# Patient Record
Sex: MALE | Race: WHITE | NOT HISPANIC OR LATINO | Employment: OTHER | URBAN - METROPOLITAN AREA
[De-identification: names, ages, dates, MRNs, and addresses within clinical notes are randomized per-mention and may not be internally consistent; named-entity substitution may affect disease eponyms.]

---

## 2018-01-11 NOTE — PROCEDURES
Results/Data  Procedure: Electroencephalography (EEG)   Indications for the procedure include Memory Disturbance  Were discussed with the patient  Written consent was obtained prior to the procedure and is detailed in the patient's record  Prior to the start of the procedure, a time out was taken and the identity of the patient was confirmed via name and date of birth with the patient  The correct site(s) and the procedure to be performed were confirmed and the site(s) were marked appropriately  The positioning of the patient and the availabilty of the correct equipment were verified  Certain medications (such as anticonvulsants and tranquilizers), stimulants, and alcohol were avoided for at least 24-48 hours prior to the procedure  Procedure Note:   Performed by: González File  Start Time: 11:15  End Time: 11:45  Electrode(s) Placement: Fp1, Fp2, F7, F3, Fz, F4, F8, T3, C3, CZ, C4, T4, T5, T6, P3, PZ, P4, O1, O2, A1 and A2  They were placed in a bipolar montage, referential montage, average reference montage, laplacian montage  The EEG was performed while the patient was exposed to of photic stimulation and awake and drowsy, but not hyperventilated and not sedated  Findings: EEG    This is a routine 18 channel EEG recording performed on a 51-year-old man  with a history of memory difficulty   Background activities during wakefulness consist of mid amplitude 8-9 cycle per second activities emanating from the posterior head region  These activities are reactive to eye opening  Intermingled with background activities are a moderate amount of lower amplitude beta activities emanating from the frontocentral head regions  Episodes of drowsiness and sleep are not seen  After about 13 minutes the study was discontinued at patient request    Photic stimulation was performed over a wide range of flash frequencies and produced a symmetrical driving response   Hyperventilation was not obtained  Concomitant EKG revealed a sinus rhythm  IMPRESSION: This is an incomplete study as only 13 minutes of EEG were performed due to poor patient tolerance  No abnormalities were noted in the portions obtained  A complete study could be obtained if the patient is more cooperative, or sedated    JOSEFINA Correia  Impression:    Post-Procedure:   Complications: The patient was non-compliant with the procedure   The patient requested I stop the EEG after only 14 minutes into the study        Signatures   Electronically signed by : Mary Malik MD; May  5 2016 12:53PM EST                       (Author)

## 2018-01-17 NOTE — MISCELLANEOUS
Dear  Mr Leticia Dawkins: We missed you for your originally scheduled appointment for an  EEG test ordered by Dr Dinah Hackett    Please call at your earliest convenience to reschedule this appointment      Sincerely,     Twila Severin  105           Electronically signed [place]   Mar 29 2016 11:40AM EST Author

## 2019-04-18 ENCOUNTER — TRANSCRIBE ORDERS (OUTPATIENT)
Dept: ADMINISTRATIVE | Facility: HOSPITAL | Age: 69
End: 2019-04-18

## 2019-04-18 DIAGNOSIS — J44.9 CHRONIC OBSTRUCTIVE PULMONARY DISEASE, UNSPECIFIED COPD TYPE (HCC): Primary | ICD-10-CM

## 2019-04-18 DIAGNOSIS — R42 DIZZINESS AND GIDDINESS: ICD-10-CM

## 2019-05-06 ENCOUNTER — HOSPITAL ENCOUNTER (OUTPATIENT)
Dept: PULMONOLOGY | Facility: HOSPITAL | Age: 69
Discharge: HOME | End: 2019-05-06
Attending: INTERNAL MEDICINE
Payer: MEDICARE

## 2019-05-06 ENCOUNTER — OFFICE VISIT (OUTPATIENT)
Dept: LAB | Facility: HOSPITAL | Age: 69
End: 2019-05-06
Attending: INTERNAL MEDICINE
Payer: MEDICARE

## 2019-05-06 DIAGNOSIS — J44.9 CHRONIC OBSTRUCTIVE PULMONARY DISEASE, UNSPECIFIED COPD TYPE (HCC): ICD-10-CM

## 2019-05-06 DIAGNOSIS — R42 DIZZINESS AND GIDDINESS: ICD-10-CM

## 2019-05-06 LAB
ATRIAL RATE: 97 BPM
P AXIS: 42 DEGREES
PR INTERVAL: 176 MS
QRS AXIS: 32 DEGREES
QRSD INTERVAL: 88 MS
QT INTERVAL: 346 MS
QTC INTERVAL: 439 MS
T WAVE AXIS: 59 DEGREES
VENTRICULAR RATE: 97 BPM

## 2019-05-06 PROCEDURE — 94729 DIFFUSING CAPACITY: CPT | Performed by: INTERNAL MEDICINE

## 2019-05-06 PROCEDURE — 94726 PLETHYSMOGRAPHY LUNG VOLUMES: CPT | Performed by: INTERNAL MEDICINE

## 2019-05-06 PROCEDURE — 94060 EVALUATION OF WHEEZING: CPT | Performed by: INTERNAL MEDICINE

## 2019-05-06 PROCEDURE — 93005 ELECTROCARDIOGRAM TRACING: CPT

## 2019-05-06 PROCEDURE — 93010 ELECTROCARDIOGRAM REPORT: CPT | Performed by: INTERNAL MEDICINE

## 2019-05-06 PROCEDURE — 94727 GAS DIL/WSHOT DETER LNG VOL: CPT

## 2019-05-06 PROCEDURE — 94729 DIFFUSING CAPACITY: CPT

## 2019-05-06 PROCEDURE — 94760 N-INVAS EAR/PLS OXIMETRY 1: CPT

## 2019-05-06 PROCEDURE — 94010 BREATHING CAPACITY TEST: CPT

## 2019-05-06 RX ORDER — DOCUSATE SODIUM 100 MG/1
1 CAPSULE, LIQUID FILLED ORAL 2 TIMES DAILY
COMMUNITY
End: 2019-06-20 | Stop reason: SDUPTHER

## 2019-05-06 RX ORDER — TRAZODONE HYDROCHLORIDE 100 MG/1
100 TABLET ORAL
COMMUNITY
Start: 2019-04-08

## 2019-05-06 RX ORDER — SIMVASTATIN 40 MG
1 TABLET ORAL
COMMUNITY
Start: 2009-06-18 | End: 2019-10-22 | Stop reason: ALTCHOICE

## 2019-05-06 RX ORDER — BUDESONIDE AND FORMOTEROL FUMARATE DIHYDRATE 80; 4.5 UG/1; UG/1
1 AEROSOL RESPIRATORY (INHALATION) EVERY 12 HOURS
COMMUNITY
End: 2020-01-29 | Stop reason: ALTCHOICE

## 2019-05-06 RX ORDER — ATORVASTATIN CALCIUM 40 MG/1
40 TABLET, FILM COATED ORAL DAILY
Refills: 5 | COMMUNITY
Start: 2019-05-02 | End: 2019-10-22 | Stop reason: SDUPTHER

## 2019-05-06 RX ORDER — CLOZAPINE 100 MG/1
TABLET ORAL
COMMUNITY
Start: 2019-04-08 | End: 2019-10-22 | Stop reason: ALTCHOICE

## 2019-05-06 RX ORDER — CLOZAPINE 200 MG/1
200 TABLET ORAL
COMMUNITY
Start: 2019-04-08

## 2019-05-06 RX ORDER — CHOLECALCIFEROL (VITAMIN D3) 25 MCG
1000 CAPSULE ORAL DAILY
Refills: 5 | COMMUNITY
Start: 2019-04-28 | End: 2019-10-22 | Stop reason: SDUPTHER

## 2019-05-06 RX ORDER — BENZTROPINE MESYLATE 1 MG/1
1 TABLET ORAL 2 TIMES DAILY
COMMUNITY
Start: 2009-06-18 | End: 2019-10-22 | Stop reason: ALTCHOICE

## 2019-05-06 RX ORDER — FAMOTIDINE 20 MG/1
1 TABLET, FILM COATED ORAL 2 TIMES DAILY
COMMUNITY
End: 2019-10-22 | Stop reason: SDUPTHER

## 2019-05-06 RX ORDER — AMLODIPINE BESYLATE 5 MG/1
1 TABLET ORAL DAILY
COMMUNITY
Start: 2010-02-03 | End: 2019-10-22 | Stop reason: ALTCHOICE

## 2019-05-06 RX ORDER — LOSARTAN POTASSIUM 25 MG/1
25 TABLET ORAL DAILY
Refills: 5 | COMMUNITY
Start: 2019-05-02 | End: 2019-10-22 | Stop reason: SDUPTHER

## 2019-05-07 ENCOUNTER — OFFICE VISIT (OUTPATIENT)
Dept: GASTROENTEROLOGY | Facility: CLINIC | Age: 69
End: 2019-05-07
Payer: MEDICARE

## 2019-05-07 VITALS
WEIGHT: 191.13 LBS | SYSTOLIC BLOOD PRESSURE: 124 MMHG | DIASTOLIC BLOOD PRESSURE: 70 MMHG | HEART RATE: 89 BPM | BODY MASS INDEX: 27.42 KG/M2

## 2019-05-07 DIAGNOSIS — K59.00 CONSTIPATION, UNSPECIFIED CONSTIPATION TYPE: ICD-10-CM

## 2019-05-07 DIAGNOSIS — K21.9 GASTROESOPHAGEAL REFLUX DISEASE, ESOPHAGITIS PRESENCE NOT SPECIFIED: Primary | ICD-10-CM

## 2019-05-07 PROCEDURE — 99214 OFFICE O/P EST MOD 30 MIN: CPT | Performed by: PHYSICIAN ASSISTANT

## 2019-05-07 RX ORDER — OMEPRAZOLE 40 MG/1
40 CAPSULE, DELAYED RELEASE ORAL DAILY
Qty: 30 CAPSULE | Refills: 1 | Status: SHIPPED | OUTPATIENT
Start: 2019-05-07 | End: 2019-10-22 | Stop reason: SDUPTHER

## 2019-05-07 RX ORDER — LACTULOSE 10 G/10G
10 SOLUTION ORAL DAILY
COMMUNITY
End: 2019-07-09 | Stop reason: ALTCHOICE

## 2019-05-07 RX ORDER — POLYETHYLENE GLYCOL 3350 17 G/17G
17 POWDER, FOR SOLUTION ORAL DAILY
Qty: 30 EACH | Refills: 5 | Status: SHIPPED | OUTPATIENT
Start: 2019-05-07 | End: 2019-10-22 | Stop reason: ALTCHOICE

## 2019-05-07 RX ORDER — ASPIRIN 325 MG
325 TABLET ORAL DAILY
COMMUNITY
End: 2019-10-22 | Stop reason: ALTCHOICE

## 2019-06-07 ENCOUNTER — APPOINTMENT (OUTPATIENT)
Dept: LAB | Facility: CLINIC | Age: 69
End: 2019-06-07
Payer: MEDICARE

## 2019-06-07 ENCOUNTER — TRANSCRIBE ORDERS (OUTPATIENT)
Dept: ADMINISTRATIVE | Facility: HOSPITAL | Age: 69
End: 2019-06-07

## 2019-06-07 DIAGNOSIS — Z79.899 LONG TERM CURRENT USE OF CLOZAPINE: Primary | ICD-10-CM

## 2019-06-07 DIAGNOSIS — Z79.899 LONG TERM CURRENT USE OF CLOZAPINE: ICD-10-CM

## 2019-06-07 LAB
BASOPHILS # BLD AUTO: 0.05 THOUSANDS/ΜL (ref 0–0.1)
BASOPHILS NFR BLD AUTO: 1 % (ref 0–1)
EOSINOPHIL # BLD AUTO: 0.31 THOUSAND/ΜL (ref 0–0.61)
EOSINOPHIL NFR BLD AUTO: 3 % (ref 0–6)
ERYTHROCYTE [DISTWIDTH] IN BLOOD BY AUTOMATED COUNT: 14.2 % (ref 11.6–15.1)
HCT VFR BLD AUTO: 43.1 % (ref 36.5–49.3)
HGB BLD-MCNC: 14.6 G/DL (ref 12–17)
IMM GRANULOCYTES # BLD AUTO: 0.02 THOUSAND/UL (ref 0–0.2)
IMM GRANULOCYTES NFR BLD AUTO: 0 % (ref 0–2)
LYMPHOCYTES # BLD AUTO: 1.32 THOUSANDS/ΜL (ref 0.6–4.47)
LYMPHOCYTES NFR BLD AUTO: 15 % (ref 14–44)
MCH RBC QN AUTO: 33.3 PG (ref 26.8–34.3)
MCHC RBC AUTO-ENTMCNC: 33.9 G/DL (ref 31.4–37.4)
MCV RBC AUTO: 98 FL (ref 82–98)
MONOCYTES # BLD AUTO: 0.86 THOUSAND/ΜL (ref 0.17–1.22)
MONOCYTES NFR BLD AUTO: 9 % (ref 4–12)
NEUTROPHILS # BLD AUTO: 6.55 THOUSANDS/ΜL (ref 1.85–7.62)
NEUTS SEG NFR BLD AUTO: 72 % (ref 43–75)
NRBC BLD AUTO-RTO: 0 /100 WBCS
PLATELET # BLD AUTO: 180 THOUSANDS/UL (ref 149–390)
PMV BLD AUTO: 10.1 FL (ref 8.9–12.7)
RBC # BLD AUTO: 4.39 MILLION/UL (ref 3.88–5.62)
WBC # BLD AUTO: 9.11 THOUSAND/UL (ref 4.31–10.16)

## 2019-06-07 PROCEDURE — 36415 COLL VENOUS BLD VENIPUNCTURE: CPT

## 2019-06-07 PROCEDURE — 85025 COMPLETE CBC W/AUTO DIFF WBC: CPT

## 2019-06-20 ENCOUNTER — TELEPHONE (OUTPATIENT)
Dept: GASTROENTEROLOGY | Facility: CLINIC | Age: 69
End: 2019-06-20

## 2019-06-20 DIAGNOSIS — K59.09 CHRONIC CONSTIPATION: Primary | ICD-10-CM

## 2019-06-20 RX ORDER — DOCUSATE SODIUM 100 MG/1
100 CAPSULE, LIQUID FILLED ORAL 2 TIMES DAILY
Qty: 60 CAPSULE | Refills: 5 | Status: SHIPPED | OUTPATIENT
Start: 2019-06-20 | End: 2019-07-09 | Stop reason: SDUPTHER

## 2019-07-09 ENCOUNTER — OFFICE VISIT (OUTPATIENT)
Dept: GASTROENTEROLOGY | Facility: CLINIC | Age: 69
End: 2019-07-09
Payer: MEDICARE

## 2019-07-09 VITALS
SYSTOLIC BLOOD PRESSURE: 124 MMHG | DIASTOLIC BLOOD PRESSURE: 78 MMHG | HEART RATE: 91 BPM | WEIGHT: 189.38 LBS | BODY MASS INDEX: 27.17 KG/M2

## 2019-07-09 DIAGNOSIS — K21.9 GASTROESOPHAGEAL REFLUX DISEASE, ESOPHAGITIS PRESENCE NOT SPECIFIED: ICD-10-CM

## 2019-07-09 DIAGNOSIS — K59.09 CHRONIC CONSTIPATION: Primary | ICD-10-CM

## 2019-07-09 PROCEDURE — 99213 OFFICE O/P EST LOW 20 MIN: CPT | Performed by: PHYSICIAN ASSISTANT

## 2019-07-09 RX ORDER — DOCUSATE SODIUM 100 MG/1
100 CAPSULE, LIQUID FILLED ORAL 2 TIMES DAILY
Qty: 60 CAPSULE | Refills: 5 | Status: SHIPPED | OUTPATIENT
Start: 2019-07-09 | End: 2020-11-11 | Stop reason: SDUPTHER

## 2019-07-09 RX ORDER — TOPIRAMATE 25 MG/1
25 TABLET ORAL 2 TIMES DAILY
Refills: 1 | COMMUNITY
Start: 2019-07-01

## 2019-07-09 RX ORDER — TEMAZEPAM 15 MG/1
15 CAPSULE ORAL
COMMUNITY

## 2019-07-09 NOTE — PROGRESS NOTES
[facility] - Outpatient Follow-up Note  Divya Neri 71 y o  male MRN: [id number]  Encounter: [id number]          ASSESSMENT AND PLAN:      1  Chronic Constipation  - Continue metamucil  - Miralax was initially recommended but this was too expensive, recommended colace but neither of these are on med rec  - Provided script for colace 100 mg BID to start  - Pt and staff will ask his sister when the last colonoscopy was and what the results were to determine if he is due for colonoscopy especially due to his recent change in bowel habits  - His weight is stable, no melena or hematochezia    2  GERD  - On omeprazole, pt reports improvement and no further reflux symptoms  ______________________________________________________________________    SUBJECTIVE:  Mr Baljinder Watkins is a 70 yo M with a PMH of COPD, HTN, HLD, presenting for follow-up of constipation and acid reflux  He was last seen in May and he was started on omeprazole and then continued on Metamucil and the suggestion was to add MiraLax  The MiraLax was too expensive and so I recommended using Colace 100 mg b i d  as a trial   This is not listed on his med rec  He is not sure see if he is taking this  He reports he does not have a bowel movement every day but he does not know how often eat he is having them  He denies melena or hematochezia  He reports occasional left lower quadrant discomfort but no pain  He does not have any nausea or vomiting  He denies any acid reflux symptoms or dysphagia  He is not sure when his last colonoscopy was but knows it was at [facility]  His history Gaynelle Lesches is coming to visit today at his group home so the staff members going to ask and call our office with this information  REVIEW OF SYSTEMS IS OTHERWISE NEGATIVE        Historical Information   Past Medical History:   Diagnosis Date    Asthma     COPD (chronic obstructive pulmonary disease) (HCC)     GERD (gastroesophageal reflux disease)     Hypercholesteremia     Hypertension     [facility]     [facility]      No past surgical history on file  Social History   Social History     Substance and Sexual Activity   Alcohol Use Not Currently     Social History     Substance and Sexual Activity   Drug Use Never     Social History     Tobacco Use   Smoking Status Former Smoker   Smokeless Tobacco Never Used     Family History   Family history unknown: Yes       Meds/Allergies       Current Outpatient Medications:     amLODIPine (NORVASC) 5 mg tablet    aspirin 325 mg tablet    atorvastatin (LIPITOR) 40 mg tablet    benztropine (COGENTIN) 1 mg tablet    budesonide-formoterol (SYMBICORT) 80-4 5 MCG/ACT inhaler    cloZAPine (CLOZARIL) 100 mg tablet    clozapine (CLOZARIL) 200 MG tablet    docusate sodium (COLACE) 100 mg capsule    famotidine (PEPCID) 20 mg tablet    Fluticasone Furoate 50 MCG/ACT AEPB    fluticasone-salmeterol (ADVAIR DISKUS, WIXELA INHUB) 250-50 mcg/dose inhaler    lactulose (CEPHULAC) 10 g packet    losartan (COZAAR) 25 mg tablet    magnesium oxide (MAG-OX) 400 mg    omeprazole (PriLOSEC) 40 MG capsule    polyethylene glycol (MIRALAX) 17 g packet    psyllium (METAMUCIL) 58 6 % packet    simvastatin (ZOCOR) 40 mg tablet    traZODone (DESYREL) 100 mg tablet    VITAMIN D HIGH POTENCY 1000 units capsule    No Known Allergies        Objective     There were no vitals taken for this visit  There is no height or weight on file to calculate BMI  PHYSICAL EXAM:      General Appearance:   Alert, cooperative, no distress   HEENT:   Normocephalic, atraumatic, anicteric      Neck:  Supple, symmetrical, trachea midline   Lungs:   Clear to auscultation bilaterally; no rales, rhonchi or wheezing; respirations unlabored    Heart[de-identified]   Regular rate and rhythm; no murmur, rub, or gallop     Abdomen:   Soft, non-tender, non-distended; normal bowel sounds; no masses, no organomegaly    Genitalia:   Deferred    Rectal:   Deferred    Extremities:  No cyanosis, clubbing or edema    Pulses:  2+ and symmetric    Skin:  No jaundice, rashes, or lesions    Lymph nodes:  No palpable cervical lymphadenopathy        Lab Results:   No visits with results within 1 Day(s) from this visit  Latest known visit with results is:   Appointment on 06/07/2019   Component Date Value    WBC 06/07/2019 9 11     RBC 06/07/2019 4 39     Hemoglobin 06/07/2019 14 6     Hematocrit 06/07/2019 43 1     MCV 06/07/2019 98     MCH 06/07/2019 33 3     MCHC 06/07/2019 33 9     RDW 06/07/2019 14 2     MPV 06/07/2019 10 1     Platelets 44/63/2120 180     nRBC 06/07/2019 0     Neutrophils Relative 06/07/2019 72     Immat GRANS % 06/07/2019 0     Lymphocytes Relative 06/07/2019 15     Monocytes Relative 06/07/2019 9     Eosinophils Relative 06/07/2019 3     Basophils Relative 06/07/2019 1     Neutrophils Absolute 06/07/2019 6 55     Immature Grans Absolute 06/07/2019 0 02     Lymphocytes Absolute 06/07/2019 1 32     Monocytes Absolute 06/07/2019 0 86     Eosinophils Absolute 06/07/2019 0 31     Basophils Absolute 06/07/2019 0 05          Radiology Results:   No results found

## 2019-07-17 ENCOUNTER — TRANSCRIBE ORDERS (OUTPATIENT)
Dept: ADMINISTRATIVE | Facility: HOSPITAL | Age: 69
End: 2019-07-17

## 2019-07-17 DIAGNOSIS — F20.9 SUBCHRONIC SCHIZOPHRENIA (HCC): Primary | ICD-10-CM

## 2019-07-19 ENCOUNTER — APPOINTMENT (OUTPATIENT)
Dept: LAB | Facility: CLINIC | Age: 69
End: 2019-07-19
Payer: MEDICARE

## 2019-07-19 LAB
BASOPHILS # BLD AUTO: 0.03 THOUSANDS/ΜL (ref 0–0.1)
BASOPHILS NFR BLD AUTO: 0 % (ref 0–1)
EOSINOPHIL # BLD AUTO: 0.24 THOUSAND/ΜL (ref 0–0.61)
EOSINOPHIL NFR BLD AUTO: 3 % (ref 0–6)
ERYTHROCYTE [DISTWIDTH] IN BLOOD BY AUTOMATED COUNT: 13.7 % (ref 11.6–15.1)
HCT VFR BLD AUTO: 43.6 % (ref 36.5–49.3)
HGB BLD-MCNC: 14.5 G/DL (ref 12–17)
IMM GRANULOCYTES # BLD AUTO: 0.02 THOUSAND/UL (ref 0–0.2)
IMM GRANULOCYTES NFR BLD AUTO: 0 % (ref 0–2)
LYMPHOCYTES # BLD AUTO: 1.21 THOUSANDS/ΜL (ref 0.6–4.47)
LYMPHOCYTES NFR BLD AUTO: 15 % (ref 14–44)
MCH RBC QN AUTO: 32.8 PG (ref 26.8–34.3)
MCHC RBC AUTO-ENTMCNC: 33.3 G/DL (ref 31.4–37.4)
MCV RBC AUTO: 99 FL (ref 82–98)
MONOCYTES # BLD AUTO: 0.8 THOUSAND/ΜL (ref 0.17–1.22)
MONOCYTES NFR BLD AUTO: 10 % (ref 4–12)
NEUTROPHILS # BLD AUTO: 5.61 THOUSANDS/ΜL (ref 1.85–7.62)
NEUTS SEG NFR BLD AUTO: 72 % (ref 43–75)
NRBC BLD AUTO-RTO: 0 /100 WBCS
PLATELET # BLD AUTO: 188 THOUSANDS/UL (ref 149–390)
PMV BLD AUTO: 10.2 FL (ref 8.9–12.7)
RBC # BLD AUTO: 4.42 MILLION/UL (ref 3.88–5.62)
WBC # BLD AUTO: 7.91 THOUSAND/UL (ref 4.31–10.16)

## 2019-07-19 PROCEDURE — 85025 COMPLETE CBC W/AUTO DIFF WBC: CPT | Performed by: PHYSICIAN ASSISTANT

## 2019-07-19 PROCEDURE — 36415 COLL VENOUS BLD VENIPUNCTURE: CPT | Performed by: PHYSICIAN ASSISTANT

## 2019-08-29 ENCOUNTER — TRANSCRIBE ORDERS (OUTPATIENT)
Dept: ADMINISTRATIVE | Facility: HOSPITAL | Age: 69
End: 2019-08-29

## 2019-08-29 ENCOUNTER — APPOINTMENT (OUTPATIENT)
Dept: LAB | Facility: CLINIC | Age: 69
End: 2019-08-29
Payer: MEDICARE

## 2019-08-29 DIAGNOSIS — E83.42 HYPOMAGNESEMIA: ICD-10-CM

## 2019-08-29 DIAGNOSIS — I51.9 MYXEDEMA HEART DISEASE: ICD-10-CM

## 2019-08-29 DIAGNOSIS — E03.9 MYXEDEMA HEART DISEASE: ICD-10-CM

## 2019-08-29 DIAGNOSIS — E55.9 AVITAMINOSIS D: ICD-10-CM

## 2019-08-29 DIAGNOSIS — D64.9 ANEMIA, UNSPECIFIED TYPE: ICD-10-CM

## 2019-08-29 DIAGNOSIS — Z11.59 SCREENING EXAMINATION FOR POLIOMYELITIS: ICD-10-CM

## 2019-08-29 DIAGNOSIS — E78.2 MIXED HYPERLIPIDEMIA: ICD-10-CM

## 2019-08-29 DIAGNOSIS — E11.9 TYPE 2 DIABETES MELLITUS WITHOUT COMPLICATION, UNSPECIFIED WHETHER LONG TERM INSULIN USE (HCC): Primary | ICD-10-CM

## 2019-08-29 LAB
25(OH)D3 SERPL-MCNC: 29.3 NG/ML (ref 30–100)
MAGNESIUM SERPL-MCNC: 2.3 MG/DL (ref 1.6–2.6)

## 2019-08-29 PROCEDURE — 36415 COLL VENOUS BLD VENIPUNCTURE: CPT

## 2019-08-29 PROCEDURE — 86803 HEPATITIS C AB TEST: CPT

## 2019-08-29 PROCEDURE — 82306 VITAMIN D 25 HYDROXY: CPT

## 2019-08-29 PROCEDURE — 83735 ASSAY OF MAGNESIUM: CPT

## 2019-08-30 ENCOUNTER — APPOINTMENT (OUTPATIENT)
Dept: LAB | Facility: CLINIC | Age: 69
End: 2019-08-30
Payer: MEDICARE

## 2019-08-30 ENCOUNTER — TRANSCRIBE ORDERS (OUTPATIENT)
Dept: LAB | Facility: CLINIC | Age: 69
End: 2019-08-30

## 2019-08-30 DIAGNOSIS — F20.9 SUBCHRONIC SCHIZOPHRENIA (HCC): ICD-10-CM

## 2019-08-30 DIAGNOSIS — F20.9 SUBCHRONIC SCHIZOPHRENIA (HCC): Primary | ICD-10-CM

## 2019-08-30 LAB
BASOPHILS # BLD AUTO: 0.06 THOUSANDS/ΜL (ref 0–0.1)
BASOPHILS NFR BLD AUTO: 1 % (ref 0–1)
EOSINOPHIL # BLD AUTO: 0.3 THOUSAND/ΜL (ref 0–0.61)
EOSINOPHIL NFR BLD AUTO: 4 % (ref 0–6)
ERYTHROCYTE [DISTWIDTH] IN BLOOD BY AUTOMATED COUNT: 13.7 % (ref 11.6–15.1)
HCT VFR BLD AUTO: 41.4 % (ref 36.5–49.3)
HCV AB SER QL: NORMAL
HGB BLD-MCNC: 13.7 G/DL (ref 12–17)
IMM GRANULOCYTES # BLD AUTO: 0.02 THOUSAND/UL (ref 0–0.2)
IMM GRANULOCYTES NFR BLD AUTO: 0 % (ref 0–2)
LYMPHOCYTES # BLD AUTO: 1.19 THOUSANDS/ΜL (ref 0.6–4.47)
LYMPHOCYTES NFR BLD AUTO: 17 % (ref 14–44)
MCH RBC QN AUTO: 32.6 PG (ref 26.8–34.3)
MCHC RBC AUTO-ENTMCNC: 33.1 G/DL (ref 31.4–37.4)
MCV RBC AUTO: 99 FL (ref 82–98)
MONOCYTES # BLD AUTO: 0.75 THOUSAND/ΜL (ref 0.17–1.22)
MONOCYTES NFR BLD AUTO: 11 % (ref 4–12)
NEUTROPHILS # BLD AUTO: 4.59 THOUSANDS/ΜL (ref 1.85–7.62)
NEUTS SEG NFR BLD AUTO: 67 % (ref 43–75)
NRBC BLD AUTO-RTO: 0 /100 WBCS
PLATELET # BLD AUTO: 202 THOUSANDS/UL (ref 149–390)
PMV BLD AUTO: 9.6 FL (ref 8.9–12.7)
RBC # BLD AUTO: 4.2 MILLION/UL (ref 3.88–5.62)
WBC # BLD AUTO: 6.91 THOUSAND/UL (ref 4.31–10.16)

## 2019-08-30 PROCEDURE — 36415 COLL VENOUS BLD VENIPUNCTURE: CPT

## 2019-08-30 PROCEDURE — 85025 COMPLETE CBC W/AUTO DIFF WBC: CPT

## 2019-09-06 ENCOUNTER — APPOINTMENT (OUTPATIENT)
Dept: LAB | Facility: CLINIC | Age: 69
End: 2019-09-06
Payer: MEDICARE

## 2019-09-06 DIAGNOSIS — E78.2 MIXED HYPERLIPIDEMIA: ICD-10-CM

## 2019-09-06 DIAGNOSIS — E03.9 MYXEDEMA HEART DISEASE: ICD-10-CM

## 2019-09-06 DIAGNOSIS — D64.9 ANEMIA, UNSPECIFIED TYPE: ICD-10-CM

## 2019-09-06 DIAGNOSIS — E83.42 HYPOMAGNESEMIA: ICD-10-CM

## 2019-09-06 DIAGNOSIS — E11.9 TYPE 2 DIABETES MELLITUS WITHOUT COMPLICATION, UNSPECIFIED WHETHER LONG TERM INSULIN USE (HCC): ICD-10-CM

## 2019-09-06 DIAGNOSIS — I51.9 MYXEDEMA HEART DISEASE: ICD-10-CM

## 2019-09-06 DIAGNOSIS — E55.9 AVITAMINOSIS D: ICD-10-CM

## 2019-09-06 DIAGNOSIS — Z11.59 SCREENING EXAMINATION FOR POLIOMYELITIS: ICD-10-CM

## 2019-09-06 LAB
ALBUMIN SERPL BCP-MCNC: 3.7 G/DL (ref 3.5–5)
ALP SERPL-CCNC: 116 U/L (ref 46–116)
ALT SERPL W P-5'-P-CCNC: 31 U/L (ref 12–78)
ANION GAP SERPL CALCULATED.3IONS-SCNC: 4 MMOL/L (ref 4–13)
AST SERPL W P-5'-P-CCNC: 19 U/L (ref 5–45)
BASOPHILS # BLD AUTO: 0.06 THOUSANDS/ΜL (ref 0–0.1)
BASOPHILS NFR BLD AUTO: 1 % (ref 0–1)
BILIRUB SERPL-MCNC: 0.55 MG/DL (ref 0.2–1)
BUN SERPL-MCNC: 11 MG/DL (ref 5–25)
CALCIUM SERPL-MCNC: 8.7 MG/DL (ref 8.3–10.1)
CHLORIDE SERPL-SCNC: 112 MMOL/L (ref 100–108)
CO2 SERPL-SCNC: 27 MMOL/L (ref 21–32)
CREAT SERPL-MCNC: 0.67 MG/DL (ref 0.6–1.3)
EOSINOPHIL # BLD AUTO: 0.25 THOUSAND/ΜL (ref 0–0.61)
EOSINOPHIL NFR BLD AUTO: 5 % (ref 0–6)
ERYTHROCYTE [DISTWIDTH] IN BLOOD BY AUTOMATED COUNT: 13.6 % (ref 11.6–15.1)
EST. AVERAGE GLUCOSE BLD GHB EST-MCNC: 88 MG/DL
GFR SERPL CREATININE-BSD FRML MDRD: 98 ML/MIN/1.73SQ M
GLUCOSE P FAST SERPL-MCNC: 96 MG/DL (ref 65–99)
HBA1C MFR BLD: 4.7 % (ref 4.2–6.3)
HCT VFR BLD AUTO: 43.2 % (ref 36.5–49.3)
HGB BLD-MCNC: 14.5 G/DL (ref 12–17)
IMM GRANULOCYTES # BLD AUTO: 0.01 THOUSAND/UL (ref 0–0.2)
IMM GRANULOCYTES NFR BLD AUTO: 0 % (ref 0–2)
LYMPHOCYTES # BLD AUTO: 1.03 THOUSANDS/ΜL (ref 0.6–4.47)
LYMPHOCYTES NFR BLD AUTO: 20 % (ref 14–44)
MCH RBC QN AUTO: 32.8 PG (ref 26.8–34.3)
MCHC RBC AUTO-ENTMCNC: 33.6 G/DL (ref 31.4–37.4)
MCV RBC AUTO: 98 FL (ref 82–98)
MONOCYTES # BLD AUTO: 0.56 THOUSAND/ΜL (ref 0.17–1.22)
MONOCYTES NFR BLD AUTO: 11 % (ref 4–12)
NEUTROPHILS # BLD AUTO: 3.23 THOUSANDS/ΜL (ref 1.85–7.62)
NEUTS SEG NFR BLD AUTO: 63 % (ref 43–75)
NRBC BLD AUTO-RTO: 0 /100 WBCS
PLATELET # BLD AUTO: 187 THOUSANDS/UL (ref 149–390)
PMV BLD AUTO: 10.2 FL (ref 8.9–12.7)
POTASSIUM SERPL-SCNC: 3.9 MMOL/L (ref 3.5–5.3)
PROT SERPL-MCNC: 6.7 G/DL (ref 6.4–8.2)
RBC # BLD AUTO: 4.42 MILLION/UL (ref 3.88–5.62)
SODIUM SERPL-SCNC: 143 MMOL/L (ref 136–145)
TSH SERPL DL<=0.05 MIU/L-ACNC: 1.47 UIU/ML (ref 0.36–3.74)
WBC # BLD AUTO: 5.14 THOUSAND/UL (ref 4.31–10.16)

## 2019-09-06 PROCEDURE — 85025 COMPLETE CBC W/AUTO DIFF WBC: CPT

## 2019-09-06 PROCEDURE — 83036 HEMOGLOBIN GLYCOSYLATED A1C: CPT

## 2019-09-06 PROCEDURE — 80053 COMPREHEN METABOLIC PANEL: CPT

## 2019-09-06 PROCEDURE — 36415 COLL VENOUS BLD VENIPUNCTURE: CPT

## 2019-09-06 PROCEDURE — 84443 ASSAY THYROID STIM HORMONE: CPT

## 2019-09-12 ENCOUNTER — TRANSCRIBE ORDERS (OUTPATIENT)
Dept: ADMINISTRATIVE | Facility: HOSPITAL | Age: 69
End: 2019-09-12

## 2019-09-12 ENCOUNTER — APPOINTMENT (OUTPATIENT)
Dept: LAB | Facility: CLINIC | Age: 69
End: 2019-09-12
Payer: MEDICARE

## 2019-09-12 DIAGNOSIS — F20.9 SUBCHRONIC SCHIZOPHRENIA (HCC): Primary | ICD-10-CM

## 2019-09-12 DIAGNOSIS — F20.9 SUBCHRONIC SCHIZOPHRENIA (HCC): ICD-10-CM

## 2019-09-12 LAB
BASOPHILS # BLD AUTO: 0.05 THOUSANDS/ΜL (ref 0–0.1)
BASOPHILS NFR BLD AUTO: 1 % (ref 0–1)
EOSINOPHIL # BLD AUTO: 0.24 THOUSAND/ΜL (ref 0–0.61)
EOSINOPHIL NFR BLD AUTO: 4 % (ref 0–6)
ERYTHROCYTE [DISTWIDTH] IN BLOOD BY AUTOMATED COUNT: 13.9 % (ref 11.6–15.1)
HCT VFR BLD AUTO: 43.5 % (ref 36.5–49.3)
HGB BLD-MCNC: 14.4 G/DL (ref 12–17)
IMM GRANULOCYTES # BLD AUTO: 0.01 THOUSAND/UL (ref 0–0.2)
IMM GRANULOCYTES NFR BLD AUTO: 0 % (ref 0–2)
LYMPHOCYTES # BLD AUTO: 1.14 THOUSANDS/ΜL (ref 0.6–4.47)
LYMPHOCYTES NFR BLD AUTO: 21 % (ref 14–44)
MCH RBC QN AUTO: 32.8 PG (ref 26.8–34.3)
MCHC RBC AUTO-ENTMCNC: 33.1 G/DL (ref 31.4–37.4)
MCV RBC AUTO: 99 FL (ref 82–98)
MONOCYTES # BLD AUTO: 0.54 THOUSAND/ΜL (ref 0.17–1.22)
MONOCYTES NFR BLD AUTO: 10 % (ref 4–12)
NEUTROPHILS # BLD AUTO: 3.48 THOUSANDS/ΜL (ref 1.85–7.62)
NEUTS SEG NFR BLD AUTO: 64 % (ref 43–75)
NRBC BLD AUTO-RTO: 0 /100 WBCS
PLATELET # BLD AUTO: 187 THOUSANDS/UL (ref 149–390)
PMV BLD AUTO: 10.3 FL (ref 8.9–12.7)
RBC # BLD AUTO: 4.39 MILLION/UL (ref 3.88–5.62)
WBC # BLD AUTO: 5.46 THOUSAND/UL (ref 4.31–10.16)

## 2019-09-12 PROCEDURE — 36415 COLL VENOUS BLD VENIPUNCTURE: CPT

## 2019-09-12 PROCEDURE — 85025 COMPLETE CBC W/AUTO DIFF WBC: CPT

## 2019-09-16 RX ORDER — MOXIFLOXACIN 5 MG/ML
SOLUTION/ DROPS OPHTHALMIC
Refills: 0 | COMMUNITY
Start: 2019-08-29 | End: 2020-01-29 | Stop reason: ALTCHOICE

## 2019-09-16 RX ORDER — TRIPROLIDINE/PSEUDOEPHEDRINE 2.5MG-60MG
TABLET ORAL
Refills: 0 | COMMUNITY
Start: 2019-08-29 | End: 2019-10-22 | Stop reason: ALTCHOICE

## 2019-09-19 ENCOUNTER — PREP FOR PROCEDURE (OUTPATIENT)
Dept: GASTROENTEROLOGY | Facility: CLINIC | Age: 69
End: 2019-09-19

## 2019-09-19 ENCOUNTER — OFFICE VISIT (OUTPATIENT)
Dept: GASTROENTEROLOGY | Facility: CLINIC | Age: 69
End: 2019-09-19
Payer: MEDICARE

## 2019-09-19 VITALS
BODY MASS INDEX: 28.16 KG/M2 | WEIGHT: 185.8 LBS | HEIGHT: 68 IN | HEART RATE: 85 BPM | DIASTOLIC BLOOD PRESSURE: 70 MMHG | SYSTOLIC BLOOD PRESSURE: 118 MMHG

## 2019-09-19 DIAGNOSIS — Z12.11 SCREENING FOR MALIGNANT NEOPLASM OF COLON: Primary | ICD-10-CM

## 2019-09-19 PROCEDURE — 99213 OFFICE O/P EST LOW 20 MIN: CPT | Performed by: PHYSICIAN ASSISTANT

## 2019-09-19 NOTE — PROGRESS NOTES
[facility] - Outpatient Consultation  Raymon Both 71 y o  male MRN: [id number]  Encounter: [id number]          ASSESSMENT AND PLAN:      1  Screening for malignant neoplasm of colon  Will plan colonoscopy  ______________________________________________________________________    HPI:   70-year-old male referred from his primary care doctor for routine colonoscopy  Patient reports occasional episodes of loose stool 1 to 2 times a week  Patient denies any melena or rectal bleeding  Patient reports he had a colonoscopy many many years ago  Patient is unsure with the results were at that time  Patient has no family history of colon cancer  Patient denies abdominal pain nausea and vomiting  REVIEW OF SYSTEMS:    CONSTITUTIONAL: Denies any fever, chills, rigors, and weight loss  HEENT: No earache or tinnitus  Denies hearing loss or visual disturbances  CARDIOVASCULAR: No chest pain or palpitations  RESPIRATORY: Denies any cough, hemoptysis, shortness of breath or dyspnea on exertion  GASTROINTESTINAL: As noted in the History of Present Illness  GENITOURINARY: No problems with urination  Denies any hematuria or dysuria  NEUROLOGIC: No dizziness or vertigo, denies headaches  MUSCULOSKELETAL: Denies any muscle or joint pain  SKIN: Denies skin rashes or itching  ENDOCRINE: Denies excessive thirst  Denies intolerance to heat or cold  PSYCHOSOCIAL: Denies depression or anxiety  Denies any recent memory loss  Historical Information   Past Medical History:   Diagnosis Date    Asthma     [facility]     GERD (gastroesophageal reflux disease)     Hypercholesteremia     Hypertension     Paranoid schizophrenia [facility]     Peripheral vascular disease ([facility] )      History reviewed  No pertinent surgical history    Social History   Social History     Substance and Sexual Activity   Alcohol Use Not Currently     Social History Substance and Sexual Activity   Drug Use Never     Social History     Tobacco Use   Smoking Status Former Smoker   Smokeless Tobacco Never Used     Family History   Problem Relation Age of Onset    No Known Problems Mother     No Known Problems Father        Meds/Allergies       Current Outpatient Medications:     amLODIPine (NORVASC) 5 mg tablet    aspirin 325 mg tablet    atorvastatin (LIPITOR) 40 mg tablet    benztropine (COGENTIN) 1 mg tablet    budesonide-formoterol (SYMBICORT) 80-4 5 MCG/ACT inhaler    cloZAPine (CLOZARIL) 100 mg tablet    clozapine (CLOZARIL) 200 MG tablet    docusate sodium (COLACE) 100 mg capsule    DUREZOL 0 05 % EMUL    famotidine (PEPCID) 20 mg tablet    Fluticasone Furoate 50 MCG/ACT AEPB    fluticasone-salmeterol (ADVAIR DISKUS, WIXELA INHUB) 250-50 mcg/dose inhaler    losartan (COZAAR) 25 mg tablet    magnesium oxide (MAG-OX) 400 mg    moxifloxacin (VIGAMOX) 0 5 % ophthalmic solution    omeprazole (PriLOSEC) 40 MG capsule    polyethylene glycol (MIRALAX) 17 g packet    psyllium (METAMUCIL) 58 6 % packet    simvastatin (ZOCOR) 40 mg tablet    temazepam (RESTORIL) 15 mg capsule    topiramate (TOPAMAX) 25 mg tablet    traZODone (DESYREL) 100 mg tablet    VITAMIN D HIGH POTENCY 1000 units capsule    No Known Allergies        Objective     Blood pressure 118/70, pulse 85, height 5' 8" (1 727 m), weight 84 3 kg (185 lb 12 8 oz)  Body mass index is 28 25 kg/m²  PHYSICAL EXAM:      General Appearance:   Alert, cooperative, no distress   HEENT:   Normocephalic, atraumatic, anicteric      Neck:  Supple, symmetrical, trachea midline   Lungs:   Clear to auscultation bilaterally; no rales, rhonchi or wheezing; respirations unlabored    Heart[de-identified]   Regular rate and rhythm; no murmur, rub, or gallop     Abdomen:   Soft, non-tender, non-distended; normal bowel sounds; no masses, no organomegaly    Genitalia:   Deferred    Rectal:   Deferred    Extremities:  No cyanosis, clubbing or edema    Pulses:  2+ and symmetric    Skin:  No jaundice, rashes, or lesions    Lymph nodes:  No palpable cervical lymphadenopathy        Lab Results:   No visits with results within 1 Day(s) from this visit  Latest known visit with results is:   Appointment on 09/12/2019   Component Date Value    WBC 09/12/2019 5 46     RBC 09/12/2019 4 39     Hemoglobin 09/12/2019 14 4     Hematocrit 09/12/2019 43 5     MCV 09/12/2019 99*    MCH 09/12/2019 32 8     MCHC 09/12/2019 33 1     RDW 09/12/2019 13 9     MPV 09/12/2019 10 3     Platelets 41/33/4671 187     nRBC 09/12/2019 0     Neutrophils Relative 09/12/2019 64     Immat GRANS % 09/12/2019 0     Lymphocytes Relative 09/12/2019 21     Monocytes Relative 09/12/2019 10     Eosinophils Relative 09/12/2019 4     Basophils Relative 09/12/2019 1     Neutrophils Absolute 09/12/2019 3 48     Immature Grans Absolute 09/12/2019 0 01     Lymphocytes Absolute 09/12/2019 1 14     Monocytes Absolute 09/12/2019 0 54     Eosinophils Absolute 09/12/2019 0 24     Basophils Absolute 09/12/2019 0 05          Radiology Results:   No results found

## 2019-09-19 NOTE — PATIENT INSTRUCTIONS
Colonoscopy   AMBULATORY CARE:   What you need to know about a colonoscopy:  A colonoscopy is a procedure to examine the inside of your colon (intestine) with a scope  A scope is a flexible tube with a small light and camera on the end  Polyps or tissue growths may be removed during your colonoscopy  What you need to do the week before your colonoscopy: You will need to stop taking medicines that contain aspirin or iron for 7 days before your colonoscopy  If you take anticoagulants, such as warfarin, ask when you should stop taking it  Make plans for someone to drive you home after your procedure  How to prepare for your colonoscopy: Your healthcare provider will have you prepare your bowels before your procedure  Your bowels will need to be empty before your procedure to allow him to clearly see your colon  You will need to do the following the day before your procedure:  · Have only clear liquids  for the entire day before your colonoscopy  Clear liquid diet includes clear fruit juices and broths, clear flavored gelatin, and hard candy  It also includes coffee, tea, carbonated beverages, and clear sports drinks  · Follow your bowel prep as directed  There are many different preparations that can be given before a colonoscopy  Some are given over 2 hours and others over 6 hours  Some are given earlier in the afternoon the day before the colonoscopy  Others are given the day before and then the morning of the colonoscopy  With any bowel prep, stay close to the bathroom  This liquid will cause your bowels to move frequently  · An enema  may be needed  Your healthcare provider may tell you to use an enema to help clean out your bowels  · Do not eat or drink anything after midnight  This will help prevent problems that can happen if you vomit while under anesthesia  What will happen during your colonoscopy:   · You will be given medicine to help you relax   You will lie on your left side and raise one or both knees toward your chest  Your healthcare provider will examine your anus and use a finger to check your rectum  You may need another enema if your bowel is not empty  The scope will be lubricated and gently placed into your anus  It will then be passed through your rectum and into your colon  Water or air will be put into your colon to help clean or expand it  This is done so your healthcare provider can see your colon clearly  · Tissue samples may be taken from the walls of your bowel and sent to a lab for tests  If you have a polyp, your healthcare provider will pass a wire loop through the scope and use it to hold the polyp  The polyp is then burned or cut off the wall of your colon  Removed polyps are sent to a lab for tests  Pictures of your colon may be taken during the procedure  The scope will be removed when the procedure is done  What will happen after your colonoscopy:   · Rest after your procedure  You may feel bloated, have some gas and abdominal discomfort  You may need to lie on your right side with a heating pad on your abdomen  You may need to take short walks to help move the gas out  Eat small meals, if you feel bloated  Do not drive or make important decisions until the day after your procedure  · You may have polyps removed  Do not take aspirin or go on long car trips for 7 days after your procedure  Ask your healthcare provider about any other limits after your procedure  Risks of a colonoscopy: You may have pain or bleeding after the scope or polyps are removed  You may also have a slow heartbeat, decreased blood pressure, or increased sweating  Your colon may tear due to the increased pressure from the scope and other instruments  This may cause bowel contents to leak out of your colon and into your abdomen  If this happens, you will need to stay in the hospital and have surgery on your colon     Seek care immediately if:   · You have a large amount of bright red blood in your bowel movements  · Your abdomen is hard and firm and you have severe pain  · You have sudden trouble breathing  Contact your healthcare provider if:   · You develop a rash or hives  · You have a fever within 24 hours of your procedure  · You have not had a bowel movement for 3 days after your procedure  · You have questions or concerns about your condition or care  Activity:   · Do not lift, strain, or run  for 3 days after your procedure  · Rest after your procedure  You have been given medicine to relax you  Do not  drive or make important decisions until the day after your procedure  Return to your normal activity as directed  · Relieve gas and discomfort from bloating  by lying on your right side with a heating pad on your abdomen  You may need to take short walks to help the gas move out  Eat small meals until bloating is relieved  If you had polyps removed: For 7 days after your procedure:  · Do not  take aspirin  · Do not  go on long car rides  Help prevent constipation:   · Eat a variety of healthy foods  Healthy foods include fruit, vegetables, whole-grain breads, low-fat dairy products, beans, lean meat, and fish  Ask if you need to be on a special diet  Your healthcare provider may recommend that you eat high-fiber foods such as cooked beans  Fiber helps you have regular bowel movements  · Drink liquids as directed  Adults should drink between 9 and 13 eight-ounce cups of liquid every day  Ask what amount is best for you  For most people, good liquids to drink are water, juice, and milk  · Exercise as directed  Talk to your healthcare provider about the best exercise plan for you  Exercise can help prevent constipation, decrease your blood pressure and improve your health  Follow up with your healthcare provider as directed:  Write down your questions so you remember to ask them during your visits     © 2017 [employer] Information is for End User's use only and may not be sold, redistributed or otherwise used for commercial purposes  All illustrations and images included in CareNotes® are the copyrighted property of [employer]  or Ricki Sandra  The above information is an  only  It is not intended as medical advice for individual conditions or treatments  Talk to your doctor, nurse or pharmacist before following any medical regimen to see if it is safe and effective for you

## 2019-09-19 NOTE — LETTER
September 19, 2019     Lydia Smith MD  [address]  [facility]  [address] [place] [id number]    Patient: Elvin Shah   YOB: 1950   Date of Visit: 9/19/2019       Dear Dr Hung French:    Thank you for referring Camilla Ely to me for evaluation  Below are my notes for this consultation  If you have questions, please do not hesitate to call me  I look forward to following your patient along with you  Sincerely,        Adriana Helton PA-C        CC: No Recipients  Christian Solis  9/19/2019 10:39 AM  Sign at close encounter  [facility] - Outpatient Consultation  Elvin Race 71 y o  male MRN: [id number]  Encounter: [id number]          ASSESSMENT AND PLAN:      1  Screening for malignant neoplasm of colon  Will plan colonoscopy  ______________________________________________________________________    HPI:   60-year-old male referred from his primary care doctor for routine colonoscopy  Patient reports occasional episodes of loose stool 1 to 2 times a week  Patient denies any melena or rectal bleeding  Patient reports he had a colonoscopy many many years ago  Patient is unsure with the results were at that time  Patient has no family history of colon cancer  Patient denies abdominal pain nausea and vomiting  REVIEW OF SYSTEMS:    CONSTITUTIONAL: Denies any fever, chills, rigors, and weight loss  HEENT: No earache or tinnitus  Denies hearing loss or visual disturbances  CARDIOVASCULAR: No chest pain or palpitations  RESPIRATORY: Denies any cough, hemoptysis, shortness of breath or dyspnea on exertion  GASTROINTESTINAL: As noted in the History of Present Illness  GENITOURINARY: No problems with urination  Denies any hematuria or dysuria  NEUROLOGIC: No dizziness or vertigo, denies headaches  MUSCULOSKELETAL: Denies any muscle or joint pain  SKIN: Denies skin rashes or itching     ENDOCRINE: Denies excessive thirst  Denies intolerance to heat or cold   PSYCHOSOCIAL: Denies depression or anxiety  Denies any recent memory loss  Historical Information   Past Medical History:   Diagnosis Date    Asthma     COPD (chronic obstructive pulmonary disease) ([place] [address] )     GERD (gastroesophageal reflux disease)     Hypercholesteremia     Hypertension     Paranoid schizophrenia ([place] [address] )     Peripheral vascular disease ([place] [address] )      History reviewed  No pertinent surgical history    Social History   Social History     Substance and Sexual Activity   Alcohol Use Not Currently     Social History     Substance and Sexual Activity   Drug Use Never     Social History     Tobacco Use   Smoking Status Former Smoker   Smokeless Tobacco Never Used     Family History   Problem Relation Age of Onset    No Known Problems Mother     No Known Problems Father        Meds/Allergies       Current Outpatient Medications:     amLODIPine (NORVASC) 5 mg tablet    aspirin 325 mg tablet    atorvastatin (LIPITOR) 40 mg tablet    benztropine (COGENTIN) 1 mg tablet    budesonide-formoterol (SYMBICORT) 80-4 5 MCG/ACT inhaler    cloZAPine (CLOZARIL) 100 mg tablet    clozapine (CLOZARIL) 200 MG tablet    docusate sodium (COLACE) 100 mg capsule    DUREZOL 0 05 % EMUL    famotidine (PEPCID) 20 mg tablet    Fluticasone Furoate 50 MCG/ACT AEPB    fluticasone-salmeterol (ADVAIR DISKUS, WIXELA INHUB) 250-50 mcg/dose inhaler    losartan (COZAAR) 25 mg tablet    magnesium oxide (MAG-OX) 400 mg    moxifloxacin (VIGAMOX) 0 5 % ophthalmic solution    omeprazole (PriLOSEC) 40 MG capsule    polyethylene glycol (MIRALAX) 17 g packet    psyllium (METAMUCIL) 58 6 % packet    simvastatin (ZOCOR) 40 mg tablet    temazepam (RESTORIL) 15 mg capsule    topiramate (TOPAMAX) 25 mg tablet    traZODone (DESYREL) 100 mg tablet    VITAMIN D HIGH POTENCY 1000 units capsule    No Known Allergies        Objective     Blood pressure 118/70, pulse 85, height 5' 8" (1 727 m), weight 84 3 kg (185 lb 12 8 oz)  Body mass index is 28 25 kg/m²  PHYSICAL EXAM:      General Appearance:   Alert, cooperative, no distress   HEENT:   Normocephalic, atraumatic, anicteric      Neck:  Supple, symmetrical, trachea midline   Lungs:   Clear to auscultation bilaterally; no rales, rhonchi or wheezing; respirations unlabored    Heart[de-identified]   Regular rate and rhythm; no murmur, rub, or gallop  Abdomen:   Soft, non-tender, non-distended; normal bowel sounds; no masses, no organomegaly    Genitalia:   Deferred    Rectal:   Deferred    Extremities:  No cyanosis, clubbing or edema    Pulses:  2+ and symmetric    Skin:  No jaundice, rashes, or lesions    Lymph nodes:  No palpable cervical lymphadenopathy        Lab Results:   No visits with results within 1 Day(s) from this visit  Latest known visit with results is:   Appointment on 09/12/2019   Component Date Value    WBC 09/12/2019 5 46     RBC 09/12/2019 4 39     Hemoglobin 09/12/2019 14 4     Hematocrit 09/12/2019 43 5     MCV 09/12/2019 99*    MCH 09/12/2019 32 8     MCHC 09/12/2019 33 1     RDW 09/12/2019 13 9     MPV 09/12/2019 10 3     Platelets 43/07/5245 187     nRBC 09/12/2019 0     Neutrophils Relative 09/12/2019 64     Immat GRANS % 09/12/2019 0     Lymphocytes Relative 09/12/2019 21     Monocytes Relative 09/12/2019 10     Eosinophils Relative 09/12/2019 4     Basophils Relative 09/12/2019 1     Neutrophils Absolute 09/12/2019 3 48     Immature Grans Absolute 09/12/2019 0 01     Lymphocytes Absolute 09/12/2019 1 14     Monocytes Absolute 09/12/2019 0 54     Eosinophils Absolute 09/12/2019 0 24     Basophils Absolute 09/12/2019 0 05          Radiology Results:   No results found

## 2019-10-01 ENCOUNTER — APPOINTMENT (OUTPATIENT)
Dept: LAB | Facility: CLINIC | Age: 69
End: 2019-10-01
Payer: MEDICARE

## 2019-10-01 DIAGNOSIS — F20.9 SUBCHRONIC SCHIZOPHRENIA (HCC): ICD-10-CM

## 2019-10-01 LAB
BASOPHILS # BLD AUTO: 0.03 THOUSANDS/ΜL (ref 0–0.1)
BASOPHILS NFR BLD AUTO: 1 % (ref 0–1)
EOSINOPHIL # BLD AUTO: 0.17 THOUSAND/ΜL (ref 0–0.61)
EOSINOPHIL NFR BLD AUTO: 4 % (ref 0–6)
ERYTHROCYTE [DISTWIDTH] IN BLOOD BY AUTOMATED COUNT: 14 % (ref 11.6–15.1)
HCT VFR BLD AUTO: 41 % (ref 36.5–49.3)
HGB BLD-MCNC: 13.9 G/DL (ref 12–17)
IMM GRANULOCYTES # BLD AUTO: 0.01 THOUSAND/UL (ref 0–0.2)
IMM GRANULOCYTES NFR BLD AUTO: 0 % (ref 0–2)
LYMPHOCYTES # BLD AUTO: 1.09 THOUSANDS/ΜL (ref 0.6–4.47)
LYMPHOCYTES NFR BLD AUTO: 23 % (ref 14–44)
MCH RBC QN AUTO: 33.1 PG (ref 26.8–34.3)
MCHC RBC AUTO-ENTMCNC: 33.9 G/DL (ref 31.4–37.4)
MCV RBC AUTO: 98 FL (ref 82–98)
MONOCYTES # BLD AUTO: 0.52 THOUSAND/ΜL (ref 0.17–1.22)
MONOCYTES NFR BLD AUTO: 11 % (ref 4–12)
NEUTROPHILS # BLD AUTO: 2.9 THOUSANDS/ΜL (ref 1.85–7.62)
NEUTS SEG NFR BLD AUTO: 61 % (ref 43–75)
NRBC BLD AUTO-RTO: 0 /100 WBCS
PLATELET # BLD AUTO: 174 THOUSANDS/UL (ref 149–390)
PMV BLD AUTO: 10.4 FL (ref 8.9–12.7)
RBC # BLD AUTO: 4.2 MILLION/UL (ref 3.88–5.62)
WBC # BLD AUTO: 4.72 THOUSAND/UL (ref 4.31–10.16)

## 2019-10-01 PROCEDURE — 85025 COMPLETE CBC W/AUTO DIFF WBC: CPT

## 2019-10-01 PROCEDURE — 36415 COLL VENOUS BLD VENIPUNCTURE: CPT

## 2019-10-04 ENCOUNTER — HOSPITAL ENCOUNTER (OUTPATIENT)
Dept: GASTROENTEROLOGY | Facility: HOSPITAL | Age: 69
Setting detail: OUTPATIENT SURGERY
Discharge: HOME | End: 2019-10-04
Attending: INTERNAL MEDICINE | Admitting: INTERNAL MEDICINE
Payer: MEDICARE

## 2019-10-04 ENCOUNTER — ANESTHESIA EVENT (OUTPATIENT)
Dept: GASTROENTEROLOGY | Facility: HOSPITAL | Age: 69
End: 2019-10-04

## 2019-10-04 ENCOUNTER — ANESTHESIA (OUTPATIENT)
Dept: GASTROENTEROLOGY | Facility: HOSPITAL | Age: 69
End: 2019-10-04

## 2019-10-04 VITALS
HEIGHT: 71 IN | HEART RATE: 73 BPM | RESPIRATION RATE: 18 BRPM | SYSTOLIC BLOOD PRESSURE: 160 MMHG | OXYGEN SATURATION: 97 % | WEIGHT: 179.23 LBS | TEMPERATURE: 98 F | DIASTOLIC BLOOD PRESSURE: 88 MMHG | BODY MASS INDEX: 25.09 KG/M2

## 2019-10-04 DIAGNOSIS — Z12.11 SCREENING FOR MALIGNANT NEOPLASM OF COLON: ICD-10-CM

## 2019-10-04 PROCEDURE — G0121 COLON CA SCRN NOT HI RSK IND: HCPCS | Performed by: INTERNAL MEDICINE

## 2019-10-04 RX ORDER — LIDOCAINE HYDROCHLORIDE 10 MG/ML
INJECTION, SOLUTION EPIDURAL; INFILTRATION; INTRACAUDAL; PERINEURAL AS NEEDED
Status: DISCONTINUED | OUTPATIENT
Start: 2019-10-04 | End: 2019-10-04 | Stop reason: SURG

## 2019-10-04 RX ORDER — PROPOFOL 10 MG/ML
INJECTION, EMULSION INTRAVENOUS AS NEEDED
Status: DISCONTINUED | OUTPATIENT
Start: 2019-10-04 | End: 2019-10-04 | Stop reason: SURG

## 2019-10-04 RX ORDER — SODIUM CHLORIDE, SODIUM LACTATE, POTASSIUM CHLORIDE, CALCIUM CHLORIDE 600; 310; 30; 20 MG/100ML; MG/100ML; MG/100ML; MG/100ML
INJECTION, SOLUTION INTRAVENOUS CONTINUOUS PRN
Status: DISCONTINUED | OUTPATIENT
Start: 2019-10-04 | End: 2019-10-04 | Stop reason: SURG

## 2019-10-04 RX ADMIN — PROPOFOL 30 MG: 10 INJECTION, EMULSION INTRAVENOUS at 10:23

## 2019-10-04 RX ADMIN — LIDOCAINE HYDROCHLORIDE 50 MG: 10 INJECTION, SOLUTION EPIDURAL; INFILTRATION; INTRACAUDAL; PERINEURAL at 10:20

## 2019-10-04 RX ADMIN — PROPOFOL 30 MG: 10 INJECTION, EMULSION INTRAVENOUS at 10:26

## 2019-10-04 RX ADMIN — PROPOFOL 150 MG: 10 INJECTION, EMULSION INTRAVENOUS at 10:20

## 2019-10-04 RX ADMIN — PROPOFOL 30 MG: 10 INJECTION, EMULSION INTRAVENOUS at 10:29

## 2019-10-04 RX ADMIN — PROPOFOL 30 MG: 10 INJECTION, EMULSION INTRAVENOUS at 10:33

## 2019-10-04 RX ADMIN — SODIUM CHLORIDE, SODIUM LACTATE, POTASSIUM CHLORIDE, AND CALCIUM CHLORIDE: .6; .31; .03; .02 INJECTION, SOLUTION INTRAVENOUS at 09:30

## 2019-10-04 NOTE — H&P
History and Physical -  [facility]  Olivia Moore 71 y o  male MRN: [id number]      HPI: Olivia Moore is a 71y o  year old male who presents for screening colonoscopy      REVIEW OF SYSTEMS: Per the HPI, and otherwise unremarkable  Historical Information   Past Medical History:   Diagnosis Date    Asthma     [facility]ch[facility]) ([facility] )     GERD (gastroesophageal reflux disease)     Hypercholesteremia     Hypertension     Paranoid schizophrenia ([facility] )     Peripheral vascular disease ([place] [address] )      No past surgical history on file  Social History   Social History     Substance and Sexual Activity   Alcohol Use Not Currently     Social History     Substance and Sexual Activity   Drug Use Never     Social History     Tobacco Use   Smoking Status Former Smoker   Smokeless Tobacco Never Used     Family History   Problem Relation Age of Onset    No Known Problems Mother     No Known Problems Father        Meds/Allergies       (Not in a hospital admission)    No Known Allergies    Objective     There were no vitals taken for this visit  PHYSICAL EXAM    Gen: NAD  CV: RRR  CHEST: Clear  ABD: soft, NT/ND  EXT: no edema      ASSESSMENT/PLAN:  This is a 71y o  year old male here for screening colonoscopy, and he is stable and optimized for his procedure

## 2019-10-04 NOTE — ANESTHESIA PREPROCEDURE EVALUATION
Review of Systems/Medical History  Patient summary reviewed  Chart reviewed  No history of anesthetic complications     Cardiovascular  EKG reviewed, Exercise tolerance (METS): >4,  Hyperlipidemia, Hypertension on > 1 medication, PVD,    Pulmonary  COPD moderate- medication dependent ,        GI/Hepatic    GERD well controlled,             Endo/Other     GYN       Hematology   Musculoskeletal       Neurology   Psychology   No depression , Schizophrenia             Physical Exam    Airway    Mallampati score: II  TM Distance: >3 FB  Neck ROM: full     Dental   upper dentures,     Cardiovascular      Pulmonary      Other Findings        Anesthesia Plan  ASA Score- 3     Anesthesia Type- IV sedation with anesthesia with ASA Monitors  Additional Monitors:   Airway Plan:         Plan Factors-    Induction- intravenous  Postoperative Plan-     Informed Consent- Anesthetic plan and risks discussed with patient  I personally reviewed this patient with the CRNA  Discussed and agreed on the Anesthesia Plan with the CRNA  Pilar Su

## 2019-10-04 NOTE — DISCHARGE INSTRUCTIONS
Colonoscopy   WHAT YOU NEED TO KNOW:   A colonoscopy is a procedure to examine the inside of your colon (intestine) with a scope  Polyps or tissue growths may have been removed during your colonoscopy  It is normal to feel bloated and to have some abdominal discomfort  You should be passing gas  If you have hemorrhoids or you had polyps removed, you may have a small amount of bleeding  DISCHARGE INSTRUCTIONS:   Seek care immediately if:   · You have a large amount of bright red blood in your bowel movements  · Your abdomen is hard and firm and you have severe pain  · You have sudden trouble breathing  Contact your healthcare provider if:   · You develop a rash or hives  · You have a fever within 24 hours of your procedure  · You have not had a bowel movement for 3 days after your procedure  · You have questions or concerns about your condition or care  Activity:   · Do not lift, strain, or run  for 3 days after your procedure  · Rest after your procedure  You have been given medicine to relax you  Do not  drive or make important decisions until the day after your procedure  Return to your normal activity as directed  · Relieve gas and discomfort from bloating  by lying on your right side with a heating pad on your abdomen  You may need to take short walks to help the gas move out  Eat small meals until bloating is relieved  If you had polyps removed: For 7 days after your procedure:  · Do not  take aspirin  · Do not  go on long car rides  Help prevent constipation:   · Eat a variety of healthy foods  Healthy foods include fruit, vegetables, whole-grain breads, low-fat dairy products, beans, lean meat, and fish  Ask if you need to be on a special diet  Your healthcare provider may recommend that you eat high-fiber foods such as cooked beans  Fiber helps you have regular bowel movements  · Drink liquids as directed    Adults should drink between 9 and 13 eight-ounce cups of liquid every day  Ask what amount is best for you  For most people, good liquids to drink are water, juice, and milk  · Exercise as directed  Talk to your healthcare provider about the best exercise plan for you  Exercise can help prevent constipation, decrease your blood pressure and improve your health  Follow up with your healthcare provider as directed:  Write down your questions so you remember to ask them during your visits  © 2017 [employer] Information is for End User's use only and may not be sold, redistributed or otherwise used for commercial purposes  All illustrations and images included in CareNotes® are the copyrighted property of [employer]  or [employer]i [employer]  The above information is an  only  It is not intended as medical advice for individual conditions or treatments  Talk to your doctor, nurse or pharmacist before following any medical regimen to see if it is safe and effective for you

## 2019-10-04 NOTE — ANESTHESIA POSTPROCEDURE EVALUATION
Post-Op Assessment Note    CV Status:  Stable  Pain Score: 0    Pain management: adequate     Mental Status:  Alert and awake   Hydration Status:  Euvolemic and stable   PONV Controlled:  Controlled   Airway Patency:  Patent   Post Op Vitals Reviewed: Yes      Staff: Anesthesiologist           /88 (10/04/19 1104)    Temp      Pulse 73 (10/04/19 1104)   Resp 18 (10/04/19 1104)    SpO2 97 % (10/04/19 1104)
16

## 2019-10-09 ENCOUNTER — TRANSCRIBE ORDERS (OUTPATIENT)
Dept: ADMINISTRATIVE | Facility: HOSPITAL | Age: 69
End: 2019-10-09

## 2019-10-09 ENCOUNTER — APPOINTMENT (OUTPATIENT)
Dept: LAB | Facility: CLINIC | Age: 69
End: 2019-10-09
Payer: MEDICARE

## 2019-10-09 DIAGNOSIS — F20.9 SUBCHRONIC SCHIZOPHRENIA (HCC): ICD-10-CM

## 2019-10-09 DIAGNOSIS — F20.9 SUBCHRONIC SCHIZOPHRENIA (HCC): Primary | ICD-10-CM

## 2019-10-09 LAB
BASOPHILS # BLD AUTO: 0.05 THOUSANDS/ΜL (ref 0–0.1)
BASOPHILS NFR BLD AUTO: 1 % (ref 0–1)
EOSINOPHIL # BLD AUTO: 0.15 THOUSAND/ΜL (ref 0–0.61)
EOSINOPHIL NFR BLD AUTO: 3 % (ref 0–6)
ERYTHROCYTE [DISTWIDTH] IN BLOOD BY AUTOMATED COUNT: 14 % (ref 11.6–15.1)
HCT VFR BLD AUTO: 41.4 % (ref 36.5–49.3)
HGB BLD-MCNC: 13.5 G/DL (ref 12–17)
IMM GRANULOCYTES # BLD AUTO: 0.01 THOUSAND/UL (ref 0–0.2)
IMM GRANULOCYTES NFR BLD AUTO: 0 % (ref 0–2)
LYMPHOCYTES # BLD AUTO: 1.42 THOUSANDS/ΜL (ref 0.6–4.47)
LYMPHOCYTES NFR BLD AUTO: 28 % (ref 14–44)
MCH RBC QN AUTO: 31.9 PG (ref 26.8–34.3)
MCHC RBC AUTO-ENTMCNC: 32.6 G/DL (ref 31.4–37.4)
MCV RBC AUTO: 98 FL (ref 82–98)
MONOCYTES # BLD AUTO: 0.72 THOUSAND/ΜL (ref 0.17–1.22)
MONOCYTES NFR BLD AUTO: 14 % (ref 4–12)
NEUTROPHILS # BLD AUTO: 2.67 THOUSANDS/ΜL (ref 1.85–7.62)
NEUTS SEG NFR BLD AUTO: 54 % (ref 43–75)
NRBC BLD AUTO-RTO: 0 /100 WBCS
PLATELET # BLD AUTO: 177 THOUSANDS/UL (ref 149–390)
PMV BLD AUTO: 9.7 FL (ref 8.9–12.7)
RBC # BLD AUTO: 4.23 MILLION/UL (ref 3.88–5.62)
WBC # BLD AUTO: 5.02 THOUSAND/UL (ref 4.31–10.16)

## 2019-10-09 PROCEDURE — 36415 COLL VENOUS BLD VENIPUNCTURE: CPT

## 2019-10-09 PROCEDURE — 85025 COMPLETE CBC W/AUTO DIFF WBC: CPT

## 2019-10-22 ENCOUNTER — OFFICE VISIT (OUTPATIENT)
Dept: INTERNAL MEDICINE CLINIC | Facility: CLINIC | Age: 69
End: 2019-10-22
Payer: MEDICARE

## 2019-10-22 VITALS
TEMPERATURE: 98 F | HEART RATE: 82 BPM | DIASTOLIC BLOOD PRESSURE: 80 MMHG | RESPIRATION RATE: 12 BRPM | HEIGHT: 68 IN | BODY MASS INDEX: 27.58 KG/M2 | SYSTOLIC BLOOD PRESSURE: 124 MMHG | WEIGHT: 182 LBS

## 2019-10-22 DIAGNOSIS — J45.20 MILD INTERMITTENT ASTHMA, UNSPECIFIED WHETHER COMPLICATED: ICD-10-CM

## 2019-10-22 DIAGNOSIS — K21.9 GASTROESOPHAGEAL REFLUX DISEASE WITHOUT ESOPHAGITIS: ICD-10-CM

## 2019-10-22 DIAGNOSIS — E55.9 VITAMIN D DEFICIENCY: ICD-10-CM

## 2019-10-22 DIAGNOSIS — Z23 NEED FOR INFLUENZA VACCINATION: ICD-10-CM

## 2019-10-22 DIAGNOSIS — I10 ESSENTIAL HYPERTENSION: Primary | ICD-10-CM

## 2019-10-22 DIAGNOSIS — G21.11 NEUROLEPTIC-INDUCED PARKINSONISM (HCC): ICD-10-CM

## 2019-10-22 DIAGNOSIS — E78.00 HYPERCHOLESTEREMIA: ICD-10-CM

## 2019-10-22 DIAGNOSIS — R52 PAIN: ICD-10-CM

## 2019-10-22 DIAGNOSIS — F20.0 PARANOID SCHIZOPHRENIA (HCC): ICD-10-CM

## 2019-10-22 DIAGNOSIS — K21.9 GASTROESOPHAGEAL REFLUX DISEASE, ESOPHAGITIS PRESENCE NOT SPECIFIED: ICD-10-CM

## 2019-10-22 DIAGNOSIS — H61.21 RIGHT EAR IMPACTED CERUMEN: ICD-10-CM

## 2019-10-22 DIAGNOSIS — J44.9 CHRONIC OBSTRUCTIVE PULMONARY DISEASE, UNSPECIFIED COPD TYPE (HCC): ICD-10-CM

## 2019-10-22 PROBLEM — J45.909 ASTHMA: Status: ACTIVE | Noted: 2019-10-22

## 2019-10-22 PROBLEM — T43.505A NEUROLEPTIC INDUCED PARKINSONISM: Status: ACTIVE | Noted: 2019-10-22

## 2019-10-22 PROCEDURE — 69209 REMOVE IMPACTED EAR WAX UNI: CPT | Performed by: NURSE PRACTITIONER

## 2019-10-22 PROCEDURE — 99214 OFFICE O/P EST MOD 30 MIN: CPT | Performed by: NURSE PRACTITIONER

## 2019-10-22 PROCEDURE — G0008 ADMIN INFLUENZA VIRUS VAC: HCPCS | Performed by: NURSE PRACTITIONER

## 2019-10-22 PROCEDURE — 90662 IIV NO PRSV INCREASED AG IM: CPT | Performed by: NURSE PRACTITIONER

## 2019-10-22 RX ORDER — ATORVASTATIN CALCIUM 40 MG/1
40 TABLET, FILM COATED ORAL DAILY
Qty: 90 TABLET | Refills: 3 | Status: SHIPPED | OUTPATIENT
Start: 2019-10-22 | End: 2020-10-01 | Stop reason: SDUPTHER

## 2019-10-22 RX ORDER — CHOLECALCIFEROL (VITAMIN D3) 25 MCG
1000 CAPSULE ORAL DAILY
Qty: 90 CAPSULE | Refills: 3 | Status: SHIPPED | OUTPATIENT
Start: 2019-10-22 | End: 2020-10-01 | Stop reason: SDUPTHER

## 2019-10-22 RX ORDER — LOSARTAN POTASSIUM 25 MG/1
25 TABLET ORAL DAILY
Qty: 90 TABLET | Refills: 3 | Status: SHIPPED | OUTPATIENT
Start: 2019-10-22 | End: 2020-10-01 | Stop reason: SDUPTHER

## 2019-10-22 RX ORDER — ACETAMINOPHEN 500 MG
500 TABLET ORAL EVERY 6 HOURS PRN
COMMUNITY
End: 2019-10-22 | Stop reason: SDUPTHER

## 2019-10-22 RX ORDER — ACETAMINOPHEN 500 MG
500 TABLET ORAL EVERY 6 HOURS PRN
Qty: 30 TABLET | Refills: 3 | Status: SHIPPED | OUTPATIENT
Start: 2019-10-22 | End: 2021-09-27 | Stop reason: SDUPTHER

## 2019-10-22 RX ORDER — OMEPRAZOLE 40 MG/1
40 CAPSULE, DELAYED RELEASE ORAL DAILY
Qty: 30 CAPSULE | Refills: 1 | Status: SHIPPED | OUTPATIENT
Start: 2019-10-22 | End: 2019-12-24 | Stop reason: SDUPTHER

## 2019-10-22 NOTE — ASSESSMENT & PLAN NOTE
Sinusitis (No Antibiotics)    The sinuses are air-filled spaces within the bones of the face. They connect to the inside of the nose. Sinusitis is an inflammation of the tissue lining the sinus cavity. Sinus inflammation can occur during a cold. It can also be due to allergies to pollens and other particles in the air. It can cause symptoms such as sinus congestion, headache, sore throat, facial swelling and fullness. It may also cause a low-grade fever. No infection is present, and no antibiotic treatment is needed.  Home care    Drink plenty of water, hot tea, and other liquids. This may help thin mucus. It also may promote sinus drainage.    Heat may help soothe painful areas of the face. Use a towel soaked in hot water. Or,  the shower and direct the hot spray onto your face. Using a vaporizer along with a menthol rub at night may also help.     An expectorant containing guaifenesin may help thin the mucus and promote drainage from the sinuses.    Over-the-counter decongestants may be used unless a similar medicine was prescribed. Nasal sprays work the fastest. Use one that contains phenylephrine or oxymetazoline. First blow the nose gently. Then use the spray. Do not use these medicines more often than directed on the label or symptoms may get worse. You may also use tablets containing pseudoephedrine. Avoid products that combine ingredients, because side effects may be increased. Read labels. You can also ask the pharmacist for help. (NOTE: Persons with high blood pressure should not use decongestants. They can raise blood pressure.)    Over-the-counter antihistamines may help if allergies contributed to your sinusitis.      Use acetaminophen or ibuprofen to control pain, unless another pain medicine was prescribed. (If you have chronic liver or kidney disease or ever had a stomach ulcer, talk with your doctor before using these medicines. Aspirin should never be used in anyone under 18 years of age  Recently saw a gastroenterologist   Colonoscopy performed and the patient will be due in 10 years  Continue  Prilosec  who is ill with a fever. It may cause severe liver damage.)    Use nasal rinses or irrigation as instructed by your health care provider.    Don't smoke. This can worsen symptoms.  Follow-up care  Follow up with your healthcare provider or our staff if you are not improving within the next week.  When to seek medical advice  Call your healthcare provider if any of these occur:    Green or yellow discharge from the nose or into the throat    Facial pain or headache becoming more severe    Stiff neck    Unusual drowsiness or confusion    Swelling of the forehead or eyelids    Vision problems, including blurred or double vision    Fever of 100.4 F (38 C) or higher, or as directed by your healthcare provider    Seizure    Breathing problems    Symptoms not resolving within 14 days  Date Last Reviewed: 4/13/2015 2000-2017 [employer]. [address] [place] [address], [place] [address]. All rights reserved. This information is not intended as a substitute for professional medical care. Always follow your healthcare professional's instructions.

## 2019-10-22 NOTE — ASSESSMENT & PLAN NOTE
Patient was a former smoker  He had been followed by a pulmonologist outside of the [facility]  Referral placed for the patient to be established in network

## 2019-10-22 NOTE — PROGRESS NOTES
INTERNAL MEDICINE INITIAL OFFICE VISIT  [facility] - [facility] OF [address] [place] [address]    NAME: Wing Díaz  AGE: 71 y o  SEX: male  : 1950     DATE: 10/22/2019     Assessment and Plan:     Problem List Items Addressed This Visit        Respiratory    Chronic obstructive pulmonary disease ([facility] )    Relevant Orders    Ambulatory referral to Pulmonology    Asthma    Relevant Orders    Ambulatory referral to Pulmonology       Cardiovascular and Mediastinum    Hypertension - Primary    Relevant Orders    TSH, 3rd generation with Free T4 reflex    Comprehensive metabolic panel    CBC    Urinalysis with reflex to microscopic       Nervous and Auditory    Neuro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 ([facility] )    Relevant Orders    Ambulatory referral to Neurology       Other    Hypercholesteremia    Relevant Orders    Lipid Panel with Direct LDL reflex    Paranoid schizophrenia ([facility] )      Other Visit Diagnoses     Vitamin D deficiency        Relevant Orders    Vitamin D 25 hydroxy    Right ear impacted cerumen        Relevant Orders    Ear cerumen removal    Need for influenza vaccination        Relevant Orders    influenza vaccine, [id number], high-dose, PF 0 5 mL (FLUZONE HIGH-DOSE) (Completed)          Return in about 4 months (around 2020) for Medicare wellness visit  Chief Complaint:     Chief Complaint   Patient presents with   Phuong Haro Establish Care     new patient  History of Present Illness: Warren Lamar presents to the office today to establish care  He is a 31-year-old male who appears his stated age  He is a resident of a local group home  He has a past medical history of schizophrenia, GERD, COPD, neuroleptics induced Parkinson's, elevated cholesterol and hypertension  He presents today in the office with his   He has no complaints which are new at today's visit  The patient is up-to-date with his pneumonia vaccines and will get the flu shot at today's visit    The  is unsure if he is up-to-date on his tetanus and hepatitis B vaccines and she will check the records and bring them with him on his next visit  The patient denies any recent falls  He had a cataract removed on his right eye last week and will be having his left eye done later on this week  He had a colonoscopy done within the last month and it was within normal limits and he will be due for repeat in 10 years  The patient had lab work performed in August of this year which was reviewed  His vitamin-D level was 29 and he was instructed to continue taking his vitamin-D  The patient does have a history of COPD and had previously followed with a pulmonologist   A referral was placed for this patient to reestablish care with a pulmonologist within the [address] [place] [address],[address]  He had also been following with a neurologist and a referral was placed for him to follow up with Neurology as well  On exam the patient had impacted cerumen in his right ear with a complaint of diminished hearing in that ear  An ear lavage was performed in the office with successful removal of that wax  The patient states his hearing was improved after the procedure  Orders were placed for the patient to get blood work done prior to his next visit in 4 months  He will be seen in February for a Medicare wellness exam     The following portions of the patient's history were reviewed and updated as appropriate: allergies, current medications, past family history, past medical history, past social history, past surgical history and problem list      Review of Systems:     Review of Systems   Constitutional: Negative  HENT: Negative  Eyes: Negative  Respiratory: Negative  Cardiovascular: Negative  Gastrointestinal: Negative  Endocrine: Negative  Genitourinary: Negative  Musculoskeletal: Negative  Neurological: Negative  Psychiatric/Behavioral: Negative           Past Medical History:     Past Medical History:   Diagnosis Date    Asthma     COPD (chronic obstructive pulmonary disease) (HCC)     GERD (gastroesophageal reflux disease)     Hypercholesteremia     Hypertension     Hypertension     Paranoid schizophrenia ([facility] )     Peripheral vascular disease ([facility] )     Peripheral vascular disease ([facility] )         Past Surgical History:     Past Surgical History:   Procedure Laterality Date    CATARACT EXTRACTION      COLONOSCOPY          Social History:     Social History     Socioeconomic History    Marital status: Single     Spouse name: None    Number of children: None    Years of education: None    Highest education level: None   Occupational History    None   Social Needs    Financial resource strain: None    Food insecurity:     Worry: None     Inability: None    Transportation needs:     Medical: None     Non-medical: None   Tobacco Use    Smoking status: Former Smoker    Smokeless tobacco: Never Used   Substance and Sexual Activity    Alcohol use: Not Currently    Drug use: Never    Sexual activity: Not Currently   Lifestyle    Physical activity:     Days per week: 0 days     Minutes per session: 0 min    Stress: Very much   Relationships    Social connections:     Talks on phone: None     Gets together: None     Attends Evangelical service: None     Active member of club or organization: None     Attends meetings of clubs or organizations: None     Relationship status: None    Intimate partner violence:     Fear of current or ex partner: None     Emotionally abused: None     Physically abused: None     Forced sexual activity: None   Other Topics Concern    None   Social History Narrative    None         Family History:     Family History   Problem Relation Age of Onset    No Known Problems Mother     No Known Problems Father         Current Medications:     Current Outpatient Medications:     acetaminophen (TYLENOL) 500 mg tablet, Take 500 mg by mouth every 6 (six) hours as needed for mild pain, Disp: , Rfl:     atorvastatin (LIPITOR) 40 mg tablet, Take 40 mg by mouth daily, Disp: , Rfl: 5    budesonide-formoterol (SYMBICORT) 80-4 5 MCG/ACT inhaler, Inhale 1 puff every 12 (twelve) hours, Disp: , Rfl:     clozapine (CLOZARIL) 200 MG tablet, , Disp: , Rfl:     docusate sodium (COLACE) 100 mg capsule, Take 1 capsule (100 mg total) by mouth 2 (two) times a day, Disp: 60 capsule, Rfl: 5    famotidine (PEPCID) 20 mg tablet, Take 1 tablet by mouth 2 (two) times a day, Disp: , Rfl:     Fluticasone Furoate 50 MCG/ACT AEPB, Inhale, Disp: , Rfl:     fluticasone-salmeterol (ADVAIR DISKUS, WIXELA INHUB) 250-50 mcg/dose inhaler, Inhale 1 puff 2 (two) times a day Rinse mouth after use , Disp: , Rfl:     losartan (COZAAR) 25 mg tablet, Take 25 mg by mouth daily, Disp: , Rfl: 5    omeprazole (PriLOSEC) 40 MG capsule, Take 1 capsule (40 mg total) by mouth daily, Disp: 30 capsule, Rfl: 1    temazepam (RESTORIL) 15 mg capsule, Take 15 mg by mouth daily at bedtime as needed for sleep, Disp: , Rfl:     topiramate (TOPAMAX) 25 mg tablet, Take 25 mg by mouth 2 (two) times a day, Disp: , Rfl: 1    traZODone (DESYREL) 100 mg tablet, , Disp: , Rfl:     VITAMIN D HIGH POTENCY 1000 units capsule, Take 1,000 Units by mouth daily, Disp: , Rfl: 5    moxifloxacin (VIGAMOX) 0 5 % ophthalmic solution, INSTILL 1 DROP IN RIGHT EYE FOUR TIMES DAILY - START DAY BEFORE SURGERY - BRING MEDICATION TO ALL APPOINTMENTS, Disp: , Rfl: 0     Allergies:   No Known Allergies     Physical Exam:     /80 (BP Location: Left arm, Patient Position: Sitting, Cuff Size: Standard)   Pulse 82   Temp 98 °F (36 7 °C) (Oral)   Resp 12   Ht 5' 8" (1 727 m)   Wt 82 6 kg (182 lb)   BMI 27 67 kg/m²     Physical Exam   Constitutional: He is oriented to person, place, and time  He appears well-developed and well-nourished  No distress  HENT:   Head: Normocephalic and atraumatic     Right Ear: External ear normal  A foreign body (impacted cerumen) is present  Decreased hearing is noted  Left Ear: External ear normal    Nose: Nose normal    Mouth/Throat: Oropharynx is clear and moist  No oropharyngeal exudate  Eyes: Pupils are equal, round, and reactive to light  Conjunctivae and EOM are normal  Right eye exhibits no discharge  Left eye exhibits no discharge  Neck: Normal range of motion  Neck supple  No JVD present  No tracheal deviation present  No thyromegaly present  Cardiovascular: Normal rate, regular rhythm, normal heart sounds and intact distal pulses  No murmur heard  Pulmonary/Chest: Effort normal and breath sounds normal  No respiratory distress  He has no wheezes  Abdominal: Soft  Bowel sounds are normal  There is no tenderness  Musculoskeletal: Normal range of motion  He exhibits no edema  Lymphadenopathy:     He has no cervical adenopathy  Neurological: He is alert and oriented to person, place, and time  Skin: Skin is warm and dry  Capillary refill takes less than 2 seconds  Psychiatric: He has a normal mood and affect  His behavior is normal    Vitals reviewed  BMI Counseling: Body mass index is 27 67 kg/m²  The BMI is above normal  Nutrition recommendations include consuming healthier snacks and moderation in carbohydrate intake  Exercise recommendations include exercising 3-5 times per week     Data:     Ear cerumen removal  Date/Time: 10/22/2019 2:25 PM  Performed by: LIUDMILA Leon  Authorized by: LIUDMILA Leon     Patient location:  Clinic  Other Assisting Provider: No    Consent:     Consent obtained:  Verbal    Consent given by:  Patient    Risks discussed:  Bleeding, dizziness, infection, incomplete removal, TM perforation and pain    Alternatives discussed:  No treatment  Universal protocol:     Procedure explained and questions answered to patient or proxy's satisfaction: yes    Procedure details:     Local anesthetic:  None    Location:  R ear    Procedure type: irrigation only Post-procedure details:     Complication:  None    Hearing quality:  Improved    Patient tolerance of procedure: Tolerated well, no immediate complications        Patient Instructions   Influenza Vaccine   WHAT YOU NEED TO KNOW:   The influenza vaccine is an injection given to help prevent influenza (flu)  The flu is caused by a virus  The virus spreads from person to person through coughing and sneezing  Several types of viruses cause the flu  The viruses change over time, so new vaccines are made each year  The vaccine begins to protect you about 2 weeks after you get it  The flu shot usually injected into your upper arm  It may be given in your thigh  You may get a vaccine with a weak or dead virus  DISCHARGE INSTRUCTIONS:   Call 911 for any of the following:   · Your mouth and throat are swollen  · You are wheezing or have trouble breathing  · You have chest pain or your heart is beating faster than normal for you  · You feel like you are going to faint  Return to the emergency department if:   · Your face is red or swollen  · You have hives that spread over your body  · You feel weak or dizzy  Contact your healthcare provider if:   · You have increased pain, redness, or swelling around the area where the shot was given  · You have questions or concerns about the influenza vaccine  Apply a warm compress  to the injection area if you got a flu shot  Apply the compress as directed to decrease pain and swelling  Follow up with your healthcare provider as directed:  Write down your questions so you remember to ask them during your visits  © 2017 [address] [employer] Information is for End User's use only and may not be sold, redistributed or otherwise used for commercial purposes  All illustrations and images included in CareNotes® are the copyrighted property of A [employer] A M [employer]  or Ricki Sandra  The above information is an  only   It is not intended as medical advice for individual conditions or treatments  Talk to your doctor, nurse or pharmacist before following any medical regimen to see if it is safe and effective for you            LIUDMILA Foster  [facility]

## 2019-11-08 ENCOUNTER — TRANSCRIBE ORDERS (OUTPATIENT)
Dept: MAMMOGRAPHY | Facility: CLINIC | Age: 69
End: 2019-11-08

## 2019-11-08 ENCOUNTER — APPOINTMENT (OUTPATIENT)
Dept: LAB | Facility: CLINIC | Age: 69
End: 2019-11-08
Payer: MEDICARE

## 2019-11-08 DIAGNOSIS — F20.9 SUBCHRONIC SCHIZOPHRENIA (HCC): ICD-10-CM

## 2019-11-08 DIAGNOSIS — Z79.899 ENCOUNTER FOR LONG-TERM (CURRENT) USE OF OTHER MEDICATIONS: ICD-10-CM

## 2019-11-08 DIAGNOSIS — Z79.899 ENCOUNTER FOR LONG-TERM (CURRENT) USE OF OTHER MEDICATIONS: Primary | ICD-10-CM

## 2019-11-08 LAB
BASOPHILS # BLD AUTO: 0.04 THOUSANDS/ΜL (ref 0–0.1)
BASOPHILS NFR BLD AUTO: 1 % (ref 0–1)
EOSINOPHIL # BLD AUTO: 0.22 THOUSAND/ΜL (ref 0–0.61)
EOSINOPHIL NFR BLD AUTO: 5 % (ref 0–6)
ERYTHROCYTE [DISTWIDTH] IN BLOOD BY AUTOMATED COUNT: 14.3 % (ref 11.6–15.1)
HCT VFR BLD AUTO: 43.5 % (ref 36.5–49.3)
HGB BLD-MCNC: 14.5 G/DL (ref 12–17)
IMM GRANULOCYTES # BLD AUTO: 0.01 THOUSAND/UL (ref 0–0.2)
IMM GRANULOCYTES NFR BLD AUTO: 0 % (ref 0–2)
LYMPHOCYTES # BLD AUTO: 1.22 THOUSANDS/ΜL (ref 0.6–4.47)
LYMPHOCYTES NFR BLD AUTO: 28 % (ref 14–44)
MCH RBC QN AUTO: 33 PG (ref 26.8–34.3)
MCHC RBC AUTO-ENTMCNC: 33.3 G/DL (ref 31.4–37.4)
MCV RBC AUTO: 99 FL (ref 82–98)
MONOCYTES # BLD AUTO: 0.46 THOUSAND/ΜL (ref 0.17–1.22)
MONOCYTES NFR BLD AUTO: 10 % (ref 4–12)
NEUTROPHILS # BLD AUTO: 2.48 THOUSANDS/ΜL (ref 1.85–7.62)
NEUTS SEG NFR BLD AUTO: 56 % (ref 43–75)
NRBC BLD AUTO-RTO: 0 /100 WBCS
PLATELET # BLD AUTO: 169 THOUSANDS/UL (ref 149–390)
PMV BLD AUTO: 10.8 FL (ref 8.9–12.7)
RBC # BLD AUTO: 4.39 MILLION/UL (ref 3.88–5.62)
WBC # BLD AUTO: 4.43 THOUSAND/UL (ref 4.31–10.16)

## 2019-11-08 PROCEDURE — 36415 COLL VENOUS BLD VENIPUNCTURE: CPT

## 2019-11-08 PROCEDURE — 82164 ANGIOTENSIN I ENZYME TEST: CPT

## 2019-11-08 PROCEDURE — 85025 COMPLETE CBC W/AUTO DIFF WBC: CPT

## 2019-11-11 LAB — ACE SERPL-CCNC: 33 U/L (ref 14–82)

## 2019-12-16 ENCOUNTER — APPOINTMENT (OUTPATIENT)
Dept: LAB | Facility: CLINIC | Age: 69
End: 2019-12-16
Payer: MEDICARE

## 2019-12-19 ENCOUNTER — OFFICE VISIT (OUTPATIENT)
Dept: INTERNAL MEDICINE CLINIC | Facility: CLINIC | Age: 69
End: 2019-12-19
Payer: MEDICARE

## 2019-12-19 VITALS
HEIGHT: 68 IN | TEMPERATURE: 97.4 F | BODY MASS INDEX: 26.1 KG/M2 | DIASTOLIC BLOOD PRESSURE: 80 MMHG | SYSTOLIC BLOOD PRESSURE: 128 MMHG | HEART RATE: 72 BPM | OXYGEN SATURATION: 92 % | WEIGHT: 172.2 LBS

## 2019-12-19 DIAGNOSIS — L03.012 CELLULITIS OF LEFT THUMB: Primary | ICD-10-CM

## 2019-12-19 PROCEDURE — 99213 OFFICE O/P EST LOW 20 MIN: CPT | Performed by: NURSE PRACTITIONER

## 2019-12-19 RX ORDER — MOMETASONE FUROATE 1 MG/G
CREAM TOPICAL DAILY
Qty: 45 G | Refills: 0 | Status: SHIPPED | OUTPATIENT
Start: 2019-12-19 | End: 2020-01-29 | Stop reason: ALTCHOICE

## 2019-12-19 RX ORDER — MOMETASONE FUROATE 1 MG/G
CREAM TOPICAL DAILY
Qty: 45 G | Refills: 0 | Status: SHIPPED | OUTPATIENT
Start: 2019-12-19 | End: 2019-12-19

## 2019-12-19 RX ORDER — CEPHALEXIN 500 MG/1
500 CAPSULE ORAL EVERY 6 HOURS SCHEDULED
Qty: 28 CAPSULE | Refills: 0 | Status: SHIPPED | OUTPATIENT
Start: 2019-12-19 | End: 2019-12-26

## 2019-12-19 RX ORDER — CEPHALEXIN 500 MG/1
500 CAPSULE ORAL EVERY 6 HOURS SCHEDULED
Qty: 28 CAPSULE | Refills: 0 | Status: SHIPPED | OUTPATIENT
Start: 2019-12-19 | End: 2019-12-19

## 2019-12-19 NOTE — PROGRESS NOTES
INTERNAL MEDICINE FOLLOW-UP OFFICE VISIT  [facility] - [facility]ICAL [facility] OF [facility] VANDANA JOHNSON    NAME: Av Campoverde  AGE: 71 y o  SEX: male    DATE OF ENCOUNTER: 12/19/2019   Assessment and Plan:     Problem List Items Addressed This Visit     None      Visit Diagnoses     Cellulitis of left thumb    -  Primary      Keflex and Elocon ointment sent to pharmacy  Relevant Medications    cephalexin (KEFLEX) 500 mg capsule    mometasone (ELOCON) 0 1 % cream          No follow-ups on file  Counseling:     · Medication Side Effects - Adverse side effects of medications were reviewed with the patient/guardian today: Yes  · Counseling was given regarding: Intructions for management  · Barriers to treatment include: Literacy barrier      Chief Complaint:     Chief Complaint   Patient presents with    Wound Infection     left thumb        History of Present Illness:     THERESA       Jeff Bryant comes to the office today for a sick visit  He is a 80-year-old male who is a resident of a group home  He has a past medical history of paranoid schizophrenia and parkinsonism  The patient has swelling of his left thumb  There is an area  On the patient's distal area of the thumb  On the skin below the nail which appears  To be a healing abrasion or cut  The patient does not remember injuring his finger  There is also a portion of skin removed from that area  The patient's left thumb is more swollen when compared to the right  A photo of this area was taken and is at the bottom of this note and is under media in epic  I will treat the patient for a cellulitis  Keflex 500 mg 4 times daily for 7 days was sent to his pharmacy  Elicon cream was also sent to his pharmacy to be placed on that area twice a day    He is accompanied today by the  in the group home [facility]   I did make him aware that if this area becomes more significantly swollen, the redness spreads down his thumb to his hand, there is increased drainage or a fever he should be evaluated in the emergency department  She will contact me with any additional questions or concerns  The following portions of the patient's history were reviewed and updated as appropriate: allergies, current medications, past family history, past medical history, past social history, past surgical history and problem list      Review of Systems:     Review of Systems   Constitutional: Negative  Negative for fatigue  HENT: Negative  Negative for congestion, postnasal drip, rhinorrhea and trouble swallowing  Eyes: Negative  Negative for visual disturbance  Respiratory: Negative  Negative for choking and shortness of breath  Cardiovascular: Negative  Negative for chest pain  Gastrointestinal: Negative  Endocrine: Negative  Genitourinary: Negative  Musculoskeletal: Negative  Negative for arthralgias, back pain, myalgias and neck pain  Skin: Positive for wound (left thumb)  Neurological: Negative for dizziness and headaches  Psychiatric/Behavioral: Negative  Problem List:     Patient Active Problem List   Diagnosis    Chronic obstructive pulmonary disease (HCC)    GERD (gastroesophageal reflux disease)    Neuro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 (HCC)    Asthma    Hypertension    Hypercholesteremia    Paranoid schizophrenia ([facility] )        Objective:     /80 (BP Location: Right arm, Patient Position: Sitting, Cuff Size: Standard)   Pulse 72   Temp (!) 97 4 °F (36 3 °C) (Oral)   Ht 5' 8" (1 727 m)   Wt 78 1 kg (172 lb 3 2 oz)   SpO2 92%   BMI 26 18 kg/m²     Physical Exam   Constitutional: He appears well-developed and well-nourished  HENT:   Head: Normocephalic and atraumatic  Eyes: Pupils are equal, round, and reactive to light  EOM are normal    Skin:            Media Information      Document Information     Clinical Image - Mobile Device   Left thumb   12/19/2019 3:30 PM   Attached To:    Office Visit on 12/19/19 with Laura Tequila, [facility], [address] [place] [address]        Current Medications:     Current Outpatient Medications   Medication Sig Dispense Refill    acetaminophen (TYLENOL) 500 mg tablet Take 1 tablet (500 mg total) by mouth every 6 (six) hours as needed for mild pain 30 tablet 3    atorvastatin (LIPITOR) 40 mg tablet Take 1 tablet (40 mg total) by mouth daily 90 tablet 3    clozapine (CLOZARIL) 200 MG tablet       docusate sodium (COLACE) 100 mg capsule Take 1 capsule (100 mg total) by mouth 2 (two) times a day 60 capsule 5    Fluticasone Furoate 50 MCG/ACT AEPB Inhale 1 spray as needed (as needed for congestion) 1 each 3    fluticasone-salmeterol (ADVAIR, WIXELA) 250-50 mcg/dose inhaler Inhale 1 puff 2 (two) times a day Rinse mouth after use  1 Inhaler 3    losartan (COZAAR) 25 mg tablet Take 1 tablet (25 mg total) by mouth daily 90 tablet 3    omeprazole (PriLOSEC) 40 MG capsule Take 1 capsule (40 mg total) by mouth daily 30 capsule 1    temazepam (RESTORIL) 15 mg capsule Take 15 mg by mouth daily at bedtime as needed for sleep      topiramate (TOPAMAX) 25 mg tablet Take 25 mg by mouth 2 (two) times a day  1    traZODone (DESYREL) 100 mg tablet       VITAMIN D HIGH POTENCY 1000 units capsule Take 1 capsule (1,000 Units total) by mouth daily 90 capsule 3    budesonide-formoterol (SYMBICORT) 80-4 5 MCG/ACT inhaler Inhale 1 puff every 12 (twelve) hours      moxifloxacin (VIGAMOX) 0 5 % ophthalmic solution INSTILL 1 DROP IN RIGHT EYE FOUR TIMES DAILY - START DAY BEFORE SURGERY - BRING MEDICATION TO ALL APPOINTMENTS  0     No current facility-administered medications for this visit  There are no Patient Instructions on file for this visit      LIUDMILA Robins  [facility]

## 2019-12-19 NOTE — PATIENT INSTRUCTIONS
Start antibiotic today  If you develops a fever, increased swelling or redness or drainage from his left thumb, take to emergency room for evaluation  Cellulitis   WHAT YOU NEED TO KNOW:   Cellulitis is a skin infection caused by bacteria  Cellulitis may go away on its own or you may need treatment  Your healthcare provider may draw a [place] around the outside edges of your cellulitis  If your cellulitis spreads, your healthcare provider will see it outside of the [place]  DISCHARGE INSTRUCTIONS:   Call 911 if:   · You have sudden trouble breathing or chest pain  Return to the emergency department if:   · Your wound gets larger and more painful  · You feel a crackling under your skin when you touch it  · You have purple dots or bumps on your skin, or you see bleeding under your skin  · You have new swelling and pain in your legs  · The red, warm, swollen area gets larger  · You see red streaks coming from the infected area  Contact your healthcare provider if:   · You have a fever  · Your fever or pain does not go away or gets worse  · The area does not get smaller after 2 days of antibiotics  · Your skin is flaking or peeling off  · You have questions or concerns about your condition or care  Medicines:   · Antibiotics  help treat the bacterial infection  · NSAIDs , such as ibuprofen, help decrease swelling, pain, and fever  NSAIDs can cause stomach bleeding or kidney problems in certain people  If you take blood thinner medicine, always ask if NSAIDs are safe for you  Always read the medicine label and follow directions  Do not give these medicines to children under 10months of age without direction from your child's healthcare provider  · Acetaminophen  decreases pain and fever  It is available without a doctor's order  Ask how much to take and how often to take it  Follow directions   Read the labels of all other medicines you are using to see if they also contain acetaminophen, or ask your doctor or pharmacist  Acetaminophen can cause liver damage if not taken correctly  Do not use more than 4 grams (4,000 milligrams) total of acetaminophen in one day  · Take your medicine as directed  Contact your healthcare provider if you think your medicine is not helping or if you have side effects  Tell him or her if you are allergic to any medicine  Keep a list of the medicines, vitamins, and herbs you take  Include the amounts, and when and why you take them  Bring the list or the pill bottles to follow-up visits  Carry your medicine list with you in case of an emergency  Self-care:   · Elevate the area above the level of your heart  as often as you can  This will help decrease swelling and pain  Prop the area on pillows or blankets to keep it elevated comfortably  · Clean the area daily until the wound scabs over  Gently wash the area with soap and water  Pat dry  Use dressings as directed  · Place cool or warm, wet cloths on the area as directed  Use clean cloths and clean water  Leave it on the area until the cloth is room temperature  Pat the area dry with a clean, dry cloth  The cloths may help decrease pain  Prevent cellulitis:   · Do not scratch bug bites or areas of injury  You increase your risk for cellulitis by scratching these areas  · Do not share personal items, such as towels, clothing, and razors  · Clean exercise equipment  with germ-killing  before and after you use it  · Wash your hands often  Use soap and water  Wash your hands after you use the bathroom, change a child's diapers, or sneeze  Wash your hands before you prepare or eat food  Use lotion to prevent dry, cracked skin  · Wear pressure stockings as directed  You may be told to wear the stockings if you have peripheral edema  The stockings improve blood flow and decrease swelling  · Treat athlete's foot    This can help prevent the spread of a bacterial skin infection  Follow up with your healthcare provider within 3 days, or as directed: Your healthcare provider will check if your cellulitis is getting better  You may need different medicine  Write down your questions so you remember to ask them during your visits  © 2017 [address] [employer] Information is for End User's use only and may not be sold, redistributed or otherwise used for commercial purposes  All illustrations and images included in CareNotes® are the copyrighted property of [employer]  or Ricki Sandra  The above information is an  only  It is not intended as medical advice for individual conditions or treatments  Talk to your doctor, nurse or pharmacist before following any medical regimen to see if it is safe and effective for you

## 2019-12-24 DIAGNOSIS — K21.9 GASTROESOPHAGEAL REFLUX DISEASE, ESOPHAGITIS PRESENCE NOT SPECIFIED: ICD-10-CM

## 2019-12-26 RX ORDER — OMEPRAZOLE 40 MG/1
40 CAPSULE, DELAYED RELEASE ORAL DAILY
Qty: 30 CAPSULE | Refills: 1 | Status: SHIPPED | OUTPATIENT
Start: 2019-12-26 | End: 2020-01-03 | Stop reason: SDUPTHER

## 2020-01-03 ENCOUNTER — TELEPHONE (OUTPATIENT)
Dept: INTERNAL MEDICINE CLINIC | Facility: CLINIC | Age: 70
End: 2020-01-03

## 2020-01-03 DIAGNOSIS — K59.00 CONSTIPATION, UNSPECIFIED CONSTIPATION TYPE: Primary | ICD-10-CM

## 2020-01-03 RX ORDER — OMEPRAZOLE 40 MG/1
40 CAPSULE, DELAYED RELEASE ORAL DAILY
Qty: 90 CAPSULE | Refills: 1 | Status: SHIPPED | OUTPATIENT
Start: 2020-01-03 | End: 2020-06-11 | Stop reason: SDUPTHER

## 2020-01-03 NOTE — TELEPHONE ENCOUNTER
Please call Mirian and let her know metamucil twice daily [facility]  He should also continue with stool softener daily as ordered previously

## 2020-01-03 NOTE — TELEPHONE ENCOUNTER
Mirian from  [facility] [phone]      Patient said he's constipated has not moved his bowels for 5 days        Would like something sent to [facility]

## 2020-01-13 ENCOUNTER — APPOINTMENT (OUTPATIENT)
Dept: LAB | Facility: CLINIC | Age: 70
End: 2020-01-13
Payer: MEDICARE

## 2020-01-13 ENCOUNTER — TRANSCRIBE ORDERS (OUTPATIENT)
Dept: LAB | Facility: CLINIC | Age: 70
End: 2020-01-13

## 2020-01-13 DIAGNOSIS — F20.9 SUBCHRONIC SCHIZOPHRENIA (HCC): ICD-10-CM

## 2020-01-13 DIAGNOSIS — Z79.899 ENCOUNTER FOR LONG-TERM (CURRENT) USE OF OTHER MEDICATIONS: Primary | ICD-10-CM

## 2020-01-21 ENCOUNTER — APPOINTMENT (OUTPATIENT)
Dept: LAB | Facility: CLINIC | Age: 70
End: 2020-01-21
Payer: MEDICARE

## 2020-01-21 DIAGNOSIS — E55.9 VITAMIN D DEFICIENCY: ICD-10-CM

## 2020-01-21 DIAGNOSIS — E78.00 HYPERCHOLESTEREMIA: ICD-10-CM

## 2020-01-21 DIAGNOSIS — I10 ESSENTIAL HYPERTENSION: ICD-10-CM

## 2020-01-21 LAB
25(OH)D3 SERPL-MCNC: 36.5 NG/ML (ref 30–100)
ALBUMIN SERPL BCP-MCNC: 3.3 G/DL (ref 3.5–5)
ALP SERPL-CCNC: 124 U/L (ref 46–116)
ALT SERPL W P-5'-P-CCNC: 29 U/L (ref 12–78)
ANION GAP SERPL CALCULATED.3IONS-SCNC: 2 MMOL/L (ref 4–13)
AST SERPL W P-5'-P-CCNC: 18 U/L (ref 5–45)
BILIRUB SERPL-MCNC: 0.47 MG/DL (ref 0.2–1)
BILIRUB UR QL STRIP: NEGATIVE
BUN SERPL-MCNC: 14 MG/DL (ref 5–25)
CALCIUM SERPL-MCNC: 8.6 MG/DL (ref 8.3–10.1)
CHLORIDE SERPL-SCNC: 109 MMOL/L (ref 100–108)
CHOLEST SERPL-MCNC: 86 MG/DL (ref 50–200)
CLARITY UR: CLEAR
CO2 SERPL-SCNC: 28 MMOL/L (ref 21–32)
COLOR UR: YELLOW
CREAT SERPL-MCNC: 0.67 MG/DL (ref 0.6–1.3)
ERYTHROCYTE [DISTWIDTH] IN BLOOD BY AUTOMATED COUNT: 13.7 % (ref 11.6–15.1)
GFR SERPL CREATININE-BSD FRML MDRD: 98 ML/MIN/1.73SQ M
GLUCOSE P FAST SERPL-MCNC: 88 MG/DL (ref 65–99)
GLUCOSE UR STRIP-MCNC: NEGATIVE MG/DL
HCT VFR BLD AUTO: 44.2 % (ref 36.5–49.3)
HDLC SERPL-MCNC: 36 MG/DL
HGB BLD-MCNC: 14.7 G/DL (ref 12–17)
HGB UR QL STRIP.AUTO: NEGATIVE
KETONES UR STRIP-MCNC: NEGATIVE MG/DL
LDLC SERPL CALC-MCNC: 38 MG/DL (ref 0–100)
LEUKOCYTE ESTERASE UR QL STRIP: NEGATIVE
MCH RBC QN AUTO: 32.8 PG (ref 26.8–34.3)
MCHC RBC AUTO-ENTMCNC: 33.3 G/DL (ref 31.4–37.4)
MCV RBC AUTO: 99 FL (ref 82–98)
NITRITE UR QL STRIP: NEGATIVE
PH UR STRIP.AUTO: 7.5 [PH]
PLATELET # BLD AUTO: 179 THOUSANDS/UL (ref 149–390)
PMV BLD AUTO: 9.9 FL (ref 8.9–12.7)
POTASSIUM SERPL-SCNC: 3.7 MMOL/L (ref 3.5–5.3)
PROT SERPL-MCNC: 7 G/DL (ref 6.4–8.2)
PROT UR STRIP-MCNC: NEGATIVE MG/DL
RBC # BLD AUTO: 4.48 MILLION/UL (ref 3.88–5.62)
SODIUM SERPL-SCNC: 139 MMOL/L (ref 136–145)
SP GR UR STRIP.AUTO: 1.01 (ref 1–1.03)
TRIGL SERPL-MCNC: 58 MG/DL
TSH SERPL DL<=0.05 MIU/L-ACNC: 1.96 UIU/ML (ref 0.36–3.74)
UROBILINOGEN UR QL STRIP.AUTO: 1 E.U./DL
WBC # BLD AUTO: 5.98 THOUSAND/UL (ref 4.31–10.16)

## 2020-01-21 PROCEDURE — 85027 COMPLETE CBC AUTOMATED: CPT

## 2020-01-21 PROCEDURE — 81003 URINALYSIS AUTO W/O SCOPE: CPT

## 2020-01-21 PROCEDURE — 84443 ASSAY THYROID STIM HORMONE: CPT

## 2020-01-21 PROCEDURE — 82306 VITAMIN D 25 HYDROXY: CPT

## 2020-01-21 PROCEDURE — 80053 COMPREHEN METABOLIC PANEL: CPT

## 2020-01-21 PROCEDURE — 80061 LIPID PANEL: CPT

## 2020-01-21 PROCEDURE — 36415 COLL VENOUS BLD VENIPUNCTURE: CPT

## 2020-01-29 ENCOUNTER — CONSULT (OUTPATIENT)
Dept: NEUROLOGY | Facility: CLINIC | Age: 70
End: 2020-01-29
Payer: MEDICARE

## 2020-01-29 VITALS
HEART RATE: 84 BPM | DIASTOLIC BLOOD PRESSURE: 78 MMHG | WEIGHT: 167 LBS | SYSTOLIC BLOOD PRESSURE: 120 MMHG | BODY MASS INDEX: 25.31 KG/M2 | HEIGHT: 68 IN

## 2020-01-29 DIAGNOSIS — E78.00 HYPERCHOLESTEREMIA: ICD-10-CM

## 2020-01-29 DIAGNOSIS — I10 ESSENTIAL HYPERTENSION: ICD-10-CM

## 2020-01-29 DIAGNOSIS — F20.0 PARANOID SCHIZOPHRENIA (HCC): ICD-10-CM

## 2020-01-29 DIAGNOSIS — G21.11 NEUROLEPTIC-INDUCED PARKINSONISM (HCC): ICD-10-CM

## 2020-01-29 PROCEDURE — 99214 OFFICE O/P EST MOD 30 MIN: CPT | Performed by: PSYCHIATRY & NEUROLOGY

## 2020-01-29 RX ORDER — DEUTETRABENAZINE 12 MG/1
1 TABLET, COATED ORAL 2 TIMES DAILY
COMMUNITY
Start: 2020-01-03

## 2020-01-29 RX ORDER — CLOZAPINE 100 MG/1
100 TABLET ORAL 2 TIMES DAILY
COMMUNITY

## 2020-01-29 NOTE — PROGRESS NOTES
Trini Lin is a 71 y o  male  Chief Complaint   Patient presents with    Tremors       Assessment:  1  Neuro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 ([facility] [address] )    2  Essential hypertension    3  Hypercholesteremia    4  Paranoid schizophrenia ([facility] [address] )          Discussion:  Patient most likely has medication induced parkinsonism including jaw tremor and resting tremor of the right hand and cogwheeling rigidity, he also possibly has some tardive dyskinesia, I have advised the patient to have an MRI scan of the brain, we discussed the side effects of Sinemet including dizziness GI side effects and tardive dyskinesia, if patient experiences any side effects then we will take him off of the medication but will give him a trial to see if his tremor is getting under control, also he and his  is going to discuss with his psychiatrist regarding changing some of his antipsychotic medications to see if it will help decreasing the tremor, he was advised to avoid getting up at least for 20 minutes after taking the medication, if he does not get any relief with the above medication then we would recommend patient to be seen  by our movement disorder specialist, he was advised to take fall safety and aspiration precautions, to go to the hospital if has any worsening symptoms and call me, follow up with his psychiatrist in other physicians and see me back in 6-8 weeks      Subjective:    HPI   Patient is here for evaluation of his jaw tremor and right hand resting tremor for the last several months, he is accompanied with his caretaker and according to her patient was having this tremor which has been getting worse since last few weeks, according to the patient the caregiver since patient has been on antipsychotics he has had the tremor, he denies any freezing episodes, no difficulty in swallowing, he does have resting tremor of the right hand worse than the left hand and jaw tremor, denies any hallucinations, his mood is good, he does have paranoid schizophrenia,  no numbness or tingling, no focal weakness, no bowel and bladder incontinence, he lives in a group home, no other complaints      Vitals:    01/29/20 1322   BP: 120/78   BP Location: Left arm   Patient Position: Sitting   Cuff Size: Adult   Pulse: 84   Weight: 75 8 kg (167 lb)   Height: 5' 8" (1 727 m)       Current Medications    Current Outpatient Medications:     acetaminophen (TYLENOL) 500 mg tablet, Take 1 tablet (500 mg total) by mouth every 6 (six) hours as needed for mild pain, Disp: 30 tablet, Rfl: 3    atorvastatin (LIPITOR) 40 mg tablet, Take 1 tablet (40 mg total) by mouth daily, Disp: 90 tablet, Rfl: 3    AUSTEDO 12 MG TABS, Take 1 tablet by mouth 2 (two) times a day, Disp: , Rfl:     cloZAPine (CLOZARIL) 100 mg tablet, Take 100 mg by mouth 2 (two) times a day, Disp: , Rfl:     clozapine (CLOZARIL) 200 MG tablet, Take 200 mg by mouth daily at bedtime , Disp: , Rfl:     docusate sodium (COLACE) 100 mg capsule, Take 1 capsule (100 mg total) by mouth 2 (two) times a day, Disp: 60 capsule, Rfl: 5    Fluticasone Furoate 50 MCG/ACT AEPB, Inhale 1 spray as needed (as needed for congestion), Disp: 1 each, Rfl: 3    fluticasone-salmeterol (ADVAIR, WIXELA) 250-50 mcg/dose inhaler, Inhale 1 puff 2 (two) times a day Rinse mouth after use , Disp: 1 Inhaler, Rfl: 3    losartan (COZAAR) 25 mg tablet, Take 1 tablet (25 mg total) by mouth daily, Disp: 90 tablet, Rfl: 3    omeprazole (PriLOSEC) 40 MG capsule, Take 1 capsule (40 mg total) by mouth daily, Disp: 90 capsule, Rfl: 1    psyllium (METAMUCIL SMOOTH TEXTURE) 28 % packet, Take 1 packet by mouth 2 (two) times a day, Disp: 60 packet, Rfl: 0    temazepam (RESTORIL) 15 mg capsule, Take 15 mg by mouth daily at bedtime as needed for sleep, Disp: , Rfl:     topiramate (TOPAMAX) 25 mg tablet, Take 25 mg by mouth 2 (two) times a day, Disp: , Rfl: 1    traZODone (DESYREL) 100 mg tablet, Take 100 mg by mouth daily at bedtime as needed , Disp: , Rfl:     VITAMIN D HIGH POTENCY 1000 units capsule, Take 1 capsule (1,000 Units total) by mouth daily, Disp: 90 capsule, Rfl: 3      Allergies  Patient has no known allergies  Past Medical History  Past Medical History:   Diagnosis Date    Asthma     COPD (chronic obstructive pulmonary disease) (HCC)     GERD (gastroesophageal reflux disease)     Hypercholesteremia     Hypertension     Hypertension     [facility] [address] [facility]     Peripheral vascular disease [facility] [address] [facility]     Peripheral vascular disease ([facility] [address] )          Past Surgical History:  Past Surgical History:   Procedure Laterality Date    CATARACT EXTRACTION      COLONOSCOPY           Family History:  Family History   Problem Relation Age of Onset    No Known Problems Mother     No Known Problems Father     Parkinsonism Son        Social History:   reports that he has quit smoking  He has never used smokeless tobacco  He reports that he drank alcohol  He reports that he does not use drugs  I have reviewed the past medical history, surgical history, social and family history, current medications, allergies vitals, review of systems, and updated this information as appropriate today  Objective:    Physical Exam    Neurological Exam    GENERAL:  Cooperative in no acute distress  Well-developed and well-nourished    HEAD and NECK   Head is atraumatic normocephalic with no lesions or masses  Neck is supple with full range of motion    CARDIOVASCULAR  Carotid Arteries-no carotid bruits  NEUROLOGIC:  Mental Status-the patient is awake alert and oriented without aphasia or apraxia  Cranial Nerves: Visual fields are full to confrontation  Discs are flat  Limited exam as unable to dilate the pupil, visual acuity is 20/20 with hand-held chart Extraocular movements are full without nystagmus  Pupils are 2-1/2 mm and reactive  Face is symmetrical to light touch  Movements of facial expression move symmetrically   Hearing is normal to finger rub bilaterally  Soft palate lifts symmetrically  Shoulder shrug is symmetrical  Tongue is midline without atrophy  Motor: No drift is noted on arm extension  Strength is full in the upper and lower extremities with normal bulk and slight cogwheeling rigidity of both hands, resting tremor of the right hand and jaw tremor  Sensory: Intact to temperature and vibratory sensation in the upper and lower extremities bilaterally  Cortical function is intact  Coordination: Finger to nose testing is performed accurately  Romberg is negative  Gait reveals a slightly stooped posture with decreased arm swing Tandem walk is abnormal  Reflexes:   2+ and symmetrical    Toes are downgoing          ROS:  Review of Systems   Constitutional: Negative  Negative for appetite change, chills, fatigue and fever  HENT: Positive for voice change (loss of volume)  Negative for hearing loss, tinnitus and trouble swallowing  Eyes: Negative  Negative for photophobia, pain and visual disturbance  Respiratory: Negative  Negative for shortness of breath and wheezing  Cardiovascular: Negative  Negative for chest pain and palpitations  Gastrointestinal: Negative  Negative for nausea and vomiting  Endocrine: Negative  Negative for cold intolerance and heat intolerance  Genitourinary: Negative  Negative for dysuria, frequency and urgency  Musculoskeletal: Negative  Negative for arthralgias, back pain, gait problem, myalgias, neck pain and neck stiffness  Skin: Negative  Negative for rash  Allergic/Immunologic: Negative  Neurological: Positive for tremors (mouth, hands)  Negative for dizziness, seizures, syncope, facial asymmetry, speech difficulty, weakness, light-headedness, numbness and headaches  Hematological: Negative  Does not bruise/bleed easily  Psychiatric/Behavioral: Positive for sleep disturbance  Negative for confusion, decreased concentration and hallucinations

## 2020-02-03 DIAGNOSIS — K59.00 CONSTIPATION, UNSPECIFIED CONSTIPATION TYPE: ICD-10-CM

## 2020-02-04 DIAGNOSIS — K59.00 CONSTIPATION, UNSPECIFIED CONSTIPATION TYPE: ICD-10-CM

## 2020-02-05 ENCOUNTER — HOSPITAL ENCOUNTER (OUTPATIENT)
Dept: MRI IMAGING | Facility: CLINIC | Age: 70
Discharge: HOME | End: 2020-02-05
Payer: MEDICARE

## 2020-02-05 DIAGNOSIS — G21.11 NEUROLEPTIC-INDUCED PARKINSONISM (HCC): ICD-10-CM

## 2020-02-05 PROCEDURE — 70551 MRI BRAIN STEM W/O DYE: CPT

## 2020-02-06 ENCOUNTER — OFFICE VISIT (OUTPATIENT)
Dept: INTERNAL MEDICINE CLINIC | Facility: CLINIC | Age: 70
End: 2020-02-06
Payer: MEDICARE

## 2020-02-06 VITALS
DIASTOLIC BLOOD PRESSURE: 72 MMHG | SYSTOLIC BLOOD PRESSURE: 112 MMHG | HEART RATE: 88 BPM | BODY MASS INDEX: 26.58 KG/M2 | RESPIRATION RATE: 14 BRPM | HEIGHT: 68 IN | TEMPERATURE: 97.8 F | WEIGHT: 175.4 LBS

## 2020-02-06 DIAGNOSIS — F20.0 PARANOID SCHIZOPHRENIA (HCC): ICD-10-CM

## 2020-02-06 DIAGNOSIS — I10 ESSENTIAL HYPERTENSION: ICD-10-CM

## 2020-02-06 DIAGNOSIS — G21.11 NEUROLEPTIC-INDUCED PARKINSONISM (HCC): ICD-10-CM

## 2020-02-06 DIAGNOSIS — K21.9 GASTROESOPHAGEAL REFLUX DISEASE WITHOUT ESOPHAGITIS: Primary | ICD-10-CM

## 2020-02-06 DIAGNOSIS — J44.9 CHRONIC OBSTRUCTIVE PULMONARY DISEASE, UNSPECIFIED COPD TYPE (HCC): ICD-10-CM

## 2020-02-06 DIAGNOSIS — E78.00 HYPERCHOLESTEREMIA: ICD-10-CM

## 2020-02-06 DIAGNOSIS — J45.20 MILD INTERMITTENT ASTHMA, UNSPECIFIED WHETHER COMPLICATED: ICD-10-CM

## 2020-02-06 PROCEDURE — 3008F BODY MASS INDEX DOCD: CPT | Performed by: NURSE PRACTITIONER

## 2020-02-06 PROCEDURE — 3074F SYST BP LT 130 MM HG: CPT | Performed by: NURSE PRACTITIONER

## 2020-02-06 PROCEDURE — 1160F RVW MEDS BY RX/DR IN RCRD: CPT | Performed by: NURSE PRACTITIONER

## 2020-02-06 PROCEDURE — 1036F TOBACCO NON-USER: CPT | Performed by: NURSE PRACTITIONER

## 2020-02-06 PROCEDURE — 4040F PNEUMOC VAC/ADMIN/RCVD: CPT | Performed by: NURSE PRACTITIONER

## 2020-02-06 PROCEDURE — 3078F DIAST BP <80 MM HG: CPT | Performed by: NURSE PRACTITIONER

## 2020-02-06 PROCEDURE — 99213 OFFICE O/P EST LOW 20 MIN: CPT | Performed by: NURSE PRACTITIONER

## 2020-02-06 NOTE — ASSESSMENT & PLAN NOTE
Recently saw a gastroenterologist   Colonoscopy performed and the patient will be due in 10 years  Continue  Prilosec

## 2020-02-06 NOTE — ASSESSMENT & PLAN NOTE
Patient was a former smoker  He had been followed by a pulmonologist outside of the [facility]      The patient has an upcoming appointment with Joe Thompson

## 2020-02-06 NOTE — ASSESSMENT & PLAN NOTE
Blood pressure in the office today is 112/72  The patient will continue his current antihypertensive medications

## 2020-02-06 NOTE — PROGRESS NOTES
INTERNAL MEDICINE FOLLOW-UP OFFICE VISIT  [facility] [address] [place] [address]    NAME: Luly Akbar  AGE: 71 y o  SEX: male    DATE OF ENCOUNTER: 2/6/2020   Assessment and Plan:     Problem List Items Addressed This Visit        Digestive    GERD (gastroesophageal reflux disease) - Primary       Recently saw a gastroenterologist   Colonoscopy performed and the patient will be due in 10 years  Continue  Prilosec  Respiratory    Chronic obstructive pulmonary disease ([address] )       Patient was a former smoker  He had been followed by a pulmonologist outside of the [facility]  The patient has an upcoming appointment with Dr Wilks         Asthma       Continue current inhaler use  Cardiovascular and Mediastinum    Hypertension       Blood pressure in the office today is 112/72  The patient will continue his current antihypertensive medications  Nervous and Auditory    Neuro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 (HCC)       Other    Hypercholesteremia       Recent lipid panel normal   Continue with Lipitor  Recent lipid panel was within normal limits  Paranoid schizophrenia [facility])       Patient follows with psych  He will contact them when he is in need of medication renewals  Return in about 3 months (around 5/6/2020) for Office Visit, [facility]  Counseling:     · Medication Side Effects - Adverse side effects of medications were reviewed with the patient/guardian today: Yes  · Counseling was given regarding: Intructions for management  · Barriers to treatment include: No identified barriers      Chief Complaint:     Chief Complaint   Patient presents with    Follow-up     needs form filled out  History of Present Illness:     THERESA Hidalgo presents to the office today for follow-up  He is accompanied by his  [facility]   He continues to follow with psychiatry for his schizophrenia     The patient had blood work performed in January and the patient's lipid panel is much improved  He will continue on his  Lipitor  The patient is a previous smoker and does have a diagnosis of COPD on his chart  When I initially had seen this patient this was in his medical history  The patient had seen pulmonologist in the past but he does have an appointment with Dr Tayla Clayton  in the near future for re-evaluation  The patient will also continue to follow with neurology for his neuroleptics induced parkinsonism  The patient does have a new concern this morning of some left ear pain  The patient does not have any other associated symptoms of congestion, rhinorrhea, sore throat, postnasal drip or fever  The patient's tympanic membrane is intact  He does have some erythema to his canal and upon questioning the patient does use Q-tips in his ears  Information was provided to the patient regarding safe usage of Q-tips in his ear canal       An MA-51 form was completed for the patient today  I will see him back in 3 months for re-evaluation    The following portions of the patient's history were reviewed and updated as appropriate: allergies, current medications, past family history, past medical history, past social history, past surgical history and problem list      Review of Systems:     Review of Systems     Problem List:     Patient Active Problem List   Diagnosis    Chronic obstructive pulmonary disease ([address] )    GERD (gastroesophageal reflux disease)    Neuro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 ([address] )    Asthma    Hypertension    Hypercholesteremia    Paranoid schizophrenia ([facility] [address] )        Objective:     /72 (BP Location: Left arm, Patient Position: Sitting, Cuff Size: Standard)   Pulse 88   Temp 97 8 °F (36 6 °C) (Oral)   Resp 14   Ht 5' 8" (1 727 m)   Wt 79 6 kg (175 lb 6 4 oz)   BMI 26 67 kg/m²     Physical Exam    Pertinent Laboratory/Diagnostic Studies:    Laboratory Results: I have personally reviewed the pertinent laboratory results/reports   Radiology/Other Diagnostic Testing Results: I have personally reviewed pertinent reports  Current Medications:     Current Outpatient Medications   Medication Sig Dispense Refill    acetaminophen (TYLENOL) 500 mg tablet Take 1 tablet (500 mg total) by mouth every 6 (six) hours as needed for mild pain 30 tablet 3    atorvastatin (LIPITOR) 40 mg tablet Take 1 tablet (40 mg total) by mouth daily 90 tablet 3    AUSTEDO 12 MG TABS Take 1 tablet by mouth 2 (two) times a day      carbidopa-levodopa (SINEMET)  mg per tablet Take 1 tablet by mouth 3 (three) times a day 90 tablet 4    cloZAPine (CLOZARIL) 100 mg tablet Take 100 mg by mouth 2 (two) times a day      clozapine (CLOZARIL) 200 MG tablet Take 200 mg by mouth daily at bedtime       docusate sodium (COLACE) 100 mg capsule Take 1 capsule (100 mg total) by mouth 2 (two) times a day 60 capsule 5    Fluticasone Furoate 50 MCG/ACT AEPB Inhale 1 spray as needed (as needed for congestion) 1 each 3    fluticasone-salmeterol (ADVAIR, WIXELA) 250-50 mcg/dose inhaler Inhale 1 puff 2 (two) times a day Rinse mouth after use  1 Inhaler 3    losartan (COZAAR) 25 mg tablet Take 1 tablet (25 mg total) by mouth daily 90 tablet 3    omeprazole (PriLOSEC) 40 MG capsule Take 1 capsule (40 mg total) by mouth daily 90 capsule 1    psyllium (METAMUCIL SMOOTH TEXTURE) 28 % packet Take 1 packet by mouth 2 (two) times a day 60 packet 2    temazepam (RESTORIL) 15 mg capsule Take 15 mg by mouth daily at bedtime as needed for sleep      topiramate (TOPAMAX) 25 mg tablet Take 25 mg by mouth 2 (two) times a day  1    traZODone (DESYREL) 100 mg tablet Take 100 mg by mouth daily at bedtime as needed       VITAMIN D HIGH POTENCY 1000 units capsule Take 1 capsule (1,000 Units total) by mouth daily 90 capsule 3     No current facility-administered medications for this visit  There are no Patient Instructions on file for this visit      Adelina Knight LIUDMILA Higuera  [facility] [address] [place] [address]

## 2020-02-10 ENCOUNTER — APPOINTMENT (OUTPATIENT)
Dept: LAB | Facility: CLINIC | Age: 70
End: 2020-02-10
Payer: MEDICARE

## 2020-02-10 DIAGNOSIS — F20.9 SUBCHRONIC SCHIZOPHRENIA (HCC): ICD-10-CM

## 2020-02-10 DIAGNOSIS — Z79.899 ENCOUNTER FOR LONG-TERM (CURRENT) USE OF OTHER MEDICATIONS: ICD-10-CM

## 2020-02-10 LAB
BASOPHILS # BLD AUTO: 0.03 THOUSANDS/ΜL (ref 0–0.1)
BASOPHILS NFR BLD AUTO: 1 % (ref 0–1)
EOSINOPHIL # BLD AUTO: 0.19 THOUSAND/ΜL (ref 0–0.61)
EOSINOPHIL NFR BLD AUTO: 4 % (ref 0–6)
ERYTHROCYTE [DISTWIDTH] IN BLOOD BY AUTOMATED COUNT: 13.4 % (ref 11.6–15.1)
HCT VFR BLD AUTO: 42.5 % (ref 36.5–49.3)
HGB BLD-MCNC: 14 G/DL (ref 12–17)
IMM GRANULOCYTES # BLD AUTO: 0.01 THOUSAND/UL (ref 0–0.2)
IMM GRANULOCYTES NFR BLD AUTO: 0 % (ref 0–2)
LYMPHOCYTES # BLD AUTO: 1 THOUSANDS/ΜL (ref 0.6–4.47)
LYMPHOCYTES NFR BLD AUTO: 19 % (ref 14–44)
MCH RBC QN AUTO: 32.3 PG (ref 26.8–34.3)
MCHC RBC AUTO-ENTMCNC: 32.9 G/DL (ref 31.4–37.4)
MCV RBC AUTO: 98 FL (ref 82–98)
MONOCYTES # BLD AUTO: 0.63 THOUSAND/ΜL (ref 0.17–1.22)
MONOCYTES NFR BLD AUTO: 12 % (ref 4–12)
NEUTROPHILS # BLD AUTO: 3.31 THOUSANDS/ΜL (ref 1.85–7.62)
NEUTS SEG NFR BLD AUTO: 64 % (ref 43–75)
NRBC BLD AUTO-RTO: 0 /100 WBCS
PLATELET # BLD AUTO: 182 THOUSANDS/UL (ref 149–390)
PMV BLD AUTO: 9.9 FL (ref 8.9–12.7)
RBC # BLD AUTO: 4.33 MILLION/UL (ref 3.88–5.62)
WBC # BLD AUTO: 5.17 THOUSAND/UL (ref 4.31–10.16)

## 2020-02-10 PROCEDURE — 82164 ANGIOTENSIN I ENZYME TEST: CPT

## 2020-02-10 PROCEDURE — 36415 COLL VENOUS BLD VENIPUNCTURE: CPT

## 2020-02-10 PROCEDURE — 85025 COMPLETE CBC W/AUTO DIFF WBC: CPT

## 2020-02-11 LAB — ACE SERPL-CCNC: 29 U/L (ref 14–82)

## 2020-02-13 ENCOUNTER — CONSULT (OUTPATIENT)
Dept: PULMONOLOGY | Facility: CLINIC | Age: 70
End: 2020-02-13
Payer: MEDICARE

## 2020-02-13 VITALS
HEART RATE: 91 BPM | BODY MASS INDEX: 25.91 KG/M2 | SYSTOLIC BLOOD PRESSURE: 110 MMHG | OXYGEN SATURATION: 93 % | HEIGHT: 68 IN | WEIGHT: 171 LBS | DIASTOLIC BLOOD PRESSURE: 84 MMHG

## 2020-02-13 DIAGNOSIS — R05.9 COUGH: ICD-10-CM

## 2020-02-13 DIAGNOSIS — R05.3 CHRONIC COUGH: Primary | ICD-10-CM

## 2020-02-13 DIAGNOSIS — J31.0 CHRONIC RHINITIS: ICD-10-CM

## 2020-02-13 PROCEDURE — 99214 OFFICE O/P EST MOD 30 MIN: CPT | Performed by: INTERNAL MEDICINE

## 2020-02-13 PROCEDURE — 4040F PNEUMOC VAC/ADMIN/RCVD: CPT | Performed by: INTERNAL MEDICINE

## 2020-02-13 PROCEDURE — 3074F SYST BP LT 130 MM HG: CPT | Performed by: INTERNAL MEDICINE

## 2020-02-13 PROCEDURE — 3079F DIAST BP 80-89 MM HG: CPT | Performed by: INTERNAL MEDICINE

## 2020-02-13 PROCEDURE — 1036F TOBACCO NON-USER: CPT | Performed by: INTERNAL MEDICINE

## 2020-02-13 PROCEDURE — 1160F RVW MEDS BY RX/DR IN RCRD: CPT | Performed by: INTERNAL MEDICINE

## 2020-02-13 NOTE — PROGRESS NOTES
Assessment/Plan:     Diagnoses and all orders for this visit:    Chronic cough    Cough  -     XR chest pa & lateral; Future  -     [facility] Allergy Panel, Adult; Future  -     Hypersensitivity pnuemonitis profile; Future    Chronic rhinitis   -     [facility] Allergy Panel, Adult; Future        Chronic cough likely secondary to chronic rhinitis and postnasal drip  Will get respiratory allergy panel with IgE and hypersensitivity pneumonitis profile  Chest x-ray PA and lateral view  He will continue with the saline nasal spray 1 spray into each nostril twice daily  Continue with fluticasone nasal spray 1 spray into each nostril twice daily  If any worsening of the postnasal drip ENT evaluation  PFTs recently done in May of 2019 normal spirometry normal lung volumes and normal DLCO  Discussed with the patient no evidence of any obstructive lung disease of COPD from the PFTs  Follow-up after the above testing  Return in about 6 weeks (around 3/26/2020)  All questions are answered to the patient's satisfaction and understanding  He verbalizes understanding  He is encouraged to call with any further questions or concerns  Portions of the record may have been created with voice recognition software  Occasional wrong word or "sound a like" substitutions may have occurred due to the inherent limitations of voice recognition software  Read the chart carefully and recognize, using context, where substitutions have occurred  a    Electronically Signed by Kathie Kenyon MD    ______________________________________________________________________    Chief Complaint:   Chief Complaint   Patient presents with    COPD        Patient ID: Phil Yuan is a 71 y o  y o  male has a past medical history of Asthma, COPD (chronic obstructive pulmonary disease) ([address] ), GERD (gastroesophageal reflux disease), Hypercholesteremia, Hypertension, and Paranoid schizophrenia ([facility] )      2/13/2020  Patient presents today for initial visit  Patient is a very pleasant 66-year-old gentleman former smoker with less than 50 pack-year smoking history who quit, here with history of cough chronic for more than 3 years, with white mucoid sputum no hemoptysis  Occupational/Exposure history:  Pets/Enviroment:  None  Travel history:  Review of Systems   Constitutional: Positive for fatigue  Negative for activity change, appetite change, chills, diaphoresis, fever and unexpected weight change  HENT: Positive for postnasal drip  Negative for congestion, dental problem, drooling, nosebleeds, rhinorrhea, sinus pressure, sore throat and voice change  Eyes: Negative for discharge, itching and visual disturbance  Respiratory: Positive for cough (clear sputum, no hemoptysis)  Negative for shortness of breath and wheezing  Cardiovascular: Negative for chest pain, palpitations and leg swelling  Endocrine: Negative for cold intolerance and heat intolerance  Allergic/Immunologic: Negative for food allergies and immunocompromised state  Neurological: Negative for dizziness, facial asymmetry, speech difficulty and weakness  Hematological: Negative for adenopathy  Psychiatric/Behavioral: Negative for agitation, confusion and sleep disturbance  The patient is not nervous/anxious  Social history: He reports that he has quit smoking  He has never used smokeless tobacco  He reports that he drank alcohol  He reports that he does not use drugs      Past surgical history:   Past Surgical History:   Procedure Laterality Date    CATARACT EXTRACTION      COLONOSCOPY       Family history:   Family History   Problem Relation Age of Onset    No Known Problems Mother     No Known Problems Father     Parkinsonism Son        Immunization History   Administered Date(s) Administered    H1N1, All Formulations 10/23/2009    Influenza TIV (IM) 10/20/2009    Influenza, high dose seasonal 0 5 mL 10/22/2019    Pneumococcal Polysaccharide PPV23 10/16/2009, 10/16/2009     Current Outpatient Medications   Medication Sig Dispense Refill    acetaminophen (TYLENOL) 500 mg tablet Take 1 tablet (500 mg total) by mouth every 6 (six) hours as needed for mild pain 30 tablet 3    atorvastatin (LIPITOR) 40 mg tablet Take 1 tablet (40 mg total) by mouth daily 90 tablet 3    AUSTEDO 12 MG TABS Take 1 tablet by mouth 2 (two) times a day      carbidopa-levodopa (SINEMET)  mg per tablet Take 1 tablet by mouth 3 (three) times a day 90 tablet 4    cloZAPine (CLOZARIL) 100 mg tablet Take 100 mg by mouth 2 (two) times a day      clozapine (CLOZARIL) 200 MG tablet Take 200 mg by mouth daily at bedtime       docusate sodium (COLACE) 100 mg capsule Take 1 capsule (100 mg total) by mouth 2 (two) times a day 60 capsule 5    Fluticasone Furoate 50 MCG/ACT AEPB Inhale 1 spray as needed (as needed for congestion) 1 each 3    losartan (COZAAR) 25 mg tablet Take 1 tablet (25 mg total) by mouth daily 90 tablet 3    omeprazole (PriLOSEC) 40 MG capsule Take 1 capsule (40 mg total) by mouth daily 90 capsule 1    temazepam (RESTORIL) 15 mg capsule Take 15 mg by mouth daily at bedtime as needed for sleep      topiramate (TOPAMAX) 25 mg tablet Take 25 mg by mouth 2 (two) times a day  1    traZODone (DESYREL) 100 mg tablet Take 100 mg by mouth daily at bedtime as needed       VITAMIN D HIGH POTENCY 1000 units capsule Take 1 capsule (1,000 Units total) by mouth daily 90 capsule 3    psyllium (METAMUCIL SMOOTH TEXTURE) 28 % packet Take 1 packet by mouth 2 (two) times a day 60 packet 2     No current facility-administered medications for this visit  Allergies: Patient has no known allergies  Objective:  Vitals:    02/13/20 1040   BP: 110/84   Pulse: 91   SpO2: 93%   Weight: 77 6 kg (171 lb)   Height: 5' 8" (1 727 m)   Oxygen Therapy  SpO2: 93 %      Wt Readings from Last 3 Encounters:   02/13/20 77 6 kg (171 lb)   02/06/20 79 6 kg (175 lb 6 4 oz)   02/05/20 75 8 kg (167 lb)     Body mass index is 26 kg/m²  Physical Exam   Constitutional: He is oriented to person, place, and time  He appears well-developed and well-nourished  HENT:   Head: Normocephalic and atraumatic  Eyes: Pupils are equal, round, and reactive to light  Conjunctivae are normal    Neck: Normal range of motion  Neck supple  No JVD present  No thyromegaly present  Cardiovascular: Normal rate, regular rhythm and normal heart sounds  Exam reveals no gallop and no friction rub  No murmur heard  Pulmonary/Chest: Effort normal and breath sounds normal  No respiratory distress  He has no wheezes  He has no rales  He exhibits no tenderness  Abdominal: Soft  Bowel sounds are normal    Musculoskeletal: Normal range of motion  He exhibits no edema, tenderness or deformity  Lymphadenopathy:     He has no cervical adenopathy  Neurological: He is alert and oriented to person, place, and time  Skin: Skin is warm and dry  Psychiatric: He has a normal mood and affect  Nursing note and vitals reviewed  Diagnostics:  I have personally reviewed pertinent reports

## 2020-02-14 ENCOUNTER — APPOINTMENT (OUTPATIENT)
Dept: LAB | Facility: CLINIC | Age: 70
End: 2020-02-14
Payer: MEDICARE

## 2020-02-14 ENCOUNTER — APPOINTMENT (OUTPATIENT)
Dept: RADIOLOGY | Facility: CLINIC | Age: 70
End: 2020-02-14
Payer: MEDICARE

## 2020-02-14 DIAGNOSIS — R05.9 COUGH: ICD-10-CM

## 2020-02-14 DIAGNOSIS — J31.0 CHRONIC RHINITIS: ICD-10-CM

## 2020-02-14 PROCEDURE — 71046 X-RAY EXAM CHEST 2 VIEWS: CPT

## 2020-02-14 PROCEDURE — 86602 ANTINOMYCES ANTIBODY: CPT

## 2020-02-14 PROCEDURE — 86671 FUNGUS NES ANTIBODY: CPT

## 2020-02-14 PROCEDURE — 86003 ALLG SPEC IGE CRUDE XTRC EA: CPT

## 2020-02-14 PROCEDURE — 86606 ASPERGILLUS ANTIBODY: CPT

## 2020-02-14 PROCEDURE — 82785 ASSAY OF IGE: CPT

## 2020-02-14 PROCEDURE — 86331 IMMUNODIFFUSION OUCHTERLONY: CPT

## 2020-02-17 LAB
A ALTERNATA IGE QN: <0.1 KUA/I
A FUMIGATUS IGE QN: <0.1 KUA/I
ALLERGEN COMMENT: NORMAL
BERMUDA GRASS IGE QN: <0.1 KUA/I
BOXELDER IGE QN: <0.1 KUA/I
C HERBARUM IGE QN: <0.1 KUA/I
CAT DANDER IGE QN: <0.1 KUA/I
CMN PIGWEED IGE QN: <0.1 KUA/I
COMMON RAGWEED IGE QN: <0.1 KUA/I
COTTONWOOD IGE QN: <0.1 KUA/I
D FARINAE IGE QN: <0.1 KUA/I
D PTERONYSS IGE QN: <0.1 KUA/I
DOG DANDER IGE QN: <0.1 KUA/I
LONDON PLANE IGE QN: <0.1 KUA/I
MOUSE URINE PROT IGE QN: <0.1 KUA/I
MT JUNIPER IGE QN: <0.1 KUA/I
MUGWORT IGE QN: <0.1 KUA/I
P NOTATUM IGE QN: <0.1 KUA/I
ROACH IGE QN: <0.1 KUA/I
SHEEP SORREL IGE QN: <0.1 KUA/I
SILVER BIRCH IGE QN: <0.1 KUA/I
TIMOTHY IGE QN: <0.1 KUA/I
TOTAL IGE SMQN RAST: 14.3 KU/L (ref 0–113)
WALNUT IGE QN: <0.1 KUA/I
WHITE ASH IGE QN: <0.1 KUA/I
WHITE ELM IGE QN: <0.1 KUA/I
WHITE MULBERRY IGE QN: <0.1 KUA/I
WHITE OAK IGE QN: <0.1 KUA/I

## 2020-02-18 ENCOUNTER — TELEPHONE (OUTPATIENT)
Dept: NEUROLOGY | Facility: CLINIC | Age: 70
End: 2020-02-18

## 2020-02-18 NOTE — TELEPHONE ENCOUNTER
Discussed with patient's nursing home supervisor johanne advised her that to speak to the psychiatrist regarding the Sinemet and [place], if it needs to be discontinued or modified and she will get back to us

## 2020-02-19 LAB
A FUMIGATUS1 AB SER QL ID: NEGATIVE
A PULLULANS AB SER QL: NEGATIVE
LACEYELLA SACCHARI AB SER QL: NEGATIVE
PIGEON SERUM AB QL ID: NEGATIVE
S RECTIVIRGULA AB SER QL ID: NEGATIVE
T VULGARIS AB SER QL ID: NEGATIVE

## 2020-03-02 DIAGNOSIS — K59.00 CONSTIPATION, UNSPECIFIED CONSTIPATION TYPE: ICD-10-CM

## 2020-03-13 ENCOUNTER — APPOINTMENT (OUTPATIENT)
Dept: LAB | Facility: CLINIC | Age: 70
End: 2020-03-13
Payer: MEDICARE

## 2020-04-07 ENCOUNTER — TELEMEDICINE (OUTPATIENT)
Dept: PULMONOLOGY | Facility: CLINIC | Age: 70
End: 2020-04-07
Payer: MEDICARE

## 2020-04-07 DIAGNOSIS — R09.82 POSTNASAL DRIP: ICD-10-CM

## 2020-04-07 DIAGNOSIS — J41.0 SIMPLE CHRONIC BRONCHITIS (HCC): ICD-10-CM

## 2020-04-07 DIAGNOSIS — F17.211 NICOTINE DEPENDENCE, CIGARETTES, IN REMISSION: Primary | ICD-10-CM

## 2020-04-07 PROCEDURE — 99441 PR PHYS/QHP TELEPHONE EVALUATION 5-10 MIN: CPT | Performed by: PHYSICIAN ASSISTANT

## 2020-04-13 ENCOUNTER — TRANSCRIBE ORDERS (OUTPATIENT)
Dept: LAB | Facility: CLINIC | Age: 70
End: 2020-04-13

## 2020-04-13 ENCOUNTER — APPOINTMENT (OUTPATIENT)
Dept: LAB | Facility: CLINIC | Age: 70
End: 2020-04-13
Payer: MEDICARE

## 2020-04-13 DIAGNOSIS — Z79.899 ENCOUNTER FOR LONG-TERM (CURRENT) USE OF OTHER MEDICATIONS: ICD-10-CM

## 2020-04-13 DIAGNOSIS — F25.0 SCHIZOAFFECTIVE DISORDER, BIPOLAR TYPE (HCC): Primary | ICD-10-CM

## 2020-04-13 DIAGNOSIS — F25.0 SCHIZOAFFECTIVE DISORDER, BIPOLAR TYPE (HCC): ICD-10-CM

## 2020-04-13 DIAGNOSIS — F20.9 SUBCHRONIC SCHIZOPHRENIA (HCC): ICD-10-CM

## 2020-04-13 LAB
BASOPHILS # BLD AUTO: 0.03 THOUSANDS/ΜL (ref 0–0.1)
BASOPHILS NFR BLD AUTO: 1 % (ref 0–1)
EOSINOPHIL # BLD AUTO: 0.15 THOUSAND/ΜL (ref 0–0.61)
EOSINOPHIL NFR BLD AUTO: 3 % (ref 0–6)
ERYTHROCYTE [DISTWIDTH] IN BLOOD BY AUTOMATED COUNT: 13.7 % (ref 11.6–15.1)
HCT VFR BLD AUTO: 42.7 % (ref 36.5–49.3)
HGB BLD-MCNC: 14.3 G/DL (ref 12–17)
IMM GRANULOCYTES # BLD AUTO: 0.01 THOUSAND/UL (ref 0–0.2)
IMM GRANULOCYTES NFR BLD AUTO: 0 % (ref 0–2)
LYMPHOCYTES # BLD AUTO: 1.27 THOUSANDS/ΜL (ref 0.6–4.47)
LYMPHOCYTES NFR BLD AUTO: 25 % (ref 14–44)
MCH RBC QN AUTO: 32.8 PG (ref 26.8–34.3)
MCHC RBC AUTO-ENTMCNC: 33.5 G/DL (ref 31.4–37.4)
MCV RBC AUTO: 98 FL (ref 82–98)
MONOCYTES # BLD AUTO: 0.6 THOUSAND/ΜL (ref 0.17–1.22)
MONOCYTES NFR BLD AUTO: 12 % (ref 4–12)
NEUTROPHILS # BLD AUTO: 3.06 THOUSANDS/ΜL (ref 1.85–7.62)
NEUTS SEG NFR BLD AUTO: 59 % (ref 43–75)
NRBC BLD AUTO-RTO: 0 /100 WBCS
PLATELET # BLD AUTO: 165 THOUSANDS/UL (ref 149–390)
PMV BLD AUTO: 10.1 FL (ref 8.9–12.7)
RBC # BLD AUTO: 4.36 MILLION/UL (ref 3.88–5.62)
WBC # BLD AUTO: 5.12 THOUSAND/UL (ref 4.31–10.16)

## 2020-04-13 PROCEDURE — 36415 COLL VENOUS BLD VENIPUNCTURE: CPT

## 2020-04-13 PROCEDURE — 85025 COMPLETE CBC W/AUTO DIFF WBC: CPT

## 2020-04-13 PROCEDURE — 82164 ANGIOTENSIN I ENZYME TEST: CPT

## 2020-04-14 LAB — ACE SERPL-CCNC: 26 U/L (ref 14–82)

## 2020-05-06 DIAGNOSIS — G21.11 NEUROLEPTIC-INDUCED PARKINSONISM (HCC): ICD-10-CM

## 2020-05-20 ENCOUNTER — TELEPHONE (OUTPATIENT)
Dept: INTERNAL MEDICINE CLINIC | Facility: CLINIC | Age: 70
End: 2020-05-20

## 2020-05-26 ENCOUNTER — TRANSCRIBE ORDERS (OUTPATIENT)
Dept: ADMINISTRATIVE | Facility: HOSPITAL | Age: 70
End: 2020-05-26

## 2020-05-26 ENCOUNTER — APPOINTMENT (OUTPATIENT)
Dept: LAB | Facility: CLINIC | Age: 70
End: 2020-05-26
Payer: MEDICARE

## 2020-05-26 DIAGNOSIS — Z79.899 ENCOUNTER FOR LONG-TERM (CURRENT) USE OF OTHER MEDICATIONS: ICD-10-CM

## 2020-05-26 DIAGNOSIS — F20.0 PARANOID SCHIZOPHRENIA, SUBCHRONIC CONDITION (HCC): ICD-10-CM

## 2020-05-26 DIAGNOSIS — F20.0 PARANOID SCHIZOPHRENIA, SUBCHRONIC CONDITION (HCC): Primary | ICD-10-CM

## 2020-05-26 LAB
BASOPHILS # BLD AUTO: 0.02 THOUSANDS/ΜL (ref 0–0.1)
BASOPHILS NFR BLD AUTO: 1 % (ref 0–1)
EOSINOPHIL # BLD AUTO: 0.17 THOUSAND/ΜL (ref 0–0.61)
EOSINOPHIL NFR BLD AUTO: 4 % (ref 0–6)
ERYTHROCYTE [DISTWIDTH] IN BLOOD BY AUTOMATED COUNT: 13.6 % (ref 11.6–15.1)
HCT VFR BLD AUTO: 42.1 % (ref 36.5–49.3)
HGB BLD-MCNC: 14.5 G/DL (ref 12–17)
IMM GRANULOCYTES # BLD AUTO: 0.01 THOUSAND/UL (ref 0–0.2)
IMM GRANULOCYTES NFR BLD AUTO: 0 % (ref 0–2)
LYMPHOCYTES # BLD AUTO: 1.27 THOUSANDS/ΜL (ref 0.6–4.47)
LYMPHOCYTES NFR BLD AUTO: 29 % (ref 14–44)
MCH RBC QN AUTO: 32.8 PG (ref 26.8–34.3)
MCHC RBC AUTO-ENTMCNC: 34.4 G/DL (ref 31.4–37.4)
MCV RBC AUTO: 95 FL (ref 82–98)
MONOCYTES # BLD AUTO: 0.44 THOUSAND/ΜL (ref 0.17–1.22)
MONOCYTES NFR BLD AUTO: 10 % (ref 4–12)
NEUTROPHILS # BLD AUTO: 2.49 THOUSANDS/ΜL (ref 1.85–7.62)
NEUTS SEG NFR BLD AUTO: 56 % (ref 43–75)
NRBC BLD AUTO-RTO: 0 /100 WBCS
PLATELET # BLD AUTO: 170 THOUSANDS/UL (ref 149–390)
PMV BLD AUTO: 10.2 FL (ref 8.9–12.7)
RBC # BLD AUTO: 4.42 MILLION/UL (ref 3.88–5.62)
WBC # BLD AUTO: 4.4 THOUSAND/UL (ref 4.31–10.16)

## 2020-05-26 PROCEDURE — 36415 COLL VENOUS BLD VENIPUNCTURE: CPT

## 2020-05-26 PROCEDURE — 85025 COMPLETE CBC W/AUTO DIFF WBC: CPT

## 2020-06-11 ENCOUNTER — TELEPHONE (OUTPATIENT)
Dept: INTERNAL MEDICINE CLINIC | Facility: CLINIC | Age: 70
End: 2020-06-11

## 2020-06-11 DIAGNOSIS — K21.9 GASTROESOPHAGEAL REFLUX DISEASE, ESOPHAGITIS PRESENCE NOT SPECIFIED: ICD-10-CM

## 2020-06-11 RX ORDER — OMEPRAZOLE 40 MG/1
40 CAPSULE, DELAYED RELEASE ORAL DAILY
Qty: 90 CAPSULE | Refills: 1 | Status: SHIPPED | OUTPATIENT
Start: 2020-06-11 | End: 2020-12-02 | Stop reason: SDUPTHER

## 2020-06-12 ENCOUNTER — TELEPHONE (OUTPATIENT)
Dept: NEUROLOGY | Facility: CLINIC | Age: 70
End: 2020-06-12

## 2020-06-12 ENCOUNTER — OFFICE VISIT (OUTPATIENT)
Dept: INTERNAL MEDICINE CLINIC | Facility: CLINIC | Age: 70
End: 2020-06-12
Payer: MEDICARE

## 2020-06-12 VITALS
SYSTOLIC BLOOD PRESSURE: 122 MMHG | BODY MASS INDEX: 24.98 KG/M2 | RESPIRATION RATE: 14 BRPM | DIASTOLIC BLOOD PRESSURE: 84 MMHG | HEART RATE: 84 BPM | TEMPERATURE: 96.9 F | HEIGHT: 68 IN | WEIGHT: 164.8 LBS

## 2020-06-12 DIAGNOSIS — E78.00 HYPERCHOLESTEREMIA: Primary | ICD-10-CM

## 2020-06-12 PROCEDURE — 1036F TOBACCO NON-USER: CPT | Performed by: NURSE PRACTITIONER

## 2020-06-12 PROCEDURE — 1170F FXNL STATUS ASSESSED: CPT | Performed by: NURSE PRACTITIONER

## 2020-06-12 PROCEDURE — 3074F SYST BP LT 130 MM HG: CPT | Performed by: NURSE PRACTITIONER

## 2020-06-12 PROCEDURE — 3008F BODY MASS INDEX DOCD: CPT | Performed by: NURSE PRACTITIONER

## 2020-06-12 PROCEDURE — 3079F DIAST BP 80-89 MM HG: CPT | Performed by: NURSE PRACTITIONER

## 2020-06-12 PROCEDURE — 1125F AMNT PAIN NOTED PAIN PRSNT: CPT | Performed by: NURSE PRACTITIONER

## 2020-06-12 PROCEDURE — G0439 PPPS, SUBSEQ VISIT: HCPCS | Performed by: NURSE PRACTITIONER

## 2020-06-12 PROCEDURE — 4040F PNEUMOC VAC/ADMIN/RCVD: CPT | Performed by: NURSE PRACTITIONER

## 2020-06-12 PROCEDURE — 1160F RVW MEDS BY RX/DR IN RCRD: CPT | Performed by: NURSE PRACTITIONER

## 2020-06-12 PROCEDURE — 1123F ACP DISCUSS/DSCN MKR DOCD: CPT | Performed by: NURSE PRACTITIONER

## 2020-06-16 DIAGNOSIS — K59.00 CONSTIPATION, UNSPECIFIED CONSTIPATION TYPE: ICD-10-CM

## 2020-06-17 ENCOUNTER — TRANSCRIBE ORDERS (OUTPATIENT)
Dept: LAB | Facility: CLINIC | Age: 70
End: 2020-06-17

## 2020-06-17 ENCOUNTER — APPOINTMENT (OUTPATIENT)
Dept: LAB | Facility: CLINIC | Age: 70
End: 2020-06-17
Payer: MEDICARE

## 2020-06-17 DIAGNOSIS — Z79.899 ENCOUNTER FOR LONG-TERM (CURRENT) USE OF OTHER MEDICATIONS: ICD-10-CM

## 2020-06-17 DIAGNOSIS — Z79.899 ENCOUNTER FOR LONG-TERM (CURRENT) USE OF OTHER MEDICATIONS: Primary | ICD-10-CM

## 2020-06-17 LAB
BASOPHILS # BLD AUTO: 0.03 THOUSANDS/ΜL (ref 0–0.1)
BASOPHILS NFR BLD AUTO: 1 % (ref 0–1)
EOSINOPHIL # BLD AUTO: 0.17 THOUSAND/ΜL (ref 0–0.61)
EOSINOPHIL NFR BLD AUTO: 4 % (ref 0–6)
ERYTHROCYTE [DISTWIDTH] IN BLOOD BY AUTOMATED COUNT: 13.7 % (ref 11.6–15.1)
HCT VFR BLD AUTO: 43.1 % (ref 36.5–49.3)
HGB BLD-MCNC: 14.5 G/DL (ref 12–17)
IMM GRANULOCYTES # BLD AUTO: 0 THOUSAND/UL (ref 0–0.2)
IMM GRANULOCYTES NFR BLD AUTO: 0 % (ref 0–2)
LYMPHOCYTES # BLD AUTO: 1.17 THOUSANDS/ΜL (ref 0.6–4.47)
LYMPHOCYTES NFR BLD AUTO: 24 % (ref 14–44)
MCH RBC QN AUTO: 32.9 PG (ref 26.8–34.3)
MCHC RBC AUTO-ENTMCNC: 33.6 G/DL (ref 31.4–37.4)
MCV RBC AUTO: 98 FL (ref 82–98)
MONOCYTES # BLD AUTO: 0.59 THOUSAND/ΜL (ref 0.17–1.22)
MONOCYTES NFR BLD AUTO: 12 % (ref 4–12)
NEUTROPHILS # BLD AUTO: 2.9 THOUSANDS/ΜL (ref 1.85–7.62)
NEUTS SEG NFR BLD AUTO: 59 % (ref 43–75)
NRBC BLD AUTO-RTO: 0 /100 WBCS
PLATELET # BLD AUTO: 173 THOUSANDS/UL (ref 149–390)
PMV BLD AUTO: 10.1 FL (ref 8.9–12.7)
RBC # BLD AUTO: 4.41 MILLION/UL (ref 3.88–5.62)
WBC # BLD AUTO: 4.86 THOUSAND/UL (ref 4.31–10.16)

## 2020-06-17 PROCEDURE — 85025 COMPLETE CBC W/AUTO DIFF WBC: CPT

## 2020-06-17 PROCEDURE — 36415 COLL VENOUS BLD VENIPUNCTURE: CPT

## 2020-07-13 ENCOUNTER — APPOINTMENT (OUTPATIENT)
Dept: LAB | Facility: CLINIC | Age: 70
End: 2020-07-13
Payer: MEDICARE

## 2020-07-13 DIAGNOSIS — Z79.899 ENCOUNTER FOR LONG-TERM (CURRENT) USE OF OTHER MEDICATIONS: Primary | ICD-10-CM

## 2020-07-13 PROCEDURE — 82164 ANGIOTENSIN I ENZYME TEST: CPT

## 2020-07-14 LAB — ACE SERPL-CCNC: 31 U/L (ref 14–82)

## 2020-07-17 ENCOUNTER — HOSPITAL ENCOUNTER (OUTPATIENT)
Dept: CT IMAGING | Facility: HOSPITAL | Age: 70
Discharge: HOME | End: 2020-07-17
Payer: MEDICARE

## 2020-07-17 DIAGNOSIS — Z12.2 SCREENING FOR MALIGNANT NEOPLASM OF RESPIRATORY ORGAN: ICD-10-CM

## 2020-07-17 DIAGNOSIS — F17.211 NICOTINE DEPENDENCE, CIGARETTES, IN REMISSION: ICD-10-CM

## 2020-07-17 DIAGNOSIS — Z87.891 PERSONAL HISTORY OF TOBACCO USE, PRESENTING HAZARDS TO HEALTH: ICD-10-CM

## 2020-07-17 PROCEDURE — G0297 LDCT FOR LUNG CA SCREEN: HCPCS

## 2020-07-31 ENCOUNTER — OFFICE VISIT (OUTPATIENT)
Dept: INTERNAL MEDICINE CLINIC | Facility: CLINIC | Age: 70
End: 2020-07-31
Payer: MEDICARE

## 2020-07-31 ENCOUNTER — TELEMEDICINE (OUTPATIENT)
Dept: NEUROLOGY | Facility: CLINIC | Age: 70
End: 2020-07-31
Payer: MEDICARE

## 2020-07-31 VITALS
DIASTOLIC BLOOD PRESSURE: 60 MMHG | SYSTOLIC BLOOD PRESSURE: 90 MMHG | BODY MASS INDEX: 25.91 KG/M2 | WEIGHT: 171 LBS | OXYGEN SATURATION: 97 % | HEART RATE: 84 BPM | HEIGHT: 68 IN | TEMPERATURE: 97.7 F

## 2020-07-31 DIAGNOSIS — L02.91 ABSCESS: Primary | ICD-10-CM

## 2020-07-31 DIAGNOSIS — I10 ESSENTIAL HYPERTENSION: ICD-10-CM

## 2020-07-31 DIAGNOSIS — F20.0 PARANOID SCHIZOPHRENIA (HCC): ICD-10-CM

## 2020-07-31 DIAGNOSIS — E78.00 HYPERCHOLESTEREMIA: ICD-10-CM

## 2020-07-31 DIAGNOSIS — I71.2 ASCENDING AORTIC ANEURYSM (HCC): ICD-10-CM

## 2020-07-31 DIAGNOSIS — G21.11 NEUROLEPTIC-INDUCED PARKINSONISM (HCC): ICD-10-CM

## 2020-07-31 PROBLEM — I71.21 ASCENDING AORTIC ANEURYSM: Status: ACTIVE | Noted: 2020-07-31

## 2020-07-31 PROCEDURE — 3074F SYST BP LT 130 MM HG: CPT | Performed by: NURSE PRACTITIONER

## 2020-07-31 PROCEDURE — 3074F SYST BP LT 130 MM HG: CPT | Performed by: PSYCHIATRY & NEUROLOGY

## 2020-07-31 PROCEDURE — 1036F TOBACCO NON-USER: CPT | Performed by: PSYCHIATRY & NEUROLOGY

## 2020-07-31 PROCEDURE — 99214 OFFICE O/P EST MOD 30 MIN: CPT | Performed by: NURSE PRACTITIONER

## 2020-07-31 PROCEDURE — 1160F RVW MEDS BY RX/DR IN RCRD: CPT | Performed by: NURSE PRACTITIONER

## 2020-07-31 PROCEDURE — 4040F PNEUMOC VAC/ADMIN/RCVD: CPT | Performed by: NURSE PRACTITIONER

## 2020-07-31 PROCEDURE — 1036F TOBACCO NON-USER: CPT | Performed by: NURSE PRACTITIONER

## 2020-07-31 PROCEDURE — 3008F BODY MASS INDEX DOCD: CPT | Performed by: NURSE PRACTITIONER

## 2020-07-31 PROCEDURE — 3079F DIAST BP 80-89 MM HG: CPT | Performed by: PSYCHIATRY & NEUROLOGY

## 2020-07-31 PROCEDURE — 1160F RVW MEDS BY RX/DR IN RCRD: CPT | Performed by: PSYCHIATRY & NEUROLOGY

## 2020-07-31 PROCEDURE — 4040F PNEUMOC VAC/ADMIN/RCVD: CPT | Performed by: PSYCHIATRY & NEUROLOGY

## 2020-07-31 PROCEDURE — 3078F DIAST BP <80 MM HG: CPT | Performed by: NURSE PRACTITIONER

## 2020-07-31 PROCEDURE — 99442 PR PHYS/QHP TELEPHONE EVALUATION 11-20 MIN: CPT | Performed by: PSYCHIATRY & NEUROLOGY

## 2020-07-31 RX ORDER — CEPHALEXIN 500 MG/1
500 CAPSULE ORAL EVERY 12 HOURS SCHEDULED
Qty: 14 CAPSULE | Refills: 0 | Status: SHIPPED | OUTPATIENT
Start: 2020-07-31 | End: 2020-08-07

## 2020-07-31 NOTE — PROGRESS NOTES
INTERNAL MEDICINE FOLLOW-UP OFFICE VISIT  [facility] - [facility] VANDANA JOHNSON    NAME: Kendra Mathis  AGE: 79 y o  SEX: male    DATE OF ENCOUNTER: 7/31/2020   Assessment and Plan:     Problem List Items Addressed This Visit        Cardiovascular and Mediastinum    Ascending aortic aneurysm ([address] )    Relevant Orders    US abdominal aorta screening aaa      Other Visit Diagnoses     Abscess    -  Primary     santyl ointment, Keflex  Relevant Medications    collagenase (SANTYL) ointment    cephalexin (KEFLEX) 500 mg capsule          No follow-ups on file  Counseling:     · Medication Side Effects - Adverse side effects of medications were reviewed with the patient/guardian today: Yes  · Counseling was given regarding: Intructions for management  · Barriers to treatment include: No identified barriers      Chief Complaint:     No chief complaint on file  History of Present Illness:     THERESA Alexander to the office today for sick visit  The patient is concerned about a "sore" on his right buttocks  He states this is been here for approximately 1 month  Upon exam there is a 1 cm circular healing abscess  There is a smaller area about 2 cm below that of another smaller abscess  Keflex was sent to the patient's pharmacy  Santyl cream was prescribed to that area twice daily  In addition the patient had a CT scan for the lung cancer screening program which showed a 4 cm aortic aneurysm  An ultrasound of the aorta was ordered  The following portions of the patient's history were reviewed and updated as appropriate: allergies, current medications, past family history, past medical history, past social history, past surgical history and problem list      Review of Systems:     Review of Systems   Constitutional: Negative  Negative for fatigue  HENT: Negative  Negative for congestion, postnasal drip, rhinorrhea and trouble swallowing  Eyes: Negative  Negative for visual disturbance  Respiratory: Negative  Negative for choking and shortness of breath  Cardiovascular: Negative  Negative for chest pain  Gastrointestinal: Negative  Endocrine: Negative  Genitourinary: Negative  Musculoskeletal: Negative  Negative for arthralgias, back pain, myalgias and neck pain  Skin: Positive for wound  Neurological: Negative for dizziness and headaches  Psychiatric/Behavioral: Negative  Problem List:     Patient Active Problem List   Diagnosis    Chronic obstructive pulmonary disease (HCC)    GERD (gastroesophageal reflux disease)    Neuro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 (HCC)    Asthma    Hypertension    Hypercholesteremia    Paranoid schizophrenia ([facility] )        Objective:     BP 90/60 (BP Location: Left arm, Patient Position: Sitting, Cuff Size: Standard)   Pulse 84   Temp 97 7 °F (36 5 °C) (Temporal)   Ht 5' 8" (1 727 m)   Wt 77 6 kg (171 lb)   SpO2 97%   BMI 26 00 kg/m²     Physical Exam   Constitutional: He is oriented to person, place, and time  He appears well-developed and well-nourished  No distress  HENT:   Head: Normocephalic and atraumatic  Eyes: Pupils are equal, round, and reactive to light  EOM are normal    Neck: Normal range of motion  Cardiovascular: Normal rate, regular rhythm and normal heart sounds  No murmur heard  Pulmonary/Chest: Effort normal and breath sounds normal  No respiratory distress  He has no wheezes  Musculoskeletal: Normal range of motion  Neurological: He is alert and oriented to person, place, and time  Skin: Skin is warm and dry  1 cm healing abscess right buttocks   Psychiatric: He has a normal mood and affect   His behavior is normal  Judgment and thought content normal        Pertinent Laboratory/Diagnostic Studies:    Laboratory Results: I have personally reviewed the pertinent laboratory results/reports   Radiology/Other Diagnostic Testing Results: I have personally reviewed pertinent reports  Current Medications:     Current Outpatient Medications   Medication Sig Dispense Refill    acetaminophen (TYLENOL) 500 mg tablet Take 1 tablet (500 mg total) by mouth every 6 (six) hours as needed for mild pain 30 tablet 3    atorvastatin (LIPITOR) 40 mg tablet Take 1 tablet (40 mg total) by mouth daily 90 tablet 3    AUSTEDO 12 MG TABS Take 1 tablet by mouth 2 (two) times a day      carbidopa-levodopa (SINEMET)  mg per tablet Take 1 tablet by mouth 3 (three) times a day 90 tablet 4    cloZAPine (CLOZARIL) 100 mg tablet Take 100 mg by mouth 2 (two) times a day      clozapine (CLOZARIL) 200 MG tablet Take 200 mg by mouth daily at bedtime       docusate sodium (COLACE) 100 mg capsule Take 1 capsule (100 mg total) by mouth 2 (two) times a day 60 capsule 5    Fluticasone Furoate 50 MCG/ACT AEPB Inhale 1 spray as needed (as needed for congestion) 1 each 3    losartan (COZAAR) 25 mg tablet Take 1 tablet (25 mg total) by mouth daily 90 tablet 3    omeprazole (PriLOSEC) 40 MG capsule Take 1 capsule (40 mg total) by mouth daily 90 capsule 1    psyllium (METAMUCIL SMOOTH TEXTURE) 28 % packet Take 1 packet by mouth 2 (two) times a day 60 packet 2    temazepam (RESTORIL) 15 mg capsule Take 15 mg by mouth daily at bedtime as needed for sleep      topiramate (TOPAMAX) 25 mg tablet Take 25 mg by mouth 2 (two) times a day  1    traZODone (DESYREL) 100 mg tablet Take 100 mg by mouth daily at bedtime as needed       VITAMIN D HIGH POTENCY 1000 units capsule Take 1 capsule (1,000 Units total) by mouth daily 90 capsule 3     No current facility-administered medications for this visit  There are no Patient Instructions on file for this visit      LIUDMILA Mcclain  [facility]

## 2020-07-31 NOTE — PROGRESS NOTES
Virtual Brief Visit    Assessment/Plan:  1  Neuro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 ([facility] )    2  Paranoid schizophrenia ([facility] )    3  Hypercholesteremia    4  Essential hypertension      MRI scan of the brain results were reviewed with the patient and the caretaker, it did not show evidence of any acute pathology, patient seems to be tolerating Sinemet well, I have advised the patient to continue with Sinemet 25/100 mg 3 times a day as it seems to be helping with his tremor, also advised him to discuss with her psychiatrist to see if any of his psychiatric medications can be decreased as some of the tremor could be due to that, if he continues to have issues then we could have him see 1 of our movement disorder specialist, he was advised to be physically active, to keep his blood pressure cholesterol and sugar under control, to go to the hospital if has any worsening symptoms and call me otherwise to see me back in 3 months and follow up with his other physicians  Problem List Items Addressed This Visit     None                Reason for visit is   Chief Complaint   Patient presents with    Follow-up     Dizziness & Tremors    Virtual Brief Visit        Encounter provider Eneida Perez MD    Provider located at [facility] [address] [place] [id number]    Recent Visits  No visits were found meeting these conditions  Showing recent visits within past 7 days and meeting all other requirements     Today's Visits  Date Type Provider Dept   07/31/20 Telemedicine Eneida Perez MD [address] [place] [address] today's visits and meeting all other requirements     Future Appointments  No visits were found meeting these conditions  Showing future appointments within next 150 days and meeting all other requirements        After connecting through telephone, the patient was identified by name and date of birth   Letty Chappell was informed that this is a telemedicine visit and that the visit is being conducted through telephone  My office door was closed  No one else was in the room  He acknowledged consent and understanding of privacy and security of the platform  The patient has agreed to participate and understands he can discontinue the visit at any time  Patient is aware this is a billable service  Subjective    Juvenal Bravo is a 79 y o  male is here in follow-up for his jaw tremor and right hand resting tremor for the last several months, according to his caregiver and the patient he does not have any hand tremor and only has jaw tremor, he has been on antipsychotics for a long time, he denies any side effects to Sinemet and feels that it might be helping with his tremor, denies any early morning stiffness, no freezing episodes, no dizziness, no bowel and bladder incontinence, he lives in a group home, no other complaints        Past Medical History:   Diagnosis Date    Asthma     COPD (chronic obstructive pulmonary disease) ([facility])     GERD (gastroesophageal reflux disease)     Hypercholesteremia     Hypertension     Paranoid schizophrenia ([facility] )        Past Surgical History:   Procedure Laterality Date    CATARACT EXTRACTION      COLONOSCOPY         Current Outpatient Medications   Medication Sig Dispense Refill    acetaminophen (TYLENOL) 500 mg tablet Take 1 tablet (500 mg total) by mouth every 6 (six) hours as needed for mild pain 30 tablet 3    atorvastatin (LIPITOR) 40 mg tablet Take 1 tablet (40 mg total) by mouth daily 90 tablet 3    AUSTEDO 12 MG TABS Take 1 tablet by mouth 2 (two) times a day      carbidopa-levodopa (SINEMET)  mg per tablet Take 1 tablet by mouth 3 (three) times a day 90 tablet 4    cloZAPine (CLOZARIL) 100 mg tablet Take 100 mg by mouth 2 (two) times a day      clozapine (CLOZARIL) 200 MG tablet Take 200 mg by mouth daily at bedtime       docusate sodium (COLACE) 100 mg capsule Take 1 capsule (100 mg total) by mouth 2 (two) times a day 60 capsule 5    Fluticasone Furoate 50 MCG/ACT AEPB Inhale 1 spray as needed (as needed for congestion) 1 each 3    losartan (COZAAR) 25 mg tablet Take 1 tablet (25 mg total) by mouth daily 90 tablet 3    omeprazole (PriLOSEC) 40 MG capsule Take 1 capsule (40 mg total) by mouth daily 90 capsule 1    psyllium (METAMUCIL SMOOTH TEXTURE) 28 % packet Take 1 packet by mouth 2 (two) times a day 60 packet 2    temazepam (RESTORIL) 15 mg capsule Take 15 mg by mouth daily at bedtime as needed for sleep      topiramate (TOPAMAX) 25 mg tablet Take 25 mg by mouth 2 (two) times a day  1    traZODone (DESYREL) 100 mg tablet Take 100 mg by mouth daily at bedtime as needed       VITAMIN D HIGH POTENCY 1000 units capsule Take 1 capsule (1,000 Units total) by mouth daily 90 capsule 3     No current facility-administered medications for this visit  No Known Allergies    Review of Systems   Constitutional: Negative  Negative for appetite change and fever  HENT: Negative  Negative for hearing loss, tinnitus, trouble swallowing and voice change  Eyes: Negative  Negative for photophobia and pain  Respiratory: Negative  Negative for shortness of breath  Cardiovascular: Negative  Negative for palpitations  Gastrointestinal: Negative  Negative for nausea and vomiting  Endocrine: Negative  Negative for cold intolerance  Genitourinary: Negative  Negative for dysuria, frequency and urgency  Musculoskeletal: Negative  Negative for myalgias and neck pain  Skin: Negative  Negative for rash  Allergic/Immunologic: Negative  Neurological: Positive for tremors  Negative for seizures, syncope, facial asymmetry, speech difficulty, weakness, light-headedness, numbness and headaches  Hematological: Negative  Does not bruise/bleed easily  Psychiatric/Behavioral: Negative  Negative for confusion, hallucinations and sleep disturbance     All other systems reviewed and are negative  I have reviewed the past medical history, surgical history, social and family history, current medications, allergies vitals, review of systems, and updated this information as appropriate today  There were no vitals filed for this visit  It was my intent to perform this visit via video technology but the patient was not able to do a video connection so the visit was completed via audio telephone only  I spent 15 minutes directly with the patient during this visit    [facility] [place] acknowledges that he has consented to an online visit or consultation  He understands that the online visit is based solely on information provided by him, and that, in the absence of a face-to-face physical evaluation by the physician, the diagnosis he receives is both limited and provisional in terms of accuracy and completeness  This is not intended to replace a full medical face-to-face evaluation by the physician  Denice Velazco understands and accepts these terms

## 2020-08-04 DIAGNOSIS — F17.211 NICOTINE DEPENDENCE, CIGARETTES, IN REMISSION: Primary | ICD-10-CM

## 2020-08-11 ENCOUNTER — TRANSCRIBE ORDERS (OUTPATIENT)
Dept: ADMINISTRATIVE | Facility: HOSPITAL | Age: 70
End: 2020-08-11

## 2020-08-13 ENCOUNTER — APPOINTMENT (OUTPATIENT)
Dept: LAB | Facility: CLINIC | Age: 70
End: 2020-08-13
Payer: MEDICARE

## 2020-08-19 ENCOUNTER — TELEPHONE (OUTPATIENT)
Dept: INTERNAL MEDICINE CLINIC | Facility: CLINIC | Age: 70
End: 2020-08-19

## 2020-08-19 ENCOUNTER — HOSPITAL ENCOUNTER (OUTPATIENT)
Dept: ULTRASOUND IMAGING | Facility: CLINIC | Age: 70
Discharge: HOME | End: 2020-08-19

## 2020-08-19 ENCOUNTER — HOSPITAL ENCOUNTER (OUTPATIENT)
Dept: NON INVASIVE DIAGNOSTICS | Facility: CLINIC | Age: 70
Discharge: HOME | End: 2020-08-19
Payer: MEDICARE

## 2020-08-19 DIAGNOSIS — I71.2 ASCENDING AORTIC ANEURYSM (HCC): Primary | ICD-10-CM

## 2020-08-19 DIAGNOSIS — I71.2 ASCENDING AORTIC ANEURYSM (HCC): ICD-10-CM

## 2020-08-19 PROCEDURE — 93306 TTE W/DOPPLER COMPLETE: CPT | Performed by: INTERNAL MEDICINE

## 2020-08-19 PROCEDURE — 93306 TTE W/DOPPLER COMPLETE: CPT

## 2020-08-19 NOTE — TELEPHONE ENCOUNTER
Gisselle Navarro from [facility] contacted our office to speak to Reece Delgado about this pt- pt was there    I contacted Reece Delgado and transferred the call

## 2020-08-21 ENCOUNTER — TELEPHONE (OUTPATIENT)
Dept: INTERNAL MEDICINE CLINIC | Facility: CLINIC | Age: 70
End: 2020-08-21

## 2020-08-21 DIAGNOSIS — I51.89 GRADE I DIASTOLIC DYSFUNCTION: Primary | ICD-10-CM

## 2020-08-21 DIAGNOSIS — I77.810 AORTIC ROOT DILATATION (HCC): ICD-10-CM

## 2020-08-21 NOTE — TELEPHONE ENCOUNTER
----- Message from Jeremiah Hodges sent at 8/21/2020 11:09 AM EDT -----  Please call the patient's  and make them aware that the patient's echocardiogram shows mild grade 1 diastolic dysfunction as well as a mild dilatation of the aortic root  A referral was placed for the patient to be evaluated by cardiology for further recommendations

## 2020-09-04 ENCOUNTER — TRANSCRIBE ORDERS (OUTPATIENT)
Dept: LAB | Facility: CLINIC | Age: 70
End: 2020-09-04

## 2020-09-04 ENCOUNTER — APPOINTMENT (OUTPATIENT)
Dept: LAB | Facility: CLINIC | Age: 70
End: 2020-09-04
Payer: MEDICARE

## 2020-09-04 DIAGNOSIS — F20.0 PARANOID SCHIZOPHRENIA, SUBCHRONIC CONDITION (HCC): ICD-10-CM

## 2020-09-04 DIAGNOSIS — Z79.899 ENCOUNTER FOR LONG-TERM (CURRENT) USE OF OTHER MEDICATIONS: ICD-10-CM

## 2020-09-04 DIAGNOSIS — F20.0 PARANOID SCHIZOPHRENIA, SUBCHRONIC CONDITION (HCC): Primary | ICD-10-CM

## 2020-09-04 LAB
BASOPHILS # BLD AUTO: 0.03 THOUSANDS/ΜL (ref 0–0.1)
BASOPHILS NFR BLD AUTO: 1 % (ref 0–1)
EOSINOPHIL # BLD AUTO: 0.13 THOUSAND/ΜL (ref 0–0.61)
EOSINOPHIL NFR BLD AUTO: 3 % (ref 0–6)
ERYTHROCYTE [DISTWIDTH] IN BLOOD BY AUTOMATED COUNT: 13.5 % (ref 11.6–15.1)
HCT VFR BLD AUTO: 41.8 % (ref 36.5–49.3)
HGB BLD-MCNC: 14.1 G/DL (ref 12–17)
IMM GRANULOCYTES # BLD AUTO: 0.01 THOUSAND/UL (ref 0–0.2)
IMM GRANULOCYTES NFR BLD AUTO: 0 % (ref 0–2)
LYMPHOCYTES # BLD AUTO: 1.12 THOUSANDS/ΜL (ref 0.6–4.47)
LYMPHOCYTES NFR BLD AUTO: 22 % (ref 14–44)
MCH RBC QN AUTO: 33 PG (ref 26.8–34.3)
MCHC RBC AUTO-ENTMCNC: 33.7 G/DL (ref 31.4–37.4)
MCV RBC AUTO: 98 FL (ref 82–98)
MONOCYTES # BLD AUTO: 0.43 THOUSAND/ΜL (ref 0.17–1.22)
MONOCYTES NFR BLD AUTO: 9 % (ref 4–12)
NEUTROPHILS # BLD AUTO: 3.33 THOUSANDS/ΜL (ref 1.85–7.62)
NEUTS SEG NFR BLD AUTO: 65 % (ref 43–75)
NRBC BLD AUTO-RTO: 0 /100 WBCS
PLATELET # BLD AUTO: 165 THOUSANDS/UL (ref 149–390)
PMV BLD AUTO: 10 FL (ref 8.9–12.7)
RBC # BLD AUTO: 4.27 MILLION/UL (ref 3.88–5.62)
WBC # BLD AUTO: 5.05 THOUSAND/UL (ref 4.31–10.16)

## 2020-09-04 PROCEDURE — 36415 COLL VENOUS BLD VENIPUNCTURE: CPT

## 2020-09-04 PROCEDURE — 85025 COMPLETE CBC W/AUTO DIFF WBC: CPT

## 2020-09-22 ENCOUNTER — OFFICE VISIT (OUTPATIENT)
Dept: INTERNAL MEDICINE CLINIC | Facility: CLINIC | Age: 70
End: 2020-09-22
Payer: MEDICARE

## 2020-09-22 VITALS
HEIGHT: 68 IN | WEIGHT: 170.8 LBS | BODY MASS INDEX: 25.88 KG/M2 | SYSTOLIC BLOOD PRESSURE: 116 MMHG | DIASTOLIC BLOOD PRESSURE: 62 MMHG | TEMPERATURE: 97.6 F | RESPIRATION RATE: 16 BRPM | HEART RATE: 82 BPM

## 2020-09-22 DIAGNOSIS — L02.416 CELLULITIS AND ABSCESS OF LEFT LEG: ICD-10-CM

## 2020-09-22 DIAGNOSIS — L02.91 ABSCESS: ICD-10-CM

## 2020-09-22 DIAGNOSIS — R21 RASH: ICD-10-CM

## 2020-09-22 DIAGNOSIS — L03.116 CELLULITIS AND ABSCESS OF LEFT LEG: ICD-10-CM

## 2020-09-22 DIAGNOSIS — Z23 NEED FOR INFLUENZA VACCINATION: ICD-10-CM

## 2020-09-22 DIAGNOSIS — Z23 NEED FOR TDAP VACCINATION: ICD-10-CM

## 2020-09-22 DIAGNOSIS — Z23 ENCOUNTER FOR IMMUNIZATION: Primary | ICD-10-CM

## 2020-09-22 PROCEDURE — G0008 ADMIN INFLUENZA VIRUS VAC: HCPCS | Performed by: NURSE PRACTITIONER

## 2020-09-22 PROCEDURE — 99214 OFFICE O/P EST MOD 30 MIN: CPT | Performed by: NURSE PRACTITIONER

## 2020-09-22 PROCEDURE — 90715 TDAP VACCINE 7 YRS/> IM: CPT | Performed by: NURSE PRACTITIONER

## 2020-09-22 PROCEDURE — 90471 IMMUNIZATION ADMIN: CPT | Performed by: NURSE PRACTITIONER

## 2020-09-22 PROCEDURE — 90662 IIV NO PRSV INCREASED AG IM: CPT | Performed by: NURSE PRACTITIONER

## 2020-09-22 RX ORDER — SULFAMETHOXAZOLE AND TRIMETHOPRIM 800; 160 MG/1; MG/1
1 TABLET ORAL EVERY 12 HOURS SCHEDULED
Qty: 10 TABLET | Refills: 0 | Status: SHIPPED | OUTPATIENT
Start: 2020-09-22 | End: 2020-09-27

## 2020-09-22 RX ORDER — METHYLPREDNISOLONE 4 MG/1
TABLET ORAL
Qty: 1 EACH | Refills: 0 | Status: SHIPPED | OUTPATIENT
Start: 2020-09-22 | End: 2020-10-14

## 2020-09-22 NOTE — PROGRESS NOTES
INTERNAL MEDICINE FOLLOW-UP OFFICE VISIT  [facility] [address] [place] [address]    NAME: Dottie Mendoza  AGE: 79 y o  SEX: male    DATE OF ENCOUNTER: 9/22/2020   Assessment and Plan:     Problem List Items Addressed This Visit     None      Visit Diagnoses     Encounter for immunization    -  Primary    Cellulitis and abscess of left leg          Bactrim ds  Photo in epic  Relevant Medications    sulfamethoxazole-trimethoprim (BACTRIM DS) 800-160 mg per tablet    Rash          Medrol Dosepak  Lubriderm cream daily  Relevant Medications    methylPREDNISolone 4 MG tablet therapy pack    collagenase (SANTYL) ointment    Emollient (Lubriderm Daily Moisture) LOTN    Abscess         santyl ointment, Keflex  Relevant Medications    collagenase (SANTYL) ointment    Need for Tdap vaccination        Relevant Orders    TDAP VACCINE GREATER THAN OR EQUAL TO 6YO IM (Completed)    Need for influenza vaccination        Relevant Orders    influenza vaccine, high-dose, PF 0 7 mL (FLUZONE HIGH-DOSE) (Completed)          No follow-ups on file  Counseling:     · Medication Side Effects - Adverse side effects of medications were reviewed with the patient/guardian today: Yes  · Counseling was given regarding: Risks and benefits of tx options, Intructions for management and Risk factor reductions  · Barriers to treatment include: No identified barriers      Chief Complaint:     Chief Complaint   Patient presents with    Rash     right thigh        History of Present Illness:     THERESA Carey presents to the office today for new onset of a rash as well as a swollen red area on his left hip  The rash has been there for several days  There are no new soaps or lotions  There are no aggravating or alleviating factors  The patient does not have any known allergies  Patient on exam does have a rash consisting of scabbed areas of his right hip, buttocks, lower back and left  Leg    In addition the patient does have a 4 5 x 2 cm area on his left hip  Patient denies any type of known bug bite  It looks upon exam at this area had been opened at some point in time and was draining  There is a scabbed area in the center  This area is red and tender to touch though is slightly hard and not fluctuant  I did recommend a dose of antibiotics at this time  In addition a Medrol Dosepak was sent to the patient's pharmacy for his rash and itching  Lubriderm lotion was also sent to the pharmacy for use daily p r n  for dry skin  He was accompanied to the appointment today by Amelia Morrison his   I did recommend that they keep an eye on his area on his left hip  If the patient develops a fever, the redness spreads or that wound opens they should be evaluated at the emergency room  The following portions of the patient's history were reviewed and updated as appropriate: allergies, current medications, past family history, past medical history, past social history, past surgical history and problem list      Review of Systems:     Review of Systems   Constitutional: Negative  Negative for fatigue  HENT: Negative  Negative for congestion, postnasal drip, rhinorrhea and trouble swallowing  Eyes: Negative  Negative for visual disturbance  Respiratory: Negative  Negative for choking and shortness of breath  Cardiovascular: Negative  Negative for chest pain  Gastrointestinal: Negative  Endocrine: Negative  Genitourinary: Negative  Musculoskeletal: Negative  Negative for arthralgias, back pain, myalgias and neck pain  Skin: Positive for rash and wound  Neurological: Negative for dizziness and headaches  Psychiatric/Behavioral: Negative           Problem List:     Patient Active Problem List   Diagnosis    Chronic obstructive pulmonary disease (HCC)    GERD (gastroesophageal reflux disease)    Neuro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 (HCC)    Asthma    Hypertension    Hypercholesteremia  Paranoid schizophrenia ([facility] )    Ascending aortic aneurysm (HCC)        Objective:     /62 (BP Location: Left arm, Patient Position: Sitting, Cuff Size: Standard)   Pulse 82   Temp 97 6 °F (36 4 °C) (Temporal)   Resp 16   Ht 5' 8" (1 727 m)   Wt 77 5 kg (170 lb 12 8 oz)   BMI 25 97 kg/m²     Physical Exam  Vitals signs reviewed  Constitutional:       General: He is not in acute distress  Appearance: Normal appearance  He is well-developed  HENT:      Head: Normocephalic and atraumatic  Eyes:      Pupils: Pupils are equal, round, and reactive to light  Neck:      Musculoskeletal: Normal range of motion  Cardiovascular:      Rate and Rhythm: Normal rate and regular rhythm  Heart sounds: Normal heart sounds  No murmur  Pulmonary:      Effort: Pulmonary effort is normal  No respiratory distress  Breath sounds: Normal breath sounds  No wheezing  Musculoskeletal: Normal range of motion  Skin:     General: Skin is warm and dry  Findings: Erythema, rash and wound present  Neurological:      General: No focal deficit present  Mental Status: He is alert and oriented to person, place, and time  Mental status is at baseline  Psychiatric:         Behavior: Behavior normal          Thought Content: Thought content normal          Judgment: Judgment normal        Media Information      Document Information     Clinical Image - Mobile Device    Left hip    09/22/2020 11:03 AM    Attached To: Office Visit on 9/22/20 with Lorraine Dill [address]      Document Information     Clinical Image - Mobile Device    Left hip    09/22/2020 11:03 AM    Attached To:     Office Visit on 9/22/20 with Lorraine Dill [address]           Current Medications:     Current Outpatient Medications   Medication Sig Dispense Refill    atorvastatin (LIPITOR) 40 mg tablet Take 1 tablet (40 mg total) by mouth daily 90 tablet 3    AUSTEDO 12 MG TABS Take 1 tablet by mouth 2 (two) times a day      carbidopa-levodopa (SINEMET)  mg per tablet Take 1 tablet by mouth 3 (three) times a day 90 tablet 4    cloZAPine (CLOZARIL) 100 mg tablet Take 100 mg by mouth 2 (two) times a day      clozapine (CLOZARIL) 200 MG tablet Take 200 mg by mouth daily at bedtime       docusate sodium (COLACE) 100 mg capsule Take 1 capsule (100 mg total) by mouth 2 (two) times a day 60 capsule 5    Fluticasone Furoate 50 MCG/ACT AEPB Inhale 1 spray as needed (as needed for congestion) 1 each 3    losartan (COZAAR) 25 mg tablet Take 1 tablet (25 mg total) by mouth daily 90 tablet 3    omeprazole (PriLOSEC) 40 MG capsule Take 1 capsule (40 mg total) by mouth daily 90 capsule 1    psyllium (METAMUCIL SMOOTH TEXTURE) 28 % packet Take 1 packet by mouth 2 (two) times a day 60 packet 2    temazepam (RESTORIL) 15 mg capsule Take 15 mg by mouth daily at bedtime as needed for sleep      topiramate (TOPAMAX) 25 mg tablet Take 25 mg by mouth 2 (two) times a day  1    traZODone (DESYREL) 100 mg tablet Take 100 mg by mouth daily at bedtime as needed       VITAMIN D HIGH POTENCY 1000 units capsule Take 1 capsule (1,000 Units total) by mouth daily 90 capsule 3    acetaminophen (TYLENOL) 500 mg tablet Take 1 tablet (500 mg total) by mouth every 6 (six) hours as needed for mild pain 30 tablet 3    collagenase (SANTYL) ointment Apply topically daily (Patient not taking: Reported on 9/22/2020) 15 g 0     No current facility-administered medications for this visit  There are no Patient Instructions on file for this visit      LIUDMILA Sunshine  [facility]

## 2020-09-22 NOTE — PATIENT INSTRUCTIONS
Acute Rash   WHAT YOU NEED TO KNOW:   A rash is irritation, redness, or itchiness in the skin or mucus membranes  Mucus membranes are areas such as the lining of your nose or throat  Acute means the rash starts suddenly, worsens quickly, and lasts a short time  An acute rash may be caused by a disease, such as hepatitis or vasculitis  The rash may be a reaction to something you are allergic to, such as certain foods, or latex  Certain medicines, including antibiotics, NSAIDs, prescription pain medicine, and aspirin can also cause a rash  DISCHARGE INSTRUCTIONS:   Return to the emergency department if:   · You have sudden trouble breathing or chest pain  · You are vomiting, have a headache or muscle aches, and your throat hurts  Contact your healthcare provider if:   · You have a fever  · You get open wounds from scratching your skin, or you have a wound that is red, swollen, or painful  · Your rash lasts longer than 3 months  · You have swelling or pain in your joints  · You have questions or concerns about your condition or care  Medicines:  If your rash does not go away on its own, you may need the following medicines:  · Antihistamines  may be given to help decrease itching  · Steroids  may be given to decrease inflammation  · Antibiotics  help fight or prevent a bacterial infection  · Take your medicine as directed  Contact your healthcare provider if you think your medicine is not helping or if you have side effects  Tell him of her if you are allergic to any medicine  Keep a list of the medicines, vitamins, and herbs you take  Include the amounts, and when and why you take them  Bring the list or the pill bottles to follow-up visits  Carry your medicine list with you in case of an emergency  Prevent a rash or care for your skin when you have a rash:  Dry skin can lead to more problems  Do not scratch your skin if it itches  You may cause a skin infection by scratching   The following may prevent dry skin, and help your skin look better:  · Use thick cream lotions or petroleum jelly to help soothe your rash  These products work well on areas with thick skin, such as your feet  Cool compresses may also be used to soothe your skin  Apply a cool compress or a cool, wet towel, and then cover it with a dry towel  · Use lukewarm water when you bathe  Hot water may damage your skin more  Pat your skin dry  Do not rub your skin with a towel  · Use detergents, soaps, shampoos, and bubble baths made for sensitive skin  Wear clothes that are made of cotton instead of nylon or wool  Cotton is softer, so it will not hurt your skin as much  Follow up with your healthcare provider as directed: You may need to see a dermatologist if healthcare providers do not know what is causing your rash  You may also need to see a dermatologist if your rash does not get better even with treatment  You may need to see a dietitian if you have allergies to foods  Write down your questions so you remember to ask them during your visits  © 2017 [address] [facility] Information is for End User's use only and may not be sold, redistributed or otherwise used for commercial purposes  All illustrations and images included in CareNotes® are the copyrighted property of [employer]  or Ricki Sandra  The above information is an  only  It is not intended as medical advice for individual conditions or treatments  Talk to your doctor, nurse or pharmacist before following any medical regimen to see if it is safe and effective for you  Cellulitis   WHAT YOU NEED TO KNOW:   Cellulitis is a skin infection caused by bacteria  Cellulitis may go away on its own or you may need treatment  Your healthcare provider may draw a [place] around the outside edges of your cellulitis  If your cellulitis spreads, your healthcare provider will see it outside of the [place]          DISCHARGE INSTRUCTIONS:   Call 911 if:   · You have sudden trouble breathing or chest pain  Return to the emergency department if:   · Your wound gets larger and more painful  · You feel a crackling under your skin when you touch it  · You have purple dots or bumps on your skin, or you see bleeding under your skin  · You have new swelling and pain in your legs  · The red, warm, swollen area gets larger  · You see red streaks coming from the infected area  Contact your healthcare provider if:   · You have a fever  · Your fever or pain does not go away or gets worse  · The area does not get smaller after 2 days of antibiotics  · Your skin is flaking or peeling off  · You have questions or concerns about your condition or care  Medicines:   · Antibiotics  help treat the bacterial infection  · NSAIDs , such as ibuprofen, help decrease swelling, pain, and fever  NSAIDs can cause stomach bleeding or kidney problems in certain people  If you take blood thinner medicine, always ask if NSAIDs are safe for you  Always read the medicine label and follow directions  Do not give these medicines to children under 10months of age without direction from your child's healthcare provider  · Acetaminophen  decreases pain and fever  It is available without a doctor's order  Ask how much to take and how often to take it  Follow directions  Read the labels of all other medicines you are using to see if they also contain acetaminophen, or ask your doctor or pharmacist  Acetaminophen can cause liver damage if not taken correctly  Do not use more than 4 grams (4,000 milligrams) total of acetaminophen in one day  · Take your medicine as directed  Contact your healthcare provider if you think your medicine is not helping or if you have side effects  Tell him or her if you are allergic to any medicine  Keep a list of the medicines, vitamins, and herbs you take   Include the amounts, and when and why you take them  Bring the list or the pill bottles to follow-up visits  Carry your medicine list with you in case of an emergency  Self-care:   · Elevate the area above the level of your heart  as often as you can  This will help decrease swelling and pain  Prop the area on pillows or blankets to keep it elevated comfortably  · Clean the area daily until the wound scabs over  Gently wash the area with soap and water  Pat dry  Use dressings as directed  · Place cool or warm, wet cloths on the area as directed  Use clean cloths and clean water  Leave it on the area until the cloth is room temperature  Pat the area dry with a clean, dry cloth  The cloths may help decrease pain  Prevent cellulitis:   · Do not scratch bug bites or areas of injury  You increase your risk for cellulitis by scratching these areas  · Do not share personal items, such as towels, clothing, and razors  · Clean exercise equipment  with germ-killing  before and after you use it  · Wash your hands often  Use soap and water  Wash your hands after you use the bathroom, change a child's diapers, or sneeze  Wash your hands before you prepare or eat food  Use lotion to prevent dry, cracked skin  · Wear pressure stockings as directed  You may be told to wear the stockings if you have peripheral edema  The stockings improve blood flow and decrease swelling  · Treat athlete's foot  This can help prevent the spread of a bacterial skin infection  Follow up with your healthcare provider within 3 days, or as directed: Your healthcare provider will check if your cellulitis is getting better  You may need different medicine  Write down your questions so you remember to ask them during your visits  © 2017 [address] [employer] Information is for End User's use only and may not be sold, redistributed or otherwise used for commercial purposes   All illustrations and images included in CareNotes® are the copyrighted property of [email or web address]  or Ricki Sandra  The above information is an  only  It is not intended as medical advice for individual conditions or treatments  Talk to your doctor, nurse or pharmacist before following any medical regimen to see if it is safe and effective for you

## 2020-09-29 ENCOUNTER — TELEPHONE (OUTPATIENT)
Dept: INTERNAL MEDICINE CLINIC | Facility: CLINIC | Age: 70
End: 2020-09-29

## 2020-09-29 DIAGNOSIS — K59.00 CONSTIPATION, UNSPECIFIED CONSTIPATION TYPE: ICD-10-CM

## 2020-09-29 NOTE — TELEPHONE ENCOUNTER
Resident needs a script for   psyllium (METAMUCIL SMOOTH TEXTURE) 28 % packet     Send to [facility]

## 2020-10-01 DIAGNOSIS — I10 ESSENTIAL HYPERTENSION: ICD-10-CM

## 2020-10-01 DIAGNOSIS — E78.00 HYPERCHOLESTEREMIA: ICD-10-CM

## 2020-10-01 DIAGNOSIS — E55.9 VITAMIN D DEFICIENCY: ICD-10-CM

## 2020-10-01 RX ORDER — LOSARTAN POTASSIUM 25 MG/1
25 TABLET ORAL DAILY
Qty: 90 TABLET | Refills: 3 | Status: SHIPPED | OUTPATIENT
Start: 2020-10-01 | End: 2020-10-14

## 2020-10-01 RX ORDER — CHOLECALCIFEROL (VITAMIN D3) 25 MCG
1000 CAPSULE ORAL DAILY
Qty: 90 CAPSULE | Refills: 3 | Status: SHIPPED | OUTPATIENT
Start: 2020-10-01 | End: 2021-09-08 | Stop reason: SDUPTHER

## 2020-10-01 RX ORDER — ATORVASTATIN CALCIUM 40 MG/1
40 TABLET, FILM COATED ORAL DAILY
Qty: 90 TABLET | Refills: 3 | Status: SHIPPED | OUTPATIENT
Start: 2020-10-01 | End: 2021-09-08 | Stop reason: SDUPTHER

## 2020-10-13 ENCOUNTER — TELEPHONE (OUTPATIENT)
Dept: INTERNAL MEDICINE CLINIC | Facility: CLINIC | Age: 70
End: 2020-10-13

## 2020-10-14 ENCOUNTER — CONSULT (OUTPATIENT)
Dept: CARDIOLOGY CLINIC | Facility: CLINIC | Age: 70
End: 2020-10-14
Payer: MEDICARE

## 2020-10-14 VITALS
HEART RATE: 90 BPM | SYSTOLIC BLOOD PRESSURE: 122 MMHG | HEIGHT: 68 IN | WEIGHT: 178 LBS | BODY MASS INDEX: 26.98 KG/M2 | OXYGEN SATURATION: 99 % | DIASTOLIC BLOOD PRESSURE: 64 MMHG | TEMPERATURE: 98.7 F

## 2020-10-14 DIAGNOSIS — I51.89 GRADE I DIASTOLIC DYSFUNCTION: ICD-10-CM

## 2020-10-14 DIAGNOSIS — I77.810 AORTIC ROOT DILATATION (HCC): ICD-10-CM

## 2020-10-14 DIAGNOSIS — E78.2 MIXED HYPERLIPIDEMIA: Primary | ICD-10-CM

## 2020-10-14 PROCEDURE — 99204 OFFICE O/P NEW MOD 45 MIN: CPT | Performed by: INTERNAL MEDICINE

## 2020-10-14 RX ORDER — METOPROLOL SUCCINATE 25 MG/1
25 TABLET, EXTENDED RELEASE ORAL DAILY
Qty: 90 TABLET | Refills: 3 | Status: SHIPPED | OUTPATIENT
Start: 2020-10-14 | End: 2021-09-13 | Stop reason: SDUPTHER

## 2020-10-15 ENCOUNTER — TRANSCRIBE ORDERS (OUTPATIENT)
Dept: LAB | Facility: CLINIC | Age: 70
End: 2020-10-15

## 2020-10-15 ENCOUNTER — LAB (OUTPATIENT)
Dept: LAB | Facility: CLINIC | Age: 70
End: 2020-10-15
Payer: MEDICARE

## 2020-10-15 DIAGNOSIS — Z79.899 ENCOUNTER FOR LONG-TERM (CURRENT) USE OF OTHER MEDICATIONS: ICD-10-CM

## 2020-10-15 DIAGNOSIS — F20.0 PARANOID SCHIZOPHRENIA, SUBCHRONIC CONDITION (HCC): ICD-10-CM

## 2020-10-15 DIAGNOSIS — F20.0 PARANOID SCHIZOPHRENIA, SUBCHRONIC CONDITION (HCC): Primary | ICD-10-CM

## 2020-10-15 DIAGNOSIS — L02.91 ABSCESS: ICD-10-CM

## 2020-10-15 LAB
BASOPHILS # BLD AUTO: 0.03 THOUSANDS/ΜL (ref 0–0.1)
BASOPHILS NFR BLD AUTO: 1 % (ref 0–1)
EOSINOPHIL # BLD AUTO: 0.15 THOUSAND/ΜL (ref 0–0.61)
EOSINOPHIL NFR BLD AUTO: 3 % (ref 0–6)
ERYTHROCYTE [DISTWIDTH] IN BLOOD BY AUTOMATED COUNT: 13.4 % (ref 11.6–15.1)
HCT VFR BLD AUTO: 42 % (ref 36.5–49.3)
HGB BLD-MCNC: 13.9 G/DL (ref 12–17)
IMM GRANULOCYTES # BLD AUTO: 0.01 THOUSAND/UL (ref 0–0.2)
IMM GRANULOCYTES NFR BLD AUTO: 0 % (ref 0–2)
LYMPHOCYTES # BLD AUTO: 1.13 THOUSANDS/ΜL (ref 0.6–4.47)
LYMPHOCYTES NFR BLD AUTO: 24 % (ref 14–44)
MCH RBC QN AUTO: 32.6 PG (ref 26.8–34.3)
MCHC RBC AUTO-ENTMCNC: 33.1 G/DL (ref 31.4–37.4)
MCV RBC AUTO: 98 FL (ref 82–98)
MONOCYTES # BLD AUTO: 0.49 THOUSAND/ΜL (ref 0.17–1.22)
MONOCYTES NFR BLD AUTO: 11 % (ref 4–12)
NEUTROPHILS # BLD AUTO: 2.82 THOUSANDS/ΜL (ref 1.85–7.62)
NEUTS SEG NFR BLD AUTO: 61 % (ref 43–75)
NRBC BLD AUTO-RTO: 0 /100 WBCS
PLATELET # BLD AUTO: 161 THOUSANDS/UL (ref 149–390)
PMV BLD AUTO: 10 FL (ref 8.9–12.7)
RBC # BLD AUTO: 4.27 MILLION/UL (ref 3.88–5.62)
WBC # BLD AUTO: 4.63 THOUSAND/UL (ref 4.31–10.16)

## 2020-10-15 PROCEDURE — 85025 COMPLETE CBC W/AUTO DIFF WBC: CPT

## 2020-10-15 PROCEDURE — 36415 COLL VENOUS BLD VENIPUNCTURE: CPT

## 2020-11-02 ENCOUNTER — OFFICE VISIT (OUTPATIENT)
Dept: NEUROLOGY | Facility: CLINIC | Age: 70
End: 2020-11-02
Payer: MEDICARE

## 2020-11-02 VITALS
HEART RATE: 79 BPM | HEIGHT: 68 IN | BODY MASS INDEX: 25.01 KG/M2 | DIASTOLIC BLOOD PRESSURE: 80 MMHG | TEMPERATURE: 98.7 F | SYSTOLIC BLOOD PRESSURE: 134 MMHG | WEIGHT: 165 LBS

## 2020-11-02 DIAGNOSIS — G21.11 NEUROLEPTIC-INDUCED PARKINSONISM (HCC): Primary | ICD-10-CM

## 2020-11-02 DIAGNOSIS — F20.0 PARANOID SCHIZOPHRENIA (HCC): ICD-10-CM

## 2020-11-02 DIAGNOSIS — E78.00 HYPERCHOLESTEREMIA: ICD-10-CM

## 2020-11-02 DIAGNOSIS — I10 ESSENTIAL HYPERTENSION: ICD-10-CM

## 2020-11-02 PROCEDURE — 99214 OFFICE O/P EST MOD 30 MIN: CPT | Performed by: PSYCHIATRY & NEUROLOGY

## 2020-11-11 ENCOUNTER — LAB (OUTPATIENT)
Dept: LAB | Facility: CLINIC | Age: 70
End: 2020-11-11
Payer: MEDICARE

## 2020-11-11 ENCOUNTER — TRANSCRIBE ORDERS (OUTPATIENT)
Dept: LAB | Facility: CLINIC | Age: 70
End: 2020-11-11

## 2020-11-11 DIAGNOSIS — K59.09 CHRONIC CONSTIPATION: ICD-10-CM

## 2020-11-11 DIAGNOSIS — Z79.899 ENCOUNTER FOR LONG-TERM (CURRENT) USE OF OTHER MEDICATIONS: ICD-10-CM

## 2020-11-11 DIAGNOSIS — F20.0 PARANOID SCHIZOPHRENIA, SUBCHRONIC CONDITION (HCC): Primary | ICD-10-CM

## 2020-11-11 DIAGNOSIS — F20.0 PARANOID SCHIZOPHRENIA, SUBCHRONIC CONDITION (HCC): ICD-10-CM

## 2020-11-11 LAB
BASOPHILS # BLD AUTO: 0.03 THOUSANDS/ΜL (ref 0–0.1)
BASOPHILS NFR BLD AUTO: 1 % (ref 0–1)
EOSINOPHIL # BLD AUTO: 0.15 THOUSAND/ΜL (ref 0–0.61)
EOSINOPHIL NFR BLD AUTO: 3 % (ref 0–6)
ERYTHROCYTE [DISTWIDTH] IN BLOOD BY AUTOMATED COUNT: 13.2 % (ref 11.6–15.1)
HCT VFR BLD AUTO: 46.4 % (ref 36.5–49.3)
HGB BLD-MCNC: 14.9 G/DL (ref 12–17)
IMM GRANULOCYTES # BLD AUTO: 0.02 THOUSAND/UL (ref 0–0.2)
IMM GRANULOCYTES NFR BLD AUTO: 0 % (ref 0–2)
LYMPHOCYTES # BLD AUTO: 1.1 THOUSANDS/ΜL (ref 0.6–4.47)
LYMPHOCYTES NFR BLD AUTO: 22 % (ref 14–44)
MCH RBC QN AUTO: 31.7 PG (ref 26.8–34.3)
MCHC RBC AUTO-ENTMCNC: 32.1 G/DL (ref 31.4–37.4)
MCV RBC AUTO: 99 FL (ref 82–98)
MONOCYTES # BLD AUTO: 0.5 THOUSAND/ΜL (ref 0.17–1.22)
MONOCYTES NFR BLD AUTO: 10 % (ref 4–12)
NEUTROPHILS # BLD AUTO: 3.29 THOUSANDS/ΜL (ref 1.85–7.62)
NEUTS SEG NFR BLD AUTO: 64 % (ref 43–75)
NRBC BLD AUTO-RTO: 0 /100 WBCS
PLATELET # BLD AUTO: 186 THOUSANDS/UL (ref 149–390)
PMV BLD AUTO: 10 FL (ref 8.9–12.7)
RBC # BLD AUTO: 4.7 MILLION/UL (ref 3.88–5.62)
WBC # BLD AUTO: 5.09 THOUSAND/UL (ref 4.31–10.16)

## 2020-11-11 PROCEDURE — 36415 COLL VENOUS BLD VENIPUNCTURE: CPT

## 2020-11-11 PROCEDURE — 85025 COMPLETE CBC W/AUTO DIFF WBC: CPT

## 2020-11-11 RX ORDER — DOCUSATE SODIUM 100 MG/1
100 CAPSULE, LIQUID FILLED ORAL 2 TIMES DAILY
Qty: 60 CAPSULE | Refills: 5 | Status: SHIPPED | OUTPATIENT
Start: 2020-11-11 | End: 2021-04-30 | Stop reason: SDUPTHER

## 2020-11-13 ENCOUNTER — OFFICE VISIT (OUTPATIENT)
Dept: INTERNAL MEDICINE CLINIC | Facility: CLINIC | Age: 70
End: 2020-11-13
Payer: MEDICARE

## 2020-11-13 VITALS
SYSTOLIC BLOOD PRESSURE: 118 MMHG | DIASTOLIC BLOOD PRESSURE: 68 MMHG | WEIGHT: 167 LBS | HEIGHT: 68 IN | HEART RATE: 82 BPM | BODY MASS INDEX: 25.31 KG/M2 | TEMPERATURE: 97.9 F

## 2020-11-13 DIAGNOSIS — L03.116 CELLULITIS OF LEFT LOWER EXTREMITY: Primary | ICD-10-CM

## 2020-11-13 DIAGNOSIS — I71.2 ASCENDING AORTIC ANEURYSM (HCC): ICD-10-CM

## 2020-11-13 PROCEDURE — 99214 OFFICE O/P EST MOD 30 MIN: CPT | Performed by: NURSE PRACTITIONER

## 2020-11-13 RX ORDER — CEPHALEXIN 500 MG/1
500 CAPSULE ORAL EVERY 6 HOURS SCHEDULED
Qty: 28 CAPSULE | Refills: 0 | Status: SHIPPED | OUTPATIENT
Start: 2020-11-13 | End: 2020-11-20

## 2020-12-02 ENCOUNTER — TELEPHONE (OUTPATIENT)
Dept: INTERNAL MEDICINE CLINIC | Facility: CLINIC | Age: 70
End: 2020-12-02

## 2020-12-02 DIAGNOSIS — K21.9 GASTROESOPHAGEAL REFLUX DISEASE: ICD-10-CM

## 2020-12-02 RX ORDER — OMEPRAZOLE 40 MG/1
40 CAPSULE, DELAYED RELEASE ORAL DAILY
Qty: 90 CAPSULE | Refills: 1 | Status: SHIPPED | OUTPATIENT
Start: 2020-12-02 | End: 2020-12-14 | Stop reason: SDUPTHER

## 2020-12-10 ENCOUNTER — LAB (OUTPATIENT)
Dept: LAB | Facility: CLINIC | Age: 70
End: 2020-12-10
Payer: MEDICARE

## 2020-12-14 ENCOUNTER — TELEMEDICINE (OUTPATIENT)
Dept: INTERNAL MEDICINE CLINIC | Facility: CLINIC | Age: 70
End: 2020-12-14
Payer: MEDICARE

## 2020-12-14 VITALS
DIASTOLIC BLOOD PRESSURE: 73 MMHG | TEMPERATURE: 97.8 F | SYSTOLIC BLOOD PRESSURE: 124 MMHG | BODY MASS INDEX: 25.24 KG/M2 | WEIGHT: 166 LBS

## 2020-12-14 DIAGNOSIS — E78.00 HYPERCHOLESTEREMIA: ICD-10-CM

## 2020-12-14 DIAGNOSIS — G21.11 NEUROLEPTIC-INDUCED PARKINSONISM (HCC): ICD-10-CM

## 2020-12-14 DIAGNOSIS — L89.321 PRESSURE INJURY OF LEFT BUTTOCK, STAGE 1: ICD-10-CM

## 2020-12-14 DIAGNOSIS — J44.9 CHRONIC OBSTRUCTIVE PULMONARY DISEASE, UNSPECIFIED COPD TYPE (HCC): ICD-10-CM

## 2020-12-14 DIAGNOSIS — L89.311 PRESSURE INJURY OF RIGHT BUTTOCK, STAGE 1: ICD-10-CM

## 2020-12-14 DIAGNOSIS — F20.0 PARANOID SCHIZOPHRENIA (HCC): ICD-10-CM

## 2020-12-14 DIAGNOSIS — K21.9 GASTROESOPHAGEAL REFLUX DISEASE: ICD-10-CM

## 2020-12-14 DIAGNOSIS — L02.91 ABSCESS: ICD-10-CM

## 2020-12-14 DIAGNOSIS — I10 ESSENTIAL HYPERTENSION: ICD-10-CM

## 2020-12-14 DIAGNOSIS — K59.00 CONSTIPATION, UNSPECIFIED CONSTIPATION TYPE: ICD-10-CM

## 2020-12-14 DIAGNOSIS — Z12.5 PROSTATE CANCER SCREENING: ICD-10-CM

## 2020-12-14 DIAGNOSIS — K21.9 GASTROESOPHAGEAL REFLUX DISEASE WITHOUT ESOPHAGITIS: Primary | ICD-10-CM

## 2020-12-14 PROCEDURE — 99213 OFFICE O/P EST LOW 20 MIN: CPT | Performed by: NURSE PRACTITIONER

## 2020-12-14 RX ORDER — OMEPRAZOLE 40 MG/1
40 CAPSULE, DELAYED RELEASE ORAL DAILY
Qty: 90 CAPSULE | Refills: 1 | Status: SHIPPED | OUTPATIENT
Start: 2020-12-14 | End: 2021-05-10 | Stop reason: SDUPTHER

## 2021-01-12 ENCOUNTER — LAB (OUTPATIENT)
Dept: LAB | Facility: CLINIC | Age: 71
End: 2021-01-12
Payer: MEDICARE

## 2021-01-12 DIAGNOSIS — I10 ESSENTIAL HYPERTENSION: ICD-10-CM

## 2021-01-12 DIAGNOSIS — E78.00 HYPERCHOLESTEREMIA: ICD-10-CM

## 2021-01-12 DIAGNOSIS — Z12.5 PROSTATE CANCER SCREENING: ICD-10-CM

## 2021-01-12 LAB
ALBUMIN SERPL BCP-MCNC: 3.8 G/DL (ref 3.5–5)
ALP SERPL-CCNC: 117 U/L (ref 46–116)
ALT SERPL W P-5'-P-CCNC: 15 U/L (ref 12–78)
ANION GAP SERPL CALCULATED.3IONS-SCNC: 4 MMOL/L (ref 4–13)
AST SERPL W P-5'-P-CCNC: 14 U/L (ref 5–45)
BILIRUB SERPL-MCNC: 0.46 MG/DL (ref 0.2–1)
BUN SERPL-MCNC: 13 MG/DL (ref 5–25)
CALCIUM SERPL-MCNC: 8.9 MG/DL (ref 8.3–10.1)
CHLORIDE SERPL-SCNC: 113 MMOL/L (ref 100–108)
CHOLEST SERPL-MCNC: 86 MG/DL (ref 50–200)
CO2 SERPL-SCNC: 27 MMOL/L (ref 21–32)
CREAT SERPL-MCNC: 0.72 MG/DL (ref 0.6–1.3)
GFR SERPL CREATININE-BSD FRML MDRD: 95 ML/MIN/1.73SQ M
GLUCOSE SERPL-MCNC: 62 MG/DL (ref 65–140)
HDLC SERPL-MCNC: 44 MG/DL
LDLC SERPL CALC-MCNC: 30 MG/DL (ref 0–100)
POTASSIUM SERPL-SCNC: 3.8 MMOL/L (ref 3.5–5.3)
PROT SERPL-MCNC: 7 G/DL (ref 6.4–8.2)
PSA SERPL-MCNC: 0.5 NG/ML (ref 0–4)
SODIUM SERPL-SCNC: 144 MMOL/L (ref 136–145)
TRIGL SERPL-MCNC: 59 MG/DL

## 2021-01-12 PROCEDURE — 80061 LIPID PANEL: CPT

## 2021-01-12 PROCEDURE — G0103 PSA SCREENING: HCPCS

## 2021-01-12 PROCEDURE — 80053 COMPREHEN METABOLIC PANEL: CPT

## 2021-01-13 ENCOUNTER — TELEPHONE (OUTPATIENT)
Dept: INTERNAL MEDICINE CLINIC | Facility: CLINIC | Age: 71
End: 2021-01-13

## 2021-01-13 NOTE — TELEPHONE ENCOUNTER
----- Message from Angelica Brasher, [address] sent at 1/13/2021  8:50 AM EST -----  Please let patient know blood work is normal

## 2021-01-14 ENCOUNTER — HOSPITAL ENCOUNTER (OUTPATIENT)
Dept: ULTRASOUND IMAGING | Facility: CLINIC | Age: 71
Discharge: HOME | End: 2021-01-14

## 2021-01-14 DIAGNOSIS — I71.2 ASCENDING AORTIC ANEURYSM (HCC): ICD-10-CM

## 2021-01-15 ENCOUNTER — TELEPHONE (OUTPATIENT)
Dept: INTERNAL MEDICINE CLINIC | Facility: CLINIC | Age: 71
End: 2021-01-15

## 2021-01-15 NOTE — TELEPHONE ENCOUNTER
That was an old order from last year  He already had an echocardiogram in August of 2020 and is following with cardiology    No additional testing needed

## 2021-01-15 NOTE — TELEPHONE ENCOUNTER
Rj Law ordered an US abdominal aorta    Mirian called central scheduling and they told her that an echo should be ordered instead    Please fax over echo order # [phone]    [facility] # [phone]

## 2021-02-10 ENCOUNTER — LAB (OUTPATIENT)
Dept: LAB | Facility: CLINIC | Age: 71
End: 2021-02-10
Payer: MEDICARE

## 2021-02-17 ENCOUNTER — LAB (OUTPATIENT)
Dept: LAB | Facility: CLINIC | Age: 71
End: 2021-02-17
Payer: MEDICARE

## 2021-02-17 DIAGNOSIS — Z79.899 ENCOUNTER FOR LONG-TERM (CURRENT) USE OF OTHER MEDICATIONS: ICD-10-CM

## 2021-02-17 DIAGNOSIS — F20.0 PARANOID SCHIZOPHRENIA, SUBCHRONIC CONDITION (HCC): ICD-10-CM

## 2021-02-17 LAB
BASOPHILS # BLD AUTO: 0.04 THOUSANDS/ΜL (ref 0–0.1)
BASOPHILS NFR BLD AUTO: 1 % (ref 0–1)
EOSINOPHIL # BLD AUTO: 0.16 THOUSAND/ΜL (ref 0–0.61)
EOSINOPHIL NFR BLD AUTO: 3 % (ref 0–6)
ERYTHROCYTE [DISTWIDTH] IN BLOOD BY AUTOMATED COUNT: 13.6 % (ref 11.6–15.1)
HCT VFR BLD AUTO: 43.4 % (ref 36.5–49.3)
HGB BLD-MCNC: 14.4 G/DL (ref 12–17)
IMM GRANULOCYTES # BLD AUTO: 0.01 THOUSAND/UL (ref 0–0.2)
IMM GRANULOCYTES NFR BLD AUTO: 0 % (ref 0–2)
LYMPHOCYTES # BLD AUTO: 1.31 THOUSANDS/ΜL (ref 0.6–4.47)
LYMPHOCYTES NFR BLD AUTO: 25 % (ref 14–44)
MCH RBC QN AUTO: 32.2 PG (ref 26.8–34.3)
MCHC RBC AUTO-ENTMCNC: 33.2 G/DL (ref 31.4–37.4)
MCV RBC AUTO: 97 FL (ref 82–98)
MONOCYTES # BLD AUTO: 0.55 THOUSAND/ΜL (ref 0.17–1.22)
MONOCYTES NFR BLD AUTO: 10 % (ref 4–12)
NEUTROPHILS # BLD AUTO: 3.2 THOUSANDS/ΜL (ref 1.85–7.62)
NEUTS SEG NFR BLD AUTO: 61 % (ref 43–75)
NRBC BLD AUTO-RTO: 0 /100 WBCS
PLATELET # BLD AUTO: 180 THOUSANDS/UL (ref 149–390)
PMV BLD AUTO: 9.9 FL (ref 8.9–12.7)
RBC # BLD AUTO: 4.47 MILLION/UL (ref 3.88–5.62)
WBC # BLD AUTO: 5.27 THOUSAND/UL (ref 4.31–10.16)

## 2021-02-17 PROCEDURE — 36415 COLL VENOUS BLD VENIPUNCTURE: CPT

## 2021-02-17 PROCEDURE — 85025 COMPLETE CBC W/AUTO DIFF WBC: CPT

## 2021-02-23 DIAGNOSIS — G21.11 NEUROLEPTIC-INDUCED PARKINSONISM (HCC): ICD-10-CM

## 2021-03-08 ENCOUNTER — OFFICE VISIT (OUTPATIENT)
Dept: INTERNAL MEDICINE CLINIC | Facility: CLINIC | Age: 71
End: 2021-03-08
Payer: MEDICARE

## 2021-03-08 VITALS
HEIGHT: 68 IN | OXYGEN SATURATION: 98 % | HEART RATE: 79 BPM | WEIGHT: 162.8 LBS | TEMPERATURE: 97.4 F | DIASTOLIC BLOOD PRESSURE: 76 MMHG | SYSTOLIC BLOOD PRESSURE: 118 MMHG | BODY MASS INDEX: 24.67 KG/M2

## 2021-03-08 DIAGNOSIS — F20.0 PARANOID SCHIZOPHRENIA (HCC): ICD-10-CM

## 2021-03-08 DIAGNOSIS — K21.9 GASTROESOPHAGEAL REFLUX DISEASE WITHOUT ESOPHAGITIS: ICD-10-CM

## 2021-03-08 DIAGNOSIS — Z00.00 ANNUAL PHYSICAL EXAM: Primary | ICD-10-CM

## 2021-03-08 DIAGNOSIS — I10 ESSENTIAL HYPERTENSION: ICD-10-CM

## 2021-03-08 PROCEDURE — 99214 OFFICE O/P EST MOD 30 MIN: CPT | Performed by: NURSE PRACTITIONER

## 2021-03-08 NOTE — PATIENT INSTRUCTIONS

## 2021-03-08 NOTE — PROGRESS NOTES
ADULT ANNUAL PHYSICAL   [facility]    NAME: Davi Madison  AGE: 79 y o  SEX: male  : 1950     DATE: 3/8/2021     Assessment and Plan:   Patient overall doing well only concern is gum pains  Advised to follow up with dental since it has been years since last visit and patient has dentures  Problem List Items Addressed This Visit        Digestive    GERD (gastroesophageal reflux disease)     Stable  Continue meds  Cardiovascular and Mediastinum    Hypertension     Stable  Continue meds  Other    Paranoid schizophrenia ([facility] [address] )     Stable  Continue meds  Other Visit Diagnoses     Annual physical exam    -  Primary          Immunizations and preventive care screenings were discussed with patient today  Appropriate education was printed on patient's after visit summary  Counseling:  Alcohol/drug use: discussed moderation in alcohol intake, the recommendations for healthy alcohol use, and avoidance of illicit drug use  Dental Health: discussed importance of regular tooth brushing, flossing, and dental visits  Injury prevention: discussed safety/seat belts, safety helmets, smoke detectors, carbon dioxide detectors, and smoking near bedding or upholstery  · Exercise: the importance of regular exercise/physical activity was discussed  Recommend exercise 3-5 times per week for at least 30 minutes  No follow-ups on file  Chief Complaint:     Chief Complaint   Patient presents with    Physical Exam      History of Present Illness:     Adult Annual Physical   Patient here for a comprehensive physical exam  The patient reports problems - gum pain on top of mouth, has not seen dentist in many years       Diet and Physical Activity  · Diet/Nutrition: well balanced diet  · Exercise: no formal exercise        Depression Screening  PHQ-9 Depression Screening    PHQ-9:   Frequency of the following problems over the past two weeks:      Little interest or pleasure in doing things: 0 - not at all  Feeling down, depressed, or hopeless: 0 - not at all  PHQ-2 Score: 0       General Health  · Sleep: sleeps well  · Hearing: normal - bilateral   · Vision: no vision problems, goes for regular eye exams and wears glasses  · Dental: no dental visits for >1 year   Health  · Symptoms include: none     Review of Systems:     Review of Systems   Constitutional: Negative for chills, fatigue and fever  HENT: Positive for dental problem (pain of gums)  Negative for congestion, hearing loss and sinus pressure  Eyes: Negative for visual disturbance  Respiratory: Negative for cough and chest tightness  Cardiovascular: Negative for chest pain  Gastrointestinal: Negative for constipation and diarrhea  Genitourinary: Negative for dysuria  Musculoskeletal: Negative for arthralgias and myalgias  Neurological: Negative for weakness and headaches  Psychiatric/Behavioral: Negative for decreased concentration and sleep disturbance        Past Medical History:     Past Medical History:   Diagnosis Date    Asthma     COPD [facility]chronic obstructive pulmonary disease) ([facility] )     GERD (gastroesophageal reflux disease)     Hypercholesteremia     Hypertension     Paranoid schizophrenia ([facility] )       Past Surgical History:     Past Surgical History:   Procedure Laterality Date    CATARACT EXTRACTION      COLONOSCOPY        Family History:     Family History   Problem Relation Age of Onset    No Known Problems Mother     No Known Problems Father     Parkinsonism Son       Social History:     E-Cigarette/Vaping    E-Cigarette Use Never User      E-Cigarette/Vaping Substances    Nicotine No     THC No     CBD No     Flavoring No     Other No     Unknown No      Social History     Socioeconomic History    Marital status: Single     Spouse name: None    Number of children: None    Years of education: None    Highest education level: None   Occupational History    None   Social Needs    Financial resource strain: None    Food insecurity     Worry: None     Inability: None    Transportation needs     Medical: None     Non-medical: None   Tobacco Use    Smoking status: Former Smoker     Packs/day: 1 00     Years: 20 00     Pack years: 20 00     Types: Cigarettes     Quit date:      Years since quittin     Smokeless tobacco: Never Used   Substance and Sexual Activity    Alcohol use: Not Currently    Drug use: Never    Sexual activity: Not Currently   Lifestyle    Physical activity     Days per week: 0 days     Minutes per session: 0 min    Stress: Not at all   Relationships    Social connections     Talks on phone: None     Gets together: None     Attends Restorationism service: None     Active member of club or organization: None     Attends meetings of clubs or organizations: None     Relationship status: None    Intimate partner violence     Fear of current or ex partner: None     Emotionally abused: None     Physically abused: None     Forced sexual activity: None   Other Topics Concern    None   Social History Narrative    None      Current Medications:     Current Outpatient Medications   Medication Sig Dispense Refill    acetaminophen (TYLENOL) 500 mg tablet Take 1 tablet (500 mg total) by mouth every 6 (six) hours as needed for mild pain 30 tablet 3    atorvastatin (LIPITOR) 40 mg tablet Take 1 tablet (40 mg total) by mouth daily 90 tablet 3    AUSTEDO 12 MG TABS Take 1 tablet by mouth 2 (two) times a day      carbidopa-levodopa (SINEMET)  mg per tablet Take 1 tablet by mouth 3 (three) times a day 90 tablet 4    cloZAPine (CLOZARIL) 100 mg tablet Take 100 mg by mouth 2 (two) times a day      clozapine (CLOZARIL) 200 MG tablet Take 200 mg by mouth daily at bedtime       collagenase (SANTYL) ointment Apply topically daily as needed (place on areas of skin breakdown) 15 g 0    docusate sodium (Colace) 100 mg capsule Take 1 capsule (100 mg total) by mouth 2 (two) times a day 60 capsule 5    Emollient (Lubriderm Daily Moisture) LOTN Apply topically daily as needed (rash and itching) 1 Bottle 1    Fluticasone Furoate 50 MCG/ACT AEPB Inhale 1 spray as needed (as needed for congestion) 1 each 3    metoprolol succinate (TOPROL-XL) 25 mg 24 hr tablet Take 1 tablet (25 mg total) by mouth daily 90 tablet 3    omeprazole (PriLOSEC) 40 MG capsule Take 1 capsule (40 mg total) by mouth daily 90 capsule 1    psyllium (METAMUCIL SMOOTH TEXTURE) 28 % packet Take 1 packet by mouth 2 (two) times a day 60 packet 2    temazepam (RESTORIL) 15 mg capsule Take 15 mg by mouth daily at bedtime as needed for sleep      topiramate (TOPAMAX) 25 mg tablet Take 25 mg by mouth 2 (two) times a day  1    traZODone (DESYREL) 100 mg tablet Take 100 mg by mouth daily at bedtime as needed       Vitamin D High Potency 25 MCG (1000 UT) capsule Take 1 capsule (1,000 Units total) by mouth daily 90 capsule 3     No current facility-administered medications for this visit  Allergies:     No Known Allergies   Physical Exam:     /76 (BP Location: Left arm, Patient Position: Sitting, Cuff Size: Standard)   Pulse 79   Temp (!) 97 4 °F (36 3 °C) (Temporal) Comment: no nsaids  Ht 5' 8" (1 727 m)   Wt 73 8 kg (162 lb 12 8 oz)   SpO2 98%   BMI 24 75 kg/m²     Physical Exam  Vitals signs reviewed  Constitutional:       Appearance: Normal appearance  He is well-developed and normal weight  He is not ill-appearing  HENT:      Head: Normocephalic and atraumatic  Right Ear: Hearing, tympanic membrane, ear canal and external ear normal       Left Ear: Hearing, tympanic membrane, ear canal and external ear normal       Nose: Nose normal       Mouth/Throat:      Lips: Pink  Mouth: Mucous membranes are moist       Dentition: Has dentures  Pharynx: Oropharynx is clear  No posterior oropharyngeal erythema        Tonsils: No tonsillar exudate  Eyes:      General: Lids are normal       Extraocular Movements: Extraocular movements intact  Conjunctiva/sclera: Conjunctivae normal    Neck:      Musculoskeletal: Full passive range of motion without pain and normal range of motion  Thyroid: No thyroid mass or thyromegaly  Trachea: Trachea and phonation normal    Cardiovascular:      Rate and Rhythm: Normal rate and regular rhythm  Heart sounds: Normal heart sounds, S1 normal and S2 normal    Pulmonary:      Effort: Pulmonary effort is normal  No accessory muscle usage  Breath sounds: Normal breath sounds  No wheezing  Abdominal:      General: Abdomen is flat  Bowel sounds are normal       Palpations: Abdomen is soft  Tenderness: There is no abdominal tenderness  Lymphadenopathy:      Cervical: No cervical adenopathy  Skin:     General: Skin is warm and dry  Capillary Refill: Capillary refill takes less than 2 seconds  Neurological:      General: No focal deficit present  Mental Status: He is alert and oriented to person, place, and time  Mental status is at baseline  Motor: Motor function is intact  Psychiatric:         Attention and Perception: Attention and perception normal          Mood and Affect: Mood normal  Affect is flat  Speech: Speech normal          Behavior: Behavior is withdrawn  Behavior is cooperative  Thought Content:  Thought content normal          Cognition and Memory: Cognition and memory normal          Judgment: Judgment normal           Amelia Orellana, [address] [place] [address]

## 2021-03-09 ENCOUNTER — HOSPITAL ENCOUNTER (OUTPATIENT)
Dept: ULTRASOUND IMAGING | Facility: HOSPITAL | Age: 71
Discharge: HOME | End: 2021-03-09
Payer: MEDICARE

## 2021-03-09 ENCOUNTER — TELEPHONE (OUTPATIENT)
Dept: INTERNAL MEDICINE CLINIC | Facility: CLINIC | Age: 71
End: 2021-03-09

## 2021-03-09 DIAGNOSIS — I71.2 ASCENDING AORTIC ANEURYSM (HCC): ICD-10-CM

## 2021-03-09 DIAGNOSIS — K59.00 CONSTIPATION, UNSPECIFIED CONSTIPATION TYPE: ICD-10-CM

## 2021-03-09 PROCEDURE — 76706 US ABDL AORTA SCREEN AAA: CPT

## 2021-03-09 NOTE — TELEPHONE ENCOUNTER
----- Message from Dorina Ernst, [facility] sent at 3/9/2021  1:55 PM EST -----  Please call the staff at [facility] to make them aware that his abdominal aortic aneurysm ultrasound was normal   They do not see any evidence for an aneurysm

## 2021-03-25 ENCOUNTER — OFFICE VISIT (OUTPATIENT)
Dept: INTERNAL MEDICINE CLINIC | Facility: CLINIC | Age: 71
End: 2021-03-25
Payer: MEDICARE

## 2021-03-25 VITALS
WEIGHT: 161.2 LBS | OXYGEN SATURATION: 98 % | TEMPERATURE: 99.1 F | BODY MASS INDEX: 24.51 KG/M2 | SYSTOLIC BLOOD PRESSURE: 128 MMHG | DIASTOLIC BLOOD PRESSURE: 76 MMHG | HEART RATE: 89 BPM

## 2021-03-25 DIAGNOSIS — L02.32 BOIL OF BUTTOCK: Primary | ICD-10-CM

## 2021-03-25 PROCEDURE — 99213 OFFICE O/P EST LOW 20 MIN: CPT | Performed by: NURSE PRACTITIONER

## 2021-03-25 RX ORDER — CEPHALEXIN 500 MG/1
500 CAPSULE ORAL EVERY 6 HOURS SCHEDULED
Qty: 40 CAPSULE | Refills: 0 | Status: SHIPPED | OUTPATIENT
Start: 2021-03-25 | End: 2021-04-04

## 2021-03-25 NOTE — PROGRESS NOTES
INTERNAL MEDICINE FOLLOW-UP OFFICE VISIT  [facility] - [facility] VANDANA JOHNSON    NAME: Jaki Urias  AGE: 79 y o  SEX: male    DATE OF ENCOUNTER: 3/25/2021   Assessment and Plan:   Draining boil on left lower buttocks  Patient will start taking keflex qid  Advised to wash area with warm soap and water daily  Can use gauze as dressing while draining, and change when saturated  Follow up in one week to reevaluate  Problem List Items Addressed This Visit     None      Visit Diagnoses     Boil of buttock    -  Primary    Relevant Medications    cephalexin (KEFLEX) 500 mg capsule          Return in about 1 week (around 4/1/2021), or if symptoms worsen or fail to improve, for Next scheduled follow up  Counseling:     · Medication Side Effects - Adverse side effects of medications were reviewed with the patient/guardian today: Yes  · Counseling was given regarding: Prognosis, Risks and benefits of tx options, Intructions for management, Patient and family education, Importance of tx compliance, Risk factor reductions and Impressions  · Barriers to treatment include: No identified barriers      Chief Complaint:     Chief Complaint   Patient presents with    Rectal Pain     boil? History of Present Illness:     Patient has complaints of possible boil on his buttocks  Care takers stated that he normally applies santyl ointment daily  They noticed a golf ball sized lump today and patient complained of drainage from this area  Denies fever, chills, body aches  Unsure how long there was a boil there  There is tenderness to touch and squeezing  Drainage has soaked through multiple pants today         The following portions of the patient's history were reviewed and updated as appropriate: allergies, current medications, past family history, past medical history, past social history, past surgical history and problem list      Review of Systems:     Review of Systems Constitutional: Negative for chills, fatigue and fever  Respiratory: Negative for shortness of breath  Musculoskeletal: Negative for myalgias  Skin: Positive for color change and wound  Neurological: Negative for dizziness and weakness  Psychiatric/Behavioral: Negative for sleep disturbance  The patient is not nervous/anxious  Problem List:     Patient Active Problem List   Diagnosis    GERD (gastroesophageal reflux disease)    [facility]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 ([facility] [address] )    Hypertension    Hypercholesteremia    [facility]anoid schizophrenia ([facility] )    Ascending aortic aneurysm (HCC)    Pressure injury of left buttock, stage 1    Pressure injury of right buttock, stage 1        Objective:     /76 (BP Location: Right arm, Patient Position: Sitting, Cuff Size: Standard)   Pulse 89   Temp 99 1 °F (37 3 °C) (Temporal) Comment: NO NSAIDS  Wt 73 1 kg (161 lb 3 2 oz) Comment: w  shoes denied off  SpO2 98%   BMI 24 51 kg/m²     Physical Exam  Vitals signs reviewed  Constitutional:       General: He is not in acute distress  Appearance: Normal appearance  He is well-developed, well-groomed and normal weight  He is not ill-appearing  HENT:      Head: Normocephalic and atraumatic  Skin:     General: Skin is warm  Findings: Abscess, erythema and wound present  Comments: SEE IMAGE UNDER MEDIA  Boil with erythema, edema, serosanguinous drainage and ttp on left lower buttocks  Mild erythema of right lower buttocks  Neurological:      Mental Status: He is alert and oriented to person, place, and time  Psychiatric:         Attention and Perception: Attention normal          Mood and Affect: Mood normal  Affect is flat  Speech: Speech normal          Behavior: Behavior is withdrawn  Behavior is cooperative  Thought Content:  Thought content normal          Pertinent Laboratory/Diagnostic Studies:    Laboratory Results: I have personally reviewed the pertinent laboratory results/reports   Radiology/Other Diagnostic Testing Results: I have personally reviewed pertinent reports         Current Medications:     Current Outpatient Medications   Medication Sig Dispense Refill    acetaminophen (TYLENOL) 500 mg tablet Take 1 tablet (500 mg total) by mouth every 6 (six) hours as needed for mild pain 30 tablet 3    atorvastatin (LIPITOR) 40 mg tablet Take 1 tablet (40 mg total) by mouth daily 90 tablet 3    AUSTEDO 12 MG TABS Take 1 tablet by mouth 2 (two) times a day      carbidopa-levodopa (SINEMET)  mg per tablet Take 1 tablet by mouth 3 (three) times a day 90 tablet 4    cloZAPine (CLOZARIL) 100 mg tablet Take 100 mg by mouth 2 (two) times a day      clozapine (CLOZARIL) 200 MG tablet Take 200 mg by mouth daily at bedtime       collagenase (SANTYL) ointment Apply topically daily as needed (place on areas of skin breakdown) 15 g 0    docusate sodium (Colace) 100 mg capsule Take 1 capsule (100 mg total) by mouth 2 (two) times a day 60 capsule 5    Emollient (Lubriderm Daily Moisture) LOTN Apply topically daily as needed (rash and itching) 1 Bottle 1    Fluticasone Furoate 50 MCG/ACT AEPB Inhale 1 spray as needed (as needed for congestion) 1 each 3    metoprolol succinate (TOPROL-XL) 25 mg 24 hr tablet Take 1 tablet (25 mg total) by mouth daily 90 tablet 3    omeprazole (PriLOSEC) 40 MG capsule Take 1 capsule (40 mg total) by mouth daily 90 capsule 1    psyllium (METAMUCIL SMOOTH TEXTURE) 28 % packet Take 1 packet by mouth 2 (two) times a day 60 packet 2    temazepam (RESTORIL) 15 mg capsule Take 15 mg by mouth daily at bedtime as needed for sleep      topiramate (TOPAMAX) 25 mg tablet Take 25 mg by mouth 2 (two) times a day  1    traZODone (DESYREL) 100 mg tablet Take 100 mg by mouth daily at bedtime as needed       Vitamin D High Potency 25 MCG (1000 UT) capsule Take 1 capsule (1,000 Units total) by mouth daily 90 capsule 3    cephalexin (KEFLEX) 500 mg capsule Take 1 capsule (500 mg total) by mouth every 6 (six) hours for 10 days 40 capsule 0     No current facility-administered medications for this visit  Patient Instructions   Furunculosis and Carbunculosis   WHAT YOU NEED TO KNOW:   Furunculosis and carbunculosis are skin infections that form lumps and pus, called furuncles and carbuncles  A furuncle (abscess) forms when a hair follicle and the skin surrounding it become infected  A carbuncle is made up of multiple furuncles, and goes much deeper into the skin  Furuncles and carbuncles are usually caused by bacteria  DISCHARGE INSTRUCTIONS:   Return to the emergency department if:   · You have a fast heartbeat or chest pain  · You have sudden trouble breathing  · Your symptoms do not improve or are getting worse  · Your wound has pus coming out or has a foul smell  Contact your healthcare provider if:   · You have a fever  · You have chills or a cough, or feel weak and achy  · You have increased pain, redness, or swelling around the infected area  · You have questions or concerns about your condition or care  Medicines: You may need any of the following:  · Antibiotics  help treat a bacterial infection  · Acetaminophen  decreases pain and fever  It is available without a doctor's order  Ask how much to take and how often to take it  Follow directions  Acetaminophen can cause liver damage if not taken correctly  · NSAIDs , such as ibuprofen, help decrease swelling, pain, and fever  This medicine is available with or without a doctor's order  NSAIDs can cause stomach bleeding or kidney problems in certain people  If you take blood thinner medicine, always ask your healthcare provider if NSAIDs are safe for you  Always read the medicine label and follow directions  · Take your medicine as directed  Contact your healthcare provider if you think your medicine is not helping or if you have side effects   Tell him or her if you are allergic to any medicine  Keep a list of the medicines, vitamins, and herbs you take  Include the amounts, and when and why you take them  Bring the list or the pill bottles to follow-up visits  Carry your medicine list with you in case of an emergency  Self-care:   · Apply a warm compress  A compress can decrease pain and swelling  It may also help drain pus and speed up healing  Apply a moist, warm compress for 30 minutes, 3 to 4 times a day or as directed  · Care for your wound as directed  You may need to apply bandages with medicine on them  You may need to wash the wound carefully with soap and water  Dry the area and put on new, clean bandages as directed  Change your bandages when they get wet or dirty  Prevent the spread of furunculosis and carbunculosis:   · Keep your skin clean  Wash your skin and hair every day  Wash your hands often  Use soap and water  Use germ-killing hand lotion or gel if soap and water are not available  · Apply lotion or moisturizing creams to your skin regularly  Stop using them if they sting or irritate your skin  · Avoid contact with other people's wounds  Keep any wounds clean and covered with clean, dry bandages until they heal  Place used bandages in a sealed plastic bag when you throw them away  · Do not share personal items  Use your own towel, soap, clothes, and other personal items  Do not share these items with others  · [address] [facility] [address] correctly  Keep an infected person's laundry in a plastic bag until it is washed  Wash with detergent and hot water  Dry the items on the hot setting  Follow up with your healthcare provider as directed:  Write down your questions so you remember to ask them during your visits  © Copyright [address] Information is for End User's use only and may not be sold, redistributed or otherwise used for commercial purposes   All illustrations and images included in CareNotes® are the copyrighted property of A D A M , Inc  or [facility] Elaina Salamanca   The above information is an  only  It is not intended as medical advice for individual conditions or treatments  Talk to your doctor, nurse or pharmacist before following any medical regimen to see if it is safe and effective for you          LIUDMILA Yee  [facility]

## 2021-03-25 NOTE — PATIENT INSTRUCTIONS
Furunculosis and Carbunculosis   WHAT YOU NEED TO KNOW:   Furunculosis and carbunculosis are skin infections that form lumps and pus, called furuncles and carbuncles  A furuncle (abscess) forms when a hair follicle and the skin surrounding it become infected  A carbuncle is made up of multiple furuncles, and goes much deeper into the skin  Furuncles and carbuncles are usually caused by bacteria  DISCHARGE INSTRUCTIONS:   Return to the emergency department if:   · You have a fast heartbeat or chest pain  · You have sudden trouble breathing  · Your symptoms do not improve or are getting worse  · Your wound has pus coming out or has a foul smell  Contact your healthcare provider if:   · You have a fever  · You have chills or a cough, or feel weak and achy  · You have increased pain, redness, or swelling around the infected area  · You have questions or concerns about your condition or care  Medicines: You may need any of the following:  · Antibiotics  help treat a bacterial infection  · Acetaminophen  decreases pain and fever  It is available without a doctor's order  Ask how much to take and how often to take it  Follow directions  Acetaminophen can cause liver damage if not taken correctly  · NSAIDs , such as ibuprofen, help decrease swelling, pain, and fever  This medicine is available with or without a doctor's order  NSAIDs can cause stomach bleeding or kidney problems in certain people  If you take blood thinner medicine, always ask your healthcare provider if NSAIDs are safe for you  Always read the medicine label and follow directions  · Take your medicine as directed  Contact your healthcare provider if you think your medicine is not helping or if you have side effects  Tell him or her if you are allergic to any medicine  Keep a list of the medicines, vitamins, and herbs you take  Include the amounts, and when and why you take them   Bring the list or the pill bottles to follow-up visits  Carry your medicine list with you in case of an emergency  Self-care:   · Apply a warm compress  A compress can decrease pain and swelling  It may also help drain pus and speed up healing  Apply a moist, warm compress for 30 minutes, 3 to 4 times a day or as directed  · Care for your wound as directed  You may need to apply bandages with medicine on them  You may need to wash the wound carefully with soap and water  Dry the area and put on new, clean bandages as directed  Change your bandages when they get wet or dirty  Prevent the spread of furunculosis and carbunculosis:   · Keep your skin clean  Wash your skin and hair every day  Wash your hands often  Use soap and water  Use germ-killing hand lotion or gel if soap and water are not available  · Apply lotion or moisturizing creams to your skin regularly  Stop using them if they sting or irritate your skin  · Avoid contact with other people's wounds  Keep any wounds clean and covered with clean, dry bandages until they heal  Place used bandages in a sealed plastic bag when you throw them away  · Do not share personal items  Use your own towel, soap, clothes, and other personal items  Do not share these items with others  · [address] correctly  Keep an infected person's laundry in a plastic bag until it is washed  Wash with detergent and hot water  Dry the items on the hot setting  Follow up with your healthcare provider as directed:  Write down your questions so you remember to ask them during your visits  © Copyright [address] Information is for End User's use only and may not be sold, redistributed or otherwise used for commercial purposes  All illustrations and images included in CareNotes® are the copyrighted property of [employer]  or [address] [place], [place] [address]sejal   The above information is an  only  It is not intended as medical advice for individual conditions or treatments   Talk to your doctor, nurse or pharmacist before following any medical regimen to see if it is safe and effective for you

## 2021-04-29 DIAGNOSIS — K59.09 CHRONIC CONSTIPATION: ICD-10-CM

## 2021-04-29 NOTE — TELEPHONE ENCOUNTER
Not sure what physician-  RX: Colace 100 mg/ refills  Uses: [facility] [phone]  Please phone Urbano Velasco @ [phone] if, any questions

## 2021-04-30 RX ORDER — DOCUSATE SODIUM 100 MG/1
100 CAPSULE, LIQUID FILLED ORAL 2 TIMES DAILY
Qty: 60 CAPSULE | Refills: 5 | Status: SHIPPED | OUTPATIENT
Start: 2021-04-30 | End: 2021-09-27 | Stop reason: SDUPTHER

## 2021-05-10 DIAGNOSIS — K21.9 GASTROESOPHAGEAL REFLUX DISEASE: ICD-10-CM

## 2021-05-10 RX ORDER — OMEPRAZOLE 40 MG/1
40 CAPSULE, DELAYED RELEASE ORAL DAILY
Qty: 30 CAPSULE | Refills: 5 | Status: SHIPPED | OUTPATIENT
Start: 2021-05-10 | End: 2021-09-27 | Stop reason: SDUPTHER

## 2021-05-11 DIAGNOSIS — L02.91 ABSCESS: ICD-10-CM

## 2021-05-12 RX ORDER — COLLAGENASE SANTYL 250 [ARB'U]/G
OINTMENT TOPICAL
Qty: 30 G | Refills: 0 | Status: SHIPPED | OUTPATIENT
Start: 2021-05-12 | End: 2021-05-19 | Stop reason: SDUPTHER

## 2021-05-13 ENCOUNTER — OFFICE VISIT (OUTPATIENT)
Dept: INTERNAL MEDICINE CLINIC | Facility: CLINIC | Age: 71
End: 2021-05-13
Payer: MEDICARE

## 2021-05-13 VITALS
HEART RATE: 76 BPM | DIASTOLIC BLOOD PRESSURE: 78 MMHG | SYSTOLIC BLOOD PRESSURE: 116 MMHG | TEMPERATURE: 97.3 F | OXYGEN SATURATION: 100 %

## 2021-05-13 DIAGNOSIS — L73.9 FOLLICULITIS: Primary | ICD-10-CM

## 2021-05-13 DIAGNOSIS — L08.9 SKIN INFECTION: ICD-10-CM

## 2021-05-13 PROBLEM — H25.819 COMBINED FORM OF SENILE CATARACT: Status: ACTIVE | Noted: 2020-01-29

## 2021-05-13 PROCEDURE — 87081 CULTURE SCREEN ONLY: CPT | Performed by: NURSE PRACTITIONER

## 2021-05-13 PROCEDURE — 99214 OFFICE O/P EST MOD 30 MIN: CPT | Performed by: NURSE PRACTITIONER

## 2021-05-13 RX ORDER — SULFAMETHOXAZOLE AND TRIMETHOPRIM 800; 160 MG/1; MG/1
1 TABLET ORAL EVERY 12 HOURS SCHEDULED
Qty: 20 TABLET | Refills: 0 | Status: SHIPPED | OUTPATIENT
Start: 2021-05-13 | End: 2021-05-23

## 2021-05-13 NOTE — PATIENT INSTRUCTIONS
Shower daily-wash affected with soap and water  Start antibiotic  Bactroban cream to affected area twice a day      Folliculitis   WHAT YOU NEED TO KNOW:   Folliculitis is inflammation of your hair follicles  A hair follicle is a sac under your skin  Your hair grows out of the follicle  Folliculitis is caused by bacteria or fungus, most commonly a germ called Staph  Folliculitis can occur anywhere you have hair  DISCHARGE INSTRUCTIONS:   Medicines:   · Antibiotics: This medicine is given to fight or prevent an infection caused by bacteria  It may be given as an ointment that you apply to your skin or as a pill  Always take your antibiotics exactly as ordered by your healthcare provider  Never save antibiotics or take leftover antibiotics that were given to you for another illness  · Antifungal medicine: This medicine helps kill fungus that may be causing your folliculitis  It may be given as an cream that you apply to your skin or as a pill  · NSAIDs , such as ibuprofen, help decrease swelling, pain, and fever  This medicine is available with or without a doctor's order  NSAIDs can cause stomach bleeding or kidney problems in certain people  If you take blood thinner medicine, always ask if NSAIDs are safe for you  Always read the medicine label and follow directions  Do not give these medicines to children under 10months of age without direction from your child's healthcare provider  · Antihistamines: This medicine may be given to help decrease itching  · Take your medicine as directed  Contact your healthcare provider if you think your medicine is not helping or if you have side effects  Tell him of her if you are allergic to any medicine  Keep a list of the medicines, vitamins, and herbs you take  Include the amounts, and when and why you take them  Bring the list or the pill bottles to follow-up visits  Carry your medicine list with you in case of an emergency      Follow up with your healthcare provider or dermatologist as directed:  Write down your questions so you remember to ask them during your visits  Manage folliculitis:   · Use warm compresses:  Wet a washcloth with warm water and apply it to the infected skin area to help decrease pain and swelling  Warm compresses may also help drain pus and improve healing  · Clean the area:  Use antibacterial soap to wash the affected area  Change your washcloths and towels every day  · Avoid shaving the area: If possible, do not shave areas that have folliculitis  If you must shave, use an electric razor or new blade every time you shave  Prevent folliculitis:   · Do not share personal items:  Do not share towels, soap, or any personal items with other people  · Do not wear tight clothing:  Do not wear tight-fitting clothes that rub against and irritate your skin  · Treat skin injuries right away:  Treat injuries such as cuts and scrapes right away  Wash them with warm, soapy water, and cover the area to prevent infection  Contact your healthcare provider or dermatologist if:  · You have a fever  · You have foul-smelling pus coming from the bumps on your skin  · Your rash is spreading  · You have questions or concerns about your condition or care  Return to the emergency department if:  · You develop large areas of red, warm, tender skin around the folliculitis  · You develop boils  © Copyright [address] Information is for End User's use only and may not be sold, redistributed or otherwise used for commercial purposes  All illustrations and images included in CareNotes® are the copyrighted property of A D A M , Inc  or Joao De Leon  The above information is an  only  It is not intended as medical advice for individual conditions or treatments  Talk to your doctor, nurse or pharmacist before following any medical regimen to see if it is safe and effective for you

## 2021-05-13 NOTE — ASSESSMENT & PLAN NOTE
Patient with a several day history of inflamed areas on his skin  These areas are limited to his left thigh  Patient denies any pain in that area  There is some tenderness at the area of the largest sore  Patient does not have a known history of MRSA  The patient is a resident of a group home and does have underlying mental health issues  I will treat the patient for folliculitis  Bactrim was sent to the patient's pharmacy  Bactroban ointment was sent for the patient to apply to those areas twice a day  I did recommend that he shower daily and not share any clothing or bath house with any other resident  In addition he has been instructed that he should not scratch or touch that area and if he does he should wash his hands afterwards  I did perform a nasal swab for MRSA to check for colonization  The patient is accompanied to the office by Dara Mendez who works at the group [employer]  I did instruct her that should these areas worsen in size or open she should contact the office

## 2021-05-13 NOTE — PROGRESS NOTES
Dariana    NAME: Rene Segura  AGE: 79 y o  SEX: male  : 1950     DATE: 2021     Assessment and Plan:     Problem List Items Addressed This Visit        Musculoskeletal and Integument    Folliculitis - Primary         Patient with a several day history of inflamed areas on his skin  These areas are limited to his left thigh  Patient denies any pain in that area  There is some tenderness at the area of the largest sore  Patient does not have a known history of MRSA  The patient is a resident of a group home and does have underlying mental health issues  I will treat the patient for folliculitis  Bactrim was sent to the patient's pharmacy  Bactroban ointment was sent for the patient to apply to those areas twice a day  I did recommend that he shower daily and not share any clothing or bath house with any other resident  In addition he has been instructed that he should not scratch or touch that area and if he does he should wash his hands afterwards  I did perform a nasal swab for MRSA to check for colonization  The patient is accompanied to the office by Sarai Swanson who works at the group home  I did instruct her that should these areas worsen in size or open she should contact the office  Relevant Medications    mupirocin (BACTROBAN) 2 % ointment    Other Relevant Orders    MRSA culture      Other Visit Diagnoses     Skin infection        Relevant Medications    sulfamethoxazole-trimethoprim (BACTRIM DS) 800-160 mg per tablet    mupirocin (BACTROBAN) 2 % ointment    Other Relevant Orders    MRSA culture              No follow-ups on file  Chief Complaint:     Chief Complaint   Patient presents with    Leg Swelling     SORES ALL OVER LEGS, "left upper leg hurts"        History of Present Illness: Chick Officer to the office today accompanied by Sarai Swanson who works in the patient's group home    The patient did bring to the staff to tension several sores that he has in his left lateral thigh  This area is not painful  There is tenderness at the largest of these areas  There is no drainage  Several of these areas are scabbed over  The patient is unsure of how long these areas have been there  In addition they are unsure if the patient ever had MRSA  Patient will be treated with Bactroban and a p o  antibiotic  A nasal culture for MRSA was obtained  They will contact me should these areas worsen  Review of Systems:     Review of Systems   Constitutional: Negative  Negative for fatigue  HENT: Negative  Negative for congestion, postnasal drip, rhinorrhea and trouble swallowing  Eyes: Negative  Negative for visual disturbance  Respiratory: Negative  Negative for choking and shortness of breath  Cardiovascular: Negative  Negative for chest pain  Gastrointestinal: Negative  Endocrine: Negative  Genitourinary: Negative  Musculoskeletal: Negative  Negative for arthralgias, back pain, myalgias and neck pain  Skin: Positive for color change and wound  Neurological: Negative for dizziness and headaches  Psychiatric/Behavioral: Negative  Problem List:     Patient Active Problem List   Diagnosis    GERD (gastroesophageal reflux disease)    [facility]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 ([facility] [address] [facility]    Hypertension    Hypercholesteremia    Paranoid schizophrenia ([facility] [address] [facility]    Ascending aortic aneurysm ([facility] [address] )    Pressure injury of left buttock, stage 1    Pressure injury of right buttock, stage 1    Combined form of senile cataract    Folliculitis        Objective:     /78 (BP Location: Left arm, Patient Position: Sitting)   Pulse 76 Comment: manual  Temp (!) 97 3 °F (36 3 °C) (Temporal)   SpO2 100%     Physical Exam  Vitals signs reviewed  Constitutional:       Appearance: Normal appearance  HENT:      Head: Normocephalic and atraumatic        Nose: Nose normal       Mouth/Throat:      Mouth: Mucous membranes are moist    Eyes:      Extraocular Movements: Extraocular movements intact  Pupils: Pupils are equal, round, and reactive to light  Cardiovascular:      Rate and Rhythm: Normal rate and regular rhythm  Pulses: Normal pulses  Heart sounds: Normal heart sounds  Pulmonary:      Effort: Pulmonary effort is normal       Breath sounds: Normal breath sounds  Musculoskeletal: Normal range of motion  Skin:     General: Skin is warm  Findings: Erythema, lesion and rash present  Neurological:      General: No focal deficit present  Mental Status: He is alert and oriented to person, place, and time  Psychiatric:         Mood and Affect: Mood normal          Behavior: Behavior normal          Thought Content:  Thought content normal          Judgment: Judgment normal          Severa Camera, [facility]

## 2021-05-14 LAB — MRSA NOSE QL CULT: ABNORMAL

## 2021-05-17 ENCOUNTER — TELEPHONE (OUTPATIENT)
Dept: INTERNAL MEDICINE CLINIC | Facility: CLINIC | Age: 71
End: 2021-05-17

## 2021-05-17 DIAGNOSIS — Z22.322 MRSA (METHICILLIN RESISTANT STAPH AUREUS) CULTURE POSITIVE: Primary | ICD-10-CM

## 2021-05-17 DIAGNOSIS — L73.9 FOLLICULITIS: ICD-10-CM

## 2021-05-17 DIAGNOSIS — L89.311 PRESSURE INJURY OF RIGHT BUTTOCK, STAGE 1: ICD-10-CM

## 2021-05-17 RX ORDER — VANCOMYCIN HYDROCHLORIDE 250 MG/1
250 CAPSULE ORAL 4 TIMES DAILY
Qty: 56 CAPSULE | Refills: 0 | Status: SHIPPED | OUTPATIENT
Start: 2021-05-17 | End: 2021-05-31

## 2021-05-17 NOTE — TELEPHONE ENCOUNTER
I spoke with Mirian the patient's  regarding his recent MRSA culture which is positive  I have stopped his Bactrim  I will start him on vancomycin 250 mg 4 times a day for 14 days  She will contact me should the area of concern not resolve  She was advised of the highly contagious nature of MRSA

## 2021-05-19 DIAGNOSIS — L02.91 ABSCESS: ICD-10-CM

## 2021-05-19 RX ORDER — COLLAGENASE SANTYL 250 [ARB'U]/G
OINTMENT TOPICAL DAILY PRN
Qty: 30 G | Refills: 3 | Status: SHIPPED | OUTPATIENT
Start: 2021-05-19 | End: 2021-09-27 | Stop reason: SDUPTHER

## 2021-05-27 ENCOUNTER — TELEPHONE (OUTPATIENT)
Dept: UROLOGY | Facility: MEDICAL CENTER | Age: 71
End: 2021-05-27

## 2021-05-27 NOTE — TELEPHONE ENCOUNTER
Please Triage - [facility] Patient-     What is the reason for the patients appointment? Mirian () called stating patient has urinary issues with incontinence  Appointment made with Peyton Contreras for 06/24 at 130 pm   Please review to see if appointment time frame is appropriate  Imaging/Lab Results:      Do we accept the patient's insurance or is the patient Self-Pay? Medicare  Provider & Plan:   Member ID#: Has the patient had any previous urologist(s)? Have patient records been requested? Has the patient had any outside testing done?       Does the patient have a personal history of cancer?no       Patient can be reached at :[phone] (M)

## 2021-06-02 DIAGNOSIS — K59.00 CONSTIPATION, UNSPECIFIED CONSTIPATION TYPE: ICD-10-CM

## 2021-07-09 DIAGNOSIS — G21.11 NEUROLEPTIC-INDUCED PARKINSONISM (HCC): ICD-10-CM

## 2021-07-14 ENCOUNTER — APPOINTMENT (OUTPATIENT)
Dept: LAB | Facility: CLINIC | Age: 71
End: 2021-07-14
Payer: MEDICARE

## 2021-07-14 ENCOUNTER — OFFICE VISIT (OUTPATIENT)
Dept: INTERNAL MEDICINE CLINIC | Facility: CLINIC | Age: 71
End: 2021-07-14
Payer: MEDICARE

## 2021-07-14 VITALS
OXYGEN SATURATION: 100 % | RESPIRATION RATE: 22 BRPM | HEART RATE: 85 BPM | TEMPERATURE: 97.9 F | SYSTOLIC BLOOD PRESSURE: 110 MMHG | BODY MASS INDEX: 23.95 KG/M2 | WEIGHT: 158 LBS | HEIGHT: 68 IN | DIASTOLIC BLOOD PRESSURE: 70 MMHG

## 2021-07-14 DIAGNOSIS — E78.00 HYPERCHOLESTEREMIA: ICD-10-CM

## 2021-07-14 DIAGNOSIS — Z12.5 PROSTATE CANCER SCREENING: ICD-10-CM

## 2021-07-14 DIAGNOSIS — F20.0 PARANOID SCHIZOPHRENIA (HCC): ICD-10-CM

## 2021-07-14 DIAGNOSIS — N40.1 BENIGN PROSTATIC HYPERPLASIA WITH POST-VOID DRIBBLING: Primary | ICD-10-CM

## 2021-07-14 DIAGNOSIS — G21.11 NEUROLEPTIC-INDUCED PARKINSONISM (HCC): ICD-10-CM

## 2021-07-14 DIAGNOSIS — N39.43 DRIBBLING FOLLOWING URINATION: ICD-10-CM

## 2021-07-14 DIAGNOSIS — Z00.00 MEDICARE ANNUAL WELLNESS VISIT, SUBSEQUENT: ICD-10-CM

## 2021-07-14 DIAGNOSIS — I10 ESSENTIAL HYPERTENSION: ICD-10-CM

## 2021-07-14 DIAGNOSIS — Z22.322 MRSA CARRIER: ICD-10-CM

## 2021-07-14 DIAGNOSIS — N39.43 BENIGN PROSTATIC HYPERPLASIA WITH POST-VOID DRIBBLING: Primary | ICD-10-CM

## 2021-07-14 PROBLEM — L89.321 PRESSURE INJURY OF LEFT BUTTOCK, STAGE 1: Status: RESOLVED | Noted: 2020-12-14 | Resolved: 2021-07-14

## 2021-07-14 PROBLEM — L89.311 PRESSURE INJURY OF RIGHT BUTTOCK, STAGE 1: Status: RESOLVED | Noted: 2020-12-14 | Resolved: 2021-07-14

## 2021-07-14 PROBLEM — L73.9 FOLLICULITIS: Status: RESOLVED | Noted: 2021-05-13 | Resolved: 2021-07-14

## 2021-07-14 LAB
BILIRUB UR QL STRIP: NEGATIVE
CLARITY UR: CLEAR
COLOR UR: YELLOW
GLUCOSE UR STRIP-MCNC: NEGATIVE MG/DL
HGB UR QL STRIP.AUTO: NEGATIVE
KETONES UR STRIP-MCNC: NEGATIVE MG/DL
LEUKOCYTE ESTERASE UR QL STRIP: NEGATIVE
NITRITE UR QL STRIP: NEGATIVE
PH UR STRIP.AUTO: 6.5 [PH]
PROT UR STRIP-MCNC: NEGATIVE MG/DL
SP GR UR STRIP.AUTO: 1.02 (ref 1–1.03)
UROBILINOGEN UR QL STRIP.AUTO: 0.2 E.U./DL

## 2021-07-14 PROCEDURE — G0439 PPPS, SUBSEQ VISIT: HCPCS | Performed by: NURSE PRACTITIONER

## 2021-07-14 PROCEDURE — 1123F ACP DISCUSS/DSCN MKR DOCD: CPT | Performed by: NURSE PRACTITIONER

## 2021-07-14 PROCEDURE — 99214 OFFICE O/P EST MOD 30 MIN: CPT | Performed by: NURSE PRACTITIONER

## 2021-07-14 PROCEDURE — 87086 URINE CULTURE/COLONY COUNT: CPT

## 2021-07-14 PROCEDURE — 81003 URINALYSIS AUTO W/O SCOPE: CPT | Performed by: NURSE PRACTITIONER

## 2021-07-14 RX ORDER — OLOPATADINE HYDROCHLORIDE 2 MG/ML
SOLUTION/ DROPS OPHTHALMIC
COMMUNITY
Start: 2021-07-07 | End: 2021-09-27 | Stop reason: SDUPTHER

## 2021-07-14 NOTE — PROGRESS NOTES
Dariana    NAME: Donnell De La Cruz  AGE: 70 y o  SEX: male  : 1950     DATE: 2021     Assessment and Plan:     {Assess/Plan SmartLinks:84091}        No follow-ups on file       Chief Complaint:     Chief Complaint   Patient presents with    Follow-up    Medicare Wellness Visit        History of Present Illness:     ***     Review of Systems:     Review of Systems     Problem List:     Patient Active Problem List   Diagnosis    GERD (gastroesophageal reflux disease)    Neuro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 [facility] [address] [facility]    Hypertension    Hypercholesteremia    [facility]    Ascending aortic aneurysm ([facility] )    Pressure injury of left buttock, stage 1    Pressure injury of right buttock, stage 1    Combined form of senile cataract    Folliculitis        Objective:     /70 (BP Location: Left arm, Patient Position: Sitting)   Temp 97 9 °F (36 6 °C) (Tympanic)   Ht 5' 8" (1 727 m)   Wt 71 7 kg (158 lb)   BMI 24 02 kg/m²     Physical Exam    Oscar Dunn, [facility]

## 2021-07-14 NOTE — ASSESSMENT & PLAN NOTE
The patient is lipid panels within normal limits  He will continue his atorvastatin  Lipid panel will be  repeated in 6 months

## 2021-07-14 NOTE — ASSESSMENT & PLAN NOTE
Patient continues with Neurology  He has had no increase in symptoms    Patient continues on his carbidopa/levodopa

## 2021-07-14 NOTE — PROGRESS NOTES
Assessment and Plan:     Problem List Items Addressed This Visit     None      Visit Diagnoses     Benign prostatic hyperplasia with post-void dribbling    -  Primary    Medicare annual wellness visit, subsequent               Preventive health issues were discussed with patient, and age appropriate screening tests were ordered as noted in patient's After Visit Summary  Personalized health advice and appropriate referrals for health education or preventive services given if needed, as noted in patient's After Visit Summary       History of Present Illness:     Patient presents for Medicare Annual Wellness visit    Patient Care Team:  LIUDMILA Burciaga as PCP - General (Internal Medicine)  MD Brittany Quesada PA-C (Gastroenterology)  Sabine Maldonado PA-C as Physician Assistant (Physician Assistant)     Problem List:     Patient Active Problem List   Diagnosis    GERD (gastroesophageal reflux disease)    [facility]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 ([facility]    Hypertension    Hypercholesteremia    Paranoid schizophrenia ([facility]    Ascending aortic aneurysm ([facility] )    Pressure injury of left buttock, stage 1    Pressure injury of right buttock, stage 1    Combined form of senile cataract    Folliculitis      Past Medical and Surgical History:     Past Medical History:   Diagnosis Date    Asthma     COPD (chronic obstructive pulmonary disease) ([facility]     GERD (gastroesophageal reflux disease)     Hypercholesteremia     Hypertension     Paranoid schizophrenia ([facility] )      Past Surgical History:   Procedure Laterality Date    CATARACT EXTRACTION      COLONOSCOPY        Family History:     Family History   Problem Relation Age of Onset    No Known Problems Mother     No Known Problems Father     Parkinsonism Son       Social History:     Social History     Socioeconomic History    Marital status: Single     Spouse name: None    Number of children: None    Years of education: None    Highest education level: None   Occupational History    None   Tobacco Use    Smoking status: Former Smoker     Packs/day: 1 00     Years: 20 00     Pack years: 20 00     Types: Cigarettes     Quit date:      Years since quittin 5    Smokeless tobacco: Never Used   Vaping Use    Vaping Use: Never used   Substance and Sexual Activity    Alcohol use: Not Currently    Drug use: Never    Sexual activity: Not Currently   Other Topics Concern    None   Social History Narrative    None     Social Determinants of Health     Financial Resource Strain:     Difficulty of Paying Living Expenses:    Food Insecurity:     Worried About Running Out of Food in the Last Year:     Ran Out of Food in the Last Year:    Transportation Needs:     Lack of Transportation (Medical):      Lack of Transportation (Non-Medical):    Physical Activity: Inactive    Days of Exercise per Week: 0 days    Minutes of Exercise per Session: 0 min   Stress: No Stress Concern Present    Feeling of Stress : Not at all   Social Connections:     Frequency of Communication with Friends and Family:     Frequency of Social Gatherings with Friends and Family:     Attends Restorationist Services:     Active Member of Clubs or Organizations:     Attends Club or Organization Meetings:     Marital Status:    Intimate Partner Violence:     Fear of Current or Ex-Partner:     Emotionally Abused:     Physically Abused:     Sexually Abused:       Medications and Allergies:     Current Outpatient Medications   Medication Sig Dispense Refill    atorvastatin (LIPITOR) 40 mg tablet Take 1 tablet (40 mg total) by mouth daily 90 tablet 3    AUSTEDO 12 MG TABS Take 1 tablet by mouth 2 (two) times a day      carbidopa-levodopa (SINEMET)  mg per tablet Take 1 tablet by mouth 3 (three) times a day 90 tablet 4    cloZAPine (CLOZARIL) 100 mg tablet Take 100 mg by mouth 2 (two) times a day      clozapine (CLOZARIL) 200 MG tablet Take 200 mg by mouth daily at bedtime       collagenase (Santyl) ointment Apply topically daily as needed (skin breakdown) 30 g 3    docusate sodium (Colace) 100 mg capsule Take 1 capsule (100 mg total) by mouth 2 (two) times a day 60 capsule 5    Emollient (Lubriderm Daily Moisture) LOTN Apply topically daily as needed (rash and itching) 1 Bottle 1    Fluticasone Furoate 50 MCG/ACT AEPB Inhale 1 spray as needed (as needed for congestion) 1 each 3    metoprolol succinate (TOPROL-XL) 25 mg 24 hr tablet Take 1 tablet (25 mg total) by mouth daily 90 tablet 3    olopatadine HCl (PATADAY) 0 2 % opth drops INSTILL 1 DROP IN EACH EYE AT BEDTIME (NC)      omeprazole (PriLOSEC) 40 MG capsule Take 1 capsule (40 mg total) by mouth daily 30 capsule 5    psyllium (METAMUCIL SMOOTH TEXTURE) 28 % packet Take 1 packet by mouth 2 (two) times a day 60 packet 2    temazepam (RESTORIL) 15 mg capsule Take 15 mg by mouth daily at bedtime as needed for sleep      topiramate (TOPAMAX) 25 mg tablet Take 25 mg by mouth 2 (two) times a day  1    traZODone (DESYREL) 100 mg tablet Take 100 mg by mouth daily at bedtime as needed       Vitamin D High Potency 25 MCG (1000 UT) capsule Take 1 capsule (1,000 Units total) by mouth daily 90 capsule 3    acetaminophen (TYLENOL) 500 mg tablet Take 1 tablet (500 mg total) by mouth every 6 (six) hours as needed for mild pain (Patient not taking: Reported on 7/14/2021) 30 tablet 3    mupirocin (BACTROBAN) 2 % ointment Apply topically 3 (three) times a day (Patient not taking: Reported on 7/14/2021) 22 g 0     No current facility-administered medications for this visit       No Known Allergies   Immunizations:     Immunization History   Administered Date(s) Administered    H1N1, All Formulations 10/23/2009    Influenza, high dose seasonal 0 7 mL 10/22/2019, 09/22/2020    Influenza, seasonal, injectable 10/20/2009    Pneumococcal Polysaccharide PPV23 10/16/2009, 10/16/2009    SARS-CoV-2 / COVID-19 mRNA IM ([employer]) 02/19/2021, 03/19/2021    SARS-CoV-2 / COVID-19 mRNA IM ([employer]) 01/29/2021, 02/19/2021    Tdap 09/22/2020      Health Maintenance:         Topic Date Due    Colorectal Cancer Screening  10/04/2029    Hepatitis C Screening  Completed         Topic Date Due    Influenza Vaccine (1) 09/01/2021      Medicare Health Risk Assessment:     /70 (BP Location: Left arm, Patient Position: Sitting)   Temp 97 9 °F (36 6 °C) (Tympanic)   Ht 5' 8" (1 727 m)   Wt 71 7 kg (158 lb)   BMI 24 02 kg/m²          Health Risk Assessment:   Patient rates overall health as good  Patient feels that their physical health rating is same  Patient is satisfied with their life  Eyesight was rated as same  Hearing was rated as same  Patient feels that their emotional and mental health rating is same  Patients states they are never, rarely angry  Patient states they are sometimes unusually tired/fatigued  Pain experienced in the last 7 days has been none  Patient states that he has experienced no weight loss or gain in last 6 months  Fall Risk Screening: In the past year, patient has experienced: no history of falling in past year      Home Safety:  Patient does not have trouble with stairs inside or outside of their home  Patient has working smoke alarms and has working carbon monoxide detector  Home safety hazards include: none  Nutrition:   Current diet is Regular and Limited junk food  Medications:   Patient is currently taking over-the-counter supplements  OTC medications include: see medication list  Patient is not able to manage medications  Patient in group home medications managed for him    Activities of Daily Living (ADLs)/Instrumental Activities of Daily Living (IADLs):   Walk and transfer into and out of bed and chair?: Yes  Dress and groom yourself?: Yes    Bathe or shower yourself?: Yes    Feed yourself?  Yes  Do your laundry/housekeeping?: No  Manage your money, pay your bills and track your expenses?: No  Make your own meals?: No    Do your own shopping?: No    Previous Hospitalizations:   Any hospitalizations or ED visits within the last 12 months?: No      Advance Care Planning:     Durable POA for healthcare: Yes      Comments: Sister     Cognitive Screening:   Provider or family/friend/caregiver concerned regarding cognition?: No    PREVENTIVE SCREENINGS      Cardiovascular Screening:    General: Screening Not Indicated and History Lipid Disorder      Diabetes Screening:     General: Screening Current      Colorectal Cancer Screening:     General: Screening Current      Prostate Cancer Screening:    General: Screening Current      Osteoporosis Screening:    General: Screening Not Indicated      Abdominal Aortic Aneurysm (AAA) Screening:    Risk factors include: age between 73-69 yo and tobacco use        Lung Cancer Screening:     General: Screening Not Indicated      Hepatitis C Screening:    General: Screening Current    Other Counseling Topics:   Car/seat belt/driving safety, skin self-exam and sunscreen         [address] [place]

## 2021-07-14 NOTE — ASSESSMENT & PLAN NOTE
The patient continues with his omeprazole  The patient denies any increase in symptoms  decreased aaron/decreased step length/decreased stride length

## 2021-07-14 NOTE — PATIENT INSTRUCTIONS
Medicare Preventive Visit Patient Instructions  Thank you for completing your Welcome to Medicare Visit or Medicare Annual Wellness Visit today  Your next wellness visit will be due in one year (7/15/2022)  The screening/preventive services that you may require over the next 5-10 years are detailed below  Some tests may not apply to you based off risk factors and/or age  Screening tests ordered at today's visit but not completed yet may show as past due  Also, please note that scanned in results may not display below  Preventive Screenings:  Service Recommendations Previous Testing/Comments   Colorectal Cancer Screening  · Colonoscopy    · Fecal Occult Blood Test (FOBT)/Fecal Immunochemical Test (FIT)  · Fecal DNA/Cologuard Test  · Flexible Sigmoidoscopy Age: 54-65 years old   Colonoscopy: every 10 years (May be performed more frequently if at higher risk)  OR  FOBT/FIT: every 1 year  OR  Cologuard: every 3 years  OR  Sigmoidoscopy: every 5 years  Screening may be recommended earlier than age 48 if at higher risk for colorectal cancer  Also, an individualized decision between you and your healthcare provider will decide whether screening between the ages of 74-80 would be appropriate  Colonoscopy: 10/04/2019  FOBT/FIT: Not on file  Cologuard: Not on file  Sigmoidoscopy: Not on file          Prostate Cancer Screening Individualized decision between patient and health care provider in men between ages of 53-78   Medicare will cover every 12 months beginning on the day after your 50th birthday PSA: 0 5 ng/mL           Hepatitis C Screening Once for adults born between 1945 and 1965  More frequently in patients at high risk for Hepatitis C Hep C Antibody: 08/29/2019        Diabetes Screening 1-2 times per year if you're at risk for diabetes or have pre-diabetes Fasting glucose: 88 mg/dL   A1C: 4 7 %        Cholesterol Screening Once every 5 years if you don't have a lipid disorder   May order more often based on risk factors  Lipid panel: 01/12/2021           Other Preventive Screenings Covered by Medicare:  1  Abdominal Aortic Aneurysm (AAA) Screening: covered once if your at risk  You're considered to be at risk if you have a family history of AAA or a male between the age of 73-68 who smoking at least 100 cigarettes in your lifetime  2  Lung Cancer Screening: covers low dose CT scan once per year if you meet all of the following conditions: (1) Age 50-69; (2) No signs or symptoms of lung cancer; (3) Current smoker or have quit smoking within the last 15 years; (4) You have a tobacco smoking history of at least 30 pack years (packs per day x number of years you smoked); (5) You get a written order from a healthcare provider  3  Glaucoma Screening: covered annually if you're considered high risk: (1) You have diabetes OR (2) Family history of glaucoma OR (3)  aged 48 and older OR (3)  American aged 72 and older  3  Osteoporosis Screening: covered every 2 years if you meet one of the following conditions: (1) Have a vertebral abnormality; (2) On glucocorticoid therapy for more than 3 months; (3) Have primary hyperparathyroidism; (4) On osteoporosis medications and need to assess response to drug therapy  5  HIV Screening: covered annually if you're between the age of 12-76  Also covered annually if you are younger than 13 and older than 72 with risk factors for HIV infection  For pregnant patients, it is covered up to 3 times per pregnancy  Immunizations:  Immunization Recommendations   Influenza Vaccine Annual influenza vaccination during flu season is recommended for all persons aged >= 6 months who do not have contraindications   Pneumococcal Vaccine (Prevnar and Pneumovax)  * Prevnar = PCV13  * Pneumovax = PPSV23 Adults 25-60 years old: 1-3 doses may be recommended based on certain risk factors  Adults 72 years old: Prevnar (PCV13) vaccine recommended followed by Pneumovax (PPSV23) vaccine   If already received PPSV23 since turning 65, then PCV13 recommended at least one year after PPSV23 dose  Hepatitis B Vaccine 3 dose series if at intermediate or high risk (ex: diabetes, end stage renal disease, liver disease)   Tetanus (Td) Vaccine - COST NOT COVERED BY MEDICARE PART B Following completion of primary series, a booster dose should be given every 10 years to maintain immunity against tetanus  Td may also be given as tetanus wound prophylaxis  Tdap Vaccine - COST NOT COVERED BY MEDICARE PART B Recommended at least once for all adults  For pregnant patients, recommended with each pregnancy  Shingles Vaccine (Shingrix) - COST NOT COVERED BY MEDICARE PART B  2 shot series recommended in those aged 48 and above     Health Maintenance Due:      Topic Date Due    Colorectal Cancer Screening  10/04/2029    Hepatitis C Screening  Completed     Immunizations Due:      Topic Date Due    Influenza Vaccine (1) 09/01/2021     Advance Directives   What are advance directives? Advance directives are legal documents that state your wishes and plans for medical care  These plans are made ahead of time in case you lose your ability to make decisions for yourself  Advance directives can apply to any medical decision, such as the treatments you want, and if you want to donate organs  What are the types of advance directives? There are many types of advance directives, and each state has rules about how to use them  You may choose a combination of any of the following:  · Living will: This is a written record of the treatment you want  You can also choose which treatments you do not want, which to limit, and which to stop at a certain time  This includes surgery, medicine, IV fluid, and tube feedings  · Durable power of  for healthcare [place] [facility]): This is a written record that states who you want to make healthcare choices for you when you are unable to make them for yourself   This person, mary guzman proxy, is usually a family member or a friend  You may choose more than 1 proxy  · Do not resuscitate (DNR) order:  A DNR order is used in case your heart stops beating or you stop breathing  It is a request not to have certain forms of treatment, such as CPR  A DNR order may be included in other types of advance directives  · Medical directive: This covers the care that you want if you are in a coma, near death, or unable to make decisions for yourself  You can list the treatments you want for each condition  Treatment may include pain medicine, surgery, blood transfusions, dialysis, IV or tube feedings, and a ventilator (breathing machine)  · Values history: This document has questions about your views, beliefs, and how you feel and think about life  This information can help others choose the care that you would choose  Why are advance directives important? An advance directive helps you control your care  Although spoken wishes may be used, it is better to have your wishes written down  Spoken wishes can be misunderstood, or not followed  Treatments may be given even if you do not want them  An advance directive may make it easier for your family to make difficult choices about your care  © Copyright [employer] 2018 Information is for End User's use only and may not be sold, redistributed or otherwise used for commercial purposes  All illustrations and images included in CareNotes® are the copyrighted property of [employer]  or [facility] Preventive Visit Patient Instructions  Thank you for completing your Welcome to Medicare Visit or Medicare Annual Wellness Visit today  Your next wellness visit will be due in one year (7/15/2022)  The screening/preventive services that you may require over the next 5-10 years are detailed below  Some tests may not apply to you based off risk factors and/or age   Screening tests ordered at today's visit but not completed yet may show as past due  Also, please note that scanned in results may not display below  Preventive Screenings:  Service Recommendations Previous Testing/Comments   Colorectal Cancer Screening  · Colonoscopy    · Fecal Occult Blood Test (FOBT)/Fecal Immunochemical Test (FIT)  · Fecal DNA/Cologuard Test  · Flexible Sigmoidoscopy Age: 54-65 years old   Colonoscopy: every 10 years (May be performed more frequently if at higher risk)  OR  FOBT/FIT: every 1 year  OR  Cologuard: every 3 years  OR  Sigmoidoscopy: every 5 years  Screening may be recommended earlier than age 48 if at higher risk for colorectal cancer  Also, an individualized decision between you and your healthcare provider will decide whether screening between the ages of 74-80 would be appropriate  Colonoscopy: 10/04/2019  FOBT/FIT: Not on file  Cologuard: Not on file  Sigmoidoscopy: Not on file          Prostate Cancer Screening Individualized decision between patient and health care provider in men between ages of 53-78   Medicare will cover every 12 months beginning on the day after your 50th birthday PSA: 0 5 ng/mL           Hepatitis C Screening Once for adults born between 1945 and 1965  More frequently in patients at high risk for Hepatitis C Hep C Antibody: 08/29/2019        Diabetes Screening 1-2 times per year if you're at risk for diabetes or have pre-diabetes Fasting glucose: 88 mg/dL   A1C: 4 7 %        Cholesterol Screening Once every 5 years if you don't have a lipid disorder  May order more often based on risk factors  Lipid panel: 01/12/2021           Other Preventive Screenings Covered by Medicare:  6  Abdominal Aortic Aneurysm (AAA) Screening: covered once if your at risk  You're considered to be at risk if you have a family history of AAA or a male between the age of 73-68 who smoking at least 100 cigarettes in your lifetime    7  Lung Cancer Screening: covers low dose CT scan once per year if you meet all of the following conditions: (1) Age 50-69; (2) No signs or symptoms of lung cancer; (3) Current smoker or have quit smoking within the last 15 years; (4) You have a tobacco smoking history of at least 30 pack years (packs per day x number of years you smoked); (5) You get a written order from a healthcare provider  8  Glaucoma Screening: covered annually if you're considered high risk: (1) You have diabetes OR (2) Family history of glaucoma OR (3)  aged 48 and older OR (3)  American aged 72 and older  5  Osteoporosis Screening: covered every 2 years if you meet one of the following conditions: (1) Have a vertebral abnormality; (2) On glucocorticoid therapy for more than 3 months; (3) Have primary hyperparathyroidism; (4) On osteoporosis medications and need to assess response to drug therapy  10  HIV Screening: covered annually if you're between the age of 12-76  Also covered annually if you are younger than 13 and older than 72 with risk factors for HIV infection  For pregnant patients, it is covered up to 3 times per pregnancy  Immunizations:  Immunization Recommendations   Influenza Vaccine Annual influenza vaccination during flu season is recommended for all persons aged >= 6 months who do not have contraindications   Pneumococcal Vaccine (Prevnar and Pneumovax)  * Prevnar = PCV13  * Pneumovax = PPSV23 Adults 25-60 years old: 1-3 doses may be recommended based on certain risk factors  Adults 72 years old: Prevnar (PCV13) vaccine recommended followed by Pneumovax (PPSV23) vaccine  If already received PPSV23 since turning 65, then PCV13 recommended at least one year after PPSV23 dose  Hepatitis B Vaccine 3 dose series if at intermediate or high risk (ex: diabetes, end stage renal disease, liver disease)   Tetanus (Td) Vaccine - COST NOT COVERED BY MEDICARE PART B Following completion of primary series, a booster dose should be given every 10 years to maintain immunity against tetanus   Td may also be given as tetanus wound prophylaxis  Tdap Vaccine - COST NOT COVERED BY MEDICARE PART B Recommended at least once for all adults  For pregnant patients, recommended with each pregnancy  Shingles Vaccine (Shingrix) - COST NOT COVERED BY MEDICARE PART B  2 shot series recommended in those aged 48 and above     Health Maintenance Due:      Topic Date Due    Colorectal Cancer Screening  10/04/2029    Hepatitis C Screening  Completed     Immunizations Due:      Topic Date Due    Influenza Vaccine (1) 09/01/2021     Advance Directives   What are advance directives? Advance directives are legal documents that state your wishes and plans for medical care  These plans are made ahead of time in case you lose your ability to make decisions for yourself  Advance directives can apply to any medical decision, such as the treatments you want, and if you want to donate organs  What are the types of advance directives? There are many types of advance directives, and each state has rules about how to use them  You may choose a combination of any of the following:  · Living will: This is a written record of the treatment you want  You can also choose which treatments you do not want, which to limit, and which to stop at a certain time  This includes surgery, medicine, IV fluid, and tube feedings  · Durable power of  for healthcare [place] [facility]): This is a written record that states who you want to make healthcare choices for you when you are unable to make them for yourself  This person, called a proxy, is usually a family member or a friend  You may choose more than 1 proxy  · Do not resuscitate (DNR) order:  A DNR order is used in case your heart stops beating or you stop breathing  It is a request not to have certain forms of treatment, such as CPR  A DNR order may be included in other types of advance directives  · Medical directive:   This covers the care that you want if you are in a coma, near death, or unable to make decisions for yourself  You can list the treatments you want for each condition  Treatment may include pain medicine, surgery, blood transfusions, dialysis, IV or tube feedings, and a ventilator (breathing machine)  · Values history: This document has questions about your views, beliefs, and how you feel and think about life  This information can help others choose the care that you would choose  Why are advance directives important? An advance directive helps you control your care  Although spoken wishes may be used, it is better to have your wishes written down  Spoken wishes can be misunderstood, or not followed  Treatments may be given even if you do not want them  An advance directive may make it easier for your family to make difficult choices about your care  © Copyright [employer] 2018 Information is for End User's use only and may not be sold, redistributed or otherwise used for commercial purposes  All illustrations and images included in CareNotes® are the copyrighted property of [employer]  or [facility] Preventive Visit Patient Instructions  Thank you for completing your Welcome to Medicare Visit or Medicare Annual Wellness Visit today  Your next wellness visit will be due in one year (7/15/2022)  The screening/preventive services that you may require over the next 5-10 years are detailed below  Some tests may not apply to you based off risk factors and/or age  Screening tests ordered at today's visit but not completed yet may show as past due  Also, please note that scanned in results may not display below    Preventive Screenings:  Service Recommendations Previous Testing/Comments   Colorectal Cancer Screening  · Colonoscopy    · Fecal Occult Blood Test (FOBT)/Fecal Immunochemical Test (FIT)  · Fecal DNA/Cologuard Test  · Flexible Sigmoidoscopy Age: 54-65 years old   Colonoscopy: every 10 years (May be performed more frequently if at higher risk)  OR  FOBT/FIT: every 1 year OR  Cologuard: every 3 years  OR  Sigmoidoscopy: every 5 years  Screening may be recommended earlier than age 48 if at higher risk for colorectal cancer  Also, an individualized decision between you and your healthcare provider will decide whether screening between the ages of 74-80 would be appropriate  Colonoscopy: 10/04/2019  FOBT/FIT: Not on file  Cologuard: Not on file  Sigmoidoscopy: Not on file          Prostate Cancer Screening Individualized decision between patient and health care provider in men between ages of 53-78   Medicare will cover every 12 months beginning on the day after your 50th birthday PSA: 0 5 ng/mL           Hepatitis C Screening Once for adults born between 1945 and 1965  More frequently in patients at high risk for Hepatitis C Hep C Antibody: 08/29/2019        Diabetes Screening 1-2 times per year if you're at risk for diabetes or have pre-diabetes Fasting glucose: 88 mg/dL   A1C: 4 7 %        Cholesterol Screening Once every 5 years if you don't have a lipid disorder  May order more often based on risk factors  Lipid panel: 01/12/2021           Other Preventive Screenings Covered by Medicare:  11  Abdominal Aortic Aneurysm (AAA) Screening: covered once if your at risk  You're considered to be at risk if you have a family history of AAA or a male between the age of 73-68 who smoking at least 100 cigarettes in your lifetime  12  Lung Cancer Screening: covers low dose CT scan once per year if you meet all of the following conditions: (1) Age 50-69; (2) No signs or symptoms of lung cancer; (3) Current smoker or have quit smoking within the last 15 years; (4) You have a tobacco smoking history of at least 30 pack years (packs per day x number of years you smoked); (5) You get a written order from a healthcare provider    15  Glaucoma Screening: covered annually if you're considered high risk: (1) You have diabetes OR (2) Family history of glaucoma OR (3)  aged 48 and older OR (4)  American aged 72 and older  15  Osteoporosis Screening: covered every 2 years if you meet one of the following conditions: (1) Have a vertebral abnormality; (2) On glucocorticoid therapy for more than 3 months; (3) Have primary hyperparathyroidism; (4) On osteoporosis medications and need to assess response to drug therapy  15  HIV Screening: covered annually if you're between the age of 12-76  Also covered annually if you are younger than 13 and older than 72 with risk factors for HIV infection  For pregnant patients, it is covered up to 3 times per pregnancy  Immunizations:  Immunization Recommendations   Influenza Vaccine Annual influenza vaccination during flu season is recommended for all persons aged >= 6 months who do not have contraindications   Pneumococcal Vaccine (Prevnar and Pneumovax)  * Prevnar = PCV13  * Pneumovax = PPSV23 Adults 25-60 years old: 1-3 doses may be recommended based on certain risk factors  Adults 72 years old: Prevnar (PCV13) vaccine recommended followed by Pneumovax (PPSV23) vaccine  If already received PPSV23 since turning 65, then PCV13 recommended at least one year after PPSV23 dose  Hepatitis B Vaccine 3 dose series if at intermediate or high risk (ex: diabetes, end stage renal disease, liver disease)   Tetanus (Td) Vaccine - COST NOT COVERED BY MEDICARE PART B Following completion of primary series, a booster dose should be given every 10 years to maintain immunity against tetanus  Td may also be given as tetanus wound prophylaxis  Tdap Vaccine - COST NOT COVERED BY MEDICARE PART B Recommended at least once for all adults  For pregnant patients, recommended with each pregnancy     Shingles Vaccine (Shingrix) - COST NOT COVERED BY MEDICARE PART B  2 shot series recommended in those aged 48 and above     Health Maintenance Due:      Topic Date Due    Colorectal Cancer Screening  10/04/2029    Hepatitis C Screening  Completed     Immunizations Due:      Topic Date Due    Influenza Vaccine (1) 09/01/2021     Advance Directives   What are advance directives? Advance directives are legal documents that state your wishes and plans for medical care  These plans are made ahead of time in case you lose your ability to make decisions for yourself  Advance directives can apply to any medical decision, such as the treatments you want, and if you want to donate organs  What are the types of advance directives? There are many types of advance directives, and each state has rules about how to use them  You may choose a combination of any of the following:  · Living will: This is a written record of the treatment you want  You can also choose which treatments you do not want, which to limit, and which to stop at a certain time  This includes surgery, medicine, IV fluid, and tube feedings  · Durable power of  for healthcare [place] [facility]): This is a written record that states who you want to make healthcare choices for you when you are unable to make them for yourself  This person, called a proxy, is usually a family member or a friend  You may choose more than 1 proxy  · Do not resuscitate (DNR) order:  A DNR order is used in case your heart stops beating or you stop breathing  It is a request not to have certain forms of treatment, such as CPR  A DNR order may be included in other types of advance directives  · Medical directive: This covers the care that you want if you are in a coma, near death, or unable to make decisions for yourself  You can list the treatments you want for each condition  Treatment may include pain medicine, surgery, blood transfusions, dialysis, IV or tube feedings, and a ventilator (breathing machine)  · Values history: This document has questions about your views, beliefs, and how you feel and think about life  This information can help others choose the care that you would choose  Why are advance directives important?   An advance directive helps you control your care  Although spoken wishes may be used, it is better to have your wishes written down  Spoken wishes can be misunderstood, or not followed  Treatments may be given even if you do not want them  An advance directive may make it easier for your family to make difficult choices about your care  © Copyright [employer] 2018 Information is for End User's use only and may not be sold, redistributed or otherwise used for commercial purposes  All illustrations and images included in CareNotes® are the copyrighted property of [employer]  or [facility] Preventive Visit Patient Instructions  Thank you for completing your Welcome to Medicare Visit or Medicare Annual Wellness Visit today  Your next wellness visit will be due in one year (7/15/2022)  The screening/preventive services that you may require over the next 5-10 years are detailed below  Some tests may not apply to you based off risk factors and/or age  Screening tests ordered at today's visit but not completed yet may show as past due  Also, please note that scanned in results may not display below  Preventive Screenings:  Service Recommendations Previous Testing/Comments   Colorectal Cancer Screening  · Colonoscopy    · Fecal Occult Blood Test (FOBT)/Fecal Immunochemical Test (FIT)  · Fecal DNA/Cologuard Test  · Flexible Sigmoidoscopy Age: 54-65 years old   Colonoscopy: every 10 years (May be performed more frequently if at higher risk)  OR  FOBT/FIT: every 1 year  OR  Cologuard: every 3 years  OR  Sigmoidoscopy: every 5 years  Screening may be recommended earlier than age 48 if at higher risk for colorectal cancer  Also, an individualized decision between you and your healthcare provider will decide whether screening between the ages of 74-80 would be appropriate   Colonoscopy: 10/04/2019  FOBT/FIT: Not on file  Cologuard: Not on file  Sigmoidoscopy: Not on file          Prostate Cancer Screening Individualized decision between patient and health care provider in men between ages of 53-78   Medicare will cover every 12 months beginning on the day after your 50th birthday PSA: 0 5 ng/mL           Hepatitis C Screening Once for adults born between 1945 and 1965  More frequently in patients at high risk for Hepatitis C Hep C Antibody: 08/29/2019        Diabetes Screening 1-2 times per year if you're at risk for diabetes or have pre-diabetes Fasting glucose: 88 mg/dL   A1C: 4 7 %        Cholesterol Screening Once every 5 years if you don't have a lipid disorder  May order more often based on risk factors  Lipid panel: 01/12/2021           Other Preventive Screenings Covered by Medicare:  16  Abdominal Aortic Aneurysm (AAA) Screening: covered once if your at risk  You're considered to be at risk if you have a family history of AAA or a male between the age of 73-68 who smoking at least 100 cigarettes in your lifetime  17  Lung Cancer Screening: covers low dose CT scan once per year if you meet all of the following conditions: (1) Age 50-69; (2) No signs or symptoms of lung cancer; (3) Current smoker or have quit smoking within the last 15 years; (4) You have a tobacco smoking history of at least 30 pack years (packs per day x number of years you smoked); (5) You get a written order from a healthcare provider  25  Glaucoma Screening: covered annually if you're considered high risk: (1) You have diabetes OR (2) Family history of glaucoma OR (3)  aged 48 and older OR (3)  American aged 72 and older  23  Osteoporosis Screening: covered every 2 years if you meet one of the following conditions: (1) Have a vertebral abnormality; (2) On glucocorticoid therapy for more than 3 months; (3) Have primary hyperparathyroidism; (4) On osteoporosis medications and need to assess response to drug therapy  20  HIV Screening: covered annually if you're between the age of 12-76   Also covered annually if you are younger than 15 and older than 72 with risk factors for HIV infection  For pregnant patients, it is covered up to 3 times per pregnancy  Immunizations:  Immunization Recommendations   Influenza Vaccine Annual influenza vaccination during flu season is recommended for all persons aged >= 6 months who do not have contraindications   Pneumococcal Vaccine (Prevnar and Pneumovax)  * Prevnar = PCV13  * Pneumovax = PPSV23 Adults 25-60 years old: 1-3 doses may be recommended based on certain risk factors  Adults 72 years old: Prevnar (PCV13) vaccine recommended followed by Pneumovax (PPSV23) vaccine  If already received PPSV23 since turning 65, then PCV13 recommended at least one year after PPSV23 dose  Hepatitis B Vaccine 3 dose series if at intermediate or high risk (ex: diabetes, end stage renal disease, liver disease)   Tetanus (Td) Vaccine - COST NOT COVERED BY MEDICARE PART B Following completion of primary series, a booster dose should be given every 10 years to maintain immunity against tetanus  Td may also be given as tetanus wound prophylaxis  Tdap Vaccine - COST NOT COVERED BY MEDICARE PART B Recommended at least once for all adults  For pregnant patients, recommended with each pregnancy  Shingles Vaccine (Shingrix) - COST NOT COVERED BY MEDICARE PART B  2 shot series recommended in those aged 48 and above     Health Maintenance Due:      Topic Date Due    Colorectal Cancer Screening  10/04/2029    Hepatitis C Screening  Completed     Immunizations Due:      Topic Date Due    Influenza Vaccine (1) 09/01/2021     Advance Directives   What are advance directives? Advance directives are legal documents that state your wishes and plans for medical care  These plans are made ahead of time in case you lose your ability to make decisions for yourself  Advance directives can apply to any medical decision, such as the treatments you want, and if you want to donate organs  What are the types of advance directives? There are many types of advance directives, and each state has rules about how to use them  You may choose a combination of any of the following:  · Living will: This is a written record of the treatment you want  You can also choose which treatments you do not want, which to limit, and which to stop at a certain time  This includes surgery, medicine, IV fluid, and tube feedings  · Durable power of  for healthcare [facility]): This is a written record that states who you want to make healthcare choices for you when you are unable to make them for yourself  This person, called a proxy, is usually a family member or a friend  You may choose more than 1 proxy  · Do not resuscitate (DNR) order:  A DNR order is used in case your heart stops beating or you stop breathing  It is a request not to have certain forms of treatment, such as CPR  A DNR order may be included in other types of advance directives  · Medical directive: This covers the care that you want if you are in a coma, near death, or unable to make decisions for yourself  You can list the treatments you want for each condition  Treatment may include pain medicine, surgery, blood transfusions, dialysis, IV or tube feedings, and a ventilator (breathing machine)  · Values history: This document has questions about your views, beliefs, and how you feel and think about life  This information can help others choose the care that you would choose  Why are advance directives important? An advance directive helps you control your care  Although spoken wishes may be used, it is better to have your wishes written down  Spoken wishes can be misunderstood, or not followed  Treatments may be given even if you do not want them  An advance directive may make it easier for your family to make difficult choices about your care        © Copyright [employer] 2018 Information is for End User's use only and may not be sold, redistributed or otherwise used for commercial purposes   All illustrations and images included in CareNotes® are the copyrighted property of A D A M , Inc  or [facility] Elaina De Leon

## 2021-07-15 ENCOUNTER — OFFICE VISIT (OUTPATIENT)
Dept: UROLOGY | Facility: CLINIC | Age: 71
End: 2021-07-15
Payer: MEDICARE

## 2021-07-15 VITALS
WEIGHT: 159 LBS | HEIGHT: 68 IN | HEART RATE: 87 BPM | DIASTOLIC BLOOD PRESSURE: 70 MMHG | SYSTOLIC BLOOD PRESSURE: 114 MMHG | BODY MASS INDEX: 24.1 KG/M2

## 2021-07-15 DIAGNOSIS — R32 URINARY INCONTINENCE, UNSPECIFIED TYPE: Primary | ICD-10-CM

## 2021-07-15 LAB
BACTERIA UR CULT: NORMAL
POST-VOID RESIDUAL VOLUME, ML POC: 0 ML

## 2021-07-15 PROCEDURE — 51798 US URINE CAPACITY MEASURE: CPT | Performed by: PHYSICIAN ASSISTANT

## 2021-07-15 PROCEDURE — 99203 OFFICE O/P NEW LOW 30 MIN: CPT | Performed by: PHYSICIAN ASSISTANT

## 2021-07-15 NOTE — PROGRESS NOTES
7/15/2021      Chief Complaint   Patient presents with    Urinary Incontinence         Assessment and Plan    70 y o  male -- New patient    1  Mild intermittent post void dribbling  - AUA today 7   - PVR 0 mL  - Patient denies any other urinary symptoms  - Discussed conservative management including adequate hydration, avoidance of bladder irritants, avoidance of constipation   - Discussed initiation of Flomax for his symptoms; medication and side effects reviewed  - Will follow up in 6 months for symptom reassessment  - Will call in the meantime with any questions or concerns  - All questions answered; patient understands and agrees with plan      History of Present Illness  Estuardo Carpenter is a 70 y o  male new patient here for evaluation of LUTS  Patient states he has been having mild intermittent post void dribbling  Patient states this has been gradual and occurring for the past few months  Denies any other urinary complaints  Denies having frequency, urgency, nocturia, dysuria, gross hematuria, flank pain, suprapubic pain  Urine culture yesterday was negative for infection  Denies having medications for prostate  Last PSA (1/2021) was 0 5  Denies having elevate PSA in the past or need for prostate biopsy  Denies family history of  malignancies  PVR 0 mL    AUA SYMPTOM SCORE      Most Recent Value   AUA SYMPTOM SCORE   How often have you had a sensation of not emptying your bladder completely after you finished urinating? 1   How often have you had to urinate again less than two hours after you finished urinating? 4   How often have you found you stopped and started again several times when you urinate?  0   How often have you found it difficult to postpone urination? 0   How often have you had a weak urinary stream?  1   How often have you had to push or strain to begin urination?   0   How many times did you most typically get up to urinate from the time you went to bed at night until the time you got up in the morning? 1   Quality of Life: If you were to spend the rest of your life with your urinary condition just the way it is now, how would you feel about that?  5   AUA SYMPTOM SCORE  7            Review of Systems   Constitutional: Negative for activity change, appetite change, chills and fever  HENT: Negative for congestion and trouble swallowing  Respiratory: Negative for cough and shortness of breath  Cardiovascular: Negative for chest pain, palpitations and leg swelling  Gastrointestinal: Negative for abdominal pain, constipation, diarrhea, nausea and vomiting  Genitourinary: Negative for difficulty urinating, dysuria, flank pain, frequency, hematuria and urgency  Mild post void dribbling   Musculoskeletal: Negative for back pain and gait problem  Skin: Negative for wound  Allergic/Immunologic: Negative for immunocompromised state  Neurological: Negative for dizziness and syncope  Hematological: Does not bruise/bleed easily  Psychiatric/Behavioral: Negative for confusion  All other systems reviewed and are negative  Vitals  Vitals:    07/15/21 1011   BP: 114/70   Pulse: 87   Weight: 72 1 kg (159 lb)   Height: 5' 8" (1 727 m)       Physical Exam  Constitutional:       Appearance: Normal appearance  HENT:      Head: Normocephalic  Pulmonary:      Effort: Pulmonary effort is normal    Musculoskeletal:      Cervical back: Normal range of motion  Skin:     General: Skin is warm and dry  Neurological:      General: No focal deficit present  Mental Status: He is alert and oriented to person, place, and time  Psychiatric:         Mood and Affect: Mood normal          Behavior: Behavior normal          Thought Content:  Thought content normal          Judgment: Judgment normal            Past History  Past Medical History:   Diagnosis Date    Asthma     [facility]     GERD (gastroesophageal reflux disease)  Hypercholesteremia     Hypertension     Paranoid schizophrenia ([facility] [address] )      Social History     Socioeconomic History    Marital status: Single     Spouse name: None    Number of children: None    Years of education: None    Highest education level: None   Occupational History    None   Tobacco Use    Smoking status: Former Smoker     Packs/day: 1 00     Years: 20 00     Pack years: 20 00     Types: Cigarettes     Quit date: 2000     Years since quittin 5    Smokeless tobacco: Never Used   Vaping Use    Vaping Use: Never used   Substance and Sexual Activity    Alcohol use: Not Currently    Drug use: Never    Sexual activity: Not Currently   Other Topics Concern    None   Social History Narrative    None     Social Determinants of Health     Financial Resource Strain:     Difficulty of Paying Living Expenses:    Food Insecurity:     Worried About Running Out of Food in the Last Year:     Ran Out of Food in the Last Year:    Transportation Needs:     Lack of Transportation (Medical):      Lack of Transportation (Non-Medical):    Physical Activity: Inactive    Days of Exercise per Week: 0 days    Minutes of Exercise per Session: 0 min   Stress: No Stress Concern Present    Feeling of Stress : Not at all   Social Connections:     Frequency of Communication with Friends and Family:     Frequency of Social Gatherings with Friends and Family:     Attends [employer] Services:     Active Member of Clubs or Organizations:     Attends Club or Organization Meetings:     Marital Status:    Intimate Partner Violence:     Fear of Current or Ex-Partner:     Emotionally Abused:     Physically Abused:     Sexually Abused:      Social History     Tobacco Use   Smoking Status Former Smoker    Packs/day: 1 00    Years: 20 00    Pack years: 20 00    Types: Cigarettes    Quit date:     Years since quittin 5   Smokeless Tobacco Never Used     Family History   Problem Relation Age of Onset  No Known Problems Mother     No Known Problems Father     Parkinsonism Son        The following portions of the patient's history were reviewed and updated as appropriate: allergies, current medications, past medical history, past social history, past surgical history and problem list     Results  Recent Results (from the past 1 hour(s))   POCT Measure PVR    Collection Time: 07/15/21 10:18 AM   Result Value Ref Range    POST-VOID RESIDUAL VOLUME, ML POC 0 mL   ]  Lab Results   Component Value Date    PSA 0 5 01/12/2021     Lab Results   Component Value Date    CALCIUM 8 9 01/12/2021    K 3 8 01/12/2021    CO2 27 01/12/2021     (H) 01/12/2021    BUN 13 01/12/2021    CREATININE 0 72 01/12/2021     Lab Results   Component Value Date    WBC 5 27 02/17/2021    HGB 14 4 02/17/2021    HCT 43 4 02/17/2021    MCV 97 02/17/2021     02/17/2021       J Carlos FileLAURIE

## 2021-08-03 DIAGNOSIS — K59.00 CONSTIPATION, UNSPECIFIED CONSTIPATION TYPE: ICD-10-CM

## 2021-09-08 DIAGNOSIS — E55.9 VITAMIN D DEFICIENCY: ICD-10-CM

## 2021-09-08 DIAGNOSIS — K59.00 CONSTIPATION, UNSPECIFIED CONSTIPATION TYPE: ICD-10-CM

## 2021-09-08 DIAGNOSIS — E78.00 HYPERCHOLESTEREMIA: ICD-10-CM

## 2021-09-08 RX ORDER — ATORVASTATIN CALCIUM 40 MG/1
40 TABLET, FILM COATED ORAL DAILY
Qty: 30 TABLET | Refills: 11 | Status: SHIPPED | OUTPATIENT
Start: 2021-09-08 | End: 2021-09-27 | Stop reason: SDUPTHER

## 2021-09-09 RX ORDER — CHOLECALCIFEROL (VITAMIN D3) 25 MCG
1000 CAPSULE ORAL DAILY
Qty: 30 CAPSULE | Refills: 11 | Status: SHIPPED | OUTPATIENT
Start: 2021-09-09 | End: 2021-09-27 | Stop reason: SDUPTHER

## 2021-09-13 DIAGNOSIS — I77.810 AORTIC ROOT DILATATION (HCC): ICD-10-CM

## 2021-09-13 NOTE — TELEPHONE ENCOUNTER
Name of Caller: Mary Parks     Call back Number:  [phone]    Medication(s) metoprolol succinate (TOPROL-XL) 25 mg 24 hr tablet  Are we prescribing provider?: yes     30 or 90 day supply: 90     Pharmacy name/number: Please send to [facility] on file     Last or Next appt?:

## 2021-09-14 RX ORDER — METOPROLOL SUCCINATE 25 MG/1
25 TABLET, EXTENDED RELEASE ORAL DAILY
Qty: 90 TABLET | Refills: 3 | Status: SHIPPED | OUTPATIENT
Start: 2021-09-14 | End: 2021-09-27 | Stop reason: SDUPTHER

## 2021-09-23 ENCOUNTER — OFFICE VISIT (OUTPATIENT)
Dept: NEUROLOGY | Facility: CLINIC | Age: 71
End: 2021-09-23
Payer: MEDICARE

## 2021-09-23 VITALS
HEART RATE: 88 BPM | DIASTOLIC BLOOD PRESSURE: 78 MMHG | TEMPERATURE: 97.9 F | WEIGHT: 160 LBS | SYSTOLIC BLOOD PRESSURE: 130 MMHG | BODY MASS INDEX: 24.25 KG/M2 | HEIGHT: 68 IN

## 2021-09-23 DIAGNOSIS — G21.11 NEUROLEPTIC-INDUCED PARKINSONISM (HCC): Primary | ICD-10-CM

## 2021-09-23 DIAGNOSIS — E78.00 HYPERCHOLESTEREMIA: ICD-10-CM

## 2021-09-23 DIAGNOSIS — I10 ESSENTIAL HYPERTENSION: ICD-10-CM

## 2021-09-23 DIAGNOSIS — F20.0 PARANOID SCHIZOPHRENIA (HCC): ICD-10-CM

## 2021-09-23 PROCEDURE — 99214 OFFICE O/P EST MOD 30 MIN: CPT | Performed by: PSYCHIATRY & NEUROLOGY

## 2021-09-23 NOTE — PROGRESS NOTES
Dennise Ochoa is a 70 y o  male  Chief Complaint   Patient presents with    Neuro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       Assessment:  1  Neuro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 ([facility] [address] )    2  Hypercholesteremia    3  Paranoid schizophrenia ([facility] [address] )    4  Essential hypertension        Plan:  Continue with Sinemet 25/100 mg 1 tablet 3 times a day  Fall and safety precautions  Follow-up with the psychiatrist and other physicians  Follow-up in 6 months  Discussion:  Patient most likely has neuroleptics induced parkinsonism, he was advised to continue with Sinemet 25/100 mg 1 tablet 3 times a day, as it seems to be helping him he is not keen to see a movement disorder specialist I have advised him to follow up with his psychiatrist regarding his psychiatric medications and see if they can be adjusted, keep his blood pressure cholesterol sugar under control continue with home exercise program, to go to the hospital if has any worsening symptoms and call me otherwise to see me back in 6 months and follow up with the other physicians  Subjective:    HPI   Patient is here in follow-up for his tremor of the right jaw and right arm, he is accompanied with his caregiver since his last visit they feel that his tremors is much better and almost resolved on Sinemet he is not having any side effects, he has been on antipsychotics for a long time and is in follow up with the psychiatrist, no dizziness no bowel and bladder incontinence he lives in a group home denies any suicidal or homicidal thoughts no hallucinations, no swallowing difficulty, no falls, sleep is good, no dizziness, appetite is good, weight has been stable  no other complaints      Vitals:    09/23/21 1237   BP: 130/78   BP Location: Left arm   Patient Position: Sitting   Cuff Size: Standard   Pulse: 88   Temp: 97 9 °F (36 6 °C)   TempSrc: Tympanic   Weight: 72 6 kg (160 lb)   Height: 5' 8" (1 727 m)       Current Medications    Current Outpatient Medications:    acetaminophen (TYLENOL) 500 mg tablet, Take 1 tablet (500 mg total) by mouth every 6 (six) hours as needed for mild pain, Disp: 30 tablet, Rfl: 3    atorvastatin (LIPITOR) 40 mg tablet, Take 1 tablet (40 mg total) by mouth daily, Disp: 30 tablet, Rfl: 11    AUSTEDO 12 MG TABS, Take 1 tablet by mouth 2 (two) times a day, Disp: , Rfl:     carbidopa-levodopa (SINEMET)  mg per tablet, Take 1 tablet by mouth 3 (three) times a day, Disp: 90 tablet, Rfl: 4    cloZAPine (CLOZARIL) 100 mg tablet, Take 100 mg by mouth 2 (two) times a day, Disp: , Rfl:     clozapine (CLOZARIL) 200 MG tablet, Take 200 mg by mouth daily at bedtime , Disp: , Rfl:     collagenase (Santyl) ointment, Apply topically daily as needed (skin breakdown), Disp: 30 g, Rfl: 3    docusate sodium (Colace) 100 mg capsule, Take 1 capsule (100 mg total) by mouth 2 (two) times a day, Disp: 60 capsule, Rfl: 5    Emollient (Lubriderm Daily Moisture) LOTN, Apply topically daily as needed (rash and itching), Disp: 1 Bottle, Rfl: 1    Fluticasone Furoate 50 MCG/ACT AEPB, Inhale 1 spray as needed (as needed for congestion), Disp: 1 each, Rfl: 3    metoprolol succinate (TOPROL-XL) 25 mg 24 hr tablet, Take 1 tablet (25 mg total) by mouth daily, Disp: 90 tablet, Rfl: 3    olopatadine HCl (PATADAY) 0 2 % opth drops, INSTILL 1 DROP IN EACH EYE AT BEDTIME (NC), Disp: , Rfl:     omeprazole (PriLOSEC) 40 MG capsule, Take 1 capsule (40 mg total) by mouth daily, Disp: 30 capsule, Rfl: 5    psyllium (METAMUCIL SMOOTH TEXTURE) 28 % packet, Take 1 packet by mouth 2 (two) times a day, Disp: 60 packet, Rfl: 0    temazepam (RESTORIL) 15 mg capsule, Take 15 mg by mouth daily at bedtime as needed for sleep, Disp: , Rfl:     topiramate (TOPAMAX) 25 mg tablet, Take 25 mg by mouth 2 (two) times a day, Disp: , Rfl: 1    traZODone (DESYREL) 100 mg tablet, Take 100 mg by mouth daily at bedtime as needed , Disp: , Rfl:     Vitamin D High Potency 25 MCG (1000 UT) capsule, Take 1 capsule (1,000 Units total) by mouth daily, Disp: 30 capsule, Rfl: 11    mupirocin (BACTROBAN) 2 % ointment, Apply topically 3 (three) times a day (Patient not taking: Reported on 9/23/2021), Disp: 22 g, Rfl: 0      Allergies  Patient has no known allergies  Past Medical History  Past Medical History:   Diagnosis Date    Asthma     [facility]     GERD (gastroesophageal reflux disease)     Hypercholesteremia     Hypertension     Paranoid schizophrenia ([facility] [address] )          Past Surgical History:  Past Surgical History:   Procedure Laterality Date    CATARACT EXTRACTION      COLONOSCOPY           Family History:  Family History   Problem Relation Age of Onset    No Known Problems Mother     No Known Problems Father     Parkinsonism Son        Social History:   reports that he quit smoking about 21 years ago  His smoking use included cigarettes  He has a 20 00 pack-year smoking history  He has never used smokeless tobacco  He reports previous alcohol use  He reports that he does not use drugs  I have reviewed the past medical history, surgical history, social and family history, current medications, allergies vitals, review of systems, and updated this information as appropriate today  Objective:    Physical Exam    Neurological Exam    GENERAL:  Cooperative in no acute distress  Well-developed and well-nourished    HEAD and NECK   Head is atraumatic normocephalic with no lesions or masses  Neck is supple with full range of motion    CARDIOVASCULAR  Carotid Arteries-no carotid bruits  NEUROLOGIC:  Mental Status-the patient is awake alert and oriented without aphasia or apraxia  Cranial Nerves: Visual fields are full to confrontation  Extraocular movements are full without nystagmus  Pupils are 2-1/2 mm and reactive  Face is symmetrical to light touch  Movements of facial expression move symmetrically  Hearing is normal to finger rub bilaterally   Soft palate lifts symmetrically  Shoulder shrug is symmetrical  Tongue is midline without atrophy  Motor: No drift is noted on arm extension  Strength is full in the upper and lower extremities with normal bulk and slight cogwheeling rigidity and mild tremor on outstretched hands  Coordination: Finger to nose testing is performed accurately  Romberg is negative  Patient has a slightly stooped posture  ROS:  Review of Systems   Constitutional: Negative  Negative for appetite change and fever  HENT: Negative  Negative for hearing loss, tinnitus, trouble swallowing and voice change  Eyes: Negative  Negative for photophobia and pain  Respiratory: Negative  Negative for shortness of breath  Cardiovascular: Negative  Negative for palpitations  Gastrointestinal: Negative  Negative for nausea and vomiting  Endocrine: Negative  Negative for cold intolerance  Genitourinary: Negative  Negative for dysuria, frequency and urgency  Musculoskeletal: Positive for back pain  Negative for myalgias and neck pain  Skin: Negative  Negative for rash  Neurological: Negative  Negative for dizziness, tremors, seizures, syncope, facial asymmetry, speech difficulty, weakness, light-headedness, numbness and headaches  Hematological: Negative  Does not bruise/bleed easily  Psychiatric/Behavioral: Negative  Negative for confusion, hallucinations and sleep disturbance

## 2021-09-27 ENCOUNTER — OFFICE VISIT (OUTPATIENT)
Dept: INTERNAL MEDICINE CLINIC | Facility: CLINIC | Age: 71
End: 2021-09-27
Payer: MEDICARE

## 2021-09-27 VITALS
HEIGHT: 68 IN | WEIGHT: 160 LBS | BODY MASS INDEX: 24.25 KG/M2 | HEART RATE: 77 BPM | OXYGEN SATURATION: 99 % | TEMPERATURE: 97.8 F | DIASTOLIC BLOOD PRESSURE: 72 MMHG | SYSTOLIC BLOOD PRESSURE: 118 MMHG | RESPIRATION RATE: 16 BRPM

## 2021-09-27 DIAGNOSIS — E78.00 HYPERCHOLESTEREMIA: ICD-10-CM

## 2021-09-27 DIAGNOSIS — K21.9 GASTROESOPHAGEAL REFLUX DISEASE: ICD-10-CM

## 2021-09-27 DIAGNOSIS — I10 ESSENTIAL HYPERTENSION: ICD-10-CM

## 2021-09-27 DIAGNOSIS — T78.40XD ALLERGY, SUBSEQUENT ENCOUNTER: ICD-10-CM

## 2021-09-27 DIAGNOSIS — E55.9 VITAMIN D DEFICIENCY: ICD-10-CM

## 2021-09-27 DIAGNOSIS — F20.0 PARANOID SCHIZOPHRENIA (HCC): ICD-10-CM

## 2021-09-27 DIAGNOSIS — L02.91 ABSCESS: ICD-10-CM

## 2021-09-27 DIAGNOSIS — K21.9 GASTROESOPHAGEAL REFLUX DISEASE WITHOUT ESOPHAGITIS: ICD-10-CM

## 2021-09-27 DIAGNOSIS — J44.9 CHRONIC OBSTRUCTIVE PULMONARY DISEASE, UNSPECIFIED COPD TYPE (HCC): ICD-10-CM

## 2021-09-27 DIAGNOSIS — L73.9 FOLLICULITIS: ICD-10-CM

## 2021-09-27 DIAGNOSIS — Z23 NEED FOR VIRAL IMMUNIZATION: ICD-10-CM

## 2021-09-27 DIAGNOSIS — I77.810 AORTIC ROOT DILATATION (HCC): ICD-10-CM

## 2021-09-27 DIAGNOSIS — Z22.322 MRSA CARRIER: ICD-10-CM

## 2021-09-27 DIAGNOSIS — K59.09 CHRONIC CONSTIPATION: ICD-10-CM

## 2021-09-27 DIAGNOSIS — G21.11 NEUROLEPTIC-INDUCED PARKINSONISM (HCC): ICD-10-CM

## 2021-09-27 DIAGNOSIS — R52 PAIN: ICD-10-CM

## 2021-09-27 DIAGNOSIS — J45.20 MILD INTERMITTENT ASTHMA, UNSPECIFIED WHETHER COMPLICATED: ICD-10-CM

## 2021-09-27 DIAGNOSIS — K59.00 CONSTIPATION, UNSPECIFIED CONSTIPATION TYPE: ICD-10-CM

## 2021-09-27 DIAGNOSIS — Z23 ENCOUNTER FOR VACCINATION: Primary | ICD-10-CM

## 2021-09-27 DIAGNOSIS — I71.2 ASCENDING AORTIC ANEURYSM (HCC): ICD-10-CM

## 2021-09-27 DIAGNOSIS — R21 RASH: ICD-10-CM

## 2021-09-27 PROCEDURE — 99214 OFFICE O/P EST MOD 30 MIN: CPT | Performed by: NURSE PRACTITIONER

## 2021-09-27 PROCEDURE — G0008 ADMIN INFLUENZA VIRUS VAC: HCPCS

## 2021-09-27 PROCEDURE — 90662 IIV NO PRSV INCREASED AG IM: CPT

## 2021-09-27 RX ORDER — OMEPRAZOLE 40 MG/1
40 CAPSULE, DELAYED RELEASE ORAL DAILY
Qty: 30 CAPSULE | Refills: 5 | Status: SHIPPED | OUTPATIENT
Start: 2021-09-27 | End: 2022-04-13 | Stop reason: SDUPTHER

## 2021-09-27 RX ORDER — OLOPATADINE HYDROCHLORIDE 2 MG/ML
1 SOLUTION/ DROPS OPHTHALMIC
Qty: 2.5 ML | Refills: 3 | Status: SHIPPED | OUTPATIENT
Start: 2021-09-27 | End: 2022-08-01

## 2021-09-27 RX ORDER — DOCUSATE SODIUM 100 MG/1
100 CAPSULE, LIQUID FILLED ORAL 2 TIMES DAILY
Qty: 60 CAPSULE | Refills: 5 | Status: SHIPPED | OUTPATIENT
Start: 2021-09-27 | End: 2022-03-21 | Stop reason: SDUPTHER

## 2021-09-27 RX ORDER — ATORVASTATIN CALCIUM 40 MG/1
40 TABLET, FILM COATED ORAL DAILY
Qty: 30 TABLET | Refills: 11 | Status: SHIPPED | OUTPATIENT
Start: 2021-09-27

## 2021-09-27 RX ORDER — METOPROLOL SUCCINATE 25 MG/1
25 TABLET, EXTENDED RELEASE ORAL DAILY
Qty: 90 TABLET | Refills: 3 | Status: SHIPPED | OUTPATIENT
Start: 2021-09-27

## 2021-09-27 RX ORDER — ACETAMINOPHEN 500 MG
500 TABLET ORAL EVERY 6 HOURS PRN
Qty: 30 TABLET | Refills: 3 | Status: SHIPPED | OUTPATIENT
Start: 2021-09-27

## 2021-09-27 RX ORDER — ZOSTER VACCINE RECOMBINANT, ADJUVANTED 50 MCG/0.5
0.5 KIT INTRAMUSCULAR ONCE
Qty: 1 EACH | Refills: 1 | Status: SHIPPED | OUTPATIENT
Start: 2021-09-27 | End: 2021-09-27

## 2021-09-27 RX ORDER — CHOLECALCIFEROL (VITAMIN D3) 25 MCG
1000 CAPSULE ORAL DAILY
Qty: 30 CAPSULE | Refills: 11 | Status: SHIPPED | OUTPATIENT
Start: 2021-09-27

## 2021-09-27 RX ORDER — COLLAGENASE SANTYL 250 [ARB'U]/G
OINTMENT TOPICAL DAILY PRN
Qty: 30 G | Refills: 3 | Status: SHIPPED | OUTPATIENT
Start: 2021-09-27 | End: 2022-03-02 | Stop reason: ALTCHOICE

## 2021-09-27 NOTE — ASSESSMENT & PLAN NOTE
The patient is scheduled for CT of his chest in 1 month  His last CT was performed in August of 2020    Narrative & Impression   CT CHEST LUNG CANCER SCREENING WITHOUT IV CONTRAST     INDICATION:   F17 211: Nicotine dependence, cigarettes, in remission  Z12 2: Encounter for screening for malignant neoplasm of respiratory organs  Z87 891: Personal history of nicotine dependence      COMPARISON: None      TECHNIQUE:  Unenhanced CT examination of the chest was performed utilizing a low dose protocol  Reformatted images were created in axial, sagittal, and coronal planes        Radiation dose length product (DLP) for this visit:  87 mGy-cm   This examination, like all CT scans performed in the [facility], was performed utilizing techniques to minimize radiation dose exposure, including the use of iterative   reconstruction and automated exposure control       FINDINGS:     LUNGS:  5 mm middle lobe nodule series 3/116  There are additional smaller subpleural nodules      PLEURA:  Unremarkable      HEART/GREAT VESSELS:  Coronary artery disease and aortic atherosclerosis  Ascending aortic aneurysm measures 4 cm the level of the right pulmonary artery series 2/32     MEDIASTINUM AND DALE:  Unremarkable      CHEST WALL AND LOWER NECK:   Unremarkable      VISUALIZED STRUCTURES IN THE UPPER ABDOMEN:  Gallstones, partially visualized      OSSEOUS STRUCTURES:  No acute fracture or destructive osseous lesion      IMPRESSION:        1  Lung-RADS2, benign appearance or behavior  Continue annual screening with LDCT in 12 months  2   4 cm ascending aortic aneurysm  3    Cholelithiasis, partially visualized

## 2021-09-27 NOTE — PROGRESS NOTES
[place]    NAME: Joe Garcia  AGE: 70 y o  SEX: male  : 1950     DATE: 2021     Assessment and Plan:     Problem List Items Addressed This Visit        Digestive    GERD (gastroesophageal reflux disease)       The patient continues to follow with Gastroenterology  He continues on his omeprazole         Relevant Medications    omeprazole (PriLOSEC) 40 MG capsule       Cardiovascular and Mediastinum    Hypertension      Patient continues on his metoprolol  His blood pressure in the office today is 118/72  Relevant Medications    metoprolol succinate (TOPROL-XL) 25 mg 24 hr tablet    Ascending aortic aneurysm ([address] )       The patient is scheduled for CT of his chest in 1 month  His last CT was performed in 2020    Narrative & Impression   CT CHEST LUNG CANCER SCREENING WITHOUT IV CONTRAST     INDICATION:   F17 211: Nicotine dependence, cigarettes, in remission  Z12 2: Encounter for screening for malignant neoplasm of respiratory organs  Z87 891: Personal history of nicotine dependence      COMPARISON: None      TECHNIQUE:  Unenhanced CT examination of the chest was performed utilizing a low dose protocol  Reformatted images were created in axial, sagittal, and coronal planes        Radiation dose length product (DLP) for this visit:  87 mGy-cm   This examination, like all CT scans performed in the [facility], was performed utilizing techniques to minimize radiation dose exposure, including the use of iterative   reconstruction and automated exposure control       FINDINGS:     LUNGS:  5 mm middle lobe nodule series 3/116  There are additional smaller subpleural nodules      PLEURA:  Unremarkable      HEART/GREAT VESSELS:  Coronary artery disease and aortic atherosclerosis    Ascending aortic aneurysm measures 4 cm the level of the right pulmonary artery series      MEDIASTINUM AND DALE: Unremarkable      CHEST WALL AND LOWER NECK:   Unremarkable      VISUALIZED STRUCTURES IN THE UPPER ABDOMEN:  Gallstones, partially visualized      OSSEOUS STRUCTURES:  No acute fracture or destructive osseous lesion      IMPRESSION:        1  Lung-RADS2, benign appearance or behavior  Continue annual screening with LDCT in 12 months  2   4 cm ascending aortic aneurysm  3  Cholelithiasis, partially visualized                    Nervous and Auditory    Neuro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 [facility])      Patient continues to follow with Neurology  Other    Hypercholesteremia       Patient continues on his atorvastatin  His last lipid panel was within normal limits  Relevant Medications    atorvastatin (LIPITOR) 40 mg tablet    Paranoid schizophrenia ([facility] [address] )       Continue to follow with psychiatry         MRSA carrier      Other Visit Diagnoses     Encounter for vaccination    -  Primary    Relevant Orders    influenza vaccine, high-dose, PF 0 7 mL (FLUZONE HIGH-DOSE) (Completed)    Need for viral immunization        Relevant Medications    Zoster Vac Recomb Adjuvanted (Shingrix) 50 MCG/0 5ML SUSR    Abscess         santyl ointment, Keflex      Relevant Medications    collagenase (Santyl) ointment    Aortic root dilatation (HCC)        Relevant Medications    metoprolol succinate (TOPROL-XL) 25 mg 24 hr tablet    Chronic constipation        Relevant Medications    docusate sodium (Colace) 100 mg capsule    Chronic obstructive pulmonary disease, unspecified COPD type (HCC)        Relevant Medications    Fluticasone Furoate (Arnuity Ellipta) 50 MCG/ACT AEPB    Mild intermittent asthma, unspecified whether complicated        Relevant Medications    Fluticasone Furoate (Arnuity Ellipta) 50 MCG/ACT AEPB    Constipation, unspecified constipation type        Relevant Medications    psyllium (METAMUCIL SMOOTH TEXTURE) 28 % packet    Folliculitis        Relevant Medications    collagenase (Santyl) ointment mupirocin (BACTROBAN) 2 % ointment    Emollient (Lubriderm Daily Moisture) LOTN    Gastroesophageal reflux disease        Relevant Medications    omeprazole (PriLOSEC) 40 MG capsule    Pain        Relevant Medications    acetaminophen (TYLENOL) 500 mg tablet    Rash          Medrol Dosepak  Lubriderm cream daily  Relevant Medications    collagenase (Santyl) ointment    mupirocin (BACTROBAN) 2 % ointment    Emollient (Lubriderm Daily Moisture) LOTN    Vitamin D deficiency        Relevant Medications    Vitamin D High Potency 25 MCG (1000 UT) capsule    Allergy, subsequent encounter        Relevant Medications    olopatadine HCl (PATADAY) 0 2 % opth drops              Return in about 6 months (around 3/27/2022)  Chief Complaint:     Chief Complaint   Patient presents with    Follow-up        History of Present Illness: Isac Backers to the office today for follow-up  He is accompanied by his  Mirian   Overall patient feels well  He has no medical issues  He continues to follow with Gastroenterology, Neurology and Psychiatry  He is due for blood work and will have that done today  He did received a flu shot in the office today  I have ordered the Shingrix vaccine and they will have that done at a local pharmacy  I will see the patient back in the office in 6 months  Review of Systems:     Review of Systems   Constitutional: Negative  Negative for fatigue  HENT: Negative  Negative for congestion, postnasal drip, rhinorrhea and trouble swallowing  Eyes: Negative  Negative for visual disturbance  Respiratory: Negative  Negative for choking and shortness of breath  Cardiovascular: Negative  Negative for chest pain  Gastrointestinal: Negative  Endocrine: Negative  Genitourinary: Negative  Musculoskeletal: Negative  Negative for arthralgias, back pain, myalgias and neck pain  Skin: Negative  Neurological: Negative for dizziness and headaches  Psychiatric/Behavioral: Negative  Problem List:     Patient Active Problem List   Diagnosis    GERD (gastroesophageal reflux disease)    Neuro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 ([facility] [address] )    Hypertension    Hypercholesteremia    Paranoid schizophrenia ([facility] )    [facility]    Combined form of senile cataract    MRSA carrier        Objective:     /72 (BP Location: Left arm, Patient Position: Sitting, Cuff Size: Standard)   Pulse 77   Temp 97 8 °F (36 6 °C) (Temporal) Comment: no nsaids  Resp 16   Ht 5' 8" (1 727 m)   Wt 72 6 kg (160 lb)   SpO2 99%   BMI 24 33 kg/m²     Physical Exam  Vitals reviewed  Constitutional:       Appearance: Normal appearance  HENT:      Head: Normocephalic and atraumatic  Nose: Nose normal       Mouth/Throat:      Mouth: Mucous membranes are moist    Eyes:      Extraocular Movements: Extraocular movements intact  Pupils: Pupils are equal, round, and reactive to light  Cardiovascular:      Rate and Rhythm: Normal rate and regular rhythm  Pulses: Normal pulses  Heart sounds: Normal heart sounds  Pulmonary:      Effort: Pulmonary effort is normal       Breath sounds: Normal breath sounds  Musculoskeletal:         General: Normal range of motion  Skin:     General: Skin is warm  Neurological:      General: No focal deficit present  Mental Status: He is alert and oriented to person, place, and time  Psychiatric:         Attention and Perception: Attention normal          Mood and Affect: Mood is depressed  Speech: Speech is delayed  Behavior: Behavior normal          Thought Content:  Thought content normal          Judgment: Judgment normal          Florentino Henson, [address] [place] [address]

## 2021-09-29 DIAGNOSIS — T78.40XD ALLERGY, SUBSEQUENT ENCOUNTER: Primary | ICD-10-CM

## 2021-09-30 DIAGNOSIS — K59.00 CONSTIPATION, UNSPECIFIED CONSTIPATION TYPE: ICD-10-CM

## 2021-09-30 RX ORDER — FLUTICASONE PROPIONATE 50 MCG
SPRAY, SUSPENSION (ML) NASAL
Qty: 16 G | Refills: 1 | Status: SHIPPED | OUTPATIENT
Start: 2021-09-30 | End: 2022-03-02 | Stop reason: ALTCHOICE

## 2021-11-11 ENCOUNTER — HOSPITAL ENCOUNTER (OUTPATIENT)
Dept: CT IMAGING | Facility: CLINIC | Age: 71
Discharge: HOME | End: 2021-11-11
Payer: MEDICARE

## 2021-11-11 DIAGNOSIS — J44.9 CHRONIC OBSTRUCTIVE PULMONARY DISEASE, UNSPECIFIED COPD TYPE (HCC): ICD-10-CM

## 2021-11-11 DIAGNOSIS — Z87.891 PERSONAL HISTORY OF NICOTINE DEPENDENCE: ICD-10-CM

## 2021-11-11 PROCEDURE — 71271 CT THORAX LUNG CANCER SCR C-: CPT

## 2021-11-24 DIAGNOSIS — K59.00 CONSTIPATION, UNSPECIFIED CONSTIPATION TYPE: ICD-10-CM

## 2021-12-10 ENCOUNTER — OFFICE VISIT (OUTPATIENT)
Dept: INTERNAL MEDICINE CLINIC | Facility: CLINIC | Age: 71
End: 2021-12-10
Payer: MEDICARE

## 2021-12-10 VITALS
OXYGEN SATURATION: 98 % | RESPIRATION RATE: 18 BRPM | HEART RATE: 77 BPM | BODY MASS INDEX: 24.01 KG/M2 | SYSTOLIC BLOOD PRESSURE: 120 MMHG | WEIGHT: 158.4 LBS | TEMPERATURE: 97.7 F | DIASTOLIC BLOOD PRESSURE: 74 MMHG | HEIGHT: 68 IN

## 2021-12-10 DIAGNOSIS — B02.7 DISSEMINATED HERPES ZOSTER: Primary | ICD-10-CM

## 2021-12-10 DIAGNOSIS — B02.9 HERPES ZOSTER WITHOUT COMPLICATION: ICD-10-CM

## 2021-12-10 PROCEDURE — 99214 OFFICE O/P EST MOD 30 MIN: CPT | Performed by: NURSE PRACTITIONER

## 2021-12-13 PROBLEM — B02.9 HERPES ZOSTER WITHOUT COMPLICATION: Status: ACTIVE | Noted: 2021-12-10

## 2021-12-21 DIAGNOSIS — K59.00 CONSTIPATION, UNSPECIFIED CONSTIPATION TYPE: ICD-10-CM

## 2021-12-22 ENCOUNTER — OFFICE VISIT (OUTPATIENT)
Dept: INTERNAL MEDICINE CLINIC | Facility: CLINIC | Age: 71
End: 2021-12-22
Payer: MEDICARE

## 2021-12-22 VITALS
SYSTOLIC BLOOD PRESSURE: 110 MMHG | HEIGHT: 68 IN | DIASTOLIC BLOOD PRESSURE: 60 MMHG | HEART RATE: 77 BPM | WEIGHT: 158.4 LBS | OXYGEN SATURATION: 99 % | BODY MASS INDEX: 24.01 KG/M2 | TEMPERATURE: 97.7 F

## 2021-12-22 DIAGNOSIS — L03.319 CELLULITIS OF SUPRAPUBIC REGION: Primary | ICD-10-CM

## 2021-12-22 PROCEDURE — 99212 OFFICE O/P EST SF 10 MIN: CPT

## 2021-12-22 RX ORDER — CEPHALEXIN 500 MG/1
500 CAPSULE ORAL EVERY 6 HOURS SCHEDULED
Qty: 28 CAPSULE | Refills: 0 | Status: SHIPPED | OUTPATIENT
Start: 2021-12-22 | End: 2021-12-29

## 2022-01-17 NOTE — PROGRESS NOTES
1/19/2022      No chief complaint on file  Assessment and Plan  1  BPH with LUTS  - AUA today 7  - Reviewed conservative measures with adequate  Hydration, avoidance of bladder irritants, avoidance of constipation, sitting to urinate, double voiding  - Discussed trial of Flomax for urinary symptoms, medication and side effects reviewed  Prescription sent to pharmacy  Patient instructed to stop medication should he have any side effects  - Discussed possible trial of OAB medications should he have no benefit from Flomax  - Follow up in 2 months for symptom reassessment  - Call with any questions or concerns  - All questions answered; patient understands and agrees with plan      History of Present Illness  Sonido Alexander is a 70 y o  male patient with history of postvoid dribbling here for follow up  States he has not noticed much difference with conservative measures alone  States his bothersome symptoms include weakened stream, hesitation, postvoid dribbling, and nocturia x 1-2  Denies gross hematuria, dysuria, fever, chills, nausea, vomiting  AUA SYMPTOM SCORE      Most Recent Value   AUA SYMPTOM SCORE    How often have you had a sensation of not emptying your bladder completely after you finished urinating? 1   How often have you had to urinate again less than two hours after you finished urinating? 3   How often have you found you stopped and started again several times when you urinate? 0   How often have you found it difficult to postpone urination? 2   How often have you had a weak urinary stream? 0   How often have you had to push or strain to begin urination? 0   How many times did you most typically get up to urinate from the time you went to bed at night until the time you got up in the morning?  1   Quality of Life: If you were to spend the rest of your life with your urinary condition just the way it is now, how would you feel about that? --   AUA SYMPTOM SCORE 7            Review of Systems   Constitutional: Negative for activity change, appetite change, chills and fever  HENT: Negative for congestion and trouble swallowing  Respiratory: Negative for cough and shortness of breath  Cardiovascular: Negative for chest pain, palpitations and leg swelling  Gastrointestinal: Negative for abdominal pain, constipation, diarrhea, nausea and vomiting  Genitourinary: Negative for difficulty urinating, dysuria, flank pain, frequency, hematuria and urgency  Post void dribbling   Musculoskeletal: Negative for back pain and gait problem  Skin: Negative for wound  Allergic/Immunologic: Negative for immunocompromised state  Neurological: Negative for dizziness and syncope  Hematological: Does not bruise/bleed easily  Psychiatric/Behavioral: Negative for confusion  All other systems reviewed and are negative  Vitals  Vitals:    01/19/22 1422   BP: 112/68   Weight: 70 3 kg (155 lb)   Height: 5' 8" (1 727 m)       Physical Exam  Constitutional:       General: He is not in acute distress  Appearance: Normal appearance  He is not ill-appearing, toxic-appearing or diaphoretic  HENT:      Head: Normocephalic  Nose: No congestion  Eyes:      General: No scleral icterus  Right eye: No discharge  Left eye: No discharge  Conjunctiva/sclera: Conjunctivae normal       Pupils: Pupils are equal, round, and reactive to light  Pulmonary:      Effort: Pulmonary effort is normal    Musculoskeletal:      Cervical back: Normal range of motion  Skin:     General: Skin is warm and dry  Coloration: Skin is not jaundiced or pale  Findings: No bruising, erythema, lesion or rash  Neurological:      General: No focal deficit present  Mental Status: He is alert and oriented to person, place, and time  Mental status is at baseline        Gait: Gait normal    Psychiatric:         Mood and Affect: Mood normal          Behavior: Behavior normal  Thought Content:  Thought content normal          Judgment: Judgment normal            Past History  Past Medical History:   Diagnosis Date    Asthma     [facility]     GERD (gastroesophageal reflux disease)     Hypercholesteremia     Hypertension     Paranoid schizophrenia ([facility] [address] )      Social History     Socioeconomic History    Marital status: Single     Spouse name: Not on file    Number of children: Not on file    Years of education: Not on file    Highest education level: Not on file   Occupational History    Not on file   Tobacco Use    Smoking status: Former Smoker     Packs/day: 1 00     Years: 20 00     Pack years: 20 00     Types: Cigarettes     Quit date: 2000     Years since quittin 0    Smokeless tobacco: Never Used   Vaping Use    Vaping Use: Never used   Substance and Sexual Activity    Alcohol use: Not Currently    Drug use: Never    Sexual activity: Not Currently   Other Topics Concern    Not on file   Social History Narrative    Not on file     Social Determinants of Health     Financial Resource Strain: Not on file   Food Insecurity: Not on file   Transportation Needs: Not on file   Physical Activity: Inactive    Days of Exercise per Week: 0 days    Minutes of Exercise per Session: 0 min   Stress: No Stress Concern Present    Feeling of Stress : Not at all   Social Connections: Not on file   Intimate Partner Violence: Not on file   Housing Stability: Not on file     Social History     Tobacco Use   Smoking Status Former Smoker    Packs/day: 1 00    Years: 20 00    Pack years: 20 00    Types: Cigarettes    Quit date:     Years since quittin 0   Smokeless Tobacco Never Used     Family History   Problem Relation Age of Onset    No Known Problems Mother     No Known Problems Father     Parkinsonism Son        The following portions of the patient's history were reviewed and updated as appropriate: allergies, current medications, past medical history, past social history, past surgical history and problem list     Results  No results found for this or any previous visit (from the past 1 hour(s)) ]  Lab Results   Component Value Date    PSA 0 5 01/12/2021     Lab Results   Component Value Date    CALCIUM 8 9 01/12/2021    K 3 8 01/12/2021    CO2 27 01/12/2021     (H) 01/12/2021    BUN 13 01/12/2021    CREATININE 0 72 01/12/2021     Lab Results   Component Value Date    WBC 5 27 02/17/2021    HGB 14 4 02/17/2021    HCT 43 4 02/17/2021    MCV 97 02/17/2021     02/17/2021       Gabriela Renteria PA-C

## 2022-01-18 DIAGNOSIS — K59.00 CONSTIPATION, UNSPECIFIED CONSTIPATION TYPE: ICD-10-CM

## 2022-01-19 ENCOUNTER — OFFICE VISIT (OUTPATIENT)
Dept: UROLOGY | Facility: CLINIC | Age: 72
End: 2022-01-19
Payer: MEDICARE

## 2022-01-19 VITALS
SYSTOLIC BLOOD PRESSURE: 112 MMHG | WEIGHT: 155 LBS | BODY MASS INDEX: 23.49 KG/M2 | HEIGHT: 68 IN | DIASTOLIC BLOOD PRESSURE: 68 MMHG

## 2022-01-19 DIAGNOSIS — N39.43 POST-VOID DRIBBLING: ICD-10-CM

## 2022-01-19 DIAGNOSIS — R32 URINARY INCONTINENCE, UNSPECIFIED TYPE: Primary | ICD-10-CM

## 2022-01-19 PROCEDURE — 99213 OFFICE O/P EST LOW 20 MIN: CPT | Performed by: PHYSICIAN ASSISTANT

## 2022-01-19 RX ORDER — TAMSULOSIN HYDROCHLORIDE 0.4 MG/1
0.4 CAPSULE ORAL
Qty: 30 CAPSULE | Refills: 3 | Status: SHIPPED | OUTPATIENT
Start: 2022-01-19 | End: 2022-04-18 | Stop reason: SDUPTHER

## 2022-01-26 ENCOUNTER — HOSPITAL ENCOUNTER (EMERGENCY)
Facility: HOSPITAL | Age: 72
Discharge: HOME | End: 2022-01-26
Attending: EMERGENCY MEDICINE | Admitting: EMERGENCY MEDICINE
Payer: MEDICARE

## 2022-01-26 ENCOUNTER — APPOINTMENT (EMERGENCY)
Dept: RADIOLOGY | Facility: HOSPITAL | Age: 72
End: 2022-01-26
Payer: MEDICARE

## 2022-01-26 VITALS
TEMPERATURE: 98 F | OXYGEN SATURATION: 98 % | SYSTOLIC BLOOD PRESSURE: 113 MMHG | HEART RATE: 80 BPM | DIASTOLIC BLOOD PRESSURE: 65 MMHG | RESPIRATION RATE: 16 BRPM

## 2022-01-26 DIAGNOSIS — R53.83 FATIGUE: Primary | ICD-10-CM

## 2022-01-26 LAB
ALBUMIN SERPL BCP-MCNC: 3.2 G/DL (ref 3.5–5)
ALP SERPL-CCNC: 123 U/L (ref 46–116)
ALT SERPL W P-5'-P-CCNC: 11 U/L (ref 12–78)
ANION GAP SERPL CALCULATED.3IONS-SCNC: 3 MMOL/L (ref 4–13)
AST SERPL W P-5'-P-CCNC: 21 U/L (ref 5–45)
BASOPHILS # BLD AUTO: 0.02 THOUSANDS/ΜL (ref 0–0.1)
BASOPHILS NFR BLD AUTO: 1 % (ref 0–1)
BILIRUB SERPL-MCNC: 0.48 MG/DL (ref 0.2–1)
BILIRUB UR QL STRIP: NEGATIVE
BUN SERPL-MCNC: 14 MG/DL (ref 5–25)
CALCIUM ALBUM COR SERPL-MCNC: 8.6 MG/DL (ref 8.3–10.1)
CALCIUM SERPL-MCNC: 8 MG/DL (ref 8.3–10.1)
CHLORIDE SERPL-SCNC: 110 MMOL/L (ref 100–108)
CLARITY UR: CLEAR
CO2 SERPL-SCNC: 29 MMOL/L (ref 21–32)
COLOR UR: YELLOW
CREAT SERPL-MCNC: 0.81 MG/DL (ref 0.6–1.3)
EOSINOPHIL # BLD AUTO: 0.1 THOUSAND/ΜL (ref 0–0.61)
EOSINOPHIL NFR BLD AUTO: 2 % (ref 0–6)
ERYTHROCYTE [DISTWIDTH] IN BLOOD BY AUTOMATED COUNT: 13.7 % (ref 11.6–15.1)
GFR SERPL CREATININE-BSD FRML MDRD: 89 ML/MIN/1.73SQ M
GLUCOSE SERPL-MCNC: 88 MG/DL (ref 65–140)
GLUCOSE UR STRIP-MCNC: NEGATIVE MG/DL
HCT VFR BLD AUTO: 40.6 % (ref 36.5–49.3)
HGB BLD-MCNC: 13.6 G/DL (ref 12–17)
HGB UR QL STRIP.AUTO: NEGATIVE
IMM GRANULOCYTES # BLD AUTO: 0.01 THOUSAND/UL (ref 0–0.2)
IMM GRANULOCYTES NFR BLD AUTO: 0 % (ref 0–2)
KETONES UR STRIP-MCNC: ABNORMAL MG/DL
LEUKOCYTE ESTERASE UR QL STRIP: NEGATIVE
LYMPHOCYTES # BLD AUTO: 0.73 THOUSANDS/ΜL (ref 0.6–4.47)
LYMPHOCYTES NFR BLD AUTO: 17 % (ref 14–44)
MCH RBC QN AUTO: 32.8 PG (ref 26.8–34.3)
MCHC RBC AUTO-ENTMCNC: 33.5 G/DL (ref 31.4–37.4)
MCV RBC AUTO: 98 FL (ref 82–98)
MONOCYTES # BLD AUTO: 0.52 THOUSAND/ΜL (ref 0.17–1.22)
MONOCYTES NFR BLD AUTO: 12 % (ref 4–12)
NEUTROPHILS # BLD AUTO: 3.03 THOUSANDS/ΜL (ref 1.85–7.62)
NEUTS SEG NFR BLD AUTO: 68 % (ref 43–75)
NITRITE UR QL STRIP: NEGATIVE
NRBC BLD AUTO-RTO: 0 /100 WBCS
PH UR STRIP.AUTO: 6.5 [PH]
PLATELET # BLD AUTO: 147 THOUSANDS/UL (ref 149–390)
PMV BLD AUTO: 9.2 FL (ref 8.9–12.7)
POTASSIUM SERPL-SCNC: 3.5 MMOL/L (ref 3.5–5.3)
PROT SERPL-MCNC: 6.2 G/DL (ref 6.4–8.2)
PROT UR STRIP-MCNC: NEGATIVE MG/DL
RBC # BLD AUTO: 4.15 MILLION/UL (ref 3.88–5.62)
SODIUM SERPL-SCNC: 142 MMOL/L (ref 136–145)
SP GR UR STRIP.AUTO: >=1.03 (ref 1–1.03)
TSH SERPL DL<=0.05 MIU/L-ACNC: 0.87 UIU/ML (ref 0.36–3.74)
UROBILINOGEN UR QL STRIP.AUTO: 1 E.U./DL
WBC # BLD AUTO: 4.41 THOUSAND/UL (ref 4.31–10.16)

## 2022-01-26 PROCEDURE — 80053 COMPREHEN METABOLIC PANEL: CPT | Performed by: EMERGENCY MEDICINE

## 2022-01-26 PROCEDURE — 93005 ELECTROCARDIOGRAM TRACING: CPT

## 2022-01-26 PROCEDURE — 81003 URINALYSIS AUTO W/O SCOPE: CPT | Performed by: EMERGENCY MEDICINE

## 2022-01-26 PROCEDURE — 85025 COMPLETE CBC W/AUTO DIFF WBC: CPT | Performed by: EMERGENCY MEDICINE

## 2022-01-26 PROCEDURE — 71045 X-RAY EXAM CHEST 1 VIEW: CPT

## 2022-01-26 PROCEDURE — 99285 EMERGENCY DEPT VISIT HI MDM: CPT

## 2022-01-26 PROCEDURE — 84443 ASSAY THYROID STIM HORMONE: CPT | Performed by: EMERGENCY MEDICINE

## 2022-01-26 PROCEDURE — 96361 HYDRATE IV INFUSION ADD-ON: CPT

## 2022-01-26 PROCEDURE — 36415 COLL VENOUS BLD VENIPUNCTURE: CPT | Performed by: EMERGENCY MEDICINE

## 2022-01-26 PROCEDURE — 99284 EMERGENCY DEPT VISIT MOD MDM: CPT | Performed by: EMERGENCY MEDICINE

## 2022-01-26 PROCEDURE — 96360 HYDRATION IV INFUSION INIT: CPT

## 2022-01-26 RX ADMIN — SODIUM CHLORIDE 1000 ML: 0.9 INJECTION, SOLUTION INTRAVENOUS at 16:46

## 2022-01-26 NOTE — ED PROVIDER NOTES
History  Chief Complaint   Patient presents with    Weakness - Generalized     c/o weakness today; thinks his meds arent agreeing with him     54-year-old male presenting to emergency department for evaluation of generalized weakness  Patient takes multiple medications, also has history of schizophrenia, takes medicines for this as well  Patient states he feels generally fatigued for about the past 24 hours, nothing hurts, he is not short of breath, denies fevers or chills  Prior to Admission Medications   Prescriptions Last Dose Informant Patient Reported? Taking?    AUSTEDO 12 MG TABS  Care Giver Yes No   Sig: Take 1 tablet by mouth 2 (two) times a day   Emollient (Lubriderm Daily Moisture) LOTN  Care Giver No No   Sig: Apply topically daily as needed (rash and itching)   Fluticasone Furoate (Arnuity Ellipta) 50 MCG/ACT AEPB  Care Giver No No   Sig: Inhale 1 puff as needed (as needed for congestion)   Vitamin D High Potency 25 MCG (1000 UT) capsule  Care Giver No No   Sig: Take 1 capsule (1,000 Units total) by mouth daily   acetaminophen (TYLENOL) 500 mg tablet  Care Giver No No   Sig: Take 1 tablet (500 mg total) by mouth every 6 (six) hours as needed for mild pain   atorvastatin (LIPITOR) 40 mg tablet  Care Giver No No   Sig: Take 1 tablet (40 mg total) by mouth daily   carbidopa-levodopa (SINEMET)  mg per tablet  Care Giver No No   Sig: Take 1 tablet by mouth 3 (three) times a day   cloZAPine (CLOZARIL) 100 mg tablet  Care Giver Yes No   Sig: Take 100 mg by mouth 2 (two) times a day   clozapine (CLOZARIL) 200 MG tablet  Care Giver Yes No   Sig: Take 200 mg by mouth daily at bedtime    collagenase (Santyl) ointment  Care Giver No No   Sig: Apply topically daily as needed (skin breakdown)   Patient not taking: Reported on 12/22/2021    diphenhydrAMINE (BENADRYL) 2 % cream   No No   Sig: Apply topically 3 (three) times a day as needed for itching   Patient not taking: Reported on 12/22/2021 docusate sodium (Colace) 100 mg capsule  Care Giver No No   Sig: Take 1 capsule (100 mg total) by mouth 2 (two) times a day   fluticasone (FLONASE) 50 mcg/act nasal spray  Care Giver No No   Sig: USE ONE SPRAY IN EACH NOSTRIL AS NEEDED FOR CONGESTION (R)   metoprolol succinate (TOPROL-XL) 25 mg 24 hr tablet  Care Giver No No   Sig: Take 1 tablet (25 mg total) by mouth daily   mupirocin (BACTROBAN) 2 % ointment  Care Giver No No   Sig: Apply topically 3 (three) times a day   Patient not taking: Reported on 12/10/2021    olopatadine HCl (PATADAY) 0 2 % opth drops  Care Giver No No   Sig: Administer 1 drop to both eyes daily at bedtime   omeprazole (PriLOSEC) 40 MG capsule  Care Giver No No   Sig: Take 1 capsule (40 mg total) by mouth daily   psyllium (METAMUCIL SMOOTH TEXTURE) 28 % packet   No No   Sig: Take 1 packet by mouth 2 (two) times a day   tamsulosin (FLOMAX) 0 4 mg   No No   Sig: Take 1 capsule (0 4 mg total) by mouth daily at bedtime   temazepam (RESTORIL) 15 mg capsule  Care Giver Yes No   Sig: Take 15 mg by mouth daily at bedtime as needed for sleep   topiramate (TOPAMAX) 25 mg tablet  Care Giver Yes No   Sig: Take 25 mg by mouth 2 (two) times a day   traZODone (DESYREL) 100 mg tablet  Care Giver Yes No   Sig: Take 100 mg by mouth daily at bedtime as needed       Facility-Administered Medications: None       Past Medical History:   Diagnosis Date    Asthma     [facility]     GERD (gastroesophageal reflux disease)     Hypercholesteremia     Hypertension     [facility] [address] [facility]     Psychiatric disorder        Past Surgical History:   Procedure Laterality Date    CATARACT EXTRACTION      COLONOSCOPY         Family History   Problem Relation Age of Onset    No Known Problems Mother     No Known Problems Father     Parkinsonism Son      I have reviewed and agree with the history as documented      E-Cigarette/Vaping    E-Cigarette Use Never User E-Cigarette/Vaping Substances    Nicotine No     THC No     CBD No     Flavoring No     Other No     Unknown No      Social History     Tobacco Use    Smoking status: Former Smoker     Packs/day: 1 00     Years: 20 00     Pack years: 20      Types: Cigarettes     Quit date:      Years since quittin 0    Smokeless tobacco: Never Used   Vaping Use    Vaping Use: Never used   Substance Use Topics    Alcohol use: Not Currently    Drug use: Never       Review of Systems   Constitutional: Positive for fatigue  Negative for appetite change, chills and fever  HENT: Negative for sneezing and sore throat  Eyes: Negative for visual disturbance  Respiratory: Negative for cough, choking, chest tightness, shortness of breath and wheezing  Cardiovascular: Negative for chest pain and palpitations  Gastrointestinal: Negative for abdominal pain, constipation, diarrhea, nausea and vomiting  Genitourinary: Negative for difficulty urinating and dysuria  Neurological: Negative for dizziness, weakness, light-headedness, numbness and headaches  All other systems reviewed and are negative  Physical Exam  Physical Exam  Vitals and nursing note reviewed  Constitutional:       General: He is not in acute distress  Appearance: He is well-developed  He is not diaphoretic  HENT:      Head: Normocephalic and atraumatic  Eyes:      Pupils: Pupils are equal, round, and reactive to light  Neck:      Vascular: No JVD  Trachea: No tracheal deviation  Cardiovascular:      Rate and Rhythm: Normal rate and regular rhythm  Heart sounds: Normal heart sounds  No murmur heard  No friction rub  No gallop  Pulmonary:      Effort: Pulmonary effort is normal  No respiratory distress  Breath sounds: Normal breath sounds  No wheezing or rales  Abdominal:      General: Bowel sounds are normal  There is no distension  Palpations: Abdomen is soft  Tenderness:  There is no abdominal tenderness  There is no guarding or rebound  Skin:     General: Skin is warm and dry  Coloration: Skin is not pale  Neurological:      Mental Status: He is alert and oriented to person, place, and time  Cranial Nerves: No cranial nerve deficit  Motor: No abnormal muscle tone     Psychiatric:         Behavior: Behavior normal          Vital Signs  ED Triage Vitals   Temperature Pulse Respirations Blood Pressure SpO2   01/26/22 1618 01/26/22 1618 01/26/22 1618 01/26/22 1618 01/26/22 1618   98 °F (36 7 °C) 81 16 111/62 98 %      Temp Source Heart Rate Source Patient Position - Orthostatic VS BP Location FiO2 (%)   01/26/22 1618 01/26/22 1618 01/26/22 1845 01/26/22 1618 --   Oral Monitor Sitting Left arm       Pain Score       01/26/22 1618       No Pain           Vitals:    01/26/22 1618 01/26/22 1845   BP: 111/62 113/65   Pulse: 81 80   Patient Position - Orthostatic VS:  Sitting         Visual Acuity      ED Medications  Medications   sodium chloride 0 9 % bolus 1,000 mL (0 mL Intravenous Stopped 1/26/22 1846)       Diagnostic Studies  Results Reviewed     Procedure Component Value Units Date/Time    UA w Reflex to Microscopic w Reflex to Culture [[id number]]  (Abnormal) Collected: 01/26/22 1732    Lab Status: Final result Specimen: Urine, Clean Catch Updated: 01/26/22 1830     Color, UA Yellow     Clarity, UA Clear     Specific Gravity, UA >=1 030     pH, UA 6 5     Leukocytes, UA Negative     Nitrite, UA Negative     Protein, UA Negative mg/dl      Glucose, UA Negative mg/dl      Ketones, UA Trace mg/dl      Urobilinogen, UA 1 0 E U /dl      Bilirubin, UA Negative     Blood, UA Negative    TSH [[id number]]  (Normal) Collected: 01/26/22 1646    Lab Status: Final result Specimen: Blood from Arm, Left Updated: 01/26/22 1713     TSH 3RD GENERATON 0 872 uIU/mL     Narrative:      Patients undergoing fluorescein dye angiography may retain small amounts of fluorescein in the body for 48-72 hours post procedure  Samples containing fluorescein can produce falsely depressed TSH values  If the patient had this procedure,a specimen should be resubmitted post fluorescein clearance        Comprehensive metabolic panel [[id number]]  (Abnormal) Collected: 01/26/22 1646    Lab Status: Final result Specimen: Blood from Arm, Left Updated: 01/26/22 1711     Sodium 142 mmol/L      Potassium 3 5 mmol/L      Chloride 110 mmol/L      CO2 29 mmol/L      ANION GAP 3 mmol/L      BUN 14 mg/dL      Creatinine 0 81 mg/dL      Glucose 88 mg/dL      Calcium 8 0 mg/dL      Corrected Calcium 8 6 mg/dL      AST 21 U/L      ALT 11 U/L      Alkaline Phosphatase 123 U/L      Total Protein 6 2 g/dL      Albumin 3 2 g/dL      Total Bilirubin 0 48 mg/dL      eGFR 89 ml/min/1 73sq m     Narrative:      National Kidney Disease Foundation guidelines for Chronic Kidney Disease (CKD):     Stage 1 with normal or high GFR (GFR > 90 mL/min/1 73 square meters)    Stage 2 Mild CKD (GFR = 60-89 mL/min/1 73 square meters)    Stage 3A Moderate CKD (GFR = 45-59 mL/min/1 73 square meters)    Stage 3B Moderate CKD (GFR = 30-44 mL/min/1 73 square meters)    Stage 4 Severe CKD (GFR = 15-29 mL/min/1 73 square meters)    Stage 5 End Stage CKD (GFR <15 mL/min/1 73 square meters)  Note: GFR calculation is accurate only with a steady state creatinine    CBC and differential [[id number]]  (Abnormal) Collected: 01/26/22 1646    Lab Status: Final result Specimen: Blood from Arm, Left Updated: 01/26/22 1650     WBC 4 41 Thousand/uL      RBC 4 15 Million/uL      Hemoglobin 13 6 g/dL      Hematocrit 40 6 %      MCV 98 fL      MCH 32 8 pg      MCHC 33 5 g/dL      RDW 13 7 %      MPV 9 2 fL      Platelets 275 Thousands/uL      nRBC 0 /100 WBCs      Neutrophils Relative 68 %      Immat GRANS % 0 %      Lymphocytes Relative 17 %      Monocytes Relative 12 %      Eosinophils Relative 2 %      Basophils Relative 1 %      Neutrophils Absolute 3 03 Thousands/µL Immature Grans Absolute 0 01 Thousand/uL      Lymphocytes Absolute 0 73 Thousands/µL      Monocytes Absolute 0 52 Thousand/µL      Eosinophils Absolute 0 10 Thousand/µL      Basophils Absolute 0 02 Thousands/µL                  XR chest 1 view portable   Final Result by Valery Lewis MD (01/26 1644)      No acute cardiopulmonary disease  Workstation performed: [id number]                    Procedures  Procedures         ED Course                               SBIRT 22yo+      Most Recent Value   SBIRT (23 yo +)    In order to provide better care to our patients, we are screening all of our patients for alcohol and drug use  Would it be okay to ask you these screening questions? Yes Filed at: 01/26/2022 1930   Initial Alcohol Screen: US AUDIT-C     1  How often do you have a drink containing alcohol? 0 Filed at: 01/26/2022 1930   2  How many drinks containing alcohol do you have on a typical day you are drinking? 0 Filed at: 01/26/2022 1930   3b  FEMALE Any Age, or MALE 65+: How often do you have 4 or more drinks on one occassion? 0 Filed at: 01/26/2022 1930   Audit-C Score 0 Filed at: 01/26/2022 1930   RAFA: How many times in the past year have you    Used an illegal drug or used a prescription medication for non-medical reasons? Never Filed at: 01/26/2022 1930                    MDM  Number of Diagnoses or Management Options  Fatigue  Diagnosis management comments: 51-year-old male with generalized fatigue, overall benign exam here, will check labs urine EKG and chest x-ray for possible organic etiology, could be related to medication side effects, will give gentle hydration here, reassess  Workup reassuring, recommend he follow up with PCP, return for worsening symptoms        Disposition  Final diagnoses:   Fatigue     Time reflects when diagnosis was documented in both MDM as applicable and the Disposition within this note     Time User Action Codes Description Comment 1/26/2022  6:38 PM Trish Paige Add [R53 83] Fatigue       ED Disposition     ED Disposition Condition Date/Time Comment    Discharge Stable Wed Jan 26, 2022  6:38 PM Shae Soto discharge to home/self care              Follow-up Information     Follow up With Specialties Details Why Contact Info    Janie Ivan, [address] [facility]   [facility] [id number]  [phone]            Discharge Medication List as of 1/26/2022  6:38 PM      CONTINUE these medications which have NOT CHANGED    Details   acetaminophen (TYLENOL) 500 mg tablet Take 1 tablet (500 mg total) by mouth every 6 (six) hours as needed for mild pain, Starting Mon 9/27/2021, Normal      atorvastatin (LIPITOR) 40 mg tablet Take 1 tablet (40 mg total) by mouth daily, Starting Mon 9/27/2021, Normal      AUSTEDO 12 MG TABS Take 1 tablet by mouth 2 (two) times a day, Starting Fri 1/3/2020, Historical Med      carbidopa-levodopa (SINEMET)  mg per tablet Take 1 tablet by mouth 3 (three) times a day, Starting Thu 9/23/2021, Normal      !! cloZAPine (CLOZARIL) 100 mg tablet Take 100 mg by mouth 2 (two) times a day, Historical Med      !! clozapine (CLOZARIL) 200 MG tablet Take 200 mg by mouth daily at bedtime , Starting Mon 4/8/2019, Historical Med      collagenase (Santyl) ointment Apply topically daily as needed (skin breakdown), Starting Mon 9/27/2021, Normal      diphenhydrAMINE (BENADRYL) 2 % cream Apply topically 3 (three) times a day as needed for itching, Starting Fri 12/10/2021, Normal      docusate sodium (Colace) 100 mg capsule Take 1 capsule (100 mg total) by mouth 2 (two) times a day, Starting Mon 9/27/2021, Normal      Emollient (Lubriderm Daily Moisture) LOTN Apply topically daily as needed (rash and itching), Starting Mon 9/27/2021, Normal      fluticasone (FLONASE) 50 mcg/act nasal spray USE ONE SPRAY IN EACH NOSTRIL AS NEEDED FOR CONGESTION (R), Normal      Fluticasone Furoate (Arnuity Ellipta) 50 MCG/ACT AEPB Inhale 1 puff as needed (as needed for congestion), Starting Mon 9/27/2021, Normal      metoprolol succinate (TOPROL-XL) 25 mg 24 hr tablet Take 1 tablet (25 mg total) by mouth daily, Starting Mon 9/27/2021, Normal      mupirocin (BACTROBAN) 2 % ointment Apply topically 3 (three) times a day, Starting Mon 9/27/2021, Normal      olopatadine HCl (PATADAY) 0 2 % opth drops Administer 1 drop to both eyes daily at bedtime, Starting Mon 9/27/2021, Normal      omeprazole (PriLOSEC) 40 MG capsule Take 1 capsule (40 mg total) by mouth daily, Starting Mon 9/27/2021, Normal      psyllium (METAMUCIL SMOOTH TEXTURE) 28 % packet Take 1 packet by mouth 2 (two) times a day, Starting Tue 1/18/2022, Normal      tamsulosin (FLOMAX) 0 4 mg Take 1 capsule (0 4 mg total) by mouth daily at bedtime, Starting Wed 1/19/2022, Normal      temazepam (RESTORIL) 15 mg capsule Take 15 mg by mouth daily at bedtime as needed for sleep, Historical Med      topiramate (TOPAMAX) 25 mg tablet Take 25 mg by mouth 2 (two) times a day, Starting Mon 7/1/2019, Historical Med      traZODone (DESYREL) 100 mg tablet Take 100 mg by mouth daily at bedtime as needed , Starting Mon 4/8/2019, Historical Med      Vitamin D High Potency 25 MCG (1000 UT) capsule Take 1 capsule (1,000 Units total) by mouth daily, Starting Mon 9/27/2021, Normal       !! - Potential duplicate medications found  Please discuss with provider  No discharge procedures on file      PDMP Review     None          ED Provider  Electronically Signed by           Lico Duarte MD  01/27/22 2203

## 2022-01-27 LAB
ATRIAL RATE: 77 BPM
P AXIS: 54 DEGREES
PR INTERVAL: 186 MS
QRS AXIS: 56 DEGREES
QRSD INTERVAL: 90 MS
QT INTERVAL: 370 MS
QTC INTERVAL: 418 MS
T WAVE AXIS: 42 DEGREES
VENTRICULAR RATE: 77 BPM

## 2022-01-27 PROCEDURE — 93010 ELECTROCARDIOGRAM REPORT: CPT | Performed by: INTERNAL MEDICINE

## 2022-02-17 DIAGNOSIS — K59.00 CONSTIPATION, UNSPECIFIED CONSTIPATION TYPE: ICD-10-CM

## 2022-02-21 ENCOUNTER — APPOINTMENT (OUTPATIENT)
Dept: LAB | Facility: CLINIC | Age: 72
End: 2022-02-21
Payer: MEDICARE

## 2022-02-21 DIAGNOSIS — I10 ESSENTIAL HYPERTENSION: ICD-10-CM

## 2022-02-21 DIAGNOSIS — Z12.5 PROSTATE CANCER SCREENING: ICD-10-CM

## 2022-02-21 DIAGNOSIS — E78.00 HYPERCHOLESTEREMIA: ICD-10-CM

## 2022-02-21 LAB
ALBUMIN SERPL BCP-MCNC: 3.1 G/DL (ref 3.5–5)
ALP SERPL-CCNC: 116 U/L (ref 46–116)
ALT SERPL W P-5'-P-CCNC: 15 U/L (ref 12–78)
ANION GAP SERPL CALCULATED.3IONS-SCNC: 6 MMOL/L (ref 4–13)
AST SERPL W P-5'-P-CCNC: 21 U/L (ref 5–45)
BASOPHILS # BLD AUTO: 0.04 THOUSANDS/ΜL (ref 0–0.1)
BASOPHILS NFR BLD AUTO: 1 % (ref 0–1)
BILIRUB SERPL-MCNC: 0.82 MG/DL (ref 0.2–1)
BUN SERPL-MCNC: 14 MG/DL (ref 5–25)
CALCIUM ALBUM COR SERPL-MCNC: 9.4 MG/DL (ref 8.3–10.1)
CALCIUM SERPL-MCNC: 8.7 MG/DL (ref 8.3–10.1)
CHLORIDE SERPL-SCNC: 113 MMOL/L (ref 100–108)
CHOLEST SERPL-MCNC: 66 MG/DL
CO2 SERPL-SCNC: 25 MMOL/L (ref 21–32)
CREAT SERPL-MCNC: 0.69 MG/DL (ref 0.6–1.3)
EOSINOPHIL # BLD AUTO: 0.11 THOUSAND/ΜL (ref 0–0.61)
EOSINOPHIL NFR BLD AUTO: 3 % (ref 0–6)
ERYTHROCYTE [DISTWIDTH] IN BLOOD BY AUTOMATED COUNT: 13.6 % (ref 11.6–15.1)
GFR SERPL CREATININE-BSD FRML MDRD: 95 ML/MIN/1.73SQ M
GLUCOSE SERPL-MCNC: 88 MG/DL (ref 65–140)
HCT VFR BLD AUTO: 38 % (ref 36.5–49.3)
HDLC SERPL-MCNC: 28 MG/DL
HGB BLD-MCNC: 12.6 G/DL (ref 12–17)
IMM GRANULOCYTES # BLD AUTO: 0 THOUSAND/UL (ref 0–0.2)
IMM GRANULOCYTES NFR BLD AUTO: 0 % (ref 0–2)
LDLC SERPL CALC-MCNC: 25 MG/DL (ref 0–100)
LYMPHOCYTES # BLD AUTO: 1.05 THOUSANDS/ΜL (ref 0.6–4.47)
LYMPHOCYTES NFR BLD AUTO: 29 % (ref 14–44)
MCH RBC QN AUTO: 31.7 PG (ref 26.8–34.3)
MCHC RBC AUTO-ENTMCNC: 33.2 G/DL (ref 31.4–37.4)
MCV RBC AUTO: 96 FL (ref 82–98)
MONOCYTES # BLD AUTO: 0.43 THOUSAND/ΜL (ref 0.17–1.22)
MONOCYTES NFR BLD AUTO: 12 % (ref 4–12)
NEUTROPHILS # BLD AUTO: 2.06 THOUSANDS/ΜL (ref 1.85–7.62)
NEUTS SEG NFR BLD AUTO: 55 % (ref 43–75)
NRBC BLD AUTO-RTO: 0 /100 WBCS
PLATELET # BLD AUTO: 189 THOUSANDS/UL (ref 149–390)
PMV BLD AUTO: 10 FL (ref 8.9–12.7)
POTASSIUM SERPL-SCNC: 3.7 MMOL/L (ref 3.5–5.3)
PROT SERPL-MCNC: 6.7 G/DL (ref 6.4–8.2)
PSA SERPL-MCNC: 0.5 NG/ML (ref 0–4)
RBC # BLD AUTO: 3.97 MILLION/UL (ref 3.88–5.62)
SODIUM SERPL-SCNC: 144 MMOL/L (ref 136–145)
TRIGL SERPL-MCNC: 65 MG/DL
WBC # BLD AUTO: 3.69 THOUSAND/UL (ref 4.31–10.16)

## 2022-02-21 PROCEDURE — G0103 PSA SCREENING: HCPCS

## 2022-02-21 PROCEDURE — 36415 COLL VENOUS BLD VENIPUNCTURE: CPT

## 2022-02-21 PROCEDURE — 80061 LIPID PANEL: CPT

## 2022-02-21 PROCEDURE — 85025 COMPLETE CBC W/AUTO DIFF WBC: CPT

## 2022-02-21 PROCEDURE — 80053 COMPREHEN METABOLIC PANEL: CPT

## 2022-02-22 ENCOUNTER — TELEPHONE (OUTPATIENT)
Dept: INTERNAL MEDICINE CLINIC | Facility: CLINIC | Age: 72
End: 2022-02-22

## 2022-02-22 NOTE — TELEPHONE ENCOUNTER
----- Message from Sunita Rosen sent at 2/22/2022  3:23 PM EST -----    Please let Mirian at [facility] know that the patient's blood work is normal

## 2022-02-23 ENCOUNTER — RA CDI HCC (OUTPATIENT)
Dept: OTHER | Facility: HOSPITAL | Age: 72
End: 2022-02-23

## 2022-02-23 NOTE — PROGRESS NOTES
[facility]  coding opportunities       Chart reviewed, no opportunity found: CHART REVIEWED, NO OPPORTUNITY FOUND                        Patients insurance company: [employer]

## 2022-03-02 ENCOUNTER — TELEPHONE (OUTPATIENT)
Dept: OTHER | Facility: OTHER | Age: 72
End: 2022-03-02

## 2022-03-02 ENCOUNTER — OFFICE VISIT (OUTPATIENT)
Dept: INTERNAL MEDICINE CLINIC | Facility: CLINIC | Age: 72
End: 2022-03-02
Payer: MEDICARE

## 2022-03-02 VITALS
HEART RATE: 82 BPM | WEIGHT: 150.6 LBS | HEIGHT: 68 IN | BODY MASS INDEX: 22.82 KG/M2 | DIASTOLIC BLOOD PRESSURE: 64 MMHG | SYSTOLIC BLOOD PRESSURE: 118 MMHG | RESPIRATION RATE: 18 BRPM

## 2022-03-02 DIAGNOSIS — F20.0 PARANOID SCHIZOPHRENIA (HCC): Primary | ICD-10-CM

## 2022-03-02 DIAGNOSIS — K21.9 GASTROESOPHAGEAL REFLUX DISEASE WITHOUT ESOPHAGITIS: ICD-10-CM

## 2022-03-02 DIAGNOSIS — R21 GROIN RASH: ICD-10-CM

## 2022-03-02 DIAGNOSIS — E78.00 HYPERCHOLESTEREMIA: ICD-10-CM

## 2022-03-02 DIAGNOSIS — G21.11 NEUROLEPTIC-INDUCED PARKINSONISM (HCC): ICD-10-CM

## 2022-03-02 DIAGNOSIS — I10 PRIMARY HYPERTENSION: ICD-10-CM

## 2022-03-02 PROBLEM — B02.9 HERPES ZOSTER WITHOUT COMPLICATION: Status: RESOLVED | Noted: 2021-12-10 | Resolved: 2022-03-02

## 2022-03-02 PROCEDURE — 99214 OFFICE O/P EST MOD 30 MIN: CPT | Performed by: NURSE PRACTITIONER

## 2022-03-02 NOTE — ASSESSMENT & PLAN NOTE
The patient with occasional rash in his groin due to incontinence  Desitin was sent to his pharmacy    Patient was encouraged to change his incontinence briefs once wet

## 2022-03-02 NOTE — PROGRESS NOTES
Dariana    NAME: Mabel Glover  AGE: 70 y o  SEX: male  : 1950     DATE: 3/2/2022     Assessment and Plan:     Problem List Items Addressed This Visit        Digestive    GERD (gastroesophageal reflux disease)       The patient continues to follow with Gastroenterology  He continues on his omeprazole            Cardiovascular and Mediastinum    Hypertension       The patient continues on his metoprolol  He continues to follow with Cardiology  His blood pressure in the office today is 118/64  Nervous and Auditory    Neuro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 ([facility] [address] )       Continue follow-up with Neurology  Continue his carbidopa / levodopa  Musculoskeletal and Integument    Groin rash       The patient with occasional rash in his groin due to incontinence  Desitin was sent to his pharmacy  Patient was encouraged to change his incontinence briefs once wet         Relevant Medications    ZINC OXIDE, TOPICAL, 10 % CREA       Other    Hypercholesteremia       Patient continues on his atorvastatin  His last lipid panel was within normal limits  Paranoid schizophrenia ([facility] [address] ) - Primary       The patient continues to follow with Psychiatry  He continues on his trazodone, clozapine, temazepam and topiramate  No follow-ups on file  Chief Complaint:     Chief Complaint   Patient presents with    Follow-up        History of Present Illness:      Patient presents to the office today for follow-up  He is accompanied to the office by his  Mirian   Overall he is stable  He has no new medical complaints  Blood work has been reviewed  He will be seen back in the office in 6 months  Review of Systems:     Review of Systems   Constitutional: Negative  Negative for fatigue  HENT: Negative  Negative for congestion, postnasal drip, rhinorrhea and trouble swallowing  Eyes: Negative    Negative for visual disturbance  Respiratory: Negative  Negative for choking and shortness of breath  Cardiovascular: Negative  Negative for chest pain  Gastrointestinal: Negative  Endocrine: Negative  Genitourinary: Negative  Incontinence   Musculoskeletal: Negative  Negative for arthralgias, back pain, myalgias and neck pain  Skin: Negative  Neurological: Negative for dizziness and headaches  Psychiatric/Behavioral: Negative  Problem List:     Patient Active Problem List   Diagnosis    GERD (gastroesophageal reflux disease)    [facility]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 ([facility] [address]    Hypertension    Hypercholesteremia    [facility]    Combined form of senile cataract    MRSA carrier    Groin rash        Objective:     /64 (BP Location: Left arm, Patient Position: Sitting, Cuff Size: Standard)   Pulse 82   Resp 18   Ht 5' 8" (1 727 m)   Wt 68 3 kg (150 lb 9 6 oz)   BMI 22 90 kg/m²     Physical Exam  Vitals reviewed  Constitutional:       Appearance: Normal appearance  HENT:      Head: Normocephalic and atraumatic  Nose: Nose normal       Mouth/Throat:      Mouth: Mucous membranes are moist    Eyes:      Extraocular Movements: Extraocular movements intact  Pupils: Pupils are equal, round, and reactive to light  Cardiovascular:      Rate and Rhythm: Normal rate and regular rhythm  Pulses: Normal pulses  Heart sounds: Normal heart sounds  Pulmonary:      Effort: Pulmonary effort is normal       Breath sounds: Normal breath sounds  Musculoskeletal:         General: Normal range of motion  Skin:     General: Skin is warm  Neurological:      General: No focal deficit present  Mental Status: He is alert and oriented to person, place, and time  Psychiatric:         Attention and Perception: Attention and perception normal          Mood and Affect: Affect is flat  Speech: Speech is delayed           Behavior: Behavior normal  Behavior is cooperative  Thought Content: Thought content normal          Cognition and Memory: Memory is impaired           Judgment: Judgment normal          Liliam Arambula, [address] [facility]

## 2022-03-02 NOTE — ASSESSMENT & PLAN NOTE
The patient continues on his metoprolol  He continues to follow with Cardiology  His blood pressure in the office today is 118/64

## 2022-03-02 NOTE — ASSESSMENT & PLAN NOTE
The patient continues to follow with Psychiatry  He continues on his trazodone, clozapine, temazepam and topiramate

## 2022-03-07 ENCOUNTER — TELEPHONE (OUTPATIENT)
Dept: INTERNAL MEDICINE CLINIC | Facility: CLINIC | Age: 72
End: 2022-03-07

## 2022-03-07 DIAGNOSIS — R21 GROIN RASH: ICD-10-CM

## 2022-03-07 NOTE — TELEPHONE ENCOUNTER
Pt dropping off med eval for doris zuleta, put in her bin, call back upon completion    Pt was seen 03/02/22

## 2022-03-08 ENCOUNTER — TELEPHONE (OUTPATIENT)
Dept: INTERNAL MEDICINE CLINIC | Facility: CLINIC | Age: 72
End: 2022-03-08

## 2022-03-21 DIAGNOSIS — K59.09 CHRONIC CONSTIPATION: ICD-10-CM

## 2022-03-21 DIAGNOSIS — K59.00 CONSTIPATION, UNSPECIFIED CONSTIPATION TYPE: ICD-10-CM

## 2022-03-22 RX ORDER — DOCUSATE SODIUM 100 MG/1
100 CAPSULE, LIQUID FILLED ORAL 2 TIMES DAILY
Qty: 60 CAPSULE | Refills: 5 | Status: SHIPPED | OUTPATIENT
Start: 2022-03-22

## 2022-03-23 ENCOUNTER — OFFICE VISIT (OUTPATIENT)
Dept: NEUROLOGY | Facility: CLINIC | Age: 72
End: 2022-03-23
Payer: MEDICARE

## 2022-03-23 VITALS
SYSTOLIC BLOOD PRESSURE: 110 MMHG | OXYGEN SATURATION: 100 % | WEIGHT: 143 LBS | HEART RATE: 78 BPM | DIASTOLIC BLOOD PRESSURE: 70 MMHG | HEIGHT: 68 IN | TEMPERATURE: 97.8 F | BODY MASS INDEX: 21.67 KG/M2

## 2022-03-23 DIAGNOSIS — I10 PRIMARY HYPERTENSION: ICD-10-CM

## 2022-03-23 DIAGNOSIS — F20.0 PARANOID SCHIZOPHRENIA (HCC): ICD-10-CM

## 2022-03-23 DIAGNOSIS — E78.00 HYPERCHOLESTEREMIA: ICD-10-CM

## 2022-03-23 DIAGNOSIS — G21.11 NEUROLEPTIC-INDUCED PARKINSONISM (HCC): Primary | ICD-10-CM

## 2022-03-23 PROCEDURE — 99214 OFFICE O/P EST MOD 30 MIN: CPT | Performed by: PSYCHIATRY & NEUROLOGY

## 2022-03-23 NOTE — PROGRESS NOTES
Dottie Mendoza is a 70 y o  male  Chief Complaint   Patient presents with    Neuro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 ([facility] [address] ),       Assessment:  1  Neuro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 ([facility] [address] )    2  Paranoid schizophrenia ([facility] [address] )    3  Hypercholesteremia    4  Primary hypertension        Plan:  Continue with Sinemet 25/100 mg 1 tablet 3 times a day  Follow-up in 6 months      Discussion:  Patient most likely has neuroleptic induced parkinsonism, he was advised to continue with Sinemet 25/100 mg 1 tablet 3 times a day  Patient in the past has refused to see a movement disorder specialist I have advised him to follow up with the psychiatrist regarding his psychiatric medications and see if they can be adjusted talking to the  and the patient his tremor is better on Sinemet and hence will continue with same, he was advised to keep his blood pressure cholesterol and sugar under control continue with home exercise program to go to the hospital if has any worsening symptoms and call me otherwise to see me back in   6 months and follow up with  His other physicians  Subjective:    HPI   Patient is here in follow-up for his tremor of the right arm, he is accompanied with his caregiver, since his last visit they feel that his tremor is much better on Sinemet and is almost resolved he is not having any side effects he does have some auditory hallucinations but does have history of paranoid schizophrenia and has been on antipsychotics for a long time and is in follow up with the psychiatrist, denies any dizziness no bowel and bladder incontinence he lives in a group home denies any suicidal or homicidal thoughts, no hallucinations no swallowing difficulty, no falls sleep is good, no dizziness appetite is good weight has been stable no other complaints      Vitals:    03/23/22 1016   BP: 110/70   BP Location: Right arm   Patient Position: Sitting   Cuff Size: Adult   Pulse: 78   SpO2: 100%   Height: 5' 8" (1 727 m) Current Medications    Current Outpatient Medications:     acetaminophen (TYLENOL) 500 mg tablet, Take 1 tablet (500 mg total) by mouth every 6 (six) hours as needed for mild pain, Disp: 30 tablet, Rfl: 3    atorvastatin (LIPITOR) 40 mg tablet, Take 1 tablet (40 mg total) by mouth daily, Disp: 30 tablet, Rfl: 11    AUSTEDO 12 MG TABS, Take 1 tablet by mouth 2 (two) times a day, Disp: , Rfl:     carbidopa-levodopa (SINEMET)  mg per tablet, Take 1 tablet by mouth 3 (three) times a day, Disp: 90 tablet, Rfl: 5    cloZAPine (CLOZARIL) 100 mg tablet, Take 100 mg by mouth 2 (two) times a day, Disp: , Rfl:     clozapine (CLOZARIL) 200 MG tablet, Take 200 mg by mouth daily at bedtime , Disp: , Rfl:     docusate sodium (Colace) 100 mg capsule, Take 1 capsule (100 mg total) by mouth 2 (two) times a day, Disp: 60 capsule, Rfl: 5    Emollient (Lubriderm Daily Moisture) LOTN, Apply topically daily as needed (rash and itching), Disp: 473 mL, Rfl: 2    Fluticasone Furoate (Arnuity Ellipta) 50 MCG/ACT AEPB, Inhale 1 puff as needed (as needed for congestion), Disp: 30 blister, Rfl: 3    metoprolol succinate (TOPROL-XL) 25 mg 24 hr tablet, Take 1 tablet (25 mg total) by mouth daily, Disp: 90 tablet, Rfl: 3    olopatadine HCl (PATADAY) 0 2 % opth drops, Administer 1 drop to both eyes daily at bedtime, Disp: 2 5 mL, Rfl: 3    omeprazole (PriLOSEC) 40 MG capsule, Take 1 capsule (40 mg total) by mouth daily, Disp: 30 capsule, Rfl: 5    psyllium (METAMUCIL SMOOTH TEXTURE) 28 % packet, Take 1 packet by mouth 2 (two) times a day, Disp: 60 packet, Rfl: 0    tamsulosin (FLOMAX) 0 4 mg, Take 1 capsule (0 4 mg total) by mouth daily at bedtime, Disp: 30 capsule, Rfl: 3    temazepam (RESTORIL) 15 mg capsule, Take 15 mg by mouth daily at bedtime as needed for sleep, Disp: , Rfl:     topiramate (TOPAMAX) 25 mg tablet, Take 25 mg by mouth 2 (two) times a day, Disp: , Rfl: 1    traZODone (DESYREL) 100 mg tablet, Take 100 mg by mouth daily at bedtime as needed , Disp: , Rfl:     Vitamin D High Potency 25 MCG (1000 UT) capsule, Take 1 capsule (1,000 Units total) by mouth daily, Disp: 30 capsule, Rfl: 11    ZINC OXIDE, TOPICAL, 10 % CREA, Apply topically 2 (two) times a day as needed (groin redness and itching), Disp: 113 g, Rfl: 1      Allergies  Patient has no known allergies  Past Medical History  Past Medical History:   Diagnosis Date    Asthma     [facility]     GERD (gastroesophageal reflux disease)     Herpes zoster without complication 61/19/8474    Hypercholesteremia     Hypertension     [facility]anoid schizophrenia ([facility] [address] )     Psychiatric disorder          Past Surgical History:  Past Surgical History:   Procedure Laterality Date    CATARACT EXTRACTION      COLONOSCOPY           Family History:  Family History   Problem Relation Age of Onset    No Known Problems Mother     No Known Problems Father     Parkinsonism Son        Social History:   reports that he quit smoking about 22 years ago  His smoking use included cigarettes  He has a 20 00 pack-year smoking history  He has never used smokeless tobacco  He reports previous alcohol use  He reports that he does not use drugs  I have reviewed the past medical history, surgical history, social and family history, current medications, allergies vitals, review of systems, and updated this information as appropriate today  Objective:    Physical Exam    Neurological Exam    GENERAL:  Cooperative in no acute distress  Well-developed and well-nourished    HEAD and NECK   Head is atraumatic normocephalic with no lesions or masses  Neck is supple with full range of motion    CARDIOVASCULAR  Carotid Arteries-no carotid bruits  NEUROLOGIC:  Mental Status-the patient is awake alert and oriented without aphasia or apraxia  Cranial Nerves: Visual fields are full to confrontation  Extraocular movements are full without nystagmus  Pupils are 2-1/2 mm and reactive  Face is symmetrical to light touch  Movements of facial expression move symmetrically  Hearing is normal to finger rub bilaterally  Soft palate lifts symmetrically  Shoulder shrug is symmetrical  Tongue is midline without atrophy  Motor: No drift is noted on arm extension  Strength is full in the upper and lower extremities with normal bulk and tone  Gait has a slightly stooped posture with decreased arm swing          ROS:  Review of Systems   Constitutional: Negative  Negative for appetite change and fever  HENT: Negative  Negative for hearing loss, tinnitus, trouble swallowing and voice change  Eyes: Negative  Negative for photophobia and pain  Respiratory: Negative  Negative for shortness of breath  Cardiovascular: Negative  Negative for palpitations  Gastrointestinal: Negative  Negative for nausea and vomiting  Endocrine: Negative  Negative for cold intolerance  Genitourinary: Negative  Negative for dysuria, frequency and urgency  Musculoskeletal: Negative  Negative for myalgias and neck pain  Skin: Negative  Negative for rash  Neurological: Negative  Negative for dizziness, tremors, seizures, syncope, facial asymmetry, speech difficulty, weakness, light-headedness, numbness and headaches  Hematological: Negative  Does not bruise/bleed easily  Psychiatric/Behavioral: Positive for confusion, hallucinations and sleep disturbance

## 2022-04-13 DIAGNOSIS — K21.9 GASTROESOPHAGEAL REFLUX DISEASE: ICD-10-CM

## 2022-04-13 DIAGNOSIS — K59.00 CONSTIPATION, UNSPECIFIED CONSTIPATION TYPE: ICD-10-CM

## 2022-04-13 RX ORDER — OMEPRAZOLE 40 MG/1
40 CAPSULE, DELAYED RELEASE ORAL DAILY
Qty: 30 CAPSULE | Refills: 5 | Status: SHIPPED | OUTPATIENT
Start: 2022-04-13

## 2022-04-18 DIAGNOSIS — N39.43 POST-VOID DRIBBLING: ICD-10-CM

## 2022-04-18 RX ORDER — TAMSULOSIN HYDROCHLORIDE 0.4 MG/1
0.4 CAPSULE ORAL
Qty: 90 CAPSULE | Refills: 3 | Status: SHIPPED | OUTPATIENT
Start: 2022-04-18

## 2022-04-18 NOTE — TELEPHONE ENCOUNTER
An Auto-fax Refill Request for Tamsulosin 0 4mg was received from [facility]  The patient was last seen on 1/19/22 by Adilia Strickland PA-C in the [facility] location; continuation of the medication was authorized at that time    Request for same, 90 day supply with 3 refills was queued and forwarded to the Advanced Practitioner covering the [facility] location for approval

## 2022-04-21 ENCOUNTER — TELEPHONE (OUTPATIENT)
Dept: OTHER | Facility: OTHER | Age: 72
End: 2022-04-21

## 2022-04-21 DIAGNOSIS — N39.43 POST-VOID DRIBBLING: ICD-10-CM

## 2022-04-21 RX ORDER — TAMSULOSIN HYDROCHLORIDE 0.4 MG/1
0.4 CAPSULE ORAL
Qty: 90 CAPSULE | Refills: 0 | Status: CANCELLED | OUTPATIENT
Start: 2022-04-21

## 2022-04-21 NOTE — TELEPHONE ENCOUNTER
Returned call to [facility] that refill for Tamsulosin was sent on 4/18/22 with receipt confirmation

## 2022-05-12 DIAGNOSIS — K59.00 CONSTIPATION, UNSPECIFIED CONSTIPATION TYPE: ICD-10-CM

## 2022-05-18 DIAGNOSIS — G21.11 NEUROLEPTIC-INDUCED PARKINSONISM (HCC): ICD-10-CM

## 2022-05-18 NOTE — TELEPHONE ENCOUNTER
Kayode Couch from [facility] calling to request refill for Sinemet 25/100  Pt takes 1 tab TID  LOV 3/2022  Next OV 9/23/22 with Dr Bety Yañez - Rx entered  Please review and sign if in agreement

## 2022-05-25 ENCOUNTER — OFFICE VISIT (OUTPATIENT)
Dept: INTERNAL MEDICINE CLINIC | Facility: CLINIC | Age: 72
End: 2022-05-25
Payer: MEDICARE

## 2022-05-25 VITALS
TEMPERATURE: 97.4 F | SYSTOLIC BLOOD PRESSURE: 118 MMHG | WEIGHT: 142.8 LBS | BODY MASS INDEX: 21.64 KG/M2 | HEART RATE: 74 BPM | HEIGHT: 68 IN | OXYGEN SATURATION: 98 % | RESPIRATION RATE: 18 BRPM | DIASTOLIC BLOOD PRESSURE: 76 MMHG
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
## 2022-05-25 PROCEDURE — 99214 OFFICE O/P EST MOD 30 MIN: CPT | Performed by: NURSE PRACTITIONER

## 2022-05-25 NOTE — PROGRESS NOTES
[place]    NAME: Olivia Moore  AGE: 70 y o  SEX: male  : 1950     DATE: 2022     Assessment and Plan:     Problem List Items Addressed This Visit        Other    Fall - Primary       The patient presents to the office today accompanied by a worker from the group home  The patient sustained a fall the previous night in the middle of the night landing on his right shoulder  He does have limited range of motion and pain in that area  He has not asked for any p r n  medications  Patient does have an underlying psychiatric history  The patient does have some tenderness and pain with movement in his bicipital area  X-rays of his right shoulder and right upper arm were ordered for the patient  Tylenol or Motrin are recommended p r n  Heat or ice could be applied  I will contact him once we have the results  He has been advised that should his pain worsen her he develops any other symptoms he should be evaluated in the emergency room  Relevant Orders    XR humerus right    XR shoulder 2+ vw right              No follow-ups on file  Chief Complaint:     Chief Complaint   Patient presents with    Fall     Right arm hurts  History of Present Illness: The patient presents to the office today along with a  from the group home after sustaining a fall the previous night in his bedroom  He denies hitting his head or loss of consciousness  He fell on his right shoulder  This is discussed above  X-rays ordered  The patient may need an orthopedic follow-up in the near future based on those results and his symptoms  Review of Systems:     Review of Systems   Constitutional: Negative  Negative for fatigue  HENT: Negative  Negative for congestion, postnasal drip, rhinorrhea and trouble swallowing  Eyes: Negative  Negative for visual disturbance  Respiratory: Negative    Negative for choking and shortness of breath  Cardiovascular: Negative  Negative for chest pain  Gastrointestinal: Negative  Endocrine: Negative  Genitourinary: Negative  Musculoskeletal: Negative  Negative for arthralgias, back pain, myalgias and neck pain  Right shoulder, right upper arm pain    Skin: Negative  Neurological: Negative for dizziness and headaches  Psychiatric/Behavioral: Negative           Problem List:     Patient Active Problem List   Diagnosis    GERD (gastroesophageal reflux disease)    [facility]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 ([facility] [address] )    Hypertension    Hypercholesteremia    [facility]    Combined form of senile cataract    MRSA carrier    Groin rash        Objective:     /76 (BP Location: Left arm, Patient Position: Sitting, Cuff Size: Large)   Pulse 74   Temp (!) 97 4 °F (36 3 °C) (Temporal)   Resp 18   Ht 5' 8" (1 727 m)   Wt 64 8 kg (142 lb 12 8 oz)   SpO2 98%   BMI 21 71 kg/m²     Current Outpatient Medications   Medication Instructions    acetaminophen (TYLENOL) 500 mg, Oral, Every 6 hours PRN    atorvastatin (LIPITOR) 40 mg, Oral, Daily    AUSTEDO 12 MG TABS 1 tablet, Oral, 2 times daily    carbidopa-levodopa (SINEMET)  mg per tablet 1 tablet, Oral, 3 times daily    clozapine (CLOZARIL) 200 mg, Oral, Daily at bedtime    cloZAPine (CLOZARIL) 100 mg, Oral, 2 times daily    docusate sodium (COLACE) 100 mg, Oral, 2 times daily    Emollient (Lubriderm Daily Moisture) LOTN Apply externally, Daily PRN    Fluticasone Furoate (Arnuity Ellipta) 50 MCG/ACT AEPB 1 puff, Inhalation, As needed    metoprolol succinate (TOPROL-XL) 25 mg, Oral, Daily    olopatadine HCl (PATADAY) 0 2 % opth drops 1 drop, Both Eyes, Daily at bedtime    omeprazole (PRILOSEC) 40 mg, Oral, Daily    psyllium (METAMUCIL SMOOTH TEXTURE) 28 % packet 1 packet, Oral, 2 times daily    tamsulosin (FLOMAX) 0 4 mg, Oral, Daily at bedtime    temazepam (RESTORIL) 15 mg, Oral, Daily at bedtime PRN    topiramate (TOPAMAX) 25 mg, Oral, 2 times daily    traZODone (DESYREL) 100 mg, Oral, Daily at bedtime PRN    Vitamin D High Potency 1,000 Units, Oral, Daily    ZINC OXIDE, TOPICAL, 10 % CREA Apply externally, 2 times daily PRN         Physical Exam  Vitals reviewed  Constitutional:       Appearance: Normal appearance  HENT:      Head: Normocephalic and atraumatic  Nose: Nose normal       Mouth/Throat:      Mouth: Mucous membranes are moist    Eyes:      Extraocular Movements: Extraocular movements intact  Pupils: Pupils are equal, round, and reactive to light  Cardiovascular:      Rate and Rhythm: Normal rate and regular rhythm  Pulses: Normal pulses  Heart sounds: Normal heart sounds  Pulmonary:      Effort: Pulmonary effort is normal       Breath sounds: Normal breath sounds  Musculoskeletal:      Right shoulder: Tenderness present  Decreased range of motion  Right upper arm: Tenderness present  Skin:     General: Skin is warm  Neurological:      General: No focal deficit present  Mental Status: He is alert and oriented to person, place, and time  Psychiatric:         Mood and Affect: Mood normal          Behavior: Behavior normal          Thought Content:  Thought content normal          Judgment: Judgment normal          [place], [address] [place] [address]

## 2022-05-26 ENCOUNTER — TELEPHONE (OUTPATIENT)
Dept: INTERNAL MEDICINE CLINIC | Facility: CLINIC | Age: 72
End: 2022-05-26

## 2022-05-26 ENCOUNTER — APPOINTMENT (OUTPATIENT)
Dept: RADIOLOGY | Facility: CLINIC | Age: 72
End: 2022-05-26
Payer: MEDICARE

## 2022-05-26 DIAGNOSIS — M89.8X2 PAIN OF RIGHT HUMERUS: ICD-10-CM

## 2022-05-26 DIAGNOSIS — G89.11 ACUTE PAIN OF RIGHT SHOULDER DUE TO TRAUMA: Primary | ICD-10-CM

## 2022-05-26 DIAGNOSIS — M25.511 ACUTE PAIN OF RIGHT SHOULDER DUE TO TRAUMA: Primary | ICD-10-CM
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
## 2022-05-26 PROCEDURE — 73030 X-RAY EXAM OF SHOULDER: CPT

## 2022-05-26 PROCEDURE — 73060 X-RAY EXAM OF HUMERUS: CPT

## 2022-05-26 NOTE — TELEPHONE ENCOUNTER
----- Message from Shahida Okeefe, [address] sent at 5/26/2022  3:36 PM EDT -----  Please let the group home know that the patients x ray of both his upper arm and shoulder show no dislocation or fracture  If the patient continues with discomfort, I can refer him to an orthopedic provider

## 2022-05-26 NOTE — ASSESSMENT & PLAN NOTE
The patient presents to the office today accompanied by a worker from the group [facility]  The patient sustained a fall the previous night in the middle of the night landing on his right shoulder  He does have limited range of motion and pain in that area  He has not asked for any p r n  medications  Patient does have an underlying psychiatric history  The patient does have some tenderness and pain with movement in his bicipital area  X-rays of his right shoulder and right upper arm were ordered for the patient  Tylenol or Motrin are recommended p r n  Heat or ice could be applied  I will contact him once we have the results  He has been advised that should his pain worsen her he develops any other symptoms he should be evaluated in the emergency room

## 2022-05-27 ENCOUNTER — TELEPHONE (OUTPATIENT)
Dept: UROLOGY | Facility: AMBULATORY SURGERY CENTER | Age: 72
End: 2022-05-27

## 2022-06-09 DIAGNOSIS — K59.00 CONSTIPATION, UNSPECIFIED CONSTIPATION TYPE: ICD-10-CM

## 2022-07-18 DIAGNOSIS — K59.00 CONSTIPATION, UNSPECIFIED CONSTIPATION TYPE: ICD-10-CM

## 2022-08-01 DIAGNOSIS — T78.40XD ALLERGY, SUBSEQUENT ENCOUNTER: ICD-10-CM

## 2022-08-01 RX ORDER — OLOPATADINE HYDROCHLORIDE 2 MG/ML
SOLUTION/ DROPS OPHTHALMIC
Qty: 2.5 ML | Refills: 3 | Status: SHIPPED | OUTPATIENT
Start: 2022-08-01

## 2022-08-09 ENCOUNTER — TELEPHONE (OUTPATIENT)
Dept: NEUROLOGY | Facility: CLINIC | Age: 72
End: 2022-08-09

## 2022-09-01 DIAGNOSIS — K59.09 CHRONIC CONSTIPATION: ICD-10-CM

## 2022-09-02 RX ORDER — DOCUSATE SODIUM 100 MG/1
100 CAPSULE, LIQUID FILLED ORAL 2 TIMES DAILY
Qty: 60 CAPSULE | Refills: 5 | Status: SHIPPED | OUTPATIENT
Start: 2022-09-02

## 2022-09-23 ENCOUNTER — OFFICE VISIT (OUTPATIENT)
Dept: INTERNAL MEDICINE CLINIC | Facility: CLINIC | Age: 72
End: 2022-09-23
Payer: MEDICARE

## 2022-09-23 ENCOUNTER — OFFICE VISIT (OUTPATIENT)
Dept: NEUROLOGY | Facility: CLINIC | Age: 72
End: 2022-09-23
Payer: MEDICARE

## 2022-09-23 VITALS
HEART RATE: 84 BPM | WEIGHT: 143 LBS | OXYGEN SATURATION: 99 % | SYSTOLIC BLOOD PRESSURE: 100 MMHG | BODY MASS INDEX: 21.67 KG/M2 | RESPIRATION RATE: 20 BRPM | TEMPERATURE: 97.8 F | DIASTOLIC BLOOD PRESSURE: 56 MMHG | HEIGHT: 68 IN

## 2022-09-23 VITALS
HEIGHT: 68 IN | SYSTOLIC BLOOD PRESSURE: 128 MMHG | BODY MASS INDEX: 21.52 KG/M2 | WEIGHT: 142 LBS | DIASTOLIC BLOOD PRESSURE: 66 MMHG

## 2022-09-23 DIAGNOSIS — Z12.5 PROSTATE CANCER SCREENING: ICD-10-CM

## 2022-09-23 DIAGNOSIS — J44.9 CHRONIC OBSTRUCTIVE PULMONARY DISEASE, UNSPECIFIED COPD TYPE (HCC): ICD-10-CM

## 2022-09-23 DIAGNOSIS — Z13.29 SCREENING FOR THYROID DISORDER: ICD-10-CM

## 2022-09-23 DIAGNOSIS — F20.0 PARANOID SCHIZOPHRENIA (HCC): ICD-10-CM

## 2022-09-23 DIAGNOSIS — Z23 ENCOUNTER FOR IMMUNIZATION: ICD-10-CM

## 2022-09-23 DIAGNOSIS — Z13.1 SCREENING FOR DIABETES MELLITUS: ICD-10-CM

## 2022-09-23 DIAGNOSIS — Z00.00 MEDICARE ANNUAL WELLNESS VISIT, INITIAL: Primary | ICD-10-CM

## 2022-09-23 DIAGNOSIS — I10 PRIMARY HYPERTENSION: ICD-10-CM

## 2022-09-23 DIAGNOSIS — G21.11 NEUROLEPTIC-INDUCED PARKINSONISM (HCC): ICD-10-CM

## 2022-09-23 DIAGNOSIS — E78.00 HYPERCHOLESTEREMIA: ICD-10-CM

## 2022-09-23 DIAGNOSIS — E55.9 VITAMIN D DEFICIENCY: ICD-10-CM

## 2022-09-23 DIAGNOSIS — G21.11 NEUROLEPTIC-INDUCED PARKINSONISM (HCC): Primary | ICD-10-CM

## 2022-09-23 PROCEDURE — G0008 ADMIN INFLUENZA VIRUS VAC: HCPCS

## 2022-09-23 PROCEDURE — 99213 OFFICE O/P EST LOW 20 MIN: CPT

## 2022-09-23 PROCEDURE — G0439 PPPS, SUBSEQ VISIT: HCPCS

## 2022-09-23 PROCEDURE — 90662 IIV NO PRSV INCREASED AG IM: CPT

## 2022-09-23 PROCEDURE — 99214 OFFICE O/P EST MOD 30 MIN: CPT | Performed by: PSYCHIATRY & NEUROLOGY

## 2022-09-23 NOTE — ASSESSMENT & PLAN NOTE
Symptoms are stable  Patient is a former smoker  He has not used the inhaler per his caregiver  Will discontinue the fluticasone furoate

## 2022-09-23 NOTE — PATIENT INSTRUCTIONS
Wellness Visit for Adults   AMBULATORY CARE:   A wellness visit  is when you see your healthcare provider to get screened for health problems  Your healthcare provider will also give you advice on how to stay healthy  Write down your questions so you remember to ask them  Ask your healthcare provider how often you should have a wellness visit  What happens at a wellness visit:  Your healthcare provider will ask about your health, and your family history of health problems  This includes high blood pressure, heart disease, and cancer  He or she will ask if you have symptoms that concern you, if you smoke, and about your mood  You may also be asked about your intake of medicines, supplements, food, and alcohol  Any of the following may be done: Your weight  will be checked  Your height may also be checked so your body mass index (BMI) can be calculated  Your BMI shows if you are at a healthy weight  Your blood pressure  and heart rate will be checked  Your temperature may also be checked  Blood and urine tests  may be done  Blood tests may be done to check your cholesterol levels  Abnormal cholesterol levels increase your risk for heart disease and stroke  You may also need a blood or urine test to check for diabetes if you are at increased risk  Urine tests may be done to look for signs of an infection or kidney disease  A physical exam  includes checking your heartbeat and lungs with a stethoscope  Your healthcare provider may also check your skin to look for sun damage  Screening tests  may be recommended  A screening test is done to check for diseases that may not cause symptoms  The screening tests you may need depend on your age, gender, family history, and lifestyle habits  For example, colorectal screening may be recommended if you are 48years old or older  Screening tests you need if you are a woman:   A Pap smear  is used to screen for cervical cancer   Pap smears are usually done every 3 to 5 years depending on your age  You may need them more often if you have had abnormal Pap smear test results in the past  Ask your healthcare provider how often you should have a Pap smear  A mammogram  is an x-ray of your breasts to screen for breast cancer  Experts recommend mammograms every 2 years starting at age 48 years  You may need a mammogram at age 52 years or younger if you have an increased risk for breast cancer  Talk to your healthcare provider about when you should start having mammograms and how often you need them  Vaccines you may need:   Get an influenza vaccine  every year  The influenza vaccine protects you from the flu  Several types of viruses cause the flu  The viruses change over time, so new vaccines are made each year  Get a tetanus-diphtheria (Td) booster vaccine  every 10 years  This vaccine protects you against tetanus and diphtheria  Tetanus is a severe infection that may cause painful muscle spasms and lockjaw  Diphtheria is a severe bacterial infection that causes a thick covering in the back of your mouth and throat  Get a human papillomavirus (HPV) vaccine  if you are female and aged 23 to 32 or male 23 to 24 and never received it  This vaccine protects you from HPV infection  HPV is the most common infection spread by sexual contact  HPV may also cause vaginal, penile, and anal cancers  Get a pneumococcal vaccine  if you are aged 72 years or older  The pneumococcal vaccine is an injection given to protect you from pneumococcal disease  Pneumococcal disease is an infection caused by pneumococcal bacteria  The infection may cause pneumonia, meningitis, or an ear infection  Get a shingles vaccine  if you are 60 or older, even if you have had shingles before  The shingles vaccine is an injection to protect you from the varicella-zoster virus  This is the same virus that causes chickenpox   Shingles is a painful rash that develops in people who had chickenpox or have been exposed to the virus  How to eat healthy:  My Plate is a model for planning healthy meals  It shows the types and amounts of foods that should go on your plate  Fruits and vegetables make up about half of your plate, and grains and protein make up the other half  A serving of dairy is included on the side of your plate  The amount of calories and serving sizes you need depends on your age, gender, weight, and height  Examples of healthy foods are listed below:  Eat a variety of vegetables  such as dark green, red, and orange vegetables  You can also include canned vegetables low in sodium (salt) and frozen vegetables without added butter or sauces  Eat a variety of fresh fruits , canned fruit in 100% juice, frozen fruit, and dried fruit  Include whole grains  At least half of the grains you eat should be whole grains  Examples include whole-wheat bread, wheat pasta, brown rice, and whole-grain cereals such as oatmeal     Eat a variety of protein foods such as seafood (fish and shellfish), lean meat, and poultry without skin (turkey and chicken)  Examples of lean meats include pork leg, shoulder, or tenderloin, and beef round, sirloin, tenderloin, and extra lean ground beef  Other protein foods include eggs and egg substitutes, beans, peas, soy products, nuts, and seeds  Choose low-fat dairy products such as skim or 1% milk or low-fat yogurt, cheese, and cottage cheese  Limit unhealthy fats  such as butter, hard margarine, and shortening  Exercise:  Exercise at least 30 minutes per day on most days of the week  Some examples of exercise include walking, biking, dancing, and swimming  You can also fit in more physical activity by taking the stairs instead of the elevator or parking farther away from stores  Include muscle strengthening activities 2 days each week  Regular exercise provides many health benefits   It helps you manage your weight, and decreases your risk for type 2 diabetes, heart disease, stroke, and high blood pressure  Exercise can also help improve your mood  Ask your healthcare provider about the best exercise plan for you  General health and safety guidelines:   Do not smoke  Nicotine and other chemicals in cigarettes and cigars can cause lung damage  Ask your healthcare provider for information if you currently smoke and need help to quit  E-cigarettes or smokeless tobacco still contain nicotine  Talk to your healthcare provider before you use these products  Limit alcohol  A drink of alcohol is 12 ounces of beer, 5 ounces of wine, or 1½ ounces of liquor  Lose weight, if needed  Being overweight increases your risk of certain health conditions  These include heart disease, high blood pressure, type 2 diabetes, and certain types of cancer  Protect your skin  Do not sunbathe or use tanning beds  Use sunscreen with a SPF 15 or higher  Apply sunscreen at least 15 minutes before you go outside  Reapply sunscreen every 2 hours  Wear protective clothing, hats, and sunglasses when you are outside  Drive safely  Always wear your seatbelt  Make sure everyone in your car wears a seatbelt  A seatbelt can save your life if you are in an accident  Do not use your cell phone when you are driving  This could distract you and cause an accident  Pull over if you need to make a call or send a text message  Practice safe sex  Use latex condoms if are sexually active and have more than one partner  Your healthcare provider may recommend screening tests for sexually transmitted infections (STIs)  Wear helmets, lifejackets, and protective gear  Always wear a helmet when you ride a bike or motorcycle, go skiing, or play sports that could cause a head injury  Wear protective equipment when you play sports  Wear a lifejacket when you are on a boat or doing water sports      © Copyright [employer] 2022 Information is for End User's use only and may not be sold, redistributed or otherwise used for commercial purposes  All illustrations and images included in CareNotes® are the copyrighted property of A D A M [employer] Inc  or Joao De Leon  The above information is an  only  It is not intended as medical advice for individual conditions or treatments  Talk to your doctor, nurse or pharmacist before following any medical regimen to see if it is safe and effective for you  Flu Shot (Vaccine) for Adults   AMBULATORY CARE:   The flu shot  is a vaccine given in your upper arm or thigh to help prevent influenza (the flu)  The flu is caused by a virus  The virus spreads from person to person through coughing and sneezing  Several types of viruses cause the flu  The viruses change over time, so new vaccines are made each year  The vaccine begins to protect you about 2 weeks after you get it  Get the vaccine as soon as it is available  What to tell your doctor before you get a flu shot:   You have any serious allergies, such as an egg allergy that causes a severe reaction  The flu vaccine may contain a small amount of egg protein  The amount is so low that it is not likely to cause an allergic reaction  Egg-free vaccines may be available  You developed Guillain-Barré syndrome within 6 weeks of getting a flu shot  You may not be able to get any flu vaccine unless your provider feels the benefits outweigh the risks  Who should not get the flu shot or should wait to get it: You may need to wait to get the flu shot, or instead get the nasal flu vaccine  Tell your healthcare provider if:  You had an allergic reaction to a flu shot or any part of it  You are sick or have a fever of 101°F (38 3°C) or higher  Risks of the flu shot:  The vaccine may cause mild symptoms, such as a fever, headache, and muscle aches  You may also have mild to moderate soreness or redness at the area where you were given the shot  You may still get the flu after you receive the vaccine  You may have an allergic reaction to the vaccine  This can be life-threatening  Call your local emergency number (911 in the [address]) if:   Your mouth and throat are swollen  You are wheezing or having trouble breathing  You have chest pain or your heart is beating faster than normal for you  You feel like you are going to faint  Seek care immediately if:   Your face is red or swollen  You have hives that spread over your body  Call your doctor if:   You feel weak or dizzy  You have increased pain, redness, or swelling around the area where the shot was given  You have questions or concerns about the flu shot  Apply a warm compress  to the injection area to decrease pain and swelling  Follow up with your doctor as directed:  Write down your questions so you remember to ask them during your visits  © Copyright [employer] 2022 Information is for End User's use only and may not be sold, redistributed or otherwise used for commercial purposes  All illustrations and images included in CareNotes® are the copyrighted property of [employer]  or Joao Salaamnca   The above information is an  only  It is not intended as medical advice for individual conditions or treatments  Talk to your doctor, nurse or pharmacist before following any medical regimen to see if it is safe and effective for you

## 2022-09-23 NOTE — ASSESSMENT & PLAN NOTE
Follows with Neurology  Their most recent recommendation is to see if Psychiatry can cut back on some of his psychiatric medications to see if his parkinsonian symptoms would improve

## 2022-09-23 NOTE — PROGRESS NOTES
Aman Lopez is a 67 y o  male  Chief Complaint   Patient presents with    Neuro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 ([address] )       Assessment:  1  Neuro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 ([address] )    2  Paranoid schizophrenia ([address] )    3  Primary hypertension    4  Hypercholesteremia        Plan:  Continue with Sinemet 25/100 mg 1 tablet 3 times a day  Follow-up in 6 months  Discussion:  Patient most likely has neuroleptics induced parkinsonism, he is currently doing well on Sinemet 25/100 mg 1 tablet 3 times a day and does not have any tremor, I advised the caretaker to discuss with his psychiatrist to see if they can decrease his psych medications and if he continues to do well in the future maybe we can try taking him off of the Sinemet and see how he does, he was advised to take fall safety and aspiration precautions keep his blood pressure cholesterol sugar under control he has always under constant supervision to go to the hospital if has any worsening symptoms and call me otherwise to see me back in 6 months and follow up with his other physicians  Subjective:    HPI   Patient is here in follow-up for his tremor of the right arm, he is accompanied with his care giver, since his last visit they feel is tremors are much better on Sinemet there almost resolved he is not having any side effects he does have some auditory hallucination and has history of paranoid schizophrenia but overall it looks like that he has been doing good he has been on anti psychotic medications for long time and is in follow up with a psychiatrist Dr Aristeo Sepulveda as per the caregiver, no bowel and bladder incontinence no suicidal or homicidal thoughts no swallowing difficulty, no falls sleep is decent, no dizziness appetite is good weight has been stable no other complaints      Vitals:    09/23/22 1032   BP: 128/66   BP Location: Left arm   Patient Position: Sitting   Cuff Size: Standard   Weight: 64 4 kg (142 lb)   Height: 5' 8" (1 727 m)       Current Medications    Current Outpatient Medications:     acetaminophen (TYLENOL) 500 mg tablet, Take 1 tablet (500 mg total) by mouth every 6 (six) hours as needed for mild pain, Disp: 30 tablet, Rfl: 3    atorvastatin (LIPITOR) 40 mg tablet, Take 1 tablet (40 mg total) by mouth daily, Disp: 30 tablet, Rfl: 11    AUSTEDO 12 MG TABS, Take 1 tablet by mouth 2 (two) times a day, Disp: , Rfl:     carbidopa-levodopa (SINEMET)  mg per tablet, Take 1 tablet by mouth in the morning and 1 tablet in the evening and 1 tablet before bedtime  , Disp: 270 tablet, Rfl: 3    cloZAPine (CLOZARIL) 100 mg tablet, Take 100 mg by mouth 2 (two) times a day, Disp: , Rfl:     clozapine (CLOZARIL) 200 MG tablet, Take 200 mg by mouth daily at bedtime , Disp: , Rfl:     docusate sodium (Colace) 100 mg capsule, Take 1 capsule (100 mg total) by mouth 2 (two) times a day, Disp: 60 capsule, Rfl: 5    Emollient (Lubriderm Daily Moisture) LOTN, Apply topically daily as needed (rash and itching), Disp: 473 mL, Rfl: 2    Fluticasone Furoate (Arnuity Ellipta) 50 MCG/ACT AEPB, Inhale 1 puff as needed (as needed for congestion), Disp: 30 blister, Rfl: 3    metoprolol succinate (TOPROL-XL) 25 mg 24 hr tablet, Take 1 tablet (25 mg total) by mouth daily, Disp: 90 tablet, Rfl: 3    olopatadine HCl (PATADAY) 0 2 % opth drops, INSTILL 1 DROP IN EACH EYE AT BEDTIME (NC), Disp: 2 5 mL, Rfl: 3    omeprazole (PriLOSEC) 40 MG capsule, Take 1 capsule (40 mg total) by mouth daily, Disp: 30 capsule, Rfl: 5    psyllium (METAMUCIL SMOOTH TEXTURE) 28 % packet, Take 1 packet by mouth 2 (two) times a day, Disp: 180 packet, Rfl: 3    tamsulosin (FLOMAX) 0 4 mg, Take 1 capsule (0 4 mg total) by mouth daily at bedtime, Disp: 90 capsule, Rfl: 3    temazepam (RESTORIL) 15 mg capsule, Take 15 mg by mouth daily at bedtime as needed for sleep, Disp: , Rfl:     topiramate (TOPAMAX) 25 mg tablet, Take 25 mg by mouth 2 (two) times a day, Disp: , Rfl: 1    traZODone (DESYREL) 100 mg tablet, Take 100 mg by mouth daily at bedtime as needed , Disp: , Rfl:     Vitamin D High Potency 25 MCG (1000 UT) capsule, Take 1 capsule (1,000 Units total) by mouth daily, Disp: 30 capsule, Rfl: 11    ZINC OXIDE, TOPICAL, 10 % CREA, Apply topically 2 (two) times a day as needed (groin redness and itching) (Patient not taking: No sig reported), Disp: 113 g, Rfl: 1      Allergies  Patient has no known allergies  Past Medical History  Past Medical History:   Diagnosis Date    Asthma     [facility]ch[facility] obstructive [facility])     GERD (gastroesophageal reflux disease)     Herpes zoster without complication 49/21/1507    Hypercholesteremia     Hypertension     [facility]     Psychiatric disorder          Past Surgical History:  Past Surgical History:   Procedure Laterality Date    CATARACT EXTRACTION      COLONOSCOPY           Family History:  Family History   Problem Relation Age of Onset    No Known Problems Mother     No Known Problems Father     Parkinsonism Son        Social History:   reports that he quit smoking about 22 years ago  His smoking use included cigarettes  He has a 20 00 pack-year smoking history  He has never used smokeless tobacco  He reports previous alcohol use  He reports that he does not use drugs  I have reviewed the past medical history, surgical history, social and family history, current medications, allergies vitals, review of systems, and updated this information as appropriate today  Objective:    Physical Exam    Neurological Exam    GENERAL:  Cooperative in no acute distress  Well-developed and well-nourished    HEAD and NECK   Head is atraumatic normocephalic with no lesions or masses  Neck is supple with full range of motion    CARDIOVASCULAR  Carotid Arteries-no carotid bruits      NEUROLOGIC:  Mental Status-the patient is awake alert and oriented without aphasia or apraxia  Cranial Nerves: Visual fields are full to confrontation  Extraocular movements are full without nystagmus  Pupils are 2-1/2 mm and reactive  Face is symmetrical to light touch  Movements of facial expression move symmetrically  Hearing is normal to finger rub bilaterally  Soft palate lifts symmetrically  Shoulder shrug is symmetrical  Tongue is midline without atrophy  Motor: No drift is noted on arm extension  Strength is full in the upper and lower extremities with normal bulk and tone  No resting tremor noted  Gait has a slightly stooped posture with decreased arm swing              ROS:  Review of Systems   Constitutional: Negative  Negative for appetite change and fever  HENT: Negative  Negative for hearing loss, tinnitus, trouble swallowing and voice change  Eyes: Negative  Negative for photophobia and pain  Respiratory: Negative  Negative for shortness of breath  Cardiovascular: Negative  Negative for palpitations  Gastrointestinal: Negative  Negative for nausea and vomiting  Endocrine: Negative  Negative for cold intolerance  Genitourinary: Negative  Negative for dysuria, frequency and urgency  Musculoskeletal: Negative  Negative for myalgias and neck pain  Skin: Negative  Negative for rash  Neurological: Negative  Negative for dizziness, tremors, seizures, syncope, facial asymmetry, speech difficulty, weakness, light-headedness, numbness and headaches  Hematological: Negative  Does not bruise/bleed easily  Psychiatric/Behavioral: Negative  Negative for confusion, hallucinations and sleep disturbance

## 2022-09-23 NOTE — PROGRESS NOTES
Assessment and Plan:     Problem List Items Addressed This Visit        Respiratory    Chronic obstructive pulmonary disease, unspecified COPD type ([facility] )     Symptoms are stable  Patient is a former smoker  He has not used the inhaler per his caregiver  Will discontinue the fluticasone furoate  Cardiovascular and Mediastinum    Hypertension     Blood pressure is stable continue current medications  Relevant Orders    CBC and differential    Comprehensive metabolic panel       Nervous and Auditory    Neuro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 ([facility] )     Follows with Neurology  Their most recent recommendation is to see if Psychiatry can cut back on some of his psychiatric medications to see if his parkinsonian symptoms would improve  Other    Hypercholesteremia    Relevant Orders    Lipid panel      Other Visit Diagnoses     Medicare annual wellness visit, initial    -  Primary    Vitamin D deficiency        Relevant Orders    Vitamin D 25 hydroxy    Screening for diabetes mellitus        Prostate cancer screening        Relevant Orders    PSA, Total Screen    Screening for thyroid disorder        Relevant Orders    TSH, 3rd generation with Free T4 reflex    Encounter for immunization        Relevant Orders    influenza vaccine, high-dose, PF 0 7 mL (FLUZONE HIGH-DOSE) (Completed)          Depression Screening and Follow-up Plan: Patient was screened for depression during today's encounter  They screened negative with a PHQ-2 score of 0  Preventive health issues were discussed with patient, and age appropriate screening tests were ordered as noted in patient's After Visit Summary  Personalized health advice and appropriate referrals for health education or preventive services given if needed, as noted in patient's After Visit Summary       History of Present Illness:     Patient presents for a Medicare Wellness Visit    Libia Segovia presents to the office today for a routine follow-up and Medicare wellness visit  He lives in a group home and Mirian the group home director is present with him today  He offers no new complaints or concerns  He was seen by Neurology just prior to this appointment for follow-up related to his parkinsonism  Their recommendation was to have Psychiatry try to decrease his medication secondary to his symptoms  Finally Mirian reports that he is on an inhaler however does not use any inhaler  He denies any concerns or issues with breathing  He is a former smoker who quit approximately 22 years ago  Patient Care Team:  Lore Lee as PCP - General (Nurse Practitioner)  MD Adelaide Hester PA-C (Gastroenterology)  Jackelin Arcos PA-C as Physician Assistant (Physician Assistant)  Rosy Galeano MD (Psychiatry)     Review of Systems:     Review of Systems   Constitutional: Negative  HENT: Negative  Respiratory: Negative  Negative for shortness of breath  Cardiovascular: Negative  Negative for chest pain and palpitations  Gastrointestinal: Negative  Genitourinary: Negative  Musculoskeletal: Negative  Skin: Negative  Neurological: Positive for tremors  Psychiatric/Behavioral: Negative           Problem List:     Patient Active Problem List   Diagnosis    GERD (gastroesophageal reflux disease)    [facility]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 ([facility] )    Hypertension    Hypercholesteremia    [facility]    Combined form of senile cataract    MRSA carrier    Groin rash    Fall    Chronic obstructive pulmonary disease, unspecified COPD type ([facility] )      Past Medical and Surgical History:     Past Medical History:   Diagnosis Date    Asthma     [facility]ch[facility] obstructive [facility]) [facility]     GERD (gastroesophageal reflux disease)     Herpes zoster without complication 39/35/4814    Hypercholesteremia     Hypertension     [facility] schizophrenia [facility]     Psychiatric disorder      Past Surgical History: Procedure Laterality Date    CATARACT EXTRACTION      COLONOSCOPY        Family History:     Family History   Problem Relation Age of Onset    No Known Problems Mother     No Known Problems Father     Parkinsonism Son       Social History:     Social History     Socioeconomic History    Marital status: Single     Spouse name: None    Number of children: None    Years of education: None    Highest education level: None   Occupational History    None   Tobacco Use    Smoking status: Former Smoker     Packs/day: 1 00     Years: 20 00     Pack years: 20 00     Types: Cigarettes     Quit date: 2000     Years since quittin 7    Smokeless tobacco: Never Used   Vaping Use    Vaping Use: Never used   Substance and Sexual Activity    Alcohol use: Not Currently    Drug use: Never    Sexual activity: Not Currently   Other Topics Concern    None   Social History Narrative    None     Social Determinants of Health     Financial Resource Strain: Low Risk     Difficulty of Paying Living Expenses: Not very hard   Food Insecurity: Not on file   Transportation Needs: No Transportation Needs    Lack of Transportation (Medical): No    Lack of Transportation (Non-Medical):  No   Physical Activity: Inactive    Days of Exercise per Week: 0 days    Minutes of Exercise per Session: 0 min   Stress: No Stress Concern Present    Feeling of Stress : Not at all   Social Connections: Not on file   Intimate Partner Violence: Not on file   Housing Stability: Not on file      Medications and Allergies:     Current Outpatient Medications   Medication Sig Dispense Refill    acetaminophen (TYLENOL) 500 mg tablet Take 1 tablet (500 mg total) by mouth every 6 (six) hours as needed for mild pain 30 tablet 3    atorvastatin (LIPITOR) 40 mg tablet Take 1 tablet (40 mg total) by mouth daily 30 tablet 11    AUSTEDO 12 MG TABS Take 1 tablet by mouth 2 (two) times a day      carbidopa-levodopa (SINEMET)  mg per tablet Take 1 tablet by mouth in the morning and 1 tablet in the evening and 1 tablet before bedtime  270 tablet 3    cloZAPine (CLOZARIL) 100 mg tablet Take 100 mg by mouth 2 (two) times a day      clozapine (CLOZARIL) 200 MG tablet Take 200 mg by mouth daily at bedtime       docusate sodium (Colace) 100 mg capsule Take 1 capsule (100 mg total) by mouth 2 (two) times a day 60 capsule 5    Emollient (Lubriderm Daily Moisture) LOTN Apply topically daily as needed (rash and itching) 473 mL 2    metoprolol succinate (TOPROL-XL) 25 mg 24 hr tablet Take 1 tablet (25 mg total) by mouth daily 90 tablet 3    olopatadine HCl (PATADAY) 0 2 % opth drops INSTILL 1 DROP IN EACH EYE AT BEDTIME (NC) 2 5 mL 3    omeprazole (PriLOSEC) 40 MG capsule Take 1 capsule (40 mg total) by mouth daily 30 capsule 5    psyllium (METAMUCIL SMOOTH TEXTURE) 28 % packet Take 1 packet by mouth 2 (two) times a day 180 packet 3    tamsulosin (FLOMAX) 0 4 mg Take 1 capsule (0 4 mg total) by mouth daily at bedtime 90 capsule 3    temazepam (RESTORIL) 15 mg capsule Take 15 mg by mouth daily at bedtime as needed for sleep      topiramate (TOPAMAX) 25 mg tablet Take 25 mg by mouth 2 (two) times a day  1    traZODone (DESYREL) 100 mg tablet Take 100 mg by mouth daily at bedtime as needed       Vitamin D High Potency 25 MCG (1000 UT) capsule Take 1 capsule (1,000 Units total) by mouth daily 30 capsule 11    ZINC OXIDE, TOPICAL, 10 % CREA Apply topically 2 (two) times a day as needed (groin redness and itching) (Patient not taking: Reported on 9/23/2022) 113 g 1     No current facility-administered medications for this visit       No Known Allergies   Immunizations:     Immunization History   Administered Date(s) Administered    COVID-19 MODERNA VACC 0 5 ML IM 02/19/2021, 03/19/2021    COVID-19 PFIZER VACCINE 0 3 ML IM 01/29/2021, 02/19/2021, 11/10/2021    COVID-19 Pfizer Vac BIVALENT Santos-sucrose 12 Yr+ IM (BOOSTER ONLY) 09/20/2022    H1N1, All Formulations 10/23/2009    Influenza, high dose seasonal 0 7 mL 10/22/2019, 09/22/2020, 09/27/2021, 09/23/2022    Influenza, seasonal, injectable 10/20/2009    Pneumococcal Polysaccharide PPV23 10/16/2009, 10/16/2009    Tdap 09/22/2020      Health Maintenance:         Topic Date Due    Colorectal Cancer Screening  10/04/2029    Hepatitis C Screening  Completed     There are no preventive care reminders to display for this patient  Medicare Screening Tests and Risk Assessments:     Ирина Gregorio is here for his Subsequent Wellness visit  Last Medicare Wellness visit information reviewed, patient interviewed, no change since last AWV  Last Medicare Wellness visit information reviewed, patient interviewed and updates made to the record today  Health Risk Assessment:   Patient rates overall health as good  Patient feels that their physical health rating is same  Patient is satisfied with their life  Eyesight was rated as same  Hearing was rated as same  Patient feels that their emotional and mental health rating is same  Patients states they are never, rarely angry  Patient states they are sometimes unusually tired/fatigued  Pain experienced in the last 7 days has been none  Patient states that he has experienced no weight loss or gain in last 6 months  Depression Screening:   PHQ-2 Score: 0      Fall Risk Screening: In the past year, patient has experienced: no history of falling in past year      Home Safety:  Patient does not have trouble with stairs inside or outside of their home  Patient has working smoke alarms and has working carbon monoxide detector  Home safety hazards include: none  Nutrition:   Current diet is Regular and Limited junk food  Medications:   Patient is currently taking over-the-counter supplements  OTC medications include: see medication list  Patient is not able to manage medications   Patient in group home medications managed for him    Activities of Daily Living (ADLs)/Instrumental Activities of Daily Living (IADLs):   Walk and transfer into and out of bed and chair?: Yes  Dress and groom yourself?: Yes    Bathe or shower yourself?: Yes    Feed yourself? Yes  Do your laundry/housekeeping?: No  Manage your money, pay your bills and track your expenses?: No  Make your own meals?: No    Do your own shopping?: No    Previous Hospitalizations:   Any hospitalizations or ED visits within the last 12 months?: No      Advance Care Planning:     Durable POA for healthcare: Yes      Comments: Sister     Cognitive Screening:   Provider or family/friend/caregiver concerned regarding cognition?: No    PREVENTIVE SCREENINGS      Cardiovascular Screening:    General: Screening Not Indicated and History Lipid Disorder      Diabetes Screening:     General: Screening Current      Colorectal Cancer Screening:     General: Screening Current      Prostate Cancer Screening:    General: Screening Current      Osteoporosis Screening:    General: Screening Not Indicated      Abdominal Aortic Aneurysm (AAA) Screening:    Risk factors include: age between 73-69 yo and tobacco use        Lung Cancer Screening:     General: Screening Not Indicated      Hepatitis C Screening:    General: Screening Current    Screening, Brief Intervention, and Referral to Treatment (SBIRT)    Screening  Typical number of drinks in a day: 0  Typical number of drinks in a week: 0  Interpretation: Low risk drinking behavior  Single Item Drug Screening:  How often have you used an illegal drug (including marijuana) or a prescription medication for non-medical reasons in the past year? never    Single Item Drug Screen Score: 0  Interpretation: Negative screen for possible drug use disorder    Brief Intervention  Alcohol & drug use screenings were reviewed  No concerns regarding substance use disorder identified  Other Counseling Topics:   Car/seat belt/driving safety, skin self-exam and sunscreen       No exam data present     Physical Exam:     /56 (BP Location: Left arm, Patient Position: Sitting, Cuff Size: Standard)   Pulse 84   Temp 97 8 °F (36 6 °C) (Oral)   Resp 20   Ht 5' 8" (1 727 m)   Wt 64 9 kg (143 lb)   SpO2 99%   BMI 21 74 kg/m²     Physical Exam  Vitals and nursing note reviewed  Constitutional:       Appearance: He is well-developed  HENT:      Head: Normocephalic and atraumatic  Right Ear: Tympanic membrane normal       Left Ear: Tympanic membrane normal       Nose: Nose normal       Mouth/Throat:      Mouth: Mucous membranes are moist       Pharynx: Oropharynx is clear  Eyes:      Conjunctiva/sclera: Conjunctivae normal       Pupils: Pupils are equal, round, and reactive to light  Cardiovascular:      Rate and Rhythm: Normal rate and regular rhythm  Pulses: Normal pulses  Heart sounds: No murmur heard  Pulmonary:      Effort: Pulmonary effort is normal  No respiratory distress  Breath sounds: Normal breath sounds  Abdominal:      Palpations: Abdomen is soft  Tenderness: There is no abdominal tenderness  Musculoskeletal:         General: Normal range of motion  Cervical back: Neck supple  Skin:     General: Skin is warm and dry  Capillary Refill: Capillary refill takes less than 2 seconds  Neurological:      General: No focal deficit present  Mental Status: He is alert and oriented to person, place, and time  Psychiatric:         Mood and Affect: Mood normal          Behavior: Behavior normal          Thought Content:  Thought content normal          Judgment: Judgment normal           LIUDMILA Mills

## 2022-09-28 DIAGNOSIS — I77.810 AORTIC ROOT DILATATION (HCC): ICD-10-CM

## 2022-09-28 DIAGNOSIS — E55.9 VITAMIN D DEFICIENCY: ICD-10-CM

## 2022-09-28 DIAGNOSIS — E78.00 HYPERCHOLESTEREMIA: ICD-10-CM

## 2022-09-28 RX ORDER — ATORVASTATIN CALCIUM 40 MG/1
40 TABLET, FILM COATED ORAL DAILY
Qty: 30 TABLET | Refills: 11 | Status: SHIPPED | OUTPATIENT
Start: 2022-09-28

## 2022-09-28 RX ORDER — METOPROLOL SUCCINATE 25 MG/1
25 TABLET, EXTENDED RELEASE ORAL DAILY
Qty: 90 TABLET | Refills: 3 | Status: SHIPPED | OUTPATIENT
Start: 2022-09-28

## 2022-09-30 RX ORDER — CHOLECALCIFEROL (VITAMIN D3) 25 MCG
1000 CAPSULE ORAL DAILY
Qty: 30 CAPSULE | Refills: 11 | Status: SHIPPED | OUTPATIENT
Start: 2022-09-30

## 2022-10-03 DIAGNOSIS — K21.9 GASTROESOPHAGEAL REFLUX DISEASE: ICD-10-CM

## 2022-10-03 RX ORDER — OMEPRAZOLE 40 MG/1
40 CAPSULE, DELAYED RELEASE ORAL DAILY
Qty: 30 CAPSULE | Refills: 5 | Status: SHIPPED | OUTPATIENT
Start: 2022-10-03

## 2022-10-10 DIAGNOSIS — R52 PAIN: ICD-10-CM

## 2022-10-10 RX ORDER — PSEUDOEPHED/ACETAMINOPH/DIPHEN 30MG-500MG
TABLET ORAL
Qty: 28 TABLET | Refills: 3 | Status: SHIPPED | OUTPATIENT
Start: 2022-10-10

## 2022-10-20 ENCOUNTER — TELEPHONE (OUTPATIENT)
Dept: INTERNAL MEDICINE CLINIC | Facility: CLINIC | Age: 72
End: 2022-10-20

## 2022-10-21 DIAGNOSIS — R05.9 COUGH, UNSPECIFIED TYPE: Primary | ICD-10-CM

## 2022-10-25 ENCOUNTER — APPOINTMENT (OUTPATIENT)
Dept: LAB | Facility: CLINIC | Age: 72
End: 2022-10-25
Payer: MEDICARE

## 2022-10-25 DIAGNOSIS — R05.9 COUGH, UNSPECIFIED TYPE: ICD-10-CM

## 2022-10-25 PROCEDURE — 87449 NOS EACH ORGANISM AG IA: CPT

## 2022-10-26 ENCOUNTER — TELEPHONE (OUTPATIENT)
Dept: INTERNAL MEDICINE CLINIC | Facility: CLINIC | Age: 72
End: 2022-10-26

## 2022-10-26 LAB — L PNEUMO1 AG UR QL IA.RAPID: NEGATIVE

## 2022-10-26 NOTE — TELEPHONE ENCOUNTER
----- Message from [address] sent at 10/26/2022  3:17 PM EDT -----  Please call Mirian at the group home and let her know that his Legionella test is negative

## 2022-10-29 ENCOUNTER — HOSPITAL ENCOUNTER (EMERGENCY)
Facility: HOSPITAL | Age: 72
Discharge: HOME | End: 2022-10-30
Attending: EMERGENCY MEDICINE

## 2022-10-29 ENCOUNTER — APPOINTMENT (EMERGENCY)
Dept: CT IMAGING | Facility: HOSPITAL | Age: 72
End: 2022-10-29

## 2022-10-29 DIAGNOSIS — K59.00 CONSTIPATION: Primary | ICD-10-CM

## 2022-10-29 DIAGNOSIS — R10.33 PERIUMBILICAL ABDOMINAL PAIN: ICD-10-CM

## 2022-10-29 DIAGNOSIS — N28.89 LEFT KIDNEY MASS: ICD-10-CM

## 2022-10-29 LAB
ALBUMIN SERPL BCP-MCNC: 2.2 G/DL (ref 3.5–5)
ALP SERPL-CCNC: 261 U/L (ref 46–116)
ALT SERPL W P-5'-P-CCNC: 28 U/L (ref 12–78)
ANION GAP SERPL CALCULATED.3IONS-SCNC: 7 MMOL/L (ref 4–13)
AST SERPL W P-5'-P-CCNC: 48 U/L (ref 5–45)
BASOPHILS # BLD AUTO: 0.03 THOUSANDS/ÂΜL (ref 0–0.1)
BASOPHILS NFR BLD AUTO: 0 % (ref 0–1)
BILIRUB SERPL-MCNC: 0.56 MG/DL (ref 0.2–1)
BUN SERPL-MCNC: 16 MG/DL (ref 5–25)
CALCIUM ALBUM COR SERPL-MCNC: 9.7 MG/DL (ref 8.3–10.1)
CALCIUM SERPL-MCNC: 8.3 MG/DL (ref 8.3–10.1)
CHLORIDE SERPL-SCNC: 104 MMOL/L (ref 96–108)
CO2 SERPL-SCNC: 28 MMOL/L (ref 21–32)
CREAT SERPL-MCNC: 0.73 MG/DL (ref 0.6–1.3)
EOSINOPHIL # BLD AUTO: 0.2 THOUSAND/ÂΜL (ref 0–0.61)
EOSINOPHIL NFR BLD AUTO: 3 % (ref 0–6)
ERYTHROCYTE [DISTWIDTH] IN BLOOD BY AUTOMATED COUNT: 13.9 % (ref 11.6–15.1)
GFR SERPL CREATININE-BSD FRML MDRD: 92 ML/MIN/1.73SQ M
GLUCOSE SERPL-MCNC: 87 MG/DL (ref 65–140)
HCT VFR BLD AUTO: 30.2 % (ref 36.5–49.3)
HGB BLD-MCNC: 9.6 G/DL (ref 12–17)
IMM GRANULOCYTES # BLD AUTO: 0.03 THOUSAND/UL (ref 0–0.2)
IMM GRANULOCYTES NFR BLD AUTO: 0 % (ref 0–2)
LIPASE SERPL-CCNC: 99 U/L (ref 73–393)
LYMPHOCYTES # BLD AUTO: 0.83 THOUSANDS/ÂΜL (ref 0.6–4.47)
LYMPHOCYTES NFR BLD AUTO: 11 % (ref 14–44)
MCH RBC QN AUTO: 28.8 PG (ref 26.8–34.3)
MCHC RBC AUTO-ENTMCNC: 31.8 G/DL (ref 31.4–37.4)
MCV RBC AUTO: 91 FL (ref 82–98)
MONOCYTES # BLD AUTO: 0.62 THOUSAND/ÂΜL (ref 0.17–1.22)
MONOCYTES NFR BLD AUTO: 8 % (ref 4–12)
NEUTROPHILS # BLD AUTO: 6.12 THOUSANDS/ÂΜL (ref 1.85–7.62)
NEUTS SEG NFR BLD AUTO: 78 % (ref 43–75)
NRBC BLD AUTO-RTO: 0 /100 WBCS
PLATELET # BLD AUTO: 219 THOUSANDS/UL (ref 149–390)
PMV BLD AUTO: 8.8 FL (ref 8.9–12.7)
POTASSIUM SERPL-SCNC: 3.7 MMOL/L (ref 3.5–5.3)
PROT SERPL-MCNC: 6.4 G/DL (ref 6.4–8.4)
RBC # BLD AUTO: 3.33 MILLION/UL (ref 3.88–5.62)
SODIUM SERPL-SCNC: 139 MMOL/L (ref 135–147)
WBC # BLD AUTO: 7.83 THOUSAND/UL (ref 4.31–10.16)

## 2022-10-29 NOTE — DISCHARGE INSTRUCTIONS
Please follow up PCP  Recommend tylenol 650 mg and ibuprofen 600 mg every 6 hours as needed for pain  Please return for severe chest pain, significant shortness of breath, severely worsening symptoms, or any other concerning signs or symptoms  Please refer to the following documents for additional instructions and return precautions  Questionable indeterminate mass in the left kidney for which nonemergent ultrasound is recommended for further evaluation

## 2022-10-29 NOTE — ED PROVIDER NOTES
History  Chief Complaint   Patient presents with   • Constipation     Pt report to ED with complaints of constipation  Last BM this afternoon  77-year-old male history of hypertension COPD presenting with abdominal discomfort and constipation  Reports about 1 week of constipation  Reports occasional passage of small hard stool  Intermittent periumbilical abdominal discomfort  Denies any nausea vomiting  Denies any bleeding  Denies any chest pain trouble breathing  Denies any other complaints  Chart reviewed  Past Medical History:  No date: Asthma  No date: COPD (chronic obstructive pulmonary disease) (HCC)  No date: GERD (gastroesophageal reflux disease)  12/10/2021: Herpes zoster without complication  No date: Hypercholesteremia  No date: Hypertension  No date: Paranoid schizophrenia ([facility] [address] )  No date: Psychiatric disorder  Family History: non-contributory  Social History            Prior to Admission Medications   Prescriptions Last Dose Informant Patient Reported? Taking? AUSTEDO 12 MG TABS  Care Giver Yes No   Sig: Take 1 tablet by mouth 2 (two) times a day   Acetaminophen Extra Strength 500 MG tablet   No No   Sig: TAKE ONE TABLET BY MOUTH EVERY 6 HOURS AS NEEDED FOR MILD PAIN (NOT TO EXCEED 3GM OF ACETAMINOPHEN IN 24 HOURS) (R)   Emollient (Lubriderm Daily Moisture) LOTN  Care Giver No No   Sig: Apply topically daily as needed (rash and itching)   Vitamin D High Potency 25 MCG (1000 UT) capsule   No No   Sig: Take 1 capsule (1,000 Units total) by mouth daily   ZINC OXIDE, TOPICAL, 10 % CREA   No No   Sig: Apply topically 2 (two) times a day as needed (groin redness and itching)   Patient not taking: Reported on 9/23/2022   atorvastatin (LIPITOR) 40 mg tablet   No No   Sig: Take 1 tablet (40 mg total) by mouth daily   carbidopa-levodopa (SINEMET)  mg per tablet  Care Giver No No   Sig: Take 1 tablet by mouth in the morning and 1 tablet in the evening and 1 tablet before bedtime     cloZAPine (CLOZARIL) 100 mg tablet  Care Giver Yes No   Sig: Take 100 mg by mouth 2 (two) times a day   clozapine (CLOZARIL) 200 MG tablet  Care Giver Yes No   Sig: Take 200 mg by mouth daily at bedtime    docusate sodium (Colace) 100 mg capsule   No No   Sig: Take 1 capsule (100 mg total) by mouth 2 (two) times a day   metoprolol succinate (TOPROL-XL) 25 mg 24 hr tablet   No No   Sig: Take 1 tablet (25 mg total) by mouth daily   olopatadine HCl (PATADAY) 0 2 % opth drops   No No   Sig: INSTILL 1 DROP IN EACH EYE AT BEDTIME (NC)   omeprazole (PriLOSEC) 40 MG capsule   No No   Sig: Take 1 capsule (40 mg total) by mouth daily   psyllium (METAMUCIL SMOOTH TEXTURE) 28 % packet   No No   Sig: Take 1 packet by mouth 2 (two) times a day   tamsulosin (FLOMAX) 0 4 mg  Care Giver No No   Sig: Take 1 capsule (0 4 mg total) by mouth daily at bedtime   temazepam (RESTORIL) 15 mg capsule  Care Giver Yes No   Sig: Take 15 mg by mouth daily at bedtime as needed for sleep   topiramate (TOPAMAX) 25 mg tablet  Care Giver Yes No   Sig: Take 25 mg by mouth 2 (two) times a day   traZODone (DESYREL) 100 mg tablet  Care Giver Yes No   Sig: Take 100 mg by mouth daily at bedtime as needed       Facility-Administered Medications: None       Past Medical History:   Diagnosis Date   • Asthma    • COPD (chronic obstructive pulmonary disease) ([facility])    • GERD (gastroesophageal reflux disease)    • Herpes zoster without complication 45/94/6596   • Hypercholesteremia    • Hypertension    • Paranoid schizophrenia ([facility])    • Psychiatric disorder        Past Surgical History:   Procedure Laterality Date   • CATARACT EXTRACTION     • COLONOSCOPY         Family History   Problem Relation Age of Onset   • No Known Problems Mother    • No Known Problems Father    • Parkinsonism Son      I have reviewed and agree with the history as documented      E-Cigarette/Vaping   • E-Cigarette Use Never User      E-Cigarette/Vaping Substances   • Nicotine No    • THC No    • CBD No    • Flavoring No    • Other No    • Unknown No      Social History     Tobacco Use   • Smoking status: Former Smoker     Packs/day: 1 00     Years: 20 00     Pack years: 20 00     Types: Cigarettes     Quit date:      Years since quittin 8   • Smokeless tobacco: Never Used   Vaping Use   • Vaping Use: Never used   Substance Use Topics   • Alcohol use: Not Currently   • Drug use: Never       Review of Systems   Constitutional: Negative for appetite change, chills, diaphoresis and fever  HENT: Negative for congestion, rhinorrhea and sore throat  Eyes: Negative for photophobia and visual disturbance  Respiratory: Negative for chest tightness and shortness of breath  Cardiovascular: Negative for chest pain and leg swelling  Gastrointestinal: Positive for abdominal pain and constipation  Negative for abdominal distention, blood in stool, diarrhea, nausea and vomiting  Genitourinary: Negative for difficulty urinating and dysuria  Musculoskeletal: Negative for back pain and joint swelling  Skin: Negative for rash  Neurological: Negative for dizziness, weakness, light-headedness, numbness and headaches  Psychiatric/Behavioral: Negative for agitation and confusion  All other systems reviewed and are negative  Physical Exam  Physical Exam  Vitals and nursing note reviewed  Constitutional:       General: He is not in acute distress  Appearance: Normal appearance  He is well-developed  He is not ill-appearing, toxic-appearing or diaphoretic  HENT:      Head: Normocephalic and atraumatic  Nose: Nose normal  No congestion or rhinorrhea  Mouth/Throat:      Mouth: Mucous membranes are moist       Pharynx: Oropharynx is clear  No oropharyngeal exudate or posterior oropharyngeal erythema  Eyes:      General: No scleral icterus  Right eye: No discharge  Left eye: No discharge  Extraocular Movements: Extraocular movements intact        Conjunctiva/sclera: Conjunctivae normal       Pupils: Pupils are equal, round, and reactive to light  Neck:      Vascular: No JVD  Trachea: No tracheal deviation  Comments: Supple  Normal range of motion  Cardiovascular:      Rate and Rhythm: Normal rate and regular rhythm  Heart sounds: Normal heart sounds  No murmur heard  No friction rub  No gallop  Comments: Normal rate and regular rhythm  Pulmonary:      Effort: Pulmonary effort is normal  No respiratory distress  Breath sounds: Normal breath sounds  No stridor  No wheezing or rales  Comments: Clear to auscultation bilaterally  Chest:      Chest wall: No tenderness  Abdominal:      General: Bowel sounds are normal  There is no distension  Palpations: Abdomen is soft  Tenderness: There is no abdominal tenderness  There is no right CVA tenderness, left CVA tenderness, guarding or rebound  Comments: Soft, nontender, nondistended  Normal bowel sounds throughout   Musculoskeletal:         General: No swelling, tenderness, deformity or signs of injury  Normal range of motion  Cervical back: Normal range of motion and neck supple  No rigidity  No muscular tenderness  Right lower leg: No edema  Left lower leg: No edema  Lymphadenopathy:      Cervical: No cervical adenopathy  Skin:     General: Skin is warm and dry  Coloration: Skin is not pale  Findings: No erythema or rash  Neurological:      General: No focal deficit present  Mental Status: He is alert  Mental status is at baseline  Sensory: No sensory deficit  Motor: No weakness or abnormal muscle tone  Coordination: Coordination normal       Gait: Gait normal       Comments: Alert  Strength and sensation grossly intact  Ambulatory without difficulty at baseline  Psychiatric:         Behavior: Behavior normal          Thought Content:  Thought content normal          Vital Signs  ED Triage Vitals   Temperature Pulse Respirations Blood Pressure SpO2   10/29/22 1800 10/29/22 1800 10/29/22 1900 10/29/22 1800 10/29/22 1800   (!) 97 3 °F (36 3 °C) 75 18 130/69 98 %      Temp Source Heart Rate Source Patient Position - Orthostatic VS BP Location FiO2 (%)   10/29/22 1800 10/29/22 1800 10/29/22 1800 10/29/22 1800 --   Oral Monitor Lying Right arm       Pain Score       10/30/22 0517       No Pain           Vitals:    10/29/22 1900 10/29/22 2000 10/29/22 2100 10/30/22 0517   BP: 124/74 118/72 123/75 134/70   Pulse: 73 75 72 70   Patient Position - Orthostatic VS: Lying Lying Lying Lying         Visual Acuity      ED Medications  Medications - No data to display    Diagnostic Studies  Results Reviewed     Procedure Component Value Units Date/Time    Comprehensive metabolic panel [[id number]]  (Abnormal) Collected: 10/29/22 1846    Lab Status: Final result Specimen: Blood from Hand, Right Updated: 10/29/22 1907     Sodium 139 mmol/L      Potassium 3 7 mmol/L      Chloride 104 mmol/L      CO2 28 mmol/L      ANION GAP 7 mmol/L      BUN 16 mg/dL      Creatinine 0 73 mg/dL      Glucose 87 mg/dL      Calcium 8 3 mg/dL      Corrected Calcium 9 7 mg/dL      AST 48 U/L      ALT 28 U/L      Alkaline Phosphatase 261 U/L      Total Protein 6 4 g/dL      Albumin 2 2 g/dL      Total Bilirubin 0 56 mg/dL      eGFR 92 ml/min/1 73sq m     Narrative:      [place] guidelines for Chronic Kidney Disease (CKD):   •  Stage 1 with normal or high GFR (GFR > 90 mL/min/1 73 square meters)  •  Stage 2 Mild CKD (GFR = 60-89 mL/min/1 73 square meters)  •  Stage 3A Moderate CKD (GFR = 45-59 mL/min/1 73 square meters)  •  Stage 3B Moderate CKD (GFR = 30-44 mL/min/1 73 square meters)  •  Stage 4 Severe CKD (GFR = 15-29 mL/min/1 73 square meters)  •  Stage 5 End Stage CKD (GFR <15 mL/min/1 73 square meters)  Note: GFR calculation is accurate only with a steady state creatinine    Lipase [[id number]]  (Normal) Collected: 10/29/22 1846    Lab Status: Final result Specimen: Blood from Hand, Right Updated: 10/29/22 1907     Lipase 99 u/L     CBC and differential [[id number]]  (Abnormal) Collected: 10/29/22 1846    Lab Status: Final result Specimen: Blood from Hand, Right Updated: 10/29/22 1852     WBC 7 83 Thousand/uL      RBC 3 33 Million/uL      Hemoglobin 9 6 g/dL      Hematocrit 30 2 %      MCV 91 fL      MCH 28 8 pg      MCHC 31 8 g/dL      RDW 13 9 %      MPV 8 8 fL      Platelets 284 Thousands/uL      nRBC 0 /100 WBCs      Neutrophils Relative 78 %      Immat GRANS % 0 %      Lymphocytes Relative 11 %      Monocytes Relative 8 %      Eosinophils Relative 3 %      Basophils Relative 0 %      Neutrophils Absolute 6 12 Thousands/µL      Immature Grans Absolute 0 03 Thousand/uL      Lymphocytes Absolute 0 83 Thousands/µL      Monocytes Absolute 0 62 Thousand/µL      Eosinophils Absolute 0 20 Thousand/µL      Basophils Absolute 0 03 Thousands/µL                  CT abdomen pelvis wo contrast   Final Result by Jocy Villa DO (10/29 1940)      Questionable indeterminate mass in the left kidney for which nonemergent ultrasound is recommended for further evaluation  Thickening of the urinary bladder wall which may represent cystitis  Follow-up with urology is recommended  Large amount of fecal material within the colon      Nonspecific bilateral perinephric stranding  Further evaluation with urinalysis is recommended to rule out infection      The study was marked in EPIC for immediate notification  Workstation performed: [id number]                    Procedures  Procedures         ED Course  ED Course as of 10/30/22 1830   Sat Oct 29, 2022   2242 Constipation improved  Pending transfer back to facility                               SBIRT 22yo+    Flowsheet Row Most Recent Value   SBIRT (25 yo +)    In order to provide better care to our patients, we are screening all of our patients for alcohol and drug use   Would it be okay to ask you these screening questions? Unable to answer at this time Filed at: 10/29/2022 1750                    MDM  Number of Diagnoses or Management Options  Constipation  Left kidney mass  Diagnosis management comments: 70-year-old male history of hypertension COPD presenting with abdominal discomfort and constipation  Intermittent abdominal pain with passage of small amounts of hard stool  Likely constipation  However, given age amongst other risk factors patient is at higher risk for other abdominal pathology such as obstruction  Plan for basic labs including abdominal labs  CT abdomen pelvis  Reassess  Labs and imaging no acute process  Informed patient of kidney kidney and follow up recommendations  Enema with significant improvement in constipation  Discussed results and recommendations  Advised follow up PCP  Medication recommendations  Given instructions and return precautions  Patient/family at bedside acknowledged understanding of all written and verbal instructions and return precautions  Discharged            Amount and/or Complexity of Data Reviewed  Clinical lab tests: reviewed and ordered  Tests in the radiology section of CPT®: reviewed and ordered  Tests in the medicine section of CPT®: ordered and reviewed  Review and summarize past medical records: yes  Independent visualization of images, tracings, or specimens: yes    Risk of Complications, Morbidity, and/or Mortality  Presenting problems: moderate  Diagnostic procedures: moderate  Management options: moderate    Patient Progress  Patient progress: improved      Disposition  Final diagnoses:   Left kidney mass   Constipation   Periumbilical abdominal pain     Time reflects when diagnosis was documented in both MDM as applicable and the Disposition within this note     Time User Action Codes Description Comment    10/29/2022  7:43 PM Yaz Bella Add [N28 89] Left kidney mass     10/29/2022  7:44 PM Yaz Bella Add [K59 00] Constipation     10/29/2022  7:44 PM Schuyler Pancoast Modify [C40 63] Left kidney mass     10/29/2022  7:44 PM Schuyler Pancoast Modify [K59 00] Constipation     10/30/2022  6:30 PM Schuyler Pancoast Add [Z48 48] Periumbilical abdominal pain       ED Disposition     ED Disposition   Discharge    Condition   Stable    Date/Time   Sat Oct 29, 2022  7:43 PM    Comment   Nancy Lopez discharge to home/self care  Follow-up Information    None         Discharge Medication List as of 10/29/2022  9:24 PM      CONTINUE these medications which have NOT CHANGED    Details   Acetaminophen Extra Strength 500 MG tablet TAKE ONE TABLET BY MOUTH EVERY 6 HOURS AS NEEDED FOR MILD PAIN (NOT TO EXCEED 3GM OF ACETAMINOPHEN IN 24 HOURS) (R), Normal      atorvastatin (LIPITOR) 40 mg tablet Take 1 tablet (40 mg total) by mouth daily, Starting Wed 9/28/2022, Normal      AUSTEDO 12 MG TABS Take 1 tablet by mouth 2 (two) times a day, Starting Fri 1/3/2020, Historical Med      carbidopa-levodopa (SINEMET)  mg per tablet Take 1 tablet by mouth in the morning and 1 tablet in the evening and 1 tablet before bedtime  , Starting Wed 5/18/2022, Normal      !! cloZAPine (CLOZARIL) 100 mg tablet Take 100 mg by mouth 2 (two) times a day, Historical Med      !! clozapine (CLOZARIL) 200 MG tablet Take 200 mg by mouth daily at bedtime , Starting Mon 4/8/2019, Historical Med      docusate sodium (Colace) 100 mg capsule Take 1 capsule (100 mg total) by mouth 2 (two) times a day, Starting Fri 9/2/2022, Normal      Emollient (Lubriderm Daily Moisture) LOTN Apply topically daily as needed (rash and itching), Starting Mon 9/27/2021, Normal      metoprolol succinate (TOPROL-XL) 25 mg 24 hr tablet Take 1 tablet (25 mg total) by mouth daily, Starting Wed 9/28/2022, Normal      olopatadine HCl (PATADAY) 0 2 % opth drops INSTILL 1 DROP IN [facility] EYE AT BEDTIME (NC), Normal      omeprazole (PriLOSEC) 40 MG capsule Take 1 capsule (40 mg total) by mouth daily, Starting Mon 10/3/2022, Normal psyllium (METAMUCIL SMOOTH TEXTURE) 28 % packet Take 1 packet by mouth 2 (two) times a day, Starting Mon 7/18/2022, Normal      tamsulosin (FLOMAX) 0 4 mg Take 1 capsule (0 4 mg total) by mouth daily at bedtime, Starting Mon 4/18/2022, Normal      temazepam (RESTORIL) 15 mg capsule Take 15 mg by mouth daily at bedtime as needed for sleep, Historical Med      topiramate (TOPAMAX) 25 mg tablet Take 25 mg by mouth 2 (two) times a day, Starting Mon 7/1/2019, Historical Med      traZODone (DESYREL) 100 mg tablet Take 100 mg by mouth daily at bedtime as needed , Starting Mon 4/8/2019, Historical Med      Vitamin D High Potency 25 MCG (1000 UT) capsule Take 1 capsule (1,000 Units total) by mouth daily, Starting Fri 9/30/2022, Normal      ZINC OXIDE, TOPICAL, 10 % CREA Apply topically 2 (two) times a day as needed (groin redness and itching), Starting Mon 3/7/2022, Normal       !! - Potential duplicate medications found  Please discuss with provider  No discharge procedures on file      PDMP Review     None          ED Provider  Electronically Signed by           Lupe Hughes MD  10/30/22 7586

## 2022-10-30 VITALS
BODY MASS INDEX: 22.73 KG/M2 | OXYGEN SATURATION: 98 % | SYSTOLIC BLOOD PRESSURE: 134 MMHG | HEIGHT: 68 IN | HEART RATE: 70 BPM | RESPIRATION RATE: 18 BRPM | TEMPERATURE: 100 F | DIASTOLIC BLOOD PRESSURE: 70 MMHG | WEIGHT: 150 LBS

## 2022-10-30 NOTE — ED NOTES
Pt to be transported to [facility]  [facility] contacted at this time        Bora Bhatti, DARVIN  10/29/22 5557

## 2022-10-30 NOTE — ED NOTES
Enema effective  Patient able to have a bowel movement  Large amount of stool noted  Tolerated well  MD made aware        Amelai Tee RN  10/29/22 [id number]

## 2022-10-30 NOTE — ED NOTES
here to  pt & go back home  Discharge instructions reviewed with pt       Erin Joseph RN  10/30/22 Medhat Calderon RN  10/30/22 [id number]

## 2022-11-02 ENCOUNTER — OFFICE VISIT (OUTPATIENT)
Dept: UROLOGY | Facility: CLINIC | Age: 72
End: 2022-11-02

## 2022-11-02 VITALS
SYSTOLIC BLOOD PRESSURE: 120 MMHG | BODY MASS INDEX: 20.46 KG/M2 | HEART RATE: 80 BPM | OXYGEN SATURATION: 97 % | HEIGHT: 68 IN | WEIGHT: 135 LBS | DIASTOLIC BLOOD PRESSURE: 78 MMHG

## 2022-11-02 DIAGNOSIS — N41.1 CHRONIC PROSTATITIS: ICD-10-CM

## 2022-11-02 DIAGNOSIS — N39.43 URINARY INCONTINENCE, POST-VOID DRIBBLING: ICD-10-CM

## 2022-11-02 DIAGNOSIS — R35.1 NOCTURIA: ICD-10-CM

## 2022-11-02 DIAGNOSIS — K59.09 CHRONIC CONSTIPATION: ICD-10-CM

## 2022-11-02 DIAGNOSIS — N39.41 URGE URINARY INCONTINENCE: ICD-10-CM

## 2022-11-02 DIAGNOSIS — N40.1 BPH WITH OBSTRUCTION/LOWER URINARY TRACT SYMPTOMS: ICD-10-CM

## 2022-11-02 DIAGNOSIS — N13.8 BPH WITH OBSTRUCTION/LOWER URINARY TRACT SYMPTOMS: ICD-10-CM

## 2022-11-02 DIAGNOSIS — D41.02 NEOPLASM OF UNCERTAIN BEHAVIOR OF LEFT KIDNEY: Primary | ICD-10-CM

## 2022-11-02 DIAGNOSIS — N32.81 OVERACTIVE BLADDER: ICD-10-CM

## 2022-11-02 DIAGNOSIS — R35.0 URINARY FREQUENCY: ICD-10-CM

## 2022-11-02 LAB
BACTERIA UR QL AUTO: ABNORMAL /HPF
BILIRUB UR QL STRIP: ABNORMAL
CLARITY UR: ABNORMAL
COLOR UR: ABNORMAL
GLUCOSE UR STRIP-MCNC: NEGATIVE MG/DL
HGB UR QL STRIP.AUTO: ABNORMAL
KETONES UR STRIP-MCNC: NEGATIVE MG/DL
LEUKOCYTE ESTERASE UR QL STRIP: ABNORMAL
MUCOUS THREADS UR QL AUTO: ABNORMAL
NITRITE UR QL STRIP: POSITIVE
NON-SQ EPI CELLS URNS QL MICRO: ABNORMAL /HPF
PH UR STRIP.AUTO: 6 [PH]
POST-VOID RESIDUAL VOLUME, ML POC: 8 ML
PROT UR STRIP-MCNC: ABNORMAL MG/DL
RBC #/AREA URNS AUTO: ABNORMAL /HPF
SL AMB  POCT GLUCOSE, UA: NORMAL
SL AMB LEUKOCYTE ESTERASE,UA: NORMAL
SL AMB POCT BILIRUBIN,UA: NORMAL
SL AMB POCT BLOOD,UA: NORMAL
SL AMB POCT CLARITY,UA: CLEAR
SL AMB POCT COLOR,UA: NORMAL
SL AMB POCT KETONES,UA: NORMAL
SL AMB POCT NITRITE,UA: NORMAL
SL AMB POCT PH,UA: 6
SL AMB POCT SPECIFIC GRAVITY,UA: 1.03
SL AMB POCT URINE PROTEIN: NORMAL
SL AMB POCT UROBILINOGEN: 2
SP GR UR STRIP.AUTO: 1.02 (ref 1–1.03)
UROBILINOGEN UR STRIP-ACNC: 4 MG/DL
WBC #/AREA URNS AUTO: ABNORMAL /HPF
WBC CLUMPS # UR AUTO: PRESENT /UL

## 2022-11-02 NOTE — PATIENT INSTRUCTIONS
Voided urine x3  PVR = 8 ml         Increase oral fluid intake @ 1 5-2 Liters / day  Continue Flomax/tamsulosin 0 4 mg daily  CT renal protocol  Return to office 1-2 months to review above results, and perform baseline cystoscopy and TRUS

## 2022-11-02 NOTE — PROGRESS NOTES
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 Discussion/PLAN:  Voided urine x3  PVR = 8 ml         Increase oral fluid intake @ 1 5-2 Liters / day  Continue Flomax/tamsulosin 0 4 mg daily  CT renal protocol  Return to office 1-2 months to review above results, and perform baseline cystoscopy and TRUS  History of Present Illness  67 y o  male with a history of BPH/LUTS last seen by Ousmane LAWTON, 01/19/2022 with an IPSS = 7, and complaining of postvoid dribbling with a weak urinary stream, hesitancy and nocturia times 1-2  The patient was on Flomax  The patient was just recently seen in the emergency room on 10/29/2022 for evaluation of constipation and abdominal pains  BUN/creatinine at that time = 16/0 73, is hematocrit was only 30 and platelet count of 261  Noncontrast CT A/P 10/29/2022: Indeterminate 4 cm left renal mass with perinephric stranding, thick-walled bladder consistent with possible cystitis and large stool burden  Recommendations for a follow-up renal sonogram was advised  PSA 02/21/2022 = 0 5     CURRENT VOIDING PATTERN:   He has had an episode of urgency incontinence recently in the hospital and does have strong urges to void but denies current urge incontinent episodes  He also has postvoid dribbling with occasional post micturition incontinence  There is hesitancy but claims to have a strong stream   He denies orchialgia and flank pain  There is erectile dysfunction with diminished libido  He denies history of stone disease, UTIs common STD  There is NO family history of urological malignancies including prostate, bladder, kidney, testicular cancer        ON PHYSICAL EXAM:  15 cm left paramedian abdominal scar with remote history of intestinal bleeding and possible exploratory laparotomy?    Genital exam shows descended testicles, nontender  No inguinal hernias  Penis is circumcised with normal meatus  Digital rectal exam estimates his prostate at 40 g, diffusely firm is smooth, sensitive, with urgency on palpation  No suspicious nodules  After obtaining voided urine x3  PVR = 8 ml      AUA Symptom Score      Review of Systems  Review of Systems   Constitutional: Negative for appetite change, chills and fever  HENT: Negative for congestion, ear pain, nosebleeds and tinnitus  Eyes: Negative for pain and visual disturbance  Respiratory: Negative for cough and shortness of breath  Cardiovascular: Negative for chest pain, palpitations and leg swelling  Gastrointestinal: Positive for constipation  Negative for abdominal distention, abdominal pain, blood in stool, diarrhea, nausea and vomiting  Genitourinary: Positive for difficulty urinating, frequency and urgency  Negative for dysuria, flank pain, hematuria, penile pain, scrotal swelling and testicular pain  Patient has frequency every hour, nocturia x3  He also complains of occasional dysuria but no gross hematuria  He has had an episode of urgency incontinence recently in the hospital and does have strong urges to void but denies current urge incontinent episodes  He also has postvoid dribbling with occasional post micturition incontinence  There is hesitancy but claims to have a strong stream   He denies orchialgia and flank pain  There is erectile dysfunction with diminished libido  He denies history of stone disease, UTIs common STD  There is NO family history of urological malignancies including prostate, bladder, kidney, testicular cancer  Musculoskeletal: Negative for back pain  Neurological: Positive for tremors  Negative for dizziness, seizures and headaches     Hematological: Negative for adenopathy  Does not bruise/bleed easily  Psychiatric/Behavioral:        Schizoaffective disorder  Past Medical History  Past Medical History:   Diagnosis Date   • Asthma    • COPD (chronic obstructive pulmonary disease) ([facility])    • GERD (gastroesophageal reflux disease)    • Herpes zoster without complication    • Hypercholesteremia    • Hypertension    • Paranoid schizophrenia ([facility] [address] )    • Psychiatric disorder        Past Social History  Past Surgical History:   Procedure Laterality Date   • CATARACT EXTRACTION     • COLONOSCOPY         Past Family History  Family History   Problem Relation Age of Onset   • No Known Problems Mother    • No Known Problems Father    • Parkinsonism Son        Past Social history  Social History     Socioeconomic History   • Marital status: Single     Spouse name: Not on file   • Number of children: Not on file   • Years of education: Not on file   • Highest education level: Not on file   Occupational History   • Not on file   Tobacco Use   • Smoking status: Former Smoker     Packs/day: 1 00     Years: 20      Pack years:      Types: Cigarettes     Quit date: 2000     Years since quittin 8   • Smokeless tobacco: Never Used   Vaping Use   • Vaping Use: Never used   Substance and Sexual Activity   • Alcohol use: Not Currently   • Drug use: Never   • Sexual activity: Not Currently   Other Topics Concern   • Not on file   Social History Narrative   • Not on file     Social Determinants of Health     Financial Resource Strain: Low Risk    • Difficulty of Paying Living Expenses: Not very hard   Food Insecurity: Not on file   Transportation Needs: No Transportation Needs   • Lack of Transportation (Medical): No   • Lack of Transportation (Non-Medical):  No   Physical Activity: Inactive   • Days of Exercise per Week: 0 days   • Minutes of Exercise per Session: 0 min   Stress: No Stress Concern Present   • Feeling of Stress : Not at all   Social Connections: Not on file   Intimate Partner Violence: Not on file   Housing Stability: Not on file       Current Medications  Current Outpatient Medications   Medication Sig Dispense Refill   • Acetaminophen Extra Strength 500 MG tablet TAKE ONE TABLET BY MOUTH EVERY 6 HOURS AS NEEDED FOR MILD PAIN (NOT TO EXCEED 3GM OF ACETAMINOPHEN IN 24 HOURS) (R) 28 tablet 3   • atorvastatin (LIPITOR) 40 mg tablet Take 1 tablet (40 mg total) by mouth daily 30 tablet 11   • AUSTEDO 12 MG TABS Take 1 tablet by mouth 2 (two) times a day     • carbidopa-levodopa (SINEMET)  mg per tablet Take 1 tablet by mouth in the morning and 1 tablet in the evening and 1 tablet before bedtime   270 tablet 3   • cloZAPine (CLOZARIL) 100 mg tablet Take 100 mg by mouth 2 (two) times a day     • clozapine (CLOZARIL) 200 MG tablet Take 200 mg by mouth daily at bedtime      • docusate sodium (Colace) 100 mg capsule Take 1 capsule (100 mg total) by mouth 2 (two) times a day 60 capsule 5   • Emollient (Lubriderm Daily Moisture) LOTN Apply topically daily as needed (rash and itching) 473 mL 2   • metoprolol succinate (TOPROL-XL) 25 mg 24 hr tablet Take 1 tablet (25 mg total) by mouth daily 90 tablet 3   • olopatadine HCl (PATADAY) 0 2 % opth drops INSTILL 1 DROP IN EACH EYE AT BEDTIME (NC) 2 5 mL 3   • omeprazole (PriLOSEC) 40 MG capsule Take 1 capsule (40 mg total) by mouth daily 30 capsule 5   • psyllium (METAMUCIL SMOOTH TEXTURE) 28 % packet Take 1 packet by mouth 2 (two) times a day 180 packet 3   • tamsulosin (FLOMAX) 0 4 mg Take 1 capsule (0 4 mg total) by mouth daily at bedtime 90 capsule 3   • temazepam (RESTORIL) 15 mg capsule Take 15 mg by mouth daily at bedtime as needed for sleep     • topiramate (TOPAMAX) 25 mg tablet Take 25 mg by mouth 2 (two) times a day  1   • traZODone (DESYREL) 100 mg tablet Take 100 mg by mouth daily at bedtime as needed      • Vitamin D High Potency 25 MCG (1000 UT) capsule Take 1 capsule (1,000 Units total) by mouth daily 30 capsule 11   • ZINC OXIDE, TOPICAL, 10 % CREA Apply topically 2 (two) times a day as needed (groin redness and itching) (Patient not taking: Reported on 9/23/2022) 113 g 1     No current facility-administered medications for this visit  Allergies  No Known Allergies    Past Medical History, Social History, Family History, medications and allergies were reviewed  Vitals  There were no vitals filed for this visit  Physical Exam  Physical Exam  Constitutional:       General: He is not in acute distress  Appearance: He is normal weight  HENT:      Head: Normocephalic and atraumatic  Nose: No congestion  Eyes:      General: No scleral icterus  Extraocular Movements: Extraocular movements intact  Pulmonary:      Effort: Pulmonary effort is normal  No respiratory distress  Abdominal:      General: Abdomen is flat  There is no distension  Palpations: Abdomen is soft  Comments: 15 cm left paramedian scar secondary to intestinal bleeding in the remote past requiring exploratory laparotomy? Genitourinary:     Comments: Genital exam shows descended testicles, nontender  No inguinal hernias  Penis is circumcised with normal meatus  Digital rectal exam estimates his prostate at 40 g, diffusely firm is smooth, sensitive, with urgency on palpation  No suspicious nodules  Musculoskeletal:      Right lower leg: No edema  Left lower leg: No edema  Lymphadenopathy:      Cervical: No cervical adenopathy  Skin:     General: Skin is warm and dry  Findings: No erythema or rash  Neurological:      General: No focal deficit present  Mental Status: He is alert and oriented to person, place, and time  Mental status is at baseline  Comments: History of Parkinson's, schizoaffective disorder, mild intention tremor               Results  Lab Results   Component Value Date    PSA 0 5 02/21/2022    PSA 0 5 01/12/2021     Lab Results   Component Value Date CALCIUM 8 3 10/29/2022    K 3 7 10/29/2022    CO2 28 10/29/2022     10/29/2022    BUN 16 10/29/2022    CREATININE 0 73 10/29/2022     Lab Results   Component Value Date    WBC 7 83 10/29/2022    HGB 9 6 (L) 10/29/2022    HCT 30 2 (L) 10/29/2022    MCV 91 10/29/2022     10/29/2022         Office Urine Dip  No results found for this or any previous visit (from the past 1 hour(s))  ]      Total visit time was 60 minutes of which over 50% was spent on counseling

## 2022-11-04 LAB — BACTERIA UR CULT: ABNORMAL

## 2022-11-22 ENCOUNTER — OFFICE VISIT (OUTPATIENT)
Dept: INTERNAL MEDICINE CLINIC | Facility: CLINIC | Age: 72
End: 2022-11-22

## 2022-11-22 VITALS
DIASTOLIC BLOOD PRESSURE: 60 MMHG | TEMPERATURE: 97.6 F | SYSTOLIC BLOOD PRESSURE: 100 MMHG | WEIGHT: 136 LBS | OXYGEN SATURATION: 99 % | BODY MASS INDEX: 20.61 KG/M2 | HEART RATE: 76 BPM | HEIGHT: 68 IN

## 2022-11-22 DIAGNOSIS — N28.89 RENAL MASS: Primary | ICD-10-CM

## 2022-11-22 NOTE — PROGRESS NOTES
INTERNAL MEDICINE FOLLOW-UP VISIT  [facility] - [facility] VANDANA JOHNSON    NAME: Elio Means  AGE: 67 y o  SEX: male  : 1950     DATE: 2022     Assessment and Plan:   1  Renal mass  2022 CT renal protocol for further investigation of renal mass of left kidney   Urology appt Dr Xavier Ocampo  No pain,   Manage constipation with stool softeners  Stay hydrated with water        No follow-ups on file  Chief Complaint:     Chief Complaint   Patient presents with   • Follow-up     Patient was in the ER and he has a cyst on the kidney      History of Present Illness:     Patient is here today for a follow-up after in the the emergency room due to constipation  He did a CT scan of his abdomen and pelvis which revealed a left kidney measure  He did see Urology who ordered a CT renal protocol which is scheduled for   Patient denies any pain to his flank or abdominal pain  He has been moving his bowels and urinating appropriately  The following portions of the patient's history were reviewed and updated as appropriate: allergies, current medications, past family history, past medical history, past social history, past surgical history and problem list      Review of Systems:     Review of Systems   Constitutional: Negative for appetite change, chills, diaphoresis, fatigue, fever and unexpected weight change  HENT: Negative for postnasal drip and sneezing  Eyes: Negative for visual disturbance  Respiratory: Negative for chest tightness and shortness of breath  Cardiovascular: Negative for chest pain, palpitations and leg swelling  Gastrointestinal: Negative for abdominal pain and blood in stool  Endocrine: Negative for cold intolerance, heat intolerance, polydipsia, polyphagia and polyuria  Genitourinary: Negative for difficulty urinating, dysuria, frequency and urgency  Musculoskeletal: Negative for arthralgias and myalgias     Skin: Negative for rash and wound  Neurological: Negative for dizziness, weakness, light-headedness and headaches  Hematological: Negative for adenopathy  Psychiatric/Behavioral: Negative for confusion, dysphoric mood and sleep disturbance  The patient is not nervous/anxious  Past Medical History:     Past Medical History:   Diagnosis Date   • Asthma    • Benign prostatic hyperplasia    • COPD (chronic obstructive pulmonary disease) ([facility])    • GERD (gastroesophageal reflux disease)    • Herpes zoster without complication 11/66/9888   • Hypercholesteremia    • Hypertension    • Paranoid schizophrenia ([facility] [address] )    • Psychiatric disorder         Current Medications:     Current Outpatient Medications:   •  Acetaminophen Extra Strength 500 MG tablet, TAKE ONE TABLET BY MOUTH EVERY 6 HOURS AS NEEDED FOR MILD PAIN (NOT TO EXCEED 3GM OF ACETAMINOPHEN IN 24 HOURS) (R), Disp: 28 tablet, Rfl: 3  •  atorvastatin (LIPITOR) 40 mg tablet, Take 1 tablet (40 mg total) by mouth daily, Disp: 30 tablet, Rfl: 11  •  AUSTEDO 12 MG TABS, Take 1 tablet by mouth 2 (two) times a day, Disp: , Rfl:   •  carbidopa-levodopa (SINEMET)  mg per tablet, Take 1 tablet by mouth in the morning and 1 tablet in the evening and 1 tablet before bedtime   (Patient taking differently: Take 1 tablet by mouth 2 (two) times a day), Disp: 270 tablet, Rfl: 3  •  cloZAPine (CLOZARIL) 100 mg tablet, Take 100 mg by mouth 2 (two) times a day, Disp: , Rfl:   •  clozapine (CLOZARIL) 200 MG tablet, Take 200 mg by mouth daily at bedtime , Disp: , Rfl:   •  docusate sodium (Colace) 100 mg capsule, Take 1 capsule (100 mg total) by mouth 2 (two) times a day, Disp: 60 capsule, Rfl: 5  •  Emollient (Lubriderm Daily Moisture) LOTN, Apply topically daily as needed (rash and itching), Disp: 473 mL, Rfl: 2  •  metoprolol succinate (TOPROL-XL) 25 mg 24 hr tablet, Take 1 tablet (25 mg total) by mouth daily, Disp: 90 tablet, Rfl: 3  •  olopatadine HCl (PATADAY) 0 2 % opth drops, INSTILL 1 DROP IN [facility] EYE AT BEDTIME (NC), Disp: 2 5 mL, Rfl: 3  •  omeprazole (PriLOSEC) 40 MG capsule, Take 1 capsule (40 mg total) by mouth daily, Disp: 30 capsule, Rfl: 5  •  psyllium (METAMUCIL SMOOTH TEXTURE) 28 % packet, Take 1 packet by mouth 2 (two) times a day, Disp: 180 packet, Rfl: 3  •  tamsulosin (FLOMAX) 0 4 mg, Take 1 capsule (0 4 mg total) by mouth daily at bedtime, Disp: 90 capsule, Rfl: 3  •  temazepam (RESTORIL) 15 mg capsule, Take 15 mg by mouth daily at bedtime, Disp: , Rfl:   •  topiramate (TOPAMAX) 25 mg tablet, Take 25 mg by mouth 2 (two) times a day, Disp: , Rfl: 1  •  traZODone (DESYREL) 100 mg tablet, Take 100 mg by mouth daily at bedtime, Disp: , Rfl:   •  Vitamin D High Potency 25 MCG (1000 UT) capsule, Take 1 capsule (1,000 Units total) by mouth daily, Disp: 30 capsule, Rfl: 11  •  ZINC OXIDE, TOPICAL, 10 % CREA, Apply topically 2 (two) times a day as needed (groin redness and itching) (Patient not taking: Reported on 11/22/2022), Disp: 113 g, Rfl: 1     Allergies:   No Known Allergies     Physical Exam:     /60 (BP Location: Left arm, Patient Position: Sitting, Cuff Size: Standard)   Pulse 76   Temp 97 6 °F (36 4 °C) (Temporal)   Ht 5' 8" (1 727 m)   Wt 61 7 kg (136 lb)   SpO2 99%   BMI 20 68 kg/m²     Physical Exam  Constitutional:       Appearance: He is well-developed and well-nourished  HENT:      Head: Normocephalic and atraumatic  Eyes:      Conjunctiva/sclera: Conjunctivae normal       Pupils: Pupils are equal, round, and reactive to light  Cardiovascular:      Rate and Rhythm: Normal rate and regular rhythm  Heart sounds: Normal heart sounds  Pulmonary:      Effort: Pulmonary effort is normal       Breath sounds: Normal breath sounds  Abdominal:      General: Bowel sounds are normal       Palpations: Abdomen is soft  Musculoskeletal:         General: Normal range of motion  Cervical back: Normal range of motion     Skin:     General: Skin is warm and dry  Neurological:      Mental Status: He is alert and oriented to person, place, and time  Data:     Laboratory Results: I have personally reviewed the pertinent laboratory results/reports   Radiology/Other Diagnostic Testing Results: I have personally reviewed pertinent reports        LIUDMILA Alvarado  [facility]

## 2022-12-14 ENCOUNTER — TELEPHONE (OUTPATIENT)
Dept: UROLOGY | Facility: CLINIC | Age: 72
End: 2022-12-14

## 2022-12-14 NOTE — TELEPHONE ENCOUNTER
Tried calling pt - number will not go through - cancelled appt Raffi - as he is no longer here - will try to call back to r/s

## 2022-12-27 ENCOUNTER — HOSPITAL ENCOUNTER (OUTPATIENT)
Dept: CT IMAGING | Facility: HOSPITAL | Age: 72
Discharge: HOME | End: 2022-12-27
Attending: UROLOGY

## 2022-12-27 DIAGNOSIS — N13.8 BPH WITH OBSTRUCTION/LOWER URINARY TRACT SYMPTOMS: ICD-10-CM

## 2022-12-27 DIAGNOSIS — D41.02 NEOPLASM OF UNCERTAIN BEHAVIOR OF LEFT KIDNEY: ICD-10-CM

## 2022-12-27 DIAGNOSIS — N40.1 BPH WITH OBSTRUCTION/LOWER URINARY TRACT SYMPTOMS: ICD-10-CM

## 2022-12-27 RX ADMIN — IOHEXOL 100 ML: 350 INJECTION, SOLUTION INTRAVENOUS at 15:36

## 2022-12-31 ENCOUNTER — RA CDI HCC (OUTPATIENT)
Dept: OTHER | Facility: HOSPITAL | Age: 72
End: 2022-12-31

## 2023-01-09 ENCOUNTER — OFFICE VISIT (OUTPATIENT)
Dept: INTERNAL MEDICINE CLINIC | Facility: CLINIC | Age: 73
End: 2023-01-09

## 2023-01-09 VITALS
HEART RATE: 76 BPM | HEIGHT: 68 IN | BODY MASS INDEX: 21.4 KG/M2 | SYSTOLIC BLOOD PRESSURE: 118 MMHG | TEMPERATURE: 97.3 F | WEIGHT: 141.2 LBS | DIASTOLIC BLOOD PRESSURE: 70 MMHG | OXYGEN SATURATION: 99 %

## 2023-01-09 DIAGNOSIS — D41.02 NEOPLASM OF UNCERTAIN BEHAVIOR OF LEFT KIDNEY: Primary | ICD-10-CM

## 2023-01-09 NOTE — PATIENT INSTRUCTIONS
Follow up with Urology as scheduled in March  Get labs before next appointment with me    Return if you develop any fever, chills, urinary burning, increased frequency or urgency, blood in urine or abdominal/side pain

## 2023-01-09 NOTE — PROGRESS NOTES
[place]    NAME: Kenny Stack  AGE: 67 y o  SEX: male  : 1950     DATE: 2023     Assessment and Plan:     Problem List Items Addressed This Visit        Genitourinary    Neoplasm of uncertain behavior of left kidney - Primary     CT renal scan ordered by urology shows the following  Results were reviewed with the patient  Made aware that there was no renal mass identified on this CAT scan  Urologist that ordered test no longer with their practice  Patient has a follow-up with a new urologist at the end of March  Counseled to keep that appointment  Patient also counseled that if he develops signs of urinary tract infection, fever, chills, urinary burning, frequency urgency, hematuria to follow-up with this office  1   Cholelithiasis  2   No renal mass identified  3   Compared to the prior study from 10/29/2022, there is interval resolution of focal edema in the left upper pole kidney which has evolved into cortical scarring  This is most suggestive of resolved focal pyelonephritis  Consider follow-up renal   ultrasound in 6 months  4   Persistent nonspecific perinephric stranding bilaterally and haziness of fat within the abdomen and subcutaneous tissue, suggestive of component of third spacing of fluid/mild anasarca                No follow-ups on file  Chief Complaint:     Chief Complaint   Patient presents with   • Follow-up     Ct scan        History of Present Illness: Fredrik Spatz presents to the office today for follow-up related to recent CT scan of kidney  Offers no new complaints or concerns     Review of Systems:     Review of Systems   Constitutional: Negative  HENT: Negative  Respiratory: Negative  Cardiovascular: Negative  Gastrointestinal: Negative  Genitourinary:        Dribbling   Musculoskeletal: Negative  Skin: Negative  Neurological: Negative           Problem List:     Patient Active Problem List   Diagnosis   • GERD (gastroesophageal reflux disease)   • Neuro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 ([facility] [address] )   • Hypertension   • Hypercholesteremia   • [facility]   • Combined form of senile cataract   • MRSA carrier   • Groin rash   • Fall   • Chronic obstructive pulmonary disease, unspecified COPD type ([facility] )   • Neoplasm of uncertain behavior of left kidney   • BPH with obstruction/lower urinary tract symptoms   • Chronic constipation   • Overactive bladder   • Urge urinary incontinence   • Urinary incontinence, post-void dribbling   • Urinary frequency   • Nocturia   • Chronic prostatitis        Objective:     /70   Pulse 76   Temp (!) 97 3 °F (36 3 °C)   Ht 5' 8" (1 727 m)   Wt 64 kg (141 lb 3 2 oz)   SpO2 99%   BMI 21 47 kg/m²     Physical Exam  Constitutional:       General: He is not in acute distress  Appearance: Normal appearance  He is obese  HENT:      Head: Normocephalic and atraumatic  Right Ear: External ear normal       Left Ear: External ear normal       Nose: Nose normal       Mouth/Throat:      Pharynx: Oropharynx is clear  Eyes:      Conjunctiva/sclera: Conjunctivae normal    Cardiovascular:      Rate and Rhythm: Normal rate and regular rhythm  Pulses: Normal pulses  Heart sounds: Normal heart sounds  Pulmonary:      Effort: Pulmonary effort is normal  No respiratory distress  Breath sounds: Normal breath sounds  Abdominal:      General: There is no distension  Palpations: Abdomen is soft  Tenderness: There is no abdominal tenderness  Musculoskeletal:         General: Normal range of motion  Cervical back: Neck supple  Skin:     General: Skin is warm and dry  Capillary Refill: Capillary refill takes less than 2 seconds  Neurological:      Mental Status: He is alert and oriented to person, place, and time  Psychiatric:         Mood and Affect: Mood normal  Affect is flat           Behavior: Behavior normal  Thought Content: Thought content normal          Judgment: Judgment normal          I spent 10 minutes with this patient      [address] [place] [address]  [facility]

## 2023-02-10 ENCOUNTER — OFFICE VISIT (OUTPATIENT)
Dept: INTERNAL MEDICINE CLINIC | Facility: CLINIC | Age: 73
End: 2023-02-10

## 2023-02-10 VITALS
TEMPERATURE: 97.4 F | SYSTOLIC BLOOD PRESSURE: 110 MMHG | WEIGHT: 133.8 LBS | HEIGHT: 68 IN | BODY MASS INDEX: 20.28 KG/M2 | DIASTOLIC BLOOD PRESSURE: 70 MMHG | OXYGEN SATURATION: 99 % | HEART RATE: 78 BPM

## 2023-02-10 DIAGNOSIS — I77.810 AORTIC ROOT DILATATION (HCC): ICD-10-CM

## 2023-02-10 DIAGNOSIS — J06.9 UPPER RESPIRATORY TRACT INFECTION, UNSPECIFIED TYPE: Primary | ICD-10-CM

## 2023-02-10 DIAGNOSIS — F20.0 PARANOID SCHIZOPHRENIA (HCC): ICD-10-CM

## 2023-02-10 DIAGNOSIS — R09.81 NASAL CONGESTION: ICD-10-CM

## 2023-02-10 DIAGNOSIS — J44.9 CHRONIC OBSTRUCTIVE PULMONARY DISEASE, UNSPECIFIED COPD TYPE (HCC): ICD-10-CM

## 2023-02-10 DIAGNOSIS — G21.11 NEUROLEPTIC-INDUCED PARKINSONISM (HCC): ICD-10-CM

## 2023-02-10 DIAGNOSIS — E44.1 MILD PROTEIN-CALORIE MALNUTRITION (HCC): ICD-10-CM

## 2023-02-10 LAB
SARS-COV-2 AG UPPER RESP QL IA: NEGATIVE
VALID CONTROL: NORMAL

## 2023-02-10 RX ORDER — FLUTICASONE PROPIONATE 50 MCG
2 SPRAY, SUSPENSION (ML) NASAL DAILY
Qty: 16 G | Refills: 3 | Status: SHIPPED | OUTPATIENT
Start: 2023-02-10

## 2023-02-10 NOTE — PROGRESS NOTES
INTERNAL MEDICINE FOLLOW-UP VISIT  [facility] - [facility] VANDANA JOHNSON    NAME: Winston Victoria  AGE: 67 y o  SEX: male  : 1950     DATE: 2/10/2023     Assessment and Plan:   1  Upper respiratory tract infection, unspecified type  - fluticasone (FLONASE) 50 mcg/act nasal spray; 2 sprays into each nostril daily for 10 days, than may use as needed for nasal congestion  Dispense: 16 g; Refill: 3  - POCT Rapid Covid Ag  Negative COVID  If symptoms worsen contact the office        No follow-ups on file  Chief Complaint:     Chief Complaint   Patient presents with   • Nasal Congestion     Head and stomach hurt      History of Present Illness:     Patient is here today for a 1 week hx of cold like symptoms  He has nasal congestion  He denies a fever, sore throat, or GI issues  The following portions of the patient's history were reviewed and updated as appropriate: allergies, current medications, past family history, past medical history, past social history, past surgical history and problem list      Review of Systems:     Review of Systems   Constitutional: Negative for appetite change, chills, diaphoresis, fatigue, fever and unexpected weight change  HENT: Positive for congestion and rhinorrhea  Negative for postnasal drip and sneezing  Eyes: Negative for visual disturbance  Respiratory: Negative for chest tightness and shortness of breath  Cardiovascular: Negative for chest pain, palpitations and leg swelling  Gastrointestinal: Negative for abdominal pain and blood in stool  Endocrine: Negative for cold intolerance, heat intolerance, polydipsia, polyphagia and polyuria  Genitourinary: Negative for difficulty urinating, dysuria, frequency and urgency  Musculoskeletal: Negative for arthralgias and myalgias  Skin: Negative for rash and wound  Neurological: Negative for dizziness, weakness, light-headedness and headaches     Hematological: Negative for adenopathy  Psychiatric/Behavioral: Negative for confusion, dysphoric mood and sleep disturbance  The patient is not nervous/anxious  Past Medical History:     Past Medical History:   Diagnosis Date   • Asthma    • Benign prostatic hyperplasia    • COPD (chronic obstructive pulmonary disease) (HCC)    • GERD (gastroesophageal reflux disease)    • Herpes zoster without complication 27/23/0610   • Hypercholesteremia    • Hypertension    • Paranoid schizophrenia ([facility] [address] )    • Psychiatric disorder         Current Medications:     Current Outpatient Medications:   •  Acetaminophen Extra Strength 500 MG tablet, TAKE ONE TABLET BY MOUTH EVERY 6 HOURS AS NEEDED FOR MILD PAIN (NOT TO EXCEED 3GM OF ACETAMINOPHEN IN 24 HOURS) (R), Disp: 28 tablet, Rfl: 3  •  atorvastatin (LIPITOR) 40 mg tablet, Take 1 tablet (40 mg total) by mouth daily, Disp: 30 tablet, Rfl: 11  •  AUSTEDO 12 MG TABS, Take 1 tablet by mouth 2 (two) times a day, Disp: , Rfl:   •  carbidopa-levodopa (SINEMET)  mg per tablet, Take 1 tablet by mouth in the morning and 1 tablet in the evening and 1 tablet before bedtime   (Patient taking differently: Take 1 tablet by mouth 2 (two) times a day), Disp: 270 tablet, Rfl: 3  •  cloZAPine (CLOZARIL) 100 mg tablet, Take 100 mg by mouth 2 (two) times a day, Disp: , Rfl:   •  clozapine (CLOZARIL) 200 MG tablet, Take 200 mg by mouth daily at bedtime , Disp: , Rfl:   •  docusate sodium (Colace) 100 mg capsule, Take 1 capsule (100 mg total) by mouth 2 (two) times a day, Disp: 60 capsule, Rfl: 5  •  Emollient (Lubriderm Daily Moisture) LOTN, Apply topically daily as needed (rash and itching), Disp: 473 mL, Rfl: 2  •  metoprolol succinate (TOPROL-XL) 25 mg 24 hr tablet, Take 1 tablet (25 mg total) by mouth daily, Disp: 90 tablet, Rfl: 3  •  olopatadine HCl (PATADAY) 0 2 % opth drops, INSTILL 1 DROP IN EACH EYE AT BEDTIME (NC), Disp: 2 5 mL, Rfl: 3  •  omeprazole (PriLOSEC) 40 MG capsule, Take 1 capsule (40 mg total) by mouth daily, Disp: 30 capsule, Rfl: 5  •  psyllium (METAMUCIL SMOOTH TEXTURE) 28 % packet, Take 1 packet by mouth 2 (two) times a day, Disp: 180 packet, Rfl: 3  •  tamsulosin (FLOMAX) 0 4 mg, Take 1 capsule (0 4 mg total) by mouth daily at bedtime, Disp: 90 capsule, Rfl: 3  •  temazepam (RESTORIL) 15 mg capsule, Take 15 mg by mouth daily at bedtime, Disp: , Rfl:   •  topiramate (TOPAMAX) 25 mg tablet, Take 25 mg by mouth 2 (two) times a day, Disp: , Rfl: 1  •  traZODone (DESYREL) 100 mg tablet, Take 100 mg by mouth daily at bedtime, Disp: , Rfl:   •  Vitamin D High Potency 25 MCG (1000 UT) capsule, Take 1 capsule (1,000 Units total) by mouth daily, Disp: 30 capsule, Rfl: 11  •  ZINC OXIDE, TOPICAL, 10 % CREA, Apply topically 2 (two) times a day as needed (groin redness and itching), Disp: 113 g, Rfl: 1     Allergies:   No Known Allergies     Physical Exam:     /70   Pulse 78   Temp (!) 97 4 °F (36 3 °C)   Ht 5' 8" (1 727 m)   Wt 60 7 kg (133 lb 12 8 oz)   SpO2 99%   BMI 20 34 kg/m²     Physical Exam  Constitutional:       Appearance: He is well-developed  HENT:      Head: Normocephalic and atraumatic  Nose: Congestion and rhinorrhea present  Eyes:      Conjunctiva/sclera: Conjunctivae normal       Pupils: Pupils are equal, round, and reactive to light  Cardiovascular:      Rate and Rhythm: Normal rate and regular rhythm  Heart sounds: Normal heart sounds  Pulmonary:      Effort: Pulmonary effort is normal       Breath sounds: Normal breath sounds  Abdominal:      General: Bowel sounds are normal       Palpations: Abdomen is soft  Musculoskeletal:         General: Normal range of motion  Cervical back: Normal range of motion  Skin:     General: Skin is warm and dry  Neurological:      Mental Status: He is alert and oriented to person, place, and time             Data:     Laboratory Results: I have personally reviewed the pertinent laboratory results/reports Radiology/Other Diagnostic Testing Results: I have personally reviewed pertinent reports        LIUDMILA Khan  [facility]

## 2023-02-21 DIAGNOSIS — E55.9 VITAMIN D DEFICIENCY: ICD-10-CM

## 2023-02-21 DIAGNOSIS — E78.00 HYPERCHOLESTEREMIA: ICD-10-CM

## 2023-02-21 DIAGNOSIS — K21.9 GASTROESOPHAGEAL REFLUX DISEASE: ICD-10-CM

## 2023-02-21 DIAGNOSIS — I77.810 AORTIC ROOT DILATATION (HCC): ICD-10-CM

## 2023-02-21 DIAGNOSIS — K59.09 CHRONIC CONSTIPATION: ICD-10-CM

## 2023-02-21 RX ORDER — DOCUSATE SODIUM 100 MG/1
100 CAPSULE, LIQUID FILLED ORAL 2 TIMES DAILY
Qty: 60 CAPSULE | Refills: 5 | Status: SHIPPED | OUTPATIENT
Start: 2023-02-21 | End: 2023-02-25

## 2023-02-22 RX ORDER — OMEPRAZOLE 40 MG/1
CAPSULE, DELAYED RELEASE ORAL
Qty: 31 CAPSULE | Refills: 11 | Status: SHIPPED | OUTPATIENT
Start: 2023-02-22

## 2023-02-22 RX ORDER — ATORVASTATIN CALCIUM 40 MG/1
TABLET, FILM COATED ORAL
Qty: 31 TABLET | Refills: 11 | Status: SHIPPED | OUTPATIENT
Start: 2023-02-22

## 2023-02-22 RX ORDER — CHOLECALCIFEROL (VITAMIN D3) 25 MCG
CAPSULE ORAL
Qty: 31 CAPSULE | Refills: 11 | Status: SHIPPED | OUTPATIENT
Start: 2023-02-22

## 2023-02-22 RX ORDER — METOPROLOL SUCCINATE 25 MG/1
TABLET, EXTENDED RELEASE ORAL
Qty: 31 TABLET | Refills: 11 | Status: SHIPPED | OUTPATIENT
Start: 2023-02-22

## 2023-02-23 DIAGNOSIS — R21 RASH: ICD-10-CM

## 2023-02-23 DIAGNOSIS — K59.09 CHRONIC CONSTIPATION: ICD-10-CM

## 2023-02-23 DIAGNOSIS — R52 PAIN: ICD-10-CM

## 2023-02-23 DIAGNOSIS — T78.40XD ALLERGY, SUBSEQUENT ENCOUNTER: ICD-10-CM

## 2023-02-25 RX ORDER — PSEUDOEPHED/ACETAMINOPH/DIPHEN 30MG-500MG
TABLET ORAL
Qty: 30 TABLET | Refills: 11 | Status: SHIPPED | OUTPATIENT
Start: 2023-02-25

## 2023-02-25 RX ORDER — ZINC OXIDE 13 %
CREAM (GRAM) TOPICAL
Qty: 113 G | Refills: 11 | Status: SHIPPED | OUTPATIENT
Start: 2023-02-25

## 2023-02-25 RX ORDER — OLOPATADINE HYDROCHLORIDE 2 MG/ML
SOLUTION/ DROPS OPHTHALMIC
Qty: 2.5 ML | Refills: 11 | Status: SHIPPED | OUTPATIENT
Start: 2023-02-25

## 2023-02-25 RX ORDER — SOD CHLORD/LANOLIN/MIN.OIL/PET
LOTION (ML) TOPICAL
Qty: 177 ML | Refills: 11 | Status: SHIPPED | OUTPATIENT
Start: 2023-02-25

## 2023-02-25 RX ORDER — DOCUSATE SODIUM 100 MG/1
CAPSULE, LIQUID FILLED ORAL
Qty: 62 CAPSULE | Refills: 11 | Status: SHIPPED | OUTPATIENT
Start: 2023-02-25

## 2023-02-28 DIAGNOSIS — R09.81 NASAL CONGESTION: ICD-10-CM

## 2023-02-28 RX ORDER — FLUTICASONE PROPIONATE 50 MCG
SPRAY, SUSPENSION (ML) NASAL
Qty: 16 G | Refills: 11 | Status: SHIPPED | OUTPATIENT
Start: 2023-02-28

## 2023-03-02 ENCOUNTER — OFFICE VISIT (OUTPATIENT)
Dept: NEUROLOGY | Facility: CLINIC | Age: 73
End: 2023-03-02

## 2023-03-02 VITALS
OXYGEN SATURATION: 98 % | BODY MASS INDEX: 20.46 KG/M2 | RESPIRATION RATE: 18 BRPM | DIASTOLIC BLOOD PRESSURE: 68 MMHG | HEART RATE: 74 BPM | SYSTOLIC BLOOD PRESSURE: 110 MMHG | HEIGHT: 68 IN | WEIGHT: 135 LBS

## 2023-03-02 DIAGNOSIS — G21.11 NEUROLEPTIC-INDUCED PARKINSONISM (HCC): Primary | ICD-10-CM

## 2023-03-02 DIAGNOSIS — F20.0 PARANOID SCHIZOPHRENIA (HCC): ICD-10-CM

## 2023-03-02 DIAGNOSIS — K59.00 CONSTIPATION, UNSPECIFIED CONSTIPATION TYPE: ICD-10-CM

## 2023-03-02 NOTE — PROGRESS NOTES
Brie Crain is a 67 y o  male  Chief Complaint   Patient presents with   • Parkinson's Disease       Assessment:  1  Neuro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 ([facility] [address] )    2  Paranoid schizophrenia ([facility] [address] )        Plan:  Continue with Sinemet 25/100 mg 1 tablet 3 times a day  Follow-up in 6-months  Discussion:  Patient most likely has neuroleptic induced parkinsonism, he is currently doing well on Sinemet 25/100 mg 1 tablet 3 times a day, currently does not have any tremor, I have advised them his caretakers to discuss with a psychiatrist to see if they can adjust his psych medications and if he continues to do well in the future to let us know and we can try to see if we can take him off of the Sinemet, he was advised to take fall safety and aspiration precautions continue follow-up with his other physicians to go to the hospital if has any worsening symptoms and call us otherwise to see me back in 6 months  Subjective:    HPI   Patient is here in follow-up for his tremor, accompanied with an office staff according to the patient he is doing good he denies having any tremor he is doing good on Sinemet no side effects to the medication, he denies any dizziness he did have an episode of dizziness for which she went to the hospital and was okay he has history of paranoid schizophrenia and currently does not have any hallucinations he is in follow-up with a psychiatrist and has been on antipsychotic medications for a long time, he does have some issues with bladder urgency but denies any incontinence no suicidal or homicidal thoughts no swallowing difficulty sleep is decent appetite is good weight has been stable he has not had any falls no freezing episodes, denying any memory issues, no other complaints      Vitals:    03/02/23 1230   BP: 110/68   BP Location: Right arm   Patient Position: Sitting   Cuff Size: Standard   Pulse: 74   Resp: 18   SpO2: 98%   Weight: 61 2 kg (135 lb)   Height: 5' 8" (1 727 m)       Current Medications    Current Outpatient Medications:   •  Acetaminophen Extra Strength 500 MG tablet, TAKE 1 TABLET BY MOUTH EVERY 6 HOURS AS NEEDED FOR MILD PAIN (MAXIMUM LIMIT OF ACETAMINOPHEN FROM ALL SOURCES IS 3000 MG IN 24 HOURS), Disp: 30 tablet, Rfl: 11  •  atorvastatin (LIPITOR) 40 mg tablet, TAKE 1 TABLET BY MOUTH ONCE EVERY DAY, Disp: 31 tablet, Rfl: 11  •  carbidopa-levodopa (SINEMET)  mg per tablet, Take 1 tablet by mouth in the morning and 1 tablet in the evening and 1 tablet before bedtime   (Patient taking differently: Take 1 tablet by mouth 2 (two) times a day), Disp: 270 tablet, Rfl: 3  •  Desitin 13 % cream, APPLY TO GROIN AS NEEDED (CAN KEEP IN ROOM), Disp: 113 g, Rfl: 11  •  docusate sodium (COLACE) 100 mg capsule, TAKE 1 CAPSULE BY MOUTH TWICE DAILY FOR CHRONIC CONSTIPATION, Disp: 62 capsule, Rfl: 11  •  Emollient (Lubriderm) LOTN, APPLY TOPICALLY EVERY DAY AS NEEDED FOR RASH AND ITCHING, Disp: 177 mL, Rfl: 11  •  fluticasone (FLONASE) 50 mcg/act nasal spray, USE 2 SPRAYS IN EACH NOSTRIL DAILY AS NEEDED FOR NASAL CONGESTION, Disp: 16 g, Rfl: 11  •  metoprolol succinate (TOPROL-XL) 25 mg 24 hr tablet, TAKE 1 TABLET BY MOUTH ONCE EVERY DAY, Disp: 31 tablet, Rfl: 11  •  olopatadine HCl (PATADAY) 0 2 % opth drops, INSTILL 1 DROP INTO EACH EYE EVERY NIGHT AT BEDTIME, Disp: 2 5 mL, Rfl: 11  •  omeprazole (PriLOSEC) 40 MG capsule, TAKE 1 CAPSULE BY MOUTH ONCE EVERY DAY, Disp: 31 capsule, Rfl: 11  •  psyllium (METAMUCIL SMOOTH TEXTURE) 28 % packet, Take one packet by mouth twice a day as directed, Disp: 60 each, Rfl: 11  •  AUSTEDO 12 MG TABS, Take 1 tablet by mouth 2 (two) times a day, Disp: , Rfl:   •  cloZAPine (CLOZARIL) 100 mg tablet, Take 100 mg by mouth 2 (two) times a day, Disp: , Rfl:   •  clozapine (CLOZARIL) 200 MG tablet, Take 200 mg by mouth daily at bedtime , Disp: , Rfl:   •  tamsulosin (FLOMAX) 0 4 mg, Take 1 capsule (0 4 mg total) by mouth daily at bedtime, Disp: 90 capsule, Rfl: 3  • temazepam (RESTORIL) 15 mg capsule, Take 15 mg by mouth daily at bedtime, Disp: , Rfl:   •  topiramate (TOPAMAX) 25 mg tablet, Take 25 mg by mouth 2 (two) times a day, Disp: , Rfl: 1  •  traZODone (DESYREL) 100 mg tablet, Take 100 mg by mouth daily at bedtime, Disp: , Rfl:   •  Vitamin D High Potency 25 MCG (1000 UT) capsule, TAKE 1 CAPSULE BY MOUTH ONCE EVERY DAY, Disp: 31 capsule, Rfl: 11  •  ZINC OXIDE, TOPICAL, 10 % CREA, Apply topically 2 (two) times a day as needed (groin redness and itching), Disp: 113 g, Rfl: 1      Allergies  Patient has no known allergies  Past Medical History  Past Medical History:   Diagnosis Date   • Asthma    • Benign prostatic hyperplasia    • COPD (chronic obstructive pulmonary disease) (HCC)    • GERD (gastroesophageal reflux disease)    • Herpes zoster without complication 04/66/1838   • Hypercholesteremia    • Hypertension    • Paranoid schizophrenia ([facility] [address] )    • Psychiatric disorder          Past Surgical History:  Past Surgical History:   Procedure Laterality Date   • CATARACT EXTRACTION     • COLONOSCOPY           Family History:  Family History   Problem Relation Age of Onset   • No Known Problems Mother    • No Known Problems Father    • Parkinsonism Son        Social History:   reports that he quit smoking about 23 years ago  His smoking use included cigarettes  He has a 20 00 pack-year smoking history  He has never used smokeless tobacco  He reports that he does not currently use alcohol  He reports that he does not use drugs  I have reviewed the past medical history, surgical history, social and family history, current medications, allergies vitals, review of systems, and updated this information as appropriate today  Objective:    Physical Exam    Neurological Exam     GENERAL:  Cooperative in no acute distress  Well-developed and well-nourished     HEAD and NECK   Head is atraumatic normocephalic with no lesions or masses   Neck is supple with full range of motion CARDIOVASCULAR  Carotid Arteries-no carotid bruits  NEUROLOGIC:  Mental Status-the patient is awake alert and oriented without aphasia or apraxia  Cranial Nerves: Visual fields are full to confrontation  Extraocular movements are full without nystagmus  Pupils are 2-1/2 mm and reactive  Face is symmetrical to light touch  Movements of facial expression move symmetrically  Hearing is normal to finger rub bilaterally  Soft palate lifts symmetrically  Shoulder shrug is symmetrical  Tongue is midline without atrophy  Motor: No drift is noted on arm extension  Strength is full in the upper and lower extremities with normal bulk and very mild cogwheeling rigidity, no resting tremor  Gait has a slightly stooped posture with a decreased arm swing    ROS:  Review of Systems   Constitutional: Negative  Negative for appetite change and fever  HENT: Negative  Negative for hearing loss, tinnitus, trouble swallowing and voice change  Eyes: Negative  Negative for photophobia, pain and visual disturbance  Respiratory: Negative  Negative for shortness of breath  Cardiovascular: Negative  Negative for palpitations  Gastrointestinal: Negative  Negative for nausea and vomiting  Endocrine: Negative  Negative for cold intolerance  Genitourinary: Negative  Negative for dysuria, frequency and urgency  Musculoskeletal: Negative  Negative for gait problem, myalgias and neck pain  Skin: Negative  Negative for rash  Allergic/Immunologic: Negative  Neurological: Negative  Negative for dizziness, tremors, seizures, syncope, facial asymmetry, speech difficulty, weakness, light-headedness, numbness and headaches  Hematological: Negative  Does not bruise/bleed easily  Psychiatric/Behavioral: Negative  Negative for confusion, hallucinations and sleep disturbance

## 2023-03-07 ENCOUNTER — TELEPHONE (OUTPATIENT)
Dept: NEUROLOGY | Facility: CLINIC | Age: 73
End: 2023-03-07

## 2023-03-07 NOTE — TELEPHONE ENCOUNTER
Pt's  has called and requested Dr Solis's LOV to be faxed to [phone]      Thank you,     Alejandro Townsend

## 2023-03-13 ENCOUNTER — OFFICE VISIT (OUTPATIENT)
Dept: INTERNAL MEDICINE CLINIC | Facility: CLINIC | Age: 73
End: 2023-03-13

## 2023-03-13 VITALS
HEART RATE: 80 BPM | DIASTOLIC BLOOD PRESSURE: 80 MMHG | RESPIRATION RATE: 16 BRPM | WEIGHT: 135 LBS | BODY MASS INDEX: 20.46 KG/M2 | HEIGHT: 68 IN | SYSTOLIC BLOOD PRESSURE: 124 MMHG

## 2023-03-13 DIAGNOSIS — I77.810 AORTIC ROOT DILATATION (HCC): ICD-10-CM

## 2023-03-13 DIAGNOSIS — E55.9 VITAMIN D DEFICIENCY: ICD-10-CM

## 2023-03-13 DIAGNOSIS — T78.40XD ALLERGY, SUBSEQUENT ENCOUNTER: ICD-10-CM

## 2023-03-13 DIAGNOSIS — R21 GROIN RASH: ICD-10-CM

## 2023-03-13 DIAGNOSIS — E78.00 HYPERCHOLESTEREMIA: ICD-10-CM

## 2023-03-13 DIAGNOSIS — J44.9 CHRONIC OBSTRUCTIVE PULMONARY DISEASE, UNSPECIFIED COPD TYPE (HCC): Primary | ICD-10-CM

## 2023-03-13 DIAGNOSIS — K59.00 CONSTIPATION, UNSPECIFIED CONSTIPATION TYPE: ICD-10-CM

## 2023-03-13 DIAGNOSIS — K21.9 GASTROESOPHAGEAL REFLUX DISEASE WITHOUT ESOPHAGITIS: ICD-10-CM

## 2023-03-13 DIAGNOSIS — R52 PAIN: ICD-10-CM

## 2023-03-13 DIAGNOSIS — R09.81 NASAL CONGESTION: ICD-10-CM

## 2023-03-13 DIAGNOSIS — I10 PRIMARY HYPERTENSION: ICD-10-CM

## 2023-03-13 DIAGNOSIS — R21 RASH: ICD-10-CM

## 2023-03-13 DIAGNOSIS — K21.9 GASTROESOPHAGEAL REFLUX DISEASE: ICD-10-CM

## 2023-03-13 DIAGNOSIS — K59.09 CHRONIC CONSTIPATION: ICD-10-CM

## 2023-03-13 DIAGNOSIS — N39.43 POST-VOID DRIBBLING: ICD-10-CM

## 2023-03-13 RX ORDER — SOD CHLORD/LANOLIN/MIN.OIL/PET
LOTION (ML) TOPICAL DAILY PRN
Qty: 177 ML | Refills: 11 | Status: SHIPPED | OUTPATIENT
Start: 2023-03-13

## 2023-03-13 RX ORDER — FLUTICASONE PROPIONATE 50 MCG
2 SPRAY, SUSPENSION (ML) NASAL DAILY
Qty: 16 G | Refills: 11 | Status: SHIPPED | OUTPATIENT
Start: 2023-03-13

## 2023-03-13 RX ORDER — ACETAMINOPHEN 500 MG
500 TABLET ORAL EVERY 6 HOURS PRN
Qty: 30 TABLET | Refills: 11 | Status: SHIPPED | OUTPATIENT
Start: 2023-03-13

## 2023-03-13 RX ORDER — DOCUSATE SODIUM 100 MG/1
100 CAPSULE, LIQUID FILLED ORAL 2 TIMES DAILY
Qty: 62 CAPSULE | Refills: 11 | Status: SHIPPED | OUTPATIENT
Start: 2023-03-13

## 2023-03-13 RX ORDER — CHOLECALCIFEROL (VITAMIN D3) 25 MCG
1000 CAPSULE ORAL DAILY
Qty: 31 CAPSULE | Refills: 6 | Status: SHIPPED | OUTPATIENT
Start: 2023-03-13

## 2023-03-13 RX ORDER — TAMSULOSIN HYDROCHLORIDE 0.4 MG/1
0.4 CAPSULE ORAL
Qty: 90 CAPSULE | Refills: 3 | Status: SHIPPED | OUTPATIENT
Start: 2023-03-13

## 2023-03-13 RX ORDER — OMEPRAZOLE 40 MG/1
40 CAPSULE, DELAYED RELEASE ORAL DAILY
Qty: 31 CAPSULE | Refills: 11 | Status: SHIPPED | OUTPATIENT
Start: 2023-03-13

## 2023-03-13 RX ORDER — METOPROLOL SUCCINATE 25 MG/1
25 TABLET, EXTENDED RELEASE ORAL DAILY
Qty: 31 TABLET | Refills: 11 | Status: SHIPPED | OUTPATIENT
Start: 2023-03-13

## 2023-03-13 RX ORDER — ATORVASTATIN CALCIUM 40 MG/1
40 TABLET, FILM COATED ORAL DAILY
Qty: 31 TABLET | Refills: 11 | Status: SHIPPED | OUTPATIENT
Start: 2023-03-13

## 2023-03-13 RX ORDER — OLOPATADINE HYDROCHLORIDE 2 MG/ML
1 SOLUTION/ DROPS OPHTHALMIC
Qty: 2.5 ML | Refills: 11 | Status: SHIPPED | OUTPATIENT
Start: 2023-03-13

## 2023-03-13 NOTE — PROGRESS NOTES
INTERNAL MEDICINE FOLLOW-UP VISIT  [facility] - [facility] [address] [place] [address]    NAME: Olivier Curtis  AGE: 67 y o  SEX: male  : 1950     DATE: 3/13/2023     Assessment and Plan:   1  Chronic obstructive pulmonary disease, unspecified COPD type ([address] )  stable    2  Primary hypertension  Limit salt to no more than 2000 mg per day  stable    3  Gastroesophageal reflux disease  Stable   Limit spicy foods and limit eating meals close to bedtime  - omeprazole (PriLOSEC) 40 MG capsule; Take 1 capsule (40 mg total) by mouth daily  Dispense: 31 capsule; Refill: 11    4  Vitamin D deficiency  - Cholecalciferol (Vitamin D High Potency) 25 MCG (1000 UT) capsule; Take 1 capsule (1,000 Units total) by mouth daily  Dispense: 31 capsule; Refill: 6    5  Constipation, unspecified constipation type  Stay well hydrated with at least 70 oz of water per day  - psyllium (METAMUCIL SMOOTH TEXTURE) 28 % packet; Take 1 packet by mouth 2 (two) times a day  Dispense: 60 each; Refill: 11          No follow-ups on file  Chief Complaint:     Chief Complaint   Patient presents with   • Follow-up     6 mo      History of Present Illness:     Patient is here today for a follow up  He is feeling well and has no complaints  He is accompanied by his care worker  The following portions of the patient's history were reviewed and updated as appropriate: allergies, current medications, past family history, past medical history, past social history, past surgical history and problem list      Review of Systems:     Review of Systems   Constitutional: Negative for appetite change, chills, diaphoresis, fatigue, fever and unexpected weight change  HENT: Negative for postnasal drip and sneezing  Eyes: Negative for visual disturbance  Respiratory: Negative for chest tightness and shortness of breath  Cardiovascular: Negative for chest pain, palpitations and leg swelling     Gastrointestinal: Negative for abdominal pain and blood in stool  Endocrine: Negative for cold intolerance, heat intolerance, polydipsia, polyphagia and polyuria  Genitourinary: Negative for difficulty urinating, dysuria, frequency and urgency  Musculoskeletal: Negative for arthralgias and myalgias  Skin: Negative for rash and wound  Neurological: Negative for dizziness, weakness, light-headedness and headaches  Hematological: Negative for adenopathy  Psychiatric/Behavioral: Negative for confusion, dysphoric mood and sleep disturbance  The patient is not nervous/anxious           Past Medical History:     Past Medical History:   Diagnosis Date   • Asthma    • Benign prostatic hyperplasia    • COPD (chronic obstructive pulmonary disease) ([facility])    • GERD (gastroesophageal reflux disease)    • Herpes zoster without complication 53/86/0591   • Hypercholesteremia    • Hypertension    • Paranoid schizophrenia ([facility] [address] )    • Psychiatric disorder         Current Medications:     Current Outpatient Medications:   •  acetaminophen (Acetaminophen Extra Strength) 500 mg tablet, Take 1 tablet (500 mg total) by mouth every 6 (six) hours as needed for mild pain, Disp: 30 tablet, Rfl: 11  •  atorvastatin (LIPITOR) 40 mg tablet, Take 1 tablet (40 mg total) by mouth daily, Disp: 31 tablet, Rfl: 11  •  AUSTEDO 12 MG TABS, Take 1 tablet by mouth 2 (two) times a day, Disp: , Rfl:   •  carbidopa-levodopa (SINEMET)  mg per tablet, Take 1 tablet by mouth 3 (three) times a day, Disp: 270 tablet, Rfl: 3  •  Cholecalciferol (Vitamin D High Potency) 25 MCG (1000 UT) capsule, Take 1 capsule (1,000 Units total) by mouth daily, Disp: 31 capsule, Rfl: 6  •  cloZAPine (CLOZARIL) 100 mg tablet, Take 100 mg by mouth 2 (two) times a day, Disp: , Rfl:   •  clozapine (CLOZARIL) 200 MG tablet, Take 200 mg by mouth daily at bedtime , Disp: , Rfl:   •  Desitin 13 % cream, APPLY TO GROIN AS NEEDED (CAN KEEP IN ROOM), Disp: 113 g, Rfl: 11  •  docusate sodium (COLACE) 100 mg capsule, Take 1 capsule (100 mg total) by mouth 2 (two) times a day, Disp: 62 capsule, Rfl: 11  •  Emollient (Lubriderm) LOTN, Apply topically daily as needed (rash), Disp: 177 mL, Rfl: 11  •  fluticasone (FLONASE) 50 mcg/act nasal spray, 2 sprays into each nostril daily, Disp: 16 g, Rfl: 11  •  metoprolol succinate (TOPROL-XL) 25 mg 24 hr tablet, Take 1 tablet (25 mg total) by mouth daily, Disp: 31 tablet, Rfl: 11  •  olopatadine HCl (PATADAY) 0 2 % opth drops, Administer 1 drop to both eyes daily at bedtime, Disp: 2 5 mL, Rfl: 11  •  omeprazole (PriLOSEC) 40 MG capsule, Take 1 capsule (40 mg total) by mouth daily, Disp: 31 capsule, Rfl: 11  •  psyllium (METAMUCIL SMOOTH TEXTURE) 28 % packet, Take 1 packet by mouth 2 (two) times a day, Disp: 60 each, Rfl: 11  •  tamsulosin (FLOMAX) 0 4 mg, Take 1 capsule (0 4 mg total) by mouth daily at bedtime, Disp: 90 capsule, Rfl: 3  •  temazepam (RESTORIL) 15 mg capsule, Take 15 mg by mouth daily at bedtime, Disp: , Rfl:   •  topiramate (TOPAMAX) 25 mg tablet, Take 25 mg by mouth 2 (two) times a day, Disp: , Rfl: 1  •  traZODone (DESYREL) 100 mg tablet, Take 100 mg by mouth daily at bedtime, Disp: , Rfl:   •  ZINC OXIDE, TOPICAL, 10 % CREA, Apply topically 2 (two) times a day as needed (groin redness and itching), Disp: 113 g, Rfl: 1     Allergies:   No Known Allergies     Physical Exam:     /80 (BP Location: Left arm, Patient Position: Sitting)   Pulse 80   Resp 16   Ht 5' 8" (1 727 m)   Wt 61 2 kg (135 lb)   BMI 20 53 kg/m²     Physical Exam  Constitutional:       Appearance: He is well-developed  HENT:      Head: Normocephalic and atraumatic  Eyes:      Conjunctiva/sclera: Conjunctivae normal       Pupils: Pupils are equal, round, and reactive to light  Cardiovascular:      Rate and Rhythm: Normal rate and regular rhythm  Heart sounds: Normal heart sounds  Pulmonary:      Effort: Pulmonary effort is normal       Breath sounds: Normal breath sounds  Abdominal:      General: Bowel sounds are normal       Palpations: Abdomen is soft  Musculoskeletal:         General: Normal range of motion  Cervical back: Normal range of motion  Skin:     General: Skin is warm and dry  Neurological:      Mental Status: He is alert and oriented to person, place, and time  Psychiatric:         Mood and Affect: Affect is flat  Data:     Laboratory Results: I have personally reviewed the pertinent laboratory results/reports   Radiology/Other Diagnostic Testing Results: I have personally reviewed pertinent reports        Melvinia Koyanagi, CRNP  [facility]

## 2023-03-29 ENCOUNTER — PROCEDURE VISIT (OUTPATIENT)
Dept: UROLOGY | Facility: CLINIC | Age: 73
End: 2023-03-29

## 2023-03-29 VITALS
SYSTOLIC BLOOD PRESSURE: 124 MMHG | OXYGEN SATURATION: 97 % | HEART RATE: 64 BPM | WEIGHT: 138 LBS | HEIGHT: 68 IN | DIASTOLIC BLOOD PRESSURE: 80 MMHG | BODY MASS INDEX: 20.92 KG/M2

## 2023-03-29 DIAGNOSIS — N13.8 BPH WITH OBSTRUCTION/LOWER URINARY TRACT SYMPTOMS: ICD-10-CM

## 2023-03-29 DIAGNOSIS — N40.1 BPH WITH OBSTRUCTION/LOWER URINARY TRACT SYMPTOMS: ICD-10-CM

## 2023-03-29 DIAGNOSIS — N39.43 URINARY INCONTINENCE, POST-VOID DRIBBLING: Primary | ICD-10-CM

## 2023-03-29 DIAGNOSIS — R35.1 NOCTURIA: ICD-10-CM

## 2023-03-29 LAB
SL AMB  POCT GLUCOSE, UA: ABNORMAL
SL AMB LEUKOCYTE ESTERASE,UA: POSITIVE
SL AMB POCT BILIRUBIN,UA: ABNORMAL
SL AMB POCT BLOOD,UA: ABNORMAL
SL AMB POCT CLARITY,UA: CLEAR
SL AMB POCT COLOR,UA: YELLOW
SL AMB POCT KETONES,UA: ABNORMAL
SL AMB POCT NITRITE,UA: ABNORMAL
SL AMB POCT PH,UA: 5
SL AMB POCT SPECIFIC GRAVITY,UA: 1.02
SL AMB POCT URINE PROTEIN: ABNORMAL
SL AMB POCT UROBILINOGEN: 0.2

## 2023-03-29 RX ORDER — SULFAMETHOXAZOLE AND TRIMETHOPRIM 800; 160 MG/1; MG/1
1 TABLET ORAL EVERY 12 HOURS SCHEDULED
Qty: 4 TABLET | Refills: 0 | Status: SHIPPED | OUTPATIENT
Start: 2023-03-29 | End: 2023-03-29 | Stop reason: SDUPTHER

## 2023-03-29 RX ORDER — SULFAMETHOXAZOLE AND TRIMETHOPRIM 800; 160 MG/1; MG/1
1 TABLET ORAL EVERY 12 HOURS SCHEDULED
Qty: 4 TABLET | Refills: 0 | Status: SHIPPED | OUTPATIENT
Start: 2023-03-29 | End: 2023-03-31

## 2023-03-29 NOTE — LETTER
March 29, 2023     [address] [place] [address], [place] [address] [place] [address]    Patient: Angelia Merlin   YOB: 1950   Date of Visit: 3/29/2023       Dear Dr Zoraida Bryant: Thank you for referring Olive Pickett to me for evaluation  Below are my notes for this consultation  If you have questions, please do not hesitate to call me  I look forward to following your patient along with you  Sincerely,        Scot Woodard MD        CC: No Recipients  Scot Woodard MD  3/29/2023  1:38 PM  Sign when Signing Visit  Assessment/Plan:  #1  Urinary incontinence occasional postvoid dribbling-overall the patient's voiding pattern is adequate with an IPSS of 7  He notes a good urinary stream   He is on multiple medications that can inhibit bladder emptying taken for psychiatric reasons  At the present time the patient is not wetting very much I will be encouraged to follow timed voiding's every 2-3 hours to maintain the bladder is empty as possible    2  Nocturia-cystoscopy did reveal some bladder trabeculation and cellule formation however the patient did not have a severely obstructive prostate  He will continue on alpha blockade    3  BPH with lower   tract obstructive symptoms-minimal obstruction if any on cystoscopy  Continue alpha blockade  4   1 episode of hematospermia-ROXANA and last PSA are normal   Plan repeat PSA and ROXANA in 1 year  If recurrent will consider MRI of the prostate  No problem-specific Assessment & Plan notes found for this encounter  Diagnoses and all orders for this visit:    Urinary incontinence, post-void dribbling  -     POCT urine dip    Nocturia    BPH with obstruction/lower urinary tract symptoms  -     sulfamethoxazole-trimethoprim (BACTRIM DS) 800-160 mg per tablet; Take 1 tablet by mouth every 12 (twelve) hours for 4 doses  -     PSA Total, Diagnostic; Future  -     Comprehensive metabolic panel;  Future    Hematospermia "    Subjective:     Patient ID: Miguel A Bo is a 67 y o  male  HPI  42-year-old male previously evaluated by another urologist   Patient is on multiple medications including psych medicine with chronic constipation symptoms of over active bladder occasional postvoid dribbling and urinary incontinence  He is on tamsulosin 0 4 mg p o  daily and currently is voiding with an adequate urinary stream with nocturia 1-2 times per night and no significant regular urinary incontinence  Is any gross hematuria and notes only 1 episode of painless hematospermia  He presents for cystoscopy  In addition the patient had a question of a renal mass which on CT renal protocol was not seen  The following portions of the patient's history were reviewed and updated as appropriate: allergies, current medications, past family history, past medical history, past social history, past surgical history and problem list     Review of Systems   Genitourinary: Positive for frequency and urgency  Psychiatric/Behavioral: Positive for decreased concentration  The patient is nervous/anxious  All other systems reviewed and are negative  Objective:      /80   Pulse 64   Ht 5' 8\" (1 727 m)   Wt 62 6 kg (138 lb)   SpO2 97%   BMI 20 98 kg/m²         Physical Exam  Vitals reviewed  Constitutional:       General: He is not in acute distress  Appearance: Normal appearance  He is normal weight  He is not ill-appearing, toxic-appearing or diaphoretic  HENT:      Head: Normocephalic and atraumatic  Nose: Nose normal       Mouth/Throat:      Mouth: Mucous membranes are moist    Eyes:      Extraocular Movements: Extraocular movements intact  Pulmonary:      Effort: Pulmonary effort is normal  No respiratory distress  Abdominal:      General: Bowel sounds are normal  There is no distension  Palpations: Abdomen is soft  Tenderness: There is no abdominal tenderness  There is no guarding     Genitourinary:    " Penis: Normal        Prostate: Normal       Comments: Prostate 30 g anodular and nontender  Skin:     General: Skin is dry  Neurological:      Mental Status: He is alert

## 2023-03-29 NOTE — PROGRESS NOTES
Assessment/Plan:  #1  Urinary incontinence occasional postvoid dribbling-overall the patient's voiding pattern is adequate with an IPSS of 7  He notes a good urinary stream   He is on multiple medications that can inhibit bladder emptying taken for psychiatric reasons  At the present time the patient is not wetting very much I will be encouraged to follow timed voiding's every 2-3 hours to maintain the bladder is empty as possible    2  Nocturia-cystoscopy did reveal some bladder trabeculation and cellule formation however the patient did not have a severely obstructive prostate  He will continue on alpha blockade    3  BPH with lower   tract obstructive symptoms-minimal obstruction if any on cystoscopy  Continue alpha blockade  4   1 episode of hematospermia-ROXANA and last PSA are normal   Plan repeat PSA and ROXANA in 1 year  If recurrent will consider MRI of the prostate  No problem-specific Assessment & Plan notes found for this encounter  Diagnoses and all orders for this visit:    Urinary incontinence, post-void dribbling  -     POCT urine dip    Nocturia    BPH with obstruction/lower urinary tract symptoms  -     sulfamethoxazole-trimethoprim (BACTRIM DS) 800-160 mg per tablet; Take 1 tablet by mouth every 12 (twelve) hours for 4 doses  -     PSA Total, Diagnostic; Future  -     Comprehensive metabolic panel; Future    Hematospermia          Subjective:      Patient ID: Oletha Dubin is a 67 y o  male  HPI  79-year-old male previously evaluated by another urologist   Patient is on multiple medications including psych medicine with chronic constipation symptoms of over active bladder occasional postvoid dribbling and urinary incontinence  He is on tamsulosin 0 4 mg p o  daily and currently is voiding with an adequate urinary stream with nocturia 1-2 times per night and no significant regular urinary incontinence  Is any gross hematuria and notes only 1 episode of painless hematospermia    He "presents for cystoscopy  In addition the patient had a question of a renal mass which on CT renal protocol was not seen  The following portions of the patient's history were reviewed and updated as appropriate: allergies, current medications, past family history, past medical history, past social history, past surgical history and problem list     Review of Systems   Genitourinary: Positive for frequency and urgency  Psychiatric/Behavioral: Positive for decreased concentration  The patient is nervous/anxious  All other systems reviewed and are negative  Objective:      /80   Pulse 64   Ht 5' 8\" (1 727 m)   Wt 62 6 kg (138 lb)   SpO2 97%   BMI 20 98 kg/m²          Physical Exam  Vitals reviewed  Constitutional:       General: He is not in acute distress  Appearance: Normal appearance  He is normal weight  He is not ill-appearing, toxic-appearing or diaphoretic  HENT:      Head: Normocephalic and atraumatic  Nose: Nose normal       Mouth/Throat:      Mouth: Mucous membranes are moist    Eyes:      Extraocular Movements: Extraocular movements intact  Pulmonary:      Effort: Pulmonary effort is normal  No respiratory distress  Abdominal:      General: Bowel sounds are normal  There is no distension  Palpations: Abdomen is soft  Tenderness: There is no abdominal tenderness  There is no guarding  Genitourinary:     Penis: Normal        Prostate: Normal       Comments: Prostate 30 g anodular and nontender  Skin:     General: Skin is dry  Neurological:      Mental Status: He is alert  Cystoscopy     Date/Time 3/29/2023 1:39 PM     Performed by  Timur Elder MD     Authorized by Timur Elder MD      Universal Protocol:  Consent: Verbal consent obtained  Written consent obtained    Risks and benefits: risks, benefits and alternatives were discussed  Patient understanding: patient states understanding of the procedure being performed  Patient consent: " the patient's understanding of the procedure matches consent given  Procedure consent: procedure consent matches procedure scheduled  Required items: required blood products, implants, devices, and special equipment available  Patient identity confirmed: verbally with patient        Procedure Details:  Procedure type: cystoscopy    Patient tolerance: Patient tolerated the procedure well with no immediate complications    Additional Procedure Details: With the patient in supine position after prepping the urethral meatus with Betadine instilling 2% lidocaine lubricant per urethra flexible video cystourethroscopy revealed a normal anterior urethra without stricture or lesion  The prostatic urethra revealed mild bilobar enlargement the lateral lobes of the prostate without high-grade visual occlusion of the bladder outlet  The bladder was moderately trabeculated without intrinsic lesion or extrinsic mass compression no bladder neck abnormalities on retroflexion and normal ureteral orifice ease with clear reflux bilaterally  The cystoscope was removed atraumatically    The patient tolerated the procedure well

## 2023-04-19 ENCOUNTER — APPOINTMENT (EMERGENCY)
Dept: CT IMAGING | Facility: HOSPITAL | Age: 73
End: 2023-04-19

## 2023-04-19 ENCOUNTER — HOSPITAL ENCOUNTER (EMERGENCY)
Facility: HOSPITAL | Age: 73
Discharge: HOME | End: 2023-04-20
Attending: EMERGENCY MEDICINE

## 2023-04-19 DIAGNOSIS — R11.2 NAUSEA VOMITING AND DIARRHEA: Primary | ICD-10-CM

## 2023-04-19 DIAGNOSIS — R19.7 NAUSEA VOMITING AND DIARRHEA: Primary | ICD-10-CM

## 2023-04-19 LAB
ALBUMIN SERPL BCP-MCNC: 3.8 G/DL (ref 3.5–5)
ALP SERPL-CCNC: 103 U/L (ref 34–104)
ALT SERPL W P-5'-P-CCNC: 16 U/L (ref 7–52)
ANION GAP SERPL CALCULATED.3IONS-SCNC: 5 MMOL/L (ref 4–13)
AST SERPL W P-5'-P-CCNC: 13 U/L (ref 13–39)
BASOPHILS # BLD AUTO: 0.02 THOUSANDS/ΜL (ref 0–0.1)
BASOPHILS NFR BLD AUTO: 0 % (ref 0–1)
BILIRUB SERPL-MCNC: 0.88 MG/DL (ref 0.2–1)
BUN SERPL-MCNC: 18 MG/DL (ref 5–25)
CALCIUM SERPL-MCNC: 9.1 MG/DL (ref 8.4–10.2)
CHLORIDE SERPL-SCNC: 107 MMOL/L (ref 96–108)
CO2 SERPL-SCNC: 26 MMOL/L (ref 21–32)
CREAT SERPL-MCNC: 0.84 MG/DL (ref 0.6–1.3)
EOSINOPHIL # BLD AUTO: 0.14 THOUSAND/ΜL (ref 0–0.61)
EOSINOPHIL NFR BLD AUTO: 2 % (ref 0–6)
ERYTHROCYTE [DISTWIDTH] IN BLOOD BY AUTOMATED COUNT: 14.6 % (ref 11.6–15.1)
GFR SERPL CREATININE-BSD FRML MDRD: 87 ML/MIN/1.73SQ M
GLUCOSE SERPL-MCNC: 108 MG/DL (ref 65–140)
HCT VFR BLD AUTO: 40.9 % (ref 36.5–49.3)
HGB BLD-MCNC: 13.6 G/DL (ref 12–17)
IMM GRANULOCYTES # BLD AUTO: 0.02 THOUSAND/UL (ref 0–0.2)
IMM GRANULOCYTES NFR BLD AUTO: 0 % (ref 0–2)
LACTATE SERPL-SCNC: 1.4 MMOL/L (ref 0.5–2)
LIPASE SERPL-CCNC: 15 U/L (ref 11–82)
LYMPHOCYTES # BLD AUTO: 0.46 THOUSANDS/ΜL (ref 0.6–4.47)
LYMPHOCYTES NFR BLD AUTO: 6 % (ref 14–44)
MCH RBC QN AUTO: 32.2 PG (ref 26.8–34.3)
MCHC RBC AUTO-ENTMCNC: 33.3 G/DL (ref 31.4–37.4)
MCV RBC AUTO: 97 FL (ref 82–98)
MONOCYTES # BLD AUTO: 0.51 THOUSAND/ΜL (ref 0.17–1.22)
MONOCYTES NFR BLD AUTO: 6 % (ref 4–12)
NEUTROPHILS # BLD AUTO: 6.87 THOUSANDS/ΜL (ref 1.85–7.62)
NEUTS SEG NFR BLD AUTO: 86 % (ref 43–75)
NRBC BLD AUTO-RTO: 0 /100 WBCS
PLATELET # BLD AUTO: 173 THOUSANDS/UL (ref 149–390)
PMV BLD AUTO: 8.6 FL (ref 8.9–12.7)
POTASSIUM SERPL-SCNC: 3.5 MMOL/L (ref 3.5–5.3)
PROT SERPL-MCNC: 6.4 G/DL (ref 6.4–8.4)
RBC # BLD AUTO: 4.22 MILLION/UL (ref 3.88–5.62)
SODIUM SERPL-SCNC: 138 MMOL/L (ref 135–147)
WBC # BLD AUTO: 8.02 THOUSAND/UL (ref 4.31–10.16)

## 2023-04-19 RX ORDER — ONDANSETRON 4 MG/1
4 TABLET, ORALLY DISINTEGRATING ORAL EVERY 8 HOURS PRN
Qty: 15 TABLET | Refills: 0 | Status: ON HOLD | OUTPATIENT
Start: 2023-04-19

## 2023-04-19 RX ORDER — ONDANSETRON 2 MG/ML
4 INJECTION INTRAMUSCULAR; INTRAVENOUS ONCE
Status: COMPLETED | OUTPATIENT
Start: 2023-04-19 | End: 2023-04-19

## 2023-04-19 RX ADMIN — ONDANSETRON 4 MG: 2 INJECTION INTRAMUSCULAR; INTRAVENOUS at 20:18

## 2023-04-19 RX ADMIN — IOHEXOL 100 ML: 350 INJECTION, SOLUTION INTRAVENOUS at 21:16

## 2023-04-19 RX ADMIN — SODIUM CHLORIDE 1000 ML: 0.9 INJECTION, SOLUTION INTRAVENOUS at 20:17

## 2023-04-20 VITALS
OXYGEN SATURATION: 99 % | RESPIRATION RATE: 20 BRPM | SYSTOLIC BLOOD PRESSURE: 112 MMHG | DIASTOLIC BLOOD PRESSURE: 70 MMHG | TEMPERATURE: 97.9 F | HEART RATE: 75 BPM

## 2023-04-20 NOTE — ED PROVIDER NOTES
"History  Chief Complaint   Patient presents with   • Diarrhea     Stated with diarrhea and vomiting after lunch today      67year-old with nausea vomiting diarrhea ,  From nursing home  His symptoms began shortly after eating lunch  He describes generalized abdominal \"upset\"  No chest pain or shortness of breath no lightheadedness or dizziness  Denies fever  No urinary symptoms  Unsure of any sick contacts but does come from nursing home  History provided by:  Patient   used: No        Prior to Admission Medications   Prescriptions Last Dose Informant Patient Reported? Taking?    AUSTEDO 12 MG TABS   Yes No   Sig: Take 1 tablet by mouth 2 (two) times a day   Cholecalciferol (Vitamin D High Potency) 25 MCG (1000 UT) capsule   No No   Sig: Take 1 capsule (1,000 Units total) by mouth daily   Desitin 13 % cream   No No   Sig: APPLY TO GROIN AS NEEDED (CAN KEEP IN ROOM)   Emollient (Lubriderm) LOTN   No No   Sig: Apply topically daily as needed (rash)   ZINC OXIDE, TOPICAL, 10 % CREA   No No   Sig: Apply topically 2 (two) times a day as needed (groin redness and itching)   acetaminophen (Acetaminophen Extra Strength) 500 mg tablet   No No   Sig: Take 1 tablet (500 mg total) by mouth every 6 (six) hours as needed for mild pain   atorvastatin (LIPITOR) 40 mg tablet   No No   Sig: Take 1 tablet (40 mg total) by mouth daily   carbidopa-levodopa (SINEMET)  mg per tablet   No No   Sig: Take 1 tablet by mouth 3 (three) times a day   cloZAPine (CLOZARIL) 100 mg tablet   Yes No   Sig: Take 100 mg by mouth 2 (two) times a day   clozapine (CLOZARIL) 200 MG tablet   Yes No   Sig: Take 200 mg by mouth daily at bedtime    docusate sodium (COLACE) 100 mg capsule   No No   Sig: Take 1 capsule (100 mg total) by mouth 2 (two) times a day   fluticasone (FLONASE) 50 mcg/act nasal spray   No No   Si sprays into each nostril daily   metoprolol succinate (TOPROL-XL) 25 mg 24 hr tablet   No No   Sig: " Take 1 tablet (25 mg total) by mouth daily   olopatadine HCl (PATADAY) 0 2 % opth drops   No No   Sig: Administer 1 drop to both eyes daily at bedtime   omeprazole (PriLOSEC) 40 MG capsule   No No   Sig: Take 1 capsule (40 mg total) by mouth daily   psyllium (METAMUCIL SMOOTH TEXTURE) 28 % packet   No No   Sig: Take 1 packet by mouth 2 (two) times a day   tamsulosin (FLOMAX) 0 4 mg   No No   Sig: Take 1 capsule (0 4 mg total) by mouth daily at bedtime   temazepam (RESTORIL) 15 mg capsule   Yes No   Sig: Take 15 mg by mouth daily at bedtime   topiramate (TOPAMAX) 25 mg tablet   Yes No   Sig: Take 25 mg by mouth 2 (two) times a day   traZODone (DESYREL) 100 mg tablet   Yes No   Sig: Take 100 mg by mouth daily at bedtime      Facility-Administered Medications: None       Past Medical History:   Diagnosis Date   • Asthma    • Benign prostatic hyperplasia    • COPD (chronic obstructive pulmonary disease) ([facility])    • GERD (gastroesophageal reflux disease)    • Herpes zoster without complication    • Hypercholesteremia    • Hypertension    • Paranoid schizophrenia ([facility])    • Psychiatric disorder        Past Surgical History:   Procedure Laterality Date   • CATARACT EXTRACTION     • COLONOSCOPY         Family History   Problem Relation Age of Onset   • No Known Problems Mother    • No Known Problems Father    • Parkinsonism Son      I have reviewed and agree with the history as documented      E-Cigarette/Vaping   • E-Cigarette Use Never User      E-Cigarette/Vaping Substances   • Nicotine No    • THC No    • CBD No    • Flavoring No    • Other No    • Unknown No      Social History     Tobacco Use   • Smoking status: Former     Packs/day:      Years: 20      Pack years: 20      Types: Cigarettes     Quit date:      Years since quittin 3   • Smokeless tobacco: Never   Vaping Use   • Vaping Use: Never used   Substance Use Topics   • Alcohol use: Not Currently   • Drug use: Never       Review of Systems   Constitutional: Negative for chills and fever  HENT: Negative for ear pain and sore throat  Eyes: Negative for pain and visual disturbance  Respiratory: Negative for cough and shortness of breath  Cardiovascular: Negative for chest pain and palpitations  Gastrointestinal: Positive for nausea and vomiting  Negative for abdominal pain  Genitourinary: Negative for dysuria and hematuria  Musculoskeletal: Negative for arthralgias and back pain  Skin: Negative for color change and rash  Neurological: Negative for seizures and syncope  All other systems reviewed and are negative  Physical Exam  Physical Exam  Vitals and nursing note reviewed  Constitutional:       General: He is not in acute distress  Appearance: He is well-developed  HENT:      Head: Normocephalic and atraumatic  Eyes:      Conjunctiva/sclera: Conjunctivae normal    Cardiovascular:      Rate and Rhythm: Normal rate and regular rhythm  Heart sounds: No murmur heard  Pulmonary:      Effort: Pulmonary effort is normal  No respiratory distress  Breath sounds: Normal breath sounds  Abdominal:      Palpations: Abdomen is soft  Tenderness: There is no abdominal tenderness  Comments: No tenderness on exam   Musculoskeletal:         General: No swelling  Cervical back: Neck supple  Skin:     General: Skin is warm and dry  Capillary Refill: Capillary refill takes less than 2 seconds  Neurological:      Mental Status: He is alert     Psychiatric:         Mood and Affect: Mood normal          Vital Signs  ED Triage Vitals   Temperature Pulse Respirations Blood Pressure SpO2   04/19/23 2004 04/19/23 2004 04/19/23 2004 04/19/23 2004 04/20/23 0030   97 9 °F (36 6 °C) 77 18 114/69 99 %      Temp Source Heart Rate Source Patient Position - Orthostatic VS BP Location FiO2 (%)   04/19/23 2004 04/19/23 2004 04/19/23 2004 04/19/23 2004 --   Oral Monitor Sitting Left arm       Pain Score --                  Vitals:    04/19/23 2004 04/20/23 0030   BP: 114/69 112/70   Pulse: 77 75   Patient Position - Orthostatic VS: Sitting Sitting         Visual Acuity      ED Medications  Medications   sodium chloride 0 9 % bolus 1,000 mL (0 mL Intravenous Stopped 4/20/23 0119)   ondansetron (ZOFRAN) injection 4 mg (4 mg Intravenous Given 4/19/23 2018)   iohexol (OMNIPAQUE) 350 MG/ML injection (SINGLE-DOSE) 100 mL (100 mL Intravenous Given 4/19/23 2116)       Diagnostic Studies  Results Reviewed     Procedure Component Value Units Date/Time    Comprehensive metabolic panel [[id number]] Collected: 04/19/23 2013    Lab Status: Final result Specimen: Blood from Arm, Right Updated: 04/19/23 2051     Sodium 138 mmol/L      Potassium 3 5 mmol/L      Chloride 107 mmol/L      CO2 26 mmol/L      ANION GAP 5 mmol/L      BUN 18 mg/dL      Creatinine 0 84 mg/dL      Glucose 108 mg/dL      Calcium 9 1 mg/dL      AST 13 U/L      ALT 16 U/L      Alkaline Phosphatase 103 U/L      Total Protein 6 4 g/dL      Albumin 3 8 g/dL      Total Bilirubin 0 88 mg/dL      eGFR 87 ml/min/1 73sq m     Narrative:      [place] guidelines for Chronic Kidney Disease (CKD):   •  Stage 1 with normal or high GFR (GFR > 90 mL/min/1 73 square meters)  •  Stage 2 Mild CKD (GFR = 60-89 mL/min/1 73 square meters)  •  Stage 3A Moderate CKD (GFR = 45-59 mL/min/1 73 square meters)  •  Stage 3B Moderate CKD (GFR = 30-44 mL/min/1 73 square meters)  •  Stage 4 Severe CKD (GFR = 15-29 mL/min/1 73 square meters)  •  Stage 5 End Stage CKD (GFR <15 mL/min/1 73 square meters)  Note: GFR calculation is accurate only with a steady state creatinine    Lactic acid, plasma (w/reflex if result > 2 0) [[id number]]  (Normal) Collected: 04/19/23 2013    Lab Status: Final result Specimen: Blood from Arm, Right Updated: 04/19/23 2051     LACTIC ACID 1 4 mmol/L     Narrative:      Result may be elevated if tourniquet was used during collection  Lipase [[id number]]  (Normal) Collected: 04/19/23 2013    Lab Status: Final result Specimen: Blood from Arm, Right Updated: 04/19/23 2051     Lipase 15 u/L     CBC and differential [[id number]]  (Abnormal) Collected: 04/19/23 2013    Lab Status: Final result Specimen: Blood from Arm, Right Updated: 04/19/23 2018     WBC 8 02 Thousand/uL      RBC 4 22 Million/uL      Hemoglobin 13 6 g/dL      Hematocrit 40 9 %      MCV 97 fL      MCH 32 2 pg      MCHC 33 3 g/dL      RDW 14 6 %      MPV 8 6 fL      Platelets 088 Thousands/uL      nRBC 0 /100 WBCs      Neutrophils Relative 86 %      Immat GRANS % 0 %      Lymphocytes Relative 6 %      Monocytes Relative 6 %      Eosinophils Relative 2 %      Basophils Relative 0 %      Neutrophils Absolute 6 87 Thousands/µL      Immature Grans Absolute 0 02 Thousand/uL      Lymphocytes Absolute 0 46 Thousands/µL      Monocytes Absolute 0 51 Thousand/µL      Eosinophils Absolute 0 14 Thousand/µL      Basophils Absolute 0 02 Thousands/µL                  CT abdomen pelvis with contrast   Final Result by Evita Calzada MD (04/19 2201)      Wall thickening of fluid-filled proximal to mid small bowel loops suspicious for enteritis i e  infectious or inflammatory  Underdistention versus minimal wall thickening of cecum, ascending colon, and transverse colon  Diffuse wall thickening of decompressed urinary bladder  Correlate with urinalysis to exclude a component of cystitis  Cholelithiasis  The study was marked in [facility] for immediate notification  Workstation performed: [id number]                    Procedures  Procedures         ED Course                                             Medical Decision Making  Differential diagnosis includes but is not limited to ACS, MI, gastritis, palpitation, enteritis, colitis, viral syndrome, appendicitis, cholecystitis  Plan: Labs  CT  Antiemetic  Dispo pending  MDM: 67year-old with abdominal pain nausea vomiting    Now improved  He is tolerating p o  Had water and Jell-O here without any vomiting  He feels much better  Labs unremarkable  CT with evidence of possible enteritis  Could be the source of his symptoms  We discussed discharge back to nursing facility and close outpatient follow-up  We discussed return parameters  Patient understands and agrees to this plan  Amount and/or Complexity of Data Reviewed  Labs: ordered  Radiology: ordered  Risk  Prescription drug management  Disposition  Final diagnoses:   Nausea vomiting and diarrhea     Time reflects when diagnosis was documented in both MDM as applicable and the Disposition within this note     Time User Action Codes Description Comment    4/19/2023 10:25 PM Kumar Hernandez Add [R11 2,  R19 7] Nausea vomiting and diarrhea       ED Disposition     ED Disposition   Discharge    Condition   Stable    Date/Time   Wed Apr 19, 2023 10:25 PM    Comment   Miguel A Counter discharge to home/self care                 Follow-up Information     Follow up With Specialties Details Why Contact Info    [address] [place] [address] Nurse Practitioner, Internal Medicine   [place] [address] [place] [address]  [phone]            Discharge Medication List as of 4/19/2023 10:25 PM      START taking these medications    Details   ondansetron (ZOFRAN-ODT) 4 mg disintegrating tablet Take 1 tablet (4 mg total) by mouth every 8 (eight) hours as needed for nausea, Starting Wed 4/19/2023, Print         CONTINUE these medications which have NOT CHANGED    Details   acetaminophen (Acetaminophen Extra Strength) 500 mg tablet Take 1 tablet (500 mg total) by mouth every 6 (six) hours as needed for mild pain, Starting Mon 3/13/2023, Normal      atorvastatin (LIPITOR) 40 mg tablet Take 1 tablet (40 mg total) by mouth daily, Starting Mon 3/13/2023, Normal      AUSTEDO 12 MG TABS Take 1 tablet by mouth 2 (two) times a day, Starting Fri 1/3/2020, Historical Med carbidopa-levodopa (SINEMET)  mg per tablet Take 1 tablet by mouth 3 (three) times a day, Starting Thu 3/2/2023, Normal      Cholecalciferol (Vitamin D High Potency) 25 MCG (1000 UT) capsule Take 1 capsule (1,000 Units total) by mouth daily, Starting Mon 3/13/2023, Normal      !! cloZAPine (CLOZARIL) 100 mg tablet Take 100 mg by mouth 2 (two) times a day, Historical Med      !! clozapine (CLOZARIL) 200 MG tablet Take 200 mg by mouth daily at bedtime , Starting Mon 4/8/2019, Historical Med      Desitin 13 % cream APPLY TO GROIN AS NEEDED (CAN KEEP IN ROOM), Normal      docusate sodium (COLACE) 100 mg capsule Take 1 capsule (100 mg total) by mouth 2 (two) times a day, Starting Mon 3/13/2023, Normal      Emollient (Lubriderm) LOTN Apply topically daily as needed (rash), Starting Mon 3/13/2023, Normal      fluticasone (FLONASE) 50 mcg/act nasal spray 2 sprays into each nostril daily, Starting Mon 3/13/2023, Normal      metoprolol succinate (TOPROL-XL) 25 mg 24 hr tablet Take 1 tablet (25 mg total) by mouth daily, Starting Mon 3/13/2023, Normal      olopatadine HCl (PATADAY) 0 2 % opth drops Administer 1 drop to both eyes daily at bedtime, Starting Mon 3/13/2023, Normal      omeprazole (PriLOSEC) 40 MG capsule Take 1 capsule (40 mg total) by mouth daily, Starting Mon 3/13/2023, Normal      psyllium (METAMUCIL SMOOTH TEXTURE) 28 % packet Take 1 packet by mouth 2 (two) times a day, Starting Mon 3/13/2023, Normal      tamsulosin (FLOMAX) 0 4 mg Take 1 capsule (0 4 mg total) by mouth daily at bedtime, Starting Mon 3/13/2023, Normal      temazepam (RESTORIL) 15 mg capsule Take 15 mg by mouth daily at bedtime, Historical Med      topiramate (TOPAMAX) 25 mg tablet Take 25 mg by mouth 2 (two) times a day, Starting Mon 7/1/2019, Historical Med      traZODone (DESYREL) 100 mg tablet Take 100 mg by mouth daily at bedtime, Starting Mon 4/8/2019, Historical Med      ZINC OXIDE, TOPICAL, 10 % CREA Apply topically 2 (two) times a day as needed (groin redness and itching), Starting Mon 3/13/2023, Normal       !! - Potential duplicate medications found  Please discuss with provider  No discharge procedures on file      PDMP Review     None          ED Provider  Electronically Signed by           Taj Zarate PA-C  04/20/23 8721 medical evaluation

## 2023-04-26 ENCOUNTER — OFFICE VISIT (OUTPATIENT)
Age: 73
End: 2023-04-26

## 2023-04-26 VITALS
SYSTOLIC BLOOD PRESSURE: 108 MMHG | HEIGHT: 68 IN | DIASTOLIC BLOOD PRESSURE: 80 MMHG | BODY MASS INDEX: 20.92 KG/M2 | HEART RATE: 80 BPM | TEMPERATURE: 97.9 F | RESPIRATION RATE: 16 BRPM | WEIGHT: 138 LBS | OXYGEN SATURATION: 100 %

## 2023-04-26 DIAGNOSIS — F20.0 PARANOID SCHIZOPHRENIA (HCC): ICD-10-CM

## 2023-04-26 DIAGNOSIS — L25.9 CONTACT DERMATITIS, UNSPECIFIED CONTACT DERMATITIS TYPE, UNSPECIFIED TRIGGER: Primary | ICD-10-CM

## 2023-04-26 RX ORDER — PETROLATUM 0.61 G/G
CREAM TOPICAL AS NEEDED
Qty: 397 G | Refills: 0 | Status: ON HOLD | OUTPATIENT
Start: 2023-04-26

## 2023-04-26 RX ORDER — HYDROXYZINE HYDROCHLORIDE 25 MG/1
25 TABLET, FILM COATED ORAL EVERY 6 HOURS PRN
Qty: 30 TABLET | Refills: 5 | Status: ON HOLD | OUTPATIENT
Start: 2023-04-26

## 2023-04-26 NOTE — PROGRESS NOTES
"[facility] - [facility] PRIMARY CARE [place]    NAME: Angelia Merlin  AGE: 67 y o  SEX: male  : 1950     DATE: 2023     Assessment and Plan:     1  Contact dermatitis, unspecified contact dermatitis type, unspecified trigger  Right upper leg, dry, mild erythematous, scabbed lesions  Apply eucerin cream as needed after showers  Atarax 25mg q6h prn for itch  - Skin Protectants, Misc  (eucerin) cream; Apply topically as needed for wound care  Dispense: 397 g; Refill: 0  - hydrOXYzine HCL (ATARAX) 25 mg tablet; Take 1 tablet (25 mg total) by mouth every 6 (six) hours as needed for itching  Dispense: 30 tablet; Refill: 5    2  Paranoid schizophrenia ([address] )  Has started hearing voices  Denies harmful thoughts or voices  Follow up with psychiatrist for medication check           No follow-ups on file  Chief Complaint:     Chief Complaint   Patient presents with   • Follow-up     ER        History of Present Illness: Paul Macdonald presents to the office for follow up  He has had some recent evaluations in the ER  He was seen for UTI most likely related to urological procedure a couple days prior  Also enteritis and lower back pain  All of these issues have resolved  Only new concern today is rash on right upper leg that is itching him and he reports return of auditory hallucinations, which he follows with psychiatry for schizophrenia  Mirian, from group home, is with him today and reports no new issues or concerns     Review of Systems:     Review of Systems   Constitutional: Positive for fatigue  Negative for chills and fever  HENT: Positive for drooling  Respiratory: Negative  Cardiovascular: Negative  Gastrointestinal: Negative  Genitourinary: Negative for dysuria  Leaks urine   Musculoskeletal: Negative  Negative for back pain  Skin: Positive for rash (right upper leg)  Neurological: Negative  Psychiatric/Behavioral: Positive for hallucinations (\"hearing voices\" )   " "Negative for self-injury  Problem List:     Patient Active Problem List   Diagnosis   • GERD (gastroesophageal reflux disease)   • Neuro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 ([facility] [address] )   • Hypertension   • Hypercholesteremia   • Paranoid schizophrenia ([facility] [address] )   • Combined form of senile cataract   • MRSA carrier   • Groin rash   • Fall   • Chronic obstructive pulmonary disease, unspecified COPD type ([facility] [address] )   • Neoplasm of uncertain behavior of left kidney   • BPH with obstruction/lower urinary tract symptoms   • Chronic constipation   • Overactive bladder   • Urge urinary incontinence   • Urinary incontinence, post-void dribbling   • Urinary frequency   • Nocturia   • Chronic prostatitis   • Mild protein-calorie malnutrition (HCC)   • Aortic root dilatation (HCC)        Objective:     /80 (BP Location: Left arm, Patient Position: Sitting, Cuff Size: Standard)   Pulse 80   Temp 97 9 °F (36 6 °C) (Tympanic)   Resp 16   Ht 5' 8\" (1 727 m)   Wt 62 6 kg (138 lb)   SpO2 100%   BMI 20 98 kg/m²     Physical Exam  Constitutional:       General: He is not in acute distress  Appearance: Normal appearance  He is normal weight  HENT:      Head: Normocephalic and atraumatic  Right Ear: External ear normal       Left Ear: External ear normal       Nose: Nose normal       Mouth/Throat:      Mouth: Mucous membranes are moist       Pharynx: Oropharynx is clear  Eyes:      Conjunctiva/sclera: Conjunctivae normal    Cardiovascular:      Rate and Rhythm: Normal rate and regular rhythm  Pulses: Normal pulses  Heart sounds: Normal heart sounds  Pulmonary:      Effort: Pulmonary effort is normal  No respiratory distress  Breath sounds: Normal breath sounds  Abdominal:      Palpations: Abdomen is soft  Tenderness: There is no abdominal tenderness  Musculoskeletal:         General: Normal range of motion  Cervical back: Neck supple  Skin:     General: Skin is warm and dry        Capillary " Refill: Capillary refill takes less than 2 seconds  Findings: Rash present  Neurological:      Mental Status: He is alert  Mental status is at baseline  Psychiatric:         Attention and Perception: Attention normal  He perceives auditory hallucinations  Mood and Affect: Mood normal          Speech: Speech is delayed  Behavior: Behavior is slowed  Thought Content: Thought content normal          Judgment: Judgment normal       Comments: Hearing voices states they are telling him what do; denies harmful voices         I spent 20 minutes with this patient      [address] [place] [address], [address] [place] [address],[address] PRIMARY CARE [place]

## 2023-04-26 NOTE — PATIENT INSTRUCTIONS
Apply eucerin cream to leg after shower  Atarax 25mg every 6 hours as needed for itching     Contact Dermatitis   AMBULATORY CARE:   Contact dermatitis  is a rash  It develops when you touch something that irritates your skin or causes an allergic reaction  Common signs and symptoms include the following:   Red, swollen, painful rash    Skin that itches, stings, or burns    Dry, scaly, or crusty skin patches    Bumps or blisters    Fluid draining from blisters    Call your local emergency number (911 in the [address] [facility]) if:   You have sudden trouble breathing  Your throat swells and you have trouble eating  Your face is swollen  Call your doctor or dermatologist if:   You have a fever  Your blisters are draining pus  Your rash spreads or does not get better, even after treatment  You have questions or concerns about your condition or care  Treatment for contact dermatitis  involves removing any irritants or allergens that cause your rash  You may also need medicines to decrease itching and swelling  They will be given as a topical medicine to apply to your rash or as a pill  Manage contact dermatitis:   Take short baths or showers in cool water  Use mild soap or soap-free cleansers  Add oatmeal, baking soda, or cornstarch to the bath water to help decrease skin irritation  Avoid skin irritants  Examples include makeup, hair products, soaps, and cleansers  Use products that do not contain a scent or dye  Apply a cool compress to your rash  This will help soothe your skin  Apply lotions or creams to the area  These help keep your skin moist and decrease itching  Apply the lotion or cream right after a lukewarm bath or shower when your skin is still damp  Use products that do not contain a scent  Follow up with your doctor or dermatologist in 2 to 3 days:  Write down your questions so you remember to ask them during your visits    © Copyright [employer] 2022 Information is for End User's use only and may not be sold, redistributed or otherwise used for commercial purposes  The above information is an  only  It is not intended as medical advice for individual conditions or treatments  Talk to your doctor, nurse or pharmacist before following any medical regimen to see if it is safe and effective for you

## 2023-05-04 ENCOUNTER — HOSPITAL ENCOUNTER (INPATIENT)
Facility: HOSPITAL | Age: 73
LOS: 4 days | Discharge: NURSING, REHAB OR LONG-TERM CARE | End: 2023-05-09
Attending: EMERGENCY MEDICINE | Admitting: INTERNAL MEDICINE

## 2023-05-04 ENCOUNTER — APPOINTMENT (EMERGENCY)
Dept: CT IMAGING | Facility: HOSPITAL | Age: 73
End: 2023-05-04

## 2023-05-04 DIAGNOSIS — K56.609 SMALL BOWEL OBSTRUCTION (HCC): Primary | ICD-10-CM

## 2023-05-04 DIAGNOSIS — K59.09 CHRONIC CONSTIPATION: ICD-10-CM

## 2023-05-04 DIAGNOSIS — K59.00 CONSTIPATION: ICD-10-CM

## 2023-05-04 PROBLEM — G20.A1 PARKINSON DISEASE: Status: ACTIVE | Noted: 2023-05-04

## 2023-05-04 PROBLEM — G20 PARKINSON DISEASE (HCC): Status: ACTIVE | Noted: 2023-05-04

## 2023-05-04 LAB
ALBUMIN SERPL BCP-MCNC: 3.6 G/DL (ref 3.5–5)
ALP SERPL-CCNC: 84 U/L (ref 34–104)
ALT SERPL W P-5'-P-CCNC: 5 U/L (ref 7–52)
ANION GAP SERPL CALCULATED.3IONS-SCNC: 4 MMOL/L (ref 4–13)
AST SERPL W P-5'-P-CCNC: 12 U/L (ref 13–39)
ATRIAL RATE: 62 BPM
BASOPHILS # BLD AUTO: 0.04 THOUSANDS/ÂΜL (ref 0–0.1)
BASOPHILS NFR BLD AUTO: 1 % (ref 0–1)
BILIRUB SERPL-MCNC: 0.69 MG/DL (ref 0.2–1)
BILIRUB UR QL STRIP: NEGATIVE
BUN SERPL-MCNC: 9 MG/DL (ref 5–25)
CALCIUM SERPL-MCNC: 9 MG/DL (ref 8.4–10.2)
CARDIAC TROPONIN I PNL SERPL HS: 3 NG/L
CHLORIDE SERPL-SCNC: 104 MMOL/L (ref 96–108)
CLARITY UR: CLEAR
CO2 SERPL-SCNC: 28 MMOL/L (ref 21–32)
COLOR UR: ABNORMAL
CREAT SERPL-MCNC: 0.58 MG/DL (ref 0.6–1.3)
EOSINOPHIL # BLD AUTO: 0.25 THOUSAND/ÂΜL (ref 0–0.61)
EOSINOPHIL NFR BLD AUTO: 4 % (ref 0–6)
ERYTHROCYTE [DISTWIDTH] IN BLOOD BY AUTOMATED COUNT: 14.1 % (ref 11.6–15.1)
GFR SERPL CREATININE-BSD FRML MDRD: 101 ML/MIN/1.73SQ M
GLUCOSE SERPL-MCNC: 97 MG/DL (ref 65–140)
GLUCOSE UR STRIP-MCNC: NEGATIVE MG/DL
HCT VFR BLD AUTO: 37.1 % (ref 36.5–49.3)
HGB BLD-MCNC: 12.5 G/DL (ref 12–17)
HGB UR QL STRIP.AUTO: NEGATIVE
IMM GRANULOCYTES # BLD AUTO: 0.02 THOUSAND/UL (ref 0–0.2)
IMM GRANULOCYTES NFR BLD AUTO: 0 % (ref 0–2)
KETONES UR STRIP-MCNC: NEGATIVE MG/DL
LACTATE SERPL-SCNC: 0.8 MMOL/L (ref 0.5–2)
LEUKOCYTE ESTERASE UR QL STRIP: NEGATIVE
LIPASE SERPL-CCNC: 19 U/L (ref 11–82)
LYMPHOCYTES # BLD AUTO: 0.92 THOUSANDS/ÂΜL (ref 0.6–4.47)
LYMPHOCYTES NFR BLD AUTO: 15 % (ref 14–44)
MCH RBC QN AUTO: 32.5 PG (ref 26.8–34.3)
MCHC RBC AUTO-ENTMCNC: 33.7 G/DL (ref 31.4–37.4)
MCV RBC AUTO: 96 FL (ref 82–98)
MONOCYTES # BLD AUTO: 0.58 THOUSAND/ÂΜL (ref 0.17–1.22)
MONOCYTES NFR BLD AUTO: 10 % (ref 4–12)
NEUTROPHILS # BLD AUTO: 4.15 THOUSANDS/ÂΜL (ref 1.85–7.62)
NEUTS SEG NFR BLD AUTO: 70 % (ref 43–75)
NITRITE UR QL STRIP: NEGATIVE
NRBC BLD AUTO-RTO: 0 /100 WBCS
P AXIS: 67 DEGREES
PH UR STRIP.AUTO: 5.5 [PH]
PLATELET # BLD AUTO: 184 THOUSANDS/UL (ref 149–390)
PMV BLD AUTO: 8.9 FL (ref 8.9–12.7)
POTASSIUM SERPL-SCNC: 3.3 MMOL/L (ref 3.5–5.3)
PR INTERVAL: 196 MS
PROT SERPL-MCNC: 6.1 G/DL (ref 6.4–8.4)
PROT UR STRIP-MCNC: NEGATIVE MG/DL
QRS AXIS: 62 DEGREES
QRSD INTERVAL: 96 MS
QT INTERVAL: 446 MS
QTC INTERVAL: 452 MS
RBC # BLD AUTO: 3.85 MILLION/UL (ref 3.88–5.62)
SODIUM SERPL-SCNC: 136 MMOL/L (ref 135–147)
SP GR UR STRIP.AUTO: 1.04 (ref 1–1.03)
T WAVE AXIS: 63 DEGREES
UROBILINOGEN UR STRIP-ACNC: <2 MG/DL
VENTRICULAR RATE: 62 BPM
WBC # BLD AUTO: 5.96 THOUSAND/UL (ref 4.31–10.16)

## 2023-05-04 RX ORDER — MORPHINE SULFATE 4 MG/ML
4 INJECTION, SOLUTION INTRAMUSCULAR; INTRAVENOUS ONCE
Status: COMPLETED | OUTPATIENT
Start: 2023-05-04 | End: 2023-05-04

## 2023-05-04 RX ORDER — ENOXAPARIN SODIUM 100 MG/ML
40 INJECTION SUBCUTANEOUS DAILY
Status: DISCONTINUED | OUTPATIENT
Start: 2023-05-05 | End: 2023-05-09 | Stop reason: HOSPADM

## 2023-05-04 RX ORDER — ONDANSETRON 2 MG/ML
4 INJECTION INTRAMUSCULAR; INTRAVENOUS EVERY 6 HOURS PRN
Status: DISCONTINUED | OUTPATIENT
Start: 2023-05-04 | End: 2023-05-09 | Stop reason: HOSPADM

## 2023-05-04 RX ORDER — CLOZAPINE 100 MG/1
200 TABLET ORAL
Status: DISCONTINUED | OUTPATIENT
Start: 2023-05-04 | End: 2023-05-09 | Stop reason: HOSPADM

## 2023-05-04 RX ORDER — KETOTIFEN FUMARATE 0.35 MG/ML
1 SOLUTION/ DROPS OPHTHALMIC 2 TIMES DAILY
Status: DISCONTINUED | OUTPATIENT
Start: 2023-05-04 | End: 2023-05-09 | Stop reason: HOSPADM

## 2023-05-04 RX ORDER — PANTOPRAZOLE SODIUM 40 MG/10ML
40 INJECTION, POWDER, LYOPHILIZED, FOR SOLUTION INTRAVENOUS
Status: DISCONTINUED | OUTPATIENT
Start: 2023-05-05 | End: 2023-05-09 | Stop reason: HOSPADM

## 2023-05-04 RX ORDER — ACETAMINOPHEN 650 MG/1
650 SUPPOSITORY RECTAL EVERY 4 HOURS PRN
Status: DISCONTINUED | OUTPATIENT
Start: 2023-05-04 | End: 2023-05-07

## 2023-05-04 RX ORDER — SODIUM CHLORIDE, SODIUM LACTATE, POTASSIUM CHLORIDE, CALCIUM CHLORIDE 600; 310; 30; 20 MG/100ML; MG/100ML; MG/100ML; MG/100ML
100 INJECTION, SOLUTION INTRAVENOUS CONTINUOUS
Status: DISCONTINUED | OUTPATIENT
Start: 2023-05-04 | End: 2023-05-07

## 2023-05-04 RX ORDER — TEMAZEPAM 15 MG/1
15 CAPSULE ORAL
Status: DISCONTINUED | OUTPATIENT
Start: 2023-05-04 | End: 2023-05-09 | Stop reason: HOSPADM

## 2023-05-04 RX ORDER — TOPIRAMATE 25 MG/1
25 TABLET ORAL 2 TIMES DAILY
Status: DISCONTINUED | OUTPATIENT
Start: 2023-05-04 | End: 2023-05-09 | Stop reason: HOSPADM

## 2023-05-04 RX ORDER — ACETAMINOPHEN 325 MG/1
650 TABLET ORAL ONCE
Status: COMPLETED | OUTPATIENT
Start: 2023-05-04 | End: 2023-05-04

## 2023-05-04 RX ORDER — OXYMETAZOLINE HYDROCHLORIDE 0.05 G/100ML
2 SPRAY NASAL EVERY 12 HOURS SCHEDULED
Status: COMPLETED | OUTPATIENT
Start: 2023-05-04 | End: 2023-05-05

## 2023-05-04 RX ORDER — TRAZODONE HYDROCHLORIDE 100 MG/1
100 TABLET ORAL
Status: DISCONTINUED | OUTPATIENT
Start: 2023-05-04 | End: 2023-05-09 | Stop reason: HOSPADM

## 2023-05-04 RX ORDER — TAMSULOSIN HYDROCHLORIDE 0.4 MG/1
0.4 CAPSULE ORAL
Status: DISCONTINUED | OUTPATIENT
Start: 2023-05-04 | End: 2023-05-09 | Stop reason: HOSPADM

## 2023-05-04 RX ORDER — ATORVASTATIN CALCIUM 40 MG/1
40 TABLET, FILM COATED ORAL
Status: DISCONTINUED | OUTPATIENT
Start: 2023-05-05 | End: 2023-05-09 | Stop reason: HOSPADM

## 2023-05-04 RX ORDER — CLOZAPINE 100 MG/1
100 TABLET ORAL 2 TIMES DAILY
Status: DISCONTINUED | OUTPATIENT
Start: 2023-05-04 | End: 2023-05-09 | Stop reason: HOSPADM

## 2023-05-04 RX ORDER — ECHINACEA PURPUREA EXTRACT 125 MG
1 TABLET ORAL
Status: DISCONTINUED | OUTPATIENT
Start: 2023-05-04 | End: 2023-05-09 | Stop reason: HOSPADM

## 2023-05-04 RX ORDER — ONDANSETRON 2 MG/ML
4 INJECTION INTRAMUSCULAR; INTRAVENOUS ONCE
Status: COMPLETED | OUTPATIENT
Start: 2023-05-04 | End: 2023-05-04

## 2023-05-04 RX ORDER — METOPROLOL SUCCINATE 25 MG/1
25 TABLET, EXTENDED RELEASE ORAL DAILY
Status: DISCONTINUED | OUTPATIENT
Start: 2023-05-05 | End: 2023-05-09 | Stop reason: HOSPADM

## 2023-05-04 RX ADMIN — IOHEXOL 100 ML: 350 INJECTION, SOLUTION INTRAVENOUS at 17:22

## 2023-05-04 RX ADMIN — SODIUM CHLORIDE, SODIUM LACTATE, POTASSIUM CHLORIDE, AND CALCIUM CHLORIDE 100 ML/HR: .6; .31; .03; .02 INJECTION, SOLUTION INTRAVENOUS at 20:55

## 2023-05-04 RX ADMIN — ONDANSETRON 4 MG: 2 INJECTION INTRAMUSCULAR; INTRAVENOUS at 16:15

## 2023-05-04 RX ADMIN — SODIUM CHLORIDE 1000 ML: 0.9 INJECTION, SOLUTION INTRAVENOUS at 16:14

## 2023-05-04 RX ADMIN — MORPHINE SULFATE 4 MG: 4 INJECTION INTRAVENOUS at 19:05

## 2023-05-04 RX ADMIN — ONDANSETRON 4 MG: 2 INJECTION INTRAMUSCULAR; INTRAVENOUS at 22:50

## 2023-05-04 RX ADMIN — ACETAMINOPHEN 650 MG: 325 TABLET ORAL at 18:56

## 2023-05-04 NOTE — ASSESSMENT & PLAN NOTE
· CT also reveals thickening of the distal esophagus  · Recommend outpatient follow up with GI   · Continue PPI

## 2023-05-04 NOTE — Clinical Note
Case was discussed with    and the patient's admission status was agreed to be Admission Status: observation status to the service of

## 2023-05-04 NOTE — ASSESSMENT & PLAN NOTE
Presents with abdominal pain, n/v  · CT a/p: Small bowel obstruction with a zone of transition in the mid abdominal  with thickened and poorly distensible small bowel loop  Small ascites  Cholelithiasis     · Plan for NGT  NPO, IVFs, anti-emetics, pain control   Serial abdominal exams, monitor I/Os  Monitor white count   DVT prophylaxis   Encourage ambulation  General surgery consult

## 2023-05-04 NOTE — ED PROVIDER NOTES
"History  Chief Complaint   Patient presents with   • Abdominal Pain     Pt arrives from [facility]  Seen at [facility] last night for same  Mid abdominal pain w/ nausea and vomiting     Stewart Lauren is a 79-year-old male with past medical history including hypertension, hyperlipidemia, GERD, schizophrenia, parkinsonism, presenting from a nursing facility for evaluation with chief complaint of periumbilical abdominal pain, nausea and vomiting  Patient was seen yesterday at [facility] for abdominal pain accompanied by constipation, with last regular bowel movement 4 days prior to his presentation  CT abdomen/pelvis without contrast was ordered yesterday during his ED visit which reported \"Impression: Large stool in the colon  No bowel obstruction  Normal appendix  \" Patient states that he received an enema and had a bowel movement in the emergency department and was subsequently discharged to return to the nursing facility  He states that as soon as he returned home he started vomiting and has not been able to keep anything down since  He has not had any episodes of hematemesis  He continues with periumbilical abdominal pain described as pressure, and nonradiating in nature  He also notes that he has not passed gas today or had a bowel movement  He denies fevers or chills  He has no pertinent past surgical history  He offers no other complaints or concerns at this time  Prior to Admission Medications   Prescriptions Last Dose Informant Patient Reported? Taking?    AUSTEDO 12 MG TABS   Yes No   Sig: Take 1 tablet by mouth 2 (two) times a day   Cholecalciferol (Vitamin D High Potency) 25 MCG (1000 UT) capsule   No No   Sig: Take 1 capsule (1,000 Units total) by mouth daily   Desitin 13 % cream   No No   Sig: APPLY TO GROIN AS NEEDED (CAN KEEP IN ROOM)   Patient not taking: Reported on 4/26/2023   Emollient (Lubriderm) LOTN   No No   Sig: Apply topically daily as needed (rash)   Skin Protectants, " Misc  (eucerin) cream   No No   Sig: Apply topically as needed for wound care   ZINC OXIDE, TOPICAL, 10 % CREA   No No   Sig: Apply topically 2 (two) times a day as needed (groin redness and itching)   Patient not taking: Reported on 2023   acetaminophen (Acetaminophen Extra Strength) 500 mg tablet   No No   Sig: Take 1 tablet (500 mg total) by mouth every 6 (six) hours as needed for mild pain   atorvastatin (LIPITOR) 40 mg tablet   No No   Sig: Take 1 tablet (40 mg total) by mouth daily   carbidopa-levodopa (SINEMET)  mg per tablet   No No   Sig: Take 1 tablet by mouth 3 (three) times a day   cloZAPine (CLOZARIL) 100 mg tablet   Yes No   Sig: Take 100 mg by mouth 2 (two) times a day   clozapine (CLOZARIL) 200 MG tablet   Yes No   Sig: Take 200 mg by mouth daily at bedtime    docusate sodium (COLACE) 100 mg capsule   No No   Sig: Take 1 capsule (100 mg total) by mouth 2 (two) times a day   fluticasone (FLONASE) 50 mcg/act nasal spray   No No   Si sprays into each nostril daily   hydrOXYzine HCL (ATARAX) 25 mg tablet   No No   Sig: Take 1 tablet (25 mg total) by mouth every 6 (six) hours as needed for itching   metoprolol succinate (TOPROL-XL) 25 mg 24 hr tablet   No No   Sig: Take 1 tablet (25 mg total) by mouth daily   olopatadine HCl (PATADAY) 0 2 % opth drops   No No   Sig: Administer 1 drop to both eyes daily at bedtime   omeprazole (PriLOSEC) 40 MG capsule   No No   Sig: Take 1 capsule (40 mg total) by mouth daily   ondansetron (ZOFRAN-ODT) 4 mg disintegrating tablet   No No   Sig: Take 1 tablet (4 mg total) by mouth every 8 (eight) hours as needed for nausea   psyllium (METAMUCIL SMOOTH TEXTURE) 28 % packet   No No   Sig: Take 1 packet by mouth 2 (two) times a day   Patient not taking: Reported on 2023   tamsulosin (FLOMAX) 0 4 mg   No No   Sig: Take 1 capsule (0 4 mg total) by mouth daily at bedtime   temazepam (RESTORIL) 15 mg capsule   Yes No   Sig: Take 15 mg by mouth daily at bedtime topiramate (TOPAMAX) 25 mg tablet   Yes No   Sig: Take 25 mg by mouth 2 (two) times a day   traZODone (DESYREL) 100 mg tablet   Yes No   Sig: Take 100 mg by mouth daily at bedtime      Facility-Administered Medications: None       Past Medical History:   Diagnosis Date   • Asthma    • Benign prostatic hyperplasia    • COPD (chronic obstructive pulmonary disease) ([facility])    • GERD (gastroesophageal reflux disease)    • Herpes zoster without complication    • Hypercholesteremia    • Hypertension    • Paranoid schizophrenia ([facility] [address] )    • Psychiatric disorder        Past Surgical History:   Procedure Laterality Date   • CATARACT EXTRACTION     • COLONOSCOPY         Family History   Problem Relation Age of Onset   • No Known Problems Mother    • No Known Problems Father    • Parkinsonism Son      I have reviewed and agree with the history as documented  E-Cigarette/Vaping   • E-Cigarette Use Never User      E-Cigarette/Vaping Substances   • Nicotine No    • THC No    • CBD No    • Flavoring No    • Other No    • Unknown No      Social History     Tobacco Use   • Smoking status: Former     Packs/day:      Years:      Pack years:      Types: Cigarettes     Quit date:      Years since quittin 3   • Smokeless tobacco: Never   Vaping Use   • Vaping Use: Never used   Substance Use Topics   • Alcohol use: Not Currently   • Drug use: Never       Review of Systems   Constitutional: Negative for chills, diaphoresis, fatigue and fever  Gastrointestinal: Positive for abdominal pain, nausea and vomiting  Skin: Negative for rash  All other systems reviewed and are negative  Physical Exam  Physical Exam  Vitals and nursing note reviewed  Constitutional:       General: He is not in acute distress  Appearance: Normal appearance  He is well-developed  He is not ill-appearing, toxic-appearing or diaphoretic  HENT:      Head: Normocephalic and atraumatic        Right Ear: External ear normal       Left Ear: External ear normal    Eyes:      Conjunctiva/sclera: Conjunctivae normal    Cardiovascular:      Rate and Rhythm: Normal rate and regular rhythm  Pulses: Normal pulses  Pulmonary:      Effort: Pulmonary effort is normal  No respiratory distress  Breath sounds: Normal breath sounds  No wheezing, rhonchi or rales  Chest:      Chest wall: No tenderness  Abdominal:      General: There is no distension  Palpations: Abdomen is soft  Tenderness: There is abdominal tenderness in the periumbilical area  Musculoskeletal:         General: Normal range of motion  Cervical back: Normal range of motion and neck supple  Skin:     General: Skin is warm and dry  Capillary Refill: Capillary refill takes less than 2 seconds  Neurological:      Mental Status: He is alert  Motor: Motor function is intact  No weakness     Psychiatric:         Mood and Affect: Mood normal          Vital Signs  ED Triage Vitals [05/04/23 1540]   Temperature Pulse Respirations Blood Pressure SpO2   98 °F (36 7 °C) 65 18 (!) 172/83 99 %      Temp Source Heart Rate Source Patient Position - Orthostatic VS BP Location FiO2 (%)   Oral Monitor Sitting Left arm --      Pain Score       1           Vitals:    05/04/23 1540   BP: (!) 172/83   Pulse: 65   Patient Position - Orthostatic VS: Sitting         Visual Acuity      ED Medications  Medications   sodium chloride 0 9 % bolus 1,000 mL (1,000 mL Intravenous New Bag 5/4/23 1614)   ondansetron (ZOFRAN) injection 4 mg (4 mg Intravenous Given 5/4/23 1615)       Diagnostic Studies  Results Reviewed     Procedure Component Value Units Date/Time    HS Troponin 0hr (reflex protocol) [[id number]]  (Normal) Collected: 05/04/23 1614    Lab Status: Final result Specimen: Blood from Arm, Left Updated: 05/04/23 1644     hs TnI 0hr 3 ng/L     Comprehensive metabolic panel [[id number]]  (Abnormal) Collected: 05/04/23 1614    Lab Status: Final result Specimen: Blood from Arm, Left Updated: 05/04/23 1636     Sodium 136 mmol/L      Potassium 3 3 mmol/L      Chloride 104 mmol/L      CO2 28 mmol/L      ANION GAP 4 mmol/L      BUN 9 mg/dL      Creatinine 0 58 mg/dL      Glucose 97 mg/dL      Calcium 9 0 mg/dL      AST 12 U/L      ALT 5 U/L      Alkaline Phosphatase 84 U/L      Total Protein 6 1 g/dL      Albumin 3 6 g/dL      Total Bilirubin 0 69 mg/dL      eGFR 101 ml/min/1 73sq m     Narrative:      National Kidney Disease Foundation guidelines for Chronic Kidney Disease (CKD):   •  Stage 1 with normal or high GFR (GFR > 90 mL/min/1 73 square meters)  •  Stage 2 Mild CKD (GFR = 60-89 mL/min/1 73 square meters)  •  Stage 3A Moderate CKD (GFR = 45-59 mL/min/1 73 square meters)  •  Stage 3B Moderate CKD (GFR = 30-44 mL/min/1 73 square meters)  •  Stage 4 Severe CKD (GFR = 15-29 mL/min/1 73 square meters)  •  Stage 5 End Stage CKD (GFR <15 mL/min/1 73 square meters)  Note: GFR calculation is accurate only with a steady state creatinine    Lipase [[id number]]  (Normal) Collected: 05/04/23 1614    Lab Status: Final result Specimen: Blood from Arm, Left Updated: 05/04/23 1636     Lipase 19 u/L     CBC and differential [[id number]]  (Abnormal) Collected: 05/04/23 1614    Lab Status: Final result Specimen: Blood from Arm, Left Updated: 05/04/23 1623     WBC 5 96 Thousand/uL      RBC 3 85 Million/uL      Hemoglobin 12 5 g/dL      Hematocrit 37 1 %      MCV 96 fL      MCH 32 5 pg      MCHC 33 7 g/dL      RDW 14 1 %      MPV 8 9 fL      Platelets 671 Thousands/uL      nRBC 0 /100 WBCs      Neutrophils Relative 70 %      Immat GRANS % 0 %      Lymphocytes Relative 15 %      Monocytes Relative 10 %      Eosinophils Relative 4 %      Basophils Relative 1 %      Neutrophils Absolute 4 15 Thousands/µL      Immature Grans Absolute 0 02 Thousand/uL      Lymphocytes Absolute 0 92 Thousands/µL      Monocytes Absolute 0 58 Thousand/µL      Eosinophils Absolute 0 25 Thousand/µL      Basophils Absolute 0 04 Thousands/µL     UA w Reflex to Microscopic w Reflex to Culture [[id number]]     Lab Status: No result Specimen: Urine                  CT abdomen pelvis with contrast   Final Result by Alexandra Murphy MD (05/04 1859)   Small bowel obstruction with a zone of transition in the mid abdominal  with thickened and poorly distensible small bowel loop  Small amount of ascites   Thickening of the distal esophagus, more pronounced, can be evaluated with upper GI endoscopy performed on a nonemergent basis   Cholelithiasis      I personally discussed this study with Erin Foley on 5/4/2023 6:58 PM                Workstation performed: [id number]                    Procedures  ECG 12 Lead Documentation Only    Date/Time: 5/4/2023 4:17 PM  Performed by: Alphonse Orozco PA-C  Authorized by: Alphonse Orozco PA-C     Indications / Diagnosis:  Abdominal pain  ECG reviewed by me, the ED Provider: yes    Patient location:  ED  Rate:     ECG rate:  62    ECG rate assessment: normal    Rhythm:     Rhythm: sinus rhythm    Ectopy:     Ectopy: none    QRS:     QRS axis:  Normal    QRS intervals:  Normal  Conduction:     Conduction: normal    ST segments:     ST segments:  Normal  T waves:     T waves: normal               ED Course                                             Medical Decision Making  This is a 66-year-old male presenting for evaluation with complaint of nausea, vomiting, and abdominal pain  Had precipitating constipation, and seen at [facility] yesterday where a CT was ordered, showing a large stool burden without evidence of bowel obstruction  He states that he was given an enema, and had a bowel movement and was discharged home  He states that upon returning to the nursing facility he developed acute onset of nausea and vomiting, and has not been able to keep anything down since  Denies hematemesis  Reports periumbilical pain, as well as decreased flatulence      Differential diagnosis includes but is not limited to: Gastritis, enteritis, colitis, diverticulitis, appendicitis, bowel obstruction, hernia, tumor; consider acs/anginal equivalent though less likely    Initial ED plan: Labs, imaging, antiemetics and reassessment    Final ED Assessment: Vital signs reviewed on ED presentation, examination as above  All labs and imaging independently reviewed with imaging interpreted by the Radiologist   There are no significant lab findings, no leukocytosis or lactic acidosis  Mild hypokalemia of 3 3  CT abdomen/pelvis reports a small bowel obstruction with a transition zone in the mid abdomen with thickened and poorly distensible small bowel loop, as well as a small amount of ascites  Discussed case with Dr Kate Akbar, General Surgery attending on-call, recommending placing an NG tube and admitting to the internal medicine service  Case discussed with Jennifer BERG, who accepts patient for admission to the internal medicine service for continued care and further management to include bowel rest, IV fluids, and pain control  All test results reviewed with the patient at bedside as well as admission plan which he is understanding of and agreeable with  The patient has remained hemodynamically stable without new or worsening symptoms during ED course and is stable for admission, bridging orders placed  Small bowel obstruction ([facility] [address] ): acute illness or injury  Amount and/or Complexity of Data Reviewed  Labs: ordered  Radiology: ordered  Risk  OTC drugs  Prescription drug management  Decision regarding hospitalization            Disposition  Final diagnoses:   Small bowel obstruction ([address] )     Time reflects when diagnosis was documented in both MDM as applicable and the Disposition within this note     Time User Action Codes Description Comment    5/4/2023  7:05 PM Luan Ann Add [[id number]] Small bowel obstruction [facility])       ED Disposition     ED Disposition   Admit Condition   Stable    Date/Time   Th May 4, 2023  7:23 PM    Comment   Case was discussed with [facility] and the patient's admission status was agreed to be Admission Status: observation status to the service of Dr Kirti Shipley  Follow-up Information    None         Current Discharge Medication List      CONTINUE these medications which have NOT CHANGED    Details   atorvastatin (LIPITOR) 40 mg tablet Take 1 tablet (40 mg total) by mouth daily  Qty: 31 tablet, Refills: 11    Comments: Hi! Yajaira Aranda is the new pharmacy provider for individuals with [address]  We need new prescriptions to dispense medications - please advise, thank you for your help  Associated Diagnoses: Hypercholesteremia      AUSTEDO 12 MG TABS Take 1 tablet by mouth 2 (two) times a day      carbidopa-levodopa (SINEMET)  mg per tablet Take 1 tablet by mouth 3 (three) times a day  Qty: 270 tablet, Refills: 3    Associated Diagnoses: Neuro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 (HCC)      Cholecalciferol (Vitamin D High Potency) 25 MCG (1000 UT) capsule Take 1 capsule (1,000 Units total) by mouth daily  Qty: 31 capsule, Refills: 6    Comments: Hi! Yajaira Aranda is the new pharmacy provider for individuals with [address]  We need new prescriptions to dispense medications - please advise, thank you for your help  Associated Diagnoses: Vitamin D deficiency      !! cloZAPine (CLOZARIL) 100 mg tablet Take 100 mg by mouth 2 (two) times a day      !! clozapine (CLOZARIL) 200 MG tablet Take 200 mg by mouth daily at bedtime       docusate sodium (COLACE) 100 mg capsule Take 1 capsule (100 mg total) by mouth 2 (two) times a day  Qty: 62 capsule, Refills: 11    Comments: Hi! Yajaira Aranda is the new pharmacy provider for individuals with [address]  We need new prescriptions to dispense medications - please advise, thank you for your help    Associated Diagnoses: Chronic constipation      metoprolol succinate (TOPROL-XL) 25 mg 24 hr tablet Take 1 tablet (25 mg total) by mouth daily  Qty: 31 tablet, Refills: 11    Comments: Hi! Padmini Castano is the new pharmacy provider for individuals with [address]  We need new prescriptions to dispense medications - please advise, thank you for your help  Associated Diagnoses: Aortic root dilatation (HCC)      olopatadine HCl (PATADAY) 0 2 % opth drops Administer 1 drop to both eyes daily at bedtime  Qty: 2 5 mL, Refills: 11    Comments: Hi! Padmini Castano is the new pharmacy provider for individuals with [address]  We need new prescriptions to dispense medications - please advise, thank you for your help  Associated Diagnoses: Allergy, subsequent encounter      omeprazole (PriLOSEC) 40 MG capsule Take 1 capsule (40 mg total) by mouth daily  Qty: 31 capsule, Refills: 11    Comments: Hi! Padmini Castano is the new pharmacy provider for individuals with [address]  We need new prescriptions to dispense medications - please advise, thank you for your help  Associated Diagnoses: Gastroesophageal reflux disease      tamsulosin (FLOMAX) 0 4 mg Take 1 capsule (0 4 mg total) by mouth daily at bedtime  Qty: 90 capsule, Refills: 3    Associated Diagnoses: Post-void dribbling      temazepam (RESTORIL) 15 mg capsule Take 15 mg by mouth daily at bedtime      topiramate (TOPAMAX) 25 mg tablet Take 25 mg by mouth 2 (two) times a day  Refills: 1      traZODone (DESYREL) 100 mg tablet Take 100 mg by mouth daily at bedtime      acetaminophen (Acetaminophen Extra Strength) 500 mg tablet Take 1 tablet (500 mg total) by mouth every 6 (six) hours as needed for mild pain  Qty: 30 tablet, Refills: 11    Comments: Hi! Padmini Castano is the new pharmacy provider for individuals with [address]  We need new prescriptions to dispense medications - please advise, thank you for your help    Associated Diagnoses: Pain      Desitin 13 % cream APPLY TO GROIN AS NEEDED (CAN KEEP IN ROOM)  Qty: 113 g, Refills: 11    Comments: Hi! [facility] is the new pharmacy provider for individuals with [address]  We need new prescriptions to dispense medications - please advise, thank you for your help  Associated Diagnoses: Rash      Emollient (Lubriderm) LOTN Apply topically daily as needed (rash)  Qty: 177 mL, Refills: 11    Comments: [facility] is the new pharmacy provider for individuals with [address]  We need new prescriptions to dispense medications - please advise, thank you for your help  Associated Diagnoses: Rash      fluticasone (FLONASE) 50 mcg/act nasal spray 2 sprays into each nostril daily  Qty: 16 g, Refills: 11    Associated Diagnoses: Nasal congestion      hydrOXYzine HCL (ATARAX) 25 mg tablet Take 1 tablet (25 mg total) by mouth every 6 (six) hours as needed for itching  Qty: 30 tablet, Refills: 5    Associated Diagnoses: Contact dermatitis, unspecified contact dermatitis type, unspecified trigger      ondansetron (ZOFRAN-ODT) 4 mg disintegrating tablet Take 1 tablet (4 mg total) by mouth every 8 (eight) hours as needed for nausea  Qty: 15 tablet, Refills: 0    Associated Diagnoses: Nausea vomiting and diarrhea      psyllium (METAMUCIL SMOOTH TEXTURE) 28 % packet Take 1 packet by mouth 2 (two) times a day  Qty: 60 each, Refills: 11    Comments: Hi! [facility] is the new pharmacy provider for individuals with [address]  We need new prescriptions to dispense medications - please advise, thank you for your help    Associated Diagnoses: Constipation, unspecified constipation type      Skin Protectants, Misc  (eucerin) cream Apply topically as needed for wound care  Qty: 397 g, Refills: 0    Associated Diagnoses: Contact dermatitis, unspecified contact dermatitis type, unspecified trigger      ZINC OXIDE, TOPICAL, 10 % CREA Apply topically 2 (two) times a day as needed (groin redness and itching)  Qty: 113 g, Refills: 1    Associated Diagnoses: Groin rash !! - Potential duplicate medications found  Please discuss with provider  No discharge procedures on file      PDMP Review     None          ED Provider  Electronically Signed by           Karthikeyan Mayer PA-C  05/04/23 4560

## 2023-05-04 NOTE — H&P
[address] [place] [address]  H&P  Name: Jinny Pastor 67 y o  male I MRN: [id number]  Unit/Bed#: -23 I Date of Admission: 5/4/2023   Date of Service: 5/4/2023 I Hospital Day: 0      Assessment/Plan   * SBO (small bowel obstruction) ([place] [address] )  Assessment & Plan  Presents with abdominal pain, n/v  · CT a/p: Small bowel obstruction with a zone of transition in the mid abdominal  with thickened and poorly distensible small bowel loop  Small ascites  Cholelithiasis  · Plan for NGT  NPO, IVFs, anti-emetics, pain control   Serial abdominal exams, monitor I/Os  Monitor white count   DVT prophylaxis   Encourage ambulation  General surgery consult     Parkinson disease ([place] [address] )  Assessment & Plan  Continue home sinemet, austedo    Paranoid schizophrenia ([place] [address] )  Assessment & Plan  Continue home clonazepam, temazepam, trazodone    Hypertension  Assessment & Plan  BP stable  Continue metoprolol    GERD (gastroesophageal reflux disease)  Assessment & Plan  · CT also reveals thickening of the distal esophagus  · Recommend outpatient follow up with GI   · Continue PPI       VTE Pharmacologic Prophylaxis: VTE Score: 3 Moderate Risk (Score 3-4) - Pharmacological DVT Prophylaxis Ordered: enoxaparin (Lovenox)  Code Status: Level 1 - Full Code   Discussion with family: Patient declined call to   Anticipated Length of Stay: Patient will be admitted on an observation basis with an anticipated length of stay of less than 2 midnights secondary to SBO  Total Time Spent on Date of Encounter in care of patient: 55 minutes This time was spent on one or more of the following: performing physical exam; counseling and coordination of care; obtaining or reviewing history; documenting in the medical record; reviewing/ordering tests, medications or procedures; communicating with other healthcare professionals and discussing with patient's family/caregivers      Chief Complaint:   Chief Complaint   Patient presents with • Abdominal Pain     Pt arrives from [facility]  Seen at [facility] last night for same  Mid abdominal pain w/ nausea and vomiting         History of Present Illness: Jhoana Pichardo is a 67 y o  male with a PMH of GERD, COPD, BPH, HTN, HLD, schizophrenia who presents with abdominal pain x 4-5 days  Had been seen by [facility] yesterday, found to have large colonic burden  Received enema with stool output and had been discharged  Once returning home abdominal pain became worse and he had subsequent n/v  No hematemesis  Unable to keep anything down  No hematochezia or melena  He does not take anything at home for constipation  Reports he has been having more constipation which he attributes to not consuming enough water per day  Reports drinking 1 bottle of water and 1 cup of tea every day  Has been taking stool softener without relief  Review of Systems:  A 10-point review of systems was obtained  Pertinent positives and negatives are outlined in the HPI above  Remainder of review of systems are otherwise negative  Past Medical and Surgical History:   Past Medical History:   Diagnosis Date   • Asthma    • Benign prostatic hyperplasia    • COPD (chronic obstructive pulmonary disease) ([facility] )    • GERD (gastroesophageal reflux disease)    • Herpes zoster without complication 88/00/8533   • Hypercholesteremia    • Hypertension    • Paranoid schizophrenia ([facility] )    • Psychiatric disorder        Past Surgical History:   Procedure Laterality Date   • CATARACT EXTRACTION     • COLONOSCOPY         Meds/Allergies:  Prior to Admission medications    Medication Sig Start Date End Date Taking?  Authorizing Provider   acetaminophen (Acetaminophen Extra Strength) 500 mg tablet Take 1 tablet (500 mg total) by mouth every 6 (six) hours as needed for mild pain 3/13/23   Montiel Patient, CRNP   atorvastatin (LIPITOR) 40 mg tablet Take 1 tablet (40 mg total) by mouth daily 3/13/23   Montiel PatientLIUDMILA   AUSTEDO 12 MG TABS Take 1 tablet by mouth 2 (two) times a day 1/3/20   Historical Provider, MD   carbidopa-levodopa (SINEMET)  mg per tablet Take 1 tablet by mouth 3 (three) times a day 3/2/23   Rosalie Kulkarni MD   Cholecalciferol (Vitamin D High Potency) 25 MCG (1000 UT) capsule Take 1 capsule (1,000 Units total) by mouth daily 3/13/23   LIUDMILA Heredia   cloZAPine (CLOZARIL) 100 mg tablet Take 100 mg by mouth 2 (two) times a day    Historical Provider, MD   clozapine (CLOZARIL) 200 MG tablet Take 200 mg by mouth daily at bedtime  4/8/19   Historical Provider, MD   Desitin 13 % cream APPLY TO GROIN AS NEEDED (CAN KEEP IN ROOM)  Patient not taking: Reported on 4/26/2023 2/25/23   LIUDMILA Fisher   docusate sodium (COLACE) 100 mg capsule Take 1 capsule (100 mg total) by mouth 2 (two) times a day 3/13/23   LIUDMILA Heredia   Emollient (Lubriderm) LOTN Apply topically daily as needed (rash) 3/13/23   LIUDMILA Heredia   fluticasone [facility]) 50 mcg/act nasal spray 2 sprays into each nostril daily 3/13/23   LIUDMILA Heredia   hydrOXYzine HCL (ATARAX) 25 mg tablet Take 1 tablet (25 mg total) by mouth every 6 (six) hours as needed for itching 4/26/23   LIUDMILA Fisher   metoprolol succinate (TOPROL-XL) 25 mg 24 hr tablet Take 1 tablet (25 mg total) by mouth daily 3/13/23   LIUDMILA Heredia   olopatadine HCl (PATADAY) 0 2 % opth drops Administer 1 drop to both eyes daily at bedtime 3/13/23   LIUDMILA Heredia   omeprazole (PriLOSEC) 40 MG capsule Take 1 capsule (40 mg total) by mouth daily 3/13/23   LIUDMILA Heredia   ondansetron (ZOFRAN-ODT) 4 mg disintegrating tablet Take 1 tablet (4 mg total) by mouth every 8 (eight) hours as needed for nausea 4/19/23   Jennifer Caballero PA-C   psyllium (METAMUCIL SMOOTH TEXTURE) 28 % packet Take 1 packet by mouth 2 (two) times a day  Patient not taking: Reported on 4/26/2023 3/13/23   LIUDMILA Heredia   Skin Protectants, Misc  (eucerin) cream Apply topically as needed for wound care 23   LIUDMILA Cabezas   tamsulosin [facility]) 0 4 mg Take 1 capsule (0 4 mg total) by mouth daily at bedtime 3/13/23   LIUDMILA Ham   temazepam (RESTORIL) 15 mg capsule Take 15 mg by mouth daily at bedtime    Historical Provider, MD   topiramate (TOPAMAX) 25 mg tablet Take 25 mg by mouth 2 (two) times a day 19   Historical Provider, MD   traZODone (DESYREL) 100 mg tablet Take 100 mg by mouth daily at bedtime 19   Historical Provider, MD   ZINC OXIDE, TOPICAL, 10 % CREA Apply topically 2 (two) times a day as needed (groin redness and itching)  Patient not taking: Reported on 2023 3/13/23   LIUDMILA Ham     I have reviewed home medications with patient personally  Allergies: No Known Allergies    Social History:  Marital Status: Single   Occupation:   Patient Pre-hospital Living Situation: Home, [facility]   Patient Pre-hospital Level of Mobility: walks  Patient Pre-hospital Diet Restrictions:   Substance Use History:   Social History     Substance and Sexual Activity   Alcohol Use Not Currently     Social History     Tobacco Use   Smoking Status Former   • Packs/day: 1 00   • Years: 20 00   • Pack years: 20 00   • Types: Cigarettes   • Quit date:    • Years since quittin 3   Smokeless Tobacco Never     Social History     Substance and Sexual Activity   Drug Use Never       Family History:  Family History   Problem Relation Age of Onset   • No Known Problems Mother    • No Known Problems Father    • Parkinsonism Son        Physical Exam:     Vitals:   Blood Pressure: 135/71 (23)  Pulse: 70 (23)  Temperature: 97 8 °F (36 6 °C) (23)  Temp Source: Oral (23 1540)  Respirations: 20 (23)  SpO2: 95 % (23)    Physical Exam  Vitals and nursing note reviewed     Constitutional:       General: He is not in acute distress  Appearance: He is well-developed  HENT:      Head: Normocephalic and atraumatic  Eyes:      Conjunctiva/sclera: Conjunctivae normal    Cardiovascular:      Rate and Rhythm: Normal rate and regular rhythm  Heart sounds: No murmur heard  Pulmonary:      Effort: Pulmonary effort is normal  No respiratory distress  Breath sounds: Normal breath sounds  Abdominal:      Palpations: Abdomen is soft  Tenderness: There is no abdominal tenderness  There is no guarding or rebound  Comments: +BS present   Musculoskeletal:         General: No swelling  Cervical back: Neck supple  Skin:     General: Skin is warm and dry  Capillary Refill: Capillary refill takes less than 2 seconds  Neurological:      Mental Status: He is alert     Psychiatric:         Mood and Affect: Mood normal           Additional Data:     Lab Results:  Results from last 7 days   Lab Units 05/04/23  1614   WBC Thousand/uL 5 96   HEMOGLOBIN g/dL 12 5   HEMATOCRIT % 37 1   PLATELETS Thousands/uL 184   NEUTROS PCT % 70   LYMPHS PCT % 15   MONOS PCT % 10   EOS PCT % 4     Results from last 7 days   Lab Units 05/04/23  1614   SODIUM mmol/L 136   POTASSIUM mmol/L 3 3*   CHLORIDE mmol/L 104   CO2 mmol/L 28   BUN mg/dL 9   CREATININE mg/dL 0 58*   ANION GAP mmol/L 4   CALCIUM mg/dL 9 0   ALBUMIN g/dL 3 6   TOTAL BILIRUBIN mg/dL 0 69   ALK PHOS U/L 84   ALT U/L 5*   AST U/L 12*   GLUCOSE RANDOM mg/dL 97                 Results from last 7 days   Lab Units 05/04/23  1905   LACTIC ACID mmol/L 0 8       Lines/Drains:  Invasive Devices     Peripheral Intravenous Line  Duration           Peripheral IV 05/04/23 Left Arm <1 day          Drain  Duration           NG/OG/Enteral Tube Nasogastric 16 Fr Right nare <1 day                    Imaging: Reviewed radiology reports from this admission including: abdominal/pelvic CT  CT abdomen pelvis with contrast   Final Result by Ruby Vargas MD (05/04 1859)   Small bowel obstruction with a zone of transition in the mid abdominal  with thickened and poorly distensible small bowel loop  Small amount of ascites   Thickening of the distal esophagus, more pronounced, can be evaluated with upper GI endoscopy performed on a nonemergent basis   Cholelithiasis      I personally discussed this study with Montse Galindo on 5/4/2023 6:58 PM                Workstation performed: [id number]             EKG and Other Studies Reviewed on Admission:   · EKG: NSR  HR 62     ** Please Note: This note has been constructed using a voice recognition system   **

## 2023-05-05 LAB
ANION GAP SERPL CALCULATED.3IONS-SCNC: 4 MMOL/L (ref 4–13)
BUN SERPL-MCNC: 8 MG/DL (ref 5–25)
CALCIUM SERPL-MCNC: 8.5 MG/DL (ref 8.4–10.2)
CHLORIDE SERPL-SCNC: 108 MMOL/L (ref 96–108)
CO2 SERPL-SCNC: 26 MMOL/L (ref 21–32)
CREAT SERPL-MCNC: 0.57 MG/DL (ref 0.6–1.3)
ERYTHROCYTE [DISTWIDTH] IN BLOOD BY AUTOMATED COUNT: 14 % (ref 11.6–15.1)
GFR SERPL CREATININE-BSD FRML MDRD: 102 ML/MIN/1.73SQ M
GLUCOSE P FAST SERPL-MCNC: 95 MG/DL (ref 65–99)
GLUCOSE SERPL-MCNC: 95 MG/DL (ref 65–140)
HCT VFR BLD AUTO: 33.8 % (ref 36.5–49.3)
HCT VFR BLD AUTO: 36 % (ref 36.5–49.3)
HGB BLD-MCNC: 11.5 G/DL (ref 12–17)
HGB BLD-MCNC: 12.2 G/DL (ref 12–17)
MCH RBC QN AUTO: 32.4 PG (ref 26.8–34.3)
MCHC RBC AUTO-ENTMCNC: 34 G/DL (ref 31.4–37.4)
MCV RBC AUTO: 95 FL (ref 82–98)
PLATELET # BLD AUTO: 155 THOUSANDS/UL (ref 149–390)
PMV BLD AUTO: 8.6 FL (ref 8.9–12.7)
POTASSIUM SERPL-SCNC: 3.3 MMOL/L (ref 3.5–5.3)
RBC # BLD AUTO: 3.55 MILLION/UL (ref 3.88–5.62)
SODIUM SERPL-SCNC: 138 MMOL/L (ref 135–147)
WBC # BLD AUTO: 6.95 THOUSAND/UL (ref 4.31–10.16)

## 2023-05-05 RX ADMIN — KETOTIFEN FUMARATE 1 DROP: 0.25 SOLUTION/ DROPS OPHTHALMIC at 09:29

## 2023-05-05 RX ADMIN — SODIUM CHLORIDE, SODIUM LACTATE, POTASSIUM CHLORIDE, AND CALCIUM CHLORIDE 100 ML/HR: .6; .31; .03; .02 INJECTION, SOLUTION INTRAVENOUS at 17:55

## 2023-05-05 RX ADMIN — SALINE NASAL SPRAY 1 SPRAY: 1.5 SOLUTION NASAL at 00:00

## 2023-05-05 RX ADMIN — SALINE NASAL SPRAY 1 SPRAY: 1.5 SOLUTION NASAL at 09:28

## 2023-05-05 RX ADMIN — CLOZAPINE 200 MG: 100 TABLET ORAL at 21:49

## 2023-05-05 RX ADMIN — CARBIDOPA AND LEVODOPA 1 TABLET: 25; 100 TABLET ORAL at 21:47

## 2023-05-05 RX ADMIN — SALINE NASAL SPRAY 1 SPRAY: 1.5 SOLUTION NASAL at 09:26

## 2023-05-05 RX ADMIN — CLOZAPINE 200 MG: 100 TABLET ORAL at 00:05

## 2023-05-05 RX ADMIN — CARBIDOPA AND LEVODOPA 1 TABLET: 25; 100 TABLET ORAL at 17:57

## 2023-05-05 RX ADMIN — SALINE NASAL SPRAY 1 SPRAY: 1.5 SOLUTION NASAL at 18:17

## 2023-05-05 RX ADMIN — TOPIRAMATE 25 MG: 25 TABLET, FILM COATED ORAL at 09:27

## 2023-05-05 RX ADMIN — TOPIRAMATE 25 MG: 25 TABLET, FILM COATED ORAL at 00:05

## 2023-05-05 RX ADMIN — CLOZAPINE 100 MG: 100 TABLET ORAL at 18:17

## 2023-05-05 RX ADMIN — TOPIRAMATE 25 MG: 25 TABLET, FILM COATED ORAL at 17:57

## 2023-05-05 RX ADMIN — CLOZAPINE 100 MG: 100 TABLET ORAL at 09:26

## 2023-05-05 RX ADMIN — TRAZODONE HYDROCHLORIDE 100 MG: 100 TABLET ORAL at 00:04

## 2023-05-05 RX ADMIN — SODIUM CHLORIDE, SODIUM LACTATE, POTASSIUM CHLORIDE, AND CALCIUM CHLORIDE 100 ML/HR: .6; .31; .03; .02 INJECTION, SOLUTION INTRAVENOUS at 06:33

## 2023-05-05 RX ADMIN — TAMSULOSIN HYDROCHLORIDE 0.4 MG: 0.4 CAPSULE ORAL at 21:47

## 2023-05-05 RX ADMIN — CARBIDOPA AND LEVODOPA 1 TABLET: 25; 100 TABLET ORAL at 00:04

## 2023-05-05 RX ADMIN — OXYMETAZOLINE HYDROCHLORIDE 2 SPRAY: 0.05 SPRAY NASAL at 09:28

## 2023-05-05 RX ADMIN — CARBIDOPA AND LEVODOPA 1 TABLET: 25; 100 TABLET ORAL at 09:27

## 2023-05-05 RX ADMIN — METOPROLOL SUCCINATE 25 MG: 25 TABLET, EXTENDED RELEASE ORAL at 09:27

## 2023-05-05 RX ADMIN — TEMAZEPAM 15 MG: 15 CAPSULE ORAL at 00:06

## 2023-05-05 RX ADMIN — TAMSULOSIN HYDROCHLORIDE 0.4 MG: 0.4 CAPSULE ORAL at 00:06

## 2023-05-05 RX ADMIN — TRAZODONE HYDROCHLORIDE 100 MG: 100 TABLET ORAL at 21:47

## 2023-05-05 RX ADMIN — ENOXAPARIN SODIUM 40 MG: 40 INJECTION SUBCUTANEOUS at 09:26

## 2023-05-05 RX ADMIN — KETOTIFEN FUMARATE 1 DROP: 0.25 SOLUTION/ DROPS OPHTHALMIC at 18:18

## 2023-05-05 RX ADMIN — TEMAZEPAM 15 MG: 15 CAPSULE ORAL at 21:47

## 2023-05-05 RX ADMIN — OXYMETAZOLINE HYDROCHLORIDE 2 SPRAY: 0.05 SPRAY NASAL at 00:00

## 2023-05-05 RX ADMIN — PANTOPRAZOLE SODIUM 40 MG: 40 INJECTION, POWDER, FOR SOLUTION INTRAVENOUS at 06:00

## 2023-05-05 RX ADMIN — ATORVASTATIN CALCIUM 40 MG: 40 TABLET, FILM COATED ORAL at 17:57

## 2023-05-05 NOTE — CASE MANAGEMENT
Case Management Assessment & Discharge Planning Note    Patient name Raquel Hogan  Location [facility] [id number] MRN [id number]  : 1950 Date 2023       Current Admission Date: 2023  Current Admission Diagnosis:SBO (small bowel obstruction) [facility])   Patient Active Problem List    Diagnosis Date Noted   • SBO (small bowel obstruction) ([facility] ) 2023   • Parkinson disease ([facility] ) 2023   • Mild protein-calorie malnutrition ([facility] ) 02/10/2023   • Aortic root dilatation ([place] [address] ) 02/10/2023   • Neoplasm of uncertain behavior of left kidney 2022   • BPH with obstruction/lower urinary tract symptoms 2022   • Chronic constipation 2022   • Overactive bladder 2022   • Urge urinary incontinence 2022   • Urinary incontinence, post-void dribbling 2022   • Urinary frequency 2022   • Nocturia 2022   • Chronic prostatitis 2022   • Chronic obstructive pulmonary disease, unspecified COPD type ([place] [address] ) 2022   • Fall 2022   • Groin rash 2022   • 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   • Combined form of senile cataract 2020   • GERD (gastroesophageal reflux disease) 10/22/2019   • Hypertension 10/22/2019   • Hypercholesteremia 10/22/2019   • Paranoid schizophrenia ([facility] ) 10/22/2019   • Neuro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 ([facility] ) 2016      LOS (days): 0  Geometric Mean LOS (GMLOS) (days): 3 00  Days to GMLOS:2 9     OBJECTIVE:    Risk of Unplanned Readmission Score: 15 21         Current admission status: Inpatient       Preferred Pharmacy:   [facility]  [address] [address]  Phone: [phone] Fax: [address] [place] [address] - [address]  [facility] [address]  Phone: [phone] Fax: [phone] ([facility], [address] [place] [address] Drive  Phone: [phone] Fax: [phone]    Primary Care Provider: LIUDMILA Adams Mc    Primary Insurance: MEDICARE  Secondary Insurance:     ASSESSMENT:  [address], [address] Representative - Sister   Primary Phone: [phone] (Mobile)               Advance Directives  Does patient have a [address]?: Yes  Does patient have Advance Directives?: Yes  Advance Directives: Power of  for health care  Primary Contact: Erin Leo [phone]         Readmission Root Cause  30 Day Readmission: No    Patient Information  Admitted from[de-identified] Home  Mental Status: Alert  During Assessment patient was accompanied by: Not accompanied during assessment  Assessment information provided by[de-identified] Patient, Other - please comment (Edwin Cortez @ [facility])  Primary Caregiver: Other (Comment)  Caregiver's Name[de-identified] Alfred Crockett Relationship to Patient[de-identified] Other (Specify)  Caregiver's Telephone Number[de-identified] [phone]  Support Systems: Other (Comment) ([facility] staff)  [place] of Residence: [place] [address] do you live in?: [address] entry access options   Select all that apply : No steps to enter home  Type of Current Residence: Group home  Upon entering residence, is there a bedroom on the main floor (no further steps)?: Yes  Upon entering residence, is there a bathroom on the main floor (no further steps)?: Yes  In the last 12 months, was there a time when you were not able to pay the mortgage or rent on time?: No  In the last 12 months, how many places have you lived?: 1  In the last 12 months, was there a time when you did not have a steady place to sleep or slept in a shelter (including now)?: No  Homeless/housing insecurity resource given?: No  Living Arrangements: Other (Comment) (Lives at [facility])  Is patient a ?: No    Activities of Daily Living Prior to Admission  Functional Status: Independent  Completes ADLs independently?: Yes  Ambulates independently?: Yes  Does patient use assisted devices?: No  Does patient currently own DME?: No  Does patient have a history of Outpatient Therapy (PT/OT)?: No  Does the patient have a history of Short-Term Rehab?: No  Does patient have a history of HHC?: No  Does patient currently have [facility]?: No         Patient Information Continued  Income Source: SSI/SSD  Within the past 12 months, you worried that your food would run out before you got the money to buy more : Never true  Within the past 12 months, the food you bought just didn't last and you didn't have money to get more : Never true  Food insecurity resource given?: No  Does patient receive dialysis treatments?: No  Does patient have a history of substance abuse?: No         Means of Transportation  Means of Transport to Appts[de-identified] Other (Comment) (Group Home)  In the past 12 months, has lack of transportation kept you from medical appointments or from getting medications?: No  In the past 12 months, has lack of transportation kept you from meetings, work, or from getting things needed for daily living?: No  Was application for public transport provided?: No        DISCHARGE DETAILS:    Discharge planning discussed with[de-identified] pt at bedside and pt  Arcenio Aquino via phone  Freedom of Choice: Yes  Comments - Freedom of Choice: CM met with pt at bedside and introduced self/role  pt is alert and oriented x3 able to make his needs known  Pt ask CM to call his [facility]  [address] [place] [address] in which CM did  CM called S[facility] hayes at [phone]  and spoke with Arcenio Aquino who is the  at the group home  As per vadim, pt was independent with all of his ADLs and IADLs  Pt has no DME or hx of STR  or VNA  Arcenio Aquino informed CM that pt sister Savanna Conti is pt HCP which was reflected on pt chart  CM attempted to call pt sister Savanna Conti at [phone] and left a message  Pt dc plan is to return to the group home once medically cleared   As per vadim at the group home, they would perfer is pt be transported back to [facility] as they are limited with staff  CM continues to follow    CM contacted family/caregiver?: Yes  Were Treatment Team discharge recommendations reviewed with patient/caregiver?: Yes  Did patient/caregiver verbalize understanding of patient care needs?: Yes  Were patient/caregiver advised of the risks associated with not following Treatment Team discharge recommendations?: Yes    Contacts  Patient Contacts: Ashley Helton  Relationship to Patient[de-identified] Family  Contact Method: Phone  Phone Number: [phone]  Reason/Outcome: Continuity of Care, Emergency Contact, Discharge [address]         Is the patient interested in [address] at discharge?: No    DME Referral Provided  Referral made for DME?: No    Other Referral/Resources/Interventions Provided:  Interventions: None Indicated  Referral Comments: No referrals made    Would you like to participate in our [address] service program?  : No - Declined    Treatment Team Recommendation: Group Home  Discharge Destination Plan[de-identified] Group Home  Transport at Discharge : [facility]

## 2023-05-05 NOTE — CONSULTS
Consult- General Surgery   Jasmyn Found 67 y o  male MRN: [id number]  Unit/Bed#: -01 Encounter: [id number]      Assessment/Plan     Jasmyn Found is a 67 y o  male with SBO secondary to chronic constipation  AVSS, no leukocytosis, remainder of labs within normal limits  NG tube output 400 since insertion  Patient reports passing gas  Denies nausea or vomiting  Denies abdominal pain  CTAP: Small bowel obstruction with a zone of transition in the mid abdominal  with thickened and poorly distensible small bowel loop  Plan:  • Continue n p o  and NG tube until NG tube output decreases  Bowel obstruction is likely related to chronic constipation given patient is passing gas and has no abdominal pain, nausea, vomiting  The symptoms have been present for over a month  Although patient has bowel function, will continue NG tube until output decreases  • IV fluid hydration  • Consult placed to GI for bowel regimen due to chronic constipation  • Monitor abdominal exam, labs, vitals  PRN pain medication and anti-emetics  Encourage ambulation  DVT ppx: heparin  Incentive spirometry 10 times/hour while awake  Continue home medications as prescribed   • Remainder of care per primary team-Slim  • General surgery will continue to follow  Tiger text with any questions or concerns  History of Present Illness     HPI:  Jasmyn Found is a 67 y o  male with a PMH of COPD, GERD, HLD, HTN, schizophenria, BPH, asthma, sigmoid colon resection secondary to bleeding who presented to the ED last night with abdominal pain, nausea, vomiting  Patient reports he has had chronic constipation and abdominal pain for several months  He takes Metamucil daily but no other GI medications  He was admitted to [facility] earlier this week for the same symptoms at which time he was treated with multiple enemas and subsequently discharged home  He reports his last bowel movement was 2 days ago    He is currently passing gas  He denies any abdominal pain, nausea, vomiting since NG tube insertion  The patient denies CP, SOB, palpitations, headache, fever, chills, unintentional weight loss, night sweats, constipation, diarrhea  Last colonoscopy was by Dr Karma Torres in 2019 with mild scattered diverticula, end-to-end colocolonic anastomosis in the rectosigmoid, remainder normal      Surgical history including sigmoidectomy  Patient reports this was 20+ years ago and was due to bleeding  Review of Systems   Constitutional: Negative for chills and fever  HENT: Negative for ear pain and sore throat  Eyes: Negative for pain and visual disturbance  Respiratory: Negative for cough and shortness of breath  Cardiovascular: Negative for chest pain and palpitations  Gastrointestinal: Negative for abdominal pain, blood in stool, constipation, diarrhea, nausea and vomiting  Genitourinary: Negative for dysuria and hematuria  Musculoskeletal: Negative for arthralgias and back pain  Skin: Negative for color change and rash  Neurological: Negative for seizures and syncope  All other systems reviewed and are negative        Historical Information   Past Medical History:   Diagnosis Date   • Asthma    • Benign prostatic hyperplasia    • COPD (chronic obstructive pulmonary disease) ([facility])    • GERD (gastroesophageal reflux disease)    • Herpes zoster without complication    • Hypercholesteremia    • Hypertension    • [facility]    • Psychiatric disorder      Past Surgical History:   Procedure Laterality Date   • CATARACT EXTRACTION     • COLONOSCOPY       Social History   Social History     Substance and Sexual Activity   Alcohol Use Not Currently     Social History     Substance and Sexual Activity   Drug Use Never     Social History     Tobacco Use   Smoking Status Former   • Packs/day: 1 00   • Years: 20 00   • Pack years: 20 00   • Types: Cigarettes   • Quit date:    • Years since quittin 3 "  Smokeless Tobacco Never     Family History: non-contributory    Meds/Allergies   all medications and allergies reviewed  No Known Allergies    Objective   First Vitals:   Blood Pressure: (!) 172/83 (05/04/23 1540)  Pulse: 65 (05/04/23 1540)  Temperature: 98 °F (36 7 °C) (05/04/23 1540)  Temp Source: Oral (05/04/23 1540)  Respirations: 18 (05/04/23 1540)  Height: 5' 8\" (172 7 cm) (05/04/23 2024)  Weight - Scale: 70 kg (154 lb 5 2 oz) (05/05/23 0556)  SpO2: 99 % (05/04/23 1540)    Current Vitals:   Blood Pressure: 149/81 (05/05/23 0758)  Pulse: 73 (05/05/23 0758)  Temperature: 97 8 °F (36 6 °C) (05/04/23 2141)  Temp Source: Oral (05/04/23 1540)  Respirations: 20 (05/04/23 2141)  Height: 5' 8\" (172 7 cm) (05/04/23 2024)  Weight - Scale: 70 kg (154 lb 5 2 oz) (05/05/23 0556)  SpO2: 96 % (05/05/23 0758)      Intake/Output Summary (Last 24 hours) at 5/5/2023 0917  Last data filed at 5/5/2023 0656  Gross per 24 hour   Intake 1000 ml   Output 500 ml   Net 500 ml       Invasive Devices     Peripheral Intravenous Line  Duration           Peripheral IV 05/04/23 Left Arm <1 day          Drain  Duration           NG/OG/Enteral Tube Nasogastric 16 Fr Right nare <1 day                Physical Exam  Vitals reviewed  Constitutional:       General: He is not in acute distress  Appearance: Normal appearance  He is not ill-appearing or toxic-appearing  HENT:      Head: Normocephalic and atraumatic  Right Ear: External ear normal       Left Ear: External ear normal       Nose: Nose normal       Comments: NG tube in place and functioning with thin green output     Mouth/Throat:      Mouth: Mucous membranes are moist    Eyes:      General: No scleral icterus  Right eye: No discharge  Left eye: No discharge  Conjunctiva/sclera: Conjunctivae normal    Cardiovascular:      Rate and Rhythm: Normal rate and regular rhythm  Pulmonary:      Effort: Pulmonary effort is normal  No respiratory distress   " Abdominal:      General: There is no distension  Palpations: Abdomen is soft  Tenderness: There is no abdominal tenderness  There is no guarding  Musculoskeletal:         General: Normal range of motion  Cervical back: Normal range of motion  Skin:     General: Skin is warm  Coloration: Skin is not jaundiced  Neurological:      General: No focal deficit present  Mental Status: He is alert and oriented to person, place, and time  Psychiatric:         Mood and Affect: Mood normal          Behavior: Behavior normal          Thought Content: Thought content normal          Judgment: Judgment normal          Lab Results: I have personally reviewed pertinent lab results      Recent Results (from the past 36 hour(s))   CBC and differential    Collection Time: 05/04/23  4:14 PM   Result Value Ref Range    WBC 5 96 4 31 - 10 16 Thousand/uL    RBC 3 85 (L) 3 88 - 5 62 Million/uL    Hemoglobin 12 5 12 0 - 17 0 g/dL    Hematocrit 37 1 36 5 - 49 3 %    MCV 96 82 - 98 fL    MCH 32 5 26 8 - 34 3 pg    MCHC 33 7 31 4 - 37 4 g/dL    RDW 14 1 11 6 - 15 1 %    MPV 8 9 8 9 - 12 7 fL    Platelets 376 457 - 902 Thousands/uL    nRBC 0 /100 WBCs    Neutrophils Relative 70 43 - 75 %    Immat GRANS % 0 0 - 2 %    Lymphocytes Relative 15 14 - 44 %    Monocytes Relative 10 4 - 12 %    Eosinophils Relative 4 0 - 6 %    Basophils Relative 1 0 - 1 %    Neutrophils Absolute 4 15 1 85 - 7 62 Thousands/µL    Immature Grans Absolute 0 02 0 00 - 0 20 Thousand/uL    Lymphocytes Absolute 0 92 0 60 - 4 47 Thousands/µL    Monocytes Absolute 0 58 0 17 - 1 22 Thousand/µL    Eosinophils Absolute 0 25 0 00 - 0 61 Thousand/µL    Basophils Absolute 0 04 0 00 - 0 10 Thousands/µL   Comprehensive metabolic panel    Collection Time: 05/04/23  4:14 PM   Result Value Ref Range    Sodium 136 135 - 147 mmol/L    Potassium 3 3 (L) 3 5 - 5 3 mmol/L    Chloride 104 96 - 108 mmol/L    CO2 28 21 - 32 mmol/L    ANION GAP 4 4 - 13 mmol/L "BUN 9 5 - 25 mg/dL    Creatinine 0 58 (L) 0 60 - 1 30 mg/dL    Glucose 97 65 - 140 mg/dL    Calcium 9 0 8 4 - 10 2 mg/dL    AST 12 (L) 13 - 39 U/L    ALT 5 (L) 7 - 52 U/L    Alkaline Phosphatase 84 34 - 104 U/L    Total Protein 6 1 (L) 6 4 - 8 4 g/dL    Albumin 3 6 3 5 - 5 0 g/dL    Total Bilirubin 0 69 0 20 - 1 00 mg/dL    eGFR 101 ml/min/1 73sq m   Lipase    Collection Time: 05/04/23  4:14 PM   Result Value Ref Range    Lipase 19 11 - 82 u/L   HS Troponin 0hr (reflex protocol)    Collection Time: 05/04/23  4:14 PM   Result Value Ref Range    hs TnI 0hr 3 \"Refer to ACS Flowchart\"- see link ng/L   ECG 12 lead    Collection Time: 05/04/23  4:17 PM   Result Value Ref Range    Ventricular Rate 62 BPM    Atrial Rate 62 BPM    LA Interval 196 ms    QRSD Interval 96 ms    QT Interval 446 ms    QTC Interval 452 ms    P [place] 67 degrees    QRS Axis 62 degrees    T Wave Axis 63 degrees   UA w Reflex to Microscopic w Reflex to Culture    Collection Time: 05/04/23  6:58 PM    Specimen: Urine, Clean Catch   Result Value Ref Range    Color, UA Light Yellow     Clarity, UA Clear     Specific Gravity, UA 1 038 (H) 1 003 - 1 030    pH, UA 5 5 4 5, 5 0, 5 5, 6 0, 6 5, 7 0, 7 5, 8 0    Leukocytes, UA Negative Negative    Nitrite, UA Negative Negative    Protein, UA Negative Negative mg/dl    Glucose, UA Negative Negative mg/dl    Ketones, UA Negative Negative mg/dl    Urobilinogen, UA <2 0 <2 0 mg/dl mg/dl    Bilirubin, UA Negative Negative    Occult Blood, UA Negative Negative   Lactic acid, plasma (w/reflex if result > 2 0)    Collection Time: 05/04/23  7:05 PM   Result Value Ref Range    LACTIC ACID 0 8 0 5 - 2 0 mmol/L   Hemoglobin and hematocrit, blood    Collection Time: 05/05/23  2:13 AM   Result Value Ref Range    Hemoglobin 12 2 12 0 - 17 0 g/dL    Hematocrit 36 0 (L) 36 5 - 49 3 %   Basic metabolic panel    Collection Time: 05/05/23  5:39 AM   Result Value Ref Range    Sodium 138 135 - 147 mmol/L    Potassium 3 3 (L) 3 5 - " 5 3 mmol/L    Chloride 108 96 - 108 mmol/L    CO2 26 21 - 32 mmol/L    ANION GAP 4 4 - 13 mmol/L    BUN 8 5 - 25 mg/dL    Creatinine 0 57 (L) 0 60 - 1 30 mg/dL    Glucose 95 65 - 140 mg/dL    Glucose, Fasting 95 65 - 99 mg/dL    Calcium 8 5 8 4 - 10 2 mg/dL    eGFR 102 ml/min/1 73sq m   CBC (With Platelets)    Collection Time: 05/05/23  5:39 AM   Result Value Ref Range    WBC 6 95 4 31 - 10 16 Thousand/uL    RBC 3 55 (L) 3 88 - 5 62 Million/uL    Hemoglobin 11 5 (L) 12 0 - 17 0 g/dL    Hematocrit 33 8 (L) 36 5 - 49 3 %    MCV 95 82 - 98 fL    MCH 32 4 26 8 - 34 3 pg    MCHC 34 0 31 4 - 37 4 g/dL    RDW 14 0 11 6 - 15 1 %    Platelets 028 976 - 561 Thousands/uL    MPV 8 6 (L) 8 9 - 12 7 fL     Imaging: I have personally reviewed pertinent reports  CT abdomen pelvis with contrast    Result Date: 5/4/2023  Impression: Small bowel obstruction with a zone of transition in the mid abdominal  with thickened and poorly distensible small bowel loop  Small amount of ascites Thickening of the distal esophagus, more pronounced, can be evaluated with upper GI endoscopy performed on a nonemergent basis Cholelithiasis I personally discussed this study with Lamar Ruiz on 5/4/2023 6:58 PM  Workstation performed: [id number]       EKG, Pathology, and Other Studies: I have personally reviewed pertinent reports

## 2023-05-05 NOTE — PLAN OF CARE
Problem: MOBILITY - ADULT  Goal: Maintain or return to baseline ADL function  Description: INTERVENTIONS:  -  Assess patient's ability to carry out ADLs; assess patient's baseline for ADL function and identify physical deficits which impact ability to perform ADLs (bathing, care of mouth/teeth, toileting, grooming, dressing, etc )  - Assess/evaluate cause of self-care deficits   - Assess range of motion  - Assess patient's mobility; develop plan if impaired  - Assess patient's need for assistive devices and provide as appropriate  - Encourage maximum independence but intervene and supervise when necessary  - Involve family in performance of ADLs  - Assess for home care needs following discharge   - Consider OT consult to assist with ADL evaluation and planning for discharge  - Provide patient education as appropriate  Outcome: Progressing     Problem: SAFETY ADULT  Goal: Patient will remain free of falls  Description: INTERVENTIONS:  - Educate patient/family on patient safety including physical limitations  - Instruct patient to call for assistance with activity   - Consult OT/PT to assist with strengthening/mobility   - Keep Call bell within reach  - Keep bed low and locked with side rails adjusted as appropriate  - Keep care items and personal belongings within reach  - Initiate and maintain comfort rounds  - Make Fall Risk Sign visible to staff  - Offer Toileting every 2 Hours, in advance of need  - Initiate/Maintain alarms  - Obtain necessary fall risk management equipment  - Apply yellow socks and bracelet for high fall risk patients  - Consider moving patient to room near nurses station  Outcome: Progressing     Problem: INFECTION - ADULT  Goal: Absence or prevention of progression during hospitalization  Description: INTERVENTIONS:  - Assess and monitor for signs and symptoms of infection  - Monitor lab/diagnostic results  - Monitor all insertion sites, i e  indwelling lines, tubes, and drains  - Monitor endotracheal if appropriate and nasal secretions for changes in amount and color  - [place] appropriate cooling/warming therapies per order  - Administer medications as ordered  - Instruct and encourage patient and family to use good hand hygiene technique  - Identify and instruct in appropriate isolation precautions for identified infection/condition  Outcome: Progressing

## 2023-05-05 NOTE — ASSESSMENT & PLAN NOTE
Presents with abdominal pain, n/v  · CT a/p: Small bowel obstruction with a zone of transition in the mid abdominal  with thickened and poorly distensible small bowel loop  Small ascites  Cholelithiasis     · NG tube placed  NPO, IVFs, anti-emetics, pain control   Serial abdominal exams, monitor I/Os  Follow-up surgery consultation

## 2023-05-05 NOTE — CONSULTS
Consultation - [facility]  oScorro Maynard 67 y o  male MRN: [id number]  Unit/Bed#: -01 Encounter: [id number]         Reason for Consult / Principal Problem: Constipation, SBO    HPI: Karlos Rapp is a 70-year-old male with history of hypertension, paranoid schizophrenia, COPD, BPH, and previous partial colon resection for unknown etiology  Patient presented to the hospital from his group home for abdominal pain, bloating, nausea and vomiting  CT on admission revealing small bowel obstruction with transition zone in the mid abdomen with thickening and poorly distended bowel small bowel loop  Hemoglobin 12 5, no leukocytosis  Lactic acid also within normal limits  Patient was seen at the bedside with NG tube in place  Patient had about 500 cc of dark bile in the canister  Patient reports that his abdominal pain is improved, and is okay with continuing enemas until he can receive oral medication again  Patient's last colonoscopy was performed by Dr Karma Torres in 2019 revealing diverticulosis and end-to-end colonic anastomosis in the rectosigmoid  Review of Systems:    CONSTITUTIONAL: Denies any fever, chills, or rigors  HEENT: No earache or tinnitus  Denies hearing loss or visual disturbances  CARDIOVASCULAR: No chest pain or palpitations  RESPIRATORY: Denies any cough, hemoptysis, shortness of breath or dyspnea on exertion  GASTROINTESTINAL: As noted in the History of Present Illness  GENITOURINARY: No problems with urination  Denies any hematuria or dysuria  NEUROLOGIC: No dizziness or vertigo, denies headaches  MUSCULOSKELETAL: Denies any muscle or joint pain  SKIN: Denies skin rashes or itching  ENDOCRINE: Denies excessive thirst  Denies intolerance to heat or cold  PSYCHOSOCIAL: Denies depression or anxiety  Denies any recent memory loss         Historical Information   Past Medical History:   Diagnosis Date   • Asthma    • Benign prostatic hyperplasia    • COPD (chronic obstructive pulmonary disease) ([facility])    • GERD (gastroesophageal reflux disease)    • Herpes zoster without complication    • Hypercholesteremia    • Hypertension    • Paranoid schizophrenia ([facility] )    • Psychiatric disorder      Past Surgical History:   Procedure Laterality Date   • CATARACT EXTRACTION     • COLONOSCOPY       Social History   Social History     Substance and Sexual Activity   Alcohol Use Not Currently     Social History     Substance and Sexual Activity   Drug Use Never     Social History     Tobacco Use   Smoking Status Former   • Packs/day:  00   • Years:    • Pack years:    • Types: Cigarettes   • Quit date:    • Years since quittin 3   Smokeless Tobacco Never     Family History   Problem Relation Age of Onset   • No Known Problems Mother    • No Known Problems Father    • Parkinsonism Son         Meds/Allergies     Current Facility-Administered Medications   Medication Dose Route Frequency   • acetaminophen (TYLENOL) rectal suppository 650 mg  650 mg Rectal Q4H PRN   • atorvastatin (LIPITOR) tablet 40 mg  40 mg Oral Daily With Dinner   • carbidopa-levodopa (SINEMET)  mg per tablet 1 tablet  1 tablet Oral TID   • cloZAPine (CLOZARIL) tablet 100 mg  100 mg Oral BID   • cloZAPine (CLOZARIL) tablet 200 mg  200 mg Oral HS   • Deutetrabenazine TABS 1 tablet  1 tablet Oral BID   • enoxaparin (LOVENOX) subcutaneous injection 40 mg  40 mg Subcutaneous Daily   • ketotifen (ZADITOR) 0 025 % ophthalmic solution 1 drop  1 drop Both Eyes BID   • lactated ringers infusion  100 mL/hr Intravenous Continuous   • metoprolol succinate (TOPROL-XL) 24 hr tablet 25 mg  25 mg Oral Daily   • morphine injection 2 mg  2 mg Intravenous Q6H PRN   • ondansetron (ZOFRAN) injection 4 mg  4 mg Intravenous Q6H PRN   • pantoprazole (PROTONIX) injection 40 mg  40 mg Intravenous Q24H [facility]   • sodium chloride (OCEAN) 0 65 % nasal spray 1 spray  1 spray Each Nare Q1H PRN   • tamsulosin "(FLOMAX) capsule 0 4 mg  0 4 mg Oral HS   • temazepam (RESTORIL) capsule 15 mg  15 mg Oral HS   • topiramate (TOPAMAX) tablet 25 mg  25 mg Oral BID   • traZODone (DESYREL) tablet 100 mg  100 mg Oral HS       No Known Allergies      Objective     Blood pressure 149/81, pulse 73, temperature 97 8 °F (36 6 °C), resp  rate 20, height 5' 8\" (1 727 m), weight 70 kg (154 lb 5 2 oz), SpO2 96 %  Intake/Output Summary (Last 24 hours) at 5/5/2023 1553  Last data filed at 5/5/2023 1346  Gross per 24 hour   Intake 1000 ml   Output 950 ml   Net 50 ml         PHYSICAL EXAM:      General Appearance:   Alert and oriented x 3  Cooperative, and in no respiratory distress   HEENT:   Normocephalic, atraumatic, anicteric      Neck:  Supple, symmetrical, trachea midline   Lungs:   Clear to auscultation bilaterally; no rales, rhonchi or wheezing; respirations unlabored    Heart[de-identified]   S1 and S2 normal; regular rate and rhythm; no murmur, rub, or gallop  Abdomen:   Soft, non-tender, non-distended; normal bowel sounds; no masses, no organomegaly    Genitalia:   Deferred    Rectal:   Deferred    Extremities:  No cyanosis, clubbing or edema    Pulses:  2+ and symmetric all extremities    Skin:  Skin color, texture, turgor normal, no rashes or lesions    Lymph nodes:  No palpable cervical or supraclavicular lymphadenopathy        Lab Results:   Results from last 7 days   Lab Units 05/05/23  0539 05/05/23  0213 05/04/23  1614   WBC Thousand/uL 6 95  --  5 96   HEMOGLOBIN g/dL 11 5*   < > 12 5   HEMATOCRIT % 33 8*   < > 37 1   PLATELETS Thousands/uL 155  --  184   NEUTROS PCT %  --   --  70   LYMPHS PCT %  --   --  15   MONOS PCT %  --   --  10   EOS PCT %  --   --  4    < > = values in this interval not displayed       Results from last 7 days   Lab Units 05/05/23  0539 05/04/23  1614   POTASSIUM mmol/L 3 3* 3 3*   CHLORIDE mmol/L 108 104   CO2 mmol/L 26 28   BUN mg/dL 8 9   CREATININE mg/dL 0 57* 0 58*   CALCIUM mg/dL 8 5 9 0   ALK PHOS U/L  " --  84   ALT U/L  --  5*   AST U/L  --  12*         Results from last 7 days   Lab Units 05/04/23  1614   LIPASE u/L 19       Imaging Studies: I have personally reviewed pertinent imaging studies  CT abdomen pelvis with contrast    Result Date: 5/4/2023  Impression: Small bowel obstruction with a zone of transition in the mid abdominal  with thickened and poorly distensible small bowel loop  Small amount of ascites Thickening of the distal esophagus, more pronounced, can be evaluated with upper GI endoscopy performed on a nonemergent basis Cholelithiasis I personally discussed this study with Shakir Bautista on 5/4/2023 6:58 PM  Workstation performed: [id number]       ASSESSMENT and PLAN:      1) Small bowel obstruction, history of chronic constipation - Consulted by our surgical team to help with bowel regimen  CT on admission showing small bowel obstruction in the mid abdomen  Patient's sister also reports prior small bowel surgery  - Continue NG tube decompression with serial abdominal exams  - Recommend KUB tomorrow morning  - Once patient is able to take by mouth, would recommend a bowel regimen of MiraLAX 2 capfuls daily with Colace twice daily  - Patient can eventually have outpatient follow-up with GI for other prescription options  - Could also consider a CT enterography to investigate the small bowel given reported thickening on exam rule out IBD, malignancy early next week  - Spoke with patient's sister, Ethan Strickland, who agrees with plan  - Appreciate surgery recommendations    The patient was seen and examined by Dr Bri Plata, all key medical decisions were made with Dr Bri Plata  Thank you for allowing us to participate in the care of this pleasant patient  We will follow up with you closely

## 2023-05-05 NOTE — PLAN OF CARE
Problem: MOBILITY - ADULT  Goal: Maintain or return to baseline ADL function  Description: INTERVENTIONS:  -  Assess patient's ability to carry out ADLs; assess patient's baseline for ADL function and identify physical deficits which impact ability to perform ADLs (bathing, care of mouth/teeth, toileting, grooming, dressing, etc )  - Assess/evaluate cause of self-care deficits   - Assess range of motion  - Assess patient's mobility; develop plan if impaired  - Assess patient's need for assistive devices and provide as appropriate  - Encourage maximum independence but intervene and supervise when necessary  - Involve family in performance of ADLs  - Assess for home care needs following discharge   - Consider OT consult to assist with ADL evaluation and planning for discharge  - Provide patient education as appropriate  Outcome: Progressing  Goal: Maintains/Returns to pre admission functional level  Description: INTERVENTIONS:  - Perform BMAT or MOVE assessment daily    - Set and communicate daily mobility goal to care team and patient/family/caregiver  - Collaborate with rehabilitation services on mobility goals if consulted  - Perform Range of Motion  times a day  - Reposition patient every  hours    - Dangle patient  times a day  - Stand patient  times a day  - Ambulate patient  times a day  - Out of bed to chair  times a day   - Out of bed for meals  times a day  - Out of bed for toileting  - Record patient progress and toleration of activity level   Outcome: Progressing     Problem: PAIN - ADULT  Goal: Verbalizes/displays adequate comfort level or baseline comfort level  Description: Interventions:  - Encourage patient to monitor pain and request assistance  - Assess pain using appropriate pain scale  - Administer analgesics based on type and severity of pain and evaluate response  - Implement non-pharmacological measures as appropriate and evaluate response  - Consider cultural and social influences on pain and pain management  - Notify physician/advanced practitioner if interventions unsuccessful or patient reports new pain  Outcome: Progressing     Problem: INFECTION - ADULT  Goal: Absence or prevention of progression during hospitalization  Description: INTERVENTIONS:  - Assess and monitor for signs and symptoms of infection  - Monitor lab/diagnostic results  - Monitor all insertion sites, i e  indwelling lines, tubes, and drains  - Monitor endotracheal if appropriate and nasal secretions for changes in amount and color  - [place] appropriate cooling/warming therapies per order  - Administer medications as ordered  - Instruct and encourage patient and family to use good hand hygiene technique  - Identify and instruct in appropriate isolation precautions for identified infection/condition  Outcome: Progressing     Problem: SAFETY ADULT  Goal: Patient will remain free of falls  Description: INTERVENTIONS:  - Educate patient/family on patient safety including physical limitations  - Instruct patient to call for assistance with activity   - Consult OT/PT to assist with strengthening/mobility   - Keep Call bell within reach  - Keep bed low and locked with side rails adjusted as appropriate  - Keep care items and personal belongings within reach  - Initiate and maintain comfort rounds  - Make Fall Risk Sign visible to staff  - Offer Toileting every  Hours, in advance of need  - Initiate/Maintain alarm  - Obtain necessary fall risk management equipment:   - Apply yellow socks and bracelet for high fall risk patients  - Consider moving patient to room near nurses station  Outcome: Progressing     Problem: DISCHARGE PLANNING  Goal: Discharge to home or other facility with appropriate resources  Description: INTERVENTIONS:  - Identify barriers to discharge w/patient and caregiver  - Arrange for needed discharge resources and transportation as appropriate  - Identify discharge learning needs (meds, wound care, etc )  - Arrange for interpretive services to assist at discharge as needed  - Refer to Case Management Department for coordinating discharge planning if the patient needs post-hospital services based on physician/advanced practitioner order or complex needs related to functional status, cognitive ability, or social support system  Outcome: Progressing     Problem: Knowledge Deficit  Goal: Patient/family/caregiver demonstrates understanding of disease process, treatment plan, medications, and discharge instructions  Description: Complete learning assessment and assess knowledge base    Interventions:  - Provide teaching at level of understanding  - Provide teaching via preferred learning methods  Outcome: Progressing

## 2023-05-05 NOTE — PROGRESS NOTES
[address] [place] [address]  Progress Note  Name: Justo Temple  MRN: [id number]  Unit/Bed#: -96 I Date of Admission: 2023   Date of Service: 2023 I Hospital Day: 0    Assessment/Plan   * SBO (small bowel obstruction) ([facility] )  Assessment & Plan  Presents with abdominal pain, n/v  · CT a/p: Small bowel obstruction with a zone of transition in the mid abdominal  with thickened and poorly distensible small bowel loop  Small ascites  Cholelithiasis  · NG tube placed  NPO, IVFs, anti-emetics, pain control   Serial abdominal exams, monitor I/Os  Follow-up surgery consultation    Parkinson disease ([place] [address] )  Assessment & Plan  Continue home sinemet, austedo    Paranoid schizophrenia ([place] [address] )  Assessment & Plan  Continue home clonazepam, temazepam, trazodone    Hypertension  Assessment & Plan  BP stable  Continue metoprolol    GERD (gastroesophageal reflux disease)  Assessment & Plan  · CT also reveals thickening of the distal esophagus  · Recommend outpatient follow up with GI   · Continue PPI           \  VTE Pharmacologic Prophylaxis:   Pharmacologic: Enoxaparin (Lovenox)  Mechanical VTE Prophylaxis in Place: Yes    Patient Centered Rounds: I have performed bedside rounds with nursing staff today  Discussions with Specialists or Other Care Team Provider: cm, nursing    Education and Discussions with Family / Patient: pt    Time Spent for Care: 30 minutes  More than 50% of total time spent on counseling and coordination of care as described above      Current Length of Stay: 0 day(s)    Current Patient Status: Observation   Certification Statement: The patient will continue to require additional inpatient hospital stay due to see below    Discharge Plan: Pending improvement of small bowel obstruction    Code Status: Level 1 - Full Code      Subjective:   Denies fevers, chills, cough, chest pain    Objective:     Vitals:   Temp (24hrs), Av 9 °F (36 6 °C), Min:97 8 °F (36 6 °C), Max:98 °F (36 7 °C)    Temp:  [97 8 °F (36 6 °C)-98 °F (36 7 °C)] 97 8 °F (36 6 °C)  HR:  [61-73] 73  Resp:  [14-22] 20  BP: (131-172)/(69-87) 149/81  SpO2:  [95 %-100 %] 96 %  Body mass index is 23 46 kg/m²  Input and Output Summary (last 24 hours): Intake/Output Summary (Last 24 hours) at 5/5/2023 0933  Last data filed at 5/5/2023 0656  Gross per 24 hour   Intake 1000 ml   Output 500 ml   Net 500 ml       Physical Exam:     Physical Exam  Constitutional:       General: He is not in acute distress  Appearance: He is well-developed  He is not diaphoretic  HENT:      Head: Normocephalic and atraumatic  Nose: Nose normal       Mouth/Throat:      Pharynx: No oropharyngeal exudate  Eyes:      General: No scleral icterus  Conjunctiva/sclera: Conjunctivae normal    Cardiovascular:      Rate and Rhythm: Normal rate and regular rhythm  Heart sounds: Normal heart sounds  No murmur heard  No friction rub  No gallop  Pulmonary:      Effort: Pulmonary effort is normal  No respiratory distress  Breath sounds: Normal breath sounds  No wheezing or rales  Chest:      Chest wall: No tenderness  Abdominal:      General: Bowel sounds are normal  There is no distension  Palpations: Abdomen is soft  Tenderness: There is no abdominal tenderness  There is no guarding  Musculoskeletal:         General: No tenderness or deformity  Normal range of motion  Cervical back: Normal range of motion and neck supple  Skin:     General: Skin is warm and dry  Findings: No erythema  Neurological:      Mental Status: He is alert  Mental status is at baseline         (     Additional Data:     Labs:    Results from last 7 days   Lab Units 05/05/23  0539 05/05/23  0213 05/04/23  1614   WBC Thousand/uL 6 95  --  5 96   HEMOGLOBIN g/dL 11 5*   < > 12 5   HEMATOCRIT % 33 8*   < > 37 1   PLATELETS Thousands/uL 155  --  184   NEUTROS PCT %  --   --  70   LYMPHS PCT %  --   --  15   MONOS PCT %  --   --  10 EOS PCT %  --   --  4    < > = values in this interval not displayed  Results from last 7 days   Lab Units 05/05/23  0539 05/04/23  1614   SODIUM mmol/L 138 136   POTASSIUM mmol/L 3 3* 3 3*   CHLORIDE mmol/L 108 104   CO2 mmol/L 26 28   BUN mg/dL 8 9   CREATININE mg/dL 0 57* 0 58*   ANION GAP mmol/L 4 4   CALCIUM mg/dL 8 5 9 0   ALBUMIN g/dL  --  3 6   TOTAL BILIRUBIN mg/dL  --  0 69   ALK PHOS U/L  --  84   ALT U/L  --  5*   AST U/L  --  12*   GLUCOSE RANDOM mg/dL 95 97                 Results from last 7 days   Lab Units 05/04/23  1905   LACTIC ACID mmol/L 0 8           * I Have Reviewed All Lab Data Listed Above  * Additional Pertinent Lab Tests Reviewed:  All Labs Within Last 24 Hours Reviewed    Imaging:    Imaging Reports Reviewed Today Include: na  Imaging Personally Reviewed by Myself Includes:  na    Recent Cultures (last 7 days):           Last 24 Hours Medication List:   Current Facility-Administered Medications   Medication Dose Route Frequency Provider Last Rate   • acetaminophen  650 mg Rectal Q4H PRN Мария Macedo PA-C     • atorvastatin  40 mg Oral Daily With LAURIE Padilla     • carbidopa-levodopa  1 tablet Oral TID Мария Macedo PA-C     • cloZAPine  100 mg Oral BID Мария Macedo PA-C     • clozapine  200 mg Oral HS Мария Macedo PA-C     • Deutetrabenazine  1 tablet Oral BID Мария Macedo PA-C     • enoxaparin  40 mg Subcutaneous Daily Мария Macedo PA-C     • ketotifen  1 drop Both Eyes BID Мария Macedo PA-C     • lactated ringers  100 mL/hr Intravenous Continuous Мария Macedo PA-C 100 mL/hr (05/05/23 0371)   • metoprolol succinate  25 mg Oral Daily Мария Macedo PA-C     • morphine injection  2 mg Intravenous Q6H PRN Мария Macedo PA-C     • ondansetron  4 mg Intravenous Q6H PRN Мария Macedo PA-C     • pantoprazole  40 mg Intravenous Q24H [address], [place]     • sodium chloride  1 spray Each Nare Q1H PRN Мария Leach PA-C     • tamsulosin  0 4 mg Oral HS Мария Macedo PA-C     • temazepam  15 mg Oral HS Мария Macedo PA-C     • topiramate  25 mg Oral BID Мария Macedo PA-C     • traZODone  100 mg Oral HS Мария Leach PA-C          Today, Patient Was Seen By: Leona Cline MD    ** Please Note: Dictation voice to text software may have been used in the creation of this document   **

## 2023-05-06 ENCOUNTER — APPOINTMENT (INPATIENT)
Dept: RADIOLOGY | Facility: HOSPITAL | Age: 73
End: 2023-05-06

## 2023-05-06 LAB
ANION GAP SERPL CALCULATED.3IONS-SCNC: 8 MMOL/L (ref 4–13)
BASOPHILS # BLD AUTO: 0.03 THOUSANDS/ÂΜL (ref 0–0.1)
BASOPHILS NFR BLD AUTO: 1 % (ref 0–1)
BUN SERPL-MCNC: 9 MG/DL (ref 5–25)
CALCIUM SERPL-MCNC: 8.4 MG/DL (ref 8.4–10.2)
CHLORIDE SERPL-SCNC: 107 MMOL/L (ref 96–108)
CO2 SERPL-SCNC: 24 MMOL/L (ref 21–32)
CREAT SERPL-MCNC: 0.51 MG/DL (ref 0.6–1.3)
EOSINOPHIL # BLD AUTO: 0.21 THOUSAND/ÂΜL (ref 0–0.61)
EOSINOPHIL NFR BLD AUTO: 4 % (ref 0–6)
ERYTHROCYTE [DISTWIDTH] IN BLOOD BY AUTOMATED COUNT: 13.7 % (ref 11.6–15.1)
GFR SERPL CREATININE-BSD FRML MDRD: 107 ML/MIN/1.73SQ M
GLUCOSE SERPL-MCNC: 64 MG/DL (ref 65–140)
HCT VFR BLD AUTO: 34.1 % (ref 36.5–49.3)
HGB BLD-MCNC: 11.7 G/DL (ref 12–17)
IMM GRANULOCYTES # BLD AUTO: 0.01 THOUSAND/UL (ref 0–0.2)
IMM GRANULOCYTES NFR BLD AUTO: 0 % (ref 0–2)
LYMPHOCYTES # BLD AUTO: 1.25 THOUSANDS/ÂΜL (ref 0.6–4.47)
LYMPHOCYTES NFR BLD AUTO: 24 % (ref 14–44)
MCH RBC QN AUTO: 32.7 PG (ref 26.8–34.3)
MCHC RBC AUTO-ENTMCNC: 34.3 G/DL (ref 31.4–37.4)
MCV RBC AUTO: 95 FL (ref 82–98)
MONOCYTES # BLD AUTO: 0.41 THOUSAND/ÂΜL (ref 0.17–1.22)
MONOCYTES NFR BLD AUTO: 8 % (ref 4–12)
MRSA NOSE QL CULT: ABNORMAL
MRSA NOSE QL CULT: ABNORMAL
NEUTROPHILS # BLD AUTO: 3.41 THOUSANDS/ÂΜL (ref 1.85–7.62)
NEUTS SEG NFR BLD AUTO: 63 % (ref 43–75)
NRBC BLD AUTO-RTO: 0 /100 WBCS
PLATELET # BLD AUTO: 142 THOUSANDS/UL (ref 149–390)
PMV BLD AUTO: 8.6 FL (ref 8.9–12.7)
POTASSIUM SERPL-SCNC: 3 MMOL/L (ref 3.5–5.3)
RBC # BLD AUTO: 3.58 MILLION/UL (ref 3.88–5.62)
SODIUM SERPL-SCNC: 139 MMOL/L (ref 135–147)
WBC # BLD AUTO: 5.32 THOUSAND/UL (ref 4.31–10.16)

## 2023-05-06 RX ORDER — POTASSIUM CHLORIDE 14.9 MG/ML
20 INJECTION INTRAVENOUS ONCE
Status: COMPLETED | OUTPATIENT
Start: 2023-05-06 | End: 2023-05-06

## 2023-05-06 RX ADMIN — CLOZAPINE 100 MG: 100 TABLET ORAL at 17:25

## 2023-05-06 RX ADMIN — PANTOPRAZOLE SODIUM 40 MG: 40 INJECTION, POWDER, FOR SOLUTION INTRAVENOUS at 05:11

## 2023-05-06 RX ADMIN — METOPROLOL SUCCINATE 25 MG: 25 TABLET, EXTENDED RELEASE ORAL at 09:43

## 2023-05-06 RX ADMIN — TOPIRAMATE 25 MG: 25 TABLET, FILM COATED ORAL at 17:25

## 2023-05-06 RX ADMIN — CARBIDOPA AND LEVODOPA 1 TABLET: 25; 100 TABLET ORAL at 17:25

## 2023-05-06 RX ADMIN — SODIUM CHLORIDE, SODIUM LACTATE, POTASSIUM CHLORIDE, AND CALCIUM CHLORIDE 100 ML/HR: .6; .31; .03; .02 INJECTION, SOLUTION INTRAVENOUS at 02:50

## 2023-05-06 RX ADMIN — CLOZAPINE 200 MG: 100 TABLET ORAL at 21:24

## 2023-05-06 RX ADMIN — TAMSULOSIN HYDROCHLORIDE 0.4 MG: 0.4 CAPSULE ORAL at 21:24

## 2023-05-06 RX ADMIN — ENOXAPARIN SODIUM 40 MG: 40 INJECTION SUBCUTANEOUS at 09:43

## 2023-05-06 RX ADMIN — TRAZODONE HYDROCHLORIDE 100 MG: 100 TABLET ORAL at 21:24

## 2023-05-06 RX ADMIN — ATORVASTATIN CALCIUM 40 MG: 40 TABLET, FILM COATED ORAL at 17:25

## 2023-05-06 RX ADMIN — CARBIDOPA AND LEVODOPA 1 TABLET: 25; 100 TABLET ORAL at 09:43

## 2023-05-06 RX ADMIN — TEMAZEPAM 15 MG: 15 CAPSULE ORAL at 21:24

## 2023-05-06 RX ADMIN — CLOZAPINE 100 MG: 100 TABLET ORAL at 09:44

## 2023-05-06 RX ADMIN — KETOTIFEN FUMARATE 1 DROP: 0.25 SOLUTION/ DROPS OPHTHALMIC at 09:44

## 2023-05-06 RX ADMIN — SODIUM CHLORIDE, SODIUM LACTATE, POTASSIUM CHLORIDE, AND CALCIUM CHLORIDE 100 ML/HR: .6; .31; .03; .02 INJECTION, SOLUTION INTRAVENOUS at 23:15

## 2023-05-06 RX ADMIN — CARBIDOPA AND LEVODOPA 1 TABLET: 25; 100 TABLET ORAL at 21:24

## 2023-05-06 RX ADMIN — TOPIRAMATE 25 MG: 25 TABLET, FILM COATED ORAL at 09:43

## 2023-05-06 RX ADMIN — POTASSIUM CHLORIDE 20 MEQ: 14.9 INJECTION, SOLUTION INTRAVENOUS at 16:03

## 2023-05-06 RX ADMIN — KETOTIFEN FUMARATE 1 DROP: 0.25 SOLUTION/ DROPS OPHTHALMIC at 17:25

## 2023-05-06 RX ADMIN — POTASSIUM CHLORIDE 20 MEQ: 14.9 INJECTION, SOLUTION INTRAVENOUS at 13:13

## 2023-05-06 RX ADMIN — SODIUM CHLORIDE, SODIUM LACTATE, POTASSIUM CHLORIDE, AND CALCIUM CHLORIDE 100 ML/HR: .6; .31; .03; .02 INJECTION, SOLUTION INTRAVENOUS at 13:13

## 2023-05-06 NOTE — PLAN OF CARE
Problem: MOBILITY - ADULT  Goal: Maintain or return to baseline ADL function  Description: INTERVENTIONS:  -  Assess patient's ability to carry out ADLs; assess patient's baseline for ADL function and identify physical deficits which impact ability to perform ADLs (bathing, care of mouth/teeth, toileting, grooming, dressing, etc )  - Assess/evaluate cause of self-care deficits   - Assess range of motion  - Assess patient's mobility; develop plan if impaired  - Assess patient's need for assistive devices and provide as appropriate  - Encourage maximum independence but intervene and supervise when necessary  - Involve family in performance of ADLs  - Assess for home care needs following discharge   - Consider OT consult to assist with ADL evaluation and planning for discharge  - Provide patient education as appropriate  Outcome: Progressing     Problem: SAFETY ADULT  Goal: Patient will remain free of falls  Description: INTERVENTIONS:  - Educate patient/family on patient safety including physical limitations  - Instruct patient to call for assistance with activity   - Consult OT/PT to assist with strengthening/mobility   - Keep Call bell within reach  - Keep bed low and locked with side rails adjusted as appropriate  - Keep care items and personal belongings within reach  - Initiate and maintain comfort rounds  - Make Fall Risk Sign visible to staff  - Offer Toileting every 2 Hours, in advance of need  - Initiate/Maintain bedalarm  - Obtain necessary fall risk management equipment:   - Apply yellow socks and bracelet for high fall risk patients  - Consider moving patient to room near nurses station  Outcome: Progressing     Problem: Knowledge Deficit  Goal: Patient/family/caregiver demonstrates understanding of disease process, treatment plan, medications, and discharge instructions  Description: Complete learning assessment and assess knowledge base    Interventions:  - Provide teaching at level of understanding  - Provide teaching via preferred learning methods  Outcome: Progressing     Problem: GASTROINTESTINAL - ADULT  Goal: Minimal or absence of nausea and/or vomiting  Description: INTERVENTIONS:  - Administer IV fluids if ordered to ensure adequate hydration  - Maintain NPO status until nausea and vomiting are resolved  - Nasogastric tube if ordered  - Administer ordered antiemetic medications as needed  - Provide nonpharmacologic comfort measures as appropriate  - Advance diet as tolerated, if ordered  - Consider nutrition services referral to assist patient with adequate nutrition and appropriate food choices  Outcome: Progressing     Problem: SAFETY ADULT  Goal: Patient will remain free of falls  Description: INTERVENTIONS:  - Educate patient/family on patient safety including physical limitations  - Instruct patient to call for assistance with activity   - Consult OT/PT to assist with strengthening/mobility   - Keep Call bell within reach  - Keep bed low and locked with side rails adjusted as appropriate  - Keep care items and personal belongings within reach  - Initiate and maintain comfort rounds  - Make Fall Risk Sign visible to staff  - Offer Toileting every 2 Hours, in advance of need  - Initiate/Maintain bedalarm  - Obtain necessary fall risk management equipment:   - Apply yellow socks and bracelet for high fall risk patients  - Consider moving patient to room near nurses station  5/6/2023 0124 by Johanna Deluca  Outcome: Progressing  5/6/2023 0123 by Johanna Deluca  Outcome: Progressing     Problem: MOBILITY - ADULT  Goal: Maintain or return to baseline ADL function  Description: INTERVENTIONS:  -  Assess patient's ability to carry out ADLs; assess patient's baseline for ADL function and identify physical deficits which impact ability to perform ADLs (bathing, care of mouth/teeth, toileting, grooming, dressing, etc )  - Assess/evaluate cause of self-care deficits   - Assess range of motion  - Assess patient's mobility; develop plan if impaired  - Assess patient's need for assistive devices and provide as appropriate  - Encourage maximum independence but intervene and supervise when necessary  - Involve family in performance of ADLs  - Assess for home care needs following discharge   - Consider OT consult to assist with ADL evaluation and planning for discharge  - Provide patient education as appropriate  5/6/2023 0124 by Sherine Nichols  Outcome: Progressing  5/6/2023 0123 by Sherine Nichols  Outcome: Progressing

## 2023-05-06 NOTE — PROGRESS NOTES
[address] [facility]  Progress Note  Name: César Lanza  MRN: [id number]  Unit/Bed#: -24 I Date of Admission: 2023   Date of Service: 2023 I Hospital Day: 1    Assessment/Plan   * SBO (small bowel obstruction) ([facility] )  Assessment & Plan  Presents with abdominal pain, n/v  · CT a/p: Small bowel obstruction with a zone of transition in the mid abdominal  with thickened and poorly distensible small bowel loop  Small ascites  Cholelithiasis  NG tube in place   remains n p o   Plan for KUB per surgery  Follow-up results  Advance diet per surgical team  Pain control  IVF    Parkinson disease [facility])  Assessment & Plan  Continue home Sinemet    Paranoid schizophrenia ([facility] )  Assessment & Plan  Continue home Clozaril    Hypertension  Assessment & Plan  Blood pressure remains controlled  Continue metoprolol         VTE Pharmacologic Prophylaxis:   Pharmacologic: Enoxaparin (Lovenox)  Mechanical VTE Prophylaxis in Place: Yes    Patient Centered Rounds: I have performed bedside rounds with nursing staff today  Discussions with Specialists or Other Care Team Provider: cm, nursing    Education and Discussions with Family / Patient: pt    Time Spent for Care: 30 minutes  More than 50% of total time spent on counseling and coordination of care as described above  Current Length of Stay: 1 day(s)    Current Patient Status: Inpatient   Certification Statement: The patient will continue to require additional inpatient hospital stay due to see below    Discharge Plan: Pending improvement of small bowel obstruction    Code Status: Level 1 - Full Code      Subjective:   Denies fevers, chills, cough, shortness of breath    Objective:     Vitals:   Temp (24hrs), Av °F (36 7 °C), Min:98 °F (36 7 °C), Max:98 °F (36 7 °C)    Temp:  [98 °F (36 7 °C)] 98 °F (36 7 °C)  HR:  [78-86] 78  Resp:  [16-19] 19  BP: (130-150)/(79-90) 150/79  SpO2:  [98 %] 98 %  Body mass index is 23 46 kg/m²       Input and Output Summary (last 24 hours): Intake/Output Summary (Last 24 hours) at 5/6/2023 0830  Last data filed at 5/6/2023 5222  Gross per 24 hour   Intake 920 ml   Output 1100 ml   Net -180 ml       Physical Exam:     Physical Exam  Constitutional:       General: He is not in acute distress  Appearance: He is well-developed  He is not diaphoretic  HENT:      Head: Normocephalic and atraumatic  Nose: Nose normal       Mouth/Throat:      Pharynx: No oropharyngeal exudate  Eyes:      General: No scleral icterus  Conjunctiva/sclera: Conjunctivae normal    Cardiovascular:      Rate and Rhythm: Normal rate and regular rhythm  Heart sounds: Normal heart sounds  No murmur heard  No friction rub  No gallop  Pulmonary:      Effort: Pulmonary effort is normal  No respiratory distress  Breath sounds: Normal breath sounds  No wheezing or rales  Chest:      Chest wall: No tenderness  Abdominal:      General: Bowel sounds are normal  There is no distension  Palpations: Abdomen is soft  Tenderness: There is no abdominal tenderness  There is no guarding  Musculoskeletal:         General: No tenderness or deformity  Normal range of motion  Cervical back: Normal range of motion and neck supple  Skin:     General: Skin is warm and dry  Findings: No erythema  Neurological:      Mental Status: He is alert and oriented to person, place, and time         3    Additional Data:     Labs:    Results from last 7 days   Lab Units 05/06/23  0509   WBC Thousand/uL 5 32   HEMOGLOBIN g/dL 11 7*   HEMATOCRIT % 34 1*   PLATELETS Thousands/uL 142*   NEUTROS PCT % 63   LYMPHS PCT % 24   MONOS PCT % 8   EOS PCT % 4     Results from last 7 days   Lab Units 05/06/23  0540 05/05/23  0539 05/04/23  1614   SODIUM mmol/L 139   < > 136   POTASSIUM mmol/L 3 0*   < > 3 3*   CHLORIDE mmol/L 107   < > 104   CO2 mmol/L 24   < > 28   BUN mg/dL 9   < > 9   CREATININE mg/dL 0 51*   < > 0 58*   ANION GAP mmol/L 8   < > 4   CALCIUM mg/dL 8 4   < > 9 0   ALBUMIN g/dL  --   --  3 6   TOTAL BILIRUBIN mg/dL  --   --  0 69   ALK PHOS U/L  --   --  84   ALT U/L  --   --  5*   AST U/L  --   --  12*   GLUCOSE RANDOM mg/dL 64*   < > 97    < > = values in this interval not displayed  Results from last 7 days   Lab Units 05/04/23  1905   LACTIC ACID mmol/L 0 8           * I Have Reviewed All Lab Data Listed Above  * Additional Pertinent Lab Tests Reviewed:  All Labs Within Last 24 Hours Reviewed    Imaging:    Imaging Reports Reviewed Today Include: na  Imaging Personally Reviewed by Myself Includes:  na    Recent Cultures (last 7 days):           Last 24 Hours Medication List:   Current Facility-Administered Medications   Medication Dose Route Frequency Provider Last Rate   • acetaminophen  650 mg Rectal Q4H PRN Мария Macedo PA-C     • atorvastatin  40 mg Oral Daily With LAURIE Padilla     • carbidopa-levodopa  1 tablet Oral TID Мария Macedo PA-C     • cloZAPine  100 mg Oral BID Мария Macedo PA-C     • clozapine  200 mg Oral HS Мария Macedo PA-C     • Deutetrabenazine  1 tablet Oral BID Мария Macedo PA-C     • enoxaparin  40 mg Subcutaneous Daily Мария Macedo PA-C     • ketotifen  1 drop Both Eyes BID Мария Macedo PA-C     • lactated ringers  100 mL/hr Intravenous Continuous Мария Macedo PA-C 100 mL/hr (05/06/23 0250)   • metoprolol succinate  25 mg Oral Daily Мария Macedo PA-C     • morphine injection  2 mg Intravenous Q6H PRN Мария Macedo PA-C     • ondansetron  4 mg Intravenous Q6H PRN Мария Macedo PA-C     • pantoprazole  40 mg Intravenous Q24H [address], PA-C     • sodium chloride  1 spray Each Nare Q1H PRN Мария Macedo PA-C     • tamsulosin  0 4 mg Oral HS Мария Castillo PA-C     • temazepam  15 mg Oral HS Мария Macedo PA-C     • topiramate  25 mg Oral BID Мария Macedo PA-C     • traZODone  100 mg Oral HS Мария Mayer PA-C          Today, Patient Was Seen By: Goyo Odom MD    ** Please Note: Dictation voice to text software may have been used in the creation of this document   **

## 2023-05-06 NOTE — ASSESSMENT & PLAN NOTE
Presents with abdominal pain, n/v  · CT a/p: Small bowel obstruction with a zone of transition in the mid abdominal  with thickened and poorly distensible small bowel loop  Small ascites  Cholelithiasis     NG tube in place   remains n p o   Plan for KUB per surgery  Follow-up results  Advance diet per surgical team  Pain control  IVF

## 2023-05-06 NOTE — PLAN OF CARE
Problem: MOBILITY - ADULT  Goal: Maintain or return to baseline ADL function  Description: INTERVENTIONS:  -  Assess patient's ability to carry out ADLs; assess patient's baseline for ADL function and identify physical deficits which impact ability to perform ADLs (bathing, care of mouth/teeth, toileting, grooming, dressing, etc )  - Assess/evaluate cause of self-care deficits   - Assess range of motion  - Assess patient's mobility; develop plan if impaired  - Assess patient's need for assistive devices and provide as appropriate  - Encourage maximum independence but intervene and supervise when necessary  - Involve family in performance of ADLs  - Assess for home care needs following discharge   - Consider OT consult to assist with ADL evaluation and planning for discharge  - Provide patient education as appropriate  Outcome: Progressing  Goal: Maintains/Returns to pre admission functional level  Description: INTERVENTIONS:  - Perform BMAT or MOVE assessment daily    - Set and communicate daily mobility goal to care team and patient/family/caregiver  - Collaborate with rehabilitation services on mobility goals if consulted  - Perform Range of Motion 3 times a day  - Reposition patient every 3 hours    - Dangle patient 3 times a day  - Stand patient 3 times a day  - Ambulate patient 3 times a day  - Out of bed to chair 3 times a day   - Out of bed for meals 3 times a day  - Out of bed for toileting  - Record patient progress and toleration of activity level   Outcome: Progressing     Problem: PAIN - ADULT  Goal: Verbalizes/displays adequate comfort level or baseline comfort level  Description: Interventions:  - Encourage patient to monitor pain and request assistance  - Assess pain using appropriate pain scale  - Administer analgesics based on type and severity of pain and evaluate response  - Implement non-pharmacological measures as appropriate and evaluate response  - Consider cultural and social influences on pain and pain management  - Notify physician/advanced practitioner if interventions unsuccessful or patient reports new pain  Outcome: Progressing     Problem: INFECTION - ADULT  Goal: Absence or prevention of progression during hospitalization  Description: INTERVENTIONS:  - Assess and monitor for signs and symptoms of infection  - Monitor lab/diagnostic results  - Monitor all insertion sites, i e  indwelling lines, tubes, and drains  - Monitor endotracheal if appropriate and nasal secretions for changes in amount and color  - [place] appropriate cooling/warming therapies per order  - Administer medications as ordered  - Instruct and encourage patient and family to use good hand hygiene technique  - Identify and instruct in appropriate isolation precautions for identified infection/condition  Outcome: Progressing     Problem: SAFETY ADULT  Goal: Patient will remain free of falls  Description: INTERVENTIONS:  - Educate patient/family on patient safety including physical limitations  - Instruct patient to call for assistance with activity   - Consult OT/PT to assist with strengthening/mobility   - Keep Call bell within reach  - Keep bed low and locked with side rails adjusted as appropriate  - Keep care items and personal belongings within reach  - Initiate and maintain comfort rounds  - Make Fall Risk Sign visible to staff  - Offer Toileting every 2 Hours, in advance of need  - Initiate/Maintain bed alarm  - Obtain necessary fall risk management equipment: chair alarm  - Apply yellow socks and bracelet for high fall risk patients  - Consider moving patient to room near nurses station  Outcome: Progressing     Problem: DISCHARGE PLANNING  Goal: Discharge to home or other facility with appropriate resources  Description: INTERVENTIONS:  - Identify barriers to discharge w/patient and caregiver  - Arrange for needed discharge resources and transportation as appropriate  - Identify discharge learning needs (meds, wound care, etc )  - Arrange for interpretive services to assist at discharge as needed  - Refer to Case Management Department for coordinating discharge planning if the patient needs post-hospital services based on physician/advanced practitioner order or complex needs related to functional status, cognitive ability, or social support system  Outcome: Progressing     Problem: Knowledge Deficit  Goal: Patient/family/caregiver demonstrates understanding of disease process, treatment plan, medications, and discharge instructions  Description: Complete learning assessment and assess knowledge base    Interventions:  - Provide teaching at level of understanding  - Provide teaching via preferred learning methods  Outcome: Progressing     Problem: GASTROINTESTINAL - ADULT  Goal: Minimal or absence of nausea and/or vomiting  Description: INTERVENTIONS:  - Administer IV fluids if ordered to ensure adequate hydration  - Maintain NPO status until nausea and vomiting are resolved  - Nasogastric tube if ordered  - Administer ordered antiemetic medications as needed  - Provide nonpharmacologic comfort measures as appropriate  - Advance diet as tolerated, if ordered  - Consider nutrition services referral to assist patient with adequate nutrition and appropriate food choices  Outcome: Progressing  Goal: Maintains or returns to baseline bowel function  Description: INTERVENTIONS:  - Assess bowel function  - Encourage oral fluids to ensure adequate hydration  - Administer IV fluids if ordered to ensure adequate hydration  - Administer ordered medications as needed  - Encourage mobilization and activity  - Consider nutritional services referral to assist patient with adequate nutrition and appropriate food choices  Outcome: Progressing  Goal: Maintains adequate nutritional intake  Description: INTERVENTIONS:  - Monitor percentage of each meal consumed  - Identify factors contributing to decreased intake, treat as appropriate  - Assist with meals as needed  - Monitor I&O, weight, and lab values if indicated  - Obtain nutrition services referral as needed  Outcome: Progressing

## 2023-05-06 NOTE — PROGRESS NOTES
"Progress Note - General Surgery   Humaira Villalta 67 y o  male MRN: [id number]  Unit/Bed#: -01 Encounter: [id number]    Assessment:  Humaira Villalta is a 67 y o  male with SBO     AVSS, no leukocytosis, labs are within normal limits  NG tube with 400 output  Patient denies any nausea or vomiting since NG tube insertion  Reports he is passing gas  Denies abdominal pain  No bowel movement  Plan:  • Continue n p o  and NG tube until NG tube output decreases  • IV fluids  • Monitor abdominal exam, labs, vitals  • PRN pain medication and anti-emetics  • Encourage ambulation  • DVT ppx: heparin  • Incentive spirometry 10 times/hour while awake  • Continue home medications as prescribed   • Remainder of care per primary team-Slim  • General surgery will continue to follow    Subjective/Objective    Subjective: No acute events overnight  Objective:     Blood pressure 150/79, pulse 78, temperature 98 °F (36 7 °C), resp  rate 19, height 5' 8\" (1 727 m), weight 70 kg (154 lb 5 2 oz), SpO2 98 %  ,Body mass index is 23 46 kg/m²  Intake/Output Summary (Last 24 hours) at 5/6/2023 0820  Last data filed at 5/6/2023 6785  Gross per 24 hour   Intake 920 ml   Output 1100 ml   Net -180 ml       Invasive Devices     Peripheral Intravenous Line  Duration           Peripheral IV 05/04/23 Left Arm 1 day          Drain  Duration           NG/OG/Enteral Tube Nasogastric 16 Fr Right nare 1 day                Physical Exam:   GEN: NAD  HEENT: NCAT, MMM  CV: RRR, no m/r/g  Lung: Normal effort, CTA B/L, no w/r/r  Ab: Soft, NT/ND  Extrem: No CCE   Neuro: A+Ox3     Lab, Imaging and other studies:I have personally reviewed pertinent lab results       VTE Pharmacologic Prophylaxis: Heparin  VTE Mechanical Prophylaxis: sequential compression device    Recent Results (from the past 36 hour(s))   Hemoglobin and hematocrit, blood    Collection Time: 05/05/23  2:13 AM   Result Value Ref Range    Hemoglobin 12 2 12 0 - 17 0 g/dL    " Hematocrit 36 0 (L) 36 5 - 49 3 %   Basic metabolic panel    Collection Time: 05/05/23  5:39 AM   Result Value Ref Range    Sodium 138 135 - 147 mmol/L    Potassium 3 3 (L) 3 5 - 5 3 mmol/L    Chloride 108 96 - 108 mmol/L    CO2 26 21 - 32 mmol/L    ANION GAP 4 4 - 13 mmol/L    BUN 8 5 - 25 mg/dL    Creatinine 0 57 (L) 0 60 - 1 30 mg/dL    Glucose 95 65 - 140 mg/dL    Glucose, Fasting 95 65 - 99 mg/dL    Calcium 8 5 8 4 - 10 2 mg/dL    eGFR 102 ml/min/1 73sq m   CBC (With Platelets)    Collection Time: 05/05/23  5:39 AM   Result Value Ref Range    WBC 6 95 4 31 - 10 16 Thousand/uL    RBC 3 55 (L) 3 88 - 5 62 Million/uL    Hemoglobin 11 5 (L) 12 0 - 17 0 g/dL    Hematocrit 33 8 (L) 36 5 - 49 3 %    MCV 95 82 - 98 fL    MCH 32 4 26 8 - 34 3 pg    MCHC 34 0 31 4 - 37 4 g/dL    RDW 14 0 11 6 - 15 1 %    Platelets 504 271 - 057 Thousands/uL    MPV 8 6 (L) 8 9 - 12 7 fL   CBC and differential    Collection Time: 05/06/23  5:09 AM   Result Value Ref Range    WBC 5 32 4 31 - 10 16 Thousand/uL    RBC 3 58 (L) 3 88 - 5 62 Million/uL    Hemoglobin 11 7 (L) 12 0 - 17 0 g/dL    Hematocrit 34 1 (L) 36 5 - 49 3 %    MCV 95 82 - 98 fL    MCH 32 7 26 8 - 34 3 pg    MCHC 34 3 31 4 - 37 4 g/dL    RDW 13 7 11 6 - 15 1 %    MPV 8 6 (L) 8 9 - 12 7 fL    Platelets 702 (L) 768 - 390 Thousands/uL    nRBC 0 /100 WBCs    Neutrophils Relative 63 43 - 75 %    Immat GRANS % 0 0 - 2 %    Lymphocytes Relative 24 14 - 44 %    Monocytes Relative 8 4 - 12 %    Eosinophils Relative 4 0 - 6 %    Basophils Relative 1 0 - 1 %    Neutrophils Absolute 3 41 1 85 - 7 62 Thousands/µL    Immature Grans Absolute 0 01 0 00 - 0 20 Thousand/uL    Lymphocytes Absolute 1 25 0 60 - 4 47 Thousands/µL    Monocytes Absolute 0 41 0 17 - 1 22 Thousand/µL    Eosinophils Absolute 0 21 0 00 - 0 61 Thousand/µL    Basophils Absolute 0 03 0 00 - 0 10 Thousands/µL   Basic metabolic panel    Collection Time: 05/06/23  5:40 AM   Result Value Ref Range    Sodium 139 135 - 147 mmol/L    Potassium 3 0 (L) 3 5 - 5 3 mmol/L    Chloride 107 96 - 108 mmol/L    CO2 24 21 - 32 mmol/L    ANION GAP 8 4 - 13 mmol/L    BUN 9 5 - 25 mg/dL    Creatinine 0 51 (L) 0 60 - 1 30 mg/dL    Glucose 64 (L) 65 - 140 mg/dL    Calcium 8 4 8 4 - 10 2 mg/dL    eGFR 107 ml/min/1 73sq m

## 2023-05-07 LAB
ANION GAP SERPL CALCULATED.3IONS-SCNC: 7 MMOL/L (ref 4–13)
BASOPHILS # BLD AUTO: 0.03 THOUSANDS/ÂΜL (ref 0–0.1)
BASOPHILS NFR BLD AUTO: 1 % (ref 0–1)
BUN SERPL-MCNC: 10 MG/DL (ref 5–25)
CALCIUM SERPL-MCNC: 8.3 MG/DL (ref 8.4–10.2)
CHLORIDE SERPL-SCNC: 107 MMOL/L (ref 96–108)
CO2 SERPL-SCNC: 25 MMOL/L (ref 21–32)
CREAT SERPL-MCNC: 0.54 MG/DL (ref 0.6–1.3)
EOSINOPHIL # BLD AUTO: 0.24 THOUSAND/ÂΜL (ref 0–0.61)
EOSINOPHIL NFR BLD AUTO: 5 % (ref 0–6)
ERYTHROCYTE [DISTWIDTH] IN BLOOD BY AUTOMATED COUNT: 13.4 % (ref 11.6–15.1)
GFR SERPL CREATININE-BSD FRML MDRD: 104 ML/MIN/1.73SQ M
GLUCOSE SERPL-MCNC: 58 MG/DL (ref 65–140)
HCT VFR BLD AUTO: 33.9 % (ref 36.5–49.3)
HGB BLD-MCNC: 11.6 G/DL (ref 12–17)
IMM GRANULOCYTES # BLD AUTO: 0.02 THOUSAND/UL (ref 0–0.2)
IMM GRANULOCYTES NFR BLD AUTO: 0 % (ref 0–2)
LYMPHOCYTES # BLD AUTO: 0.71 THOUSANDS/ÂΜL (ref 0.6–4.47)
LYMPHOCYTES NFR BLD AUTO: 13 % (ref 14–44)
MCH RBC QN AUTO: 32.5 PG (ref 26.8–34.3)
MCHC RBC AUTO-ENTMCNC: 34.2 G/DL (ref 31.4–37.4)
MCV RBC AUTO: 95 FL (ref 82–98)
MONOCYTES # BLD AUTO: 0.48 THOUSAND/ÂΜL (ref 0.17–1.22)
MONOCYTES NFR BLD AUTO: 9 % (ref 4–12)
NEUTROPHILS # BLD AUTO: 3.87 THOUSANDS/ÂΜL (ref 1.85–7.62)
NEUTS SEG NFR BLD AUTO: 72 % (ref 43–75)
NRBC BLD AUTO-RTO: 0 /100 WBCS
PLATELET # BLD AUTO: 151 THOUSANDS/UL (ref 149–390)
PMV BLD AUTO: 8.9 FL (ref 8.9–12.7)
POTASSIUM SERPL-SCNC: 3.6 MMOL/L (ref 3.5–5.3)
RBC # BLD AUTO: 3.57 MILLION/UL (ref 3.88–5.62)
SODIUM SERPL-SCNC: 139 MMOL/L (ref 135–147)
WBC # BLD AUTO: 5.35 THOUSAND/UL (ref 4.31–10.16)

## 2023-05-07 RX ORDER — ACETAMINOPHEN 325 MG/1
650 TABLET ORAL EVERY 6 HOURS PRN
Status: DISCONTINUED | OUTPATIENT
Start: 2023-05-07 | End: 2023-05-09 | Stop reason: HOSPADM

## 2023-05-07 RX ADMIN — CARBIDOPA AND LEVODOPA 1 TABLET: 25; 100 TABLET ORAL at 22:04

## 2023-05-07 RX ADMIN — CARBIDOPA AND LEVODOPA 1 TABLET: 25; 100 TABLET ORAL at 17:14

## 2023-05-07 RX ADMIN — KETOTIFEN FUMARATE 1 DROP: 0.25 SOLUTION/ DROPS OPHTHALMIC at 09:42

## 2023-05-07 RX ADMIN — TOPIRAMATE 25 MG: 25 TABLET, FILM COATED ORAL at 17:14

## 2023-05-07 RX ADMIN — CARBIDOPA AND LEVODOPA 1 TABLET: 25; 100 TABLET ORAL at 09:42

## 2023-05-07 RX ADMIN — TRAZODONE HYDROCHLORIDE 100 MG: 100 TABLET ORAL at 22:04

## 2023-05-07 RX ADMIN — PANTOPRAZOLE SODIUM 40 MG: 40 INJECTION, POWDER, FOR SOLUTION INTRAVENOUS at 05:57

## 2023-05-07 RX ADMIN — ATORVASTATIN CALCIUM 40 MG: 40 TABLET, FILM COATED ORAL at 17:14

## 2023-05-07 RX ADMIN — TEMAZEPAM 15 MG: 15 CAPSULE ORAL at 22:04

## 2023-05-07 RX ADMIN — KETOTIFEN FUMARATE 1 DROP: 0.25 SOLUTION/ DROPS OPHTHALMIC at 17:14

## 2023-05-07 RX ADMIN — CLOZAPINE 100 MG: 100 TABLET ORAL at 17:14

## 2023-05-07 RX ADMIN — ONDANSETRON 4 MG: 2 INJECTION INTRAMUSCULAR; INTRAVENOUS at 17:17

## 2023-05-07 RX ADMIN — TOPIRAMATE 25 MG: 25 TABLET, FILM COATED ORAL at 09:42

## 2023-05-07 RX ADMIN — METOPROLOL SUCCINATE 25 MG: 25 TABLET, EXTENDED RELEASE ORAL at 09:42

## 2023-05-07 RX ADMIN — CLOZAPINE 200 MG: 100 TABLET ORAL at 22:05

## 2023-05-07 RX ADMIN — CLOZAPINE 100 MG: 100 TABLET ORAL at 09:42

## 2023-05-07 RX ADMIN — SALINE NASAL SPRAY 1 SPRAY: 1.5 SOLUTION NASAL at 22:10

## 2023-05-07 RX ADMIN — TAMSULOSIN HYDROCHLORIDE 0.4 MG: 0.4 CAPSULE ORAL at 22:04

## 2023-05-07 RX ADMIN — ACETAMINOPHEN 650 MG: 325 TABLET ORAL at 22:20

## 2023-05-07 RX ADMIN — ENOXAPARIN SODIUM 40 MG: 40 INJECTION SUBCUTANEOUS at 09:42

## 2023-05-07 NOTE — PROGRESS NOTES
"Progress Note - General Surgery   Amberly Blandon 67 y o  male MRN: [id number]  Unit/Bed#: -01 Encounter: [id number]    Assessment:  Amberly Blandon is a 67 y o  male with SBO, resolving  AVSS, no leukocytosis, labs within normal limits  NG tube with 250 output    Plan:  • Discontinue NG tube today as output has decreased significantly and bowel function continues  • Start clear liquid diet as tolerated  • Discontinue IV fluids  • We will hold off on laxatives for now  • Monitor abdominal exam, labs, vitals  • PRN pain medication and anti-emetics  • Encourage ambulation  • DVT ppx: heparin  • Incentive spirometry 10 times/hour while awake  • Continue home medications as prescribed   • Remainder of care per primary team-Slim  • General surgery will continue to follow  Tiger text with any questions or concerns  Subjective/Objective    Subjective: No acute events overnight  Objective:     Blood pressure 147/80, pulse 62, temperature 98 1 °F (36 7 °C), resp  rate 16, height 5' 8\" (1 727 m), weight 70 kg (154 lb 5 2 oz), SpO2 96 %  ,Body mass index is 23 46 kg/m²  Intake/Output Summary (Last 24 hours) at 5/7/2023 0834  Last data filed at 5/7/2023 2963  Gross per 24 hour   Intake 2671 67 ml   Output 1475 ml   Net 1196 67 ml       Invasive Devices     Peripheral Intravenous Line  Duration           Peripheral IV 05/04/23 Left Arm 2 days          Drain  Duration           NG/OG/Enteral Tube Nasogastric 16 Fr Right nare 2 days                Physical Exam:   GEN: NAD  HEENT: NCAT, MMM NG tube in place and functioning with light green output  CV: RRR, no m/r/g  Lung: Normal effort, CTA B/L, no w/r/r  Ab: Soft, NT/ND  Extrem: No CCE   Neuro: A+Ox3     Lab, Imaging and other studies:I have personally reviewed pertinent lab results       VTE Pharmacologic Prophylaxis: Heparin  VTE Mechanical Prophylaxis: sequential compression device    Recent Results (from the past 36 hour(s))   CBC and differential    " Collection Time: 05/06/23  5:09 AM   Result Value Ref Range    WBC 5 32 4 31 - 10 16 Thousand/uL    RBC 3 58 (L) 3 88 - 5 62 Million/uL    Hemoglobin 11 7 (L) 12 0 - 17 0 g/dL    Hematocrit 34 1 (L) 36 5 - 49 3 %    MCV 95 82 - 98 fL    MCH 32 7 26 8 - 34 3 pg    MCHC 34 3 31 4 - 37 4 g/dL    RDW 13 7 11 6 - 15 1 %    MPV 8 6 (L) 8 9 - 12 7 fL    Platelets 559 (L) 576 - 390 Thousands/uL    nRBC 0 /100 WBCs    Neutrophils Relative 63 43 - 75 %    Immat GRANS % 0 0 - 2 %    Lymphocytes Relative 24 14 - 44 %    Monocytes Relative 8 4 - 12 %    Eosinophils Relative 4 0 - 6 %    Basophils Relative 1 0 - 1 %    Neutrophils Absolute 3 41 1 85 - 7 62 Thousands/µL    Immature Grans Absolute 0 01 0 00 - 0 20 Thousand/uL    Lymphocytes Absolute 1 25 0 60 - 4 47 Thousands/µL    Monocytes Absolute 0 41 0 17 - 1 22 Thousand/µL    Eosinophils Absolute 0 21 0 00 - 0 61 Thousand/µL    Basophils Absolute 0 03 0 00 - 0 10 Thousands/µL   Basic metabolic panel    Collection Time: 05/06/23  5:40 AM   Result Value Ref Range    Sodium 139 135 - 147 mmol/L    Potassium 3 0 (L) 3 5 - 5 3 mmol/L    Chloride 107 96 - 108 mmol/L    CO2 24 21 - 32 mmol/L    ANION GAP 8 4 - 13 mmol/L    BUN 9 5 - 25 mg/dL    Creatinine 0 51 (L) 0 60 - 1 30 mg/dL    Glucose 64 (L) 65 - 140 mg/dL    Calcium 8 4 8 4 - 10 2 mg/dL    eGFR 107 ml/min/1 73sq m   Basic metabolic panel    Collection Time: 05/07/23  5:34 AM   Result Value Ref Range    Sodium 139 135 - 147 mmol/L    Potassium 3 6 3 5 - 5 3 mmol/L    Chloride 107 96 - 108 mmol/L    CO2 25 21 - 32 mmol/L    ANION GAP 7 4 - 13 mmol/L    BUN 10 5 - 25 mg/dL    Creatinine 0 54 (L) 0 60 - 1 30 mg/dL    Glucose 58 (L) 65 - 140 mg/dL    Calcium 8 3 (L) 8 4 - 10 2 mg/dL    eGFR 104 ml/min/1 73sq m   CBC and differential    Collection Time: 05/07/23  5:34 AM   Result Value Ref Range    WBC 5 35 4 31 - 10 16 Thousand/uL    RBC 3 57 (L) 3 88 - 5 62 Million/uL    Hemoglobin 11 6 (L) 12 0 - 17 0 g/dL    Hematocrit 33 9 (L) 36 5 - 49 3 %    MCV 95 82 - 98 fL    MCH 32 5 26 8 - 34 3 pg    MCHC 34 2 31 4 - 37 4 g/dL    RDW 13 4 11 6 - 15 1 %    MPV 8 9 8 9 - 12 7 fL    Platelets 840 001 - 466 Thousands/uL    nRBC 0 /100 WBCs    Neutrophils Relative 72 43 - 75 %    Immat GRANS % 0 0 - 2 %    Lymphocytes Relative 13 (L) 14 - 44 %    Monocytes Relative 9 4 - 12 %    Eosinophils Relative 5 0 - 6 %    Basophils Relative 1 0 - 1 %    Neutrophils Absolute 3 87 1 85 - 7 62 Thousands/µL    Immature Grans Absolute 0 02 0 00 - 0 20 Thousand/uL    Lymphocytes Absolute 0 71 0 60 - 4 47 Thousands/µL    Monocytes Absolute 0 48 0 17 - 1 22 Thousand/µL    Eosinophils Absolute 0 24 0 00 - 0 61 Thousand/µL    Basophils Absolute 0 03 0 00 - 0 10 Thousands/µL

## 2023-05-07 NOTE — CASE MANAGEMENT
Case Management Discharge Planning Note    Patient name Beatrice Byrd  Location [facility] 418/-[id number] MRN [id number]  : 1950 Date 2023       Current Admission Date: 2023  Current Admission Diagnosis:SBO (small bowel obstruction) [facility])   Patient Active Problem List    Diagnosis Date Noted   • [facility] ) 2023   • Parkinson disease ([facility] ) 2023   • Mild protein-calorie malnutrition ([facility] ) 02/10/2023   • Aortic root dilatation ([facility] ) 02/10/2023   • Neoplasm of uncertain behavior of left kidney 2022   • BPH with obstruction/lower urinary tract symptoms 2022   • Chronic constipation 2022   • Overactive bladder 2022   • Urge urinary incontinence 2022   • Urinary incontinence, post-void dribbling 2022   • Urinary frequency 2022   • Nocturia 2022   • Chronic prostatitis 2022   • Chronic obstructive pulmonary disease, unspecified COPD type ([place] [address] ) 2022   • Fall 2022   • Groin rash 2022   • 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   • Combined form of senile cataract 2020   • GERD (gastroesophageal reflux disease) 10/22/2019   • Hypertension 10/22/2019   • Hypercholesteremia 10/22/2019   • Paranoid schizophrenia ([facility] ) 10/22/2019   • Neuro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 ([facility] ) 2016      LOS (days): 2  Geometric Mean LOS (GMLOS) (days): 3 00  Days to GMLOS:0 9     OBJECTIVE:  Risk of Unplanned Readmission Score: 17 8         Current admission status: Inpatient   Preferred Pharmacy:   [address] [address]  Phone: [phone] Fax: [address] Rayna Flores [address] [place] [address] - [address]  [place] [address]  Phone: [phone] Fax: [phone] (6499 [address]  Phone: [phone] Fax: [phone]    Primary Care Provider: [address]    Primary Insurance: MEDICARE  Secondary Insurance:     DISCHARGE DETAILS:                                          Other Referral/Resources/Interventions Provided:  Referral Comments: Pt's sister Juana Evangelista is interested in the Med[facility] a [facility] and pt ends up going days without taking his meds  Dr Christian Bhatia notified to E-rx prescriptions to [facility] and CM sent text to [address] [place] [address] along with required info      Would you like to participate in our [address] service program?  : Yes

## 2023-05-07 NOTE — PROGRESS NOTES
[address] [place] [address]  Progress Note  Name: Marisa Light  MRN: [id number]  Unit/Bed#: -52 I Date of Admission: 2023   Date of Service: 2023 I Hospital Day: 2    Assessment/Plan   * SBO (small bowel obstruction) ([facility] )  Assessment & Plan  Presents with abdominal pain, n/v  · CT a/p: Small bowel obstruction with a zone of transition in the mid abdominal  with thickened and poorly distensible small bowel loop  Small ascites  Cholelithiasis  NG tube in place  Remains n p o  Continue NG tube until output decreases  Management by surgical team  Advance diet as able  Continue IV fluids    Parkinson disease ([facility] )  Assessment & Plan  Continue home Sinemet    Paranoid schizophrenia ([facility] )  Assessment & Plan  Continue Clazuril    Hypertension  Assessment & Plan  Blood pressure remains controlled  Continue metoprolol    GERD (gastroesophageal reflux disease)  Assessment & Plan  · CT also reveals thickening of the distal esophagus  · Recommend outpatient follow up with GI   · Continue PPI           VTE Pharmacologic Prophylaxis:   Pharmacologic: Enoxaparin (Lovenox)  Mechanical VTE Prophylaxis in Place: Yes    Patient Centered Rounds: I have performed bedside rounds with nursing staff today  Discussions with Specialists or Other Care Team Provider: cm, nursnig    Education and Discussions with Family / Patient: pt    Time Spent for Care: 30 minutes  More than 50% of total time spent on counseling and coordination of care as described above      Current Length of Stay: 2 day(s)    Current Patient Status: Inpatient   Certification Statement: The patient will continue to require additional inpatient hospital stay due to see below    Discharge Plan: Pending improvement of small bowel obstruction    Code Status: Level 1 - Full Code      Subjective:   Denies chest pain, shortness of breath, cough, fevers    Objective:     Vitals:   Temp (24hrs), Av 9 °F (36 6 °C), Min:97 5 °F (36 4 °C), Max:98 1 °F (36 7 °C)    Temp:  [97 5 °F (36 4 °C)-98 1 °F (36 7 °C)] 98 1 °F (36 7 °C)  HR:  [62-69] 62  Resp:  [16-18] 16  BP: (147-162)/(77-84) 147/80  SpO2:  [96 %] 96 %  Body mass index is 23 46 kg/m²  Input and Output Summary (last 24 hours): Intake/Output Summary (Last 24 hours) at 5/7/2023 0004  Last data filed at 5/7/2023 5680  Gross per 24 hour   Intake 2671 67 ml   Output 1475 ml   Net 1196 67 ml       Physical Exam:     Physical Exam  Constitutional:       General: He is not in acute distress  Appearance: He is well-developed  He is not diaphoretic  HENT:      Head: Normocephalic and atraumatic  Nose: Nose normal       Mouth/Throat:      Pharynx: No oropharyngeal exudate  Eyes:      General: No scleral icterus  Conjunctiva/sclera: Conjunctivae normal    Cardiovascular:      Rate and Rhythm: Normal rate and regular rhythm  Heart sounds: Normal heart sounds  No murmur heard  No friction rub  No gallop  Pulmonary:      Effort: Pulmonary effort is normal  No respiratory distress  Breath sounds: Normal breath sounds  No wheezing or rales  Chest:      Chest wall: No tenderness  Abdominal:      General: Bowel sounds are normal  There is no distension  Palpations: Abdomen is soft  Tenderness: There is no abdominal tenderness  There is no guarding  Musculoskeletal:         General: No tenderness or deformity  Normal range of motion  Cervical back: Normal range of motion and neck supple  Skin:     General: Skin is warm and dry  Findings: No erythema  Neurological:      Mental Status: He is alert  Mental status is at baseline         (     Additional Data:     Labs:    Results from last 7 days   Lab Units 05/07/23  0534   WBC Thousand/uL 5 35   HEMOGLOBIN g/dL 11 6*   HEMATOCRIT % 33 9*   PLATELETS Thousands/uL 151   NEUTROS PCT % 72   LYMPHS PCT % 13*   MONOS PCT % 9   EOS PCT % 5     Results from last 7 days   Lab Units 05/07/23  0534 05/05/23  0539 05/04/23  1614   SODIUM mmol/L 139   < > 136   POTASSIUM mmol/L 3 6   < > 3 3*   CHLORIDE mmol/L 107   < > 104   CO2 mmol/L 25   < > 28   BUN mg/dL 10   < > 9   CREATININE mg/dL 0 54*   < > 0 58*   ANION GAP mmol/L 7   < > 4   CALCIUM mg/dL 8 3*   < > 9 0   ALBUMIN g/dL  --   --  3 6   TOTAL BILIRUBIN mg/dL  --   --  0 69   ALK PHOS U/L  --   --  84   ALT U/L  --   --  5*   AST U/L  --   --  12*   GLUCOSE RANDOM mg/dL 58*   < > 97    < > = values in this interval not displayed  Results from last 7 days   Lab Units 05/04/23  1905   LACTIC ACID mmol/L 0 8           * I Have Reviewed All Lab Data Listed Above  * Additional Pertinent Lab Tests Reviewed:  All Labs Within Last 24 Hours Reviewed    Imaging:    Imaging Reports Reviewed Today Include: na  Imaging Personally Reviewed by Myself Includes:  na    Recent Cultures (last 7 days):           Last 24 Hours Medication List:   Current Facility-Administered Medications   Medication Dose Route Frequency Provider Last Rate   • acetaminophen  650 mg Rectal Q4H PRN Мария Macedo PA-C     • atorvastatin  40 mg Oral Daily With LAURIE Padilla     • carbidopa-levodopa  1 tablet Oral TID Мария Macedo PA-C     • cloZAPine  100 mg Oral BID Мария Macedo PA-C     • clozapine  200 mg Oral HS Мария Macedo PA-C     • Deutetrabenazine  1 tablet Oral BID Мария Macedo PA-C     • enoxaparin  40 mg Subcutaneous Daily Мария Macedo PA-C     • ketotifen  1 drop Both Eyes BID Мария Macedo PA-C     • lactated ringers  100 mL/hr Intravenous Continuous Мария Macedo PA-C 100 mL/hr (05/07/23 0535)   • metoprolol succinate  25 mg Oral Daily Мария Macedo PA-C     • morphine injection  2 mg Intravenous Q6H PRN Мария Macedo PA-C     • ondansetron  4 mg Intravenous Q6H PRN Мария Macedo PA-C     • pantoprazole  40 mg Intravenous Q24H [address], LAURIE     • sodium chloride  1 spray Each Nare Q1H PRN Мария Cho PA-C     • tamsulosin  0 4 mg Oral HS Мария Macedo PA-C     • temazepam  15 mg Oral HS Мария Macedo PA-C     • topiramate  25 mg Oral BID Мария Macedo PA-C     • traZODone  100 mg Oral HS Мария Cho PA-C          Today, Patient Was Seen By: Melissa Cortez MD    ** Please Note: Dictation voice to text software may have been used in the creation of this document   **

## 2023-05-07 NOTE — ASSESSMENT & PLAN NOTE
Presents with abdominal pain, n/v  · CT a/p: Small bowel obstruction with a zone of transition in the mid abdominal  with thickened and poorly distensible small bowel loop  Small ascites  Cholelithiasis  NG tube in place  Remains n p o    Continue NG tube until output decreases  Management by surgical team  Advance diet as able  Continue IV fluids

## 2023-05-07 NOTE — PLAN OF CARE
Problem: MOBILITY - ADULT  Goal: Maintain or return to baseline ADL function  Description: INTERVENTIONS:  -  Assess patient's ability to carry out ADLs; assess patient's baseline for ADL function and identify physical deficits which impact ability to perform ADLs (bathing, care of mouth/teeth, toileting, grooming, dressing, etc )  - Assess/evaluate cause of self-care deficits   - Assess range of motion  - Assess patient's mobility; develop plan if impaired  - Assess patient's need for assistive devices and provide as appropriate  - Encourage maximum independence but intervene and supervise when necessary  - Involve family in performance of ADLs  - Assess for home care needs following discharge   - Consider OT consult to assist with ADL evaluation and planning for discharge  - Provide patient education as appropriate  Outcome: Progressing  Goal: Maintains/Returns to pre admission functional level  Description: INTERVENTIONS:  - Perform BMAT or MOVE assessment daily    - Set and communicate daily mobility goal to care team and patient/family/caregiver  - Collaborate with rehabilitation services on mobility goals if consulted  - Perform Range of Motion 3 times a day  - Reposition patient every 3 hours    - Dangle patient 3 times a day  - Stand patient 3 times a day  - Ambulate patient 3 times a day  - Out of bed to chair 3 times a day   - Out of bed for meals 3 times a day  - Out of bed for toileting  - Record patient progress and toleration of activity level   Outcome: Progressing     Problem: PAIN - ADULT  Goal: Verbalizes/displays adequate comfort level or baseline comfort level  Description: Interventions:  - Encourage patient to monitor pain and request assistance  - Assess pain using appropriate pain scale  - Administer analgesics based on type and severity of pain and evaluate response  - Implement non-pharmacological measures as appropriate and evaluate response  - Consider cultural and social influences on pain and pain management  - Notify physician/advanced practitioner if interventions unsuccessful or patient reports new pain  Outcome: Progressing     Problem: INFECTION - ADULT  Goal: Absence or prevention of progression during hospitalization  Description: INTERVENTIONS:  - Assess and monitor for signs and symptoms of infection  - Monitor lab/diagnostic results  - Monitor all insertion sites, i e  indwelling lines, tubes, and drains  - Monitor endotracheal if appropriate and nasal secretions for changes in amount and color  - [place] appropriate cooling/warming therapies per order  - Administer medications as ordered  - Instruct and encourage patient and family to use good hand hygiene technique  - Identify and instruct in appropriate isolation precautions for identified infection/condition  Outcome: Progressing     Problem: SAFETY ADULT  Goal: Patient will remain free of falls  Description: INTERVENTIONS:  - Educate patient/family on patient safety including physical limitations  - Instruct patient to call for assistance with activity   - Consult OT/PT to assist with strengthening/mobility   - Keep Call bell within reach  - Keep bed low and locked with side rails adjusted as appropriate  - Keep care items and personal belongings within reach  - Initiate and maintain comfort rounds  - Make Fall Risk Sign visible to staff  - Offer Toileting every 2 Hours, in advance of need  - Initiate/Maintain bed alarm  - Obtain necessary fall risk management equipment: chair alarm  - Apply yellow socks and bracelet for high fall risk patients  - Consider moving patient to room near nurses station  Outcome: Progressing     Problem: DISCHARGE PLANNING  Goal: Discharge to home or other facility with appropriate resources  Description: INTERVENTIONS:  - Identify barriers to discharge w/patient and caregiver  - Arrange for needed discharge resources and transportation as appropriate  - Identify discharge learning needs (meds, wound care, etc )  - Arrange for interpretive services to assist at discharge as needed  - Refer to Case Management Department for coordinating discharge planning if the patient needs post-hospital services based on physician/advanced practitioner order or complex needs related to functional status, cognitive ability, or social support system  Outcome: Progressing     Problem: Knowledge Deficit  Goal: Patient/family/caregiver demonstrates understanding of disease process, treatment plan, medications, and discharge instructions  Description: Complete learning assessment and assess knowledge base    Interventions:  - Provide teaching at level of understanding  - Provide teaching via preferred learning methods  Outcome: Progressing     Problem: GASTROINTESTINAL - ADULT  Goal: Minimal or absence of nausea and/or vomiting  Description: INTERVENTIONS:  - Administer IV fluids if ordered to ensure adequate hydration  - Maintain NPO status until nausea and vomiting are resolved  - Nasogastric tube if ordered  - Administer ordered antiemetic medications as needed  - Provide nonpharmacologic comfort measures as appropriate  - Advance diet as tolerated, if ordered  - Consider nutrition services referral to assist patient with adequate nutrition and appropriate food choices  Outcome: Progressing  Goal: Maintains or returns to baseline bowel function  Description: INTERVENTIONS:  - Assess bowel function  - Encourage oral fluids to ensure adequate hydration  - Administer IV fluids if ordered to ensure adequate hydration  - Administer ordered medications as needed  - Encourage mobilization and activity  - Consider nutritional services referral to assist patient with adequate nutrition and appropriate food choices  Outcome: Progressing  Goal: Maintains adequate nutritional intake  Description: INTERVENTIONS:  - Monitor percentage of each meal consumed  - Identify factors contributing to decreased intake, treat as appropriate  - Assist with meals as needed  - Monitor I&O, weight, and lab values if indicated  - Obtain nutrition services referral as needed  Outcome: Progressing     Problem: Prexisting or High Potential for Compromised Skin Integrity  Goal: Skin integrity is maintained or improved  Description: INTERVENTIONS:  - Identify patients at risk for skin breakdown  - Assess and monitor skin integrity  - Assess and monitor nutrition and hydration status  - Monitor labs   - Assess for incontinence   - Turn and reposition patient  - Assist with mobility/ambulation  - Relieve pressure over bony prominences  - Avoid friction and shearing  - Provide appropriate hygiene as needed including keeping skin clean and dry  - Evaluate need for skin moisturizer/barrier cream  - Collaborate with interdisciplinary team   - Patient/family teaching  - Consider wound care consult   Outcome: Progressing

## 2023-05-08 LAB
ANION GAP SERPL CALCULATED.3IONS-SCNC: 2 MMOL/L (ref 4–13)
BASOPHILS # BLD AUTO: 0.02 THOUSANDS/ÂΜL (ref 0–0.1)
BASOPHILS NFR BLD AUTO: 0 % (ref 0–1)
BUN SERPL-MCNC: 7 MG/DL (ref 5–25)
CALCIUM SERPL-MCNC: 8.3 MG/DL (ref 8.4–10.2)
CHLORIDE SERPL-SCNC: 109 MMOL/L (ref 96–108)
CO2 SERPL-SCNC: 30 MMOL/L (ref 21–32)
CREAT SERPL-MCNC: 0.52 MG/DL (ref 0.6–1.3)
EOSINOPHIL # BLD AUTO: 0.15 THOUSAND/ÂΜL (ref 0–0.61)
EOSINOPHIL NFR BLD AUTO: 3 % (ref 0–6)
ERYTHROCYTE [DISTWIDTH] IN BLOOD BY AUTOMATED COUNT: 13.9 % (ref 11.6–15.1)
GFR SERPL CREATININE-BSD FRML MDRD: 106 ML/MIN/1.73SQ M
GLUCOSE SERPL-MCNC: 97 MG/DL (ref 65–140)
HCT VFR BLD AUTO: 38 % (ref 36.5–49.3)
HGB BLD-MCNC: 12.4 G/DL (ref 12–17)
IMM GRANULOCYTES # BLD AUTO: 0.01 THOUSAND/UL (ref 0–0.2)
IMM GRANULOCYTES NFR BLD AUTO: 0 % (ref 0–2)
LYMPHOCYTES # BLD AUTO: 0.39 THOUSANDS/ÂΜL (ref 0.6–4.47)
LYMPHOCYTES NFR BLD AUTO: 7 % (ref 14–44)
MCH RBC QN AUTO: 32.4 PG (ref 26.8–34.3)
MCHC RBC AUTO-ENTMCNC: 32.6 G/DL (ref 31.4–37.4)
MCV RBC AUTO: 99 FL (ref 82–98)
MONOCYTES # BLD AUTO: 0.56 THOUSAND/ÂΜL (ref 0.17–1.22)
MONOCYTES NFR BLD AUTO: 11 % (ref 4–12)
NEUTROPHILS # BLD AUTO: 4.21 THOUSANDS/ÂΜL (ref 1.85–7.62)
NEUTS SEG NFR BLD AUTO: 79 % (ref 43–75)
NRBC BLD AUTO-RTO: 0 /100 WBCS
PLATELET # BLD AUTO: 163 THOUSANDS/UL (ref 149–390)
PMV BLD AUTO: 9 FL (ref 8.9–12.7)
POTASSIUM SERPL-SCNC: 3.2 MMOL/L (ref 3.5–5.3)
RBC # BLD AUTO: 3.83 MILLION/UL (ref 3.88–5.62)
SODIUM SERPL-SCNC: 141 MMOL/L (ref 135–147)
WBC # BLD AUTO: 5.34 THOUSAND/UL (ref 4.31–10.16)

## 2023-05-08 RX ORDER — SODIUM PHOSPHATE, DIBASIC AND SODIUM PHOSPHATE, MONOBASIC 7; 19 G/133ML; G/133ML
1 ENEMA RECTAL ONCE
Status: DISCONTINUED | OUTPATIENT
Start: 2023-05-08 | End: 2023-05-09 | Stop reason: HOSPADM

## 2023-05-08 RX ORDER — BISACODYL 5 MG/1
10 TABLET, DELAYED RELEASE ORAL ONCE
Status: COMPLETED | OUTPATIENT
Start: 2023-05-08 | End: 2023-05-08

## 2023-05-08 RX ORDER — POLYETHYLENE GLYCOL 3350 17 G/17G
17 POWDER, FOR SOLUTION ORAL ONCE
Status: COMPLETED | OUTPATIENT
Start: 2023-05-08 | End: 2023-05-08

## 2023-05-08 RX ORDER — POTASSIUM CHLORIDE 14.9 MG/ML
20 INJECTION INTRAVENOUS ONCE
Status: COMPLETED | OUTPATIENT
Start: 2023-05-08 | End: 2023-05-08

## 2023-05-08 RX ADMIN — ACETAMINOPHEN 650 MG: 325 TABLET ORAL at 07:23

## 2023-05-08 RX ADMIN — METOPROLOL SUCCINATE 25 MG: 25 TABLET, EXTENDED RELEASE ORAL at 09:07

## 2023-05-08 RX ADMIN — POLYETHYLENE GLYCOL 3350 17 G: 17 POWDER, FOR SOLUTION ORAL at 12:34

## 2023-05-08 RX ADMIN — KETOTIFEN FUMARATE 1 DROP: 0.25 SOLUTION/ DROPS OPHTHALMIC at 17:34

## 2023-05-08 RX ADMIN — KETOTIFEN FUMARATE 1 DROP: 0.25 SOLUTION/ DROPS OPHTHALMIC at 09:08

## 2023-05-08 RX ADMIN — CARBIDOPA AND LEVODOPA 1 TABLET: 25; 100 TABLET ORAL at 21:29

## 2023-05-08 RX ADMIN — TEMAZEPAM 15 MG: 15 CAPSULE ORAL at 21:29

## 2023-05-08 RX ADMIN — TOPIRAMATE 25 MG: 25 TABLET, FILM COATED ORAL at 17:34

## 2023-05-08 RX ADMIN — TRAZODONE HYDROCHLORIDE 100 MG: 100 TABLET ORAL at 21:29

## 2023-05-08 RX ADMIN — TAMSULOSIN HYDROCHLORIDE 0.4 MG: 0.4 CAPSULE ORAL at 21:29

## 2023-05-08 RX ADMIN — CLOZAPINE 200 MG: 100 TABLET ORAL at 21:29

## 2023-05-08 RX ADMIN — PANTOPRAZOLE SODIUM 40 MG: 40 INJECTION, POWDER, FOR SOLUTION INTRAVENOUS at 05:17

## 2023-05-08 RX ADMIN — ENOXAPARIN SODIUM 40 MG: 40 INJECTION SUBCUTANEOUS at 09:07

## 2023-05-08 RX ADMIN — CARBIDOPA AND LEVODOPA 1 TABLET: 25; 100 TABLET ORAL at 17:34

## 2023-05-08 RX ADMIN — TOPIRAMATE 25 MG: 25 TABLET, FILM COATED ORAL at 09:07

## 2023-05-08 RX ADMIN — ATORVASTATIN CALCIUM 40 MG: 40 TABLET, FILM COATED ORAL at 17:34

## 2023-05-08 RX ADMIN — BISACODYL 10 MG: 5 TABLET, COATED ORAL at 12:34

## 2023-05-08 RX ADMIN — SALINE NASAL SPRAY 1 SPRAY: 1.5 SOLUTION NASAL at 09:09

## 2023-05-08 RX ADMIN — CLOZAPINE 100 MG: 100 TABLET ORAL at 09:08

## 2023-05-08 RX ADMIN — CARBIDOPA AND LEVODOPA 1 TABLET: 25; 100 TABLET ORAL at 09:07

## 2023-05-08 RX ADMIN — POTASSIUM CHLORIDE 20 MEQ: 14.9 INJECTION, SOLUTION INTRAVENOUS at 12:35

## 2023-05-08 RX ADMIN — CLOZAPINE 100 MG: 100 TABLET ORAL at 17:34

## 2023-05-08 NOTE — PROGRESS NOTES
"Progress Note - General Surgery   Kendra Cooper 67 y o  male MRN: [id number]  Unit/Bed#: -01 Encounter: [id number]    Assessment:  Kendra Cooper is a 67 y o  male with SBO, resolving  AVSS, no leukocytosis, remainder of labs within normal limits  Plan:  • Advance to full liquid diet as tolerated until cleared by speech  Patient tolerated clear liquid diet and continues to pass gas and denies abdominal pain  • Continue IV fluids until tolerating regular diet  • Educate patient on importance of PO fluid intake  • Patient will need a bowel regimen on discharge -Colace twice daily and MiraLAX as needed  • Fleet enema and Dulcolax 10 mg ordered today  • PRN pain medication and anti-emetics  • Encourage ambulation  • DVT ppx: heparin  • Incentive spirometry 10 times/hour while awake  • Continue home medications as prescribed   • Remainder of care per primary team-SLIM  • General surgery will continue to follow  TT with any questions or concerns  Subjective/Objective    Subjective: No acute events overnight  Objective:     Blood pressure 154/80, pulse 72, temperature 97 7 °F (36 5 °C), resp  rate 16, height 5' 8\" (1 727 m), weight 70 kg (154 lb 5 2 oz), SpO2 98 %  ,Body mass index is 23 46 kg/m²  Intake/Output Summary (Last 24 hours) at 5/8/2023 1120  Last data filed at 5/7/2023 2228  Gross per 24 hour   Intake 720 ml   Output 1300 ml   Net -580 ml       Invasive Devices     Peripheral Intravenous Line  Duration           Peripheral IV 05/04/23 Left Arm 3 days                Physical Exam:   GEN: NAD  HEENT: NCAT, MMM  CV: RRR, no m/r/g  Lung: Normal effort, CTA B/L, no w/r/r  Ab: Soft, NT/ND  Extrem: No CCE   Neuro: A+Ox3     Lab, Imaging and other studies:I have personally reviewed pertinent lab results       VTE Pharmacologic Prophylaxis: Heparin  VTE Mechanical Prophylaxis: sequential compression device    Recent Results (from the past 36 hour(s))   Basic metabolic panel    Collection Time: " 05/07/23  5:34 AM   Result Value Ref Range    Sodium 139 135 - 147 mmol/L    Potassium 3 6 3 5 - 5 3 mmol/L    Chloride 107 96 - 108 mmol/L    CO2 25 21 - 32 mmol/L    ANION GAP 7 4 - 13 mmol/L    BUN 10 5 - 25 mg/dL    Creatinine 0 54 (L) 0 60 - 1 30 mg/dL    Glucose 58 (L) 65 - 140 mg/dL    Calcium 8 3 (L) 8 4 - 10 2 mg/dL    eGFR 104 ml/min/1 73sq m   CBC and differential    Collection Time: 05/07/23  5:34 AM   Result Value Ref Range    WBC 5 35 4 31 - 10 16 Thousand/uL    RBC 3 57 (L) 3 88 - 5 62 Million/uL    Hemoglobin 11 6 (L) 12 0 - 17 0 g/dL    Hematocrit 33 9 (L) 36 5 - 49 3 %    MCV 95 82 - 98 fL    MCH 32 5 26 8 - 34 3 pg    MCHC 34 2 31 4 - 37 4 g/dL    RDW 13 4 11 6 - 15 1 %    MPV 8 9 8 9 - 12 7 fL    Platelets 276 605 - 964 Thousands/uL    nRBC 0 /100 WBCs    Neutrophils Relative 72 43 - 75 %    Immat GRANS % 0 0 - 2 %    Lymphocytes Relative 13 (L) 14 - 44 %    Monocytes Relative 9 4 - 12 %    Eosinophils Relative 5 0 - 6 %    Basophils Relative 1 0 - 1 %    Neutrophils Absolute 3 87 1 85 - 7 62 Thousands/µL    Immature Grans Absolute 0 02 0 00 - 0 20 Thousand/uL    Lymphocytes Absolute 0 71 0 60 - 4 47 Thousands/µL    Monocytes Absolute 0 48 0 17 - 1 22 Thousand/µL    Eosinophils Absolute 0 24 0 00 - 0 61 Thousand/µL    Basophils Absolute 0 03 0 00 - 0 10 Thousands/µL   Basic metabolic panel    Collection Time: 05/08/23  6:08 AM   Result Value Ref Range    Sodium 141 135 - 147 mmol/L    Potassium 3 2 (L) 3 5 - 5 3 mmol/L    Chloride 109 (H) 96 - 108 mmol/L    CO2 30 21 - 32 mmol/L    ANION GAP 2 (L) 4 - 13 mmol/L    BUN 7 5 - 25 mg/dL    Creatinine 0 52 (L) 0 60 - 1 30 mg/dL    Glucose 97 65 - 140 mg/dL    Calcium 8 3 (L) 8 4 - 10 2 mg/dL    eGFR 106 ml/min/1 73sq m   CBC and differential    Collection Time: 05/08/23  6:08 AM   Result Value Ref Range    WBC 5 34 4 31 - 10 16 Thousand/uL    RBC 3 83 (L) 3 88 - 5 62 Million/uL    Hemoglobin 12 4 12 0 - 17 0 g/dL    Hematocrit 38 0 36 5 - 49 3 % MCV 99 (H) 82 - 98 fL    MCH 32 4 26 8 - 34 3 pg    MCHC 32 6 31 4 - 37 4 g/dL    RDW 13 9 11 6 - 15 1 %    MPV 9 0 8 9 - 12 7 fL    Platelets 737 629 - 948 Thousands/uL    nRBC 0 /100 WBCs    Neutrophils Relative 79 (H) 43 - 75 %    Immat GRANS % 0 0 - 2 %    Lymphocytes Relative 7 (L) 14 - 44 %    Monocytes Relative 11 4 - 12 %    Eosinophils Relative 3 0 - 6 %    Basophils Relative 0 0 - 1 %    Neutrophils Absolute 4 21 1 85 - 7 62 Thousands/µL    Immature Grans Absolute 0 01 0 00 - 0 20 Thousand/uL    Lymphocytes Absolute 0 39 (L) 0 60 - 4 47 Thousands/µL    Monocytes Absolute 0 56 0 17 - 1 22 Thousand/µL    Eosinophils Absolute 0 15 0 00 - 0 61 Thousand/µL    Basophils Absolute 0 02 0 00 - 0 10 Thousands/µL

## 2023-05-08 NOTE — NURSING NOTE
Pt exhibiting concerning verbal behavior such as not knowing what certain basic room objects are  SLIM made aware of concerns

## 2023-05-08 NOTE — PLAN OF CARE
Problem: MOBILITY - ADULT  Goal: Maintain or return to baseline ADL function  Description: INTERVENTIONS:  -  Assess patient's ability to carry out ADLs; assess patient's baseline for ADL function and identify physical deficits which impact ability to perform ADLs (bathing, care of mouth/teeth, toileting, grooming, dressing, etc )  - Assess/evaluate cause of self-care deficits   - Assess range of motion  - Assess patient's mobility; develop plan if impaired  - Assess patient's need for assistive devices and provide as appropriate  - Encourage maximum independence but intervene and supervise when necessary  - Involve family in performance of ADLs  - Assess for home care needs following discharge   - Consider OT consult to assist with ADL evaluation and planning for discharge  - Provide patient education as appropriate  Outcome: Progressing  Goal: Maintains/Returns to pre admission functional level  Description: INTERVENTIONS:  - Perform BMAT or MOVE assessment daily    - Set and communicate daily mobility goal to care team and patient/family/caregiver  - Collaborate with rehabilitation services on mobility goals if consulted  - Perform Range of Motion 3 times a day  - Reposition patient every 2 hours    - Dangle patient 3 times a day  - Stand patient 3 times a day  - Ambulate patient 3 times a day  - Out of bed to chair 3 times a day   - Out of bed for meals 3 times a day  - Out of bed for toileting  - Record patient progress and toleration of activity level   Outcome: Progressing     Problem: PAIN - ADULT  Goal: Verbalizes/displays adequate comfort level or baseline comfort level  Description: Interventions:  - Encourage patient to monitor pain and request assistance  - Assess pain using appropriate pain scale  - Administer analgesics based on type and severity of pain and evaluate response  - Implement non-pharmacological measures as appropriate and evaluate response  - Consider cultural and social influences on pain and pain management  - Notify physician/advanced practitioner if interventions unsuccessful or patient reports new pain  Outcome: Progressing     Problem: INFECTION - ADULT  Goal: Absence or prevention of progression during hospitalization  Description: INTERVENTIONS:  - Assess and monitor for signs and symptoms of infection  - Monitor lab/diagnostic results  - Monitor all insertion sites, i e  indwelling lines, tubes, and drains  - Monitor endotracheal if appropriate and nasal secretions for changes in amount and color  - [place] appropriate cooling/warming therapies per order  - Administer medications as ordered  - Instruct and encourage patient and family to use good hand hygiene technique  - Identify and instruct in appropriate isolation precautions for identified infection/condition  Outcome: Progressing     Problem: SAFETY ADULT  Goal: Patient will remain free of falls  Description: INTERVENTIONS:  - Educate patient/family on patient safety including physical limitations  - Instruct patient to call for assistance with activity   - Consult OT/PT to assist with strengthening/mobility   - Keep Call bell within reach  - Keep bed low and locked with side rails adjusted as appropriate  - Keep care items and personal belongings within reach  - Initiate and maintain comfort rounds  - Make Fall Risk Sign visible to staff  - Offer Toileting every 2 Hours, in advance of need  - Initiate/Maintain bed alarm  - Obtain necessary fall risk management equipment  - Apply yellow socks and bracelet for high fall risk patients  - Consider moving patient to room near nurses station  Outcome: Progressing     Problem: DISCHARGE PLANNING  Goal: Discharge to home or other facility with appropriate resources  Description: INTERVENTIONS:  - Identify barriers to discharge w/patient and caregiver  - Arrange for needed discharge resources and transportation as appropriate  - Identify discharge learning needs (meds, wound care, etc )  - Arrange for interpretive services to assist at discharge as needed  - Refer to Case Management Department for coordinating discharge planning if the patient needs post-hospital services based on physician/advanced practitioner order or complex needs related to functional status, cognitive ability, or social support system  Outcome: Progressing     Problem: Knowledge Deficit  Goal: Patient/family/caregiver demonstrates understanding of disease process, treatment plan, medications, and discharge instructions  Description: Complete learning assessment and assess knowledge base    Interventions:  - Provide teaching at level of understanding  - Provide teaching via preferred learning methods  Outcome: Progressing     Problem: GASTROINTESTINAL - ADULT  Goal: Minimal or absence of nausea and/or vomiting  Description: INTERVENTIONS:  - Administer IV fluids if ordered to ensure adequate hydration  - Maintain NPO status until nausea and vomiting are resolved  - Nasogastric tube if ordered  - Administer ordered antiemetic medications as needed  - Provide nonpharmacologic comfort measures as appropriate  - Advance diet as tolerated, if ordered  - Consider nutrition services referral to assist patient with adequate nutrition and appropriate food choices  Outcome: Progressing  Goal: Maintains or returns to baseline bowel function  Description: INTERVENTIONS:  - Assess bowel function  - Encourage oral fluids to ensure adequate hydration  - Administer IV fluids if ordered to ensure adequate hydration  - Administer ordered medications as needed  - Encourage mobilization and activity  - Consider nutritional services referral to assist patient with adequate nutrition and appropriate food choices  Outcome: Progressing  Goal: Maintains adequate nutritional intake  Description: INTERVENTIONS:  - Monitor percentage of each meal consumed  - Identify factors contributing to decreased intake, treat as appropriate  - Assist with meals as needed  - Monitor I&O, weight, and lab values if indicated  - Obtain nutrition services referral as needed  Outcome: Progressing     Problem: Prexisting or High Potential for Compromised Skin Integrity  Goal: Skin integrity is maintained or improved  Description: INTERVENTIONS:  - Identify patients at risk for skin breakdown  - Assess and monitor skin integrity  - Assess and monitor nutrition and hydration status  - Monitor labs   - Assess for incontinence   - Turn and reposition patient  - Assist with mobility/ambulation  - Relieve pressure over bony prominences  - Avoid friction and shearing  - Provide appropriate hygiene as needed including keeping skin clean and dry  - Evaluate need for skin moisturizer/barrier cream  - Collaborate with interdisciplinary team   - Patient/family teaching  - Consider wound care consult   Outcome: Progressing     Problem: Nutrition/Hydration-ADULT  Goal: Nutrient/Hydration intake appropriate for improving, restoring or maintaining nutritional needs  Description: Monitor and assess patient's nutrition/hydration status for malnutrition  Collaborate with interdisciplinary team and initiate plan and interventions as ordered  Monitor patient's weight and dietary intake as ordered or per policy  Utilize nutrition screening tool and intervene as necessary  Determine patient's food preferences and provide high-protein, high-caloric foods as appropriate       INTERVENTIONS:  - Monitor oral intake, urinary output, labs, and treatment plans  - Assess nutrition and hydration status and recommend course of action  - Evaluate amount of meals eaten  - Assist patient with eating if necessary   - Allow adequate time for meals  - Recommend/ encourage appropriate diets, oral nutritional supplements, and vitamin/mineral supplements  - Order, calculate, and assess calorie counts as needed  - Recommend, monitor, and adjust tube feedings and TPN/PPN based on assessed needs  - Assess need for intravenous fluids  - Provide specific nutrition/hydration education as appropriate  - Include patient/family/caregiver in decisions related to nutrition  Outcome: Progressing

## 2023-05-08 NOTE — PLAN OF CARE
Recommendations:   Diet: full liquid as per MD joy    Meds: whole with puree   Feeding assistance: tray set up w/ assist as needed   Frequent Oral care: 2-4x/day  Aspiration precautions and compensatory swallowing strategies: upright posture, only feed when fully alert, slow rate of feeding, small bites/sips and alternating bites and sips  Other Recommendations/ considerations: SLP will continue to follow to assess mgmt of regular solids (once cleared) and thin liquids, adjust as indicated 1-3x

## 2023-05-08 NOTE — SPEECH THERAPY NOTE
hyperlipidemia, GERD, schizophrenia, parkinsonism, presenting from a nursing facility for evaluation with chief complaint of periumbilical abdominal pain, nausea and vomiting  RN reports coughing when taking meds w/ thin liquids; pt has been provided with thickened liquid  Unknown hx of dysphagia  Therefore bedside dysphagia evaluation ordered by MD     Past medical history:  Please see H&P for details    Special Studies:  CT abd/pelvis 5/4/23:  Small bowel obstruction with a zone of transition in the mid abdominal  with thickened and poorly distensible small bowel loop      Small amount of ascites  Thickening of the distal esophagus, more pronounced, can be evaluated with upper GI endoscopy performed on a nonemergent basis  Cholelithiasis    Social/Education/Vocational Hx:  Pt lives in group home    Swallow Information   Current Risks for Dysphagia & Aspiration: coughing with PO  Current Symptoms/Concerns: coughing with po  Current Diet: thin liquids /clears   Baseline Diet: regular diet and thin liquids    Baseline Assessment   Behavior/Cognition: alert  Speech/Language Status: able to participate in basic conversation  Patient Positioning: upright in bed    Swallow Mechanism Exam   Facial: symmetrical  Labial: WFL  Lingual: WFL  Velum: unable to visualize  Mandible: adequate ROM  Dentition: upper dentures; none lower  Vocal quality:clear/adequate   Volitional Cough: unable to initiate volitional cough   Respiratory: RA    Consistencies Assessed and Performance   Consistencies Administered: thin liquids, nectar thick, honey thick and puree    Oral Stage: Adequate bolus retrieval and containment  Oral transfer appears prompt  Minimal retention  Mastication not assessed  Pharyngeal Stage: Laryngeal rise noted upon palpation  Swallow initiation appears delayed  No overt s/s of aspiration or distress observed today  Nursing reported this occurred w/ meds and thin liquid      Esophageal Concerns: none reported      Results Reviewed with: patient, RN and MD     Plan  Will continue to follow for 1-3x    Dysphagia Goals: pt will tolerate thin liquids with puree/soft solids as mx cleared without s/s of aspiration x1-3  Pt will participate in trials for advancement across x1-3 diagnostic sessions      Discharge recommendation: likely no follow up needed for swallowing    Speech Therapy Prognosis   Prognosis: fair  Prognosis Considerations: medical status        Eben Rodríguez [address], [address] [facility]  Speech-Language Pathologist  [address] #[id number]  [place] #[id number]

## 2023-05-08 NOTE — PROGRESS NOTES
[address] [facility]  Progress Note  Name: Justo Temple  MRN: [id number]  Unit/Bed#: -63 I Date of Admission: 5/4/2023   Date of Service: 5/8/2023 I Hospital Day: 3    Assessment/Plan   * SBO (small bowel obstruction) ([facility] )  Assessment & Plan  Presents with abdominal pain, n/v  · CT a/p: Small bowel obstruction with a zone of transition in the mid abdominal  with thickened and poorly distensible small bowel loop  Small ascites  Cholelithiasis  Initially required NG tube  Has since slowly improved  Status post NG tube removal on 5/7  Currently on clear liquid diet tolerating adequately  Follow-up surgery recommendations  Continue pain control    Parkinson disease ([facility] )  Assessment & Plan  Continue home Sinemet    Paranoid schizophrenia ([facility] )  Assessment & Plan  Continue Clozaril     Hypertension  Assessment & Plan  Blood pressure remains controlled  Continue metoprolol    GERD (gastroesophageal reflux disease)  Assessment & Plan  · CT also reveals thickening of the distal esophagus  · Recommend outpatient follow up with GI   · Continue PPI            VTE Pharmacologic Prophylaxis:   Pharmacologic: Enoxaparin (Lovenox)  Mechanical VTE Prophylaxis in Place: Yes    Patient Centered Rounds: I have performed bedside rounds with nursing staff today  Discussions with Specialists or Other Care Team Provider: cm, nursing    Education and Discussions with Family / Patient: pt    Time Spent for Care: 30 minutes  More than 50% of total time spent on counseling and coordination of care as described above  Current Length of Stay: 3 day(s)    Current Patient Status: Inpatient   Certification Statement: The patient will continue to require additional inpatient hospital stay due to See below    Discharge Plan: Pending resolution of small bowel obstruction    Anticipate discharge in next 24 hours    Code Status: Level 1 - Full Code      Subjective:   Denies fevers, chills, shortness of breath, cough    Objective:     Vitals:   Temp (24hrs), Av 7 °F (36 5 °C), Min:97 5 °F (36 4 °C), Max:98 °F (36 7 °C)    Temp:  [97 5 °F (36 4 °C)-98 °F (36 7 °C)] 97 7 °F (36 5 °C)  HR:  [65-72] 72  Resp:  [16-19] 16  BP: (108-154)/(68-80) 154/80  SpO2:  [96 %-98 %] 98 %  Body mass index is 23 46 kg/m²  Input and Output Summary (last 24 hours): Intake/Output Summary (Last 24 hours) at 2023 1040  Last data filed at 2023 2228  Gross per 24 hour   Intake 720 ml   Output 1300 ml   Net -580 ml       Physical Exam:     Physical Exam  Constitutional:       General: He is not in acute distress  Appearance: He is well-developed  He is not diaphoretic  HENT:      Head: Normocephalic and atraumatic  Nose: Nose normal       Mouth/Throat:      Pharynx: No oropharyngeal exudate  Eyes:      General: No scleral icterus  Conjunctiva/sclera: Conjunctivae normal    Cardiovascular:      Rate and Rhythm: Normal rate and regular rhythm  Heart sounds: Normal heart sounds  No murmur heard  No friction rub  No gallop  Pulmonary:      Effort: Pulmonary effort is normal  No respiratory distress  Breath sounds: Normal breath sounds  No wheezing or rales  Chest:      Chest wall: No tenderness  Abdominal:      General: Bowel sounds are normal  There is no distension  Palpations: Abdomen is soft  Tenderness: There is no abdominal tenderness  There is no guarding  Musculoskeletal:         General: No tenderness or deformity  Normal range of motion  Cervical back: Normal range of motion and neck supple  Skin:     General: Skin is warm and dry  Findings: No erythema  Neurological:      Mental Status: He is alert  Mental status is at baseline             Additional Data:     Labs:    Results from last 7 days   Lab Units 23  0608   WBC Thousand/uL 5 34   HEMOGLOBIN g/dL 12 4   HEMATOCRIT % 38 0   PLATELETS Thousands/uL 163   NEUTROS PCT % 79*   LYMPHS PCT % 7*   MONOS PCT % 11   EOS PCT % 3     Results from last 7 days   Lab Units 05/08/23  0608 05/05/23  0539 05/04/23  1614   SODIUM mmol/L 141   < > 136   POTASSIUM mmol/L 3 2*   < > 3 3*   CHLORIDE mmol/L 109*   < > 104   CO2 mmol/L 30   < > 28   BUN mg/dL 7   < > 9   CREATININE mg/dL 0 52*   < > 0 58*   ANION GAP mmol/L 2*   < > 4   CALCIUM mg/dL 8 3*   < > 9 0   ALBUMIN g/dL  --   --  3 6   TOTAL BILIRUBIN mg/dL  --   --  0 69   ALK PHOS U/L  --   --  84   ALT U/L  --   --  5*   AST U/L  --   --  12*   GLUCOSE RANDOM mg/dL 97   < > 97    < > = values in this interval not displayed  Results from last 7 days   Lab Units 05/04/23  1905   LACTIC ACID mmol/L 0 8           * I Have Reviewed All Lab Data Listed Above  * Additional Pertinent Lab Tests Reviewed:  All Labs Within Last 24 Hours Reviewed    Imaging:    Imaging Reports Reviewed Today Include: na  Imaging Personally Reviewed by Myself Includes:  na    Recent Cultures (last 7 days):           Last 24 Hours Medication List:   Current Facility-Administered Medications   Medication Dose Route Frequency Provider Last Rate   • acetaminophen  650 mg Oral Q6H PRN Мария Macedo PA-C     • atorvastatin  40 mg Oral Daily With LAURIE Padilla     • carbidopa-levodopa  1 tablet Oral TID Мария Macedo PA-C     • cloZAPine  100 mg Oral BID Мария Macedo PA-C     • clozapine  200 mg Oral HS Мария Macedo PA-C     • Deutetrabenazine  1 tablet Oral BID Мария Macedo PA-C     • enoxaparin  40 mg Subcutaneous Daily Мария Macedo PA-C     • ketotifen  1 drop Both Eyes BID Мария Macedo PA-C     • metoprolol succinate  25 mg Oral Daily Мария Macedo PA-C     • morphine injection  2 mg Intravenous Q6H PRN Мария Macedo PA-C     • ondansetron  4 mg Intravenous Q6H PRN Мария Jillian Ciaramella, PA-C     • pantoprazole  40 mg Intravenous Q24H [address] Мария Mayer PA-C     • potassium chloride  20 mEq Intravenous Once Christopher Briones MD     • sodium chloride  1 spray Each Nare Q1H PRN Мария Macedo PA-C     • tamsulosin  0 4 mg Oral HS Мария Macedo PA-C     • temazepam  15 mg Oral HS Мария Macedo PA-C     • topiramate  25 mg Oral BID Мария Macedo PA-C     • traZODone  100 mg Oral HS Мария Mayer PA-C          Today, Patient Was Seen By: Goyo Odom MD    ** Please Note: Dictation voice to text software may have been used in the creation of this document   **

## 2023-05-08 NOTE — CASE MANAGEMENT
Case Management Discharge Planning Note    Patient name Kim Dears  Location [facility] [address] [id number]/-[id number] MRN [id number]  : 1950 Date 2023       Current Admission Date: 2023  Current Admission Diagnosis:SBO (small bowel obstruction) [facility])   Patient Active Problem List    Diagnosis Date Noted   • SBO (small bowel obstruction) ([facility] ) 2023   • Parkinson disease ([facility] ) 2023   • Mild protein-calorie malnutrition ([facility] ) 02/10/2023   • Aortic root dilatation ([place] [address] ) 02/10/2023   • Neoplasm of uncertain behavior of left kidney 2022   • BPH with obstruction/lower urinary tract symptoms 2022   • Chronic constipation 2022   • Overactive bladder 2022   • Urge urinary incontinence 2022   • Urinary incontinence, post-void dribbling 2022   • Urinary frequency 2022   • Nocturia 2022   • Chronic prostatitis 2022   • Chronic obstructive pulmonary disease, unspecified COPD type ([place] [address] ) 2022   • Fall 2022   • Groin rash 2022   • 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   • Combined form of senile cataract 2020   • GERD (gastroesophageal reflux disease) 10/22/2019   • Hypertension 10/22/2019   • Hypercholesteremia 10/22/2019   • Paranoid schizophrenia ([facility] ) 10/22/2019   • Neuro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 ([facility] ) 2016      LOS (days): 3  Geometric Mean LOS (GMLOS) (days): 3 00  Days to GMLOS:0 1     OBJECTIVE:  Risk of Unplanned Readmission Score: 16 46         Current admission status: Inpatient   Preferred Pharmacy:   [address] [address]  Phone: [phone] Fax: 29 Rayna Flores [address] [place] [address]  [address]  [place] [address]  Phone: [phone] Fax: [phone] [address]  Phone: [phone] Fax: [phone]    Primary Care Provider: LIUDMILA Spencer    Primary Insurance: MEDICARE  Secondary Insurance:     DISCHARGE DETAILS:  As per Charge nurse Yaya Santiago, pt has a medication  \"deuretrabenriazine\" that he has been taking BID at his group home  As per pharmacy, they are unable to dispense medication  CM called pt group home at [phone] and spoke with vadim who confirmed that pt has that medication at the group home  As per Polo Son, she will reach out to her cooperate nurse and see if its a possibility of the group home to bring in pt home medication here at the hospital to be given  CM provide Vadim SPRINGER contact number, CM will follow up                                                                                                                           "

## 2023-05-08 NOTE — ASSESSMENT & PLAN NOTE
Presents with abdominal pain, n/v  · CT a/p: Small bowel obstruction with a zone of transition in the mid abdominal  with thickened and poorly distensible small bowel loop  Small ascites  Cholelithiasis     Initially required NG tube  Has since slowly improved  Status post NG tube removal on 5/7  Currently on clear liquid diet tolerating adequately  Follow-up surgery recommendations  Continue pain control

## 2023-05-08 NOTE — PLAN OF CARE
Problem: MOBILITY - ADULT  Goal: Maintain or return to baseline ADL function  Description: INTERVENTIONS:  -  Assess patient's ability to carry out ADLs; assess patient's baseline for ADL function and identify physical deficits which impact ability to perform ADLs (bathing, care of mouth/teeth, toileting, grooming, dressing, etc )  - Assess/evaluate cause of self-care deficits   - Assess range of motion  - Assess patient's mobility; develop plan if impaired  - Assess patient's need for assistive devices and provide as appropriate  - Encourage maximum independence but intervene and supervise when necessary  - Involve family in performance of ADLs  - Assess for home care needs following discharge   - Consider OT consult to assist with ADL evaluation and planning for discharge  - Provide patient education as appropriate  Outcome: Progressing  Goal: Maintains/Returns to pre admission functional level  Description: INTERVENTIONS:  - Perform BMAT or MOVE assessment daily    - Set and communicate daily mobility goal to care team and patient/family/caregiver  - Collaborate with rehabilitation services on mobility goals if consulted  - Perform Range of Motion 3 times a day  - Reposition patient every 2 hours    - Dangle patient 3 times a day  - Stand patient 3 times a day  - Ambulate patient 3 times a day  - Out of bed to chair 3 times a day   - Out of bed for meals 3 times a day  - Out of bed for toileting  - Record patient progress and toleration of activity level   Outcome: Progressing     Problem: PAIN - ADULT  Goal: Verbalizes/displays adequate comfort level or baseline comfort level  Description: Interventions:  - Encourage patient to monitor pain and request assistance  - Assess pain using appropriate pain scale  - Administer analgesics based on type and severity of pain and evaluate response  - Implement non-pharmacological measures as appropriate and evaluate response  - Consider cultural and social influences on pain and pain management  - Notify physician/advanced practitioner if interventions unsuccessful or patient reports new pain  Outcome: Progressing     Problem: INFECTION - ADULT  Goal: Absence or prevention of progression during hospitalization  Description: INTERVENTIONS:  - Assess and monitor for signs and symptoms of infection  - Monitor lab/diagnostic results  - Monitor all insertion sites, i e  indwelling lines, tubes, and drains  - Monitor endotracheal if appropriate and nasal secretions for changes in amount and color  - [place] appropriate cooling/warming therapies per order  - Administer medications as ordered  - Instruct and encourage patient and family to use good hand hygiene technique  - Identify and instruct in appropriate isolation precautions for identified infection/condition  Outcome: Progressing     Problem: SAFETY ADULT  Goal: Patient will remain free of falls  Description: INTERVENTIONS:  - Educate patient/family on patient safety including physical limitations  - Instruct patient to call for assistance with activity   - Consult OT/PT to assist with strengthening/mobility   - Keep Call bell within reach  - Keep bed low and locked with side rails adjusted as appropriate  - Keep care items and personal belongings within reach  - Initiate and maintain comfort rounds  - Make Fall Risk Sign visible to staff  - Offer Toileting every 2 Hours, in advance of need  - Initiate/Maintain bedalarm  - Obtain necessary fall risk management equipment:   - Apply yellow socks and bracelet for high fall risk patients  - Consider moving patient to room near nurses station  Outcome: Progressing     Problem: DISCHARGE PLANNING  Goal: Discharge to home or other facility with appropriate resources  Description: INTERVENTIONS:  - Identify barriers to discharge w/patient and caregiver  - Arrange for needed discharge resources and transportation as appropriate  - Identify discharge learning needs (meds, wound care, etc )  - Arrange for interpretive services to assist at discharge as needed  - Refer to Case Management Department for coordinating discharge planning if the patient needs post-hospital services based on physician/advanced practitioner order or complex needs related to functional status, cognitive ability, or social support system  Outcome: Progressing     Problem: Knowledge Deficit  Goal: Patient/family/caregiver demonstrates understanding of disease process, treatment plan, medications, and discharge instructions  Description: Complete learning assessment and assess knowledge base    Interventions:  - Provide teaching at level of understanding  - Provide teaching via preferred learning methods  Outcome: Progressing     Problem: GASTROINTESTINAL - ADULT  Goal: Minimal or absence of nausea and/or vomiting  Description: INTERVENTIONS:  - Administer IV fluids if ordered to ensure adequate hydration  - Maintain NPO status until nausea and vomiting are resolved  - Nasogastric tube if ordered  - Administer ordered antiemetic medications as needed  - Provide nonpharmacologic comfort measures as appropriate  - Advance diet as tolerated, if ordered  - Consider nutrition services referral to assist patient with adequate nutrition and appropriate food choices  Outcome: Progressing  Goal: Maintains or returns to baseline bowel function  Description: INTERVENTIONS:  - Assess bowel function  - Encourage oral fluids to ensure adequate hydration  - Administer IV fluids if ordered to ensure adequate hydration  - Administer ordered medications as needed  - Encourage mobilization and activity  - Consider nutritional services referral to assist patient with adequate nutrition and appropriate food choices  Outcome: Progressing  Goal: Maintains adequate nutritional intake  Description: INTERVENTIONS:  - Monitor percentage of each meal consumed  - Identify factors contributing to decreased intake, treat as appropriate  - Assist with meals as needed  - Monitor I&O, weight, and lab values if indicated  - Obtain nutrition services referral as needed  Outcome: Progressing     Problem: Prexisting or High Potential for Compromised Skin Integrity  Goal: Skin integrity is maintained or improved  Description: INTERVENTIONS:  - Identify patients at risk for skin breakdown  - Assess and monitor skin integrity  - Assess and monitor nutrition and hydration status  - Monitor labs   - Assess for incontinence   - Turn and reposition patient  - Assist with mobility/ambulation  - Relieve pressure over bony prominences  - Avoid friction and shearing  - Provide appropriate hygiene as needed including keeping skin clean and dry  - Evaluate need for skin moisturizer/barrier cream  - Collaborate with interdisciplinary team   - Patient/family teaching  - Consider wound care consult   Outcome: Progressing 02-Oct-2018

## 2023-05-08 NOTE — CASE MANAGEMENT
Case Management Discharge Planning Note    Patient name Flaca Vargas  Location [facility] [address] 418/-15 MRN [id number]  : 1950 Date 2023       Current Admission Date: 2023  Current Admission Diagnosis:SBO (small bowel obstruction) [facility])   Patient Active Problem List    Diagnosis Date Noted   • SBO (small bowel obstruction) ([facility] ) 2023   • Parkinson disease ([place] [address] ) 2023   • Mild protein-calorie malnutrition ([facility] ) 02/10/2023   • Aortic root dilatation ([place] [address] ) 02/10/2023   • Neoplasm of uncertain behavior of left kidney 2022   • BPH with obstruction/lower urinary tract symptoms 2022   • Chronic constipation 2022   • Overactive bladder 2022   • Urge urinary incontinence 2022   • Urinary incontinence, post-void dribbling 2022   • Urinary frequency 2022   • Nocturia 2022   • Chronic prostatitis 2022   • Chronic obstructive pulmonary disease, unspecified COPD type ([place] [address] ) 2022   • Fall 2022   • Groin rash 2022   • 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   • Combined form of senile cataract 2020   • GERD (gastroesophageal reflux disease) 10/22/2019   • Hypertension 10/22/2019   • Hypercholesteremia 10/22/2019   • Paranoid schizophrenia ([place] [address] ) 10/22/2019   • Neuro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 ([place] [address] ) 2016      LOS (days): 3  Geometric Mean LOS (GMLOS) (days): 3 00  Days to GMLOS:-0 2     OBJECTIVE:  Risk of Unplanned Readmission Score: 17 21         Current admission status: Inpatient   Preferred Pharmacy:   [address] [address]  Phone: [phone] Fax: 88 Rayna Flores [address] [place] [address]  [address]  [place] [address]  Phone: [phone] Fax: [phone] [address]  Phone: [phone] Fax: [phone]    Primary Care Provider: LIUDMILA Pool    Primary Insurance: MEDICARE  Secondary Insurance:     DISCHARGE DETAILS:    Discharge planning discussed with[de-identified] pt at bedside  [place] of Choice: Yes  Comments - [place] of Choice: As per SLIM, pt has an anticiapted 24hr dc due to : Pending resolution of small bowel obstruction  CM continues to follow  CM contacted family/caregiver?: Yes  Were Treatment Team discharge recommendations reviewed with patient/caregiver?: Yes  Did patient/caregiver verbalize understanding of patient care needs?: Yes  Were patient/caregiver advised of the risks associated with not following Treatment Team discharge recommendations?: Yes    Contacts  Patient Contacts: Mily Abel  Relationship to Patient[de-identified] Family  Contact Method: Phone  Phone Number: [phone]  Reason/Outcome: Continuity of Care, Emergency Contact, Discharge Planning             IMM Given (Date):: 05/08/23  IMM Given to[de-identified] Patient  Family notified[de-identified] Pt at bedside  Additional Comments: CM reviewed IMM with pt at bedside  Pt expressed full and complete understanding  Copy was provided and original placed in MR for scanning

## 2023-05-09 ENCOUNTER — TELEPHONE (OUTPATIENT)
Age: 73
End: 2023-05-09

## 2023-05-09 ENCOUNTER — TRANSITIONAL CARE MANAGEMENT (OUTPATIENT)
Age: 73
End: 2023-05-09

## 2023-05-09 VITALS
SYSTOLIC BLOOD PRESSURE: 155 MMHG | DIASTOLIC BLOOD PRESSURE: 86 MMHG | TEMPERATURE: 98 F | BODY MASS INDEX: 23.39 KG/M2 | HEIGHT: 68 IN | RESPIRATION RATE: 18 BRPM | OXYGEN SATURATION: 98 % | WEIGHT: 154.32 LBS | HEART RATE: 77 BPM

## 2023-05-09 LAB
ANION GAP SERPL CALCULATED.3IONS-SCNC: 3 MMOL/L (ref 4–13)
BASOPHILS # BLD AUTO: 0.03 THOUSANDS/ÂΜL (ref 0–0.1)
BASOPHILS NFR BLD AUTO: 1 % (ref 0–1)
BUN SERPL-MCNC: 6 MG/DL (ref 5–25)
CALCIUM SERPL-MCNC: 8.6 MG/DL (ref 8.4–10.2)
CHLORIDE SERPL-SCNC: 109 MMOL/L (ref 96–108)
CO2 SERPL-SCNC: 29 MMOL/L (ref 21–32)
CREAT SERPL-MCNC: 0.49 MG/DL (ref 0.6–1.3)
EOSINOPHIL # BLD AUTO: 0.39 THOUSAND/ÂΜL (ref 0–0.61)
EOSINOPHIL NFR BLD AUTO: 9 % (ref 0–6)
ERYTHROCYTE [DISTWIDTH] IN BLOOD BY AUTOMATED COUNT: 13.9 % (ref 11.6–15.1)
GFR SERPL CREATININE-BSD FRML MDRD: 109 ML/MIN/1.73SQ M
GLUCOSE SERPL-MCNC: 97 MG/DL (ref 65–140)
HCT VFR BLD AUTO: 35 % (ref 36.5–49.3)
HGB BLD-MCNC: 12.1 G/DL (ref 12–17)
IMM GRANULOCYTES # BLD AUTO: 0.01 THOUSAND/UL (ref 0–0.2)
IMM GRANULOCYTES NFR BLD AUTO: 0 % (ref 0–2)
LYMPHOCYTES # BLD AUTO: 0.95 THOUSANDS/ÂΜL (ref 0.6–4.47)
LYMPHOCYTES NFR BLD AUTO: 21 % (ref 14–44)
MCH RBC QN AUTO: 32.5 PG (ref 26.8–34.3)
MCHC RBC AUTO-ENTMCNC: 34.6 G/DL (ref 31.4–37.4)
MCV RBC AUTO: 94 FL (ref 82–98)
MONOCYTES # BLD AUTO: 0.41 THOUSAND/ÂΜL (ref 0.17–1.22)
MONOCYTES NFR BLD AUTO: 9 % (ref 4–12)
NEUTROPHILS # BLD AUTO: 2.71 THOUSANDS/ÂΜL (ref 1.85–7.62)
NEUTS SEG NFR BLD AUTO: 60 % (ref 43–75)
NRBC BLD AUTO-RTO: 0 /100 WBCS
PLATELET # BLD AUTO: 183 THOUSANDS/UL (ref 149–390)
PMV BLD AUTO: 8.8 FL (ref 8.9–12.7)
POTASSIUM SERPL-SCNC: 3.6 MMOL/L (ref 3.5–5.3)
RBC # BLD AUTO: 3.72 MILLION/UL (ref 3.88–5.62)
SODIUM SERPL-SCNC: 141 MMOL/L (ref 135–147)
WBC # BLD AUTO: 4.5 THOUSAND/UL (ref 4.31–10.16)

## 2023-05-09 RX ORDER — POLYETHYLENE GLYCOL 3350 17 G/17G
17 POWDER, FOR SOLUTION ORAL DAILY
Qty: 10 EACH | Refills: 0 | Status: SHIPPED | OUTPATIENT
Start: 2023-05-09 | End: 2023-05-12 | Stop reason: SDUPTHER

## 2023-05-09 RX ORDER — SODIUM PHOSPHATE, DIBASIC AND SODIUM PHOSPHATE, MONOBASIC 7; 19 G/133ML; G/133ML
1 ENEMA RECTAL DAILY PRN
Qty: 5 ENEMA | Refills: 0 | Status: SHIPPED | OUTPATIENT
Start: 2023-05-09

## 2023-05-09 RX ADMIN — ENOXAPARIN SODIUM 40 MG: 40 INJECTION SUBCUTANEOUS at 09:28

## 2023-05-09 RX ADMIN — METOPROLOL SUCCINATE 25 MG: 25 TABLET, EXTENDED RELEASE ORAL at 09:28

## 2023-05-09 RX ADMIN — CARBIDOPA AND LEVODOPA 1 TABLET: 25; 100 TABLET ORAL at 09:28

## 2023-05-09 RX ADMIN — TOPIRAMATE 25 MG: 25 TABLET, FILM COATED ORAL at 09:28

## 2023-05-09 RX ADMIN — KETOTIFEN FUMARATE 1 DROP: 0.25 SOLUTION/ DROPS OPHTHALMIC at 10:55

## 2023-05-09 RX ADMIN — PANTOPRAZOLE SODIUM 40 MG: 40 INJECTION, POWDER, FOR SOLUTION INTRAVENOUS at 05:22

## 2023-05-09 RX ADMIN — CLOZAPINE 100 MG: 100 TABLET ORAL at 09:29

## 2023-05-09 NOTE — PLAN OF CARE
Problem: MOBILITY - ADULT  Goal: Maintain or return to baseline ADL function  Description: INTERVENTIONS:  -  Assess patient's ability to carry out ADLs; assess patient's baseline for ADL function and identify physical deficits which impact ability to perform ADLs (bathing, care of mouth/teeth, toileting, grooming, dressing, etc )  - Assess/evaluate cause of self-care deficits   - Assess range of motion  - Assess patient's mobility; develop plan if impaired  - Assess patient's need for assistive devices and provide as appropriate  - Encourage maximum independence but intervene and supervise when necessary  - Involve family in performance of ADLs  - Assess for home care needs following discharge   - Consider OT consult to assist with ADL evaluation and planning for discharge  - Provide patient education as appropriate  Outcome: Progressing  Goal: Maintains/Returns to pre admission functional level  Description: INTERVENTIONS:  - Perform BMAT or MOVE assessment daily    - Set and communicate daily mobility goal to care team and patient/family/caregiver     - Collaborate with rehabilitation services on mobility goals if consulted  - Stand patient 3 times a day  - Ambulate patient 3 times a day  - Out of bed to chair 3 times a day   - Out of bed for meals 3 times a day  - Out of bed for toileting  - Record patient progress and toleration of activity level   Outcome: Progressing     Problem: PAIN - ADULT  Goal: Verbalizes/displays adequate comfort level or baseline comfort level  Description: Interventions:  - Encourage patient to monitor pain and request assistance  - Assess pain using appropriate pain scale  - Administer analgesics based on type and severity of pain and evaluate response  - Implement non-pharmacological measures as appropriate and evaluate response  - Consider cultural and social influences on pain and pain management  - Notify physician/advanced practitioner if interventions unsuccessful or patient reports new pain  Outcome: Progressing     Problem: INFECTION - ADULT  Goal: Absence or prevention of progression during hospitalization  Description: INTERVENTIONS:  - Assess and monitor for signs and symptoms of infection  - Monitor lab/diagnostic results  - Monitor all insertion sites, i e  indwelling lines, tubes, and drains  - Monitor endotracheal if appropriate and nasal secretions for changes in amount and color  - [place] appropriate cooling/warming therapies per order  - Administer medications as ordered  - Instruct and encourage patient and family to use good hand hygiene technique  - Identify and instruct in appropriate isolation precautions for identified infection/condition  Outcome: Progressing     Problem: SAFETY ADULT  Goal: Patient will remain free of falls  Description: INTERVENTIONS:  - Educate patient/family on patient safety including physical limitations  - Instruct patient to call for assistance with activity   - Consult OT/PT to assist with strengthening/mobility   - Keep Call bell within reach  - Keep bed low and locked with side rails adjusted as appropriate  - Keep care items and personal belongings within reach  - Initiate and maintain comfort rounds  - Make Fall Risk Sign visible to staff  - Offer Toileting every 2 Hours, in advance of need  - Initiate/Maintain bed alarm  - Apply yellow socks and bracelet for high fall risk patients  - Consider moving patient to room near nurses station  Outcome: Progressing     Problem: DISCHARGE PLANNING  Goal: Discharge to home or other facility with appropriate resources  Description: INTERVENTIONS:  - Identify barriers to discharge w/patient and caregiver  - Arrange for needed discharge resources and transportation as appropriate  - Identify discharge learning needs (meds, wound care, etc )  - Arrange for interpretive services to assist at discharge as needed  - Refer to Case Management Department for coordinating discharge planning if the patient needs post-hospital services based on physician/advanced practitioner order or complex needs related to functional status, cognitive ability, or social support system  Outcome: Progressing     Problem: Knowledge Deficit  Goal: Patient/family/caregiver demonstrates understanding of disease process, treatment plan, medications, and discharge instructions  Description: Complete learning assessment and assess knowledge base    Interventions:  - Provide teaching at level of understanding  - Provide teaching via preferred learning methods  Outcome: Progressing     Problem: GASTROINTESTINAL - ADULT  Goal: Minimal or absence of nausea and/or vomiting  Description: INTERVENTIONS:  - Administer IV fluids if ordered to ensure adequate hydration  - Maintain NPO status until nausea and vomiting are resolved  - Nasogastric tube if ordered  - Administer ordered antiemetic medications as needed  - Provide nonpharmacologic comfort measures as appropriate  - Advance diet as tolerated, if ordered  - Consider nutrition services referral to assist patient with adequate nutrition and appropriate food choices  Outcome: Progressing  Goal: Maintains or returns to baseline bowel function  Description: INTERVENTIONS:  - Assess bowel function  - Encourage oral fluids to ensure adequate hydration  - Administer IV fluids if ordered to ensure adequate hydration  - Administer ordered medications as needed  - Encourage mobilization and activity  - Consider nutritional services referral to assist patient with adequate nutrition and appropriate food choices  Outcome: Progressing  Goal: Maintains adequate nutritional intake  Description: INTERVENTIONS:  - Monitor percentage of each meal consumed  - Identify factors contributing to decreased intake, treat as appropriate  - Assist with meals as needed  - Monitor I&O, weight, and lab values if indicated  - Obtain nutrition services referral as needed  Outcome: Progressing     Problem: Prexisting or High Potential for Compromised Skin Integrity  Goal: Skin integrity is maintained or improved  Description: INTERVENTIONS:  - Identify patients at risk for skin breakdown  - Assess and monitor skin integrity  - Assess and monitor nutrition and hydration status  - Monitor labs   - Assess for incontinence   - Turn and reposition patient  - Assist with mobility/ambulation  - Relieve pressure over bony prominences  - Avoid friction and shearing  - Provide appropriate hygiene as needed including keeping skin clean and dry  - Evaluate need for skin moisturizer/barrier cream  - Collaborate with interdisciplinary team   - Patient/family teaching  - Consider wound care consult   Outcome: Progressing     Problem: Nutrition/Hydration-ADULT  Goal: Nutrient/Hydration intake appropriate for improving, restoring or maintaining nutritional needs  Description: Monitor and assess patient's nutrition/hydration status for malnutrition  Collaborate with interdisciplinary team and initiate plan and interventions as ordered  Monitor patient's weight and dietary intake as ordered or per policy  Utilize nutrition screening tool and intervene as necessary  Determine patient's food preferences and provide high-protein, high-caloric foods as appropriate       INTERVENTIONS:  - Monitor oral intake, urinary output, labs, and treatment plans  - Assess nutrition and hydration status and recommend course of action  - Evaluate amount of meals eaten  - Assist patient with eating if necessary   - Allow adequate time for meals  - Recommend/ encourage appropriate diets, oral nutritional supplements, and vitamin/mineral supplements  - Order, calculate, and assess calorie counts as needed  - Recommend, monitor, and adjust tube feedings and TPN/PPN based on assessed needs  - Assess need for intravenous fluids  - Provide specific nutrition/hydration education as appropriate  - Include patient/family/caregiver in decisions related to nutrition  Outcome: Progressing

## 2023-05-09 NOTE — DISCHARGE SUMMARY
[address] [facility]  Discharge- Trini Gonzalez 1950, 67 y o  male MRN: [id number]  Unit/Bed#: -01 Encounter: [id number]  Primary Care Provider: LIUDMILA Montalvo   Date and time admitted to hospital: 5/4/2023  3:37 PM    * SBO (small bowel obstruction) [facility])  Assessment & Plan  Presents with abdominal pain, n/v  · CT a/p: Small bowel obstruction with a zone of transition in the mid abdominal  with thickened and poorly distensible small bowel loop  Small ascites  Cholelithiasis  Initially required NG tube  Status post NG tube removal on 5/7  Improved, patient passing flatus and bowel movement  Diet advanced and tolerated well  Appreciate surgery recommendations  Patient cleared for discharge  Continue with bowel regimen at discharge    Parkinson disease [facility])  Assessment & Plan  Continue home Sinemet    Paranoid schizophrenia ([facility] )  Assessment & Plan  Continue Clozaril     Hypertension  Assessment & Plan  Blood pressure remains controlled  Continue metoprolol    GERD (gastroesophageal reflux disease)  Assessment & Plan  · CT also reveals thickening of the distal esophagus  · Recommend outpatient follow up with GI   · Continue PPI       Discharging Physician / Practitioner: Maryana Martin MD  PCP: Jeremiah Montalvo Casia   Admission Date:   Admission Orders (From admission, onward)     Ordered        05/05/23 1047  Inpatient Admission  Once            05/04/23 1923  Place in Observation  Once                      Discharge Date: 05/09/23    Medical Problems     Resolved Problems  Date Reviewed: 5/9/2023   None         Consultations During Hospital Stay:  · General surgery        Reason for Admission: Abdominal pain    Hospital Course: Trini Gonzalez is a 67 y o  male patient who originally presented to the hospital on 5/4/2023 due to SBO requiring NG tube initially  Surgery was on board and patient significantly improved and NG tube was removed    Diet advanced "and patient tolerated well and continued to have flatus and bowel movement  Patient cleared for discharge from surgery standpoint and will be discharged with bowel regimen  Patient is medically stable for discharge today  Please see above list of diagnoses and related plan for additional information  Condition at Discharge: stable     Discharge Day Visit / Exam:     Subjective: Denies any complaints  Vitals: Blood Pressure: 155/86 (05/09/23 0711)  Pulse: 77 (05/09/23 0711)  Temperature: 98 °F (36 7 °C) (05/09/23 0711)  Temp Source: Oral (05/09/23 0201)  Respirations: 18 (05/09/23 0711)  Height: 5' 8\" (172 7 cm) (05/04/23 2024)  Weight - Scale: 70 kg (154 lb 5 2 oz) (05/05/23 0556)  SpO2: 98 % (05/09/23 0711)  Exam:   Physical Exam  Vitals and nursing note reviewed  Constitutional:       General: He is not in acute distress  Appearance: He is well-developed  He is not ill-appearing or diaphoretic  HENT:      Head: Normocephalic and atraumatic  Mouth/Throat:      Mouth: Mucous membranes are moist       Pharynx: Oropharynx is clear  Eyes:      Conjunctiva/sclera: Conjunctivae normal    Cardiovascular:      Rate and Rhythm: Normal rate and regular rhythm  Heart sounds: No murmur heard  Pulmonary:      Effort: Pulmonary effort is normal  No respiratory distress  Breath sounds: Normal breath sounds  Abdominal:      General: Bowel sounds are normal       Palpations: Abdomen is soft  Tenderness: There is no abdominal tenderness  Musculoskeletal:         General: No swelling  Cervical back: Neck supple  Right lower leg: No edema  Left lower leg: No edema  Skin:     General: Skin is warm and dry  Capillary Refill: Capillary refill takes less than 2 seconds  Neurological:      General: No focal deficit present  Mental Status: He is alert and oriented to person, place, and time     Psychiatric:         Mood and Affect: Mood normal          Behavior: " Behavior normal          Discussion with Family: Patient declined    Discharge instructions/Information to patient and family:   See after visit summary for information provided to patient and family  Provisions for Follow-Up Care:  See after visit summary for information related to follow-up care and any pertinent home health orders  Disposition:     Home        Planned Readmission: no     Discharge Statement:  I spent 45 minutes discharging the patient  This time was spent on the day of discharge  I had direct contact with the patient on the day of discharge  Greater than 50% of the total time was spent examining patient, answering all patient questions, arranging and discussing plan of care with patient as well as directly providing post-discharge instructions  Additional time then spent on discharge activities  Discharge Medications:  See after visit summary for reconciled discharge medications provided to patient and family        ** Please Note: This note has been constructed using a voice recognition system **

## 2023-05-09 NOTE — PLAN OF CARE
Problem: MOBILITY - ADULT  Goal: Maintain or return to baseline ADL function  Description: INTERVENTIONS:  -  Assess patient's ability to carry out ADLs; assess patient's baseline for ADL function and identify physical deficits which impact ability to perform ADLs (bathing, care of mouth/teeth, toileting, grooming, dressing, etc )  - Assess/evaluate cause of self-care deficits   - Assess range of motion  - Assess patient's mobility; develop plan if impaired  - Assess patient's need for assistive devices and provide as appropriate  - Encourage maximum independence but intervene and supervise when necessary  - Involve family in performance of ADLs  - Assess for home care needs following discharge   - Consider OT consult to assist with ADL evaluation and planning for discharge  - Provide patient education as appropriate  Outcome: Progressing  Goal: Maintains/Returns to pre admission functional level  Description: INTERVENTIONS:  - Perform BMAT or MOVE assessment daily    - Set and communicate daily mobility goal to care team and patient/family/caregiver  - Collaborate with rehabilitation services on mobility goals if consulted  - Perform Range of Motion  times a day  - Reposition patient every  hours    - Dangle patient  times a day  - Stand patient  times a day  - Ambulate patient  times a day  - Out of bed to chair  times a day   - Out of bed for meals  times a day  - Out of bed for toileting  - Record patient progress and toleration of activity level   Outcome: Progressing     Problem: PAIN - ADULT  Goal: Verbalizes/displays adequate comfort level or baseline comfort level  Description: Interventions:  - Encourage patient to monitor pain and request assistance  - Assess pain using appropriate pain scale  - Administer analgesics based on type and severity of pain and evaluate response  - Implement non-pharmacological measures as appropriate and evaluate response  - Consider cultural and social influences on pain and pain management  - Notify physician/advanced practitioner if interventions unsuccessful or patient reports new pain  Outcome: Progressing     Problem: INFECTION - ADULT  Goal: Absence or prevention of progression during hospitalization  Description: INTERVENTIONS:  - Assess and monitor for signs and symptoms of infection  - Monitor lab/diagnostic results  - Monitor all insertion sites, i e  indwelling lines, tubes, and drains  - Monitor endotracheal if appropriate and nasal secretions for changes in amount and color  - [place] appropriate cooling/warming therapies per order  - Administer medications as ordered  - Instruct and encourage patient and family to use good hand hygiene technique  - Identify and instruct in appropriate isolation precautions for identified infection/condition  Outcome: Progressing     Problem: SAFETY ADULT  Goal: Patient will remain free of falls  Description: INTERVENTIONS:  - Educate patient/family on patient safety including physical limitations  - Instruct patient to call for assistance with activity   - Consult OT/PT to assist with strengthening/mobility   - Keep Call bell within reach  - Keep bed low and locked with side rails adjusted as appropriate  - Keep care items and personal belongings within reach  - Initiate and maintain comfort rounds  - Make Fall Risk Sign visible to staff  - Offer Toileting every  Hours, in advance of need  - Initiate/Maintain alarm  - Obtain necessary fall risk management equipment:  - Apply yellow socks and bracelet for high fall risk patients  - Consider moving patient to room near nurses station  Outcome: Progressing     Problem: DISCHARGE PLANNING  Goal: Discharge to home or other facility with appropriate resources  Description: INTERVENTIONS:  - Identify barriers to discharge w/patient and caregiver  - Arrange for needed discharge resources and transportation as appropriate  - Identify discharge learning needs (meds, wound care, etc )  - Arrange for interpretive services to assist at discharge as needed  - Refer to Case Management Department for coordinating discharge planning if the patient needs post-hospital services based on physician/advanced practitioner order or complex needs related to functional status, cognitive ability, or social support system  Outcome: Progressing     Problem: Knowledge Deficit  Goal: Patient/family/caregiver demonstrates understanding of disease process, treatment plan, medications, and discharge instructions  Description: Complete learning assessment and assess knowledge base    Interventions:  - Provide teaching at level of understanding  - Provide teaching via preferred learning methods  Outcome: Progressing     Problem: GASTROINTESTINAL - ADULT  Goal: Minimal or absence of nausea and/or vomiting  Description: INTERVENTIONS:  - Administer IV fluids if ordered to ensure adequate hydration  - Maintain NPO status until nausea and vomiting are resolved  - Nasogastric tube if ordered  - Administer ordered antiemetic medications as needed  - Provide nonpharmacologic comfort measures as appropriate  - Advance diet as tolerated, if ordered  - Consider nutrition services referral to assist patient with adequate nutrition and appropriate food choices  Outcome: Progressing  Goal: Maintains or returns to baseline bowel function  Description: INTERVENTIONS:  - Assess bowel function  - Encourage oral fluids to ensure adequate hydration  - Administer IV fluids if ordered to ensure adequate hydration  - Administer ordered medications as needed  - Encourage mobilization and activity  - Consider nutritional services referral to assist patient with adequate nutrition and appropriate food choices  Outcome: Progressing  Goal: Maintains adequate nutritional intake  Description: INTERVENTIONS:  - Monitor percentage of each meal consumed  - Identify factors contributing to decreased intake, treat as appropriate  - Assist with meals as needed  - Monitor I&O, weight, and lab values if indicated  - Obtain nutrition services referral as needed  Outcome: Progressing     Problem: Prexisting or High Potential for Compromised Skin Integrity  Goal: Skin integrity is maintained or improved  Description: INTERVENTIONS:  - Identify patients at risk for skin breakdown  - Assess and monitor skin integrity  - Assess and monitor nutrition and hydration status  - Monitor labs   - Assess for incontinence   - Turn and reposition patient  - Assist with mobility/ambulation  - Relieve pressure over bony prominences  - Avoid friction and shearing  - Provide appropriate hygiene as needed including keeping skin clean and dry  - Evaluate need for skin moisturizer/barrier cream  - Collaborate with interdisciplinary team   - Patient/family teaching  - Consider wound care consult   Outcome: Progressing     Problem: Nutrition/Hydration-ADULT  Goal: Nutrient/Hydration intake appropriate for improving, restoring or maintaining nutritional needs  Description: Monitor and assess patient's nutrition/hydration status for malnutrition  Collaborate with interdisciplinary team and initiate plan and interventions as ordered  Monitor patient's weight and dietary intake as ordered or per policy  Utilize nutrition screening tool and intervene as necessary  Determine patient's food preferences and provide high-protein, high-caloric foods as appropriate       INTERVENTIONS:  - Monitor oral intake, urinary output, labs, and treatment plans  - Assess nutrition and hydration status and recommend course of action  - Evaluate amount of meals eaten  - Assist patient with eating if necessary   - Allow adequate time for meals  - Recommend/ encourage appropriate diets, oral nutritional supplements, and vitamin/mineral supplements  - Order, calculate, and assess calorie counts as needed  - Recommend, monitor, and adjust tube feedings and TPN/PPN based on assessed needs  - Assess need for intravenous fluids  - Provide specific nutrition/hydration education as appropriate  - Include patient/family/caregiver in decisions related to nutrition  Outcome: Progressing

## 2023-05-09 NOTE — ASSESSMENT & PLAN NOTE
Presents with abdominal pain, n/v  · CT a/p: Small bowel obstruction with a zone of transition in the mid abdominal  with thickened and poorly distensible small bowel loop  Small ascites  Cholelithiasis  Initially required NG tube  Status post NG tube removal on 5/7  Improved, patient passing flatus and bowel movement  Diet advanced and tolerated well  Appreciate surgery recommendations    Patient cleared for discharge  Continue with bowel regimen at discharge

## 2023-05-09 NOTE — CASE MANAGEMENT
Case Management Discharge Planning Note    Patient name Lewis Cage  Location [facility] [facility] [address] [id number] MRN [id number]  : 1950 Date 2023       Current Admission Date: 2023  Current Admission Diagnosis:SBO (small bowel obstruction) [facility])   Patient Active Problem List    Diagnosis Date Noted   • SBO (small bowel obstruction) ([facility] ) 2023   • Parkinson disease ([facility] ) 2023   • Mild protein-calorie malnutrition ([facility] ) 02/10/2023   • Aortic root dilatation ([facility] ) 02/10/2023   • Neoplasm of uncertain behavior of left kidney 2022   • BPH with obstruction/lower urinary tract symptoms 2022   • Chronic constipation 2022   • Overactive bladder 2022   • Urge urinary incontinence 2022   • Urinary incontinence, post-void dribbling 2022   • Urinary frequency 2022   • Nocturia 2022   • Chronic prostatitis 2022   • Chronic obstructive pulmonary disease, unspecified COPD type ([place] [address] ) 2022   • Fall 2022   • Groin rash 2022   • 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   • Combined form of senile cataract 2020   • GERD (gastroesophageal reflux disease) 10/22/2019   • Hypertension 10/22/2019   • Hypercholesteremia 10/22/2019   • Paranoid schizophrenia ([facility] ) 10/22/2019   • Neuro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 ([facility] ) 2016      LOS (days): 4  Geometric Mean LOS (GMLOS) (days): 3 00  Days to GMLOS:-1     OBJECTIVE:  Risk of Unplanned Readmission Score: 14 68         Current admission status: Inpatient   Preferred Pharmacy:   [facility]  [address] [address]  Phone: [phone] Fax: [address] [place] [address] - [address]  [place] [address]  Phone: [phone] Fax: [phone] ([address] [place] [address]  Phone: [phone] Fax: [phone]    Primary Care Provider: LIUDMILA Sanchez    Primary Insurance: MEDICARE  Secondary Insurance:     DISCHARGE DETAILS:  As per GEORGE, pt is being dc today  CM called and spoke with pt sister Geovany Lawler who was made aware  CM also reached out to pt group home [address] and spoke with Michelle Weir who is aware that pt will be returning back to their care  CM arranges w/c Dorisann Spore transport as the group home was unable to transport  Pt has a tentative  time of 1:30pm  SLIM and RN made aware  CM continues to follow

## 2023-05-09 NOTE — PROGRESS NOTES
"Progress Note - General Surgery   Amberly Blandon 67 y o  male MRN: [id number]  Unit/Bed#: -01 Encounter: [id number]    Assessment/Plan  Amberly Blandon is a 67 y o  male     SBO, resolving  Constipation  AVSS, WBC 4 5  Multiple BMs following fleets enema and dulcolax yesterday, abdomen soft, non-distended, normoactive bowel sounds, non-tender to palpation    Doing well from surgical standpoint, no further indication for surgical intervention  Patient having regular bowel function and tolerating diet  Recommend discharge on bowel regimen given chronic constipation  At this time General Surgery will sign off  Please contact if you have any further questions or concerns  Discharge per Medicine team     Subjective/Objective    Subjective: No acute events overnight     Objective:     Blood pressure 155/86, pulse 77, temperature 98 °F (36 7 °C), resp  rate 18, height 5' 8\" (1 727 m), weight 70 kg (154 lb 5 2 oz), SpO2 98 %  ,Body mass index is 23 46 kg/m²  Intake/Output Summary (Last 24 hours) at 5/9/2023 1304  Last data filed at 5/9/2023 1001  Gross per 24 hour   Intake 600 ml   Output 650 ml   Net -50 ml       Invasive Devices     None                 Physical Exam: /86   Pulse 77   Temp 98 °F (36 7 °C)   Resp 18   Ht 5' 8\" (1 727 m)   Wt 70 kg (154 lb 5 2 oz)   SpO2 98%   BMI 23 46 kg/m²   General appearance: alert and oriented, in no acute distress  Lungs: clear to auscultation bilaterally  Heart: regular rate and rhythm, S1, S2 normal, no murmur, click, rub or gallop  Abdomen: soft, non-tender; bowel sounds normal; no masses,  no organomegaly  Extremities: extremities normal, warm and well-perfused; no cyanosis, clubbing, or edema    Lab, Imaging and other studies:I have personally reviewed pertinent lab results       VTE Pharmacologic Prophylaxis: Enoxaparin (Lovenox)  VTE Mechanical Prophylaxis: sequential compression device    Recent Results (from the past 36 hour(s))   Basic metabolic panel " Collection Time: 05/08/23  6:08 AM   Result Value Ref Range    Sodium 141 135 - 147 mmol/L    Potassium 3 2 (L) 3 5 - 5 3 mmol/L    Chloride 109 (H) 96 - 108 mmol/L    CO2 30 21 - 32 mmol/L    ANION GAP 2 (L) 4 - 13 mmol/L    BUN 7 5 - 25 mg/dL    Creatinine 0 52 (L) 0 60 - 1 30 mg/dL    Glucose 97 65 - 140 mg/dL    Calcium 8 3 (L) 8 4 - 10 2 mg/dL    eGFR 106 ml/min/1 73sq m   CBC and differential    Collection Time: 05/08/23  6:08 AM   Result Value Ref Range    WBC 5 34 4 31 - 10 16 Thousand/uL    RBC 3 83 (L) 3 88 - 5 62 Million/uL    Hemoglobin 12 4 12 0 - 17 0 g/dL    Hematocrit 38 0 36 5 - 49 3 %    MCV 99 (H) 82 - 98 fL    MCH 32 4 26 8 - 34 3 pg    MCHC 32 6 31 4 - 37 4 g/dL    RDW 13 9 11 6 - 15 1 %    MPV 9 0 8 9 - 12 7 fL    Platelets 982 121 - 151 Thousands/uL    nRBC 0 /100 WBCs    Neutrophils Relative 79 (H) 43 - 75 %    Immat GRANS % 0 0 - 2 %    Lymphocytes Relative 7 (L) 14 - 44 %    Monocytes Relative 11 4 - 12 %    Eosinophils Relative 3 0 - 6 %    Basophils Relative 0 0 - 1 %    Neutrophils Absolute 4 21 1 85 - 7 62 Thousands/µL    Immature Grans Absolute 0 01 0 00 - 0 20 Thousand/uL    Lymphocytes Absolute 0 39 (L) 0 60 - 4 47 Thousands/µL    Monocytes Absolute 0 56 0 17 - 1 22 Thousand/µL    Eosinophils Absolute 0 15 0 00 - 0 61 Thousand/µL    Basophils Absolute 0 02 0 00 - 0 10 Thousands/µL   CBC and differential    Collection Time: 05/09/23  5:05 AM   Result Value Ref Range    WBC 4 50 4 31 - 10 16 Thousand/uL    RBC 3 72 (L) 3 88 - 5 62 Million/uL    Hemoglobin 12 1 12 0 - 17 0 g/dL    Hematocrit 35 0 (L) 36 5 - 49 3 %    MCV 94 82 - 98 fL    MCH 32 5 26 8 - 34 3 pg    MCHC 34 6 31 4 - 37 4 g/dL    RDW 13 9 11 6 - 15 1 %    MPV 8 8 (L) 8 9 - 12 7 fL    Platelets 954 246 - 766 Thousands/uL    nRBC 0 /100 WBCs    Neutrophils Relative 60 43 - 75 %    Immat GRANS % 0 0 - 2 %    Lymphocytes Relative 21 14 - 44 %    Monocytes Relative 9 4 - 12 %    Eosinophils Relative 9 (H) 0 - 6 % Basophils Relative 1 0 - 1 %    Neutrophils Absolute 2 71 1 85 - 7 62 Thousands/µL    Immature Grans Absolute 0 01 0 00 - 0 20 Thousand/uL    Lymphocytes Absolute 0 95 0 60 - 4 47 Thousands/µL    Monocytes Absolute 0 41 0 17 - 1 22 Thousand/µL    Eosinophils Absolute 0 39 0 00 - 0 61 Thousand/µL    Basophils Absolute 0 03 0 00 - 0 10 Thousands/µL   Basic metabolic panel    Collection Time: 05/09/23  5:05 AM   Result Value Ref Range    Sodium 141 135 - 147 mmol/L    Potassium 3 6 3 5 - 5 3 mmol/L    Chloride 109 (H) 96 - 108 mmol/L    CO2 29 21 - 32 mmol/L    ANION GAP 3 (L) 4 - 13 mmol/L    BUN 6 5 - 25 mg/dL    Creatinine 0 49 (L) 0 60 - 1 30 mg/dL    Glucose 97 65 - 140 mg/dL    Calcium 8 6 8 4 - 10 2 mg/dL    eGFR 109 ml/min/1 73sq m

## 2023-05-12 ENCOUNTER — OFFICE VISIT (OUTPATIENT)
Age: 73
End: 2023-05-12

## 2023-05-12 VITALS
WEIGHT: 136.2 LBS | RESPIRATION RATE: 16 BRPM | HEIGHT: 68 IN | BODY MASS INDEX: 20.64 KG/M2 | TEMPERATURE: 98 F | SYSTOLIC BLOOD PRESSURE: 120 MMHG | HEART RATE: 86 BPM | OXYGEN SATURATION: 98 % | DIASTOLIC BLOOD PRESSURE: 60 MMHG

## 2023-05-12 DIAGNOSIS — K59.09 CHRONIC CONSTIPATION: ICD-10-CM

## 2023-05-12 RX ORDER — POLYETHYLENE GLYCOL 3350 17 G/17G
17 POWDER, FOR SOLUTION ORAL DAILY
Qty: 31 EACH | Refills: 11 | Status: SHIPPED | OUTPATIENT
Start: 2023-05-12

## 2023-05-12 RX ORDER — DOCUSATE SODIUM 100 MG/1
100 CAPSULE, LIQUID FILLED ORAL 2 TIMES DAILY
Qty: 62 CAPSULE | Refills: 11 | Status: SHIPPED | OUTPATIENT
Start: 2023-05-12

## 2023-05-12 NOTE — ASSESSMENT & PLAN NOTE
Patient states last bowel movement was 2 days ago  Reports compliance with bowel regimen  Medications are administered by staff  Patient encouraged to increase water intake  Encouraged to be mobile  Request facility keep a BM log  Patient has a follow-up with gastroenterology

## 2023-05-12 NOTE — PROGRESS NOTES
INTERNAL MEDICINE TRANSITION OF CARE OFFICE VISIT  [facility]    NAME: Flaca Vargas  AGE: 67 y o  SEX: male  : 1950     DATE: 2023     Assessment and Plan:     Problem List Items Addressed This Visit        Digestive    Chronic constipation     Patient states last bowel movement was 2 days ago  Reports compliance with bowel regimen  Medications are administered by staff  Patient encouraged to increase water intake  Encouraged to be mobile  Request facility keep a BM log  Patient has a follow-up with gastroenterology  Relevant Medications    polyethylene glycol (MIRALAX) 17 g packet    psyllium (METAMUCIL SMOOTH TEXTURE) 28 % packet    docusate sodium (COLACE) 100 mg capsule        Transitional Care Management Review:     Flaca Vargas is a 67 y o  male here for TCM follow-up    During the TCM phone call patient stated:    [facility] care reviewed  Records reviewed    Patient was hospitialized at  [facility]    Date of Admission  23    Date of discharge  23    Diagnosis  small bowel obstruction    Disposition  Home      TCM Call     Patients specialists  Other (comment)    Other specialists names  LAURIE Dennis, [phone]           HPI:     Lobito Anna presents to the office today for TCM visit  [facility]  Patient presented to the emergency department at [facility] on May 4 with complaints of abdominal pain for approximately 5 days  He had been seen at [facility] previously diagnosed with constipation received enema with results and was discharged back home    The following portions of the patient's history were reviewed and updated as appropriate: allergies, current medications, past family history, past medical history, past social history, past surgical history and problem list   On this admission a NG tube was placed that was removed on "5/7   His diet was advanced and patient had BM  Was started on a bowel regimen which he has continued since discharge  Patient states he has not had a bowel movement in 2 days  Denies nausea, vomiting or abdominal pain today  Review of Systems:     Review of Systems   Constitutional: Negative  Negative for chills and fever  Respiratory: Negative  Negative for shortness of breath  Cardiovascular: Negative  Gastrointestinal: Positive for constipation (Reports last BM 2 days ago)  Negative for abdominal distention, abdominal pain, diarrhea, nausea and vomiting  Genitourinary: Negative  Neurological: Negative  Problem List:     Patient Active Problem List   Diagnosis   • GERD (gastroesophageal reflux disease)   • 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 ([facility] )   • Hypertension   • Hypercholesteremia   • Paranoid schizophrenia ([facility] )   • Combined form of senile cataract   • MRSA carrier   • Groin rash   • Fall   • Chronic obstructive pulmonary disease, unspecified COPD type ([facility] )   • Neoplasm of uncertain behavior of left kidney   • BPH with obstruction/lower urinary tract symptoms   • Chronic constipation   • Overactive bladder   • Urge urinary incontinence   • Urinary incontinence, post-void dribbling   • Urinary frequency   • Nocturia   • Chronic prostatitis   • Mild protein-calorie malnutrition (HCC)   • Aortic root dilatation (HCC)   • SBO (small bowel obstruction) (HCC)   • Parkinson disease (HCC)        Objective:     /60 (BP Location: Left arm, Patient Position: Sitting, Cuff Size: Standard)   Pulse 86   Temp 98 °F (36 7 °C) (Tympanic)   Resp 16   Ht 5' 8\" (1 727 m)   Wt 61 8 kg (136 lb 3 2 oz)   SpO2 98%   BMI 20 71 kg/m²     Physical Exam  Vitals and nursing note reviewed  Constitutional:       General: He is not in acute distress  Appearance: Normal appearance  He is well-developed  HENT:      Head: Normocephalic and atraumatic        Nose: Nose normal       " Mouth/Throat:      Mouth: Mucous membranes are moist       Pharynx: Oropharynx is clear  Eyes:      Conjunctiva/sclera: Conjunctivae normal    Cardiovascular:      Rate and Rhythm: Normal rate and regular rhythm  Heart sounds: No murmur heard  Pulmonary:      Effort: Pulmonary effort is normal  No respiratory distress  Breath sounds: Normal breath sounds  Abdominal:      General: Abdomen is flat  Bowel sounds are normal  There is no distension  Palpations: Abdomen is soft  Tenderness: There is no abdominal tenderness  Musculoskeletal:         General: Normal range of motion  Cervical back: Neck supple  Skin:     General: Skin is warm and dry  Capillary Refill: Capillary refill takes less than 2 seconds  Neurological:      Mental Status: He is alert  Mental status is at baseline  Psychiatric:         Mood and Affect: Mood normal           Laboratory Results: I have personally reviewed the pertinent laboratory results/reports     Radiology/Other Diagnostic Testing Results: I have personally reviewed pertinent reports  CT abdomen pelvis with contrast    Result Date: 5/4/2023  CT ABDOMEN AND PELVIS WITH IV CONTRAST INDICATION:   abdominal pain, n/v, constipation  COMPARISON:  None  TECHNIQUE:  CT examination of the abdomen and pelvis was performed  Multiplanar 2D reformatted images were created from the source data  Radiation dose length product (DLP) for this visit:  622 mGy-cm   This examination, like all CT scans performed in the [facility], was performed utilizing techniques to minimize radiation dose exposure, including the use of iterative reconstruction and automated exposure control  IV Contrast:  100 mL of iohexol (OMNIPAQUE) Enteric Contrast:  Enteric contrast was not administered   FINDINGS: ABDOMEN LOWER CHEST:  No clinically significant abnormality identified in the visualized lower chest  LIVER/BILIARY TREE: Mild prominence of the intrahepatic ducts seen  A subcentimeter hypodensity seen segment 2 of the liver, suggest a cyst , Stable Common bile duct measures 4 2 mm GALLBLADDER: Multiple gallstones are seen with pericholecystic fluid  SPLEEN: Spleen appears unremarkable PANCREAS: No pancreatic ductal dilatation seen No pancreatic atrophy The uncinate process remains unchanged ADRENAL GLANDS:  Unremarkable  KIDNEYS/URETERS: Mild prominence of the right renal pelvis and ureter there is no hydronephrosis  STOMACH AND BOWEL: There is increasing dilatation of the small bowel loops in the upper and mid abdominal  There is fecalization of the distal small bowel loops  The zone of transition lies in the mid abdominal and demonstrates wall thickening, decreased  distensibility  Thickening of the transition small bowel loop may be an inflammatory or infectious or stricture , seen in image 127 series 2 Ileocecal junction appears unremarkable There is development of small amount of ascites with fluid in the pelvic cul-de-sac, paracolic gutter, perihepatic and perisplenic region and thickened esophagus, more pronounced APPENDIX: Appendix appears unremarkable, seen in image 74 series 601 ABDOMINOPELVIC CAVITY: Small amount of ascites seen with fluid in the pelvic cul-de-sac and paracolic gutter, perihepatic region, perisplenic region VESSELS: Celiac trunk appears unremarkable SMA appears unremarkable Iliac vessels appear unremarkable PELVIS REPRODUCTIVE ORGANS:  Unremarkable for patient's age  URINARY BLADDER: Mild bladder wall thickening seen ABDOMINAL WALL/INGUINAL REGIONS:  Unremarkable  OSSEOUS STRUCTURES:  No acute fracture or destructive osseous lesion  Small bowel obstruction with a zone of transition in the mid abdominal  with thickened and poorly distensible small bowel loop   Small amount of ascites Thickening of the distal esophagus, more pronounced, can be evaluated with upper GI endoscopy performed on a nonemergent basis Cholelithiasis I personally discussed this study with Kamilah Farmer on 5/4/2023 6:58 PM  Workstation performed: [id number]        Current Medications:     Outpatient Medications Prior to Visit   Medication Sig Dispense Refill   • acetaminophen (Acetaminophen Extra Strength) 500 mg tablet Take 1 tablet (500 mg total) by mouth every 6 (six) hours as needed for mild pain 30 tablet 11   • atorvastatin (LIPITOR) 40 mg tablet Take 1 tablet (40 mg total) by mouth daily 31 tablet 11   • AUSTEDO 12 MG TABS Take 1 tablet by mouth 2 (two) times a day     • carbidopa-levodopa (SINEMET)  mg per tablet Take 1 tablet by mouth 3 (three) times a day 270 tablet 3   • Cholecalciferol (Vitamin D High Potency) 25 MCG (1000 UT) capsule Take 1 capsule (1,000 Units total) by mouth daily 31 capsule 6   • cloZAPine (CLOZARIL) 100 mg tablet Take 100 mg by mouth 2 (two) times a day     • clozapine (CLOZARIL) 200 MG tablet Take 200 mg by mouth daily at bedtime      • Emollient (Lubriderm) LOTN Apply topically daily as needed (rash) 177 mL 11   • fluticasone (FLONASE) 50 mcg/act nasal spray 2 sprays into each nostril daily 16 g 11   • hydrOXYzine HCL (ATARAX) 25 mg tablet Take 1 tablet (25 mg total) by mouth every 6 (six) hours as needed for itching 30 tablet 5   • metoprolol succinate (TOPROL-XL) 25 mg 24 hr tablet Take 1 tablet (25 mg total) by mouth daily 31 tablet 11   • olopatadine HCl (PATADAY) 0 2 % opth drops Administer 1 drop to both eyes daily at bedtime 2 5 mL 11   • omeprazole (PriLOSEC) 40 MG capsule Take 1 capsule (40 mg total) by mouth daily 31 capsule 11   • ondansetron (ZOFRAN-ODT) 4 mg disintegrating tablet Take 1 tablet (4 mg total) by mouth every 8 (eight) hours as needed for nausea 15 tablet 0   • tamsulosin (FLOMAX) 0 4 mg Take 1 capsule (0 4 mg total) by mouth daily at bedtime 90 capsule 3   • temazepam (RESTORIL) 15 mg capsule Take 15 mg by mouth daily at bedtime     • topiramate (TOPAMAX) 25 mg tablet Take 25 mg by mouth 2 (two) times a day  1   • traZODone (DESYREL) 100 mg tablet Take 100 mg by mouth daily at bedtime     • docusate sodium (COLACE) 100 mg capsule Take 1 capsule (100 mg total) by mouth 2 (two) times a day 62 capsule 11   • polyethylene glycol (MIRALAX) 17 g packet Take 17 g by mouth daily 10 each 0   • Skin Protectants, Misc  (eucerin) cream Apply topically as needed for wound care (Patient not taking: Reported on 5/12/2023) 397 g 0   • sodium phosphate-biphosphate (FLEET) 7-19 g 118 mL enema Insert 1 enema into the rectum daily as needed (constipation) for up to 5 doses (Patient not taking: Reported on 5/12/2023) 5 enema 0   • psyllium (METAMUCIL SMOOTH TEXTURE) 28 % packet Take 1 packet by mouth 2 (two) times a day (Patient not taking: Reported on 4/26/2023) 60 each 11     No facility-administered medications prior to visit  I spent 25 minutes with this patient      [address] [place] [address], [address] [place] [address],[address] PRIMARY [facility] [place]

## 2023-05-18 ENCOUNTER — OFFICE VISIT (OUTPATIENT)
Dept: GASTROENTEROLOGY | Facility: CLINIC | Age: 73
End: 2023-05-18

## 2023-05-18 ENCOUNTER — APPOINTMENT (OUTPATIENT)
Age: 73
End: 2023-05-18

## 2023-05-18 VITALS
BODY MASS INDEX: 20.31 KG/M2 | HEART RATE: 80 BPM | OXYGEN SATURATION: 100 % | SYSTOLIC BLOOD PRESSURE: 108 MMHG | HEIGHT: 68 IN | DIASTOLIC BLOOD PRESSURE: 72 MMHG | WEIGHT: 134 LBS

## 2023-05-18 DIAGNOSIS — K59.09 CHRONIC CONSTIPATION: Primary | ICD-10-CM

## 2023-05-18 NOTE — LETTER
May 18, 2023     [address] [place] [id number]    Patient: Conception Hy   YOB: 1950   Date of Visit: 5/18/2023       Dear Dr Renzo Fuentes: Thank you for referring Nelida Maurice to me for evaluation  Below are my notes for this consultation  If you have questions, please do not hesitate to call me  I look forward to following your patient along with you  Sincerely,        Nicole Fernández PA-C        CC: No Recipients  Nicole Fernández PA-C  5/18/2023  1:50 PM  Sign when Signing Visit  [facility] - Outpatient Follow-up Note  Conception Hy 67 y o  male MRN: [id number]  Encounter: [id number]          ASSESSMENT AND PLAN:      1  Chronic constipation  -Patient will continue MiraLAX, Colace, psyllium for the treatment of his constipation   -Encourage plenty of water intake and high-fiber diet   -Patient's colonoscopy from 2019 reviewed; will hold on repeat colonoscopy at this time   -Patient will continue to monitor his stool output and color  -If symptoms worsen patient will contact the office   ______________________________________________________________________    SUBJECTIVE:   72-year-old male with a past medical history significant for Parkinson's disease, paranoid schizophrenia, hypertension, COPD, chronic constipation presents to the GI clinic today for hospital follow-up  Patient was admitted to [facility] last month with a small bowel obstruction  Patient was significantly constipated  After several days his small bowel obstruction resolved with NG tube placement and bowel rest   Since discharge patient has been taking Colace, psyllium, MiraLAX and this is really helping him  He reports that he is having a bowel movement every day  He denies any blood in his stool  He denies any melena  He reports that he is eating well    He denies any abdominal pain he denies any further episodes of nausea or vomiting  Patient's  who is with him today from the [facility] reports that he is doing exceptionally well  REVIEW OF SYSTEMS IS OTHERWISE NEGATIVE        Historical Information   Past Medical History:   Diagnosis Date   • Asthma    • Benign prostatic hyperplasia    • COPD (chronic obstructive pulmonary disease) ([facility])    • GERD (gastroesophageal reflux disease)    • Herpes zoster without complication    • Hypercholesteremia    • Hypertension    • Paranoid schizophrenia ([facility] [address] )    • Psychiatric disorder      Past Surgical History:   Procedure Laterality Date   • CATARACT EXTRACTION     • COLONOSCOPY       Social History   Social History     Substance and Sexual Activity   Alcohol Use Not Currently     Social History     Substance and Sexual Activity   Drug Use Never     Social History     Tobacco Use   Smoking Status Former   • Packs/day: 1 00   • Years: 20    • Pack years:    • Types: Cigarettes   • Quit date:    • Years since quittin 3   Smokeless Tobacco Never     Family History   Problem Relation Age of Onset   • No Known Problems Mother    • No Known Problems Father    • Parkinsonism Son        Meds/Allergies        Current Outpatient Medications:   •  acetaminophen (Acetaminophen Extra Strength) 500 mg tablet  •  atorvastatin (LIPITOR) 40 mg tablet  •  AUSTEDO 12 MG TABS  •  carbidopa-levodopa (SINEMET)  mg per tablet  •  Cholecalciferol (Vitamin D High Potency) 25 MCG (1000 UT) capsule  •  cloZAPine (CLOZARIL) 100 mg tablet  •  clozapine (CLOZARIL) 200 MG tablet  •  docusate sodium (COLACE) 100 mg capsule  •  Emollient (Lubriderm) LOTN  •  fluticasone (FLONASE) 50 mcg/act nasal spray  •  hydrOXYzine HCL (ATARAX) 25 mg tablet  •  metoprolol succinate (TOPROL-XL) 25 mg 24 hr tablet  •  olopatadine HCl (PATADAY) 0 2 % opth drops  •  omeprazole (PriLOSEC) 40 MG capsule  •  ondansetron (ZOFRAN-ODT) 4 mg disintegrating tablet  • "polyethylene glycol (MIRALAX) 17 g packet  •  psyllium (METAMUCIL SMOOTH TEXTURE) 28 % packet  •  tamsulosin (FLOMAX) 0 4 mg  •  temazepam (RESTORIL) 15 mg capsule  •  topiramate (TOPAMAX) 25 mg tablet  •  traZODone (DESYREL) 100 mg tablet  •  Skin Protectants, Misc  (eucerin) cream  •  sodium phosphate-biphosphate (FLEET) 7-19 g 118 mL enema    No Known Allergies        Objective      Blood pressure 108/72, pulse 80, height 5' 8\" (1 727 m), weight 60 8 kg (134 lb), SpO2 100 %  Body mass index is 20 37 kg/m²  PHYSICAL EXAM:      General Appearance:   Alert, cooperative, no distress   HEENT:   Normocephalic, atraumatic, anicteric      Neck:  Supple, symmetrical, trachea midline   Lungs:   Clear to auscultation bilaterally; no rales, rhonchi or wheezing; respirations unlabored    Heart[de-identified]   Regular rate and rhythm; no murmur, rub, or gallop  Abdomen:   Soft, non-tender, non-distended; normal bowel sounds; no masses, no organomegaly    Genitalia:   Deferred    Rectal:   Deferred    Extremities:  No cyanosis, clubbing or edema    Pulses:  2+ and symmetric    Skin:  No jaundice, rashes, or lesions    Lymph nodes:  No palpable cervical lymphadenopathy        Lab Results:   No visits with results within 1 Day(s) from this visit  Latest known visit with results is:   No results displayed because visit has over 200 results  Radiology Results:   XR abdomen 1 view kub    Result Date: 5/8/2023  Narrative: ABDOMEN INDICATION:   SBO follow-up  COMPARISON:  None VIEWS:  AP supine FINDINGS: A feeding tube is seen lying in the upper abdomen with the tip below the diaphragm Gas-filled small bowel loops seen  No discernible free air on this supine study  Upright or left lateral decubitus imaging is more sensitive to detect subtle free air in the appropriate setting  No pathologic calcifications or soft tissue masses  Visualized lung bases are clear   Visualized osseous structures are unremarkable for the patient's " age      Impression: Gas-filled small bowel loops are seen within the abdomen without worsening Air also noted within the descending colon, rectum Workstation performed: [id number]     CT abdomen pelvis with contrast    Result Date: 5/4/2023  Narrative: CT ABDOMEN AND PELVIS WITH IV CONTRAST INDICATION:   abdominal pain, n/v, constipation  COMPARISON:  None  TECHNIQUE:  CT examination of the abdomen and pelvis was performed  Multiplanar 2D reformatted images were created from the source data  Radiation dose length product (DLP) for this visit:  622 mGy-cm   This examination, like all CT scans performed in the [facility], was performed utilizing techniques to minimize radiation dose exposure, including the use of iterative reconstruction and automated exposure control  IV Contrast:  100 mL of iohexol (OMNIPAQUE) Enteric Contrast:  Enteric contrast was not administered  FINDINGS: ABDOMEN LOWER CHEST:  No clinically significant abnormality identified in the visualized lower chest  LIVER/BILIARY TREE: Mild prominence of the intrahepatic ducts seen  A subcentimeter hypodensity seen segment 2 of the liver, suggest a cyst , Stable Common bile duct measures 4 2 mm GALLBLADDER: Multiple gallstones are seen with pericholecystic fluid  SPLEEN: Spleen appears unremarkable PANCREAS: No pancreatic ductal dilatation seen No pancreatic atrophy The uncinate process remains unchanged ADRENAL GLANDS:  Unremarkable  KIDNEYS/URETERS: Mild prominence of the right renal pelvis and ureter there is no hydronephrosis  STOMACH AND BOWEL: There is increasing dilatation of the small bowel loops in the upper and mid abdominal  There is fecalization of the distal small bowel loops  The zone of transition lies in the mid abdominal and demonstrates wall thickening, decreased  distensibility   Thickening of the transition small bowel loop may be an inflammatory or infectious or stricture , seen in image 127 series 2 Ileocecal junction appears unremarkable There is development of small amount of ascites with fluid in the pelvic cul-de-sac, paracolic gutter, perihepatic and perisplenic region and thickened esophagus, more pronounced APPENDIX: Appendix appears unremarkable, seen in image 74 series 601 ABDOMINOPELVIC CAVITY: Small amount of ascites seen with fluid in the pelvic cul-de-sac and paracolic gutter, perihepatic region, perisplenic region VESSELS: Celiac trunk appears unremarkable SMA appears unremarkable Iliac vessels appear unremarkable PELVIS REPRODUCTIVE ORGANS:  Unremarkable for patient's age  URINARY BLADDER: Mild bladder wall thickening seen ABDOMINAL WALL/INGUINAL REGIONS:  Unremarkable  OSSEOUS STRUCTURES:  No acute fracture or destructive osseous lesion  Impression: Small bowel obstruction with a zone of transition in the mid abdominal  with thickened and poorly distensible small bowel loop  Small amount of ascites Thickening of the distal esophagus, more pronounced, can be evaluated with upper GI endoscopy performed on a nonemergent basis Cholelithiasis I personally discussed this study with Didier Maynard on 5/4/2023 6:58 PM  Workstation performed: [id number]     CT abdomen pelvis with contrast    Result Date: 4/19/2023  Narrative: CT ABDOMEN AND PELVIS WITH IV CONTRAST INDICATION:   left flank pain, n/v/d  COMPARISON:  CT renal protocol on 12/27/2022  TECHNIQUE:  CT examination of the abdomen and pelvis was performed  Multiplanar 2D reformatted images were created from the source data  Radiation dose length product (DLP) for this visit:  644 mGy-cm   This examination, like all CT scans performed in the [facility], was performed utilizing techniques to minimize radiation dose exposure, including the use of iterative reconstruction and automated exposure control  IV Contrast:  100 mL of iohexol (OMNIPAQUE) Enteric Contrast:  Enteric contrast was not administered   FINDINGS: ABDOMEN LOWER CHEST: Mild left basilar atelectasis  Stable mild elevation of the left hemidiaphragm  LIVER/BILIARY TREE:  Unremarkable  GALLBLADDER:  There are gallstone(s) within the gallbladder, without pericholecystic inflammatory changes  SPLEEN:  Unremarkable  PANCREAS:  Unremarkable  ADRENAL GLANDS:  Unremarkable  KIDNEYS/URETERS:  Left renal upper pole cortical scarring  Nonspecific mild bilateral perinephric stranding, similar to the prior study  No hydronephrosis or urinary tract calculus  One or more sharply circumscribed subcentimeter renal hypodensities are present, too small to accurately characterize, and statistically most likely benign findings  According to recent literature (Radiology 2019) no further workup of these findings is recommended  STOMACH AND BOWEL:  Evaluation is limited without enteric contrast   Wall thickening of fluid-filled proximal to mid small bowel loops  Underdistention versus minimal wall wall thickening of the cecum, ascending colon, and transverse colon  No bowel obstruction  APPENDIX:  No findings to suggest appendicitis  ABDOMINOPELVIC CAVITY:  No ascites  No pneumoperitoneum  No lymphadenopathy  Stable appearance of surgical clips in the pelvis  VESSELS:  Atherosclerotic changes are present  No evidence of aneurysm  PELVIS REPRODUCTIVE ORGANS:  Unremarkable for patient's age  URINARY BLADDER:  Diffuse wall thickening of decompressed bladder  ABDOMINAL WALL/INGUINAL REGIONS:  Stable linear hyperdensities along the left rectus abdominis muscle, nonspecific but may represent postsurgical change or calcifications  OSSEOUS STRUCTURES:  No acute fracture or destructive osseous lesion  Impression: Wall thickening of fluid-filled proximal to mid small bowel loops suspicious for enteritis i e  infectious or inflammatory  Underdistention versus minimal wall thickening of cecum, ascending colon, and transverse colon  Diffuse wall thickening of decompressed urinary bladder    Correlate with urinalysis to exclude a component of cystitis  Cholelithiasis  The study was marked in [facility] for immediate notification   Workstation performed: [id number]

## 2023-05-18 NOTE — PROGRESS NOTES
[facility] - Outpatient Follow-up Note  Kim Landeros 67 y o  male MRN: [id number]  Encounter: [id number]          ASSESSMENT AND PLAN:      1  Chronic constipation  -Patient will continue MiraLAX, Colace, psyllium for the treatment of his constipation   -Encourage plenty of water intake and high-fiber diet   -Patient's colonoscopy from 2019 reviewed; will hold on repeat colonoscopy at this time   -Patient will continue to monitor his stool output and color  -If symptoms worsen patient will contact the office   ______________________________________________________________________    SUBJECTIVE:   57-year-old male with a past medical history significant for Parkinson's disease, paranoid schizophrenia, hypertension, COPD, chronic constipation presents to the GI clinic today for hospital follow-up  Patient was admitted to [facility] last month with a small bowel obstruction  Patient was significantly constipated  After several days his small bowel obstruction resolved with NG tube placement and bowel rest   Since discharge patient has been taking Colace, psyllium, MiraLAX and this is really helping him  He reports that he is having a bowel movement every day  He denies any blood in his stool  He denies any melena  He reports that he is eating well  He denies any abdominal pain he denies any further episodes of nausea or vomiting  Patient's  who is with him today from the [facility] reports that he is doing exceptionally well  REVIEW OF SYSTEMS IS OTHERWISE NEGATIVE        Historical Information   Past Medical History:   Diagnosis Date   • Asthma    • Benign prostatic hyperplasia    • [facility]    • GERD (gastroesophageal reflux disease)    • Herpes zoster without complication 44/49/9047   • Hypercholesteremia    • Hypertension    • Paranoid schizophrenia ([facility] )    • Psychiatric disorder      Past "Surgical History:   Procedure Laterality Date   • CATARACT EXTRACTION     • COLONOSCOPY       Social History   Social History     Substance and Sexual Activity   Alcohol Use Not Currently     Social History     Substance and Sexual Activity   Drug Use Never     Social History     Tobacco Use   Smoking Status Former   • Packs/day: 1 00   • Years:    • Pack years:    • Types: Cigarettes   • Quit date:    • Years since quittin 3   Smokeless Tobacco Never     Family History   Problem Relation Age of Onset   • No Known Problems Mother    • No Known Problems Father    • Parkinsonism Son        Meds/Allergies       Current Outpatient Medications:   •  acetaminophen (Acetaminophen Extra Strength) 500 mg tablet  •  atorvastatin (LIPITOR) 40 mg tablet  •  AUSTEDO 12 MG TABS  •  carbidopa-levodopa (SINEMET)  mg per tablet  •  Cholecalciferol (Vitamin D High Potency) 25 MCG (1000 UT) capsule  •  cloZAPine (CLOZARIL) 100 mg tablet  •  clozapine (CLOZARIL) 200 MG tablet  •  docusate sodium (COLACE) 100 mg capsule  •  Emollient (Lubriderm) LOTN  •  fluticasone (FLONASE) 50 mcg/act nasal spray  •  hydrOXYzine HCL (ATARAX) 25 mg tablet  •  metoprolol succinate (TOPROL-XL) 25 mg 24 hr tablet  •  olopatadine HCl (PATADAY) 0 2 % opth drops  •  omeprazole (PriLOSEC) 40 MG capsule  •  ondansetron (ZOFRAN-ODT) 4 mg disintegrating tablet  •  polyethylene glycol (MIRALAX) 17 g packet  •  psyllium (METAMUCIL SMOOTH TEXTURE) 28 % packet  •  tamsulosin (FLOMAX) 0 4 mg  •  temazepam (RESTORIL) 15 mg capsule  •  topiramate (TOPAMAX) 25 mg tablet  •  traZODone (DESYREL) 100 mg tablet  •  Skin Protectants, Misc  (eucerin) cream  •  sodium phosphate-biphosphate (FLEET) 7-19 g 118 mL enema    No Known Allergies        Objective     Blood pressure 108/72, pulse 80, height 5' 8\" (1 727 m), weight 60 8 kg (134 lb), SpO2 100 %  Body mass index is 20 37 kg/m²        PHYSICAL EXAM:      General Appearance:   Alert, cooperative, no " distress   HEENT:   Normocephalic, atraumatic, anicteric      Neck:  Supple, symmetrical, trachea midline   Lungs:   Clear to auscultation bilaterally; no rales, rhonchi or wheezing; respirations unlabored    Heart[de-identified]   Regular rate and rhythm; no murmur, rub, or gallop  Abdomen:   Soft, non-tender, non-distended; normal bowel sounds; no masses, no organomegaly    Genitalia:   Deferred    Rectal:   Deferred    Extremities:  No cyanosis, clubbing or edema    Pulses:  2+ and symmetric    Skin:  No jaundice, rashes, or lesions    Lymph nodes:  No palpable cervical lymphadenopathy        Lab Results:   No visits with results within 1 Day(s) from this visit  Latest known visit with results is:   No results displayed because visit has over 200 results  Radiology Results:   XR abdomen 1 view kub    Result Date: 5/8/2023  Narrative: ABDOMEN INDICATION:   SBO follow-up  COMPARISON:  None VIEWS:  AP supine FINDINGS: A feeding tube is seen lying in the upper abdomen with the tip below the diaphragm Gas-filled small bowel loops seen  No discernible free air on this supine study  Upright or left lateral decubitus imaging is more sensitive to detect subtle free air in the appropriate setting  No pathologic calcifications or soft tissue masses  Visualized lung bases are clear  Visualized osseous structures are unremarkable for the patient's age  Impression: Gas-filled small bowel loops are seen within the abdomen without worsening Air also noted within the descending colon, rectum Workstation performed: [id number]     CT abdomen pelvis with contrast    Result Date: 5/4/2023  Narrative: CT ABDOMEN AND PELVIS WITH IV CONTRAST INDICATION:   abdominal pain, n/v, constipation  COMPARISON:  None  TECHNIQUE:  CT examination of the abdomen and pelvis was performed  Multiplanar 2D reformatted images were created from the source data  Radiation dose length product (DLP) for this visit:  622 mGy-cm     This examination, like all CT scans performed in the [facility], was performed utilizing techniques to minimize radiation dose exposure, including the use of iterative reconstruction and automated exposure control  IV Contrast:  100 mL of iohexol (OMNIPAQUE) Enteric Contrast:  Enteric contrast was not administered  FINDINGS: ABDOMEN LOWER CHEST:  No clinically significant abnormality identified in the visualized lower chest  LIVER/BILIARY TREE: Mild prominence of the intrahepatic ducts seen  A subcentimeter hypodensity seen segment 2 of the liver, suggest a cyst , Stable Common bile duct measures 4 2 mm GALLBLADDER: Multiple gallstones are seen with pericholecystic fluid  SPLEEN: Spleen appears unremarkable PANCREAS: No pancreatic ductal dilatation seen No pancreatic atrophy The uncinate process remains unchanged ADRENAL GLANDS:  Unremarkable  KIDNEYS/URETERS: Mild prominence of the right renal pelvis and ureter there is no hydronephrosis  STOMACH AND BOWEL: There is increasing dilatation of the small bowel loops in the upper and mid abdominal  There is fecalization of the distal small bowel loops  The zone of transition lies in the mid abdominal and demonstrates wall thickening, decreased  distensibility  Thickening of the transition small bowel loop may be an inflammatory or infectious or stricture , seen in image 127 series 2 Ileocecal junction appears unremarkable There is development of small amount of ascites with fluid in the pelvic cul-de-sac, paracolic gutter, perihepatic and perisplenic region and thickened esophagus, more pronounced APPENDIX: Appendix appears unremarkable, seen in image 74 series 601 ABDOMINOPELVIC CAVITY: Small amount of ascites seen with fluid in the pelvic cul-de-sac and paracolic gutter, perihepatic region, perisplenic region VESSELS: Celiac trunk appears unremarkable SMA appears unremarkable Iliac vessels appear unremarkable PELVIS REPRODUCTIVE ORGANS:  Unremarkable for patient's age  URINARY BLADDER: Mild bladder wall thickening seen ABDOMINAL WALL/INGUINAL REGIONS:  Unremarkable  OSSEOUS STRUCTURES:  No acute fracture or destructive osseous lesion  Impression: Small bowel obstruction with a zone of transition in the mid abdominal  with thickened and poorly distensible small bowel loop  Small amount of ascites Thickening of the distal esophagus, more pronounced, can be evaluated with upper GI endoscopy performed on a nonemergent basis Cholelithiasis I personally discussed this study with Demetrio Estrada on 5/4/2023 6:58 PM  Workstation performed: [id number]     CT abdomen pelvis with contrast    Result Date: 4/19/2023  Narrative: CT ABDOMEN AND PELVIS WITH IV CONTRAST INDICATION:   left flank pain, n/v/d  COMPARISON:  CT renal protocol on 12/27/2022  TECHNIQUE:  CT examination of the abdomen and pelvis was performed  Multiplanar 2D reformatted images were created from the source data  Radiation dose length product (DLP) for this visit:  644 mGy-cm   This examination, like all CT scans performed in the [facility], was performed utilizing techniques to minimize radiation dose exposure, including the use of iterative reconstruction and automated exposure control  IV Contrast:  100 mL of iohexol (OMNIPAQUE) Enteric Contrast:  Enteric contrast was not administered  FINDINGS: ABDOMEN LOWER CHEST:  Mild left basilar atelectasis  Stable mild elevation of the left hemidiaphragm  LIVER/BILIARY TREE:  Unremarkable  GALLBLADDER:  There are gallstone(s) within the gallbladder, without pericholecystic inflammatory changes  SPLEEN:  Unremarkable  PANCREAS:  Unremarkable  ADRENAL GLANDS:  Unremarkable  KIDNEYS/URETERS:  Left renal upper pole cortical scarring  Nonspecific mild bilateral perinephric stranding, similar to the prior study  No hydronephrosis or urinary tract calculus    One or more sharply circumscribed subcentimeter renal hypodensities are present, too small to accurately characterize, and statistically most likely benign findings  According to recent literature (Radiology 2019) no further workup of these findings is recommended  STOMACH AND BOWEL:  Evaluation is limited without enteric contrast   Wall thickening of fluid-filled proximal to mid small bowel loops  Underdistention versus minimal wall wall thickening of the cecum, ascending colon, and transverse colon  No bowel obstruction  APPENDIX:  No findings to suggest appendicitis  ABDOMINOPELVIC CAVITY:  No ascites  No pneumoperitoneum  No lymphadenopathy  Stable appearance of surgical clips in the pelvis  VESSELS:  Atherosclerotic changes are present  No evidence of aneurysm  PELVIS REPRODUCTIVE ORGANS:  Unremarkable for patient's age  URINARY BLADDER:  Diffuse wall thickening of decompressed bladder  ABDOMINAL WALL/INGUINAL REGIONS:  Stable linear hyperdensities along the left rectus abdominis muscle, nonspecific but may represent postsurgical change or calcifications  OSSEOUS STRUCTURES:  No acute fracture or destructive osseous lesion  Impression: Wall thickening of fluid-filled proximal to mid small bowel loops suspicious for enteritis i e  infectious or inflammatory  Underdistention versus minimal wall thickening of cecum, ascending colon, and transverse colon  Diffuse wall thickening of decompressed urinary bladder  Correlate with urinalysis to exclude a component of cystitis  Cholelithiasis  The study was marked in [facility] for immediate notification   Workstation performed: [id number]

## 2023-05-19 ENCOUNTER — TELEPHONE (OUTPATIENT)
Age: 73
End: 2023-05-19

## 2023-05-19 DIAGNOSIS — R74.8 ELEVATED ALKALINE PHOSPHATASE LEVEL: Primary | ICD-10-CM

## 2023-05-19 NOTE — TELEPHONE ENCOUNTER
I spoke with Mirian at group home and made her aware of Sumit's labs  Additional labs ordered for him to get completed  Will follow up with results

## 2023-06-15 ENCOUNTER — TELEPHONE (OUTPATIENT)
Age: 73
End: 2023-06-15

## 2023-06-15 DIAGNOSIS — K59.09 CHRONIC CONSTIPATION: Primary | ICD-10-CM

## 2023-06-15 RX ORDER — PSYLLIUM SEED (WITH DEXTROSE)
POWDER (GRAM) ORAL 2 TIMES DAILY
Qty: 575 G | Refills: 11 | Status: SHIPPED | OUTPATIENT
Start: 2023-06-15

## 2023-06-15 NOTE — TELEPHONE ENCOUNTER
Mirian / [facility]    Patient Needs a change in script for psyllium (metamucil) - [employer] cover packets but will cover container       Please Change script then send to [phone] [facility] Cresencio Phalen)

## 2023-07-18 ENCOUNTER — APPOINTMENT (OUTPATIENT)
Age: 73
End: 2023-07-18
Payer: MEDICARE

## 2023-07-18 DIAGNOSIS — R74.8 ELEVATED ALKALINE PHOSPHATASE LEVEL: ICD-10-CM

## 2023-07-18 LAB — GGT SERPL-CCNC: 39 U/L (ref 5–85)

## 2023-07-18 PROCEDURE — 82977 ASSAY OF GGT: CPT

## 2023-07-18 PROCEDURE — 84165 PROTEIN E-PHORESIS SERUM: CPT

## 2023-07-18 PROCEDURE — 86015 ACTIN ANTIBODY EACH: CPT

## 2023-07-18 PROCEDURE — 86381 MITOCHONDRIAL ANTIBODY EACH: CPT

## 2023-07-18 PROCEDURE — 86334 IMMUNOFIX E-PHORESIS SERUM: CPT

## 2023-07-18 PROCEDURE — 84080 ASSAY ALKALINE PHOSPHATASES: CPT

## 2023-07-18 PROCEDURE — 84075 ASSAY ALKALINE PHOSPHATASE: CPT

## 2023-07-19 LAB — MITOCHONDRIA M2 IGG SER-ACNC: <20 UNITS (ref 0–20)

## 2023-07-20 LAB
ACTIN IGG SERPL-ACNC: 10 UNITS (ref 0–19)
ALBUMIN SERPL ELPH-MCNC: 3.71 G/DL (ref 3.2–5.1)
ALBUMIN SERPL ELPH-MCNC: 58 % (ref 48–70)
ALPHA1 GLOB SERPL ELPH-MCNC: 0.26 G/DL (ref 0.15–0.47)
ALPHA1 GLOB SERPL ELPH-MCNC: 4.1 % (ref 1.8–7)
ALPHA2 GLOB SERPL ELPH-MCNC: 0.61 G/DL (ref 0.42–1.04)
ALPHA2 GLOB SERPL ELPH-MCNC: 9.5 % (ref 5.9–14.9)
BETA GLOB ABNORMAL SERPL ELPH-MCNC: 0.35 G/DL (ref 0.31–0.57)
BETA1 GLOB SERPL ELPH-MCNC: 5.4 % (ref 4.7–7.7)
BETA2 GLOB SERPL ELPH-MCNC: 4.6 % (ref 3.1–7.9)
BETA2+GAMMA GLOB SERPL ELPH-MCNC: 0.29 G/DL (ref 0.2–0.58)
GAMMA GLOB ABNORMAL SERPL ELPH-MCNC: 1.18 G/DL (ref 0.4–1.66)
GAMMA GLOB SERPL ELPH-MCNC: 18.4 % (ref 6.9–22.3)
IGG/ALB SER: 1.38 {RATIO} (ref 1.1–1.8)
INTERPRETATION UR IFE-IMP: NORMAL
M PROTEIN 1 MFR SERPL ELPH: 5.7 %
M PROTEIN 1 SERPL ELPH-MCNC: 0.36 G/DL
PROT PATTERN SERPL ELPH-IMP: NORMAL
PROT SERPL-MCNC: 6.4 G/DL (ref 6.4–8.2)

## 2023-07-20 PROCEDURE — 84165 PROTEIN E-PHORESIS SERUM: CPT | Performed by: STUDENT IN AN ORGANIZED HEALTH CARE EDUCATION/TRAINING PROGRAM

## 2023-07-20 PROCEDURE — 84166 PROTEIN E-PHORESIS/URINE/CSF: CPT | Performed by: STUDENT IN AN ORGANIZED HEALTH CARE EDUCATION/TRAINING PROGRAM

## 2023-07-22 LAB
ALP BONE CFR SERPL: 34 % (ref 12–68)
ALP INTEST CFR SERPL: 3 % (ref 0–18)
ALP LIVER CFR SERPL: 63 % (ref 13–88)
ALP SERPL-CCNC: 98 IU/L (ref 44–121)

## 2023-07-25 ENCOUNTER — TELEPHONE (OUTPATIENT)
Age: 73
End: 2023-07-25

## 2023-07-25 NOTE — TELEPHONE ENCOUNTER
----- Message from Michelle Pichardo DO sent at 7/25/2023 12:51 PM EDT -----  Recent labs are normal

## 2023-08-07 ENCOUNTER — TELEPHONE (OUTPATIENT)
Age: 73
End: 2023-08-07

## 2023-09-02 ENCOUNTER — APPOINTMENT (EMERGENCY)
Dept: CT IMAGING | Facility: HOSPITAL | Age: 73
End: 2023-09-02
Payer: MEDICARE

## 2023-09-02 ENCOUNTER — HOSPITAL ENCOUNTER (EMERGENCY)
Facility: HOSPITAL | Age: 73
Discharge: HOME | End: 2023-09-02
Attending: EMERGENCY MEDICINE
Payer: MEDICARE

## 2023-09-02 VITALS
OXYGEN SATURATION: 100 % | TEMPERATURE: 98.2 F | HEART RATE: 60 BPM | RESPIRATION RATE: 19 BRPM | SYSTOLIC BLOOD PRESSURE: 154 MMHG | DIASTOLIC BLOOD PRESSURE: 72 MMHG

## 2023-09-02 DIAGNOSIS — K80.20 GALLSTONES: ICD-10-CM

## 2023-09-02 DIAGNOSIS — R11.10 VOMITING: Primary | ICD-10-CM

## 2023-09-02 LAB
ALBUMIN SERPL BCP-MCNC: 3.4 G/DL (ref 3.5–5)
ALP SERPL-CCNC: 75 U/L (ref 34–104)
ALT SERPL W P-5'-P-CCNC: 4 U/L (ref 7–52)
ANION GAP SERPL CALCULATED.3IONS-SCNC: 4 MMOL/L
AST SERPL W P-5'-P-CCNC: 13 U/L (ref 13–39)
ATRIAL RATE: 59 BPM
BASOPHILS # BLD AUTO: 0.02 THOUSANDS/ÂΜL (ref 0–0.1)
BASOPHILS NFR BLD AUTO: 0 % (ref 0–1)
BILIRUB SERPL-MCNC: 0.52 MG/DL (ref 0.2–1)
BUN SERPL-MCNC: 18 MG/DL (ref 5–25)
CALCIUM ALBUM COR SERPL-MCNC: 8.7 MG/DL (ref 8.3–10.1)
CALCIUM SERPL-MCNC: 8.2 MG/DL (ref 8.4–10.2)
CARDIAC TROPONIN I PNL SERPL HS: <2 NG/L
CHLORIDE SERPL-SCNC: 110 MMOL/L (ref 96–108)
CO2 SERPL-SCNC: 26 MMOL/L (ref 21–32)
CREAT SERPL-MCNC: 0.62 MG/DL (ref 0.6–1.3)
EOSINOPHIL # BLD AUTO: 0.06 THOUSAND/ÂΜL (ref 0–0.61)
EOSINOPHIL NFR BLD AUTO: 1 % (ref 0–6)
ERYTHROCYTE [DISTWIDTH] IN BLOOD BY AUTOMATED COUNT: 13.6 % (ref 11.6–15.1)
GFR SERPL CREATININE-BSD FRML MDRD: 98 ML/MIN/1.73SQ M
GLUCOSE SERPL-MCNC: 117 MG/DL (ref 65–140)
HCT VFR BLD AUTO: 38 % (ref 36.5–49.3)
HGB BLD-MCNC: 12.9 G/DL (ref 12–17)
IMM GRANULOCYTES # BLD AUTO: 0.01 THOUSAND/UL (ref 0–0.2)
IMM GRANULOCYTES NFR BLD AUTO: 0 % (ref 0–2)
LIPASE SERPL-CCNC: 22 U/L (ref 11–82)
LYMPHOCYTES # BLD AUTO: 0.51 THOUSANDS/ÂΜL (ref 0.6–4.47)
LYMPHOCYTES NFR BLD AUTO: 10 % (ref 14–44)
MCH RBC QN AUTO: 32.6 PG (ref 26.8–34.3)
MCHC RBC AUTO-ENTMCNC: 33.9 G/DL (ref 31.4–37.4)
MCV RBC AUTO: 96 FL (ref 82–98)
MONOCYTES # BLD AUTO: 0.34 THOUSAND/ÂΜL (ref 0.17–1.22)
MONOCYTES NFR BLD AUTO: 7 % (ref 4–12)
NEUTROPHILS # BLD AUTO: 4.18 THOUSANDS/ÂΜL (ref 1.85–7.62)
NEUTS SEG NFR BLD AUTO: 82 % (ref 43–75)
NRBC BLD AUTO-RTO: 0 /100 WBCS
P AXIS: 62 DEGREES
PLATELET # BLD AUTO: 165 THOUSANDS/UL (ref 149–390)
PMV BLD AUTO: 8.5 FL (ref 8.9–12.7)
POTASSIUM SERPL-SCNC: 3.6 MMOL/L (ref 3.5–5.3)
PR INTERVAL: 202 MS
PROT SERPL-MCNC: 5.8 G/DL (ref 6.4–8.4)
QRS AXIS: 72 DEGREES
QRSD INTERVAL: 96 MS
QT INTERVAL: 430 MS
QTC INTERVAL: 425 MS
RBC # BLD AUTO: 3.96 MILLION/UL (ref 3.88–5.62)
SODIUM SERPL-SCNC: 140 MMOL/L (ref 135–147)
T WAVE AXIS: 75 DEGREES
VENTRICULAR RATE: 59 BPM
WBC # BLD AUTO: 5.12 THOUSAND/UL (ref 4.31–10.16)

## 2023-09-02 PROCEDURE — 99285 EMERGENCY DEPT VISIT HI MDM: CPT | Performed by: EMERGENCY MEDICINE

## 2023-09-02 PROCEDURE — 96375 TX/PRO/DX INJ NEW DRUG ADDON: CPT

## 2023-09-02 PROCEDURE — 85025 COMPLETE CBC W/AUTO DIFF WBC: CPT | Performed by: EMERGENCY MEDICINE

## 2023-09-02 PROCEDURE — 96361 HYDRATE IV INFUSION ADD-ON: CPT

## 2023-09-02 PROCEDURE — 84484 ASSAY OF TROPONIN QUANT: CPT | Performed by: EMERGENCY MEDICINE

## 2023-09-02 PROCEDURE — G1004 CDSM NDSC: HCPCS

## 2023-09-02 PROCEDURE — 80053 COMPREHEN METABOLIC PANEL: CPT | Performed by: EMERGENCY MEDICINE

## 2023-09-02 PROCEDURE — 74174 CTA ABD&PLVS W/CONTRAST: CPT

## 2023-09-02 PROCEDURE — 93005 ELECTROCARDIOGRAM TRACING: CPT

## 2023-09-02 PROCEDURE — C9113 INJ PANTOPRAZOLE SODIUM, VIA: HCPCS | Performed by: EMERGENCY MEDICINE

## 2023-09-02 PROCEDURE — 71275 CT ANGIOGRAPHY CHEST: CPT

## 2023-09-02 PROCEDURE — 36415 COLL VENOUS BLD VENIPUNCTURE: CPT | Performed by: EMERGENCY MEDICINE

## 2023-09-02 PROCEDURE — 83690 ASSAY OF LIPASE: CPT | Performed by: EMERGENCY MEDICINE

## 2023-09-02 PROCEDURE — 93010 ELECTROCARDIOGRAM REPORT: CPT | Performed by: INTERNAL MEDICINE

## 2023-09-02 PROCEDURE — 96374 THER/PROPH/DIAG INJ IV PUSH: CPT

## 2023-09-02 PROCEDURE — 99285 EMERGENCY DEPT VISIT HI MDM: CPT

## 2023-09-02 RX ORDER — METOPROLOL SUCCINATE 25 MG/1
25 TABLET, EXTENDED RELEASE ORAL ONCE
Status: COMPLETED | OUTPATIENT
Start: 2023-09-02 | End: 2023-09-02

## 2023-09-02 RX ORDER — ONDANSETRON 2 MG/ML
4 INJECTION INTRAMUSCULAR; INTRAVENOUS ONCE
Status: COMPLETED | OUTPATIENT
Start: 2023-09-02 | End: 2023-09-02

## 2023-09-02 RX ORDER — PANTOPRAZOLE SODIUM 40 MG/10ML
40 INJECTION, POWDER, LYOPHILIZED, FOR SOLUTION INTRAVENOUS ONCE
Status: COMPLETED | OUTPATIENT
Start: 2023-09-02 | End: 2023-09-02

## 2023-09-02 RX ORDER — DICYCLOMINE HCL 20 MG
20 TABLET ORAL 2 TIMES DAILY
Qty: 20 TABLET | Refills: 0 | Status: SHIPPED | OUTPATIENT
Start: 2023-09-02

## 2023-09-02 RX ORDER — ONDANSETRON 4 MG/1
4 TABLET, ORALLY DISINTEGRATING ORAL EVERY 6 HOURS PRN
Qty: 20 TABLET | Refills: 0 | Status: SHIPPED | OUTPATIENT
Start: 2023-09-02

## 2023-09-02 RX ORDER — MAGNESIUM HYDROXIDE/ALUMINUM HYDROXICE/SIMETHICONE 120; 1200; 1200 MG/30ML; MG/30ML; MG/30ML
30 SUSPENSION ORAL ONCE
Status: COMPLETED | OUTPATIENT
Start: 2023-09-02 | End: 2023-09-02

## 2023-09-02 RX ADMIN — IOHEXOL 100 ML: 350 INJECTION, SOLUTION INTRAVENOUS at 11:53

## 2023-09-02 RX ADMIN — ALUMINUM HYDROXIDE, MAGNESIUM HYDROXIDE, AND DIMETHICONE 30 ML: 200; 20; 200 SUSPENSION ORAL at 11:32

## 2023-09-02 RX ADMIN — METOPROLOL SUCCINATE 25 MG: 25 TABLET, EXTENDED RELEASE ORAL at 11:31

## 2023-09-02 RX ADMIN — SODIUM CHLORIDE 1000 ML: 0.9 INJECTION, SOLUTION INTRAVENOUS at 10:49

## 2023-09-02 RX ADMIN — PANTOPRAZOLE SODIUM 40 MG: 40 INJECTION, POWDER, FOR SOLUTION INTRAVENOUS at 10:52

## 2023-09-02 RX ADMIN — ONDANSETRON 4 MG: 2 INJECTION INTRAMUSCULAR; INTRAVENOUS at 10:50

## 2023-09-02 NOTE — ED PROVIDER NOTES
History  Chief Complaint   Patient presents with   • Abdominal Pain     Abdominal pain and vomiting x 3 that started today     Patient is 77-year-old male past medical history of GERD, hypertension, hyperlipidemia, Parkinson's, COPD, schizophrenia, SBO presenting with abdominal pain and vomiting. Patient notes that roughly 1 hour ago he began having epigastric nonradiating abdominal pain which has been intermittent associated with 3 episodes of bilious nonbloody vomit. Notes lightheadedness but denies fevers, urinary symptoms, chest pain, shortness of breath, rashes, vision changes. States he did not take his blood pressure medications this morning. Prior to Admission Medications   Prescriptions Last Dose Informant Patient Reported? Taking?    AUSTEDO 12 MG TABS  Care Giver Yes No   Sig: Take 1 tablet by mouth 2 (two) times a day   Cholecalciferol (Vitamin D High Potency) 25 MCG (1000 UT) capsule  Care Giver No No   Sig: Take 1 capsule (1,000 Units total) by mouth daily   Emollient (Lubriderm) LOTN  Care Giver No No   Sig: Apply topically daily as needed (rash)   Psyllium (Metamucil) 28.3 % POWD   No No   Sig: Take by mouth 2 (two) times a day 1 dose as per can twice a day   Skin Protectants, Misc. (eucerin) cream   No No   Sig: Apply topically as needed for wound care   Patient not taking: Reported on 5/18/2023   acetaminophen (Acetaminophen Extra Strength) 500 mg tablet  Care Giver No No   Sig: Take 1 tablet (500 mg total) by mouth every 6 (six) hours as needed for mild pain   atorvastatin (LIPITOR) 40 mg tablet  Care Giver No No   Sig: Take 1 tablet (40 mg total) by mouth daily   carbidopa-levodopa (SINEMET)  mg per tablet  Care Giver No No   Sig: Take 1 tablet by mouth 3 (three) times a day   cloZAPine (CLOZARIL) 100 mg tablet  Care Giver Yes No   Sig: Take 100 mg by mouth 2 (two) times a day   clozapine (CLOZARIL) 200 MG tablet  Care Giver Yes No   Sig: Take 200 mg by mouth daily at bedtime docusate sodium (COLACE) 100 mg capsule   No No   Sig: Take 1 capsule (100 mg total) by mouth 2 (two) times a day   fluticasone (FLONASE) 50 mcg/act nasal spray  Care Giver No No   Si sprays into each nostril daily   hydrOXYzine HCL (ATARAX) 25 mg tablet   No No   Sig: Take 1 tablet (25 mg total) by mouth every 6 (six) hours as needed for itching   metoprolol succinate (TOPROL-XL) 25 mg 24 hr tablet  Care Giver No No   Sig: Take 1 tablet (25 mg total) by mouth daily   olopatadine HCl (PATADAY) 0.2 % opth drops  Care Giver No No   Sig: Administer 1 drop to both eyes daily at bedtime   omeprazole (PriLOSEC) 40 MG capsule  Care Giver No No   Sig: Take 1 capsule (40 mg total) by mouth daily   ondansetron (ZOFRAN-ODT) 4 mg disintegrating tablet   No No   Sig: Take 1 tablet (4 mg total) by mouth every 8 (eight) hours as needed for nausea   polyethylene glycol (MIRALAX) 17 g packet   No No   Sig: Take 17 g by mouth daily   sodium phosphate-biphosphate (FLEET) 7-19 g 118 mL enema   No No   Sig: Insert 1 enema into the rectum daily as needed (constipation) for up to 5 doses   Patient not taking: Reported on 2023   tamsulosin (FLOMAX) 0.4 mg  Care Giver No No   Sig: Take 1 capsule (0.4 mg total) by mouth daily at bedtime   temazepam (RESTORIL) 15 mg capsule  Care Giver Yes No   Sig: Take 15 mg by mouth daily at bedtime   topiramate (TOPAMAX) 25 mg tablet  Care Giver Yes No   Sig: Take 25 mg by mouth 2 (two) times a day   traZODone (DESYREL) 100 mg tablet  Care Giver Yes No   Sig: Take 100 mg by mouth daily at bedtime      Facility-Administered Medications: None       Past Medical History:   Diagnosis Date   • Asthma    • Benign prostatic hyperplasia    • COPD (chronic obstructive pulmonary disease) (HCC)    • GERD (gastroesophageal reflux disease)    • Herpes zoster without complication    • Hypercholesteremia    • Hypertension    • Paranoid schizophrenia (HCC)    • Psychiatric disorder        Past Surgical History:   Procedure Laterality Date   • CATARACT EXTRACTION     • COLONOSCOPY         Family History   Problem Relation Age of Onset   • No Known Problems Mother    • No Known Problems Father    • Parkinsonism Son      I have reviewed and agree with the history as documented. E-Cigarette/Vaping   • E-Cigarette Use Never User      E-Cigarette/Vaping Substances   • Nicotine No    • THC No    • CBD No    • Flavoring No    • Other No    • Unknown No      Social History     Tobacco Use   • Smoking status: Former     Packs/day: 1.00     Years: 20.00     Total pack years: 20.00     Types: Cigarettes     Quit date:      Years since quittin.6   • Smokeless tobacco: Never   Vaping Use   • Vaping Use: Never used   Substance Use Topics   • Alcohol use: Not Currently   • Drug use: Never       Review of Systems   All other systems reviewed and are negative. Physical Exam  Physical Exam  Vitals reviewed. Constitutional:       General: He is not in acute distress. Appearance: Normal appearance. He is not ill-appearing. HENT:      Mouth/Throat:      Mouth: Mucous membranes are moist.   Eyes:      Conjunctiva/sclera: Conjunctivae normal.      Comments: Normal conjunctiva   Cardiovascular:      Rate and Rhythm: Regular rhythm. Bradycardia present. Heart sounds: Normal heart sounds. Pulmonary:      Effort: Pulmonary effort is normal.      Breath sounds: Normal breath sounds. Abdominal:      General: Abdomen is flat. Palpations: Abdomen is soft. Tenderness: There is no abdominal tenderness. There is no right CVA tenderness, left CVA tenderness or guarding. Musculoskeletal:         General: No swelling. Normal range of motion. Cervical back: Neck supple. Skin:     General: Skin is warm and dry. Neurological:      General: No focal deficit present. Mental Status: He is alert.    Psychiatric:         Mood and Affect: Mood normal.         Vital Signs  ED Triage Vitals [09/02/23 1008]   Temperature Pulse Respirations Blood Pressure SpO2   98.2 °F (36.8 °C) (!) 51 18 (!) 214/91 100 %      Temp Source Heart Rate Source Patient Position - Orthostatic VS BP Location FiO2 (%)   Oral Monitor Lying Right arm --      Pain Score       --           Vitals:    09/02/23 1008   BP: (!) 214/91   Pulse: (!) 51   Patient Position - Orthostatic VS: Lying         Visual Acuity      ED Medications  Medications   sodium chloride 0.9 % bolus 1,000 mL (has no administration in time range)   ondansetron (ZOFRAN) injection 4 mg (has no administration in time range)   aluminum-magnesium hydroxide-simethicone (MAALOX) oral suspension 30 mL (has no administration in time range)   pantoprazole (PROTONIX) injection 40 mg (has no administration in time range)   metoprolol succinate (TOPROL-XL) 24 hr tablet 25 mg (has no administration in time range)       Diagnostic Studies  Results Reviewed     None                 No orders to display              Procedures  ECG 12 Lead Documentation Only    Date/Time: 9/2/2023 10:33 AM    Performed by: Mary Muniz DO  Authorized by: Mary Muniz DO    Patient location:  ED  Previous ECG:     Previous ECG:  Compared to current    Similarity:  No change  Interpretation:     Interpretation: normal    Rate:     ECG rate assessment: bradycardic    Rhythm:     Rhythm: sinus rhythm    Ectopy:     Ectopy: none    QRS:     QRS axis:  Normal    QRS intervals:  Normal  Conduction:     Conduction: normal    ST segments:     ST segments:  Normal  T waves:     T waves: normal               ED Course  ED Course as of 09/02/23 1532   Sat Sep 02, 2023   1303 Patient with gallstones but no inflammatory changes, work-up otherwise unremarkable, will discuss with surgery however due to patient's age and comorbidities likely not a surgical candidate and patient is not with any inflammatory changes consistent with cholecystitis at this time.    1406 Patient notes improvement of his symptoms, able to tolerate p.o., have discussed return precautions outpatient follow-up and discussed with surgery who states that patient to be medically optimized and follow-up as outpatient. Medical Decision Making  Patient is 59-year-old male past medical Struve hypertension, hyperlipidemia, COPD, SBO, Parkinson's, schizophrenia, GERD presenting with abdominal pain. Patient is well-appearing at bedside though currently hypertensive but in no acute distress with no discernible abdominal tenderness, CVA tenderness with dry mucous membranes and no other significant physical exam findings. Due to patient's age and comorbidities will obtain CT dissection to rule out dissection given hypertension as well as intra-abdominal pathology such as SBO, diverticulitis, appendicitis, perforation, EKG and labs to rule out ACS, electrolyte abnormality, anemia, pancreatitis, give pain control and continue to monitor. We will also give patient's home blood pressure medications and reassess and give IV medications as needed. Amount and/or Complexity of Data Reviewed  Labs: ordered. Radiology: ordered. Risk  OTC drugs. Prescription drug management. Disposition  Final diagnoses:   None     ED Disposition     None      Follow-up Information    None         Patient's Medications   Discharge Prescriptions    No medications on file       No discharge procedures on file.     PDMP Review       Value Time User    PDMP Reviewed  Yes 5/9/2023 10:38 AM Carl Jay MD          ED Provider  Electronically Signed by           Letty Shelton DO  09/02/23 7458

## 2023-09-22 ENCOUNTER — OFFICE VISIT (OUTPATIENT)
Dept: NEUROLOGY | Facility: CLINIC | Age: 73
End: 2023-09-22
Payer: MEDICARE

## 2023-09-22 VITALS
WEIGHT: 145.6 LBS | HEART RATE: 62 BPM | DIASTOLIC BLOOD PRESSURE: 80 MMHG | HEIGHT: 68 IN | BODY MASS INDEX: 22.07 KG/M2 | SYSTOLIC BLOOD PRESSURE: 118 MMHG

## 2023-09-22 DIAGNOSIS — F20.0 PARANOID SCHIZOPHRENIA (HCC): ICD-10-CM

## 2023-09-22 DIAGNOSIS — T43.505A NEUROLEPTIC-INDUCED PARKINSONISM: Primary | ICD-10-CM

## 2023-09-22 DIAGNOSIS — G21.11 NEUROLEPTIC-INDUCED PARKINSONISM: Primary | ICD-10-CM

## 2023-09-22 DIAGNOSIS — G20.A1 PARKINSON DISEASE: ICD-10-CM

## 2023-09-22 PROCEDURE — 99214 OFFICE O/P EST MOD 30 MIN: CPT | Performed by: PSYCHIATRY & NEUROLOGY

## 2023-09-22 RX ORDER — METRONIDAZOLE 500 MG/1
TABLET ORAL 3 TIMES DAILY
Status: ON HOLD | COMMUNITY
Start: 2023-09-21

## 2023-09-22 NOTE — PROGRESS NOTES
Michelle Garcia is a 68 y.o. male. Chief Complaint   Patient presents with   • Neuro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        Assessment:  1. Neuro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 ([address])    2. Parkinson disease ([address])    3. Paranoid schizophrenia ([address])        Plan:  Continue with Sinemet 25/100 mg 1 tablet 3 times a day. Follow-up in 6 months. Discussion:  Patient most likely has neuroleptic induced parkinsonism, he is currently doing well on Sinemet 25/100 mg 1 tablet 3 times a day I advised the caretaker to discuss with her psychiatrist to see if they can adjust his psych medications and if he needs to continue on the Sinemet 1 option would be is we can take him off the Sinemet and see how he does but the caretaker and the patient would like to continue with Sinemet and hence we will continue for now he is not experiencing any side effects he will have his routine blood work monitored with his family physician to keep his blood pressure cholesterol and sugar under control to take fall and safety and aspiration precautions to go to the hospital if has any worsening symptoms and call me otherwise to see me back in 6 months or sooner if needed and follow-up with the other physicians. Subjective:    HPI   Patient is here in follow-up for his tremor accompanied with an office from his behavioral home, according to the staff patient is doing good he denies having any tremor he is doing good on Sinemet no side effects to the medication denies any dizziness no bowel and bladder incontinence he has history of paranoid schizophrenia and does not have any hallucinations he is in follow-up with a psychiatrist and has been on antipsychotic medications for a long time some issues with bladder urgency but denies any incontinence no suicidal or homicidal thoughts no swallowing difficulty sleep is decent appetite is good weight has been stable he has not had any falls no freezing episodes no other complaints.     Vitals:    09/22/23 1229 BP: 118/80   BP Location: Left arm   Patient Position: Sitting   Cuff Size: Standard   Pulse: 62   Weight: 66 kg (145 lb 9.6 oz)   Height: 5' 8" (1.727 m)       Current Medications    Current Outpatient Medications:   •  acetaminophen (Acetaminophen Extra Strength) 500 mg tablet, Take 1 tablet (500 mg total) by mouth every 6 (six) hours as needed for mild pain, Disp: 30 tablet, Rfl: 11  •  atorvastatin (LIPITOR) 40 mg tablet, Take 1 tablet (40 mg total) by mouth daily, Disp: 31 tablet, Rfl: 11  •  AUSTEDO 12 MG TABS, Take 1 tablet by mouth 2 (two) times a day, Disp: , Rfl:   •  carbidopa-levodopa (SINEMET)  mg per tablet, Take 1 tablet by mouth 3 (three) times a day, Disp: 270 tablet, Rfl: 3  •  Cholecalciferol (Vitamin D High Potency) 25 MCG (1000 UT) capsule, Take 1 capsule (1,000 Units total) by mouth daily, Disp: 31 capsule, Rfl: 6  •  cloZAPine (CLOZARIL) 100 mg tablet, Take 100 mg by mouth 2 (two) times a day, Disp: , Rfl:   •  clozapine (CLOZARIL) 200 MG tablet, Take 200 mg by mouth daily at bedtime , Disp: , Rfl:   •  docusate sodium (COLACE) 100 mg capsule, Take 1 capsule (100 mg total) by mouth 2 (two) times a day, Disp: 62 capsule, Rfl: 11  •  hydrOXYzine HCL (ATARAX) 25 mg tablet, Take 1 tablet (25 mg total) by mouth every 6 (six) hours as needed for itching, Disp: 30 tablet, Rfl: 5  •  metoprolol succinate (TOPROL-XL) 25 mg 24 hr tablet, Take 1 tablet (25 mg total) by mouth daily, Disp: 31 tablet, Rfl: 11  •  metroNIDAZOLE (FLAGYL) 500 mg tablet, 3 (three) times a day, Disp: , Rfl:   •  olopatadine HCl (PATADAY) 0.2 % opth drops, Administer 1 drop to both eyes daily at bedtime, Disp: 2.5 mL, Rfl: 11  •  omeprazole (PriLOSEC) 40 MG capsule, Take 1 capsule (40 mg total) by mouth daily, Disp: 31 capsule, Rfl: 11  •  ondansetron (Zofran ODT) 4 mg disintegrating tablet, Take 1 tablet (4 mg total) by mouth every 6 (six) hours as needed for nausea or vomiting, Disp: 20 tablet, Rfl: 0  •  tamsulosin (FLOMAX) 0.4 mg, Take 1 capsule (0.4 mg total) by mouth daily at bedtime, Disp: 90 capsule, Rfl: 3  •  temazepam (RESTORIL) 15 mg capsule, Take 15 mg by mouth daily at bedtime, Disp: , Rfl:   •  topiramate (TOPAMAX) 25 mg tablet, Take 25 mg by mouth 2 (two) times a day, Disp: , Rfl: 1  •  traZODone (DESYREL) 100 mg tablet, Take 100 mg by mouth daily at bedtime, Disp: , Rfl:   •  dicyclomine (BENTYL) 20 mg tablet, Take 1 tablet (20 mg total) by mouth 2 (two) times a day (Patient not taking: Reported on 9/22/2023), Disp: 20 tablet, Rfl: 0  •  Emollient (Lubriderm) LOTN, Apply topically daily as needed (rash) (Patient not taking: Reported on 9/22/2023), Disp: 177 mL, Rfl: 11  •  fluticasone (FLONASE) 50 mcg/act nasal spray, 2 sprays into each nostril daily (Patient not taking: Reported on 9/22/2023), Disp: 16 g, Rfl: 11  •  ondansetron (ZOFRAN-ODT) 4 mg disintegrating tablet, Take 1 tablet (4 mg total) by mouth every 8 (eight) hours as needed for nausea, Disp: 15 tablet, Rfl: 0  •  polyethylene glycol (MIRALAX) 17 g packet, Take 17 g by mouth daily (Patient not taking: Reported on 9/22/2023), Disp: 31 each, Rfl: 11  •  Psyllium (Metamucil) 28.3 % POWD, Take by mouth 2 (two) times a day 1 dose as per can twice a day (Patient not taking: Reported on 9/22/2023), Disp: 575 g, Rfl: 11  •  Skin Protectants, Misc. (eucerin) cream, Apply topically as needed for wound care (Patient not taking: Reported on 5/18/2023), Disp: 397 g, Rfl: 0  •  sodium phosphate-biphosphate (FLEET) 7-19 g 118 mL enema, Insert 1 enema into the rectum daily as needed (constipation) for up to 5 doses (Patient not taking: Reported on 5/12/2023), Disp: 5 enema, Rfl: 0      Allergies  Patient has no known allergies.     Past Medical History  Past Medical History:   Diagnosis Date   • Asthma    • Benign prostatic hyperplasia    • COPD (chronic obstructive pulmonary disease) (HCC)    • GERD (gastroesophageal reflux disease)    • Herpes zoster without complication 89/33/2156   • Hypercholesteremia    • Hypertension    • Paranoid schizophrenia ([address])    • Psychiatric disorder          Past Surgical History:  Past Surgical History:   Procedure Laterality Date   • CATARACT EXTRACTION     • COLONOSCOPY           Family History:  Family History   Problem Relation Age of Onset   • No Known Problems Mother    • No Known Problems Father    • Parkinsonism Son        Social History:   reports that he quit smoking about 23 years ago. His smoking use included cigarettes. He has a 20.00 pack-year smoking history. He has never used smokeless tobacco. He reports that he does not currently use alcohol. He reports that he does not use drugs. I have reviewed the past medical history, surgical history, social and family history, current medications, allergies vitals, review of systems, and updated this information as appropriate today. Objective:    Physical Exam    Neurological Exam     GENERAL:  Cooperative in no acute distress. Well-developed and well-nourished     HEAD and NECK   Head is atraumatic normocephalic with no lesions or masses. Neck is supple with full range of motion     CARDIOVASCULAR  Carotid Arteries-no carotid bruits. NEUROLOGIC:  Mental Status-the patient is awake alert and oriented without aphasia or apraxia  Cranial Nerves: Visual fields are full to confrontation. Extraocular movements are full without nystagmus. Pupils are 2-1/2 mm and reactive. Face is symmetrical to light touch. Movements of facial expression move symmetrically. Hearing is normal to finger rub bilaterally. Soft palate lifts symmetrically. Shoulder shrug is symmetrical. Tongue is midline without atrophy. Motor: No drift is noted on arm extension. Strength is full in the upper and lower extremities with normal bulk and cogwheeling rigidity bilateral upper extremity, no resting tremor.   Mild tremor of the mouth  Gait has a slightly stooped posture with decreased arm swing    ROS:  Review of Systems   Constitutional: Negative for appetite change, fatigue and fever. HENT: Negative. Negative for hearing loss, tinnitus, trouble swallowing and voice change. Eyes: Negative. Negative for photophobia, pain and visual disturbance. Respiratory: Negative. Negative for shortness of breath. Cardiovascular: Negative. Negative for palpitations. Gastrointestinal: Negative. Negative for nausea and vomiting. Endocrine: Negative. Negative for cold intolerance. Genitourinary: Negative. Negative for dysuria, frequency and urgency. Musculoskeletal: Negative for back pain, gait problem, myalgias and neck pain. Skin: Negative. Negative for rash. Allergic/Immunologic: Negative. Neurological: Negative. Negative for dizziness, tremors, seizures, syncope, facial asymmetry, speech difficulty, weakness, light-headedness, numbness and headaches. Hematological: Negative. Does not bruise/bleed easily. Psychiatric/Behavioral: Negative. Negative for confusion, hallucinations and sleep disturbance.

## 2023-09-25 ENCOUNTER — OFFICE VISIT (OUTPATIENT)
Dept: SURGERY | Facility: CLINIC | Age: 73
End: 2023-09-25
Payer: MEDICARE

## 2023-09-25 VITALS
WEIGHT: 147.4 LBS | OXYGEN SATURATION: 98 % | BODY MASS INDEX: 22.34 KG/M2 | SYSTOLIC BLOOD PRESSURE: 121 MMHG | HEIGHT: 68 IN | TEMPERATURE: 95.9 F | DIASTOLIC BLOOD PRESSURE: 86 MMHG | HEART RATE: 68 BPM

## 2023-09-25 DIAGNOSIS — K80.20 CALCULUS OF GALLBLADDER WITHOUT CHOLECYSTITIS WITHOUT OBSTRUCTION: Primary | ICD-10-CM

## 2023-09-25 PROCEDURE — 99205 OFFICE O/P NEW HI 60 MIN: CPT | Performed by: SURGERY

## 2023-09-29 ENCOUNTER — HOSPITAL ENCOUNTER (INPATIENT)
Facility: HOSPITAL | Age: 73
LOS: 4 days | Discharge: HOME HEALTH | End: 2023-10-04
Attending: EMERGENCY MEDICINE | Admitting: STUDENT IN AN ORGANIZED HEALTH CARE EDUCATION/TRAINING PROGRAM
Payer: MEDICARE

## 2023-09-29 ENCOUNTER — APPOINTMENT (EMERGENCY)
Dept: CT IMAGING | Facility: HOSPITAL | Age: 73
End: 2023-09-29
Payer: MEDICARE

## 2023-09-29 ENCOUNTER — APPOINTMENT (EMERGENCY)
Dept: RADIOLOGY | Facility: HOSPITAL | Age: 73
End: 2023-09-29
Payer: MEDICARE

## 2023-09-29 DIAGNOSIS — G20.A1 PARKINSON DISEASE: ICD-10-CM

## 2023-09-29 DIAGNOSIS — R29.90 STROKE-LIKE SYMPTOMS: ICD-10-CM

## 2023-09-29 DIAGNOSIS — R42 DIZZINESS: Primary | ICD-10-CM

## 2023-09-29 LAB
ALBUMIN SERPL BCP-MCNC: 3.6 G/DL (ref 3.5–5)
ALP SERPL-CCNC: 71 U/L (ref 34–104)
ALT SERPL W P-5'-P-CCNC: 27 U/L (ref 7–52)
ANION GAP SERPL CALCULATED.3IONS-SCNC: 2 MMOL/L
APTT PPP: 41 SECONDS (ref 23–37)
AST SERPL W P-5'-P-CCNC: 23 U/L (ref 13–39)
BACTERIA UR QL AUTO: ABNORMAL /HPF
BASOPHILS # BLD AUTO: 0.03 THOUSANDS/ÂΜL (ref 0–0.1)
BASOPHILS NFR BLD AUTO: 1 % (ref 0–1)
BILIRUB SERPL-MCNC: 0.42 MG/DL (ref 0.2–1)
BILIRUB UR QL STRIP: NEGATIVE
BUN SERPL-MCNC: 14 MG/DL (ref 5–25)
CALCIUM SERPL-MCNC: 8.8 MG/DL (ref 8.4–10.2)
CHLORIDE SERPL-SCNC: 111 MMOL/L (ref 96–108)
CLARITY UR: CLEAR
CO2 SERPL-SCNC: 27 MMOL/L (ref 21–32)
COLOR UR: YELLOW
CREAT SERPL-MCNC: 0.73 MG/DL (ref 0.6–1.3)
D DIMER PPP FEU-MCNC: 0.29 UG/ML FEU
EOSINOPHIL # BLD AUTO: 0.2 THOUSAND/ÂΜL (ref 0–0.61)
EOSINOPHIL NFR BLD AUTO: 5 % (ref 0–6)
ERYTHROCYTE [DISTWIDTH] IN BLOOD BY AUTOMATED COUNT: 14.1 % (ref 11.6–15.1)
FLUAV RNA RESP QL NAA+PROBE: NEGATIVE
FLUBV RNA RESP QL NAA+PROBE: NEGATIVE
GFR SERPL CREATININE-BSD FRML MDRD: 92 ML/MIN/1.73SQ M
GLUCOSE SERPL-MCNC: 80 MG/DL (ref 65–140)
GLUCOSE SERPL-MCNC: 85 MG/DL (ref 65–140)
GLUCOSE UR STRIP-MCNC: NEGATIVE MG/DL
HCT VFR BLD AUTO: 37.4 % (ref 36.5–49.3)
HGB BLD-MCNC: 12.8 G/DL (ref 12–17)
HGB UR QL STRIP.AUTO: NEGATIVE
IMM GRANULOCYTES # BLD AUTO: 0.01 THOUSAND/UL (ref 0–0.2)
IMM GRANULOCYTES NFR BLD AUTO: 0 % (ref 0–2)
INR PPP: 1.09 (ref 0.84–1.19)
KETONES UR STRIP-MCNC: NEGATIVE MG/DL
LEUKOCYTE ESTERASE UR QL STRIP: NEGATIVE
LYMPHOCYTES # BLD AUTO: 1.3 THOUSANDS/ÂΜL (ref 0.6–4.47)
LYMPHOCYTES NFR BLD AUTO: 33 % (ref 14–44)
MCH RBC QN AUTO: 33.7 PG (ref 26.8–34.3)
MCHC RBC AUTO-ENTMCNC: 34.2 G/DL (ref 31.4–37.4)
MCV RBC AUTO: 98 FL (ref 82–98)
MONOCYTES # BLD AUTO: 0.57 THOUSAND/ÂΜL (ref 0.17–1.22)
MONOCYTES NFR BLD AUTO: 14 % (ref 4–12)
MUCOUS THREADS UR QL AUTO: ABNORMAL
NEUTROPHILS # BLD AUTO: 1.84 THOUSANDS/ÂΜL (ref 1.85–7.62)
NEUTS SEG NFR BLD AUTO: 47 % (ref 43–75)
NITRITE UR QL STRIP: NEGATIVE
NON-SQ EPI CELLS URNS QL MICRO: ABNORMAL /HPF
NRBC BLD AUTO-RTO: 0 /100 WBCS
PH UR STRIP.AUTO: 6 [PH]
PLATELET # BLD AUTO: 145 THOUSANDS/UL (ref 149–390)
PMV BLD AUTO: 9 FL (ref 8.9–12.7)
POTASSIUM SERPL-SCNC: 3.8 MMOL/L (ref 3.5–5.3)
PROT SERPL-MCNC: 6.1 G/DL (ref 6.4–8.4)
PROT UR STRIP-MCNC: ABNORMAL MG/DL
PROTHROMBIN TIME: 14.7 SECONDS (ref 11.6–14.5)
RBC # BLD AUTO: 3.8 MILLION/UL (ref 3.88–5.62)
RBC #/AREA URNS AUTO: ABNORMAL /HPF
RSV RNA RESP QL NAA+PROBE: NEGATIVE
SARS-COV-2 RNA RESP QL NAA+PROBE: NEGATIVE
SODIUM SERPL-SCNC: 140 MMOL/L (ref 135–147)
SP GR UR STRIP.AUTO: 1.02 (ref 1–1.03)
UROBILINOGEN UR STRIP-ACNC: <2 MG/DL
WBC # BLD AUTO: 3.95 THOUSAND/UL (ref 4.31–10.16)
WBC #/AREA URNS AUTO: ABNORMAL /HPF

## 2023-09-29 PROCEDURE — 80053 COMPREHEN METABOLIC PANEL: CPT | Performed by: PHYSICIAN ASSISTANT

## 2023-09-29 PROCEDURE — 70498 CT ANGIOGRAPHY NECK: CPT

## 2023-09-29 PROCEDURE — 0241U HB NFCT DS VIR RESP RNA 4 TRGT: CPT | Performed by: PHYSICIAN ASSISTANT

## 2023-09-29 PROCEDURE — 82948 REAGENT STRIP/BLOOD GLUCOSE: CPT

## 2023-09-29 PROCEDURE — 71045 X-RAY EXAM CHEST 1 VIEW: CPT

## 2023-09-29 PROCEDURE — 81001 URINALYSIS AUTO W/SCOPE: CPT | Performed by: PHYSICIAN ASSISTANT

## 2023-09-29 PROCEDURE — 36415 COLL VENOUS BLD VENIPUNCTURE: CPT | Performed by: PHYSICIAN ASSISTANT

## 2023-09-29 PROCEDURE — 99285 EMERGENCY DEPT VISIT HI MDM: CPT | Performed by: PHYSICIAN ASSISTANT

## 2023-09-29 PROCEDURE — 85730 THROMBOPLASTIN TIME PARTIAL: CPT | Performed by: PHYSICIAN ASSISTANT

## 2023-09-29 PROCEDURE — 70496 CT ANGIOGRAPHY HEAD: CPT

## 2023-09-29 PROCEDURE — 85610 PROTHROMBIN TIME: CPT | Performed by: PHYSICIAN ASSISTANT

## 2023-09-29 PROCEDURE — 99285 EMERGENCY DEPT VISIT HI MDM: CPT

## 2023-09-29 PROCEDURE — 85379 FIBRIN DEGRADATION QUANT: CPT | Performed by: PHYSICIAN ASSISTANT

## 2023-09-29 PROCEDURE — 96361 HYDRATE IV INFUSION ADD-ON: CPT

## 2023-09-29 PROCEDURE — 85025 COMPLETE CBC W/AUTO DIFF WBC: CPT | Performed by: PHYSICIAN ASSISTANT

## 2023-09-29 PROCEDURE — 93005 ELECTROCARDIOGRAM TRACING: CPT

## 2023-09-29 PROCEDURE — 96360 HYDRATION IV INFUSION INIT: CPT

## 2023-09-29 RX ORDER — ATORVASTATIN CALCIUM 40 MG/1
40 TABLET, FILM COATED ORAL EVERY EVENING
Status: DISCONTINUED | OUTPATIENT
Start: 2023-09-29 | End: 2023-10-04 | Stop reason: HOSPADM

## 2023-09-29 RX ORDER — CALCIUM CARBONATE 500 MG/1
1000 TABLET, CHEWABLE ORAL DAILY PRN
Status: DISCONTINUED | OUTPATIENT
Start: 2023-09-29 | End: 2023-10-04 | Stop reason: HOSPADM

## 2023-09-29 RX ORDER — ASPIRIN 81 MG/1
81 TABLET, CHEWABLE ORAL DAILY
Status: DISCONTINUED | OUTPATIENT
Start: 2023-09-30 | End: 2023-10-04 | Stop reason: HOSPADM

## 2023-09-29 RX ORDER — CLOPIDOGREL BISULFATE 75 MG/1
600 TABLET ORAL ONCE
Status: COMPLETED | OUTPATIENT
Start: 2023-09-29 | End: 2023-09-29

## 2023-09-29 RX ORDER — METOPROLOL SUCCINATE 25 MG/1
25 TABLET, EXTENDED RELEASE ORAL DAILY
Status: DISCONTINUED | OUTPATIENT
Start: 2023-09-30 | End: 2023-10-04 | Stop reason: HOSPADM

## 2023-09-29 RX ORDER — POLYETHYLENE GLYCOL 3350 17 G/17G
17 POWDER, FOR SOLUTION ORAL DAILY PRN
Status: DISCONTINUED | OUTPATIENT
Start: 2023-09-29 | End: 2023-10-04 | Stop reason: HOSPADM

## 2023-09-29 RX ORDER — TEMAZEPAM 15 MG/1
15 CAPSULE ORAL
Status: DISCONTINUED | OUTPATIENT
Start: 2023-09-29 | End: 2023-10-04 | Stop reason: HOSPADM

## 2023-09-29 RX ORDER — HYDROXYZINE HYDROCHLORIDE 25 MG/1
25 TABLET, FILM COATED ORAL EVERY 6 HOURS PRN
Status: DISCONTINUED | OUTPATIENT
Start: 2023-09-29 | End: 2023-10-04 | Stop reason: HOSPADM

## 2023-09-29 RX ORDER — TAMSULOSIN HYDROCHLORIDE 0.4 MG/1
0.4 CAPSULE ORAL
Status: DISCONTINUED | OUTPATIENT
Start: 2023-09-29 | End: 2023-10-04 | Stop reason: HOSPADM

## 2023-09-29 RX ORDER — TOPIRAMATE 25 MG/1
25 TABLET ORAL 2 TIMES DAILY
Status: DISCONTINUED | OUTPATIENT
Start: 2023-09-30 | End: 2023-10-04 | Stop reason: HOSPADM

## 2023-09-29 RX ORDER — CLOZAPINE 100 MG/1
200 TABLET ORAL
Status: DISCONTINUED | OUTPATIENT
Start: 2023-09-30 | End: 2023-10-04 | Stop reason: HOSPADM

## 2023-09-29 RX ORDER — PANTOPRAZOLE SODIUM 40 MG/1
40 TABLET, DELAYED RELEASE ORAL
Status: DISCONTINUED | OUTPATIENT
Start: 2023-09-30 | End: 2023-10-04 | Stop reason: HOSPADM

## 2023-09-29 RX ORDER — CLOZAPINE 100 MG/1
100 TABLET ORAL 2 TIMES DAILY
Status: DISCONTINUED | OUTPATIENT
Start: 2023-09-30 | End: 2023-10-04 | Stop reason: HOSPADM

## 2023-09-29 RX ORDER — ACETAMINOPHEN 325 MG/1
650 TABLET ORAL EVERY 4 HOURS PRN
Status: DISCONTINUED | OUTPATIENT
Start: 2023-09-29 | End: 2023-10-04 | Stop reason: HOSPADM

## 2023-09-29 RX ORDER — ONDANSETRON 2 MG/ML
4 INJECTION INTRAMUSCULAR; INTRAVENOUS EVERY 6 HOURS PRN
Status: DISCONTINUED | OUTPATIENT
Start: 2023-09-29 | End: 2023-10-04 | Stop reason: HOSPADM

## 2023-09-29 RX ORDER — HEPARIN SODIUM 5000 [USP'U]/ML
5000 INJECTION, SOLUTION INTRAVENOUS; SUBCUTANEOUS EVERY 8 HOURS SCHEDULED
Status: DISCONTINUED | OUTPATIENT
Start: 2023-09-29 | End: 2023-10-04 | Stop reason: HOSPADM

## 2023-09-29 RX ORDER — TRAZODONE HYDROCHLORIDE 100 MG/1
100 TABLET ORAL
Status: DISCONTINUED | OUTPATIENT
Start: 2023-09-29 | End: 2023-10-04 | Stop reason: HOSPADM

## 2023-09-29 RX ADMIN — SODIUM CHLORIDE 1000 ML: 0.9 INJECTION, SOLUTION INTRAVENOUS at 19:35

## 2023-09-29 RX ADMIN — CLOPIDOGREL BISULFATE 600 MG: 75 TABLET ORAL at 22:24

## 2023-09-29 RX ADMIN — IOHEXOL 85 ML: 350 INJECTION, SOLUTION INTRAVENOUS at 20:48

## 2023-09-30 PROBLEM — R42 ORTHOSTATIC LIGHTHEADEDNESS: Status: ACTIVE | Noted: 2023-09-30

## 2023-09-30 PROBLEM — R29.90 STROKE-LIKE SYMPTOMS: Status: ACTIVE | Noted: 2023-09-30

## 2023-09-30 LAB
ANION GAP SERPL CALCULATED.3IONS-SCNC: 3 MMOL/L
BUN SERPL-MCNC: 12 MG/DL (ref 5–25)
CALCIUM SERPL-MCNC: 8.4 MG/DL (ref 8.4–10.2)
CHLORIDE SERPL-SCNC: 111 MMOL/L (ref 96–108)
CHOLEST SERPL-MCNC: 67 MG/DL
CO2 SERPL-SCNC: 24 MMOL/L (ref 21–32)
CREAT SERPL-MCNC: 0.66 MG/DL (ref 0.6–1.3)
ERYTHROCYTE [DISTWIDTH] IN BLOOD BY AUTOMATED COUNT: 14.1 % (ref 11.6–15.1)
GFR SERPL CREATININE-BSD FRML MDRD: 95 ML/MIN/1.73SQ M
GLUCOSE SERPL-MCNC: 110 MG/DL (ref 65–140)
GLUCOSE SERPL-MCNC: 83 MG/DL (ref 65–140)
HCT VFR BLD AUTO: 33.2 % (ref 36.5–49.3)
HDLC SERPL-MCNC: 43 MG/DL
HGB BLD-MCNC: 11.5 G/DL (ref 12–17)
LDLC SERPL CALC-MCNC: 16 MG/DL (ref 0–100)
MCH RBC QN AUTO: 33.7 PG (ref 26.8–34.3)
MCHC RBC AUTO-ENTMCNC: 34.6 G/DL (ref 31.4–37.4)
MCV RBC AUTO: 97 FL (ref 82–98)
PLATELET # BLD AUTO: 131 THOUSANDS/UL (ref 149–390)
PLATELET # BLD AUTO: 137 THOUSANDS/UL (ref 149–390)
PMV BLD AUTO: 8.8 FL (ref 8.9–12.7)
PMV BLD AUTO: 9 FL (ref 8.9–12.7)
POTASSIUM SERPL-SCNC: 3.6 MMOL/L (ref 3.5–5.3)
RBC # BLD AUTO: 3.41 MILLION/UL (ref 3.88–5.62)
SODIUM SERPL-SCNC: 138 MMOL/L (ref 135–147)
TRIGL SERPL-MCNC: 41 MG/DL
WBC # BLD AUTO: 3.4 THOUSAND/UL (ref 4.31–10.16)

## 2023-09-30 PROCEDURE — 99223 1ST HOSP IP/OBS HIGH 75: CPT | Performed by: STUDENT IN AN ORGANIZED HEALTH CARE EDUCATION/TRAINING PROGRAM

## 2023-09-30 PROCEDURE — 85049 AUTOMATED PLATELET COUNT: CPT

## 2023-09-30 PROCEDURE — 82948 REAGENT STRIP/BLOOD GLUCOSE: CPT

## 2023-09-30 PROCEDURE — 80048 BASIC METABOLIC PNL TOTAL CA: CPT

## 2023-09-30 PROCEDURE — 97530 THERAPEUTIC ACTIVITIES: CPT

## 2023-09-30 PROCEDURE — 85027 COMPLETE CBC AUTOMATED: CPT

## 2023-09-30 PROCEDURE — 97163 PT EVAL HIGH COMPLEX 45 MIN: CPT

## 2023-09-30 PROCEDURE — 80061 LIPID PANEL: CPT

## 2023-09-30 RX ORDER — MECLIZINE HCL 12.5 MG/1
12.5 TABLET ORAL EVERY 8 HOURS PRN
Status: DISCONTINUED | OUTPATIENT
Start: 2023-09-30 | End: 2023-10-04 | Stop reason: HOSPADM

## 2023-09-30 RX ADMIN — HEPARIN SODIUM 5000 UNITS: 5000 INJECTION INTRAVENOUS; SUBCUTANEOUS at 13:28

## 2023-09-30 RX ADMIN — HEPARIN SODIUM 5000 UNITS: 5000 INJECTION INTRAVENOUS; SUBCUTANEOUS at 05:13

## 2023-09-30 RX ADMIN — CARBIDOPA AND LEVODOPA 1 TABLET: 25; 100 TABLET ORAL at 01:55

## 2023-09-30 RX ADMIN — TEMAZEPAM 15 MG: 15 CAPSULE ORAL at 01:55

## 2023-09-30 RX ADMIN — CARBIDOPA AND LEVODOPA 1 TABLET: 25; 100 TABLET ORAL at 09:08

## 2023-09-30 RX ADMIN — ATORVASTATIN CALCIUM 40 MG: 40 TABLET, FILM COATED ORAL at 01:55

## 2023-09-30 RX ADMIN — PANTOPRAZOLE SODIUM 40 MG: 40 TABLET, DELAYED RELEASE ORAL at 05:13

## 2023-09-30 RX ADMIN — CLOZAPINE 200 MG: 100 TABLET ORAL at 01:55

## 2023-09-30 RX ADMIN — TAMSULOSIN HYDROCHLORIDE 0.4 MG: 0.4 CAPSULE ORAL at 21:09

## 2023-09-30 RX ADMIN — METOPROLOL SUCCINATE 25 MG: 25 TABLET, EXTENDED RELEASE ORAL at 09:08

## 2023-09-30 RX ADMIN — HEPARIN SODIUM 5000 UNITS: 5000 INJECTION INTRAVENOUS; SUBCUTANEOUS at 21:09

## 2023-09-30 RX ADMIN — CARBIDOPA AND LEVODOPA 1 TABLET: 25; 100 TABLET ORAL at 21:09

## 2023-09-30 RX ADMIN — TAMSULOSIN HYDROCHLORIDE 0.4 MG: 0.4 CAPSULE ORAL at 01:55

## 2023-09-30 RX ADMIN — ATORVASTATIN CALCIUM 40 MG: 40 TABLET, FILM COATED ORAL at 17:27

## 2023-09-30 RX ADMIN — TOPIRAMATE 25 MG: 25 TABLET, FILM COATED ORAL at 09:08

## 2023-09-30 RX ADMIN — ASPIRIN 81 MG 81 MG: 81 TABLET ORAL at 09:08

## 2023-09-30 RX ADMIN — TEMAZEPAM 15 MG: 15 CAPSULE ORAL at 21:09

## 2023-09-30 RX ADMIN — CLOZAPINE 100 MG: 100 TABLET ORAL at 09:08

## 2023-09-30 RX ADMIN — TRAZODONE HYDROCHLORIDE 100 MG: 100 TABLET ORAL at 21:09

## 2023-09-30 RX ADMIN — CLOZAPINE 200 MG: 100 TABLET ORAL at 23:09

## 2023-09-30 RX ADMIN — CLOZAPINE 100 MG: 100 TABLET ORAL at 18:22

## 2023-09-30 RX ADMIN — CARBIDOPA AND LEVODOPA 1 TABLET: 25; 100 TABLET ORAL at 17:27

## 2023-09-30 RX ADMIN — TOPIRAMATE 25 MG: 25 TABLET, FILM COATED ORAL at 17:27

## 2023-09-30 RX ADMIN — TRAZODONE HYDROCHLORIDE 100 MG: 100 TABLET ORAL at 01:55

## 2023-09-30 NOTE — H&P
[address]  H&P  Name: Bre Lincoln 68 y.o. male I MRN: [id number]  Unit/Bed#: -02 I Date of Admission: 9/29/2023   Date of Service: 9/30/2023 I Hospital Day: 0      Assessment/Plan   * Stroke-like symptoms  Assessment & Plan  • Background: dizziness affecting ambulation  • Admitted on Stroke Pathway:  o  r/o stroke vs BPPV vs Vertigo  • CTA Head and Neck:  No acute intracranial hemorrhage, mass effect or extra-axial collection. No hemodynamically significant stenosis, dissection or occlusion of the carotid or vertebral arteries. No intracranial aneurysm. No hemodynamically significant stenosis or occlusion of the major vessels of the [place]. • Admission NIHSS: 0  • Not a candidate for TPa/TNK  • DVT prophylaxis   • PT/OT/ST  • ASA  • Statin  • Neurology consult  o Will defer MRI, ECHO dependent on Neuro recomendations      Paranoid schizophrenia ([address])  Assessment & Plan  · At baseline  · Continue current medications    Hypertension  Assessment & Plan  · BP stable  · Continue metoprolol    Neuro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 (HCC)  Assessment & Plan  · Stable, mild resting tremor noted  · Continue Sinemet          VTE Pharmacologic Prophylaxis: VTE Score: 3 Moderate Risk (Score 3-4) - Pharmacological DVT Prophylaxis Ordered: heparin. Code Status: Level 1 - Full Code   Discussion with family: Attempted to update  (sister) via phone. Unable to contact. Anticipated Length of Stay: Patient will be admitted on an observation basis with an anticipated length of stay of less than 2 midnights secondary to r/o stroke. Total Time Spent on Date of Encounter in care of patient: 75 mins.  This time was spent on one or more of the following: performing physical exam; counseling and coordination of care; obtaining or reviewing history; documenting in the medical record; reviewing/ordering tests, medications or procedures; communicating with other healthcare professionals and discussing with patient's family/caregivers. Chief Complaint:    Chief Complaint   Patient presents with   • Dizziness     Pt arrived via EMS from assisted living facility. Pt c/o of having a glass of milk with dinner and states that he felt dizzy afterwards. Pt can ambulate but had difficulty ambulating to EMS. Pt hx of schizophrenia and Parkinsons. Pt did not take his medication this evening. History of Present Illness: Tesfaye Bowen is a 68 y.o. male with a PMH of HTN, Parkinson's disease, schizoprenia, GERD, SBO, and COPD who presents with dizziness. Patient reports dizziness that began after drinking a glass of milk as assisted living center. The dizziness affected his ability to ambulate. He was sent via EMS to the emergency department. Upon arrival to the emergency department he was admitted via the stroke pathway. His initial NIH score was 0. Stroke alert was not activated due to initial NIH score and ability to ambulate. Patient denies loss of consciousness, weakness, numbness, shortness of breath, chest pain. Will admit under observation for rule out stroke versus BPPV. Review of Systems:  Review of Systems   Constitutional: Negative. HENT: Negative. Respiratory: Negative for shortness of breath. Cardiovascular: Negative for chest pain and leg swelling. Gastrointestinal: Negative. Genitourinary: Negative. Neurological: Positive for dizziness and tremors. Hematological: Negative. Psychiatric/Behavioral: Negative.         Past Medical and Surgical History:   Past Medical History:   Diagnosis Date   • Asthma    • Benign prostatic hyperplasia    • COPD (chronic obstructive pulmonary disease) ([address])    • GERD (gastroesophageal reflux disease)    • Herpes zoster without complication 24/75/6288   • Hypercholesteremia    • Hypertension    • Neuroleptic induced parkinsonism ([address])    • Paranoid schizophrenia ([address])    • Psychiatric disorder        Past Surgical History:   Procedure Laterality Date   • CATARACT EXTRACTION     • COLONOSCOPY         Meds/Allergies:  Prior to Admission medications    Medication Sig Start Date End Date Taking?  Authorizing Provider   acetaminophen (Acetaminophen Extra Strength) 500 mg tablet Take 1 tablet (500 mg total) by mouth every 6 (six) hours as needed for mild pain 3/13/23   LIUDMILA Mercado   atorvastatin (LIPITOR) 40 mg tablet Take 1 tablet (40 mg total) by mouth daily 3/13/23   LIUDMILA Mercado   AUSTEDO 12 MG TABS Take 1 tablet by mouth 2 (two) times a day 1/3/20   Historical Provider, MD   carbidopa-levodopa (SINEMET)  mg per tablet Take 1 tablet by mouth 3 (three) times a day 3/2/23   Montse Gee MD   Cholecalciferol (Vitamin D High Potency) 25 MCG (1000 UT) capsule Take 1 capsule (1,000 Units total) by mouth daily 3/13/23   LIUDMILA Mercado   cloZAPine (CLOZARIL) 100 mg tablet Take 100 mg by mouth 2 (two) times a day    Historical Provider, MD   clozapine (CLOZARIL) 200 MG tablet Take 200 mg by mouth daily at bedtime  4/8/19   Historical Provider, MD   dicyclomine (BENTYL) 20 mg tablet Take 1 tablet (20 mg total) by mouth 2 (two) times a day  Patient not taking: Reported on 9/22/2023 9/2/23   Bita Rosenbaum DO   docusate sodium (COLACE) 100 mg capsule Take 1 capsule (100 mg total) by mouth 2 (two) times a day 5/12/23   LIUDMILA Bridges   Emollient (Lubriderm) LOTN Apply topically daily as needed (rash)  Patient not taking: Reported on 9/22/2023 3/13/23   LIUDMILA Mercado   fluticasone [facility]) 50 mcg/act nasal spray 2 sprays into each nostril daily 3/13/23   LIUDMILA Mercado   hydrOXYzine HCL (ATARAX) 25 mg tablet Take 1 tablet (25 mg total) by mouth every 6 (six) hours as needed for itching 4/26/23   LIUDMILA Bridges   metoprolol succinate (TOPROL-XL) 25 mg 24 hr tablet Take 1 tablet (25 mg total) by mouth daily 3/13/23   [address] [facility] Jax metroNIDAZOLE (FLAGYL) 500 mg tablet 3 (three) times a day 9/21/23   Historical Provider, MD   olopatadine HCl (PATADAY) 0.2 % opth drops Administer 1 drop to both eyes daily at bedtime 3/13/23   LIUDMILA De Santiago   omeprazole (PriLOSEC) 40 MG capsule Take 1 capsule (40 mg total) by mouth daily 3/13/23   LIUDMILA De Santiago   ondansetron (Zofran ODT) 4 mg disintegrating tablet Take 1 tablet (4 mg total) by mouth every 6 (six) hours as needed for nausea or vomiting 9/2/23   Bita Rosenbaum DO   ondansetron (ZOFRAN-ODT) 4 mg disintegrating tablet Take 1 tablet (4 mg total) by mouth every 8 (eight) hours as needed for nausea 4/19/23   Tasha Aldana PA-C   polyethylene glycol (MIRALAX) 17 g packet Take 17 g by mouth daily  Patient not taking: Reported on 9/22/2023 5/12/23   LIUDMILA New   Psyllium (Metamucil) 28.3 % POWD Take by mouth 2 (two) times a day 1 dose as per can twice a day  Patient not taking: Reported on 9/22/2023 6/15/23   LIUDMILA New   Skin Protectants, Misc. (eucerin) cream Apply topically as needed for wound care  Patient not taking: Reported on 5/18/2023 4/26/23   LIUDMILA New   sodium phosphate-biphosphate (FLEET) 7-19 g 118 mL enema Insert 1 enema into the rectum daily as needed (constipation) for up to 5 doses  Patient not taking: Reported on 5/12/2023 5/9/23   Hakan Mosley MD   tamsulosin [facility]) 0.4 mg Take 1 capsule (0.4 mg total) by mouth daily at bedtime 3/13/23   LIUDMILA De Santiago   temazepam (RESTORIL) 15 mg capsule Take 15 mg by mouth daily at bedtime    Historical Provider, MD   topiramate (TOPAMAX) 25 mg tablet Take 25 mg by mouth 2 (two) times a day 7/1/19   Historical Provider, MD   traZODone (DESYREL) 100 mg tablet Take 100 mg by mouth daily at bedtime 4/8/19   Historical Provider, MD CHAN have reviewed home medications using recent Epic encounter.     Allergies: No Known Allergies    Social History:  Marital Status: Single   Occupation:   Patient Pre-hospital Living Situation: Assisted Living  Patient Pre-hospital Level of Mobility: walks  Patient Pre-hospital Diet Restrictions:   Substance Use History:   Social History     Substance and Sexual Activity   Alcohol Use Not Currently     Social History     Tobacco Use   Smoking Status Former   • Packs/day: 1.00   • Years: 20.00   • Total pack years: 20.00   • Types: Cigarettes   • Quit date:    • Years since quittin.7   Smokeless Tobacco Never     Social History     Substance and Sexual Activity   Drug Use Never       Family History:  Family History   Problem Relation Age of Onset   • No Known Problems Mother    • No Known Problems Father    • Parkinsonism Son        Physical Exam:     Vitals:   Blood Pressure: 147/77 (23)  Pulse: 70 (23)  Temperature: 98 °F (36.7 °C) (23)  Respirations: 20 (23)  SpO2: 96 % (23)    Physical Exam  Vitals and nursing note reviewed. Constitutional:       General: He is not in acute distress. Appearance: He is well-developed. HENT:      Head: Normocephalic and atraumatic. Eyes:      Conjunctiva/sclera: Conjunctivae normal.   Cardiovascular:      Rate and Rhythm: Normal rate and regular rhythm. Heart sounds: No murmur heard. Pulmonary:      Effort: Pulmonary effort is normal. No respiratory distress. Breath sounds: Normal breath sounds. Abdominal:      Palpations: Abdomen is soft. Tenderness: There is no abdominal tenderness. Musculoskeletal:         General: No swelling. Cervical back: Neck supple. Skin:     General: Skin is warm and dry. Capillary Refill: Capillary refill takes less than 2 seconds. Neurological:      Mental Status: He is alert. Mental status is at baseline.       Comments: Mild resting tremor,    Psychiatric:         Attention and Perception: Attention normal.         Mood and Affect: Mood normal.         Behavior: Behavior normal. Behavior is cooperative. Additional Data:     Lab Results:  Results from last 7 days   Lab Units 09/29/23 1931   WBC Thousand/uL 3.95*   HEMOGLOBIN g/dL 12.8   HEMATOCRIT % 37.4   PLATELETS Thousands/uL 145*   NEUTROS PCT % 47   LYMPHS PCT % 33   MONOS PCT % 14*   EOS PCT % 5     Results from last 7 days   Lab Units 09/29/23 1931   SODIUM mmol/L 140   POTASSIUM mmol/L 3.8   CHLORIDE mmol/L 111*   CO2 mmol/L 27   BUN mg/dL 14   CREATININE mg/dL 0.73   ANION GAP mmol/L 2   CALCIUM mg/dL 8.8   ALBUMIN g/dL 3.6   TOTAL BILIRUBIN mg/dL 0.42   ALK PHOS U/L 71   ALT U/L 27   AST U/L 23   GLUCOSE RANDOM mg/dL 80     Results from last 7 days   Lab Units 09/29/23 1931   INR  1.09     Results from last 7 days   Lab Units 09/30/23  0017 09/29/23  1913   POC GLUCOSE mg/dl 110 85               Lines/Drains:  Invasive Devices     Peripheral Intravenous Line  Duration           Peripheral IV 09/29/23 Right;Upper;Ventral (anterior) Arm <1 day                    Imaging: Reviewed radiology reports from this admission including: CT head  CTA head and neck with and without contrast   Final Result by Oneta Schirmer, MD (09/29 2145)      1. No acute intracranial hemorrhage, mass effect or extra-axial collection. 2.  No hemodynamically significant stenosis, dissection or occlusion of the carotid or vertebral arteries. 3.  No intracranial aneurysm. No hemodynamically significant stenosis or occlusion of the major vessels of the [place]. Workstation performed: [id number]         XR chest 1 view portable    (Results Pending)   MRI Inpatient Order    (Results Pending)       EKG and Other Studies Reviewed on Admission:   · EKG: NSR. HR 74.    ** Please Note: This note has been constructed using a voice recognition system.  **

## 2023-09-30 NOTE — ED NOTES
Jay Vo from [facility] called to inform that if patient needed transportation call facility [phone] 24hr. Hudson Casas.  Monet Gonzalez, [address] [place] [address]  09/29/23 0455

## 2023-09-30 NOTE — ASSESSMENT & PLAN NOTE
• Background: dizziness affecting ambulation  • Admitted on Stroke Pathway:  o  r/o stroke vs BPPV vs Vertigo  • CTA Head and Neck:  No acute intracranial hemorrhage, mass effect or extra-axial collection. No hemodynamically significant stenosis, dissection or occlusion of the carotid or vertebral arteries. No intracranial aneurysm. No hemodynamically significant stenosis or occlusion of the major vessels of the [place].    • Admission NIHSS: 0  • Not a candidate for TPa/TNK  • DVT prophylaxis   • PT/OT/ST  • ASA  • Statin  • Neurology consult  o Will defer MRI, ECHO dependent on Neuro recomendations

## 2023-09-30 NOTE — ED PROVIDER NOTES
History  Chief Complaint   Patient presents with   • Dizziness     Pt arrived via EMS from assisted living facility. Pt c/o of having a glass of milk with dinner and states that he felt dizzy afterwards. Pt can ambulate but had difficulty ambulating to EMS. Pt hx of schizophrenia and Parkinsons. Pt did not take his medication this evening. This is a 51-year-old male patient presenting for evaluation of lightheadedness and dizziness. It is difficult for him to describe the dizziness. He states he feels like he is going to fall and that he is faint. Started around 5-5 30 after eating dinner. Gets worse when he walks and sits up. Denies headache denies extremity numbness tingling or weakness. History provided by:  Patient   used: No    Dizziness  Associated symptoms: no chest pain, no palpitations, no shortness of breath and no vomiting        Prior to Admission Medications   Prescriptions Last Dose Informant Patient Reported? Taking?    AUSTEDO 12 MG TABS 9/29/2023 Care Giver Yes Yes   Sig: Take 1 tablet by mouth 2 (two) times a day   Cholecalciferol (Vitamin D High Potency) 25 MCG (1000 UT) capsule  Care Giver No No   Sig: Take 1 capsule (1,000 Units total) by mouth daily   Emollient (Lubriderm) LOTN  Care Giver No No   Sig: Apply topically daily as needed (rash)   Patient not taking: Reported on 9/22/2023   Psyllium (Metamucil) 28.3 % POWD  Care Giver No No   Sig: Take by mouth 2 (two) times a day 1 dose as per can twice a day   Patient not taking: Reported on 9/22/2023   Skin Protectants, Misc. (eucerin) cream  Care Giver No No   Sig: Apply topically as needed for wound care   Patient not taking: Reported on 5/18/2023   acetaminophen (Acetaminophen Extra Strength) 500 mg tablet Not Taking Care Giver No No   Sig: Take 1 tablet (500 mg total) by mouth every 6 (six) hours as needed for mild pain   Patient not taking: Reported on 9/30/2023   atorvastatin (LIPITOR) 40 mg tablet 2023 Care Giver No Yes   Sig: Take 1 tablet (40 mg total) by mouth daily   carbidopa-levodopa (SINEMET)  mg per tablet  Care Giver No No   Sig: Take 1 tablet by mouth 3 (three) times a day   cloZAPine (CLOZARIL) 100 mg tablet 2023 Care Giver Yes Yes   Sig: Take 100 mg by mouth 2 (two) times a day   clozapine (CLOZARIL) 200 MG tablet 2023 Care Giver Yes Yes   Sig: Take 200 mg by mouth daily at bedtime    dicyclomine (BENTYL) 20 mg tablet 2023 Care Giver No Yes   Sig: Take 1 tablet (20 mg total) by mouth 2 (two) times a day   docusate sodium (COLACE) 100 mg capsule 2023 Care Giver No Yes   Sig: Take 1 capsule (100 mg total) by mouth 2 (two) times a day   fluticasone (FLONASE) 50 mcg/act nasal spray 2023 Care Giver No Yes   Si sprays into each nostril daily   hydrOXYzine HCL (ATARAX) 25 mg tablet Past Week Care Giver No Yes   Sig: Take 1 tablet (25 mg total) by mouth every 6 (six) hours as needed for itching   metoprolol succinate (TOPROL-XL) 25 mg 24 hr tablet Past Week Care Giver No Yes   Sig: Take 1 tablet (25 mg total) by mouth daily   metroNIDAZOLE (FLAGYL) 500 mg tablet Unknown Care Giver Yes No   Sig: 3 (three) times a day   olopatadine HCl (PATADAY) 0.2 % opth drops Not Taking Care Giver No No   Sig: Administer 1 drop to both eyes daily at bedtime   Patient not taking: Reported on 2023   omeprazole (PriLOSEC) 40 MG capsule Past Week Care Giver No Yes   Sig: Take 1 capsule (40 mg total) by mouth daily   ondansetron (ZOFRAN-ODT) 4 mg disintegrating tablet Past Month Care Giver No Yes   Sig: Take 1 tablet (4 mg total) by mouth every 8 (eight) hours as needed for nausea   ondansetron (Zofran ODT) 4 mg disintegrating tablet Past Week Care Giver No Yes   Sig: Take 1 tablet (4 mg total) by mouth every 6 (six) hours as needed for nausea or vomiting   polyethylene glycol (MIRALAX) 17 g packet  Care Giver No No   Sig: Take 17 g by mouth daily   Patient not taking: Reported on 2023   sodium phosphate-biphosphate (FLEET) 7-19 g 118 mL enema  Care Giver No No   Sig: Insert 1 enema into the rectum daily as needed (constipation) for up to 5 doses   Patient not taking: Reported on 2023   tamsulosin (FLOMAX) 0.4 mg Past Month Care Giver No Yes   Sig: Take 1 capsule (0.4 mg total) by mouth daily at bedtime   temazepam (RESTORIL) 15 mg capsule Past Week Care Giver Yes Yes   Sig: Take 15 mg by mouth daily at bedtime   topiramate (TOPAMAX) 25 mg tablet Past Week Care Giver Yes Yes   Sig: Take 25 mg by mouth 2 (two) times a day   traZODone (DESYREL) 100 mg tablet Past Week Care Giver Yes Yes   Sig: Take 100 mg by mouth daily at bedtime      Facility-Administered Medications: None       Past Medical History:   Diagnosis Date   • Asthma    • Benign prostatic hyperplasia    • COPD (chronic obstructive pulmonary disease) ([facility])    • GERD (gastroesophageal reflux disease)    • Herpes zoster without complication    • Hypercholesteremia    • Hypertension    • Neuroleptic induced parkinsonism ([address])    • Paranoid schizophrenia ([address])    • Psychiatric disorder        Past Surgical History:   Procedure Laterality Date   • CATARACT EXTRACTION     • COLONOSCOPY         Family History   Problem Relation Age of Onset   • No Known Problems Mother    • No Known Problems Father    • Parkinsonism Son      I have reviewed and agree with the history as documented.     E-Cigarette/Vaping   • E-Cigarette Use Never User      E-Cigarette/Vaping Substances   • Nicotine No    • THC No    • CBD No    • Flavoring No    • Other No    • Unknown No      Social History     Tobacco Use   • Smoking status: Former     Packs/day: 1.00     Years: 20.00     Total pack years: 20.00     Types: Cigarettes     Quit date:      Years since quittin.7   • Smokeless tobacco: Never   Vaping Use   • Vaping Use: Never used   Substance Use Topics   • Alcohol use: Not Currently   • Drug use: Never       Review of Systems Constitutional: Negative for chills and fever. HENT: Negative for ear pain and sore throat. Eyes: Negative for pain and visual disturbance. Respiratory: Negative for cough and shortness of breath. Cardiovascular: Negative for chest pain and palpitations. Gastrointestinal: Negative for abdominal pain and vomiting. Genitourinary: Negative for dysuria and hematuria. Musculoskeletal: Negative for arthralgias and back pain. Skin: Negative for color change and rash. Neurological: Positive for dizziness and light-headedness. Negative for seizures and syncope. All other systems reviewed and are negative. Physical Exam  Physical Exam  Vitals and nursing note reviewed. Constitutional:       General: He is not in acute distress. Appearance: He is well-developed. HENT:      Head: Normocephalic and atraumatic. Eyes:      Conjunctiva/sclera: Conjunctivae normal.   Cardiovascular:      Rate and Rhythm: Normal rate and regular rhythm. Heart sounds: No murmur heard. Pulmonary:      Effort: Pulmonary effort is normal. No respiratory distress. Breath sounds: Normal breath sounds. Abdominal:      Palpations: Abdomen is soft. Tenderness: There is no abdominal tenderness. Musculoskeletal:         General: No swelling. Cervical back: Neck supple. Skin:     General: Skin is warm and dry. Capillary Refill: Capillary refill takes less than 2 seconds. Neurological:      Mental Status: He is alert and oriented to person, place, and time. GCS: GCS eye subscore is 4. GCS verbal subscore is 5. GCS motor subscore is 6. Comments: GCS 15. AAOx3. No focal neuro deficits. CN II-XII grossly intact. Resting tremor. Speech normal, no aphasia or dysarthria. No pronator drift. Cerebellar function normal. Finger to nose normal. PERRL. EOMI. Peripheral vision intact. No nystagmus. Upper and lower extremity strength 5/5 through.  strength 5/5 b/l.  Gross sensation intact b/l.      Psychiatric:         Mood and Affect: Mood normal.         Vital Signs  ED Triage Vitals   Temperature Pulse Respirations Blood Pressure SpO2   09/29/23 1916 09/29/23 1916 09/29/23 1916 09/29/23 1916 09/29/23 1916   98.1 °F (36.7 °C) 78 18 164/93 97 %      Temp Source Heart Rate Source Patient Position - Orthostatic VS BP Location FiO2 (%)   09/30/23 0000 09/29/23 2027 09/29/23 1916 09/29/23 1916 --   Oral Monitor Lying Right arm       Pain Score       09/30/23 0116       No Pain           Vitals:    10/02/23 0750 10/02/23 1100 10/02/23 1101 10/02/23 1118   BP:  121/63 121/63 113/67   Pulse: 83 84 84 77   Patient Position - Orthostatic VS:  Lying           Visual Acuity  Visual Acuity    Flowsheet Row Most Recent Value   L Pupil Size (mm) 3   R Pupil Size (mm) 3   L Pupil Shape Round   R Pupil Shape Round          ED Medications  Medications   carbidopa-levodopa (SINEMET)  mg per tablet 1 tablet (1 tablet Oral Given 10/2/23 0842)   cloZAPine (CLOZARIL) tablet 100 mg (100 mg Oral Given 10/2/23 0838)   cloZAPine (CLOZARIL) tablet 200 mg (200 mg Oral Given 10/1/23 2158)   hydrOXYzine HCL (ATARAX) tablet 25 mg (has no administration in time range)   metoprolol succinate (TOPROL-XL) 24 hr tablet 25 mg (25 mg Oral Given 10/2/23 0838)   pantoprazole (PROTONIX) EC tablet 40 mg (40 mg Oral Given 10/2/23 0558)   tamsulosin (FLOMAX) capsule 0.4 mg (0.4 mg Oral Given 10/1/23 2158)   temazepam (RESTORIL) capsule 15 mg (15 mg Oral Given 10/1/23 2158)   topiramate (TOPAMAX) tablet 25 mg (25 mg Oral Given 10/2/23 0842)   traZODone (DESYREL) tablet 100 mg (100 mg Oral Given 10/1/23 2158)   atorvastatin (LIPITOR) tablet 40 mg (40 mg Oral Given 10/1/23 1809)   aspirin chewable tablet 81 mg (81 mg Oral Given 10/2/23 0842)   acetaminophen (TYLENOL) tablet 650 mg (has no administration in time range)   polyethylene glycol (MIRALAX) packet 17 g (has no administration in time range)   calcium carbonate (TUMS) chewable tablet 1,000 mg (has no administration in time range)   ondansetron (ZOFRAN) injection 4 mg (has no administration in time range)   heparin (porcine) subcutaneous injection 5,000 Units (5,000 Units Subcutaneous Given 10/2/23 0558)   meclizine (ANTIVERT) tablet 12.5 mg (has no administration in time range)   sodium chloride 0.9 % bolus 1,000 mL (0 mL Intravenous Stopped 9/29/23 2226)   iohexol (OMNIPAQUE) 350 MG/ML injection (MULTI-DOSE) 85 mL (85 mL Intravenous Given 9/29/23 2048)   clopidogrel (PLAVIX) tablet 600 mg (600 mg Oral Given 9/29/23 2224)       Diagnostic Studies  Results Reviewed     Procedure Component Value Units Date/Time    Lipid Panel with Direct LDL reflex [[id number]]  (Normal) Collected: 09/30/23 0453    Lab Status: Final result Specimen: Blood from Arm, Left Updated: 09/30/23 0540     Cholesterol 67 mg/dL      Triglycerides 41 mg/dL      HDL, Direct 43 mg/dL      LDL Calculated 16 mg/dL     Basic metabolic panel [[id number]]  (Abnormal) Collected: 09/30/23 0453    Lab Status: Final result Specimen: Blood from Arm, Left Updated: 09/30/23 0540     Sodium 138 mmol/L      Potassium 3.6 mmol/L      Chloride 111 mmol/L      CO2 24 mmol/L      ANION GAP 3 mmol/L      BUN 12 mg/dL      Creatinine 0.66 mg/dL      Glucose 83 mg/dL      Calcium 8.4 mg/dL      eGFR 95 ml/min/1.73sq m     Narrative:      [place] guidelines for Chronic Kidney Disease (CKD):   •  Stage 1 with normal or high GFR (GFR > 90 mL/min/1.73 square meters)  •  Stage 2 Mild CKD (GFR = 60-89 mL/min/1.73 square meters)  •  Stage 3A Moderate CKD (GFR = 45-59 mL/min/1.73 square meters)  •  Stage 3B Moderate CKD (GFR = 30-44 mL/min/1.73 square meters)  •  Stage 4 Severe CKD (GFR = 15-29 mL/min/1.73 square meters)  •  Stage 5 End Stage CKD (GFR <15 mL/min/1.73 square meters)  Note: GFR calculation is accurate only with a steady state creatinine    CBC (With Platelets) [[id number]]  (Abnormal) Collected: 09/30/23 045 Lab Status: Final result Specimen: Blood from Arm, Left Updated: 09/30/23 0510     WBC 3.40 Thousand/uL      RBC 3.41 Million/uL      Hemoglobin 11.5 g/dL      Hematocrit 33.2 %      MCV 97 fL      MCH 33.7 pg      MCHC 34.6 g/dL      RDW 14.1 %      Platelets 053 Thousands/uL      MPV 8.8 fL     Platelet count [[id number]]  (Abnormal) Collected: 09/30/23 0050    Lab Status: Final result Specimen: Blood from Arm, Left Updated: 09/30/23 0100     Platelets 900 Thousands/uL      MPV 9.0 fL     Protime-INR [[id number]]  (Abnormal) Collected: 09/29/23 1931    Lab Status: Final result Specimen: Blood from Arm, Right Updated: 09/29/23 2038     Protime 14.7 seconds      INR 1.09    APTT [[id number]]  (Abnormal) Collected: 09/29/23 1931    Lab Status: Final result Specimen: Blood from Arm, Right Updated: 09/29/23 2038     PTT 41 seconds     D-Dimer [[id number]]  (Normal) Collected: 09/29/23 1931    Lab Status: Final result Specimen: Blood from Arm, Right Updated: 09/29/23 2038     D-Dimer, Quant 0.29 ug/ml FEU     Narrative: In the evaluation for possible pulmonary embolism, in the appropriate (Well's Score of 4 or less) patient, the age adjusted d-dimer cutoff for this patient can be calculated as:    Age x 0.01 (in ug/mL) for Age-adjusted D-dimer exclusion threshold for a patient over 50 years. FLU/RSV/COVID - if FLU/RSV clinically relevant [[id number]]  (Normal) Collected: 09/29/23 1931    Lab Status: Final result Specimen: Nares from Nose Updated: 09/29/23 2020     SARS-CoV-2 Negative     INFLUENZA A PCR Negative     INFLUENZA B PCR Negative     RSV PCR Negative    Narrative:      FOR PEDIATRIC PATIENTS - copy/paste COVID Guidelines URL to browser: [email or web address]    SARS-CoV-2 assay is a Nucleic Acid Amplification assay intended for the  qualitative detection of nucleic acid from SARS-CoV-2 in nasopharyngeal  swabs.  Results are for the presumptive identification of SARS-CoV-2 RNA. Positive results are indicative of infection with SARS-CoV-2, the virus  causing COVID-19, but do not rule out bacterial infection or co-infection  with other viruses. Laboratories within the [facility] and its  territories are required to report all positive results to the appropriate  public health authorities. Negative results do not preclude SARS-CoV-2  infection and should not be used as the sole basis for treatment or other  patient management decisions. Negative results must be combined with  clinical observations, patient history, and epidemiological information. This test has not been FDA cleared or approved. This test has been authorized by FDA under an Emergency Use Authorization  (EUA). This test is only authorized for the duration of time the  declaration that circumstances exist justifying the authorization of the  emergency use of an in vitro diagnostic tests for detection of SARS-CoV-2  virus and/or diagnosis of COVID-19 infection under section 564(b)(1) of  the Act, 21 U. S.C. 651NOI-6(J)(9), unless the authorization is terminated  or revoked sooner. The test has been validated but independent review by FDA  and CLIA is pending. Test performed using [employer] GeneXpert: This RT-PCR assay targets N2,  a region unique to SARS-CoV-2. A conserved region in the E-gene was chosen  for pan-Sar[place]ovirus detection which includes SARS-CoV-2. According to CMS-2020-01-R, this platform meets the definition of high-throughput technology.     Urine Microscopic [[id number]]  (Abnormal) Collected: 09/29/23 2005    Lab Status: Final result Specimen: Urine, Other Updated: 09/29/23 2012     RBC, UA 1-2 /hpf      WBC, UA None Seen /hpf      Epithelial Cells None Seen /hpf      Bacteria, UA None Seen /hpf      MUCUS THREADS Occasional    UA w Reflex to Microscopic w Reflex to Culture [[id number]]  (Abnormal) Collected: 09/29/23 2005    Lab Status: Final result Specimen: Urine, Other Updated: 09/29/23 2012     Color, UA Yellow     Clarity, UA Clear     Specific Gravity, UA 1.024     pH, UA 6.0     Leukocytes, UA Negative     Nitrite, UA Negative     Protein, UA Trace mg/dl      Glucose, UA Negative mg/dl      Ketones, UA Negative mg/dl      Urobilinogen, UA <2.0 mg/dl      Bilirubin, UA Negative     Occult Blood, UA Negative    Comprehensive metabolic panel [[id number]]  (Abnormal) Collected: 09/29/23 1931    Lab Status: Final result Specimen: Blood from Arm, Right Updated: 09/29/23 2000     Sodium 140 mmol/L      Potassium 3.8 mmol/L      Chloride 111 mmol/L      CO2 27 mmol/L      ANION GAP 2 mmol/L      BUN 14 mg/dL      Creatinine 0.73 mg/dL      Glucose 80 mg/dL      Calcium 8.8 mg/dL      AST 23 U/L      ALT 27 U/L      Alkaline Phosphatase 71 U/L      Total Protein 6.1 g/dL      Albumin 3.6 g/dL      Total Bilirubin 0.42 mg/dL      eGFR 92 ml/min/1.73sq m     Narrative:      [place] guidelines for Chronic Kidney Disease (CKD):   •  Stage 1 with normal or high GFR (GFR > 90 mL/min/1.73 square meters)  •  Stage 2 Mild CKD (GFR = 60-89 mL/min/1.73 square meters)  •  Stage 3A Moderate CKD (GFR = 45-59 mL/min/1.73 square meters)  •  Stage 3B Moderate CKD (GFR = 30-44 mL/min/1.73 square meters)  •  Stage 4 Severe CKD (GFR = 15-29 mL/min/1.73 square meters)  •  Stage 5 End Stage CKD (GFR <15 mL/min/1.73 square meters)  Note: GFR calculation is accurate only with a steady state creatinine    CBC and differential [[id number]]  (Abnormal) Collected: 09/29/23 1931    Lab Status: Final result Specimen: Blood from Arm, Right Updated: 09/29/23 1943     WBC 3.95 Thousand/uL      RBC 3.80 Million/uL      Hemoglobin 12.8 g/dL      Hematocrit 37.4 %      MCV 98 fL      MCH 33.7 pg      MCHC 34.2 g/dL      RDW 14.1 %      MPV 9.0 fL      Platelets 766 Thousands/uL      nRBC 0 /100 WBCs      Neutrophils Relative 47 %      Immat GRANS % 0 %      Lymphocytes Relative 33 % Monocytes Relative 14 %      Eosinophils Relative 5 %      Basophils Relative 1 %      Neutrophils Absolute 1.84 Thousands/µL      Immature Grans Absolute 0.01 Thousand/uL      Lymphocytes Absolute 1.30 Thousands/µL      Monocytes Absolute 0.57 Thousand/µL      Eosinophils Absolute 0.20 Thousand/µL      Basophils Absolute 0.03 Thousands/µL     Fingerstick Glucose (POCT) [[id number]]  (Normal) Collected: 09/29/23 1913    Lab Status: Final result Updated: 09/29/23 1914     POC Glucose 85 mg/dl                  CTA head and neck with and without contrast   Final Result by Yolanda Castano MD (09/29 2145)      1. No acute intracranial hemorrhage, mass effect or extra-axial collection. 2.  No hemodynamically significant stenosis, dissection or occlusion of the carotid or vertebral arteries. 3.  No intracranial aneurysm. No hemodynamically significant stenosis or occlusion of the major vessels of the [place]. Workstation performed: [id number]         XR chest 1 view portable   Final Result by Shannen Carrasco MD (09/30 0802)      No acute cardiopulmonary disease.                   Workstation performed: [id number]                    Procedures  ECG 12 Lead Documentation Only    Date/Time: 9/29/2023 8:56 PM    Performed by: Deangelo Richards PA-C  Authorized by: Deangelo Richards PA-C    ECG reviewed by me, the ED Provider: yes    Patient location:  ED  Interpretation:     Interpretation: normal    Quality:     Tracing quality:  Limited by artifact  Rate:     ECG rate:  74    ECG rate assessment: normal    Rhythm:     Rhythm: sinus rhythm    Ectopy:     Ectopy: none    QRS:     QRS axis:  Normal    QRS intervals:  Normal  Conduction:     Conduction: normal    ST segments:     ST segments:  Normal  T waves:     T waves: normal               ED Course  ED Course as of 10/02/23 1437   Fri Sep 29, 2023   1927 Paged neurology    2020 Discussed with neurology no stroke alert at this time as patient has NIH 0 and able to ambulate so would not be tnk candidate     2238 Message read by [facility]. No response from slim 40 minutes after paging   [id number] Reached out to Minerva Fischer. Message read. No response                               SBIRT 20yo+    Flowsheet Row Most Recent Value   Initial Alcohol Screen: US AUDIT-C     1. How often do you have a drink containing alcohol? 0 Filed at: 09/29/2023 1922   2. How many drinks containing alcohol do you have on a typical day you are drinking? 0 Filed at: 09/29/2023 1922   3b. FEMALE Any Age, or MALE 65+: How often do you have 4 or more drinks on one occassion? 0 Filed at: 09/29/2023 1922   Audit-C Score 0 Filed at: 09/29/2023 1922   RAFA: How many times in the past year have you. .. Used an illegal drug or used a prescription medication for non-medical reasons? Never Filed at: 09/29/2023 1922                    Medical Decision Making  Differential diagnosis including but not limited to: vasovagal syncope, cardiac arrhythmia, MI, ACS, PE, metabolic abnormality, intracranial process, seizure, anemia, GI bleed, dehydration, cva, tia. Plan: Discuss with neuro. Cardiac workup. Labs. CTA. MDM: 68year-old with dizziness. Difficulty ambulating. Labs unremarkable. CT unremarkable. Cannot rule out central etiology as patient otherwise has no clear source for his dizziness. Has normal orthostatic vital signs. Discussed with neurology will admit for MRI and stroke pathway. Aspirin and Plavix load. Amount and/or Complexity of Data Reviewed  Labs: ordered. Radiology: ordered. Risk  Prescription drug management. Decision regarding hospitalization.           Disposition  Final diagnoses:   Dizziness     Time reflects when diagnosis was documented in both MDM as applicable and the Disposition within this note     Time User Action Codes Description Comment    9/29/2023 10:25 PM Amos LAWTON Add [R42] Dizziness     9/29/2023 11:13 PM Pippa Caro Add [G20] Parkinson disease ([address] [facility])     9/29/2023 11:13 PM Pippa Caro Add [R29.90] Stroke-like symptoms       ED Disposition     ED Disposition   Admit    Condition   Stable    Date/Time   Fri Sep 29, 2023 10:25 PM    Comment   Case was discussed with Dr. Gerda Rome and the patient's admission status was agreed to be Admission Status: observation status to the service of Dr. Gerda Rome . Follow-up Information    None         Current Discharge Medication List      CONTINUE these medications which have NOT CHANGED    Details   atorvastatin (LIPITOR) 40 mg tablet Take 1 tablet (40 mg total) by mouth daily  Qty: 31 tablet, Refills: 11    Comments: [facility] is the new pharmacy provider for individuals with [address] [place]. We need new prescriptions to dispense medications - please advise, thank you for your help. Associated Diagnoses: Hypercholesteremia      AUSTEDO 12 MG TABS Take 1 tablet by mouth 2 (two) times a day      !! cloZAPine (CLOZARIL) 100 mg tablet Take 100 mg by mouth 2 (two) times a day      !! clozapine (CLOZARIL) 200 MG tablet Take 200 mg by mouth daily at bedtime       dicyclomine (BENTYL) 20 mg tablet Take 1 tablet (20 mg total) by mouth 2 (two) times a day  Qty: 20 tablet, Refills: 0    Associated Diagnoses: Vomiting      docusate sodium (COLACE) 100 mg capsule Take 1 capsule (100 mg total) by mouth 2 (two) times a day  Qty: 62 capsule, Refills: 11    Comments: [facility] is the new pharmacy provider for individuals with [address]. We need new prescriptions to dispense medications - please advise, thank you for your help.   Associated Diagnoses: Chronic constipation      fluticasone (FLONASE) 50 mcg/act nasal spray 2 sprays into each nostril daily  Qty: 16 g, Refills: 11    Associated Diagnoses: Nasal congestion      hydrOXYzine HCL (ATARAX) 25 mg tablet Take 1 tablet (25 mg total) by mouth every 6 (six) hours as needed for itching  Qty: 30 tablet, Refills: 5 Associated Diagnoses: Contact dermatitis, unspecified contact dermatitis type, unspecified trigger      metoprolol succinate (TOPROL-XL) 25 mg 24 hr tablet Take 1 tablet (25 mg total) by mouth daily  Qty: 31 tablet, Refills: 11    Comments: [facility] is the new pharmacy provider for individuals with [address]. We need new prescriptions to dispense medications - please advise, thank you for your help. Associated Diagnoses: Aortic root dilatation (HCC)      omeprazole (PriLOSEC) 40 MG capsule Take 1 capsule (40 mg total) by mouth daily  Qty: 31 capsule, Refills: 11    Comments: [facility] is the new pharmacy provider for individuals with [address]. We need new prescriptions to dispense medications - please advise, thank you for your help. Associated Diagnoses: Gastroesophageal reflux disease      !! ondansetron (Zofran ODT) 4 mg disintegrating tablet Take 1 tablet (4 mg total) by mouth every 6 (six) hours as needed for nausea or vomiting  Qty: 20 tablet, Refills: 0    Associated Diagnoses: Vomiting      !! ondansetron (ZOFRAN-ODT) 4 mg disintegrating tablet Take 1 tablet (4 mg total) by mouth every 8 (eight) hours as needed for nausea  Qty: 15 tablet, Refills: 0    Associated Diagnoses: Nausea vomiting and diarrhea      tamsulosin (FLOMAX) 0.4 mg Take 1 capsule (0.4 mg total) by mouth daily at bedtime  Qty: 90 capsule, Refills: 3    Associated Diagnoses: Post-void dribbling      temazepam (RESTORIL) 15 mg capsule Take 15 mg by mouth daily at bedtime      topiramate (TOPAMAX) 25 mg tablet Take 25 mg by mouth 2 (two) times a day  Refills: 1      traZODone (DESYREL) 100 mg tablet Take 100 mg by mouth daily at bedtime      acetaminophen (Acetaminophen Extra Strength) 500 mg tablet Take 1 tablet (500 mg total) by mouth every 6 (six) hours as needed for mild pain  Qty: 30 tablet, Refills: 11    Comments: Robin Fitzpatrick is the new pharmacy provider for individuals with [facility]. We need new prescriptions to dispense medications - please advise, thank you for your help. Associated Diagnoses: Pain      carbidopa-levodopa (SINEMET)  mg per tablet Take 1 tablet by mouth 3 (three) times a day  Qty: 270 tablet, Refills: 3    Associated Diagnoses: Neuro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       Cholecalciferol (Vitamin D High Potency) 25 MCG (1000 UT) capsule Take 1 capsule (1,000 Units total) by mouth daily  Qty: 31 capsule, Refills: 6    Comments: Hi! Austin Alberts is the new pharmacy provider for individuals with [address] [place]. We need new prescriptions to dispense medications - please advise, thank you for your help. Associated Diagnoses: Vitamin D deficiency      Emollient (Lubriderm) LOTN Apply topically daily as needed (rash)  Qty: 177 mL, Refills: 11    Comments: Hi! Armindaence Lexus is the new pharmacy provider for individuals with [address]. We need new prescriptions to dispense medications - please advise, thank you for your help. Associated Diagnoses: Rash      metroNIDAZOLE (FLAGYL) 500 mg tablet 3 (three) times a day      olopatadine HCl (PATADAY) 0.2 % opth drops Administer 1 drop to both eyes daily at bedtime  Qty: 2.5 mL, Refills: 11    Comments: Hi! Armindaence Lexus is the new pharmacy provider for individuals with [address]. We need new prescriptions to dispense medications - please advise, thank you for your help. Associated Diagnoses:  Allergy, subsequent encounter      polyethylene glycol (MIRALAX) 17 g packet Take 17 g by mouth daily  Qty: 31 each, Refills: 11    Associated Diagnoses: Chronic constipation      Psyllium (Metamucil) 28.3 % POWD Take by mouth 2 (two) times a day 1 dose as per can twice a day  Qty: 575 g, Refills: 11    Associated Diagnoses: Chronic constipation      Skin Protectants, Misc. (eucerin) cream Apply topically as needed for wound care  Qty: 397 g, Refills: 0    Associated Diagnoses: Contact dermatitis, unspecified contact dermatitis type, unspecified trigger      sodium phosphate-biphosphate (FLEET) 7-19 g 118 mL enema Insert 1 enema into the rectum daily as needed (constipation) for up to 5 doses  Qty: 5 enema, Refills: 0    Associated Diagnoses: Constipation       !! - Potential duplicate medications found. Please discuss with provider. No discharge procedures on file.     PDMP Review       Value Time User    PDMP Reviewed  Yes 9/29/2023 11:21 PM LIUDMILA Pinedo          ED Provider  Electronically Signed by           Andi Mora PA-C  10/02/23 1095

## 2023-09-30 NOTE — CONSULTS
Consultation - Neurology   Andre Tapia 68 y.o. male MRN: [id number]  Unit/Bed#: -02 Encounter: [id number]      Assessment/Plan     * Orthostatic lightheadedness  Assessment & Plan  61-year-old male with hypertension, hyperlipidemia, paranoid schizophrenia, neuroleptic induced parkinsonism, BPH, COPD presented to the ED from his assisted living facility for evaluation of lightheadedness/imbalance upon standing. No other focal neurologic symptoms reported. Lab work notable for pancytopenia. CTA head and neck was unremarkable. Orthostatic blood pressures were obtained in the ED. Lying-152/74. Sitting-148/74. Standing-136/86. Do not suspect underlying neurologic etiology. Plan:   - Can defer stroke pathway / additional neurodiagnostic imaging.   - Encouraged slow transitions between sitting to standing   - Encouraged hydration - patient reports he does not drink a lot of water during the day. - Management of orthostasis per primary team   - No changes to Sinemet at this time. Continue to follow with outpatient neurologist. Last seen 9/22.   - PT/OT    No further inpatient neurologic recommendations. Andre Tapia will need follow up in at the next regular appointment with Dr. Porfirio Escamilla. History of Present Illness     Reason for Consult / Principal Problem: "Parkinson's disease", Stroke like symptoms     HPI: Andre Tapia is a 68 y.o. male with hypertension, hyperlipidemia, paranoid schizophrenia, neuroleptic induced parkinsonism, BPH, COPD, and GERD, presented to the ED from his assisted living facility for evaluation of dizziness. Patient reports feeling like he is going to fall/pass out upon standing. He reports he was standing up to go and get his medicine. He started coughing during this episode and couldn't get himself fully out of the chair. He did not have any loss of consciousness. Symptoms began after eating dinner. Symptoms are worse upon standing.   He denied any other focal neurologic symptoms. Blood pressure on arrival 164/93. Other vital signs stable. Lab work overall unremarkable except for pancytopenia. CTA head and neck was performed no acute pathology noted. Orthostatic blood pressures were obtained in the ED. Lying-152/74. Sitting-148/74. Standing- 136/86. Patient was last seen by neurology on 9/22/2023 in the office. Per neurologist, patient has neuroleptic induced parkinsonism and is currently doing well on Sinemet 25/100 mg 1 tablet 3 times a day. Given the concern for neuroleptic induced parkinsonism, a question was raised if he can have his psychiatrist adjust his psychiatric medications and patient could potentially stop Sinemet to see how he does. Patient and caregiver did not want to stop Sinemet at time of appointment. He denies any side effects from the Sinemet. Inpatient consult to Neurology  Consult performed by: Clare Quiroga PA-C  Consult ordered by: LIUDMILA Pinedo           Review of Systems   Musculoskeletal: Positive for gait problem. Neurological: Positive for light-headedness.        Historical Information   Past Medical History:   Diagnosis Date   • Asthma    • Benign prostatic hyperplasia    • COPD (chronic obstructive pulmonary disease) ([facility])    • GERD (gastroesophageal reflux disease)    • Herpes zoster without complication 55/38/4604   • Hypercholesteremia    • Hypertension    • Neuroleptic induced parkinsonism ([address])    • Paranoid schizophrenia ([address])    • Psychiatric disorder      Past Surgical History:   Procedure Laterality Date   • CATARACT EXTRACTION     • COLONOSCOPY       Social History   Social History     Substance and Sexual Activity   Alcohol Use Not Currently     Social History     Substance and Sexual Activity   Drug Use Never     E-Cigarette/Vaping   • E-Cigarette Use Never User      E-Cigarette/Vaping Substances   • Nicotine No    • THC No    • CBD No    • Flavoring No    • Other No    • Unknown No      Social History     Tobacco Use   Smoking Status Former   • Packs/day: 1.00   • Years: 20.00   • Total pack years: 20.00   • Types: Cigarettes   • Quit date:    • Years since quittin.7   Smokeless Tobacco Never     Family History:   Family History   Problem Relation Age of Onset   • No Known Problems Mother    • No Known Problems Father    • Parkinsonism Son        Review of previous medical records was completed. Meds/Allergies   all current active meds have been reviewed, current meds:   Current Facility-Administered Medications   Medication Dose Route Frequency   • acetaminophen (TYLENOL) tablet 650 mg  650 mg Oral Q4H PRN   • aspirin chewable tablet 81 mg  81 mg Oral Daily   • atorvastatin (LIPITOR) tablet 40 mg  40 mg Oral QPM   • calcium carbonate (TUMS) chewable tablet 1,000 mg  1,000 mg Oral Daily PRN   • carbidopa-levodopa (SINEMET)  mg per tablet 1 tablet  1 tablet Oral TID   • cloZAPine (CLOZARIL) tablet 100 mg  100 mg Oral BID   • cloZAPine (CLOZARIL) tablet 200 mg  200 mg Oral HS   • Deutetrabenazine TABS 1 tablet  1 tablet Oral BID   • heparin (porcine) subcutaneous injection 5,000 Units  5,000 Units Subcutaneous Q8H [address]   • hydrOXYzine HCL (ATARAX) tablet 25 mg  25 mg Oral Q6H PRN   • meclizine (ANTIVERT) tablet 12.5 mg  12.5 mg Oral Q8H PRN   • metoprolol succinate (TOPROL-XL) 24 hr tablet 25 mg  25 mg Oral Daily   • ondansetron (ZOFRAN) injection 4 mg  4 mg Intravenous Q6H PRN   • pantoprazole (PROTONIX) EC tablet 40 mg  40 mg Oral Early Morning   • polyethylene glycol (MIRALAX) packet 17 g  17 g Oral Daily PRN   • tamsulosin (FLOMAX) capsule 0.4 mg  0.4 mg Oral HS   • temazepam (RESTORIL) capsule 15 mg  15 mg Oral HS   • topiramate (TOPAMAX) tablet 25 mg  25 mg Oral BID   • traZODone (DESYREL) tablet 100 mg  100 mg Oral HS    and PTA meds:   Prior to Admission Medications   Prescriptions Last Dose Informant Patient Reported? Taking?    AUSTEDO 12 MG TABS 2023 Care Giver Yes Yes   Sig: Take 1 tablet by mouth 2 (two) times a day   Cholecalciferol (Vitamin D High Potency) 25 MCG (1000 UT) capsule  Care Giver No No   Sig: Take 1 capsule (1,000 Units total) by mouth daily   Emollient (Lubriderm) LOTN  Care Giver No No   Sig: Apply topically daily as needed (rash)   Patient not taking: Reported on 2023   Psyllium (Metamucil) 28.3 % POWD  Care Giver No No   Sig: Take by mouth 2 (two) times a day 1 dose as per can twice a day   Patient not taking: Reported on 2023   Skin Protectants, Misc. (eucerin) cream  Care Giver No No   Sig: Apply topically as needed for wound care   Patient not taking: Reported on 2023   acetaminophen (Acetaminophen Extra Strength) 500 mg tablet Not Taking Care Giver No No   Sig: Take 1 tablet (500 mg total) by mouth every 6 (six) hours as needed for mild pain   Patient not taking: Reported on 2023   atorvastatin (LIPITOR) 40 mg tablet 2023 Care Giver No Yes   Sig: Take 1 tablet (40 mg total) by mouth daily   carbidopa-levodopa (SINEMET)  mg per tablet  Care Giver No No   Sig: Take 1 tablet by mouth 3 (three) times a day   cloZAPine (CLOZARIL) 100 mg tablet 2023 Care Giver Yes Yes   Sig: Take 100 mg by mouth 2 (two) times a day   clozapine (CLOZARIL) 200 MG tablet 2023 Care Giver Yes Yes   Sig: Take 200 mg by mouth daily at bedtime    dicyclomine (BENTYL) 20 mg tablet 2023 Care Giver No Yes   Sig: Take 1 tablet (20 mg total) by mouth 2 (two) times a day   docusate sodium (COLACE) 100 mg capsule 2023 Care Giver No Yes   Sig: Take 1 capsule (100 mg total) by mouth 2 (two) times a day   fluticasone (FLONASE) 50 mcg/act nasal spray 2023 Care Giver No Yes   Si sprays into each nostril daily   hydrOXYzine HCL (ATARAX) 25 mg tablet Past Week Care Giver No Yes   Sig: Take 1 tablet (25 mg total) by mouth every 6 (six) hours as needed for itching   metoprolol succinate (TOPROL-XL) 25 mg 24 hr tablet Past Week Care Giver No Yes   Sig: Take 1 tablet (25 mg total) by mouth daily   metroNIDAZOLE (FLAGYL) 500 mg tablet Unknown Care Giver Yes No   Sig: 3 (three) times a day   olopatadine HCl (PATADAY) 0.2 % opth drops Not Taking Care Giver No No   Sig: Administer 1 drop to both eyes daily at bedtime   Patient not taking: Reported on 9/30/2023   omeprazole (PriLOSEC) 40 MG capsule Past Week Care Giver No Yes   Sig: Take 1 capsule (40 mg total) by mouth daily   ondansetron (ZOFRAN-ODT) 4 mg disintegrating tablet Past Month Care Giver No Yes   Sig: Take 1 tablet (4 mg total) by mouth every 8 (eight) hours as needed for nausea   ondansetron (Zofran ODT) 4 mg disintegrating tablet Past Week Care Giver No Yes   Sig: Take 1 tablet (4 mg total) by mouth every 6 (six) hours as needed for nausea or vomiting   polyethylene glycol (MIRALAX) 17 g packet  Care Giver No No   Sig: Take 17 g by mouth daily   Patient not taking: Reported on 9/22/2023   sodium phosphate-biphosphate (FLEET) 7-19 g 118 mL enema  Care Giver No No   Sig: Insert 1 enema into the rectum daily as needed (constipation) for up to 5 doses   Patient not taking: Reported on 5/12/2023   tamsulosin (FLOMAX) 0.4 mg Past Month Care Giver No Yes   Sig: Take 1 capsule (0.4 mg total) by mouth daily at bedtime   temazepam (RESTORIL) 15 mg capsule Past Week Care Giver Yes Yes   Sig: Take 15 mg by mouth daily at bedtime   topiramate (TOPAMAX) 25 mg tablet Past Week Care Giver Yes Yes   Sig: Take 25 mg by mouth 2 (two) times a day   traZODone (DESYREL) 100 mg tablet Past Week Care Giver Yes Yes   Sig: Take 100 mg by mouth daily at bedtime      Facility-Administered Medications: None       No Known Allergies    Objective   Vitals:Blood pressure 134/73, pulse 86, temperature 97.9 °F (36.6 °C), temperature source Oral, resp. rate 16, SpO2 99 %. ,There is no height or weight on file to calculate BMI.     Intake/Output Summary (Last 24 hours) at 9/30/2023 4762  Last data filed at 9/30/2023 0835  Gross per 24 hour   Intake 240 ml   Output 950 ml   Net -710 ml       Invasive Devices: Invasive Devices     Peripheral Intravenous Line  Duration           Peripheral IV 09/29/23 Right;Upper;Ventral (anterior) Arm <1 day                Physical Exam  Vitals and nursing note reviewed. Constitutional:       General: He is not in acute distress. Appearance: He is not ill-appearing or diaphoretic. HENT:      Head: Normocephalic and atraumatic. Comments: Mild jaw tremor     Right Ear: External ear normal.      Left Ear: External ear normal.      Nose: Nose normal.      Mouth/Throat:      Mouth: Mucous membranes are moist.      Pharynx: Oropharynx is clear. Eyes:      Extraocular Movements: EOM normal.   Cardiovascular:      Rate and Rhythm: Normal rate. Pulmonary:      Effort: Pulmonary effort is normal. No respiratory distress. Breath sounds: No stridor. Musculoskeletal:         General: No tenderness or signs of injury. Cervical back: Normal range of motion and neck supple. Right lower leg: No edema. Left lower leg: No edema. Skin:     General: Skin is warm and dry. Coloration: Skin is not jaundiced or pale. Findings: No erythema. Neurological:      Mental Status: He is alert. Coordination: Finger-Nose-Finger Test normal.      Comments: Detailed below. Psychiatric:         Mood and Affect: Mood normal.         Behavior: Behavior normal.         Thought Content: Thought content normal.       Neurologic Exam     Mental Status   Attention: normal.   Level of consciousness: alert  Able to name object. Speech slightly garbled. Cranial Nerves     CN II   Visual fields full to confrontation. CN III, IV, VI   Extraocular motions are normal.   Upgaze: normal  Downgaze: normal  Conjugate gaze: present    CN V   Facial sensation intact. CN VII   Facial expression full, symmetric.      CN VIII   Hearing: intact    CN XII   Tongue: not atrophic  Fasciculations: Slight. R nasolabial fold decrease     Motor Exam   Muscle bulk: normal  Right arm pronator drift: absent  Left arm pronator drift: absent    Strength   Strength 5/5 except as noted. R deltoid pain limited with some weakness. No cogwheel rigidity. Increased tone L arm and leg compared to right. Gait, Coordination, and Reflexes     Gait  Gait: (Deferred for safety)    Coordination   Finger to nose coordination: normal    Tremor   Intention tremor: present    Reflexes diminished throughout. Lab Results: I have personally reviewed pertinent reports. Imaging Studies: I have personally reviewed pertinent reports. and I have personally reviewed pertinent films in PACS (CTA)  EKG, Pathology, and Other Studies: I have personally reviewed pertinent reports. VTE Prophylaxis: Heparin    Code Status: Level 1 - Full Code    History and physical examination obtained by attending neurologist. AP in room as witness to history and physical examination and acted solely as a scribe for attending neurologist for this encounter. All medical decision making per attending.

## 2023-09-30 NOTE — ASSESSMENT & PLAN NOTE
70-year-old male with hypertension, hyperlipidemia, paranoid schizophrenia, neuroleptic induced parkinsonism, BPH, COPD presented to the ED from his assisted living facility for evaluation of lightheadedness/imbalance upon standing. No other focal neurologic symptoms reported. Lab work notable for pancytopenia. CTA head and neck was unremarkable. Orthostatic blood pressures were obtained in the ED. Lying-152/74. Sitting-148/74. Standing-136/86. Do not suspect underlying neurologic etiology. Plan:   - Can defer stroke pathway / additional neurodiagnostic imaging.   - Encouraged slow transitions between sitting to standing   - Encouraged hydration - patient reports he does not drink a lot of water during the day. - Management of orthostasis per primary team   - No changes to Sinemet at this time. Continue to follow with outpatient neurologist. Last seen 9/22.   - PT/OT    No further inpatient neurologic recommendations.

## 2023-09-30 NOTE — PLAN OF CARE
Problem: Knowledge Deficit  Goal: Patient/family/caregiver demonstrates understanding of disease process, treatment plan, medications, and discharge instructions  Description: Complete learning assessment and assess knowledge base.   Interventions:  - Provide teaching at level of understanding  - Provide teaching via preferred learning methods  9/30/2023 0116 by Umu Monroe  Outcome: Progressing  9/30/2023 0115 by Umu Monroe  Outcome: Progressing

## 2023-09-30 NOTE — H&P
[address]  H&P  Name: Augie Reyutant 68 y.o. male I MRN: [id number]  Unit/Bed#: -02 I Date of Admission: 9/29/2023   Date of Service: 9/30/2023 I Hospital Day: 0      Assessment/Plan   * Stroke-like symptoms  Assessment & Plan  • Background: dizziness affecting ambulation  • Admitted on Stroke Pathway:  o  r/o stroke vs BPPV vs Vertigo  • CTA Head and Neck:  No acute intracranial hemorrhage, mass effect or extra-axial collection. No hemodynamically significant stenosis, dissection or occlusion of the carotid or vertebral arteries. No intracranial aneurysm. No hemodynamically significant stenosis or occlusion of the major vessels of the [place]. • Admission NIHSS: 0  • Not a candidate for TPa/TNK  • DVT prophylaxis   • PT/OT/ST  • ASA  • Statin  • Neurology consult  o Will defer MRI, ECHO dependent on Neuro recomendations      Paranoid schizophrenia ([address])  Assessment & Plan  · At baseline  · Continue current medications    Hypertension  Assessment & Plan  · BP stable  · Continue metoprolol    Neuro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 (HCC)  Assessment & Plan  · Stable, mild resting tremor noted  · Continue Sinemet       VTE Pharmacologic Prophylaxis: VTE Score: 3 Moderate Risk (Score 3-4) - Pharmacological DVT Prophylaxis Ordered: heparin. Code Status: Level 1 - Full Code   Discussion with family: Attempted to update  (sister) via phone. Left voicemail. Anticipated Length of Stay: Patient will be admitted on an observation basis with an anticipated length of stay of less than 2 midnights secondary to dizziness. Total Time Spent on Date of Encounter in care of patient: 75 mins.  This time was spent on one or more of the following: performing physical exam; counseling and coordination of care; obtaining or reviewing history; documenting in the medical record; reviewing/ordering tests, medications or procedures; communicating with other healthcare professionals and discussing with patient's family/caregivers. Chief Complaint:    Chief Complaint   Patient presents with   • Dizziness     Pt arrived via EMS from assisted living facility. Pt c/o of having a glass of milk with dinner and states that he felt dizzy afterwards. Pt can ambulate but had difficulty ambulating to EMS. Pt hx of schizophrenia and Parkinsons. Pt did not take his medication this evening. History of Present Illness: Michelle Garcia is a 68 y.o. male with a PMH of HTN, COPD, SBO, Parkinson's disease, GERD, and Paranoid Schizoprenia who presents with dizziness upon ambulation. Patient reports dizziness that started after drinking a glass of milk. His dizziness affected his ability to walk, feeling as though he would fall. He denies chest pain, SOB, visual disturbances, numbness, n/v/d, weakness. He resides in an assisted living facility and was brought to the ED via EMS. Upon arrival to the ED he was admitted via stroke pathway. Stroke alert was not activated due to his NIH score of 0 and ability to ambulate. Will continue to monitor r/o stroke vs BPPV. Review of Systems:  Review of Systems   Constitutional: Negative. HENT: Negative. Respiratory: Negative. Gastrointestinal: Negative. Genitourinary: Negative. Musculoskeletal: Negative. Neurological: Positive for dizziness and light-headedness. Psychiatric/Behavioral: Negative.         Past Medical and Surgical History:   Past Medical History:   Diagnosis Date   • Asthma    • Benign prostatic hyperplasia    • COPD (chronic obstructive pulmonary disease) ([address])    • GERD (gastroesophageal reflux disease)    • Herpes zoster without complication 51/48/4432   • Hypercholesteremia    • Hypertension    • Neuroleptic induced parkinsonism ([address])    • Paranoid schizophrenia ([address])    • Psychiatric disorder        Past Surgical History:   Procedure Laterality Date   • CATARACT EXTRACTION     • COLONOSCOPY Meds/Allergies:  Prior to Admission medications    Medication Sig Start Date End Date Taking?  Authorizing Provider   acetaminophen (Acetaminophen Extra Strength) 500 mg tablet Take 1 tablet (500 mg total) by mouth every 6 (six) hours as needed for mild pain 3/13/23   LIUDMILA Suarez   atorvastatin (LIPITOR) 40 mg tablet Take 1 tablet (40 mg total) by mouth daily 3/13/23   LIUDMILA Suarez   AUSTEDO 12 MG TABS Take 1 tablet by mouth 2 (two) times a day 1/3/20   Historical Provider, MD   carbidopa-levodopa (SINEMET)  mg per tablet Take 1 tablet by mouth 3 (three) times a day 3/2/23   Shanika Pi, MD   Cholecalciferol (Vitamin D High Potency) 25 MCG (1000 UT) capsule Take 1 capsule (1,000 Units total) by mouth daily 3/13/23   LIUDMILA Suarez   cloZAPine (CLOZARIL) 100 mg tablet Take 100 mg by mouth 2 (two) times a day    Historical Provider, MD   clozapine (CLOZARIL) 200 MG tablet Take 200 mg by mouth daily at bedtime  4/8/19   Historical Provider, MD   dicyclomine (BENTYL) 20 mg tablet Take 1 tablet (20 mg total) by mouth 2 (two) times a day  Patient not taking: Reported on 9/22/2023 9/2/23   Bita Rosenbaum DO   docusate sodium (COLACE) 100 mg capsule Take 1 capsule (100 mg total) by mouth 2 (two) times a day 5/12/23   LIUDMILA Barry   Emollient (Lubriderm) LOTN Apply topically daily as needed (rash)  Patient not taking: Reported on 9/22/2023 3/13/23   LIUDMILA Suarez   fluticasone [facility]) 50 mcg/act nasal spray 2 sprays into each nostril daily 3/13/23   LIUDMILA Suarez   hydrOXYzine HCL (ATARAX) 25 mg tablet Take 1 tablet (25 mg total) by mouth every 6 (six) hours as needed for itching 4/26/23   LIUDMILA Barry   metoprolol succinate (TOPROL-XL) 25 mg 24 hr tablet Take 1 tablet (25 mg total) by mouth daily 3/13/23   LIUDMILA Suarez   metroNIDAZOLE (FLAGYL) 500 mg tablet 3 (three) times a day 9/21/23   Historical Provider, MD olopatadine HCl (PATADAY) 0.2 % opth drops Administer 1 drop to both eyes daily at bedtime 3/13/23   LIUDMILA Romero   omeprazole (PriLOSEC) 40 MG capsule Take 1 capsule (40 mg total) by mouth daily 3/13/23   LIUDMILA Romero   ondansetron (Zofran ODT) 4 mg disintegrating tablet Take 1 tablet (4 mg total) by mouth every 6 (six) hours as needed for nausea or vomiting 9/2/23   Bita Rosenbaum DO   ondansetron (ZOFRAN-ODT) 4 mg disintegrating tablet Take 1 tablet (4 mg total) by mouth every 8 (eight) hours as needed for nausea 4/19/23   Darline De Paz PA-C   polyethylene glycol (MIRALAX) 17 g packet Take 17 g by mouth daily  Patient not taking: Reported on 9/22/2023 5/12/23   LIUDMILA Lares   Psyllium (Metamucil) 28.3 % POWD Take by mouth 2 (two) times a day 1 dose as per can twice a day  Patient not taking: Reported on 9/22/2023 6/15/23   LIUDMILA Lares   Skin Protectants, Misc. (eucerin) cream Apply topically as needed for wound care  Patient not taking: Reported on 5/18/2023 4/26/23   LIUDMIAL Lares   sodium phosphate-biphosphate (FLEET) 7-19 g 118 mL enema Insert 1 enema into the rectum daily as needed (constipation) for up to 5 doses  Patient not taking: Reported on 5/12/2023 5/9/23   Kiarra Singer MD   tamsulosin [facility]) 0.4 mg Take 1 capsule (0.4 mg total) by mouth daily at bedtime 3/13/23   LIUDMILA Romero   temazepam (RESTORIL) 15 mg capsule Take 15 mg by mouth daily at bedtime    Historical Provider, MD   topiramate (TOPAMAX) 25 mg tablet Take 25 mg by mouth 2 (two) times a day 7/1/19   Historical Provider, MD   traZODone (DESYREL) 100 mg tablet Take 100 mg by mouth daily at bedtime 4/8/19   Historical Provider, MD     I have reviewed home medications using recent Epic encounter.     Allergies: No Known Allergies    Social History:  Marital Status: Single   Occupation: retired  Patient Pre-hospital Living Situation: Assisted Living  Patient Pre-hospital Level of Mobility: walks  Patient Pre-hospital Diet Restrictions: none  Substance Use History:   Social History     Substance and Sexual Activity   Alcohol Use Not Currently     Social History     Tobacco Use   Smoking Status Former   • Packs/day: 1.00   • Years: 20.00   • Total pack years: 20.00   • Types: Cigarettes   • Quit date:    • Years since quittin.7   Smokeless Tobacco Never     Social History     Substance and Sexual Activity   Drug Use Never       Family History:  Family History   Problem Relation Age of Onset   • No Known Problems Mother    • No Known Problems Father    • Parkinsonism Son        Physical Exam:     Vitals:   Blood Pressure: 147/77 (23)  Pulse: 70 (23)  Temperature: 98 °F (36.7 °C) (23)  Respirations: 20 (23)  SpO2: 96 % (23)    Physical Exam  Vitals and nursing note reviewed. Constitutional:       General: He is not in acute distress. Appearance: Normal appearance. He is well-developed and normal weight. He is not toxic-appearing. HENT:      Head: Normocephalic and atraumatic. Eyes:      Conjunctiva/sclera: Conjunctivae normal.   Cardiovascular:      Rate and Rhythm: Normal rate and regular rhythm. Pulses: Normal pulses. Heart sounds: Normal heart sounds. No murmur heard. Pulmonary:      Effort: Pulmonary effort is normal. No respiratory distress. Breath sounds: Normal breath sounds. Abdominal:      Palpations: Abdomen is soft. Tenderness: There is no abdominal tenderness. Musculoskeletal:         General: No swelling. Cervical back: Neck supple. Skin:     General: Skin is warm and dry. Capillary Refill: Capillary refill takes less than 2 seconds. Neurological:      Mental Status: He is alert. Mental status is at baseline. Psychiatric:         Mood and Affect: Mood normal.         Behavior: Behavior normal.         Thought Content:  Thought content normal.         Judgment: Judgment normal.          Additional Data:     Lab Results:  Results from last 7 days   Lab Units 09/29/23  1931   WBC Thousand/uL 3.95*   HEMOGLOBIN g/dL 12.8   HEMATOCRIT % 37.4   PLATELETS Thousands/uL 145*   NEUTROS PCT % 47   LYMPHS PCT % 33   MONOS PCT % 14*   EOS PCT % 5     Results from last 7 days   Lab Units 09/29/23  1931   SODIUM mmol/L 140   POTASSIUM mmol/L 3.8   CHLORIDE mmol/L 111*   CO2 mmol/L 27   BUN mg/dL 14   CREATININE mg/dL 0.73   ANION GAP mmol/L 2   CALCIUM mg/dL 8.8   ALBUMIN g/dL 3.6   TOTAL BILIRUBIN mg/dL 0.42   ALK PHOS U/L 71   ALT U/L 27   AST U/L 23   GLUCOSE RANDOM mg/dL 80     Results from last 7 days   Lab Units 09/29/23 1931   INR  1.09     Results from last 7 days   Lab Units 09/30/23  0017 09/29/23  1913   POC GLUCOSE mg/dl 110 85               Lines/Drains:  Invasive Devices     Peripheral Intravenous Line  Duration           Peripheral IV 09/29/23 Right;Upper;Ventral (anterior) Arm <1 day                    Imaging: Reviewed radiology reports from this admission including: CT head  CTA head and neck with and without contrast   Final Result by Matthew Choudhury MD (09/29 2145)      1. No acute intracranial hemorrhage, mass effect or extra-axial collection. 2.  No hemodynamically significant stenosis, dissection or occlusion of the carotid or vertebral arteries. 3.  No intracranial aneurysm. No hemodynamically significant stenosis or occlusion of the major vessels of the [place]. Workstation performed: [id number]         XR chest 1 view portable    (Results Pending)   MRI Inpatient Order    (Results Pending)       EKG and Other Studies Reviewed on Admission:   · EKG: Sinus Tachycardia. HR 74.    ** Please Note: This note has been constructed using a voice recognition system.  **

## 2023-09-30 NOTE — PLAN OF CARE
Problem: PHYSICAL THERAPY ADULT  Goal: Performs mobility at highest level of function for planned discharge setting. See evaluation for individualized goals. Description: Treatment/Interventions: Functional transfer training, LE strengthening/ROM, Endurance training, Therapeutic exercise, Bed mobility, Gait training, Patient/family training, Equipment eval/education, Spoke to nursing  Equipment Recommended: Cielo King       See flowsheet documentation for full assessment, interventions and recommendations. Outcome: Progressing  Note: Prognosis: Good  Problem List: Decreased strength, Decreased endurance, Impaired balance, Decreased mobility, Decreased safety awareness  Assessment: Pt is 68 y.o. male seen for PT evaluation s/p admit to [id number] [facility] on 9/29/2023 w/ Orthostatic lightheadedness. PT consulted to assess pt's functional mobility and d/c needs. Order placed for PT eval and tx, w/ activity order. Comorbidities affecting pt's physical performance at time of assessment include: Parkinsonism, HTN, Paranoid schizophrenia, orthostatic lightheadedness, GERD, falls history. PTA, pt was independent w/ all functional mobility w/ no AD. Personal factors affecting pt at time of IE include: ambulating w/ assistive device, positive fall history, inability to perform IADLs and inability to perform ADLs. Please find objective findings from PT assessment regarding body systems outlined above with impairments and limitations including weakness, impaired balance, gait deviations, fall risk and decreased safety awareness with new AD, dizziness. The following objective measures performed on IE also reveal limitations: AM-PAC 6-Clicks: 08/70. Pt's clinical presentation is currently unstable/unpredictable seen in pt's presentation. Pt to benefit from continued PT tx to address deficits as defined above and maximize level of functional independent mobility and consistency.  From PT/mobility standpoint, recommendation at time of d/c would be post acute rehabilitation services vs return to facility with rehab services with Ax1 for mobility pending progress in order to facilitate return to PLOF. Barriers to Discharge: Other (Comment) (decreased caregiver support, per pt. he did not recieve A with ADL/mobility; will require A x1 upon D/C as elevated falls risk especially with turns and new AD use (RW). Also would not recommend stairs, per pt. he must do flight of stairs for meals. )     PT Discharge Recommendation: Post acute rehabilitation services (vs back to assisted with assist x1 for mobility (see d/c barrier comments))    See flowsheet documentation for full assessment.

## 2023-09-30 NOTE — PHYSICAL THERAPY NOTE
Physical Therapy Evaluation and treatment    Patient's Name: Noelle Huerta    Admitting Diagnosis  Dizziness [R42]  Parkinson disease [G20]  Stroke-like symptoms [R29.90]    Problem List  Patient Active Problem List   Diagnosis    GERD (gastroesophageal reflux disease)    Neuro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 ([address])    Hypertension    Hypercholesteremia    Paranoid schizophrenia ([address])    Combined form of senile cataract    MRSA carrier    Groin rash    Fall    Chronic obstructive pulmonary disease, unspecified COPD type ([address])    Neoplasm of uncertain behavior of left kidney    BPH with obstruction/lower urinary tract symptoms    Chronic constipation    Overactive bladder    Urge urinary incontinence    Urinary incontinence, post-void dribbling    Urinary frequency    Nocturia    Chronic prostatitis    Mild protein-calorie malnutrition (HCC)    Aortic root dilatation (HCC)    SBO (small bowel obstruction) (HCC)    Parkinson disease (HCC)    Orthostatic lightheadedness       Past Medical History  Past Medical History:   Diagnosis Date    Asthma     Benign prostatic hyperplasia     COPD (chronic obstructive pulmonary disease) (HCC)     GERD (gastroesophageal reflux disease)     Herpes zoster without complication 82/29/1313    Hypercholesteremia     Hypertension     Neuroleptic induced parkinsonism ([address])     Paranoid schizophrenia ([address])     Psychiatric disorder        Past Surgical History  Past Surgical History:   Procedure Laterality Date    CATARACT EXTRACTION      COLONOSCOPY            09/30/23 1210   PT Last Visit   PT Visit Date 09/30/23   Note Type   Note type Evaluation   Pain Assessment   Pain Assessment Tool 0-10   Pain Score No Pain   Restrictions/Precautions   Other Precautions Fall Risk  (dizziness)   Home Living   Type of Home Assisted living   Home Layout Two level  (per pt.  flight of stairs to dinner)   Bathroom Shower/Tub Walk-in shower  ("I haven't showered in quite awhile")   Bathroom Toilet Standard Bathroom Equipment Grab bars in shower  (denies grab bars around  toilet)   Home Equipment   (denies DME)   Additional Comments Pt. indicates PTA he was ambulating with out AD with 1 fall awhile ago. Pt. indicates he was self dressing, self mobilizing and only had assist for medication and meals. Prior Function   Level of [place] Independent with functional mobility; Independent with ADLs; Needs assistance with [address] staff   Receives Help From Personal care attendant   IADLs Family/Friend/Other provides meals; Family/Friend/Other provides medication management; Family/Friend/Other provides transportation   Falls in the last 6 months 0  (1 fall >6 months ago)   Cognition   Overall Cognitive Status WFL   Arousal/Participation Alert   Orientation Level Oriented X4   Memory Within functional limits   Following Commands Follows all commands and directions without difficulty   Subjective   Subjective I am doing a little better today. Still dizzy though. RLE Assessment   RLE Assessment X   Strength RLE   R Hip Flexion 4-/5   R Knee Extension 4/5   R Ankle Dorsiflexion 4/5   LLE Assessment   LLE Assessment X   Strength LLE   L Hip Flexion 4-/5   L Knee Extension 4/5   L Ankle Dorsiflexion 4/5   Bed Mobility   Supine to Sit 4  Minimal assistance   Additional items Assist x 1; Increased time required;Verbal cues;LE management   Sit to Supine 5  Supervision   Additional items Assist x 1; Increased time required;Verbal cues   Transfers   Sit to Stand 4  Minimal assistance   Additional items Assist x 1; Increased time required;Verbal cues   Stand to Sit 4  Minimal assistance   Additional items Assist x 1; Increased time required;Verbal cues   Additional Comments BP: supine 116/65, sitting 127/65, standing 112/60. Ambulating with RW pt. is A x1. With practice and on straight aways pt. is CGAx1, however with turns pt. is unsteady. Did trial without AD and Min to Mod A with mobility.  Do recommend AD of RW for mobility. poor safety with transitions using RW will require continued education. Ambulation/Elevation   Gait pattern Decreased foot clearance; Forward Flexion; Short stride; Excessively slow   Gait Assistance 4  Minimal assist   Additional items Assist x 1;Verbal cues   Assistive Device Rolling walker   Distance 100'   Stair Management Assistance Not tested   Balance   Static Sitting Fair +   Dynamic Sitting Fair   Static Standing Fair -   Dynamic Standing Poor +   Ambulatory Poor +   Endurance Deficit   Endurance Deficit Yes   Endurance Deficit Description dizziness, fatigue   Activity Tolerance   Activity Tolerance Patient tolerated treatment well   Nurse Made Aware RN ok to see   Assessment   Prognosis Good   Problem List Decreased strength;Decreased endurance; Impaired balance;Decreased mobility; Decreased safety awareness   Assessment Pt is 68 y.o. male seen for PT evaluation [facility] w/ Orthostatic lightheadedness. PT consulted to assess pt's functional mobility and d/c needs. Order placed for PT eval and tx, w/ activity order. Comorbidities affecting pt's physical performance at time of assessment include: Parkinsonism, HTN, Paranoid schizophrenia, orthostatic lightheadedness, GERD, falls history. PTA, pt was independent w/ all functional mobility w/ no AD. Personal factors affecting pt at time of IE include: ambulating w/ assistive device, positive fall history, inability to perform IADLs and inability to perform ADLs. Please find objective findings from PT assessment regarding body systems outlined above with impairments and limitations including weakness, impaired balance, gait deviations, fall risk and decreased safety awareness with new AD, dizziness. The following objective measures performed on IE also reveal limitations: AM-PAC 6-Clicks: 50/23. Pt's clinical presentation is currently unstable/unpredictable seen in pt's presentation.  Pt to benefit from continued PT tx to address deficits as defined above and maximize level of functional independent mobility and consistency. From PT/mobility standpoint, recommendation at time of d/c would be post acute rehabilitation services vs return to facility with rehab services with Ax1 for mobility pending progress in order to facilitate return to PLOF. Barriers to Discharge Other (Comment)  (decreased caregiver support, per pt. he did not recieve A with ADL/mobility; will require A x1 upon D/C as elevated falls risk especially with turns and new AD use (RW). Also would not recommend stairs, per pt. he must do flight of stairs for meals.)   Goals   Patient Goals to go home   STG Expiration Date 10/14/23   Short Term Goal #1 1. Pt will complete bed mobility with Mod I to increase functional mobility. 2. Pt will complete sit to stand transfers with Mod I to increase functional mobility. 3. Pt will ambulate 150 ft with RW with Mod I without LOB 4. Pt will increase B/L LE strength by 1 grade to facilitate improved functional mobility with decreased risk of falls. 5. Pt will increase standing balance to fair in order to decrease risk of falls. PT Treatment Day 0   Plan   Treatment/Interventions Functional transfer training;LE strengthening/ROM; Endurance training; Therapeutic exercise; Bed mobility;Gait training;Patient/family training;Equipment eval/education;Spoke to nursing   PT Frequency 3-5x/wk   Recommendation   PT Discharge Recommendation Post acute rehabilitation services  (vs back to [facility] with assist x1 for mobility (see d/c barrier comments))   Equipment Recommended Kalyan Posey)  [address] in Flat Bed Without Bedrails 4   Lying on Back to Sitting on Edge of Flat Bed Without Bedrails 3   Moving Bed to Chair 3   Standing Up From Chair Using Arms 3   Walk in Room 3   Climb 3-5 Stairs With Railing 2   Basic Mobility Inpatient Raw Score 18   Basic Mobility Standardized Score 41.05   Highest Level Of Mobility   JH-HLM Goal 6: Walk 10 steps or more   JH-HLM Achieved 7: Walk 25 feet or more   Additional Treatment Session   Start Time 5973   End Time 1243   Treatment Assessment Pt. requested continued treatment s/p IE. Pt. able to complete additional ambulation of 150' with Min to CGA A x1. Pt. requires Min A primarily for turns and transfers due to unsteadiness in these areas. Pt. able to complete STS transfer to/from bed with Min A x1, also able to demonstrate standing at toilet with close S with no LOB. However did require heavy cues regarding  mobility for standing at toilet and to [facility] MORENA positioning within for transfers. Will continue to require cues and education in order to reduce risk of falls.    Equipment Use          Yuly Del Rosario [place]

## 2023-09-30 NOTE — ASSESSMENT & PLAN NOTE
• Background: dizziness affecting ambulation  • Admitted on Stroke Pathway:  o  r/o stroke vs BPPV vs Vertigo  • CTA Head and Neck:  No acute intracranial hemorrhage, mass effect or extra-axial collection. No hemodynamically significant stenosis, dissection or occlusion of the carotid or vertebral arteries. No intracranial aneurysm. No hemodynamically significant stenosis or occlusion of the major vessels of the [place].    • Admission NIHSS: 0  • Not a candidate for TPa/TNK  • DVT prophylaxis   • PT/OT/ST  • ASA  • Statin  • Neurology consult  o Will defer MRI, ECHO dependent on Neuro recomendations

## 2023-09-30 NOTE — PLAN OF CARE
Problem: PAIN - ADULT  Goal: Verbalizes/displays adequate comfort level or baseline comfort level  Description: Interventions:  - Encourage patient to monitor pain and request assistance  - Assess pain using appropriate pain scale  - Administer analgesics based on type and severity of pain and evaluate response  - Implement non-pharmacological measures as appropriate and evaluate response  - Consider cultural and social influences on pain and pain management  - Notify physician/advanced practitioner if interventions unsuccessful or patient reports new pain  Outcome: Progressing     Problem: INFECTION - ADULT  Goal: Absence or prevention of progression during hospitalization  Description: INTERVENTIONS:  - Assess and monitor for signs and symptoms of infection  - Monitor lab/diagnostic results  - Monitor all insertion sites, i.e. indwelling lines, tubes, and drains  - Monitor endotracheal if appropriate and nasal secretions for changes in amount and color  - [place] appropriate cooling/warming therapies per order  - Administer medications as ordered  - Instruct and encourage patient and family to use good hand hygiene technique  - Identify and instruct in appropriate isolation precautions for identified infection/condition  Outcome: Progressing  Goal: Absence of fever/infection during neutropenic period  Description: INTERVENTIONS:  - Monitor WBC    Outcome: Progressing     Problem: SAFETY ADULT  Goal: Patient will remain free of falls  Description: INTERVENTIONS:  - Educate patient/family on patient safety including physical limitations  - Instruct patient to call for assistance with activity   - Consult OT/PT to assist with strengthening/mobility   - Keep Call bell within reach  - Keep bed low and locked with side rails adjusted as appropriate  - Keep care items and personal belongings within reach  - Initiate and maintain comfort rounds  - Make Fall Risk Sign visible to staff  - Offer Toileting every 3 Hours, in advance of need  - Initiate/Maintain bed alarm  - Obtain necessary fall risk management equipment:  - Apply yellow socks and bracelet for high fall risk patients  - Consider moving patient to room near nurses station  Outcome: Progressing  Goal: Maintain or return to baseline ADL function  Description: INTERVENTIONS:  -  Assess patient's ability to carry out ADLs; assess patient's baseline for ADL function and identify physical deficits which impact ability to perform ADLs (bathing, care of mouth/teeth, toileting, grooming, dressing, etc.)  - Assess/evaluate cause of self-care deficits   - Assess range of motion  - Assess patient's mobility; develop plan if impaired  - Assess patient's need for assistive devices and provide as appropriate  - Encourage maximum independence but intervene and supervise when necessary  - Involve family in performance of ADLs  - Assess for home care needs following discharge   - Consider OT consult to assist with ADL evaluation and planning for discharge  - Provide patient education as appropriate  Outcome: Progressing  Goal: Maintains/Returns to pre admission functional level  Description: INTERVENTIONS:  - Perform BMAT or MOVE assessment daily.   - Set and communicate daily mobility goal to care team and patient/family/caregiver. - Collaborate with rehabilitation services on mobility goals if consulted  - Perform Range of Motion 3 times a day. - Reposition patient every 3 hours.   - Dangle patient 3 times a day  - Stand patient 3 times a day  - Ambulate patient 3 times a day  - Out of bed to chair 3 times a day   - Out of bed for meals 3 times a day  - Out of bed for toileting  - Record patient progress and toleration of activity level   Outcome: Progressing     Problem: DISCHARGE PLANNING  Goal: Discharge to home or other facility with appropriate resources  Description: INTERVENTIONS:  - Identify barriers to discharge w/patient and caregiver  - Arrange for needed discharge resources and transportation as appropriate  - Identify discharge learning needs (meds, wound care, etc.)  - Arrange for interpretive services to assist at discharge as needed  - Refer to Case Management Department for coordinating discharge planning if the patient needs post-hospital services based on physician/advanced practitioner order or complex needs related to functional status, cognitive ability, or social support system  Outcome: Progressing     Problem: Knowledge Deficit  Goal: Patient/family/caregiver demonstrates understanding of disease process, treatment plan, medications, and discharge instructions  Description: Complete learning assessment and assess knowledge base. Interventions:  - Provide teaching at level of understanding  - Provide teaching via preferred learning methods  Outcome: Progressing     Problem: MOBILITY - ADULT  Goal: Maintain or return to baseline ADL function  Description: INTERVENTIONS:  -  Assess patient's ability to carry out ADLs; assess patient's baseline for ADL function and identify physical deficits which impact ability to perform ADLs (bathing, care of mouth/teeth, toileting, grooming, dressing, etc.)  - Assess/evaluate cause of self-care deficits   - Assess range of motion  - Assess patient's mobility; develop plan if impaired  - Assess patient's need for assistive devices and provide as appropriate  - Encourage maximum independence but intervene and supervise when necessary  - Involve family in performance of ADLs  - Assess for home care needs following discharge   - Consider OT consult to assist with ADL evaluation and planning for discharge  - Provide patient education as appropriate  Outcome: Progressing  Goal: Maintains/Returns to pre admission functional level  Description: INTERVENTIONS:  - Perform BMAT or MOVE assessment daily.   - Set and communicate daily mobility goal to care team and patient/family/caregiver.    - Collaborate with rehabilitation services on mobility goals if consulted  - Perform Range of Motion 3 times a day. - Reposition patient every 3 hours.   - Dangle patient 3 times a day  - Stand patient 3 times a day  - Ambulate patient 3 times a day  - Out of bed to chair 3 times a day   - Out of bed for meals 3 times a day  - Out of bed for toileting  - Record patient progress and toleration of activity level   Outcome: Progressing     Problem: Prexisting or High Potential for Compromised Skin Integrity  Goal: Skin integrity is maintained or improved  Description: INTERVENTIONS:  - Identify patients at risk for skin breakdown  - Assess and monitor skin integrity  - Assess and monitor nutrition and hydration status  - Monitor labs   - Assess for incontinence   - Turn and reposition patient  - Assist with mobility/ambulation  - Relieve pressure over bony prominences  - Avoid friction and shearing  - Provide appropriate hygiene as needed including keeping skin clean and dry  - Evaluate need for skin moisturizer/barrier cream  - Collaborate with interdisciplinary team   - Patient/family teaching  - Consider wound care consult   Outcome: Progressing

## 2023-10-01 LAB
ANION GAP SERPL CALCULATED.3IONS-SCNC: 0 MMOL/L
ATRIAL RATE: 74 BPM
BASOPHILS # BLD AUTO: 0.03 THOUSANDS/ÂΜL (ref 0–0.1)
BASOPHILS NFR BLD AUTO: 1 % (ref 0–1)
BUN SERPL-MCNC: 9 MG/DL (ref 5–25)
CALCIUM SERPL-MCNC: 8.6 MG/DL (ref 8.4–10.2)
CHLORIDE SERPL-SCNC: 111 MMOL/L (ref 96–108)
CO2 SERPL-SCNC: 29 MMOL/L (ref 21–32)
CREAT SERPL-MCNC: 0.61 MG/DL (ref 0.6–1.3)
EOSINOPHIL # BLD AUTO: 0.19 THOUSAND/ÂΜL (ref 0–0.61)
EOSINOPHIL NFR BLD AUTO: 5 % (ref 0–6)
ERYTHROCYTE [DISTWIDTH] IN BLOOD BY AUTOMATED COUNT: 13.9 % (ref 11.6–15.1)
GFR SERPL CREATININE-BSD FRML MDRD: 99 ML/MIN/1.73SQ M
GLUCOSE SERPL-MCNC: 93 MG/DL (ref 65–140)
HCT VFR BLD AUTO: 34.4 % (ref 36.5–49.3)
HGB BLD-MCNC: 11.7 G/DL (ref 12–17)
IMM GRANULOCYTES # BLD AUTO: 0.01 THOUSAND/UL (ref 0–0.2)
IMM GRANULOCYTES NFR BLD AUTO: 0 % (ref 0–2)
LYMPHOCYTES # BLD AUTO: 0.92 THOUSANDS/ÂΜL (ref 0.6–4.47)
LYMPHOCYTES NFR BLD AUTO: 26 % (ref 14–44)
MCH RBC QN AUTO: 33.1 PG (ref 26.8–34.3)
MCHC RBC AUTO-ENTMCNC: 34 G/DL (ref 31.4–37.4)
MCV RBC AUTO: 98 FL (ref 82–98)
MONOCYTES # BLD AUTO: 0.46 THOUSAND/ÂΜL (ref 0.17–1.22)
MONOCYTES NFR BLD AUTO: 13 % (ref 4–12)
NEUTROPHILS # BLD AUTO: 1.88 THOUSANDS/ÂΜL (ref 1.85–7.62)
NEUTS SEG NFR BLD AUTO: 55 % (ref 43–75)
NRBC BLD AUTO-RTO: 0 /100 WBCS
P AXIS: 72 DEGREES
PLATELET # BLD AUTO: 130 THOUSANDS/UL (ref 149–390)
PMV BLD AUTO: 9.1 FL (ref 8.9–12.7)
POTASSIUM SERPL-SCNC: 3.7 MMOL/L (ref 3.5–5.3)
PR INTERVAL: 200 MS
QRS AXIS: 69 DEGREES
QRSD INTERVAL: 90 MS
QT INTERVAL: 392 MS
QTC INTERVAL: 435 MS
RBC # BLD AUTO: 3.53 MILLION/UL (ref 3.88–5.62)
SODIUM SERPL-SCNC: 140 MMOL/L (ref 135–147)
T WAVE AXIS: 76 DEGREES
VENTRICULAR RATE: 74 BPM
WBC # BLD AUTO: 3.49 THOUSAND/UL (ref 4.31–10.16)

## 2023-10-01 PROCEDURE — 85025 COMPLETE CBC W/AUTO DIFF WBC: CPT | Performed by: STUDENT IN AN ORGANIZED HEALTH CARE EDUCATION/TRAINING PROGRAM

## 2023-10-01 PROCEDURE — 80048 BASIC METABOLIC PNL TOTAL CA: CPT | Performed by: STUDENT IN AN ORGANIZED HEALTH CARE EDUCATION/TRAINING PROGRAM

## 2023-10-01 PROCEDURE — 87081 CULTURE SCREEN ONLY: CPT | Performed by: STUDENT IN AN ORGANIZED HEALTH CARE EDUCATION/TRAINING PROGRAM

## 2023-10-01 PROCEDURE — 87147 CULTURE TYPE IMMUNOLOGIC: CPT | Performed by: STUDENT IN AN ORGANIZED HEALTH CARE EDUCATION/TRAINING PROGRAM

## 2023-10-01 PROCEDURE — 93010 ELECTROCARDIOGRAM REPORT: CPT | Performed by: INTERNAL MEDICINE

## 2023-10-01 RX ADMIN — CLOZAPINE 100 MG: 100 TABLET ORAL at 18:09

## 2023-10-01 RX ADMIN — TOPIRAMATE 25 MG: 25 TABLET, FILM COATED ORAL at 18:09

## 2023-10-01 RX ADMIN — PANTOPRAZOLE SODIUM 40 MG: 40 TABLET, DELAYED RELEASE ORAL at 06:06

## 2023-10-01 RX ADMIN — HEPARIN SODIUM 5000 UNITS: 5000 INJECTION INTRAVENOUS; SUBCUTANEOUS at 21:58

## 2023-10-01 RX ADMIN — CLOZAPINE 200 MG: 100 TABLET ORAL at 21:58

## 2023-10-01 RX ADMIN — TRAZODONE HYDROCHLORIDE 100 MG: 100 TABLET ORAL at 21:58

## 2023-10-01 RX ADMIN — CARBIDOPA AND LEVODOPA 1 TABLET: 25; 100 TABLET ORAL at 08:24

## 2023-10-01 RX ADMIN — METOPROLOL SUCCINATE 25 MG: 25 TABLET, EXTENDED RELEASE ORAL at 08:24

## 2023-10-01 RX ADMIN — HEPARIN SODIUM 5000 UNITS: 5000 INJECTION INTRAVENOUS; SUBCUTANEOUS at 06:06

## 2023-10-01 RX ADMIN — HEPARIN SODIUM 5000 UNITS: 5000 INJECTION INTRAVENOUS; SUBCUTANEOUS at 14:08

## 2023-10-01 RX ADMIN — ATORVASTATIN CALCIUM 40 MG: 40 TABLET, FILM COATED ORAL at 18:09

## 2023-10-01 RX ADMIN — ASPIRIN 81 MG 81 MG: 81 TABLET ORAL at 08:24

## 2023-10-01 RX ADMIN — TOPIRAMATE 25 MG: 25 TABLET, FILM COATED ORAL at 08:24

## 2023-10-01 RX ADMIN — TEMAZEPAM 15 MG: 15 CAPSULE ORAL at 21:58

## 2023-10-01 RX ADMIN — CLOZAPINE 100 MG: 100 TABLET ORAL at 09:13

## 2023-10-01 RX ADMIN — TAMSULOSIN HYDROCHLORIDE 0.4 MG: 0.4 CAPSULE ORAL at 21:58

## 2023-10-01 RX ADMIN — CARBIDOPA AND LEVODOPA 1 TABLET: 25; 100 TABLET ORAL at 18:09

## 2023-10-01 RX ADMIN — CARBIDOPA AND LEVODOPA 1 TABLET: 25; 100 TABLET ORAL at 21:58

## 2023-10-01 NOTE — PROGRESS NOTES
INTERNAL MEDICINE PROGRESS NOTE     Name: Efren Castillo   Age & Sex: 68 y.o. male   MRN: [id number]  Unit/Bed#: -01   Encounter: [id number]      PATIENT INFORMATION     Name: Efren Castillo   Age & Sex: 68 y.o. male   MRN: [id number]  Hospital Stay Days: 1    ASSESSMENT/PLAN     Principal Problem:    Orthostatic lightheadedness  Active Problems:    Neuro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     Hypertension    Paranoid schizophrenia ([address])      Paranoid schizophrenia ([address])  Assessment & Plan  · At baseline  · Continue current medications    Hypertension  Assessment & Plan  · BP stable  · Continue metoprolol    Neuro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   Assessment & Plan  · Stable, mild resting tremor noted  · Continue Sinemet    * Orthostatic lightheadedness  Assessment & Plan  • Background: dizziness affecting ambulation  • Admitted on Stroke Pathway:  o  r/o stroke vs BPPV vs Vertigo  • CTA Head and Neck:  No acute intracranial hemorrhage, mass effect or extra-axial collection. No hemodynamically significant stenosis, dissection or occlusion of the carotid or vertebral arteries. No intracranial aneurysm. No hemodynamically significant stenosis or occlusion of the major vessels of the [place]. • Admission NIHSS: 0  • Not a candidate for TPa/TNK  • DVT prophylaxis   • PT/OT/ST  • ASA  • Statin  • Neurology consult  o Will defer MRI, ECHO dependent on Neuro recomendations        Disposition: PT/OT     SUBJECTIVE     Patient seen and examined. No acute events overnight. Feels better when walking. Denies any dizziness, chest pain, SOB, abdominal pain, nausea, vomiting.     OBJECTIVE     Vitals:    10/01/23 0534 10/01/23 0700 10/01/23 1100 10/01/23 1548   BP: 128/84 135/75 133/76 137/81   BP Location:  Left arm Left arm    Pulse: 79 73 78 82   Resp: 19 20 20 18   Temp: 97.9 °F (36.6 °C) 97.8 °F (36.6 °C) 98.2 °F (36.8 °C) 98.5 °F (36.9 °C)   TempSrc:  Oral Oral    SpO2: 99% 98% 98% 100%      Temperature:   Temp (24hrs), Av.9 °F (36.6 °C), Min:97.3 °F (36.3 °C), Max:98.5 °F (36.9 °C)    Temperature: 98.5 °F (36.9 °C)  Intake & Output:  I/O        07 07 0701  10/01 0700 10/01 0701  10/02 0700    P. O.  1080 240    Total Intake  1080 240    Urine 600 2050 700    Stool  0     Total Output 600 2050 700    Net -600 -970 -460           Unmeasured Stool Occurrence  1 x         Weights: There is no height or weight on file to calculate BMI. Weight (last 2 days)     None        Physical Exam  HENT:      Head: Normocephalic and atraumatic. Nose: No congestion or rhinorrhea. Cardiovascular:      Rate and Rhythm: Normal rate and regular rhythm. Pulses: Normal pulses. Heart sounds: Normal heart sounds. No murmur heard. No gallop. Pulmonary:      Effort: Pulmonary effort is normal.      Breath sounds: Normal breath sounds. No wheezing or rales. Chest:      Chest wall: No tenderness. Abdominal:      General: Bowel sounds are normal.      Tenderness: There is no abdominal tenderness. There is no right CVA tenderness or left CVA tenderness. Musculoskeletal:      Right lower leg: No edema. Left lower leg: No edema. Neurological:      General: No focal deficit present. Mental Status: He is oriented to person, place, and time. Psychiatric:         Mood and Affect: Mood normal.         Behavior: Behavior normal.       LABORATORY DATA     Labs: I have personally reviewed pertinent reports.   Results from last 7 days   Lab Units 10/01/23  0526 09/30/23  0453 09/30/23  0050 23   WBC Thousand/uL 3.49* 3.40*  --  3.95*   HEMOGLOBIN g/dL 11.7* 11.5*  --  12.8   HEMATOCRIT % 34.4* 33.2*  --  37.4   PLATELETS Thousands/uL 130* 131* 137* 145*   NEUTROS PCT % 55  --   --  47   MONOS PCT % 13*  --   --  14*   EOS PCT % 5  --   --  5      Results from last 7 days   Lab Units 10/01/23  0526 09/30/23  0453 09/29/23  1931   POTASSIUM mmol/L 3.7 3.6 3.8   CHLORIDE mmol/L 111* 111* 111*   CO2 mmol/L 29 24 27   BUN mg/dL 9 12 14   CREATININE mg/dL 0.61 0.66 0.73   CALCIUM mg/dL 8.6 8.4 8.8   ALK PHOS U/L  --   --  71   ALT U/L  --   --  27   AST U/L  --   --  23              Results from last 7 days   Lab Units 09/29/23  1931   INR  1.09   PTT seconds 41*               IMAGING & DIAGNOSTIC TESTING     Radiology Results: I have personally reviewed pertinent reports. XR chest 1 view portable    Result Date: 9/30/2023  Impression: No acute cardiopulmonary disease. Workstation performed: [id number]     CTA head and neck with and without contrast    Result Date: 9/29/2023  Impression: 1. No acute intracranial hemorrhage, mass effect or extra-axial collection. 2.  No hemodynamically significant stenosis, dissection or occlusion of the carotid or vertebral arteries. 3.  No intracranial aneurysm. No hemodynamically significant stenosis or occlusion of the major vessels of the [place]. Workstation performed: [id number]     Other Diagnostic Testing: I have personally reviewed pertinent reports.     ACTIVE MEDICATIONS     Current Facility-Administered Medications   Medication Dose Route Frequency   • acetaminophen (TYLENOL) tablet 650 mg  650 mg Oral Q4H PRN   • aspirin chewable tablet 81 mg  81 mg Oral Daily   • atorvastatin (LIPITOR) tablet 40 mg  40 mg Oral QPM   • calcium carbonate (TUMS) chewable tablet 1,000 mg  1,000 mg Oral Daily PRN   • carbidopa-levodopa (SINEMET)  mg per tablet 1 tablet  1 tablet Oral TID   • cloZAPine (CLOZARIL) tablet 100 mg  100 mg Oral BID   • cloZAPine (CLOZARIL) tablet 200 mg  200 mg Oral HS   • Deutetrabenazine TABS 1 tablet  1 tablet Oral BID   • heparin (porcine) subcutaneous injection 5,000 Units  5,000 Units Subcutaneous Q8H [address]   • hydrOXYzine HCL (ATARAX) tablet 25 mg  25 mg Oral Q6H PRN   • meclizine (ANTIVERT) tablet 12.5 mg  12.5 mg Oral Q8H PRN   • metoprolol succinate (TOPROL-XL) 24 hr tablet 25 mg  25 mg Oral Daily   • ondansetron (ZOFRAN) injection 4 mg  4 mg Intravenous Q6H PRN   • pantoprazole (PROTONIX) EC tablet 40 mg  40 mg Oral Early Morning   • polyethylene glycol (MIRALAX) packet 17 g  17 g Oral Daily PRN   • tamsulosin (FLOMAX) capsule 0.4 mg  0.4 mg Oral HS   • temazepam (RESTORIL) capsule 15 mg  15 mg Oral HS   • topiramate (TOPAMAX) tablet 25 mg  25 mg Oral BID   • traZODone (DESYREL) tablet 100 mg  100 mg Oral HS       VTE Pharmacologic Prophylaxis: Heparin  VTE Mechanical Prophylaxis: sequential compression device    Portions of the record may have been created with voice recognition software. Occasional wrong word or "sound a like" substitutions may have occurred due to the inherent limitations of voice recognition software.   Read the chart carefully and recognize, using context, where substitutions have occurred.  ==  Claire Woodard MD  [id number] [facility]

## 2023-10-01 NOTE — PLAN OF CARE
Problem: PAIN - ADULT  Goal: Verbalizes/displays adequate comfort level or baseline comfort level  Description: Interventions:  - Encourage patient to monitor pain and request assistance  - Assess pain using appropriate pain scale  - Administer analgesics based on type and severity of pain and evaluate response  - Implement non-pharmacological measures as appropriate and evaluate response  - Consider cultural and social influences on pain and pain management  - Notify physician/advanced practitioner if interventions unsuccessful or patient reports new pain  Outcome: Progressing     Problem: INFECTION - ADULT  Goal: Absence or prevention of progression during hospitalization  Description: INTERVENTIONS:  - Assess and monitor for signs and symptoms of infection  - Monitor lab/diagnostic results  - Monitor all insertion sites, i.e. indwelling lines, tubes, and drains  - Monitor endotracheal if appropriate and nasal secretions for changes in amount and color  - [place] appropriate cooling/warming therapies per order  - Administer medications as ordered  - Instruct and encourage patient and family to use good hand hygiene technique  - Identify and instruct in appropriate isolation precautions for identified infection/condition  Outcome: Progressing     Problem: MOBILITY - ADULT  Goal: Maintain or return to baseline ADL function  Description: INTERVENTIONS:  -  Assess patient's ability to carry out ADLs; assess patient's baseline for ADL function and identify physical deficits which impact ability to perform ADLs (bathing, care of mouth/teeth, toileting, grooming, dressing, etc.)  - Assess/evaluate cause of self-care deficits   - Assess range of motion  - Assess patient's mobility; develop plan if impaired  - Assess patient's need for assistive devices and provide as appropriate  - Encourage maximum independence but intervene and supervise when necessary  - Involve family in performance of ADLs  - Assess for home care needs following discharge   - Consider OT consult to assist with ADL evaluation and planning for discharge  - Provide patient education as appropriate  Outcome: Progressing

## 2023-10-01 NOTE — ASSESSMENT & PLAN NOTE
• Background: dizziness affecting ambulation  • Admitted on Stroke Pathway:  o  r/o stroke vs BPPV vs Vertigo  • CTA Head and Neck: negative  • Admission NIHSS: 0  • Not a candidate for TPa/TNK  • ASA/Statin  • Neurology consult appreciated; no further stroke pathway workup. • Neurogenic orthostatic hypotension due to Parkinson's disease; as evidenced by orthostatic hypotension in a patient with neuro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; requiring Neurology consult, orthostatic vital signs, compression stockings, slow transition from sitting to standing, and hydration.   • PT/OT recs rehab vs. Return to correction

## 2023-10-01 NOTE — PLAN OF CARE
Problem: PAIN - ADULT  Goal: Verbalizes/displays adequate comfort level or baseline comfort level  Description: Interventions:  - Encourage patient to monitor pain and request assistance  - Assess pain using appropriate pain scale  - Administer analgesics based on type and severity of pain and evaluate response  - Implement non-pharmacological measures as appropriate and evaluate response  - Consider cultural and social influences on pain and pain management  - Notify physician/advanced practitioner if interventions unsuccessful or patient reports new pain  Outcome: Progressing     Problem: INFECTION - ADULT  Goal: Absence or prevention of progression during hospitalization  Description: INTERVENTIONS:  - Assess and monitor for signs and symptoms of infection  - Monitor lab/diagnostic results  - Monitor all insertion sites, i.e. indwelling lines, tubes, and drains  - Monitor endotracheal if appropriate and nasal secretions for changes in amount and color  - [place] appropriate cooling/warming therapies per order  - Administer medications as ordered  - Instruct and encourage patient and family to use good hand hygiene technique  - Identify and instruct in appropriate isolation precautions for identified infection/condition  Outcome: Progressing  Goal: Absence of fever/infection during neutropenic period  Description: INTERVENTIONS:  - Monitor WBC    Outcome: Progressing     Problem: SAFETY ADULT  Goal: Patient will remain free of falls  Description: INTERVENTIONS:  - Educate patient/family on patient safety including physical limitations  - Instruct patient to call for assistance with activity   - Consult OT/PT to assist with strengthening/mobility   - Keep Call bell within reach  - Keep bed low and locked with side rails adjusted as appropriate  - Keep care items and personal belongings within reach  - Initiate and maintain comfort rounds  - Make Fall Risk Sign visible to staff  - Offer Toileting every 2 Hours, in advance of need  - Initiate/Maintain bed alarm  - Obtain necessary fall risk management equipment: chiar alarm  - Apply yellow socks and bracelet for high fall risk patients  - Consider moving patient to room near nurses station  Outcome: Progressing  Goal: Maintain or return to baseline ADL function  Description: INTERVENTIONS:  -  Assess patient's ability to carry out ADLs; assess patient's baseline for ADL function and identify physical deficits which impact ability to perform ADLs (bathing, care of mouth/teeth, toileting, grooming, dressing, etc.)  - Assess/evaluate cause of self-care deficits   - Assess range of motion  - Assess patient's mobility; develop plan if impaired  - Assess patient's need for assistive devices and provide as appropriate  - Encourage maximum independence but intervene and supervise when necessary  - Involve family in performance of ADLs  - Assess for home care needs following discharge   - Consider OT consult to assist with ADL evaluation and planning for discharge  - Provide patient education as appropriate  Outcome: Progressing  Goal: Maintains/Returns to pre admission functional level  Description: INTERVENTIONS:  - Perform BMAT or MOVE assessment daily.   - Set and communicate daily mobility goal to care team and patient/family/caregiver. - Collaborate with rehabilitation services on mobility goals if consulted  - Perform Range of Motion 3 times a day. - Reposition patient every 3 hours.   - Dangle patient 3 times a day  - Stand patient 3 times a day  - Ambulate patient 3 times a day  - Out of bed to chair 3 times a day   - Out of bed for meals 3 times a day  - Out of bed for toileting  - Record patient progress and toleration of activity level   Outcome: Progressing     Problem: DISCHARGE PLANNING  Goal: Discharge to home or other facility with appropriate resources  Description: INTERVENTIONS:  - Identify barriers to discharge w/patient and caregiver  - Arrange for needed discharge resources and transportation as appropriate  - Identify discharge learning needs (meds, wound care, etc.)  - Arrange for interpretive services to assist at discharge as needed  - Refer to Case Management Department for coordinating discharge planning if the patient needs post-hospital services based on physician/advanced practitioner order or complex needs related to functional status, cognitive ability, or social support system  Outcome: Progressing     Problem: Knowledge Deficit  Goal: Patient/family/caregiver demonstrates understanding of disease process, treatment plan, medications, and discharge instructions  Description: Complete learning assessment and assess knowledge base. Interventions:  - Provide teaching at level of understanding  - Provide teaching via preferred learning methods  Outcome: Progressing     Problem: MOBILITY - ADULT  Goal: Maintain or return to baseline ADL function  Description: INTERVENTIONS:  -  Assess patient's ability to carry out ADLs; assess patient's baseline for ADL function and identify physical deficits which impact ability to perform ADLs (bathing, care of mouth/teeth, toileting, grooming, dressing, etc.)  - Assess/evaluate cause of self-care deficits   - Assess range of motion  - Assess patient's mobility; develop plan if impaired  - Assess patient's need for assistive devices and provide as appropriate  - Encourage maximum independence but intervene and supervise when necessary  - Involve family in performance of ADLs  - Assess for home care needs following discharge   - Consider OT consult to assist with ADL evaluation and planning for discharge  - Provide patient education as appropriate  Outcome: Progressing  Goal: Maintains/Returns to pre admission functional level  Description: INTERVENTIONS:  - Perform BMAT or MOVE assessment daily.   - Set and communicate daily mobility goal to care team and patient/family/caregiver.    - Collaborate with rehabilitation services on mobility goals if consulted  - Perform Range of Motion 3 times a day. - Reposition patient every 3 hours. - Dangle patient 3 times a day  - Stand patient 3 times a day  - Ambulate patient 3 times a day  - Out of bed to chair 3 times a day   - Out of bed for meals 3 times a day  - Out of bed for toileting  - Record patient progress and toleration of activity level   Outcome: Progressing     Problem: Prexisting or High Potential for Compromised Skin Integrity  Goal: Skin integrity is maintained or improved  Description: INTERVENTIONS:  - Identify patients at risk for skin breakdown  - Assess and monitor skin integrity  - Assess and monitor nutrition and hydration status  - Monitor labs   - Assess for incontinence   - Turn and reposition patient  - Assist with mobility/ambulation  - Relieve pressure over bony prominences  - Avoid friction and shearing  - Provide appropriate hygiene as needed including keeping skin clean and dry  - Evaluate need for skin moisturizer/barrier cream  - Collaborate with interdisciplinary team   - Patient/family teaching  - Consider wound care consult   Outcome: Progressing     Problem: Neurological Deficit  Goal: Neurological status is stable or improving  Description: Interventions:  - Monitor and assess patient's level of consciousness, motor function, sensory function, and level of assistance needed for ADLs. - Monitor and report changes from baseline. Collaborate with interdisciplinary team to initiate plan and implement interventions as ordered. - Provide and maintain a safe environment. - Consider seizure precautions. - Consider fall precautions. - Consider aspiration precautions. - Consider bleeding precautions. Outcome: Progressing     Problem:  Activity Intolerance/Impaired Mobility  Goal: Mobility/activity is maintained at optimum level for patient  Description: Interventions:  - Assess and monitor patient  barriers to mobility and need for assistive/adaptive devices. - Assess patient's emotional response to limitations. - Collaborate with interdisciplinary team and initiate plans and interventions as ordered. - Encourage independent activity per ability.  - Maintain proper body alignment. - Perform active/passive rom as tolerated/ordered. - Plan activities to conserve energy.  - Turn patient as appropriate  Outcome: Progressing     Problem: NEUROSENSORY - ADULT  Goal: Achieves stable or improved neurological status  Description: INTERVENTIONS  - Monitor and report changes in neurological status  - Monitor vital signs such as temperature, blood pressure, glucose, and any other labs ordered   - Initiate measures to prevent increased intracranial pressure  - Monitor for seizure activity and implement precautions if appropriate      Outcome: Progressing  Goal: Achieves maximal functionality and self care  Description: INTERVENTIONS  - Monitor swallowing and airway patency with patient fatigue and changes in neurological status  - Encourage and assist patient to increase activity and self care.    - Encourage visually impaired, hearing impaired and aphasic patients to use assistive/communication devices  Outcome: Progressing     Problem: CARDIOVASCULAR - ADULT  Goal: Maintains optimal cardiac output and hemodynamic stability  Description: INTERVENTIONS:  - Monitor I/O, vital signs and rhythm  - Monitor for S/S and trends of decreased cardiac output  - Administer and titrate ordered vasoactive medications to optimize hemodynamic stability  - Assess quality of pulses, skin color and temperature  - Assess for signs of decreased coronary artery perfusion  - Instruct patient to report change in severity of symptoms  Outcome: Progressing  Goal: Absence of cardiac dysrhythmias or at baseline rhythm  Description: INTERVENTIONS:  - Continuous cardiac monitoring, vital signs, obtain 12 lead EKG if ordered  - Administer antiarrhythmic and heart rate control medications as ordered  - Monitor electrolytes and administer replacement therapy as ordered  Outcome: Progressing

## 2023-10-02 LAB
ANION GAP SERPL CALCULATED.3IONS-SCNC: 1 MMOL/L
BASOPHILS # BLD AUTO: 0.02 THOUSANDS/ÂΜL (ref 0–0.1)
BASOPHILS NFR BLD AUTO: 1 % (ref 0–1)
BUN SERPL-MCNC: 14 MG/DL (ref 5–25)
CALCIUM SERPL-MCNC: 8.5 MG/DL (ref 8.4–10.2)
CHLORIDE SERPL-SCNC: 110 MMOL/L (ref 96–108)
CO2 SERPL-SCNC: 27 MMOL/L (ref 21–32)
CREAT SERPL-MCNC: 0.53 MG/DL (ref 0.6–1.3)
EOSINOPHIL # BLD AUTO: 0.21 THOUSAND/ÂΜL (ref 0–0.61)
EOSINOPHIL NFR BLD AUTO: 6 % (ref 0–6)
ERYTHROCYTE [DISTWIDTH] IN BLOOD BY AUTOMATED COUNT: 14 % (ref 11.6–15.1)
GFR SERPL CREATININE-BSD FRML MDRD: 104 ML/MIN/1.73SQ M
GLUCOSE SERPL-MCNC: 94 MG/DL (ref 65–140)
HCT VFR BLD AUTO: 35.2 % (ref 36.5–49.3)
HGB BLD-MCNC: 11.9 G/DL (ref 12–17)
IMM GRANULOCYTES # BLD AUTO: 0 THOUSAND/UL (ref 0–0.2)
IMM GRANULOCYTES NFR BLD AUTO: 0 % (ref 0–2)
LYMPHOCYTES # BLD AUTO: 1.04 THOUSANDS/ÂΜL (ref 0.6–4.47)
LYMPHOCYTES NFR BLD AUTO: 28 % (ref 14–44)
MCH RBC QN AUTO: 33.1 PG (ref 26.8–34.3)
MCHC RBC AUTO-ENTMCNC: 33.8 G/DL (ref 31.4–37.4)
MCV RBC AUTO: 98 FL (ref 82–98)
MONOCYTES # BLD AUTO: 0.49 THOUSAND/ÂΜL (ref 0.17–1.22)
MONOCYTES NFR BLD AUTO: 13 % (ref 4–12)
NEUTROPHILS # BLD AUTO: 2 THOUSANDS/ÂΜL (ref 1.85–7.62)
NEUTS SEG NFR BLD AUTO: 52 % (ref 43–75)
NRBC BLD AUTO-RTO: 0 /100 WBCS
PLATELET # BLD AUTO: 151 THOUSANDS/UL (ref 149–390)
PMV BLD AUTO: 9.6 FL (ref 8.9–12.7)
POTASSIUM SERPL-SCNC: 4.2 MMOL/L (ref 3.5–5.3)
RBC # BLD AUTO: 3.59 MILLION/UL (ref 3.88–5.62)
SODIUM SERPL-SCNC: 138 MMOL/L (ref 135–147)
WBC # BLD AUTO: 3.76 THOUSAND/UL (ref 4.31–10.16)

## 2023-10-02 PROCEDURE — 80048 BASIC METABOLIC PNL TOTAL CA: CPT | Performed by: STUDENT IN AN ORGANIZED HEALTH CARE EDUCATION/TRAINING PROGRAM

## 2023-10-02 PROCEDURE — 99232 SBSQ HOSP IP/OBS MODERATE 35: CPT | Performed by: FAMILY MEDICINE

## 2023-10-02 PROCEDURE — 85025 COMPLETE CBC W/AUTO DIFF WBC: CPT | Performed by: STUDENT IN AN ORGANIZED HEALTH CARE EDUCATION/TRAINING PROGRAM

## 2023-10-02 RX ADMIN — CLOZAPINE 200 MG: 100 TABLET ORAL at 22:38

## 2023-10-02 RX ADMIN — TRAZODONE HYDROCHLORIDE 100 MG: 100 TABLET ORAL at 22:38

## 2023-10-02 RX ADMIN — CARBIDOPA AND LEVODOPA 1 TABLET: 25; 100 TABLET ORAL at 22:42

## 2023-10-02 RX ADMIN — CARBIDOPA AND LEVODOPA 1 TABLET: 25; 100 TABLET ORAL at 08:42

## 2023-10-02 RX ADMIN — CLOZAPINE 100 MG: 100 TABLET ORAL at 17:28

## 2023-10-02 RX ADMIN — HEPARIN SODIUM 5000 UNITS: 5000 INJECTION INTRAVENOUS; SUBCUTANEOUS at 05:58

## 2023-10-02 RX ADMIN — TOPIRAMATE 25 MG: 25 TABLET, FILM COATED ORAL at 08:42

## 2023-10-02 RX ADMIN — TEMAZEPAM 15 MG: 15 CAPSULE ORAL at 22:29

## 2023-10-02 RX ADMIN — CARBIDOPA AND LEVODOPA 1 TABLET: 25; 100 TABLET ORAL at 17:28

## 2023-10-02 RX ADMIN — TOPIRAMATE 25 MG: 25 TABLET, FILM COATED ORAL at 17:28

## 2023-10-02 RX ADMIN — ATORVASTATIN CALCIUM 40 MG: 40 TABLET, FILM COATED ORAL at 17:28

## 2023-10-02 RX ADMIN — HEPARIN SODIUM 5000 UNITS: 5000 INJECTION INTRAVENOUS; SUBCUTANEOUS at 22:29

## 2023-10-02 RX ADMIN — METOPROLOL SUCCINATE 25 MG: 25 TABLET, EXTENDED RELEASE ORAL at 08:38

## 2023-10-02 RX ADMIN — PANTOPRAZOLE SODIUM 40 MG: 40 TABLET, DELAYED RELEASE ORAL at 05:58

## 2023-10-02 RX ADMIN — ASPIRIN 81 MG 81 MG: 81 TABLET ORAL at 08:42

## 2023-10-02 RX ADMIN — TAMSULOSIN HYDROCHLORIDE 0.4 MG: 0.4 CAPSULE ORAL at 22:29

## 2023-10-02 RX ADMIN — CLOZAPINE 100 MG: 100 TABLET ORAL at 08:38

## 2023-10-02 NOTE — PLAN OF CARE
Problem: SAFETY ADULT  Goal: Patient will remain free of falls  Description: INTERVENTIONS:  - Educate patient/family on patient safety including physical limitations  - Instruct patient to call for assistance with activity   - Consult OT/PT to assist with strengthening/mobility   - Keep Call bell within reach  - Keep bed low and locked with side rails adjusted as appropriate  - Keep care items and personal belongings within reach  - Initiate and maintain comfort rounds  - Make Fall Risk Sign visible to staff  - Offer Toileting every 2 Hours, in advance of need  - Initiate/Maintain bed alarm  - Obtain necessary fall risk management equipment: yellow socks  - Apply yellow socks and bracelet for high fall risk patients  - Consider moving patient to room near nurses station  Outcome: Progressing     Problem: MOBILITY - ADULT  Goal: Maintain or return to baseline ADL function  Description: INTERVENTIONS:  -  Assess patient's ability to carry out ADLs; assess patient's baseline for ADL function and identify physical deficits which impact ability to perform ADLs (bathing, care of mouth/teeth, toileting, grooming, dressing, etc.)  - Assess/evaluate cause of self-care deficits   - Assess range of motion  - Assess patient's mobility; develop plan if impaired  - Assess patient's need for assistive devices and provide as appropriate  - Encourage maximum independence but intervene and supervise when necessary  - Involve family in performance of ADLs  - Assess for home care needs following discharge   - Consider OT consult to assist with ADL evaluation and planning for discharge  - Provide patient education as appropriate  Outcome: Progressing     Problem: NEUROSENSORY - ADULT  Goal: Achieves stable or improved neurological status  Description: INTERVENTIONS  - Monitor and report changes in neurological status  - Monitor vital signs such as temperature, blood pressure, glucose, and any other labs ordered   - Initiate measures to prevent increased intracranial pressure  - Monitor for seizure activity and implement precautions if appropriate      Outcome: Progressing

## 2023-10-02 NOTE — PROGRESS NOTES
[address]  Progress Note  Name: Terrance Olea  MRN: [id number]  Unit/Bed#: -01 I Date of Admission: 2023   Date of Service: 10/2/2023 I Hospital Day: 2    Assessment/Plan   * Orthostatic lightheadedness  Assessment & Plan  • Background: dizziness affecting ambulation  • Admitted on Stroke Pathway:  o  r/o stroke vs BPPV vs Vertigo  • CTA Head and Neck: negative  • Admission NIHSS: 0  • Not a candidate for TPa/TNK  • ASA/Statin  • Neurology consult appreciated; no further stroke pathway workup. • Neurogenic orthostatic hypotension due to Parkinson's disease; as evidenced by orthostatic hypotension in a patient with neuro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; requiring Neurology consult, orthostatic vital signs, compression stockings, slow transition from sitting to standing, and hydration. • PT/OT recs rehab vs. Return to skilled nursing      Paranoid schizophrenia ([address])  Assessment & Plan  · At baseline  · Continue current medications    Hypertension  Assessment & Plan  · BP stable  · Continue metoprolol    Neuro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   Assessment & Plan  · Stable, mild resting tremor noted  · Continue Sinemet            VTE Pharmacologic Prophylaxis:   Pharmacologic: Heparin  Mechanical VTE Prophylaxis in Place: Yes    Patient Centered Rounds: I have performed bedside rounds with nursing staff today. Current Length of Stay: 2 day(s)    Current Patient Status: Inpatient   Certification Statement: The patient will continue to require additional inpatient hospital stay due to medically stable for discharge, pending placement     Discharge Plan: pending decision on disposition by PT/OT    Code Status: Level 1 - Full Code      Subjective:     Patient reports of feeling improved with lightheadedness. reported he participated with PT/OT this morning.      Objective:     Vitals:   Temp (24hrs), Av.5 °F (36.9 °C), Min:98.2 °F (36.8 °C), Max:99 °F (37.2 °C)    Temp:  [98.2 °F (36.8 °C)-99 °F (37.2 °C)] 98.2 °F (36.8 °C)  HR:  [76-83] 83  Resp:  [15-20] 16  BP: (120-143)/(75-81) 135/78  SpO2:  [96 %-100 %] 96 %  Body mass index is 23.43 kg/m². Input and Output Summary (last 24 hours): Intake/Output Summary (Last 24 hours) at 10/2/2023 0915  Last data filed at 10/2/2023 0825  Gross per 24 hour   Intake 660 ml   Output 2050 ml   Net -1390 ml       Physical Exam:     Physical Exam  Vitals and nursing note reviewed. Constitutional:       General: He is not in acute distress. Appearance: Normal appearance. HENT:      Head: Normocephalic and atraumatic. Right Ear: External ear normal.      Left Ear: External ear normal.      Nose: Nose normal.   Eyes:      Extraocular Movements: Extraocular movements intact. Pulmonary:      Effort: Pulmonary effort is normal.   Musculoskeletal:      Cervical back: Normal range of motion. Neurological:      Mental Status: He is alert. Mental status is at baseline. Psychiatric:         Mood and Affect: Mood normal.          Additional Data:     Labs:    Results from last 7 days   Lab Units 10/02/23  0429   WBC Thousand/uL 3.76*   HEMOGLOBIN g/dL 11.9*   HEMATOCRIT % 35.2*   PLATELETS Thousands/uL 151   NEUTROS PCT % 52   LYMPHS PCT % 28   MONOS PCT % 13*   EOS PCT % 6     Results from last 7 days   Lab Units 10/02/23  0429 09/30/23  0453 09/29/23  1931   SODIUM mmol/L 138   < > 140   POTASSIUM mmol/L 4.2   < > 3.8   CHLORIDE mmol/L 110*   < > 111*   CO2 mmol/L 27   < > 27   BUN mg/dL 14   < > 14   CREATININE mg/dL 0.53*   < > 0.73   ANION GAP mmol/L 1   < > 2   CALCIUM mg/dL 8.5   < > 8.8   ALBUMIN g/dL  --   --  3.6   TOTAL BILIRUBIN mg/dL  --   --  0.42   ALK PHOS U/L  --   --  71   ALT U/L  --   --  27   AST U/L  --   --  23   GLUCOSE RANDOM mg/dL 94   < > 80    < > = values in this interval not displayed.      Results from last 7 days   Lab Units 09/29/23 1931   INR  1.09     Results from last 7 days   Lab Units 09/30/23  0017 09/29/23 1913 POC GLUCOSE mg/dl 110 85                  Recent Cultures (last 7 days):           Last 24 Hours Medication List:   Current Facility-Administered Medications   Medication Dose Route Frequency Provider Last Rate   • acetaminophen  650 mg Oral Q4H PRN LIUDMILA Pinedo     • aspirin  81 mg Oral Daily Pippa Caro, CRNP     • atorvastatin  40 mg Oral QPM Pippa Caro, CRNP     • calcium carbonate  1,000 mg Oral Daily PRN Pippa Caro, CRNP     • carbidopa-levodopa  1 tablet Oral TID Pippa Caro, CRNP     • cloZAPine  100 mg Oral BID Pippa Caro, CRNP     • clozapine  200 mg Oral HS Pippa Caro, COLTNP     • heparin (porcine)  5,000 Units Subcutaneous Q8H [address] Pippa Caro, CRNP     • hydrOXYzine HCL  25 mg Oral Q6H PRN Pippa Caro, COLTNP     • meclizine  12.5 mg Oral Q8H PRN Pippa Caro, CRNP     • metoprolol succinate  25 mg Oral Daily Pippa Caro, CRNP     • ondansetron  4 mg Intravenous Q6H PRN Pippa Caro, LIUDMILA     • pantoprazole  40 mg Oral Early Morning Pippa Caro, COLTNP     • polyethylene glycol  17 g Oral Daily PRN Pippa Caro, CRNP     • tamsulosin  0.4 mg Oral HS Pippa Caro, CRNP     • temazepam  15 mg Oral HS Pippa Caro, CRNP     • topiramate  25 mg Oral BID Pippa Caro, CRNP     • traZODone  100 mg Oral HS LIUDMILA Pinedo          Today, Patient Was Seen By: Carmen Darnell MD    ** Please Note: Dictation voice to text software may have been used in the creation of this document.  **

## 2023-10-03 LAB
MRSA NOSE QL CULT: ABNORMAL
MRSA NOSE QL CULT: ABNORMAL

## 2023-10-03 PROCEDURE — 97116 GAIT TRAINING THERAPY: CPT

## 2023-10-03 PROCEDURE — 97165 OT EVAL LOW COMPLEX 30 MIN: CPT

## 2023-10-03 PROCEDURE — 99232 SBSQ HOSP IP/OBS MODERATE 35: CPT | Performed by: INTERNAL MEDICINE

## 2023-10-03 RX ADMIN — ASPIRIN 81 MG 81 MG: 81 TABLET ORAL at 08:54

## 2023-10-03 RX ADMIN — HEPARIN SODIUM 5000 UNITS: 5000 INJECTION INTRAVENOUS; SUBCUTANEOUS at 15:14

## 2023-10-03 RX ADMIN — CARBIDOPA AND LEVODOPA 1 TABLET: 25; 100 TABLET ORAL at 15:14

## 2023-10-03 RX ADMIN — METOPROLOL SUCCINATE 25 MG: 25 TABLET, EXTENDED RELEASE ORAL at 08:54

## 2023-10-03 RX ADMIN — TEMAZEPAM 15 MG: 15 CAPSULE ORAL at 22:36

## 2023-10-03 RX ADMIN — HEPARIN SODIUM 5000 UNITS: 5000 INJECTION INTRAVENOUS; SUBCUTANEOUS at 06:58

## 2023-10-03 RX ADMIN — HEPARIN SODIUM 5000 UNITS: 5000 INJECTION INTRAVENOUS; SUBCUTANEOUS at 22:35

## 2023-10-03 RX ADMIN — CARBIDOPA AND LEVODOPA 1 TABLET: 25; 100 TABLET ORAL at 08:54

## 2023-10-03 RX ADMIN — CLOZAPINE 200 MG: 100 TABLET ORAL at 22:36

## 2023-10-03 RX ADMIN — TOPIRAMATE 25 MG: 25 TABLET, FILM COATED ORAL at 17:42

## 2023-10-03 RX ADMIN — TOPIRAMATE 25 MG: 25 TABLET, FILM COATED ORAL at 08:54

## 2023-10-03 RX ADMIN — CLOZAPINE 100 MG: 100 TABLET ORAL at 17:43

## 2023-10-03 RX ADMIN — TRAZODONE HYDROCHLORIDE 100 MG: 100 TABLET ORAL at 22:36

## 2023-10-03 RX ADMIN — ATORVASTATIN CALCIUM 40 MG: 40 TABLET, FILM COATED ORAL at 17:42

## 2023-10-03 RX ADMIN — TAMSULOSIN HYDROCHLORIDE 0.4 MG: 0.4 CAPSULE ORAL at 22:36

## 2023-10-03 RX ADMIN — PANTOPRAZOLE SODIUM 40 MG: 40 TABLET, DELAYED RELEASE ORAL at 06:58

## 2023-10-03 RX ADMIN — CARBIDOPA AND LEVODOPA 1 TABLET: 25; 100 TABLET ORAL at 22:37

## 2023-10-03 RX ADMIN — CLOZAPINE 100 MG: 100 TABLET ORAL at 08:54

## 2023-10-03 NOTE — ASSESSMENT & PLAN NOTE
• Initially presented with dizziness with ambulation  • Possibly disequilibrium versus orthostatic hypotension  • Symptoms have since improved  • PT/OT recommending return to assisted living facility versus possible rehab  We will follow-up repeat PT/OT eval  Appreciate neurology evaluation

## 2023-10-03 NOTE — PHYSICAL THERAPY NOTE
PHYSICAL THERAPY TREATMENT  NAME:  Tona Gowers  DATE: 10/03/23    AGE:   68 y.o. Mrn:   [id number]  ADMIT DX:  Dizziness [R42]  Parkinson disease [G20. A1]  Stroke-like symptoms [R29.90]  Problem List:   Patient Active Problem List   Diagnosis    GERD (gastroesophageal reflux disease)    Neuro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     Hypertension    Hypercholesteremia    Paranoid schizophrenia ([address])    Combined form of senile cataract    MRSA carrier    Groin rash    Fall    Chronic obstructive pulmonary disease, unspecified COPD type ([address])    Neoplasm of uncertain behavior of left kidney    BPH with obstruction/lower urinary tract symptoms    Chronic constipation    Overactive bladder    Urge urinary incontinence    Urinary incontinence, post-void dribbling    Urinary frequency    Nocturia    Chronic prostatitis    Mild protein-calorie malnutrition (HCC)    Aortic root dilatation (HCC)    SBO (small bowel obstruction) (HCC)    Parkinson disease    Orthostatic lightheadedness       Past Medical History  Past Medical History:   Diagnosis Date    Asthma     Benign prostatic hyperplasia     COPD (chronic obstructive pulmonary disease) (HCC)     GERD (gastroesophageal reflux disease)     Herpes zoster without complication 65/36/1830    Hypercholesteremia     Hypertension     Neuroleptic induced parkinsonism ([address])     Paranoid schizophrenia ([address])     Psychiatric disorder        Past Surgical History  Past Surgical History:   Procedure Laterality Date    CATARACT EXTRACTION      COLONOSCOPY         Length Of Stay: 3  Performed at least 2 patient identifiers during session: Name and Birthday       10/03/23 1017   PT Last Visit   PT Visit Date 10/03/23   Note Type   Note Type Treatment   Pain Assessment   Pain Assessment Tool 0-10   Pain Score No Pain   Restrictions/Precautions   Weight Bearing Precautions Per Order No   Other Precautions Chair Alarm; Bed Alarm; Fall Risk;Hard of hearing   General   Chart Reviewed Yes   Response to Previous Treatment Patient with no complaints from previous session. Family/Caregiver Present No   Cognition   Overall Cognitive Status WFL   Arousal/Participation Alert; Responsive; Cooperative   Attention Within functional limits   Orientation Level Oriented X4   Memory Within functional limits   Following Commands Follows one step commands without difficulty   Comments Pt agreeable to PT session   Subjective   Subjective "we can walk"   Five Times Sit To Stand   Time (Seconds) 14 Seconds   Bed Mobility   Supine to Sit 7  Independent   Sit to Supine   (pt seated in bedside chair at end of session)   Transfers   Sit to Stand 6  Modified independent   Additional items Increased time required   Stand to Sit 6  Modified independent   Additional items Increased time required   Additional Comments no AD used during transfers. Pt without complaints of lightheadedness, SOB, chest pain, dizziness throughout session   Ambulation/Elevation   Gait pattern Narrow ALYX; Decreased foot clearance   Gait Assistance 5  Supervision   Additional items Verbal cues   Assistive Device None   Distance 180'   Stair Management Assistance 5  Supervision   Additional items Assist x 1;Verbal cues   Stair Management Technique One rail R   Number of Stairs 14   Balance   Static Sitting Normal   Dynamic Sitting Good   Static Standing Fair +   Dynamic Standing Fair   Ambulatory Fair   Endurance Deficit   Endurance Deficit No   Activity Tolerance   Activity Tolerance Patient tolerated treatment well   Nurse Made Aware DARVIN Garcia   Exercises   Knee AROM Long Arc Quad Sitting;20 reps;AROM; Bilateral   Neuro re-ed Pt with improved static standing balance x3 minutes with distant supervision without LOB in any direction   Assessment   Prognosis Good   Problem List Decreased strength;Decreased endurance; Impaired balance;Decreased mobility; Decreased safety awareness   Assessment Pt seen for PT treatment session this date, consisting of gt training on level surfaces to improve pt safety in household environment and elevation training for enter/exit home. Since previous session, pt has made very good progress in terms of activity tolerance and decreased assist required for ambulation and transfers. Patient greeted supine in bed and agreeable to PT session reporting 0/10 pain. Pt completed sup > sit IND, STS Rachana with arm rests and increased time to complete. Pt progressed to ambulation 180' without AD supervision and navigated 14 steps with unilateral HR supervision wit reciprocal pattern. PT instructed pt in completed of 5xSTS test with completion in 14 seconds (pt age norm 12.6). Current goals and POC remain appropriate, pt continues to have rehab potential and is making good progress towards STGs. Pt prognosis for achieving goals is good, pending pt progress with hospitalization/medical status improvements, and indicated by orientation, previous response to intervention and responsive to cues/strategies. Pt limited d/t avoidance behaviors. PT recommends home with home health rehabilitation upon discharge. Pt continues to be functioning below baseline level, and remains limited 2* factors listed above. PT will continue to see pt during current hospitalization in order to address the deficits listed above and provide interventions consistent w/ POC in effort to achieve STGs. Goals   STG Expiration Date 10/14/23   PT Treatment Day 1   Plan   Treatment/Interventions Functional transfer training; Therapeutic exercise;Elevations; Endurance training;Patient/family training;Bed mobility;Gait training;Spoke to nursing;Spoke to case management   Progress Progressing toward goals   PT Frequency 3-5x/wk   Recommendation   PT Discharge Recommendation Home with home health rehabilitation   AM-PAC Basic Mobility Inpatient   Turning in Flat Bed Without Bedrails 4   Lying on Back to Sitting on Edge of Flat Bed Without Bedrails 4   Moving Bed to Chair 3   Standing Up From Chair Using Arms 4   Walk in Room 3   Climb 3-5 Stairs With Railing 3   Basic Mobility Inpatient Raw Score 21   Basic Mobility Standardized Score 45.55   Highest Level Of Mobility   JH-HLM Goal 6: Walk 10 steps or more   JH-HLM Achieved 7: Walk 25 feet or more   Education   Education Provided Assistive device; Mobility training   Patient Demonstrates acceptance/verbal understanding   End of Consult   Patient Position at End of Consult All needs within reach; Supine;Bed/Chair alarm activated       Time In: 1017  Time Out: 1042  Total Treatment Minutes: 25    Yosef Carlin, PT

## 2023-10-03 NOTE — PLAN OF CARE
Problem: PAIN - ADULT  Goal: Verbalizes/displays adequate comfort level or baseline comfort level  Description: Interventions:  - Encourage patient to monitor pain and request assistance  - Assess pain using appropriate pain scale  - Administer analgesics based on type and severity of pain and evaluate response  - Implement non-pharmacological measures as appropriate and evaluate response  - Consider cultural and social influences on pain and pain management  - Notify physician/advanced practitioner if interventions unsuccessful or patient reports new pain  Outcome: Progressing     Problem: INFECTION - ADULT  Goal: Absence or prevention of progression during hospitalization  Description: INTERVENTIONS:  - Assess and monitor for signs and symptoms of infection  - Monitor lab/diagnostic results  - Monitor all insertion sites, i.e. indwelling lines, tubes, and drains  - Monitor endotracheal if appropriate and nasal secretions for changes in amount and color  - [place] appropriate cooling/warming therapies per order  - Administer medications as ordered  - Instruct and encourage patient and family to use good hand hygiene technique  - Identify and instruct in appropriate isolation precautions for identified infection/condition  Outcome: Progressing     Problem: SAFETY ADULT  Goal: Patient will remain free of falls  Description: INTERVENTIONS:  - Educate patient/family on patient safety including physical limitations  - Instruct patient to call for assistance with activity   - Consult OT/PT to assist with strengthening/mobility   - Keep Call bell within reach  - Keep bed low and locked with side rails adjusted as appropriate  - Keep care items and personal belongings within reach  - Initiate and maintain comfort rounds  - Make Fall Risk Sign visible to staff  - Offer Toileting every 2 Hours, in advance of need  - Initiate/Maintain bed alarm  - Obtain necessary fall risk management equipment: yellow socks  - Apply yellow socks and bracelet for high fall risk patients  - Consider moving patient to room near nurses station  Outcome: Progressing     Problem: MOBILITY - ADULT  Goal: Maintain or return to baseline ADL function  Description: INTERVENTIONS:  -  Assess patient's ability to carry out ADLs; assess patient's baseline for ADL function and identify physical deficits which impact ability to perform ADLs (bathing, care of mouth/teeth, toileting, grooming, dressing, etc.)  - Assess/evaluate cause of self-care deficits   - Assess range of motion  - Assess patient's mobility; develop plan if impaired  - Assess patient's need for assistive devices and provide as appropriate  - Encourage maximum independence but intervene and supervise when necessary  - Involve family in performance of ADLs  - Assess for home care needs following discharge   - Consider OT consult to assist with ADL evaluation and planning for discharge  - Provide patient education as appropriate  Outcome: Progressing

## 2023-10-03 NOTE — PLAN OF CARE
Problem: OCCUPATIONAL THERAPY ADULT  Goal: Performs self-care activities at highest level of function for planned discharge setting. See evaluation for individualized goals. Description: Treatment Interventions: ADL retraining, Functional transfer training, UE strengthening/ROM, Endurance training, Cognitive reorientation, Patient/family training, Compensatory technique education, Energy conservation, Activityengagement          See flowsheet documentation for full assessment, interventions and recommendations. Note: Limitation: Decreased ADL status, Decreased UE strength, Decreased Safe judgement during ADL, Decreased cognition, Decreased endurance, Decreased self-care trans, Decreased high-level ADLs  Prognosis: Good  Assessment: Pt is a 68 y.o. male seen for OT evaluation s/p admit to [facility] on 9/29/2023 w/ Orthostatic lightheadedness, dizziness. Comorbidities affecting pt's functional performance at time of assessment include: paranoid schizophrenia, HTN, neuroleptic induced parkinsonism. Personal factors affecting pt at time of IE include:difficulty performing IADLS  and flat affect. Prior to admission, pt was living in group home and reports:per pt independent w/ ADLs, independent w/ functional transfers and mobility w/ no AD, assist w/ IADLs and transport from group home staff . Upon evaluation: Pt requires setup UB ADLs, setup LB ADLs, supervision toileting, MOD I bed mobility, supervision functional transfers, supervision functional mobility w/ RW and close supervision w/ no AD in homelike environment 2* the following deficits impacting occupational performance: slightly impaired balance, decreased endurance, decreased activity tolerance, decreased strength, reports dizziness has resolved. Educated pt on pacing self, taking rest breaks, pt receptive.  Pt to benefit from continued skilled OT tx while in the hospital to address deficits as defined above and maximize level of functional independence w ADL's and functional mobility. Occupational Performance areas to address include: grooming, bathing/shower, toilet hygiene, dressing, health maintenance, functional mobility, clothing management and cleaning. From OT standpoint, recommendation at time of d/c would be home w/ staff support and outpt therapy. The patient's raw score on the -PAC Daily Activity Inpatient Short Form is 20. A raw score of greater than or equal to 19 suggests the patient may benefit from discharge to home. Please refer to the recommendation of the Occupational Therapist for safe discharge planning.      OT Discharge Recommendation: Home with outpatient rehabilitation

## 2023-10-03 NOTE — OCCUPATIONAL THERAPY NOTE
Occupational Therapy Evaluation     Patient Name: Mary GALVAN Date: 10/3/2023  Problem List  Principal Problem:    Orthostatic lightheadedness  Active Problems:    Neuro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     Hypertension    Paranoid schizophrenia [facility])    Past Medical History  Past Medical History:   Diagnosis Date    Asthma     Benign prostatic hyperplasia     COPD (chronic obstructive pulmonary disease) ([address])     GERD (gastroesophageal reflux disease)     Herpes zoster without complication 05/99/9086    Hypercholesteremia     Hypertension     Neuroleptic induced parkinsonism ([address])     Paranoid schizophrenia ([address])     Psychiatric disorder      Past Surgical History  Past Surgical History:   Procedure Laterality Date    CATARACT EXTRACTION      COLONOSCOPY             10/03/23 0932   OT Last Visit   OT Visit Date 10/03/23   Note Type   Note type Evaluation   Pain Assessment   Pain Assessment Tool 0-10   Pain Score No Pain   Restrictions/Precautions   Other Precautions Fall Risk;Bed Alarm; Chair Alarm;Multiple lines;Hard of hearing   Home Living   Type of Home Group Home   Home Layout Two level  (0 [address]; flight down to dinner/dining room)   Bathroom Shower/Tub Walk-in shower   Bathroom Toilet Standard   Bathroom Accessibility Accessible   Home Equipment   (no DME)   Additional Comments no AD use at baseline reports staff can assist him if needed   Prior Function   Level of [place] Independent with ADLs; Independent with functional mobility; Needs assistance with [address] staff   Receives Help From Personal care attendant;Friend(s)  (sister)   IADLs Family/Friend/Other provides transportation; Family/Friend/Other provides meals; Family/Friend/Other provides medication management  (assist w/ cooking and meds)   Falls in the last 6 months 0   Vocational Retired   Comments pt reports staff provides meals and transport   Lifestyle   Autonomy per pt independent w/ ADLs, independent w/ functional transfers and mobility w/ no AD, assist w/ IADLs and transport   Reciprocal Relationships sister and friends   Service to Others retired   Intrinsic Gratification walking   Subjective   Subjective " I am doing well"   ADL   Where Assessed Chair   Eating Assistance 6  Modified independent   Grooming Assistance 5  Supervision/Setup   UB Bathing Assistance 5  Supervision/Setup   LB Bathing Assistance 5  Supervision/Setup   UB Dressing Assistance 5  Supervision/Setup   LB Dressing Assistance 5  Supervision/Setup   Toileting Assistance  5  Supervision/Setup   Functional Assistance 5  Supervision/Setup   Bed Mobility   Supine to Sit 6  Modified independent   Additional items Increased time required   Sit to Supine 6  Modified independent   Additional items Increased time required   Transfers   Sit to Stand 5  Supervision   Additional items Increased time required;Verbal cues   Stand to Sit 5  Supervision   Additional items Increased time required;Verbal cues   Toilet transfer 5  Supervision   Additional items Increased time required;Verbal cues;Standard toilet   Functional Mobility   Functional Mobility 5  Supervision   Additional Comments w/ RW and close supervision w/ no AD in homelike environment   Additional items Rolling walker   Balance   Static Sitting Normal   Dynamic Sitting Good   Static Standing Fair +   Dynamic Standing Fair   Ambulatory Fair -   Activity Tolerance   Activity Tolerance Patient tolerated treatment well   Nurse Made Aware appropriate to see per Radha BOSTON Assessment   RUE Assessment WFL  (4/5)   LUE Assessment   LUE Assessment WFL  (4/5)   Hand Function   Gross Motor Coordination Functional   Fine Motor Coordination Functional   Sensation   Light Touch No apparent deficits   Proprioception   Proprioception No apparent deficits   Vision-Basic Assessment   Current Vision No visual deficits   Vision - Complex Assessment   Ocular Range of Motion Intact   Acuity Able to read clock/calendar on wall without difficulty   Psychosocial   Psychosocial (WDL) WDL   Cognition   Overall Cognitive Status WFL   Arousal/Participation Alert; Responsive; Cooperative   Attention Within functional limits   Orientation Level Oriented X4   Memory Decreased recall of precautions  (able to recall 3 words after several minutes)   Following Commands Follows one step commands without difficulty   Comments pleasant and cooperative   Assessment   Limitation Decreased ADL status; Decreased UE strength;Decreased Safe judgement during ADL;Decreased cognition;Decreased endurance;Decreased self-care trans;Decreased high-level ADLs   Prognosis Good   Assessment Pt is a 68 y.o. male seen for OT evaluation s/p admit to [facility] on 9/29/2023 w/ Orthostatic lightheadedness, dizziness. Comorbidities affecting pt's functional performance at time of assessment include: paranoid schizophrenia, HTN, neuroleptic induced parkinsonism. Personal factors affecting pt at time of IE include:difficulty performing IADLS  and flat affect. Prior to admission, pt was living in group home and reports:per pt independent w/ ADLs, independent w/ functional transfers and mobility w/ no AD, assist w/ IADLs and transport from group home staff . Upon evaluation: Pt requires setup UB ADLs, setup LB ADLs, supervision toileting, MOD I bed mobility, supervision functional transfers, supervision functional mobility w/ RW and close supervision w/ no AD in homelike environment 2* the following deficits impacting occupational performance: slightly impaired balance, decreased endurance, decreased activity tolerance, decreased strength, reports dizziness has resolved. Educated pt on pacing self, taking rest breaks, pt receptive. Pt to benefit from continued skilled OT tx while in the hospital to address deficits as defined above and maximize level of functional independence w ADL's and functional mobility.  Occupational Performance areas to address include: grooming, bathing/shower, toilet hygiene, dressing, health maintenance, functional mobility, clothing management and cleaning. From OT standpoint, recommendation at time of d/c would be home w/ staff support and outpt therapy. The patient's raw score on the AM-PAC Daily Activity Inpatient Short Form is 20. A raw score of greater than or equal to 19 suggests the patient may benefit from discharge to home. Please refer to the recommendation of the Occupational Therapist for safe discharge planning. Goals   Patient Goals "to not go back to the group home"   LTG Time Frame 10-14   Long Term Goal please see below goals   Plan   Treatment Interventions ADL retraining;Functional transfer training;UE strengthening/ROM; Endurance training;Cognitive reorientation;Patient/family training; Compensatory technique education; Energy conservation; Activityengagement   Goal Expiration Date 10/17/23   OT Frequency 1-2x/wk   Recommendation   OT Discharge Recommendation Home with outpatient rehabilitation   [facility] Daily Activity Inpatient   Lower Body Dressing 3   Bathing 3   Toileting 3   Upper Body Dressing 3   Grooming 4   Eating 4   Daily Activity Raw Score 20   Daily Activity Standardized Score (Calc for Raw Score >=11) 42.03   AM-PAC Applied Cognition Inpatient   Following a Speech/Presentation 4   Understanding Ordinary Conversation 4   Taking Medications 4   Remembering Where Things Are Placed or Put Away 4   Remembering List of 4-5 Errands 3   Taking Care of Complicated Tasks 3   Applied Cognition Raw Score 22   Applied Cognition Standardized Score 47.83   Modified [place] Scale   Modified Jame Scale 4   End of Consult   Education Provided Yes   Patient Position at End of Consult All needs within reach;Bed/Chair alarm activated;Supine   Nurse Communication Nurse aware of consult     Occupational Therapy Goals to be met in 10-14 days:  1) Pt will improve activity tolerance to G for 30 min txment sessions to enhance ADLs  2) Pt will complete ADLs/self care w/ mod I   3) Pt will complete toileting w/ mod I w/ G hygiene/thoroughness using DME PRN  4) Pt will improve functional transfers on/off all surfaces using DME PRN w/ G balance/safety including toileting w/ mod I  5) Pt will improve fx'l mobility during I/ADl/leisure tasks using DME PRN w/ g balance/safety w/ mod I  6) Pt will engage in ongoing cognitive assessment w/ G participation to A w/ safe d/c planning/recommendations  7) Pt will demonstrate G carryover of pt/caregiver education and training as appropriate w/ mod I  w/ G tolerance  8) Pt will engage in depression screen/leisure interest checklist w/ G participation to monitor s/s depression and ID 3 positive coping strategies to A w/ emotional regulation and management  9) Pt will demonstrate 100% carryover of E.C. techniques w/ mod I t/o fx'l I/ADL/leisure tasks w/o cues s/p skilled education  10) Pt will demonstrate improved standing tolerance to 4-6 minutes during functional tasks w/ good- dynamic standing balance to enhance ADL performance  11) Pt will demonstrate improved b/l UE strength by 1 MMT grade to enhance ADLS and functional transfers     Documentation completed by: Meeta Villa MS, OTR/L

## 2023-10-03 NOTE — PLAN OF CARE
Problem: Neurological Deficit  Goal: Neurological status is stable or improving  Description: Interventions:  - Monitor and assess patient's level of consciousness, motor function, sensory function, and level of assistance needed for ADLs. - Monitor and report changes from baseline. Collaborate with interdisciplinary team to initiate plan and implement interventions as ordered. - Provide and maintain a safe environment. - Consider seizure precautions. - Consider fall precautions. - Consider aspiration precautions. - Consider bleeding precautions.   Outcome: Progressing     Problem: CARDIOVASCULAR - ADULT  Goal: Maintains optimal cardiac output and hemodynamic stability  Description: INTERVENTIONS:  - Monitor I/O, vital signs and rhythm  - Monitor for S/S and trends of decreased cardiac output  - Administer and titrate ordered vasoactive medications to optimize hemodynamic stability  - Assess quality of pulses, skin color and temperature  - Assess for signs of decreased coronary artery perfusion  - Instruct patient to report change in severity of symptoms  Outcome: Progressing  Goal: Absence of cardiac dysrhythmias or at baseline rhythm  Description: INTERVENTIONS:  - Continuous cardiac monitoring, vital signs, obtain 12 lead EKG if ordered  - Administer antiarrhythmic and heart rate control medications as ordered  - Monitor electrolytes and administer replacement therapy as ordered  Outcome: Progressing     Problem: NEUROSENSORY - ADULT  Goal: Achieves stable or improved neurological status  Description: INTERVENTIONS  - Monitor and report changes in neurological status  - Monitor vital signs such as temperature, blood pressure, glucose, and any other labs ordered   - Initiate measures to prevent increased intracranial pressure  - Monitor for seizure activity and implement precautions if appropriate      Outcome: Progressing  Goal: Achieves maximal functionality and self care  Description: INTERVENTIONS  - Monitor swallowing and airway patency with patient fatigue and changes in neurological status  - Encourage and assist patient to increase activity and self care.    - Encourage visually impaired, hearing impaired and aphasic patients to use assistive/communication devices  Outcome: Progressing

## 2023-10-03 NOTE — PLAN OF CARE
Problem: PHYSICAL THERAPY ADULT  Goal: Performs mobility at highest level of function for planned discharge setting. See evaluation for individualized goals. Description: Treatment/Interventions: Functional transfer training, Therapeutic exercise, Elevations, Endurance training, Patient/family training, Bed mobility, Gait training, Spoke to nursing, Spoke to case management  Equipment Recommended: Ruby Kign       See flowsheet documentation for full assessment, interventions and recommendations. Outcome: Progressing  Note: Prognosis: Good  Problem List: Decreased strength, Decreased endurance, Impaired balance, Decreased mobility, Decreased safety awareness  Assessment: Pt seen for PT treatment session this date, consisting of gt training on level surfaces to improve pt safety in household environment and elevation training for enter/exit home. Since previous session, pt has made very good progress in terms of activity tolerance and decreased assist required for ambulation and transfers. Patient greeted supine in bed and agreeable to PT session reporting 0/10 pain. Pt completed sup > sit IND, STS Rachana with arm rests and increased time to complete. Pt progressed to ambulation 180' without AD supervision and navigated 14 steps with unilateral HR supervision wit reciprocal pattern. PT instructed pt in completed of 5xSTS test with completion in 14 seconds (pt age norm 12.6). Current goals and POC remain appropriate, pt continues to have rehab potential and is making good progress towards STGs. Pt prognosis for achieving goals is good, pending pt progress with hospitalization/medical status improvements, and indicated by orientation, previous response to intervention and responsive to cues/strategies. Pt limited d/t avoidance behaviors. PT recommends home with home health rehabilitation upon discharge. Pt continues to be functioning below baseline level, and remains limited 2* factors listed above.  PT will continue to see pt during current hospitalization in order to address the deficits listed above and provide interventions consistent w/ POC in effort to achieve STGs. Barriers to Discharge: Other (Comment) (decreased caregiver support, per pt. he did not recieve A with ADL/mobility; will require A x1 upon D/C as elevated falls risk especially with turns and new AD use (RW). Also would not recommend stairs, per pt. he must do flight of stairs for meals. )     PT Discharge Recommendation: Home with home health rehabilitation    See flowsheet documentation for full assessment.    Dominique Chowdhury; PT, DPT

## 2023-10-03 NOTE — PROGRESS NOTES
[address]  Progress Note  Name: Terrance Olea  MRN: [id number]  Unit/Bed#: -01 I Date of Admission: 2023   Date of Service: 10/3/2023 I Hospital Day: 3    Assessment/Plan   * Orthostatic lightheadedness  Assessment & Plan  • Initially presented with dizziness with ambulation  • Possibly disequilibrium versus orthostatic hypotension  • Symptoms have since improved  • PT/OT recommending return to assisted living facility versus possible rehab  We will follow-up repeat PT/OT eval  Appreciate neurology evaluation    Paranoid schizophrenia ([address])  Assessment & Plan  ·  Continue clozapine    Hypertension  Assessment & Plan  · C/w metoprolol     Neuro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   Assessment & Plan  · continue Sinemet           VTE Pharmacologic Prophylaxis:   Pharmacologic: Heparin  Mechanical VTE Prophylaxis in Place: Yes    Patient Centered Rounds: I have performed bedside rounds with nursing staff today. Discussions with Specialists or Other Care Team Provider: brain,. nursing    Education and Discussions with Family / Patient: pt, sister via phone    Time Spent for Care: 45 minutes. More than 50% of total time spent on counseling and coordination of care as described above. Current Length of Stay: 3 day(s)    Current Patient Status: Inpatient   Certification Statement: The patient will continue to require additional inpatient hospital stay due to see below    Discharge Plan: Anticipate 24 hours medically. Awaiting further improvement of dizziness  Code Status: Level 1 - Full Code      Subjective:   Denies chest pain, shortness of breath, cough, fevers, chills    Objective:     Vitals:   Temp (24hrs), Av.1 °F (36.7 °C), Min:97.4 °F (36.3 °C), Max:98.5 °F (36.9 °C)    Temp:  [97.4 °F (36.3 °C)-98.5 °F (36.9 °C)] 97.4 °F (36.3 °C)  HR:  [75-90] 86  Resp:  [16-18] 18  BP: (107-150)/(63-85) 114/63  SpO2:  [94 %-96 %] 96 %  Body mass index is 23.43 kg/m².      Input and Output Summary (last 24 hours): Intake/Output Summary (Last 24 hours) at 10/3/2023 1151  Last data filed at 10/3/2023 0900  Gross per 24 hour   Intake 730 ml   Output 1775 ml   Net -1045 ml       Physical Exam:     Physical Exam  Constitutional:       General: He is not in acute distress. Appearance: He is well-developed. He is not diaphoretic. HENT:      Head: Normocephalic and atraumatic. Nose: Nose normal.      Mouth/Throat:      Pharynx: No oropharyngeal exudate. Eyes:      General: No scleral icterus. Conjunctiva/sclera: Conjunctivae normal.   Cardiovascular:      Rate and Rhythm: Normal rate and regular rhythm. Heart sounds: Normal heart sounds. No murmur heard. No friction rub. No gallop. Pulmonary:      Effort: Pulmonary effort is normal. No respiratory distress. Breath sounds: Normal breath sounds. No wheezing or rales. Chest:      Chest wall: No tenderness. Abdominal:      General: Bowel sounds are normal. There is no distension. Palpations: Abdomen is soft. Tenderness: There is no abdominal tenderness. There is no guarding. Musculoskeletal:         General: No tenderness or deformity. Normal range of motion. Cervical back: Normal range of motion and neck supple. Skin:     General: Skin is warm and dry. Findings: No erythema. Neurological:      Mental Status: He is alert. Mental status is at baseline.            Additional Data:     Labs:    Results from last 7 days   Lab Units 10/02/23  0429   WBC Thousand/uL 3.76*   HEMOGLOBIN g/dL 11.9*   HEMATOCRIT % 35.2*   PLATELETS Thousands/uL 151   NEUTROS PCT % 52   LYMPHS PCT % 28   MONOS PCT % 13*   EOS PCT % 6     Results from last 7 days   Lab Units 10/02/23  0429 09/30/23  0453 09/29/23  1931   SODIUM mmol/L 138   < > 140   POTASSIUM mmol/L 4.2   < > 3.8   CHLORIDE mmol/L 110*   < > 111*   CO2 mmol/L 27   < > 27   BUN mg/dL 14   < > 14   CREATININE mg/dL 0.53*   < > 0.73   ANION GAP mmol/L 1   < > 2   CALCIUM mg/dL 8.5   < > 8.8   ALBUMIN g/dL  --   --  3.6   TOTAL BILIRUBIN mg/dL  --   --  0.42   ALK PHOS U/L  --   --  71   ALT U/L  --   --  27   AST U/L  --   --  23   GLUCOSE RANDOM mg/dL 94   < > 80    < > = values in this interval not displayed. Results from last 7 days   Lab Units 09/29/23  1931   INR  1.09     Results from last 7 days   Lab Units 09/30/23  0017 09/29/23  1913   POC GLUCOSE mg/dl 110 85                   * I Have Reviewed All Lab Data Listed Above. * Additional Pertinent Lab Tests Reviewed:  All Labs Within Last 24 Hours Reviewed    Imaging:    Imaging Reports Reviewed Today Include: na  Imaging Personally Reviewed by Myself Includes:  na    Recent Cultures (last 7 days):           Last 24 Hours Medication List:   Current Facility-Administered Medications   Medication Dose Route Frequency Provider Last Rate   • acetaminophen  650 mg Oral Q4H PRN LIUDMILA Pinedo     • aspirin  81 mg Oral Daily LIUDMILA Pinedo     • atorvastatin  40 mg Oral QPM LIUDMILA Pinedo     • calcium carbonate  1,000 mg Oral Daily PRN LIUDMILA Pinedo     • carbidopa-levodopa  1 tablet Oral TID LIUDMILA Pinedo     • cloZAPine  100 mg Oral BID LIUDMILA Pinedo     • clozapine  200 mg Oral HS LIUDMILA Pinedo     • heparin (porcine)  5,000 Units Subcutaneous Q8H [facility] LIUDMILA Pinedo     • hydrOXYzine HCL  25 mg Oral Q6H PRN LIUDMILA Pinedo     • meclizine  12.5 mg Oral Q8H PRN LIUDMILA Pinedo     • metoprolol succinate  25 mg Oral Daily LIUDMILA Pinedo     • ondansetron  4 mg Intravenous Q6H PRN LIUDMILA Pinedo     • pantoprazole  40 mg Oral Early Morning LIUDMILA Pinedo     • polyethylene glycol  17 g Oral Daily PRN LIUDMILA Pinedo     • tamsulosin  0.4 mg Oral HS LIUDMILA Pinedo     • temazepam 15 mg Oral HS LIUDMILA Pinedo     • topiramate  25 mg Oral BID LIUDMILA Pinedo     • traZODone  100 mg Oral HS LIUDMILA Pinedo          Today, Patient Was Seen By: Hoang Roberto MD    ** Please Note: Dictation voice to text software may have been used in the creation of this document.  **

## 2023-10-03 NOTE — CASE MANAGEMENT
Case Management Discharge Planning Note    Patient name Jani Leon  Location [address] [facility] 425/-01 MRN [id number]  : 1950 Date 10/3/2023       Current Admission Date: 2023  Current Admission Diagnosis:Orthostatic lightheadedness   Patient Active Problem List    Diagnosis Date Noted   • Orthostatic lightheadedness 2023   • SBO (small bowel obstruction) ([address]) 2023   • Parkinson disease 2023   • Mild protein-calorie malnutrition ([address]) 02/10/2023   • Aortic root dilatation ([address]) 02/10/2023   • Neoplasm of uncertain behavior of left kidney 2022   • BPH with obstruction/lower urinary tract symptoms 2022   • Chronic constipation 2022   • Overactive bladder 2022   • Urge urinary incontinence 2022   • Urinary incontinence, post-void dribbling 2022   • Urinary frequency 2022   • Nocturia 2022   • Chronic prostatitis 2022   • Chronic obstructive pulmonary disease, unspecified COPD type ([address]) 2022   • Fall 2022   • Groin rash 2022   • 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   • Combined form of senile cataract 2020   • GERD (gastroesophageal reflux disease) 10/22/2019   • Hypertension 10/22/2019   • Hypercholesteremia 10/22/2019   • Paranoid schizophrenia ([address]) 10/22/2019   • Neuro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  2016      LOS (days): 3  Geometric Mean LOS (GMLOS) (days): 4.00  Days to GMLOS:1.2     OBJECTIVE:  Risk of Unplanned Readmission Score: 19.89         Current admission status: Inpatient   Preferred Pharmacy:   Felipe Schneider [address] [place] [address] [address]  [address]  [address] [place] [address]  Phone: [phone] Fax: [phone]  ([facility] [place] [facility]. [facility].   Building [address] [place] [address],[address]  Phone: [phone] Fax: [phone]    Primary Care Provider: LIUDMILA Gregg    Primary Insurance: MEDICARE  Secondary Insurance: DISCHARGE DETAILS:     IMM Given (Date):: 10/03/23  IMM Given to[de-identified] Patient     Additional Comments: CM reviewed IMM with patient, gave patient a copy and had patient sign a copy for MR.

## 2023-10-04 VITALS
BODY MASS INDEX: 23.36 KG/M2 | TEMPERATURE: 98.6 F | HEIGHT: 68 IN | RESPIRATION RATE: 17 BRPM | SYSTOLIC BLOOD PRESSURE: 99 MMHG | OXYGEN SATURATION: 95 % | DIASTOLIC BLOOD PRESSURE: 66 MMHG | WEIGHT: 154.1 LBS | HEART RATE: 16 BPM

## 2023-10-04 PROCEDURE — 99239 HOSP IP/OBS DSCHRG MGMT >30: CPT | Performed by: INTERNAL MEDICINE

## 2023-10-04 RX ADMIN — CLOZAPINE 100 MG: 100 TABLET ORAL at 10:12

## 2023-10-04 RX ADMIN — METOPROLOL SUCCINATE 25 MG: 25 TABLET, EXTENDED RELEASE ORAL at 09:20

## 2023-10-04 RX ADMIN — CLOZAPINE 100 MG: 100 TABLET ORAL at 17:18

## 2023-10-04 RX ADMIN — CARBIDOPA AND LEVODOPA 1 TABLET: 25; 100 TABLET ORAL at 17:18

## 2023-10-04 RX ADMIN — CARBIDOPA AND LEVODOPA 1 TABLET: 25; 100 TABLET ORAL at 09:20

## 2023-10-04 RX ADMIN — TOPIRAMATE 25 MG: 25 TABLET, FILM COATED ORAL at 09:20

## 2023-10-04 RX ADMIN — ASPIRIN 81 MG 81 MG: 81 TABLET ORAL at 09:20

## 2023-10-04 RX ADMIN — PANTOPRAZOLE SODIUM 40 MG: 40 TABLET, DELAYED RELEASE ORAL at 05:38

## 2023-10-04 RX ADMIN — HEPARIN SODIUM 5000 UNITS: 5000 INJECTION INTRAVENOUS; SUBCUTANEOUS at 05:38

## 2023-10-04 RX ADMIN — ATORVASTATIN CALCIUM 40 MG: 40 TABLET, FILM COATED ORAL at 17:17

## 2023-10-04 RX ADMIN — TOPIRAMATE 25 MG: 25 TABLET, FILM COATED ORAL at 17:18

## 2023-10-04 NOTE — PLAN OF CARE
Problem: PAIN - ADULT  Goal: Verbalizes/displays adequate comfort level or baseline comfort level  Description: Interventions:  - Encourage patient to monitor pain and request assistance  - Assess pain using appropriate pain scale  - Administer analgesics based on type and severity of pain and evaluate response  - Implement non-pharmacological measures as appropriate and evaluate response  - Consider cultural and social influences on pain and pain management  - Notify physician/advanced practitioner if interventions unsuccessful or patient reports new pain  Outcome: Adequate for Discharge     Problem: INFECTION - ADULT  Goal: Absence or prevention of progression during hospitalization  Description: INTERVENTIONS:  - Assess and monitor for signs and symptoms of infection  - Monitor lab/diagnostic results  - Monitor all insertion sites, i.e. indwelling lines, tubes, and drains  - Monitor endotracheal if appropriate and nasal secretions for changes in amount and color  - [place] appropriate cooling/warming therapies per order  - Administer medications as ordered  - Instruct and encourage patient and family to use good hand hygiene technique  - Identify and instruct in appropriate isolation precautions for identified infection/condition  Outcome: Adequate for Discharge  Goal: Absence of fever/infection during neutropenic period  Description: INTERVENTIONS:  - Monitor WBC    Outcome: Adequate for Discharge     Problem: SAFETY ADULT  Goal: Patient will remain free of falls  Description: INTERVENTIONS:  - Educate patient/family on patient safety including physical limitations  - Instruct patient to call for assistance with activity   - Consult OT/PT to assist with strengthening/mobility   - Keep Call bell within reach  - Keep bed low and locked with side rails adjusted as appropriate  - Keep care items and personal belongings within reach  - Initiate and maintain comfort rounds  - Make Fall Risk Sign visible to staff  - Offer Toileting every 2 Hours, in advance of need  - Initiate/Maintain bed alarm  - Obtain necessary fall risk management equipment:   - Apply yellow socks and bracelet for high fall risk patients  - Consider moving patient to room near nurses station  Outcome: Adequate for Discharge  Goal: Maintain or return to baseline ADL function  Description: INTERVENTIONS:  -  Assess patient's ability to carry out ADLs; assess patient's baseline for ADL function and identify physical deficits which impact ability to perform ADLs (bathing, care of mouth/teeth, toileting, grooming, dressing, etc.)  - Assess/evaluate cause of self-care deficits   - Assess range of motion  - Assess patient's mobility; develop plan if impaired  - Assess patient's need for assistive devices and provide as appropriate  - Encourage maximum independence but intervene and supervise when necessary  - Involve family in performance of ADLs  - Assess for home care needs following discharge   - Consider OT consult to assist with ADL evaluation and planning for discharge  - Provide patient education as appropriate  Outcome: Adequate for Discharge  Goal: Maintains/Returns to pre admission functional level  Description: INTERVENTIONS:  - Perform BMAT or MOVE assessment daily.   - Set and communicate daily mobility goal to care team and patient/family/caregiver. - Collaborate with rehabilitation services on mobility goals if consulted  - Perform Range of Motion 2 times a day. - Reposition patient every 2 hours.   - Dangle patient 2 times a day  - Stand patient 2 times a day  - Ambulate patient 2 times a day  - Out of bed to chair 2 times a day   - Out of bed for meals 2 times a day  - Out of bed for toileting  - Record patient progress and toleration of activity level   Outcome: Adequate for Discharge     Problem: DISCHARGE PLANNING  Goal: Discharge to home or other facility with appropriate resources  Description: INTERVENTIONS:  - Identify barriers to discharge w/patient and caregiver  - Arrange for needed discharge resources and transportation as appropriate  - Identify discharge learning needs (meds, wound care, etc.)  - Arrange for interpretive services to assist at discharge as needed  - Refer to Case Management Department for coordinating discharge planning if the patient needs post-hospital services based on physician/advanced practitioner order or complex needs related to functional status, cognitive ability, or social support system  Outcome: Adequate for Discharge     Problem: Knowledge Deficit  Goal: Patient/family/caregiver demonstrates understanding of disease process, treatment plan, medications, and discharge instructions  Description: Complete learning assessment and assess knowledge base.   Interventions:  - Provide teaching at level of understanding  - Provide teaching via preferred learning methods  Outcome: Adequate for Discharge     Problem: MOBILITY - ADULT  Goal: Maintain or return to baseline ADL function  Description: INTERVENTIONS:  -  Assess patient's ability to carry out ADLs; assess patient's baseline for ADL function and identify physical deficits which impact ability to perform ADLs (bathing, care of mouth/teeth, toileting, grooming, dressing, etc.)  - Assess/evaluate cause of self-care deficits   - Assess range of motion  - Assess patient's mobility; develop plan if impaired  - Assess patient's need for assistive devices and provide as appropriate  - Encourage maximum independence but intervene and supervise when necessary  - Involve family in performance of ADLs  - Assess for home care needs following discharge   - Consider OT consult to assist with ADL evaluation and planning for discharge  - Provide patient education as appropriate  Outcome: Adequate for Discharge  Goal: Maintains/Returns to pre admission functional level  Description: INTERVENTIONS:  - Perform BMAT or MOVE assessment daily.   - Set and communicate daily mobility goal to care team and patient/family/caregiver. - Collaborate with rehabilitation services on mobility goals if consulted  - Perform Range of Motion 2 times a day. - Reposition patient every 2 hours. - Dangle patient 2 times a day  - Stand patient 2 times a day  - Ambulate patient 2 times a day  - Out of bed to chair 2 times a day   - Out of bed for meals 2 times a day  - Out of bed for toileting  - Record patient progress and toleration of activity level   Outcome: Adequate for Discharge     Problem: Prexisting or High Potential for Compromised Skin Integrity  Goal: Skin integrity is maintained or improved  Description: INTERVENTIONS:  - Identify patients at risk for skin breakdown  - Assess and monitor skin integrity  - Assess and monitor nutrition and hydration status  - Monitor labs   - Assess for incontinence   - Turn and reposition patient  - Assist with mobility/ambulation  - Relieve pressure over bony prominences  - Avoid friction and shearing  - Provide appropriate hygiene as needed including keeping skin clean and dry  - Evaluate need for skin moisturizer/barrier cream  - Collaborate with interdisciplinary team   - Patient/family teaching  - Consider wound care consult   Outcome: Adequate for Discharge     Problem: Neurological Deficit  Goal: Neurological status is stable or improving  Description: Interventions:  - Monitor and assess patient's level of consciousness, motor function, sensory function, and level of assistance needed for ADLs. - Monitor and report changes from baseline. Collaborate with interdisciplinary team to initiate plan and implement interventions as ordered. - Provide and maintain a safe environment. - Consider seizure precautions. - Consider fall precautions. - Consider aspiration precautions. - Consider bleeding precautions. Outcome: Adequate for Discharge     Problem:  Activity Intolerance/Impaired Mobility  Goal: Mobility/activity is maintained at optimum level for patient  Description: Interventions:  - Assess and monitor patient  barriers to mobility and need for assistive/adaptive devices. - Assess patient's emotional response to limitations. - Collaborate with interdisciplinary team and initiate plans and interventions as ordered. - Encourage independent activity per ability.  - Maintain proper body alignment. - Perform active/passive rom as tolerated/ordered. - Plan activities to conserve energy.  - Turn patient as appropriate  Outcome: Adequate for Discharge     Problem: NEUROSENSORY - ADULT  Goal: Achieves stable or improved neurological status  Description: INTERVENTIONS  - Monitor and report changes in neurological status  - Monitor vital signs such as temperature, blood pressure, glucose, and any other labs ordered   - Initiate measures to prevent increased intracranial pressure  - Monitor for seizure activity and implement precautions if appropriate      Outcome: Adequate for Discharge  Goal: Achieves maximal functionality and self care  Description: INTERVENTIONS  - Monitor swallowing and airway patency with patient fatigue and changes in neurological status  - Encourage and assist patient to increase activity and self care.    - Encourage visually impaired, hearing impaired and aphasic patients to use assistive/communication devices  Outcome: Adequate for Discharge     Problem: CARDIOVASCULAR - ADULT  Goal: Maintains optimal cardiac output and hemodynamic stability  Description: INTERVENTIONS:  - Monitor I/O, vital signs and rhythm  - Monitor for S/S and trends of decreased cardiac output  - Administer and titrate ordered vasoactive medications to optimize hemodynamic stability  - Assess quality of pulses, skin color and temperature  - Assess for signs of decreased coronary artery perfusion  - Instruct patient to report change in severity of symptoms  Outcome: Adequate for Discharge  Goal: Absence of cardiac dysrhythmias or at baseline rhythm  Description: INTERVENTIONS:  - Continuous cardiac monitoring, vital signs, obtain 12 lead EKG if ordered  - Administer antiarrhythmic and heart rate control medications as ordered  - Monitor electrolytes and administer replacement therapy as ordered  Outcome: Adequate for Discharge

## 2023-10-04 NOTE — CASE MANAGEMENT
Case Management Discharge Planning Note    Patient name Augie Adjutant  [facility] 425/-48 MRN [id number]  : 1950 Date 10/4/2023       Current Admission Date: 2023  Current Admission Diagnosis:Orthostatic lightheadedness   Patient Active Problem List    Diagnosis Date Noted   • Orthostatic lightheadedness 2023   • SBO (small bowel obstruction) ([address]) 2023   • Parkinson disease 2023   • Mild protein-calorie malnutrition ([address]) 02/10/2023   • Aortic root dilatation ([address]) 02/10/2023   • Neoplasm of uncertain behavior of left kidney 2022   • BPH with obstruction/lower urinary tract symptoms 2022   • Chronic constipation 2022   • Overactive bladder 2022   • Urge urinary incontinence 2022   • Urinary incontinence, post-void dribbling 2022   • Urinary frequency 2022   • Nocturia 2022   • Chronic prostatitis 2022   • Chronic obstructive pulmonary disease, unspecified COPD type ([address]) 2022   • Fall 2022   • Groin rash 2022   • 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   • Combined form of senile cataract 2020   • GERD (gastroesophageal reflux disease) 10/22/2019   • Hypertension 10/22/2019   • Hypercholesteremia 10/22/2019   • Paranoid schizophrenia ([address]) 10/22/2019   • Neuro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  2016      LOS (days): 4  Geometric Mean LOS (GMLOS) (days): 4.00  Days to GMLOS:0     OBJECTIVE:  Risk of Unplanned Readmission Score: 20.21         Current admission status: Inpatient   Preferred Pharmacy:   [address] [place] [address] [address]  [address]  [address] [place] [id number]  Phone: [phone] Fax: [phone]  (700 [facility] [place] [facility], [facility]. [address] [place], [place] [address]vard.   [address]  Phone: [phone] Fax: [phone]    Primary Care Provider: LIUDMILA Coyle    Primary Insurance: MEDICARE  Secondary Insurance:     DISCHARGE DETAILS:  CM spoke with Jitendra Alexandre pt sister and made her aware that pt is being dc today. Pt no longer has a STR recommendations and pt is able to return back to the group home. Pt sister was made aware and in agreement. CM also called and spoke with Mirian from [facility] group [place] who was made aware of pt dc. Pt has  [address] reserved via [address] WC van arranged through roundtrip. SLIM and bedside RN made aware. CM continue to be available to assist with pt dc plans.

## 2023-10-04 NOTE — NURSING NOTE
Patient left at this time via wheelchair van. No needs at time of discharge. IV and masimo removed. All belongings sent with patient.

## 2023-10-04 NOTE — ASSESSMENT & PLAN NOTE
• Initially presented with dizziness with ambulation  • Possibly disequilibrium versus orthostatic hypotension  • Symptoms have since significantly improved no longer with dizziness with ambulation  • PT/OT recommending return to assisted living facility no need for rehab at this time  • Medically clear  • We will follow-up outpatient with primary care doctor approximate 1 week

## 2023-10-04 NOTE — PLAN OF CARE
Problem: PAIN - ADULT  Goal: Verbalizes/displays adequate comfort level or baseline comfort level  Description: Interventions:  - Encourage patient to monitor pain and request assistance  - Assess pain using appropriate pain scale  - Administer analgesics based on type and severity of pain and evaluate response  - Implement non-pharmacological measures as appropriate and evaluate response  - Consider cultural and social influences on pain and pain management  - Notify physician/advanced practitioner if interventions unsuccessful or patient reports new pain  Outcome: Progressing     Problem: INFECTION - ADULT  Goal: Absence or prevention of progression during hospitalization  Description: INTERVENTIONS:  - Assess and monitor for signs and symptoms of infection  - Monitor lab/diagnostic results  - Monitor all insertion sites, i.e. indwelling lines, tubes, and drains  - Monitor endotracheal if appropriate and nasal secretions for changes in amount and color  - [employer] appropriate cooling/warming therapies per order  - Administer medications as ordered  - Instruct and encourage patient and family to use good hand hygiene technique  - Identify and instruct in appropriate isolation precautions for identified infection/condition  Outcome: Progressing  Goal: Absence of fever/infection during neutropenic period  Description: INTERVENTIONS:  - Monitor WBC    Outcome: Progressing     Problem: SAFETY ADULT  Goal: Patient will remain free of falls  Description: INTERVENTIONS:  - Educate patient/family on patient safety including physical limitations  - Instruct patient to call for assistance with activity   - Consult OT/PT to assist with strengthening/mobility   - Keep Call bell within reach  - Keep bed low and locked with side rails adjusted as appropriate  - Keep care items and personal belongings within reach  - Initiate and maintain comfort rounds  - Make Fall Risk Sign visible to staff  - Offer Toileting every 2 Hours, in advance of need  - Initiate/Maintain bedalarm  - Obtain necessary fall risk management equipment:   - Apply yellow socks and bracelet for high fall risk patients  - Consider moving patient to room near nurses station  Outcome: Progressing  Goal: Maintain or return to baseline ADL function  Description: INTERVENTIONS:  -  Assess patient's ability to carry out ADLs; assess patient's baseline for ADL function and identify physical deficits which impact ability to perform ADLs (bathing, care of mouth/teeth, toileting, grooming, dressing, etc.)  - Assess/evaluate cause of self-care deficits   - Assess range of motion  - Assess patient's mobility; develop plan if impaired  - Assess patient's need for assistive devices and provide as appropriate  - Encourage maximum independence but intervene and supervise when necessary  - Involve family in performance of ADLs  - Assess for home care needs following discharge   - Consider OT consult to assist with ADL evaluation and planning for discharge  - Provide patient education as appropriate  Outcome: Progressing  Goal: Maintains/Returns to pre admission functional level  Description: INTERVENTIONS:  - Perform BMAT or MOVE assessment daily.   - Set and communicate daily mobility goal to care team and patient/family/caregiver. - Collaborate with rehabilitation services on mobility goals if consulted  - Perform Range of Motion 3 times a day. - Reposition patient every 2 hours.   - Dangle patient 3 times a day  - Stand patient 3 times a day  - Ambulate patient 3 times a day  - Out of bed to chair 3 times a day   - Out of bed for meals 3 times a day  - Out of bed for toileting  - Record patient progress and toleration of activity level   Outcome: Progressing     Problem: DISCHARGE PLANNING  Goal: Discharge to home or other facility with appropriate resources  Description: INTERVENTIONS:  - Identify barriers to discharge w/patient and caregiver  - Arrange for needed discharge resources and transportation as appropriate  - Identify discharge learning needs (meds, wound care, etc.)  - Arrange for interpretive services to assist at discharge as needed  - Refer to Case Management Department for coordinating discharge planning if the patient needs post-hospital services based on physician/advanced practitioner order or complex needs related to functional status, cognitive ability, or social support system  Outcome: Progressing     Problem: Knowledge Deficit  Goal: Patient/family/caregiver demonstrates understanding of disease process, treatment plan, medications, and discharge instructions  Description: Complete learning assessment and assess knowledge base. Interventions:  - Provide teaching at level of understanding  - Provide teaching via preferred learning methods  Outcome: Progressing     Problem: MOBILITY - ADULT  Goal: Maintain or return to baseline ADL function  Description: INTERVENTIONS:  -  Assess patient's ability to carry out ADLs; assess patient's baseline for ADL function and identify physical deficits which impact ability to perform ADLs (bathing, care of mouth/teeth, toileting, grooming, dressing, etc.)  - Assess/evaluate cause of self-care deficits   - Assess range of motion  - Assess patient's mobility; develop plan if impaired  - Assess patient's need for assistive devices and provide as appropriate  - Encourage maximum independence but intervene and supervise when necessary  - Involve family in performance of ADLs  - Assess for home care needs following discharge   - Consider OT consult to assist with ADL evaluation and planning for discharge  - Provide patient education as appropriate  Outcome: Progressing  Goal: Maintains/Returns to pre admission functional level  Description: INTERVENTIONS:  - Perform BMAT or MOVE assessment daily.   - Set and communicate daily mobility goal to care team and patient/family/caregiver.    - Collaborate with rehabilitation services on mobility goals if consulted  - Perform Range of Motion 3 times a day. - Reposition patient every 2 hours. - Dangle patient 3 times a day  - Stand patient 3 times a day  - Ambulate patient 3 times a day  - Out of bed to chair 3 times a day   - Out of bed for meals 3 times a day  - Out of bed for toileting  - Record patient progress and toleration of activity level   Outcome: Progressing     Problem: Prexisting or High Potential for Compromised Skin Integrity  Goal: Skin integrity is maintained or improved  Description: INTERVENTIONS:  - Identify patients at risk for skin breakdown  - Assess and monitor skin integrity  - Assess and monitor nutrition and hydration status  - Monitor labs   - Assess for incontinence   - Turn and reposition patient  - Assist with mobility/ambulation  - Relieve pressure over bony prominences  - Avoid friction and shearing  - Provide appropriate hygiene as needed including keeping skin clean and dry  - Evaluate need for skin moisturizer/barrier cream  - Collaborate with interdisciplinary team   - Patient/family teaching  - Consider wound care consult   Outcome: Progressing     Problem: Neurological Deficit  Goal: Neurological status is stable or improving  Description: Interventions:  - Monitor and assess patient's level of consciousness, motor function, sensory function, and level of assistance needed for ADLs. - Monitor and report changes from baseline. Collaborate with interdisciplinary team to initiate plan and implement interventions as ordered. - Provide and maintain a safe environment. - Consider seizure precautions. - Consider fall precautions. - Consider aspiration precautions. - Consider bleeding precautions. Outcome: Progressing     Problem: Activity Intolerance/Impaired Mobility  Goal: Mobility/activity is maintained at optimum level for patient  Description: Interventions:  - Assess and monitor patient  barriers to mobility and need for assistive/adaptive devices.   - Assess patient's emotional response to limitations. - Collaborate with interdisciplinary team and initiate plans and interventions as ordered. - Encourage independent activity per ability.  - Maintain proper body alignment. - Perform active/passive rom as tolerated/ordered. - Plan activities to conserve energy.  - Turn patient as appropriate  Outcome: Progressing     Problem: NEUROSENSORY - ADULT  Goal: Achieves stable or improved neurological status  Description: INTERVENTIONS  - Monitor and report changes in neurological status  - Monitor vital signs such as temperature, blood pressure, glucose, and any other labs ordered   - Initiate measures to prevent increased intracranial pressure  - Monitor for seizure activity and implement precautions if appropriate      Outcome: Progressing  Goal: Achieves maximal functionality and self care  Description: INTERVENTIONS  - Monitor swallowing and airway patency with patient fatigue and changes in neurological status  - Encourage and assist patient to increase activity and self care.    - Encourage visually impaired, hearing impaired and aphasic patients to use assistive/communication devices  Outcome: Progressing     Problem: CARDIOVASCULAR - ADULT  Goal: Maintains optimal cardiac output and hemodynamic stability  Description: INTERVENTIONS:  - Monitor I/O, vital signs and rhythm  - Monitor for S/S and trends of decreased cardiac output  - Administer and titrate ordered vasoactive medications to optimize hemodynamic stability  - Assess quality of pulses, skin color and temperature  - Assess for signs of decreased coronary artery perfusion  - Instruct patient to report change in severity of symptoms  Outcome: Progressing  Goal: Absence of cardiac dysrhythmias or at baseline rhythm  Description: INTERVENTIONS:  - Continuous cardiac monitoring, vital signs, obtain 12 lead EKG if ordered  - Administer antiarrhythmic and heart rate control medications as ordered  - Monitor electrolytes and administer replacement therapy as ordered  Outcome: Progressing

## 2023-10-04 NOTE — DISCHARGE SUMMARY
[address]  Discharge- Noelle Huerta 1950, 68 y.o. male MRN: [id number]  Unit/Bed#: -01 Encounter: [id number]  Primary Care Provider: LIUDMILA Ernst   Date and time admitted to hospital: 9/29/2023  7:10 PM    * Orthostatic lightheadedness  Assessment & Plan  • Initially presented with dizziness with ambulation  • Possibly disequilibrium versus orthostatic hypotension  • Symptoms have since significantly improved no longer with dizziness with ambulation  • PT/OT recommending return to assisted living facility no need for rehab at this time  • Medically clear  • We will follow-up outpatient with primary care doctor approximate 1 week      Paranoid schizophrenia ([address])  Assessment & Plan  · Continue clozapine    Hypertension  Assessment & Plan  · Continue metoprolol     Neuro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   Assessment & Plan  · Continue sinemet      Discharging Physician / Practitioner: Latoya Krueger MD  PCP: Alek Ernst  Admission Date:   Admission Orders (From admission, onward)     Ordered        09/30/23 1448  Inpatient Admission  Once            09/29/23 2257  Place in Observation  Once                      Discharge Date: 10/04/23    Medical Problems     Resolved Problems  Date Reviewed: 10/4/2023   None         Consultations During Hospital Stay:  · neurology    Procedures Performed:   · none    Significant Findings / Test Results:   XR chest 1 view portable    Result Date: 9/30/2023  Impression: No acute cardiopulmonary disease. Workstation performed: [id number]     CTA head and neck with and without contrast    Result Date: 9/29/2023  · Impression: 1. No acute intracranial hemorrhage, mass effect or extra-axial collection. 2.  No hemodynamically significant stenosis, dissection or occlusion of the carotid or vertebral arteries. 3.  No intracranial aneurysm.   No hemodynamically significant stenosis or occlusion of the major vessels of the [place]. Workstation performed: [id number]     Incidental Findings:   · none     Test Results Pending at Discharge (will require follow up):   · none     Outpatient Tests Requested:  · none    Complications:  noen    Reason for Admission: Orthostatic hypotension/dizziness    Hospital Course: William Saleh is a 68 y.o. male patient who originally presented to the hospital on 9/29/2023 with past medical history significant Parkinson disease initially presented with dizziness likely secondary to orthostatic hypotension versus disequilibrium. Symptoms significantly improved with IV fluids and continued monitoring. Was evaluated by PT/OT did not need any rehab needs. Will be discharged back to to [facility]        Please see above list of diagnoses and related plan for additional information. Condition at Discharge: stable     Discharge Day Visit / Exam:     Subjective:   Denies chest pain, shortness of breath, cough, fevers, chills  Vitals: Blood Pressure: 123/69 (10/04/23 0729)  Pulse: 79 (10/04/23 0729)  Temperature: 98.5 °F (36.9 °C) (10/04/23 0729)  Temp Source: Oral (10/04/23 0300)  Respirations: 17 (10/04/23 0729)  Height: 5' 8" (172.7 cm) (10/01/23 1700)  Weight - Scale: 69.9 kg (154 lb 1.6 oz) (10/01/23 1700)  SpO2: 95 % (10/04/23 0814)  Exam:   Physical Exam  Constitutional:       General: He is not in acute distress. Appearance: He is well-developed. He is not diaphoretic. HENT:      Head: Normocephalic and atraumatic. Nose: Nose normal.      Mouth/Throat:      Pharynx: No oropharyngeal exudate. Eyes:      General: No scleral icterus. Conjunctiva/sclera: Conjunctivae normal.   Cardiovascular:      Rate and Rhythm: Normal rate and regular rhythm. Heart sounds: Normal heart sounds. No murmur heard. No friction rub. No gallop. Pulmonary:      Effort: Pulmonary effort is normal. No respiratory distress. Breath sounds: Normal breath sounds. No wheezing or rales.    Chest: Chest wall: No tenderness. Abdominal:      General: Bowel sounds are normal. There is no distension. Palpations: Abdomen is soft. Tenderness: There is no abdominal tenderness. There is no guarding. Musculoskeletal:         General: No tenderness or deformity. Normal range of motion. Cervical back: Normal range of motion and neck supple. Skin:     General: Skin is warm and dry. Findings: No erythema. Neurological:      Mental Status: He is alert. Mental status is at baseline. Discussion with Family: pt    Discharge instructions/Information to patient and family:   See after visit summary for information provided to patient and family. Provisions for Follow-Up Care:  See after visit summary for information related to follow-up care and any pertinent home health orders. Disposition:     Home    For Discharges to [facility] SNF:   · Not Applicable to this Patient - Not Applicable to this Patient    Planned Readmission: none     Discharge Statement:  I spent 60 minutes discharging the patient. This time was spent on the day of discharge. I had direct contact with the patient on the day of discharge. Greater than 50% of the total time was spent examining patient, answering all patient questions, arranging and discussing plan of care with patient as well as directly providing post-discharge instructions. Additional time then spent on discharge activities. Discharge Medications:  See after visit summary for reconciled discharge medications provided to patient and family.       ** Please Note: This note has been constructed using a voice recognition system **

## 2023-10-04 NOTE — PLAN OF CARE
Problem: SAFETY ADULT  Goal: Patient will remain free of falls  Description: INTERVENTIONS:  - Educate patient/family on patient safety including physical limitations  - Instruct patient to call for assistance with activity   - Consult OT/PT to assist with strengthening/mobility   - Keep Call bell within reach  - Keep bed low and locked with side rails adjusted as appropriate  - Keep care items and personal belongings within reach  - Initiate and maintain comfort rounds  - Make Fall Risk Sign visible to staff  - Offer Toileting every 2 Hours, in advance of need  - Initiate/Maintain bed alarm  - Obtain necessary fall risk management equipment: yellow socks  Outcome: Progressing     Problem: SAFETY ADULT  Goal: Maintain or return to baseline ADL function  Description: INTERVENTIONS:  -  Assess patient's ability to carry out ADLs; assess patient's baseline for ADL function and identify physical deficits which impact ability to perform ADLs (bathing, care of mouth/teeth, toileting, grooming, dressing, etc.)  - Assess/evaluate cause of self-care deficits   - Assess range of motion  - Assess patient's mobility; develop plan if impaired  - Assess patient's need for assistive devices and provide as appropriate  - Encourage maximum independence but intervene and supervise when necessary  - Involve family in performance of ADLs  - Assess for home care needs following discharge   - Consider OT consult to assist with ADL evaluation and planning for discharge  - Provide patient education as appropriate  Outcome: Progressing     Problem: SAFETY ADULT  Goal: Maintains/Returns to pre admission functional level  Description: INTERVENTIONS:  - Perform BMAT or MOVE assessment daily.   - Set and communicate daily mobility goal to care team and patient/family/caregiver. - Collaborate with rehabilitation services on mobility goals if consulted  - Reposition patient every 2 hours.   - Stand patient 4 times a day  - Ambulate patient 4 times a day  - Out of bed to chair 2 times a day   - Out of bed for meals 3 times a day  - Out of bed for toileting  - Record patient progress and toleration of activity level   Outcome: Progressing     Problem: MOBILITY - ADULT  Goal: Maintain or return to baseline ADL function  Description: INTERVENTIONS:  -  Assess patient's ability to carry out ADLs; assess patient's baseline for ADL function and identify physical deficits which impact ability to perform ADLs (bathing, care of mouth/teeth, toileting, grooming, dressing, etc.)  - Assess/evaluate cause of self-care deficits   - Assess range of motion  - Assess patient's mobility; develop plan if impaired  - Assess patient's need for assistive devices and provide as appropriate  - Encourage maximum independence but intervene and supervise when necessary  - Involve family in performance of ADLs  - Assess for home care needs following discharge   - Consider OT consult to assist with ADL evaluation and planning for discharge  - Provide patient education as appropriate  Outcome: Progressing     Problem: MOBILITY - ADULT  Goal: Maintains/Returns to pre admission functional level  Description: INTERVENTIONS:  - Perform BMAT or MOVE assessment daily.   - Set and communicate daily mobility goal to care team and patient/family/caregiver. - Collaborate with rehabilitation services on mobility goals if consulted  - Perform Range of Motion 2 times a day. - Reposition patient every 2 hours.   - Dangle patient 2 times a day  - Stand patient 2 times a day  - Ambulate patient 4 times a day  - Out of bed to chair 4 times a day   - Out of bed for meals 3 times a day  - Out of bed for toileting  - Record patient progress and toleration of activity level   Outcome: Progressing     Problem: Prexisting or High Potential for Compromised Skin Integrity  Goal: Skin integrity is maintained or improved  Description: INTERVENTIONS:  - Identify patients at risk for skin breakdown  - Assess and monitor skin integrity  - Assess and monitor nutrition and hydration status  - Monitor labs   - Assess for incontinence   - Turn and reposition patient  - Assist with mobility/ambulation  - Relieve pressure over bony prominences  - Avoid friction and shearing  - Provide appropriate hygiene as needed including keeping skin clean and dry  - Evaluate need for skin moisturizer/barrier cream  - Collaborate with interdisciplinary team   - Patient/family teaching  - Consider wound care consult   Outcome: Progressing

## 2023-10-05 ENCOUNTER — TRANSITIONAL CARE MANAGEMENT (OUTPATIENT)
Age: 73
End: 2023-10-05

## 2023-10-11 ENCOUNTER — CONSULT (OUTPATIENT)
Age: 73
End: 2023-10-11
Payer: MEDICARE

## 2023-10-11 VITALS
OXYGEN SATURATION: 94 % | WEIGHT: 147 LBS | HEIGHT: 68 IN | DIASTOLIC BLOOD PRESSURE: 71 MMHG | TEMPERATURE: 96.9 F | BODY MASS INDEX: 22.28 KG/M2 | SYSTOLIC BLOOD PRESSURE: 121 MMHG | RESPIRATION RATE: 17 BRPM | HEART RATE: 55 BPM

## 2023-10-11 DIAGNOSIS — Z01.818 PREOP EXAMINATION: Primary | ICD-10-CM

## 2023-10-11 PROCEDURE — 99214 OFFICE O/P EST MOD 30 MIN: CPT

## 2023-10-11 NOTE — PROGRESS NOTES
INTERNAL MEDICINE PRE-OPERATIVE EVALUATION  [facility]YSICIAN [facility] - [facility] CARE [place]    NAME: Gege Medina  AGE: 68 y.o. SEX: male  : 1950     DATE: 10/11/2023     Internal Medicine Pre-Operative Evaluation:     Chief Complaint: Pre-operative Evaluation     Surgery:   Anticipated Date of Surgery:   Referring Provider: No ref. provider found        History of Present Illness: Gege Medina is a 68 y.o. male who presents to the office today for a preoperative consultation at the request of surgeon, who plans on performing  on cholecystectomy. Patient lives in a group home and is accompanied by   Carlos Morrison. Surgeon requested evaluation and clearance before scheduling procedure given recent hospitalization for dizziness. Further episodes since visit to the ER. EKG normal sinus rhythm and orthostatic vitals negative. Planned anesthesia is general. Patient has a bleeding risk of: no recent abnormal bleeding and no remote history of abnormal bleeding. Patient does not have objections to receiving blood products if needed. Current anti-platelet/anti-coagulation medications that the patient is prescribed includes: none. Assessment of Chronic Conditions:   - Hypertension: /71, stable on current therapy.      Assessment of Cardiac Risk:  Denies unstable or severe angina or MI in the last 6 weeks or history of stent placement in the last year   Denies decompensated heart failure (e.g. New onset heart failure, NYHA functional class IV heart failure, or worsening existing heart failure)  Denies significant arrhythmias such as high grade AV block, symptomatic ventricular arrhythmia, newly recognized ventricular tachycardia, supraventricular tachycardia with resting heart rate >100, or symptomatic bradycardia  Denies severe heart valve disease including aortic stenosis or symptomatic mitral stenosis     Exercise Capacity:  Able to walk 4 blocks without symptoms?: Yes  Able to walk 2 flights without symptoms?: Yes    Prior Anesthesia Reactions: No     Personal history of venous thromboembolic disease? No    History of steroid use for >2 weeks within last year? No    STOP-BANG Sleep Apnea Screening Questionnaire:      Do you SNORE loudly (louder than talking or loud enough to be heard through closed doors)? No = 0 point   Do you often feel TIRED, fatigued, or sleepy during daytime? No = 0 point   Has anyone OBSERVED you stop breathing during your sleep? No = 0 point   Do you have or are you being treated for high blood pressure? No = 0 point   BMI more than 35 kg/m2? No = 0 point   AGE over 48years old? Yes = 1 point   NECK circumference > 16 inches (40 cm)? No = 0 point   Male GENDER? Yes = 1 point   TOTAL SCORE 1 = LOW risk of JO       Review of Systems:     Review of Systems   Constitutional:  Negative for activity change, chills and fever. HENT: Negative. Negative for ear pain and sore throat. Eyes:  Negative for pain and visual disturbance. Respiratory:  Negative for cough, chest tightness, shortness of breath and wheezing. Cardiovascular:  Negative for chest pain, palpitations and leg swelling. Gastrointestinal:  Negative for abdominal pain and vomiting. Genitourinary:  Negative for dysuria and hematuria. Musculoskeletal:  Negative for arthralgias and back pain. Skin:  Negative for color change and rash. Neurological:  Negative for seizures and syncope. Psychiatric/Behavioral: Negative. Negative for sleep disturbance and suicidal ideas. All other systems reviewed and are negative.        Problem List:     Patient Active Problem List   Diagnosis   • GERD (gastroesophageal reflux disease)   • Neuro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    • Hypertension   • Hypercholesteremia   • Paranoid schizophrenia ([address])   • Combined form of senile cataract   • MRSA carrier   • Groin rash   • Fall   • Chronic obstructive pulmonary disease, unspecified COPD type ([address])   • Neoplasm of uncertain behavior of left kidney   • BPH with obstruction/lower urinary tract symptoms   • Chronic constipation   • Overactive bladder   • Urge urinary incontinence   • Urinary incontinence, post-void dribbling   • Urinary frequency   • Nocturia   • Chronic prostatitis   • Mild protein-calorie malnutrition (HCC)   • Aortic root dilatation (HCC)   • SBO (small bowel obstruction) (HCC)   • Parkinson disease   • Orthostatic lightheadedness        Allergies:     No Known Allergies     Current Medications:       Current Outpatient Medications:   •  acetaminophen (Acetaminophen Extra Strength) 500 mg tablet, Take 1 tablet (500 mg total) by mouth every 6 (six) hours as needed for mild pain, Disp: 30 tablet, Rfl: 11  •  atorvastatin (LIPITOR) 40 mg tablet, Take 1 tablet (40 mg total) by mouth daily, Disp: 31 tablet, Rfl: 11  •  carbidopa-levodopa (SINEMET)  mg per tablet, Take 1 tablet by mouth 3 (three) times a day, Disp: 270 tablet, Rfl: 3  •  Cholecalciferol (Vitamin D High Potency) 25 MCG (1000 UT) capsule, Take 1 capsule (1,000 Units total) by mouth daily, Disp: 31 capsule, Rfl: 6  •  cloZAPine (CLOZARIL) 100 mg tablet, Take 100 mg by mouth 2 (two) times a day, Disp: , Rfl:   •  clozapine (CLOZARIL) 200 MG tablet, Take 200 mg by mouth daily at bedtime , Disp: , Rfl:   •  fluticasone (FLONASE) 50 mcg/act nasal spray, 2 sprays into each nostril daily, Disp: 16 g, Rfl: 11  •  hydrOXYzine HCL (ATARAX) 25 mg tablet, Take 1 tablet (25 mg total) by mouth every 6 (six) hours as needed for itching, Disp: 30 tablet, Rfl: 5  •  metoprolol succinate (TOPROL-XL) 25 mg 24 hr tablet, Take 1 tablet (25 mg total) by mouth daily, Disp: 31 tablet, Rfl: 11  •  omeprazole (PriLOSEC) 40 MG capsule, Take 1 capsule (40 mg total) by mouth daily, Disp: 31 capsule, Rfl: 11  •  polyethylene glycol (MIRALAX) 17 g packet, Take 17 g by mouth daily, Disp: 31 each, Rfl: 11  •  tamsulosin (FLOMAX) 0.4 mg, Take 1 capsule (0.4 mg total) by mouth daily at bedtime, Disp: 90 capsule, Rfl: 3  •  temazepam (RESTORIL) 15 mg capsule, Take 15 mg by mouth daily at bedtime, Disp: , Rfl:   •  topiramate (TOPAMAX) 25 mg tablet, Take 25 mg by mouth 2 (two) times a day, Disp: , Rfl: 1  •  traZODone (DESYREL) 100 mg tablet, Take 100 mg by mouth daily at bedtime, Disp: , Rfl:      Past History:     Past Medical History:   Diagnosis Date   • Asthma    • Benign prostatic hyperplasia    • COPD (chronic obstructive pulmonary disease) ([facility])    • GERD (gastroesophageal reflux disease)    • Herpes zoster without complication    • Hypercholesteremia    • Hypertension    • Neuroleptic induced parkinsonism     • Paranoid schizophrenia ([address])    • Psychiatric disorder         Past Surgical History:   Procedure Laterality Date   • CATARACT EXTRACTION     • COLONOSCOPY          Family History   Problem Relation Age of Onset   • No Known Problems Mother    • No Known Problems Father    • Parkinsonism Son         Social History     Socioeconomic History   • Marital status: Single     Spouse name: Not on file   • Number of children: Not on file   • Years of education: Not on file   • Highest education level: Not on file   Occupational History   • Not on file   Tobacco Use   • Smoking status: Former     Packs/day: 1.00     Years: 20.00     Total pack years: 20.00     Types: Cigarettes     Quit date:      Years since quittin.7   • Smokeless tobacco: Never   Vaping Use   • Vaping Use: Never used   Substance and Sexual Activity   • Alcohol use: Not Currently   • Drug use: Never   • Sexual activity: Not Currently   Other Topics Concern   • Not on file   Social History Narrative   • Not on file     Social Determinants of Health     Financial Resource Strain: Low Risk  (2022)    Overall Financial Resource Strain (CARDIA)    • Difficulty of Paying Living Expenses: Not very hard   Food Insecurity: No Food Insecurity (10/2/2023)    Hunger Vital Sign    • Worried About Running Out of Food in the Last Year: Never true    • Ran Out of Food in the Last Year: Never true   Transportation Needs: No Transportation Needs (10/2/2023)    PRAPARE - Transportation    • Lack of Transportation (Medical): No    • Lack of Transportation (Non-Medical): No   Physical Activity: Inactive (4/26/2023)    Exercise Vital Sign    • Days of Exercise per Week: 0 days    • Minutes of Exercise per Session: 0 min   Stress: No Stress Concern Present (10/11/2023)    [facility]    • Feeling of Stress : Not at all   Social Connections: Not on file   Intimate Partner Violence: Not on file   Housing Stability: [address]  (10/2/2023)    Housing Stability Vital Sign    • Unable to Pay for Housing in the Last Year: No    • Number of Places Lived in the Last Year: 1    • Unstable Housing in the Last Year: No        Physical Exam:      /71 (BP Location: Left arm, Patient Position: Supine, Cuff Size: Standard)   Pulse 55   Temp (!) 96.9 °F (36.1 °C) (Temporal)   Resp 17   Ht 5' 8" (1.727 m)   Wt 66.7 kg (147 lb)   SpO2 94%   BMI 22.35 kg/m²     Physical Exam  Vitals and nursing note reviewed. Constitutional:       General: He is not in acute distress. Appearance: He is well-developed. HENT:      Head: Normocephalic and atraumatic. Eyes:      Conjunctiva/sclera: Conjunctivae normal.   Cardiovascular:      Rate and Rhythm: Normal rate and regular rhythm. Pulses: Normal pulses. Heart sounds: Normal heart sounds. No murmur heard. Pulmonary:      Effort: Pulmonary effort is normal. No respiratory distress. Breath sounds: Normal breath sounds. Abdominal:      Palpations: Abdomen is soft. Tenderness: There is no abdominal tenderness. Musculoskeletal:         General: No swelling. Cervical back: Normal range of motion and neck supple. Left lower leg: Edema (trace) present.    Lymphadenopathy:      Cervical: No cervical adenopathy. Skin:     General: Skin is warm and dry. Capillary Refill: Capillary refill takes less than 2 seconds. Neurological:      General: No focal deficit present. Mental Status: He is alert. Mental status is at baseline. Psychiatric:         Mood and Affect: Mood normal.          Data:     Pre-operative work-up    Laboratory Results: I have personally reviewed the pertinent laboratory results/reports     EKG: I have personally reviewed pertinent reports. Chest x-ray: I have personally reviewed pertinent reports. Assessment/Plan:     1. Preop examination            68 y.o. male with planned surgery: .      Cardiac Risk Estimation: per the Revised Cardiac Risk Index (Circ. 100:1043, 1999), the patient's risk factors for cardiac complications include  none , putting him in: RCI RISK CLASS I (0 risk factors, risk of major cardiac compl. appr. 0.5%). 1. Further preoperative workup as follows:   - None; no further preoperative work-up is required    2. Medication Management/Recommendations:   - Patient has been instructed to avoid herbs or non-directed vitamins the week prior to surgery to ensure no drug interactions with perioperative surgical and anesthetic medications. - Patient should continue antihypertensive medications up through and including the day of surgery. 3. Prophylaxis for cardiac events with perioperative beta-blockers: not indicated. 4. Patient requires further consultation with: None    Clearance  Patient is CLEARED for surgery without any additional cardiac testing.      [facility], [address] [facility] PRIMARY CARE [address] [address]  Phone#  [phone]  Fax#  [phone]

## 2023-10-12 ENCOUNTER — TELEPHONE (OUTPATIENT)
Dept: SURGERY | Facility: CLINIC | Age: 73
End: 2023-10-12

## 2023-10-12 NOTE — TELEPHONE ENCOUNTER
Clinical Coordinator Mirian for Mr. [facility] called. She stated that when she was in the office with this patient on 9/25/23 you informed her that he would require PCP clearance for his procedure with you. She took the patient to see PCP and the PCP was able to clear him because they were unable to review your note to confirm what was needed from them to clear him. There were no orders or clarification. Please complete the note so they can move forward with clearance. Also Mirian asked when does the patient need to return for clearance because she does not remember if you asked for a 2 week f/u or more. Please confirm so we can accommodate the patient. Thank you     Amanda Sepulveda when Dr. Viridiana Monaco responds with that please schedule patient for an office visit with her for surgery scheduling and consent.  Please call Mirian at [phone]   Thanks

## 2023-10-13 NOTE — PATIENT INSTRUCTIONS
Influenza Vaccine   WHAT YOU NEED TO KNOW:   The influenza vaccine is an injection given to help prevent influenza (flu)  The flu is caused by a virus  The virus spreads from person to person through coughing and sneezing  Several types of viruses cause the flu  The viruses change over time, so new vaccines are made each year  The vaccine begins to protect you about 2 weeks after you get it  The flu shot usually injected into your upper arm  It may be given in your thigh  You may get a vaccine with a weak or dead virus  DISCHARGE INSTRUCTIONS:   Call 911 for any of the following:   · Your mouth and throat are swollen  · You are wheezing or have trouble breathing  · You have chest pain or your heart is beating faster than normal for you  · You feel like you are going to faint  Return to the emergency department if:   · Your face is red or swollen  · You have hives that spread over your body  · You feel weak or dizzy  Contact your healthcare provider if:   · You have increased pain, redness, or swelling around the area where the shot was given  · You have questions or concerns about the influenza vaccine  Apply a warm compress  to the injection area if you got a flu shot  Apply the compress as directed to decrease pain and swelling  Follow up with your healthcare provider as directed:  Write down your questions so you remember to ask them during your visits  © 2017 [facility] Information is for End User's use only and may not be sold, redistributed or otherwise used for commercial purposes  All illustrations and images included in CareNotes® are the copyrighted property of A D A M , Inc  or Ricki Sandra  The above information is an  only  It is not intended as medical advice for individual conditions or treatments  Talk to your doctor, nurse or pharmacist before following any medical regimen to see if it is safe and effective for you  60.3

## 2023-10-16 NOTE — PROGRESS NOTES
Assessment/Plan:     1. Calculus of gallbladder without cholecystitis without obstruction      Its not entirely clear that his symptoms stem from gallstones. Cholecystectomy is a relatively safe option but he does not demonstrate the ability to make complex medical decisions and provide informed consent. And given his history of abdominal surgery and bowel resection it may not be as straight forward. The staff member from the group home is not aware of a medical proxy or guardian and thinks he typically makes his own decisions. She thinks his sister is typically not involved. I wonder if his PCP might have this information on file. If not he will need a capacity eval. He will also need standard medical clearance. I will see him back in the next 3-4 weeks and discuss how to proceed. Subjective:      Patient ID: Kimber Hopkins is a 68 y.o. male. Triage Notes:    Mr Trae Knight is seen today for gallstones. Much of his history is relayed by his medical attendant. He resides in a group home. He was seen in the ED for abdominal pain, diarrhea and vomiting. Prior to that related to constipation. It is not clear if his diarrhea was laxative induced since he typically takes medications for constipation but it appears to be documented as infectious. He reports no abdominal pain currently. States he has been able to eat without pain. He has a history of sigmoid colon resection. The following portions of the patient's history were reviewed and updated as appropriate:   He  has a past medical history of Asthma, Benign prostatic hyperplasia, COPD (chronic obstructive pulmonary disease) ([facility]), GERD (gastroesophageal reflux disease), Herpes zoster without complication (85/32/6977), Hypercholesteremia, Hypertension, Neuroleptic induced parkinsonism, Paranoid schizophrenia ([address]), and Psychiatric disorder.   He   Patient Active Problem List    Diagnosis Date Noted    Orthostatic lightheadedness 09/30/2023    SBO (small bowel obstruction) ([address]) 05/04/2023    Parkinson disease 05/04/2023    Mild protein-calorie malnutrition ([address]) 02/10/2023    Aortic root dilatation ([facility]) 02/10/2023    Neoplasm of uncertain behavior of left kidney 11/02/2022    BPH with obstruction/lower urinary tract symptoms 11/02/2022    Chronic constipation 11/02/2022    Overactive bladder 11/02/2022    Urge urinary incontinence 11/02/2022    Urinary incontinence, post-void dribbling 11/02/2022    Urinary frequency 11/02/2022    Nocturia 11/02/2022    Chronic prostatitis 11/02/2022    Chronic obstructive pulmonary disease, unspecified COPD type ([facility]) 09/23/2022    Fall 05/26/2022    Groin rash 03/02/2022    MRSA carrier 07/14/2021    Combined form of senile cataract 01/29/2020    GERD (gastroesophageal reflux disease) 10/22/2019    Hypertension 10/22/2019    Hypercholesteremia 10/22/2019    Paranoid schizophrenia ([facility]) 10/22/2019    Neuro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  02/29/2016     He  has a past surgical history that includes Cataract extraction and Colonoscopy. His family history includes No Known Problems in his father and mother; Parkinsonism in his son. He  reports that he quit smoking about 23 years ago. His smoking use included cigarettes. He has a 20.00 pack-year smoking history. He has never used smokeless tobacco. He reports that he does not currently use alcohol. He reports that he does not use drugs.   Current Outpatient Medications on File Prior to Visit   Medication Sig    acetaminophen (Acetaminophen Extra Strength) 500 mg tablet Take 1 tablet (500 mg total) by mouth every 6 (six) hours as needed for mild pain    atorvastatin (LIPITOR) 40 mg tablet Take 1 tablet (40 mg total) by mouth daily    carbidopa-levodopa (SINEMET)  mg per tablet Take 1 tablet by mouth 3 (three) times a day    Cholecalciferol (Vitamin D High Potency) 25 MCG (1000 UT) capsule Take 1 capsule (1,000 Units total) by mouth daily    cloZAPine (CLOZARIL) 100 mg tablet Take 100 mg by mouth 2 (two) times a day    clozapine (CLOZARIL) 200 MG tablet Take 200 mg by mouth daily at bedtime     fluticasone (FLONASE) 50 mcg/act nasal spray 2 sprays into each nostril daily    hydrOXYzine HCL (ATARAX) 25 mg tablet Take 1 tablet (25 mg total) by mouth every 6 (six) hours as needed for itching    metoprolol succinate (TOPROL-XL) 25 mg 24 hr tablet Take 1 tablet (25 mg total) by mouth daily    omeprazole (PriLOSEC) 40 MG capsule Take 1 capsule (40 mg total) by mouth daily    tamsulosin (FLOMAX) 0.4 mg Take 1 capsule (0.4 mg total) by mouth daily at bedtime    temazepam (RESTORIL) 15 mg capsule Take 15 mg by mouth daily at bedtime    topiramate (TOPAMAX) 25 mg tablet Take 25 mg by mouth 2 (two) times a day    traZODone (DESYREL) 100 mg tablet Take 100 mg by mouth daily at bedtime    polyethylene glycol (MIRALAX) 17 g packet Take 17 g by mouth daily     No current facility-administered medications on file prior to visit. He has No Known Allergies. .    Review of Systems   Constitutional:  Negative for activity change, chills and fever. HENT: Negative. Negative for ear pain and sore throat. Eyes:  Negative for pain and visual disturbance. Respiratory:  Negative for cough, chest tightness, shortness of breath and wheezing. Cardiovascular:  Negative for chest pain, palpitations and leg swelling. Gastrointestinal:  Negative for abdominal pain and vomiting. Genitourinary:  Negative for dysuria and hematuria. Musculoskeletal:  Negative for arthralgias and back pain. Skin:  Negative for color change and rash. Neurological:  Negative for seizures and syncope. Psychiatric/Behavioral: Negative. Negative for sleep disturbance and suicidal ideas. All other systems reviewed and are negative.         Objective:      /86 (BP Location: Left arm, Patient Position: Sitting, Cuff Size: Standard)   Pulse 68   Temp (!) 95.9 °F (35.5 °C) (Temporal)   Ht 5' 8" (1.727 m)   Wt 66.9 kg (147 lb 6.4 oz)   SpO2 98%   BMI 22.41 kg/m²     Below is the patient's most recent value for Albumin, ALT, AST, BUN, Calcium, Chloride, Cholesterol, CO2, Creatinine, GFR, Glucose, HDL, Hematocrit, Hemoglobin, Hemoglobin A1C, LDL, Magnesium, Phosphorus, Platelets, Potassium, PSA, Sodium, Triglycerides, and WBC. Lab Results   Component Value Date    ALT 27 09/29/2023    AST 23 09/29/2023    BUN 14 10/02/2023    CALCIUM 8.5 10/02/2023     (H) 10/02/2023    CO2 27 10/02/2023    CREATININE 0.53 (L) 10/02/2023    HDL 43 09/30/2023    HCT 35.2 (L) 10/02/2023    HGB 11.9 (L) 10/02/2023    HGBA1C 4.7 09/06/2019    MG 2.3 08/29/2019     10/02/2023    K 4.2 10/02/2023    PSA 0.95 05/18/2023    TRIG 41 09/30/2023    WBC 3.76 (L) 10/02/2023     Note: for a comprehensive list of the patient's lab results, access the Results Review activity. Physical Exam  Vitals and nursing note reviewed. Constitutional:       General: He is not in acute distress. Appearance: He is well-developed. HENT:      Head: Normocephalic and atraumatic. Eyes:      General:         Right eye: No discharge. Left eye: No discharge. Neck:      Vascular: No JVD. Cardiovascular:      Rate and Rhythm: Normal rate and regular rhythm. Pulmonary:      Effort: Pulmonary effort is normal.      Breath sounds: Normal breath sounds. Abdominal:      General: There is no distension. Palpations: Abdomen is soft. Tenderness: There is no abdominal tenderness. There is no guarding. Musculoskeletal:         General: Normal range of motion. Cervical back: Normal range of motion and neck supple. Skin:     General: Skin is warm and dry. Neurological:      Mental Status: He is alert and oriented to person, place, and time.    Psychiatric:      Comments: Demonstrates limited ability to reason and understand two step commands or complex questioning             Procedures

## 2023-10-18 DIAGNOSIS — Z01.89 ENCOUNTER FOR COMPETENCY EVALUATION: Primary | ICD-10-CM

## 2023-10-18 NOTE — TELEPHONE ENCOUNTER
This office do not have a documented proxy on file for the patient, please check with the group home regarding their standard operating procedure otherwise referral was placed to neuropsych for competency evaluation.

## 2023-10-19 ENCOUNTER — TELEPHONE (OUTPATIENT)
Age: 73
End: 2023-10-19

## 2023-10-19 DIAGNOSIS — E44.1 MILD PROTEIN-CALORIE MALNUTRITION (HCC): Primary | ICD-10-CM

## 2023-10-24 RX ORDER — FEEDER CONTAINER WITH PUMP SET
237 EACH MISCELLANEOUS 2 TIMES DAILY
Qty: 237 ML | Refills: 1 | Status: SHIPPED | OUTPATIENT
Start: 2023-10-24 | End: 2023-11-23

## 2023-11-08 ENCOUNTER — TELEPHONE (OUTPATIENT)
Dept: SURGERY | Facility: CLINIC | Age: 73
End: 2023-11-08

## 2023-11-08 NOTE — TELEPHONE ENCOUNTER
Patient's  Jing Perkins called would like to know what the next step is for patient's surgery on Gall Bladder. I went to the email that you requested from [facility] for Neuropsychology would you please put a referral in or would you like me to send fax sheet and patient's demographics and last office note from you. Please advise what you would like me to do for patient.

## 2023-11-08 NOTE — TELEPHONE ENCOUNTER
Spoke to patient's  Kristi Cook, sending referral for Neuropsychology that his PCP refer too. I told Kristi Cook will fax copy of referral and if patient gets worse with symptoms then go to the ER per Dr. Jennifer Berman. She understood and confirmed.

## 2023-11-20 ENCOUNTER — OFFICE VISIT (OUTPATIENT)
Age: 73
End: 2023-11-20
Payer: MEDICARE

## 2023-11-20 ENCOUNTER — TELEPHONE (OUTPATIENT)
Age: 73
End: 2023-11-20

## 2023-11-20 VITALS
OXYGEN SATURATION: 98 % | RESPIRATION RATE: 16 BRPM | HEART RATE: 86 BPM | WEIGHT: 153 LBS | BODY MASS INDEX: 23.19 KG/M2 | DIASTOLIC BLOOD PRESSURE: 78 MMHG | TEMPERATURE: 97.2 F | HEIGHT: 68 IN | SYSTOLIC BLOOD PRESSURE: 120 MMHG

## 2023-11-20 DIAGNOSIS — I10 PRIMARY HYPERTENSION: ICD-10-CM

## 2023-11-20 DIAGNOSIS — Z23 ENCOUNTER FOR IMMUNIZATION: ICD-10-CM

## 2023-11-20 DIAGNOSIS — Z00.00 MEDICARE ANNUAL WELLNESS VISIT, SUBSEQUENT: Primary | ICD-10-CM

## 2023-11-20 DIAGNOSIS — B86 SCABIES: ICD-10-CM

## 2023-11-20 PROBLEM — R35.1 NOCTURIA: Status: RESOLVED | Noted: 2022-11-02 | Resolved: 2023-11-20

## 2023-11-20 PROBLEM — N39.41 URGE URINARY INCONTINENCE: Status: RESOLVED | Noted: 2022-11-02 | Resolved: 2023-11-20

## 2023-11-20 PROBLEM — N39.43 URINARY INCONTINENCE, POST-VOID DRIBBLING: Status: RESOLVED | Noted: 2022-11-02 | Resolved: 2023-11-20

## 2023-11-20 PROBLEM — R35.0 URINARY FREQUENCY: Status: RESOLVED | Noted: 2022-11-02 | Resolved: 2023-11-20

## 2023-11-20 PROCEDURE — 90662 IIV NO PRSV INCREASED AG IM: CPT | Performed by: FAMILY MEDICINE

## 2023-11-20 PROCEDURE — G0008 ADMIN INFLUENZA VIRUS VAC: HCPCS | Performed by: FAMILY MEDICINE

## 2023-11-20 PROCEDURE — 99214 OFFICE O/P EST MOD 30 MIN: CPT | Performed by: FAMILY MEDICINE

## 2023-11-20 PROCEDURE — G0439 PPPS, SUBSEQ VISIT: HCPCS | Performed by: FAMILY MEDICINE

## 2023-11-20 RX ORDER — IVERMECTIN 3 MG/1
200 TABLET ORAL ONCE
Qty: 5 TABLET | Refills: 1 | Status: SHIPPED | OUTPATIENT
Start: 2023-11-20 | End: 2023-11-20

## 2023-11-20 RX ORDER — IVERMECTIN 3 MG/1
200 TABLET ORAL ONCE
Qty: 5 TABLET | Refills: 1 | Status: SHIPPED | OUTPATIENT
Start: 2023-11-20 | End: 2023-11-20 | Stop reason: SDUPTHER

## 2023-11-20 RX ORDER — DEUTETRABENAZINE 12 MG/1
TABLET, COATED ORAL
Status: ON HOLD | COMMUNITY
Start: 2023-11-17

## 2023-11-20 NOTE — TELEPHONE ENCOUNTER
Gabriela FAJARDO from [facility]-- in regards to ivermectin (STROMECTOL) 3 MG TABS   They want to know if you want to discontinue it?     They received the script then received a cancel order

## 2023-11-20 NOTE — TELEPHONE ENCOUNTER
Yes it was discontinued and sent to a different pharmacy so that they were able to pick it up faster per the caregiver in the room.

## 2023-11-20 NOTE — PROGRESS NOTES
Assessment and Plan:     Problem List Items Addressed This Visit          Cardiovascular and Mediastinum    Hypertension     Controlled on current regimen. Blood Pressure: 120/78               Musculoskeletal and Integument    Scabies     Likely will tolerate oral over topical.  Provided order for ivermectin 200 mg/kg once followed by repeat treatment in 2 weeks-refill provided. Relevant Medications    ivermectin (STROMECTOL) 3 MG TABS       Other    Medicare annual wellness visit, subsequent - Primary     Up-to-date          Other Visit Diagnoses       Encounter for immunization        Relevant Orders    influenza vaccine, high-dose, PF 0.7 mL (FLUZONE HIGH-DOSE) (Completed)            Depression Screening and Follow-up Plan: Patient was screened for depression during today's encounter. They screened negative with a PHQ-2 score of 0. Preventive health issues were discussed with patient, and age appropriate screening tests were ordered as noted in patient's After Visit Summary. Personalized health advice and appropriate referrals for health education or preventive services given if needed, as noted in patient's After Visit Summary. History of Present Illness:     Patient presents for a Medicare Wellness Visit    Lives in a group home       Patient Care Team:  Sly Neves MD as PCP - General (Family Medicine)  MD Le Davis PA-C (Gastroenterology)  Surinder Jones PA-C as Physician Assistant (Physician Assistant)  Allen Tang MD (Psychiatry)     Review of Systems:     Review of Systems   Constitutional:  Negative for chills, fever and unexpected weight change. HENT:  Negative for congestion, rhinorrhea and sore throat. Eyes:  Negative for visual disturbance. Respiratory:  Negative for chest tightness, shortness of breath and wheezing. Cardiovascular:  Negative for chest pain.    Gastrointestinal:  Negative for abdominal pain, constipation, diarrhea, nausea and vomiting. Endocrine: Negative for polyuria. Genitourinary:  Negative for dysuria and hematuria. Skin:  Positive for rash. Neurological:  Negative for dizziness, weakness, light-headedness and headaches. Psychiatric/Behavioral:  Negative for confusion.          Problem List:     Patient Active Problem List   Diagnosis    GERD (gastroesophageal reflux disease)    Neuro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     Hypertension    Hypercholesteremia    Paranoid schizophrenia ([address])    Combined form of senile cataract    MRSA carrier    Groin rash    Fall    Chronic obstructive pulmonary disease, unspecified COPD type ([facility])    Neoplasm of uncertain behavior of left kidney    BPH with obstruction/lower urinary tract symptoms    Chronic constipation    Overactive bladder    Chronic prostatitis    Mild protein-calorie malnutrition (HCC)    Aortic root dilatation (HCC)    SBO (small bowel obstruction) (HCC)    Parkinson disease    Orthostatic lightheadedness    Medicare annual wellness visit, subsequent    Scabies      Past Medical and Surgical History:     Past Medical History:   Diagnosis Date    Asthma     Benign prostatic hyperplasia     COPD (chronic obstructive pulmonary disease) ([facility])     GERD (gastroesophageal reflux disease)     Herpes zoster without complication 15/93/3360    Hypercholesteremia     Hypertension     Neuroleptic induced parkinsonism      Paranoid schizophrenia ([facility])     Psychiatric disorder      Past Surgical History:   Procedure Laterality Date    CATARACT EXTRACTION      COLONOSCOPY        Family History:     Family History   Problem Relation Age of Onset    No Known Problems Mother     No Known Problems Father     Parkinsonism Son       Social History:     Social History     Socioeconomic History    Marital status: Single     Spouse name: None    Number of children: None    Years of education: None    Highest education level: None   Occupational History    None   Tobacco Use    Smoking status: Former Packs/day: 1.00     Years: 20.00     Total pack years: 20.00     Types: Cigarettes     Quit date: 2000     Years since quittin.9    Smokeless tobacco: Never   Vaping Use    Vaping Use: Never used   Substance and Sexual Activity    Alcohol use: Not Currently    Drug use: Never    Sexual activity: Not Currently   Other Topics Concern    None   Social History Narrative    None     Social Determinants of Health     Financial Resource Strain: Low Risk  (2023)    Overall Financial Resource Strain (CARDIA)     Difficulty of Paying Living Expenses: Not hard at all   Food Insecurity: No Food Insecurity (10/2/2023)    Hunger Vital Sign     Worried About Running Out of Food in the Last Year: Never true     Ran Out of Food in the Last Year: Never true   Transportation Needs: No Transportation Needs (2023)    PRAPARE - Transportation     Lack of Transportation (Medical): No     Lack of Transportation (Non-Medical):  No   Physical Activity: Inactive (2023)    Exercise Vital Sign     Days of Exercise per Week: 0 days     Minutes of Exercise per Session: 0 min   Stress: No Stress Concern Present (10/11/2023)    [facility]     Feeling of Stress : Not at all   Social Connections: Not on file   Intimate Partner Violence: Not on file   Housing Stability: [address] [facility]  (10/2/2023)    Housing Stability Vital Sign     Unable to Pay for Housing in the Last Year: No     Number of Places Lived in the Last Year: 1     Unstable Housing in the Last Year: No      Medications and Allergies:     Current Outpatient Medications   Medication Sig Dispense Refill    acetaminophen (Acetaminophen Extra Strength) 500 mg tablet Take 1 tablet (500 mg total) by mouth every 6 (six) hours as needed for mild pain 30 tablet 11    atorvastatin (LIPITOR) 40 mg tablet Take 1 tablet (40 mg total) by mouth daily 31 tablet 11    Austedo 12 MG TABS       carbidopa-levodopa (SINEMET)  mg per tablet Take 1 tablet by mouth 3 (three) times a day 270 tablet 3    Cholecalciferol (Vitamin D High Potency) 25 MCG (1000 UT) capsule Take 1 capsule (1,000 Units total) by mouth daily 31 capsule 6    cloZAPine (CLOZARIL) 100 mg tablet Take 100 mg by mouth 2 (two) times a day      clozapine (CLOZARIL) 200 MG tablet Take 200 mg by mouth daily at bedtime       fluticasone (FLONASE) 50 mcg/act nasal spray 2 sprays into each nostril daily 16 g 11    hydrOXYzine HCL (ATARAX) 25 mg tablet Take 1 tablet (25 mg total) by mouth every 6 (six) hours as needed for itching 30 tablet 5    ivermectin (STROMECTOL) 3 MG TABS Take 4.5 tablets (13,500 mcg total) by mouth once for 1 dose Repeat in 2 weeks (hence 1 refill) 5 tablet 1    metoprolol succinate (TOPROL-XL) 25 mg 24 hr tablet Take 1 tablet (25 mg total) by mouth daily 31 tablet 11    Nutritional Supplements (Ensure High Protein) LIQD Take 237 mL by mouth 2 (two) times a day 237 mL 1    omeprazole (PriLOSEC) 40 MG capsule Take 1 capsule (40 mg total) by mouth daily 31 capsule 11    polyethylene glycol (MIRALAX) 17 g packet Take 17 g by mouth daily 31 each 11    tamsulosin (FLOMAX) 0.4 mg Take 1 capsule (0.4 mg total) by mouth daily at bedtime 90 capsule 3    temazepam (RESTORIL) 15 mg capsule Take 15 mg by mouth daily at bedtime      topiramate (TOPAMAX) 25 mg tablet Take 25 mg by mouth 2 (two) times a day  1    traZODone (DESYREL) 100 mg tablet Take 100 mg by mouth daily at bedtime       No current facility-administered medications for this visit.      No Known Allergies   Immunizations:     Immunization History   Administered Date(s) Administered    COVID-19 MODERNA VACC 0.5 ML IM 02/19/2021, 03/19/2021    COVID-19 PFIZER VACCINE 0.3 ML IM 01/29/2021, 02/19/2021, 11/10/2021    COVID-19 Pfizer Vac BIVALENT Santos-sucrose 12 Yr+ IM 09/20/2022    H1N1, All Formulations 10/23/2009    INFLUENZA 09/23/2022    Influenza, high dose seasonal 0.7 mL 10/22/2019, 09/22/2020, 09/27/2021, 09/23/2022, 11/20/2023    Influenza, seasonal, injectable 10/20/2009    Pneumococcal Polysaccharide PPV23 10/16/2009, 10/16/2009    Tdap 09/22/2020      Health Maintenance:         Topic Date Due    Colorectal Cancer Screening  10/04/2029    Hepatitis C Screening  Completed         Topic Date Due    COVID-19 Vaccine (7 - Mixed Product series) 01/20/2023      Medicare Screening Tests and Risk Assessments:     Ronny Jacques is here for his Subsequent Wellness visit. Health Risk Assessment:   Patient rates overall health as good. Patient feels that their physical health rating is same. Patient is satisfied with their life. Eyesight was rated as same. Hearing was rated as same. Patient feels that their emotional and mental health rating is same. Patients states they are sometimes angry. Patient states they are sometimes unusually tired/fatigued. Pain experienced in the last 7 days has been none. Patient states that he has experienced no weight loss or gain in last 6 months. Depression Screening:   PHQ-2 Score: 0      Fall Risk Screening: In the past year, patient has experienced: no history of falling in past year      Home Safety:  Patient does not have trouble with stairs inside or outside of their home. Patient has working smoke alarms Home safety hazards include: none. Nutrition:   Current diet is Regular. Medications:   Patient is currently taking over-the-counter supplements. OTC medications include: see medication list. Patient is not able to manage medications. Activities of Daily Living (ADLs)/Instrumental Activities of Daily Living (IADLs):   Walk and transfer into and out of bed and chair?: Yes  Dress and groom yourself?: Yes    Bathe or shower yourself?: Yes    Feed yourself?  Yes  Do your laundry/housekeeping?: No  Manage your money, pay your bills and track your expenses?: No  Make your own meals?: No    Do your own shopping?: Yes    Previous Hospitalizations:   Any hospitalizations or ED visits within the last 12 months?: Yes    How many hospitalizations have you had in the last year?: 1-2    Advance Care Planning:   Living will: No      PREVENTIVE SCREENINGS      Cardiovascular Screening:    General: Screening Not Indicated and History Lipid Disorder      Diabetes Screening:     General: Screening Current      Colorectal Cancer Screening:     General: Screening Current      Prostate Cancer Screening:    General: Screening Current      Abdominal Aortic Aneurysm (AAA) Screening:    Risk factors include: age between 70-75 yo and tobacco use        Lung Cancer Screening:     General: Screening Not Indicated      Hepatitis C Screening:    General: Screening Current    Screening, Brief Intervention, and Referral to Treatment (SBIRT)    Screening      Single Item Drug Screening:  How often have you used an illegal drug (including marijuana) or a prescription medication for non-medical reasons in the past year? never    Single Item Drug Screen Score: 0  Interpretation: Negative screen for possible drug use disorder    No results found. Physical Exam:     /78 (BP Location: Left arm, Patient Position: Sitting, Cuff Size: Standard)   Pulse 86   Temp (!) 97.2 °F (36.2 °C) (Tympanic)   Resp 16   Ht 5' 8" (1.727 m)   Wt 69.4 kg (153 lb)   SpO2 98%   BMI 23.26 kg/m²     Physical Exam  Vitals reviewed. Constitutional:       General: He is not in acute distress. Appearance: Normal appearance. He is not ill-appearing, toxic-appearing or diaphoretic. HENT:      Head: Normocephalic and atraumatic. Right Ear: External ear normal.      Left Ear: External ear normal.      Nose: Nose normal.      Mouth/Throat:      Mouth: Mucous membranes are moist.   Eyes:      General: No scleral icterus. Right eye: No discharge. Left eye: No discharge. Extraocular Movements: Extraocular movements intact.       Conjunctiva/sclera: Conjunctivae normal. Cardiovascular:      Rate and Rhythm: Normal rate and regular rhythm. Pulses: Normal pulses. Heart sounds: Normal heart sounds. Pulmonary:      Effort: Pulmonary effort is normal. No respiratory distress. Breath sounds: Normal breath sounds. Abdominal:      Palpations: Abdomen is soft. Tenderness: There is no abdominal tenderness. Musculoskeletal:         General: No swelling. Normal range of motion. Cervical back: Normal range of motion. Skin:     General: Skin is warm and dry. Findings: Rash (lower abdomen, itching) present. Neurological:      General: No focal deficit present. Mental Status: He is alert and oriented to person, place, and time. Psychiatric:         Mood and Affect: Mood normal.         Behavior: Behavior normal.         Thought Content:  Thought content normal.          Latosha Queen MD

## 2023-11-20 NOTE — PATIENT INSTRUCTIONS
Medicare Preventive Visit Patient Instructions  Thank you for completing your Welcome to Medicare Visit or Medicare Annual Wellness Visit today. Your next wellness visit will be due in one year (11/20/2024). The screening/preventive services that you may require over the next 5-10 years are detailed below. Some tests may not apply to you based off risk factors and/or age. Screening tests ordered at today's visit but not completed yet may show as past due. Also, please note that scanned in results may not display below. Preventive Screenings:  Service Recommendations Previous Testing/Comments   Colorectal Cancer Screening  Colonoscopy    Fecal Occult Blood Test (FOBT)/Fecal Immunochemical Test (FIT)  Fecal DNA/Cologuard Test  Flexible Sigmoidoscopy Age: 43-73 years old   Colonoscopy: every 10 years (May be performed more frequently if at higher risk)  OR  FOBT/FIT: every 1 year  OR  Cologuard: every 3 years  OR  Sigmoidoscopy: every 5 years  Screening may be recommended earlier than age 39 if at higher risk for colorectal cancer. Also, an individualized decision between you and your healthcare provider will decide whether screening between the ages of 77-80 would be appropriate.  Colonoscopy: 10/04/2019  FOBT/FIT: Not on file  Cologuard: Not on file  Sigmoidoscopy: Not on file    Screening Current     Prostate Cancer Screening Individualized decision between patient and health care provider in men between ages of 53-66   Medicare will cover every 12 months beginning on the day after your 50th birthday PSA: 0.95 ng/mL     Screening Current     Hepatitis C Screening Once for adults born between 1945 and 1965  More frequently in patients at high risk for Hepatitis C Hep C Antibody: 08/29/2019    Screening Current   Diabetes Screening 1-2 times per year if you're at risk for diabetes or have pre-diabetes Fasting glucose: 81 mg/dL (5/18/2023)  A1C: 4.7 % (9/6/2019)  Screening Current   Cholesterol Screening Once every 5 years if you don't have a lipid disorder. May order more often based on risk factors. Lipid panel: 09/30/2023  Screening Not Indicated  History Lipid Disorder      Other Preventive Screenings Covered by Medicare:  Abdominal Aortic Aneurysm (AAA) Screening: covered once if your at risk. You're considered to be at risk if you have a family history of AAA or a male between the age of 70-76 who smoking at least 100 cigarettes in your lifetime. Lung Cancer Screening: covers low dose CT scan once per year if you meet all of the following conditions: (1) Age 48-67; (2) No signs or symptoms of lung cancer; (3) Current smoker or have quit smoking within the last 15 years; (4) You have a tobacco smoking history of at least 20 pack years (packs per day x number of years you smoked); (5) You get a written order from a healthcare provider. Glaucoma Screening: covered annually if you're considered high risk: (1) You have diabetes OR (2) Family history of glaucoma OR (3)  aged 48 and older OR (3)  American aged 72 and older  Osteoporosis Screening: covered every 2 years if you meet one of the following conditions: (1) Have a vertebral abnormality; (2) On glucocorticoid therapy for more than 3 months; (3) Have primary hyperparathyroidism; (4) On osteoporosis medications and need to assess response to drug therapy. HIV Screening: covered annually if you're between the age of 14-79. Also covered annually if you are younger than 13 and older than 72 with risk factors for HIV infection. For pregnant patients, it is covered up to 3 times per pregnancy.     Immunizations:  Immunization Recommendations   Influenza Vaccine Annual influenza vaccination during flu season is recommended for all persons aged >= 6 months who do not have contraindications   Pneumococcal Vaccine   * Pneumococcal conjugate vaccine = PCV13 (Prevnar 13), PCV15 (Vaxneuvance), PCV20 (Prevnar 20)  * Pneumococcal polysaccharide vaccine = PPSV23 (Pneumovax) Adults 56-76 yo with certain risk factors or if 69+ yo  If never received any pneumonia vaccine: recommend Prevnar 20 (PCV20)  Give PCV20 if previously received 1 dose of PCV13 or PPSV23   Hepatitis B Vaccine 3 dose series if at intermediate or high risk (ex: diabetes, end stage renal disease, liver disease)   Respiratory syncytial virus (RSV) Vaccine - COVERED BY MEDICARE PART D  * RSVPreF3 (Arexvy) CDC recommends that adults 61years of age and older may receive a single dose of RSV vaccine using shared clinical decision-making (SCDM)   Tetanus (Td) Vaccine - COST NOT COVERED BY MEDICARE PART B Following completion of primary series, a booster dose should be given every 10 years to maintain immunity against tetanus. Td may also be given as tetanus wound prophylaxis. Tdap Vaccine - COST NOT COVERED BY MEDICARE PART B Recommended at least once for all adults. For pregnant patients, recommended with each pregnancy. Shingles Vaccine (Shingrix) - COST NOT COVERED BY MEDICARE PART B  2 shot series recommended in those 19 years and older who have or will have weakened immune systems or those 50 years and older     Health Maintenance Due:      Topic Date Due   • Colorectal Cancer Screening  10/04/2029   • Hepatitis C Screening  Completed     Immunizations Due:      Topic Date Due   • COVID-19 Vaccine (7 - Mixed Product series) 01/20/2023   • Influenza Vaccine (1) 09/01/2023     Advance Directives   What are advance directives? Advance directives are legal documents that state your wishes and plans for medical care. These plans are made ahead of time in case you lose your ability to make decisions for yourself. Advance directives can apply to any medical decision, such as the treatments you want, and if you want to donate organs. What are the types of advance directives? There are many types of advance directives, and each state has rules about how to use them.  You may choose a combination of any of the following:  Living will: This is a written record of the treatment you want. You can also choose which treatments you do not want, which to limit, and which to stop at a certain time. This includes surgery, medicine, IV fluid, and tube feedings. Durable power of  for healthcare [place] [facility]): This is a written record that states who you want to make healthcare choices for you when you are unable to make them for yourself. This person, called a proxy, is usually a family member or a friend. You may choose more than 1 proxy. Do not resuscitate (DNR) order:  A DNR order is used in case your heart stops beating or you stop breathing. It is a request not to have certain forms of treatment, such as CPR. A DNR order may be included in other types of advance directives. Medical directive: This covers the care that you want if you are in a coma, near death, or unable to make decisions for yourself. You can list the treatments you want for each condition. Treatment may include pain medicine, surgery, blood transfusions, dialysis, IV or tube feedings, and a ventilator (breathing machine). Values history: This document has questions about your views, beliefs, and how you feel and think about life. This information can help others choose the care that you would choose. Why are advance directives important? An advance directive helps you control your care. Although spoken wishes may be used, it is better to have your wishes written down. Spoken wishes can be misunderstood, or not followed. Treatments may be given even if you do not want them. An advance directive may make it easier for your family to make difficult choices about your care. © Copyright [employer] 2018 Information is for End User's use only and may not be sold, redistributed or otherwise used for commercial purposes.  All illustrations and images included in CareNotes® are the copyrighted property of A.D.A.M., Inc. or  [employer]

## 2023-11-20 NOTE — ASSESSMENT & PLAN NOTE
Likely will tolerate oral over topical.  Provided order for ivermectin 200 mg/kg once followed by repeat treatment in 2 weeks-refill provided.

## 2023-11-21 ENCOUNTER — TELEPHONE (OUTPATIENT)
Age: 73
End: 2023-11-21

## 2023-11-21 NOTE — TELEPHONE ENCOUNTER
Chanelle called to see of the fax was received for medical records. I provided her with the number for medical records directly.

## 2023-11-24 NOTE — TELEPHONE ENCOUNTER
Called and spoke to [facility] from personal care home  She stated patient refused to come and did not give any other details regarding why  [facility] stated patient has no complaints at this time  Just wanted to confirm with patient  No further action needed  Patient was rescheduled for July  Informed [facility] if patient has any concerns in the meantime patient can be seen sooner  [facility] verbalized understanding 
Pt under care of Jennifer Ballard    Pt is refusing to come to appt and rescheduled for 7/15/22
no

## 2023-12-02 ENCOUNTER — HOSPITAL ENCOUNTER (INPATIENT)
Facility: HOSPITAL | Age: 73
LOS: 1 days | Discharge: NURSING, REHAB OR LONG-TERM CARE | DRG: 418 | End: 2023-12-04
Attending: EMERGENCY MEDICINE | Admitting: STUDENT IN AN ORGANIZED HEALTH CARE EDUCATION/TRAINING PROGRAM
Payer: MEDICARE

## 2023-12-02 ENCOUNTER — APPOINTMENT (EMERGENCY)
Dept: CT IMAGING | Facility: HOSPITAL | Age: 73
DRG: 418 | End: 2023-12-02
Payer: MEDICARE

## 2023-12-02 DIAGNOSIS — K81.9 CHOLECYSTITIS: ICD-10-CM

## 2023-12-02 DIAGNOSIS — R10.13 EPIGASTRIC PAIN: ICD-10-CM

## 2023-12-02 DIAGNOSIS — K81.0 ACUTE CHOLECYSTITIS: Primary | ICD-10-CM

## 2023-12-02 DIAGNOSIS — R11.2 NAUSEA AND VOMITING: ICD-10-CM

## 2023-12-02 DIAGNOSIS — F20.0 PARANOID SCHIZOPHRENIA (HCC): ICD-10-CM

## 2023-12-02 LAB
ALBUMIN SERPL BCP-MCNC: 3.8 G/DL (ref 3.5–5)
ALP SERPL-CCNC: 124 U/L (ref 34–104)
ALT SERPL W P-5'-P-CCNC: 25 U/L (ref 7–52)
ANION GAP SERPL CALCULATED.3IONS-SCNC: 3 MMOL/L
AST SERPL W P-5'-P-CCNC: 24 U/L (ref 13–39)
BASOPHILS # BLD AUTO: 0.04 THOUSANDS/ÂΜL (ref 0–0.1)
BASOPHILS NFR BLD AUTO: 1 % (ref 0–1)
BILIRUB SERPL-MCNC: 0.41 MG/DL (ref 0.2–1)
BUN SERPL-MCNC: 23 MG/DL (ref 5–25)
CALCIUM SERPL-MCNC: 8.8 MG/DL (ref 8.4–10.2)
CHLORIDE SERPL-SCNC: 108 MMOL/L (ref 96–108)
CO2 SERPL-SCNC: 29 MMOL/L (ref 21–32)
CREAT SERPL-MCNC: 0.86 MG/DL (ref 0.6–1.3)
EOSINOPHIL # BLD AUTO: 0.23 THOUSAND/ÂΜL (ref 0–0.61)
EOSINOPHIL NFR BLD AUTO: 4 % (ref 0–6)
ERYTHROCYTE [DISTWIDTH] IN BLOOD BY AUTOMATED COUNT: 14 % (ref 11.6–15.1)
GFR SERPL CREATININE-BSD FRML MDRD: 86 ML/MIN/1.73SQ M
GLUCOSE SERPL-MCNC: 98 MG/DL (ref 65–140)
HCT VFR BLD AUTO: 39.2 % (ref 36.5–49.3)
HGB BLD-MCNC: 13.2 G/DL (ref 12–17)
IMM GRANULOCYTES # BLD AUTO: 0.01 THOUSAND/UL (ref 0–0.2)
IMM GRANULOCYTES NFR BLD AUTO: 0 % (ref 0–2)
LIPASE SERPL-CCNC: 47 U/L (ref 11–82)
LYMPHOCYTES # BLD AUTO: 2.08 THOUSANDS/ÂΜL (ref 0.6–4.47)
LYMPHOCYTES NFR BLD AUTO: 35 % (ref 14–44)
MCH RBC QN AUTO: 33.4 PG (ref 26.8–34.3)
MCHC RBC AUTO-ENTMCNC: 33.7 G/DL (ref 31.4–37.4)
MCV RBC AUTO: 99 FL (ref 82–98)
MONOCYTES # BLD AUTO: 0.65 THOUSAND/ÂΜL (ref 0.17–1.22)
MONOCYTES NFR BLD AUTO: 11 % (ref 4–12)
NEUTROPHILS # BLD AUTO: 2.93 THOUSANDS/ÂΜL (ref 1.85–7.62)
NEUTS SEG NFR BLD AUTO: 49 % (ref 43–75)
NRBC BLD AUTO-RTO: 0 /100 WBCS
PLATELET # BLD AUTO: 168 THOUSANDS/UL (ref 149–390)
PMV BLD AUTO: 8.9 FL (ref 8.9–12.7)
POTASSIUM SERPL-SCNC: 3.9 MMOL/L (ref 3.5–5.3)
PROT SERPL-MCNC: 6.6 G/DL (ref 6.4–8.4)
RBC # BLD AUTO: 3.95 MILLION/UL (ref 3.88–5.62)
SODIUM SERPL-SCNC: 140 MMOL/L (ref 135–147)
WBC # BLD AUTO: 5.94 THOUSAND/UL (ref 4.31–10.16)

## 2023-12-02 PROCEDURE — 96361 HYDRATE IV INFUSION ADD-ON: CPT

## 2023-12-02 PROCEDURE — 83690 ASSAY OF LIPASE: CPT | Performed by: EMERGENCY MEDICINE

## 2023-12-02 PROCEDURE — 99284 EMERGENCY DEPT VISIT MOD MDM: CPT

## 2023-12-02 PROCEDURE — 80053 COMPREHEN METABOLIC PANEL: CPT | Performed by: EMERGENCY MEDICINE

## 2023-12-02 PROCEDURE — 99285 EMERGENCY DEPT VISIT HI MDM: CPT | Performed by: EMERGENCY MEDICINE

## 2023-12-02 PROCEDURE — 96376 TX/PRO/DX INJ SAME DRUG ADON: CPT

## 2023-12-02 PROCEDURE — 96374 THER/PROPH/DIAG INJ IV PUSH: CPT

## 2023-12-02 PROCEDURE — 96375 TX/PRO/DX INJ NEW DRUG ADDON: CPT

## 2023-12-02 PROCEDURE — 36415 COLL VENOUS BLD VENIPUNCTURE: CPT | Performed by: EMERGENCY MEDICINE

## 2023-12-02 PROCEDURE — 85025 COMPLETE CBC W/AUTO DIFF WBC: CPT | Performed by: EMERGENCY MEDICINE

## 2023-12-02 RX ORDER — ONDANSETRON 2 MG/ML
4 INJECTION INTRAMUSCULAR; INTRAVENOUS ONCE
Status: COMPLETED | OUTPATIENT
Start: 2023-12-02 | End: 2023-12-02

## 2023-12-02 RX ORDER — FENTANYL CITRATE 50 UG/ML
50 INJECTION, SOLUTION INTRAMUSCULAR; INTRAVENOUS ONCE
Status: COMPLETED | OUTPATIENT
Start: 2023-12-02 | End: 2023-12-02

## 2023-12-02 RX ORDER — HYDROMORPHONE HCL/PF 1 MG/ML
0.5 SYRINGE (ML) INJECTION ONCE
Status: COMPLETED | OUTPATIENT
Start: 2023-12-02 | End: 2023-12-02

## 2023-12-02 RX ORDER — FAMOTIDINE 20 MG/1
20 TABLET, FILM COATED ORAL ONCE
Status: DISCONTINUED | OUTPATIENT
Start: 2023-12-02 | End: 2023-12-02

## 2023-12-02 RX ORDER — FAMOTIDINE 10 MG/ML
20 INJECTION, SOLUTION INTRAVENOUS ONCE
Status: COMPLETED | OUTPATIENT
Start: 2023-12-02 | End: 2023-12-02

## 2023-12-02 RX ORDER — MAGNESIUM HYDROXIDE/ALUMINUM HYDROXICE/SIMETHICONE 120; 1200; 1200 MG/30ML; MG/30ML; MG/30ML
30 SUSPENSION ORAL ONCE
Status: COMPLETED | OUTPATIENT
Start: 2023-12-02 | End: 2023-12-02

## 2023-12-02 RX ADMIN — FAMOTIDINE 20 MG: 10 INJECTION, SOLUTION INTRAVENOUS at 22:01

## 2023-12-02 RX ADMIN — ONDANSETRON 4 MG: 2 INJECTION INTRAMUSCULAR; INTRAVENOUS at 20:58

## 2023-12-02 RX ADMIN — ALUMINUM HYDROXIDE, MAGNESIUM HYDROXIDE, AND SIMETHICONE 30 ML: 200; 200; 20 SUSPENSION ORAL at 20:58

## 2023-12-02 RX ADMIN — ONDANSETRON 4 MG: 2 INJECTION INTRAMUSCULAR; INTRAVENOUS at 23:17

## 2023-12-02 RX ADMIN — HYDROMORPHONE HYDROCHLORIDE 0.5 MG: 1 INJECTION, SOLUTION INTRAMUSCULAR; INTRAVENOUS; SUBCUTANEOUS at 23:17

## 2023-12-02 RX ADMIN — SODIUM CHLORIDE 500 ML: 0.9 INJECTION, SOLUTION INTRAVENOUS at 20:58

## 2023-12-02 RX ADMIN — FENTANYL CITRATE 50 MCG: 50 INJECTION INTRAMUSCULAR; INTRAVENOUS at 22:01

## 2023-12-03 ENCOUNTER — APPOINTMENT (INPATIENT)
Dept: RADIOLOGY | Facility: HOSPITAL | Age: 73
DRG: 418 | End: 2023-12-03
Payer: MEDICARE

## 2023-12-03 ENCOUNTER — ANESTHESIA (INPATIENT)
Dept: PERIOP | Facility: HOSPITAL | Age: 73
DRG: 418 | End: 2023-12-03
Payer: MEDICARE

## 2023-12-03 ENCOUNTER — APPOINTMENT (EMERGENCY)
Dept: CT IMAGING | Facility: HOSPITAL | Age: 73
DRG: 418 | End: 2023-12-03
Payer: MEDICARE

## 2023-12-03 ENCOUNTER — ANESTHESIA EVENT (INPATIENT)
Dept: PERIOP | Facility: HOSPITAL | Age: 73
DRG: 418 | End: 2023-12-03
Payer: MEDICARE

## 2023-12-03 PROBLEM — K81.9 CHOLECYSTITIS: Status: ACTIVE | Noted: 2023-12-03

## 2023-12-03 PROCEDURE — 74177 CT ABD & PELVIS W/CONTRAST: CPT

## 2023-12-03 PROCEDURE — C1729 CATH, DRAINAGE: HCPCS | Performed by: SURGERY

## 2023-12-03 PROCEDURE — 74300 X-RAY BILE DUCTS/PANCREAS: CPT

## 2023-12-03 PROCEDURE — 47563 LAPARO CHOLECYSTECTOMY/GRAPH: CPT | Performed by: SURGERY

## 2023-12-03 PROCEDURE — 99222 1ST HOSP IP/OBS MODERATE 55: CPT | Performed by: SURGERY

## 2023-12-03 PROCEDURE — 93005 ELECTROCARDIOGRAM TRACING: CPT

## 2023-12-03 PROCEDURE — 99222 1ST HOSP IP/OBS MODERATE 55: CPT | Performed by: INTERNAL MEDICINE

## 2023-12-03 PROCEDURE — 99223 1ST HOSP IP/OBS HIGH 75: CPT | Performed by: INTERNAL MEDICINE

## 2023-12-03 PROCEDURE — 47563 LAPARO CHOLECYSTECTOMY/GRAPH: CPT

## 2023-12-03 PROCEDURE — 96376 TX/PRO/DX INJ SAME DRUG ADON: CPT

## 2023-12-03 PROCEDURE — BF141ZZ FLUOROSCOPY OF GALLBLADDER, BILE DUCTS AND PANCREATIC DUCTS USING LOW OSMOLAR CONTRAST: ICD-10-PCS | Performed by: INTERNAL MEDICINE

## 2023-12-03 PROCEDURE — 88304 TISSUE EXAM BY PATHOLOGIST: CPT | Performed by: PATHOLOGY

## 2023-12-03 PROCEDURE — 0FT44ZZ RESECTION OF GALLBLADDER, PERCUTANEOUS ENDOSCOPIC APPROACH: ICD-10-PCS | Performed by: INTERNAL MEDICINE

## 2023-12-03 PROCEDURE — G1004 CDSM NDSC: HCPCS

## 2023-12-03 RX ORDER — ACETAMINOPHEN 325 MG/1
650 TABLET ORAL EVERY 6 HOURS PRN
Status: DISCONTINUED | OUTPATIENT
Start: 2023-12-03 | End: 2023-12-04 | Stop reason: HOSPADM

## 2023-12-03 RX ORDER — TAMSULOSIN HYDROCHLORIDE 0.4 MG/1
0.4 CAPSULE ORAL
Status: DISCONTINUED | OUTPATIENT
Start: 2023-12-03 | End: 2023-12-04 | Stop reason: HOSPADM

## 2023-12-03 RX ORDER — PROPOFOL 10 MG/ML
INJECTION, EMULSION INTRAVENOUS AS NEEDED
Status: DISCONTINUED | OUTPATIENT
Start: 2023-12-03 | End: 2023-12-03

## 2023-12-03 RX ORDER — ATORVASTATIN CALCIUM 40 MG/1
40 TABLET, FILM COATED ORAL
Status: DISCONTINUED | OUTPATIENT
Start: 2023-12-03 | End: 2023-12-04 | Stop reason: HOSPADM

## 2023-12-03 RX ORDER — METOPROLOL TARTRATE 1 MG/ML
INJECTION, SOLUTION INTRAVENOUS AS NEEDED
Status: DISCONTINUED | OUTPATIENT
Start: 2023-12-03 | End: 2023-12-03

## 2023-12-03 RX ORDER — HYDROMORPHONE HCL/PF 1 MG/ML
0.5 SYRINGE (ML) INJECTION EVERY 6 HOURS PRN
Status: DISCONTINUED | OUTPATIENT
Start: 2023-12-03 | End: 2023-12-03

## 2023-12-03 RX ORDER — SUCCINYLCHOLINE/SOD CL,ISO/PF 100 MG/5ML
SYRINGE (ML) INTRAVENOUS AS NEEDED
Status: DISCONTINUED | OUTPATIENT
Start: 2023-12-03 | End: 2023-12-03

## 2023-12-03 RX ORDER — SODIUM CHLORIDE, SODIUM LACTATE, POTASSIUM CHLORIDE, CALCIUM CHLORIDE 600; 310; 30; 20 MG/100ML; MG/100ML; MG/100ML; MG/100ML
75 INJECTION, SOLUTION INTRAVENOUS CONTINUOUS
Status: DISCONTINUED | OUTPATIENT
Start: 2023-12-03 | End: 2023-12-03

## 2023-12-03 RX ORDER — FENTANYL CITRATE 50 UG/ML
INJECTION, SOLUTION INTRAMUSCULAR; INTRAVENOUS AS NEEDED
Status: DISCONTINUED | OUTPATIENT
Start: 2023-12-03 | End: 2023-12-03

## 2023-12-03 RX ORDER — CLOZAPINE 100 MG/1
200 TABLET ORAL
Status: DISCONTINUED | OUTPATIENT
Start: 2023-12-03 | End: 2023-12-04 | Stop reason: HOSPADM

## 2023-12-03 RX ORDER — DEXAMETHASONE SODIUM PHOSPHATE 10 MG/ML
INJECTION, SOLUTION INTRAMUSCULAR; INTRAVENOUS AS NEEDED
Status: DISCONTINUED | OUTPATIENT
Start: 2023-12-03 | End: 2023-12-03

## 2023-12-03 RX ORDER — SODIUM CHLORIDE, SODIUM LACTATE, POTASSIUM CHLORIDE, CALCIUM CHLORIDE 600; 310; 30; 20 MG/100ML; MG/100ML; MG/100ML; MG/100ML
100 INJECTION, SOLUTION INTRAVENOUS CONTINUOUS
Status: DISPENSED | OUTPATIENT
Start: 2023-12-03 | End: 2023-12-03

## 2023-12-03 RX ORDER — TOPIRAMATE 25 MG/1
25 TABLET ORAL 2 TIMES DAILY
Status: DISCONTINUED | OUTPATIENT
Start: 2023-12-03 | End: 2023-12-04 | Stop reason: HOSPADM

## 2023-12-03 RX ORDER — LIDOCAINE HYDROCHLORIDE 10 MG/ML
INJECTION, SOLUTION EPIDURAL; INFILTRATION; INTRACAUDAL; PERINEURAL AS NEEDED
Status: DISCONTINUED | OUTPATIENT
Start: 2023-12-03 | End: 2023-12-03

## 2023-12-03 RX ORDER — ONDANSETRON 2 MG/ML
4 INJECTION INTRAMUSCULAR; INTRAVENOUS EVERY 6 HOURS PRN
Status: DISCONTINUED | OUTPATIENT
Start: 2023-12-03 | End: 2023-12-04 | Stop reason: HOSPADM

## 2023-12-03 RX ORDER — CEFAZOLIN SODIUM 1 G/50ML
1000 SOLUTION INTRAVENOUS
Status: COMPLETED | OUTPATIENT
Start: 2023-12-03 | End: 2023-12-03

## 2023-12-03 RX ORDER — CLOZAPINE 100 MG/1
100 TABLET ORAL 2 TIMES DAILY
Status: DISCONTINUED | OUTPATIENT
Start: 2023-12-03 | End: 2023-12-04 | Stop reason: HOSPADM

## 2023-12-03 RX ORDER — ALBUTEROL SULFATE 90 UG/1
AEROSOL, METERED RESPIRATORY (INHALATION) AS NEEDED
Status: DISCONTINUED | OUTPATIENT
Start: 2023-12-03 | End: 2023-12-03

## 2023-12-03 RX ORDER — PHENYLEPHRINE HCL IN 0.9% NACL 1 MG/10 ML
SYRINGE (ML) INTRAVENOUS AS NEEDED
Status: DISCONTINUED | OUTPATIENT
Start: 2023-12-03 | End: 2023-12-03

## 2023-12-03 RX ORDER — TRAZODONE HYDROCHLORIDE 100 MG/1
100 TABLET ORAL
Status: DISCONTINUED | OUTPATIENT
Start: 2023-12-03 | End: 2023-12-04 | Stop reason: HOSPADM

## 2023-12-03 RX ORDER — BUPIVACAINE HYDROCHLORIDE 2.5 MG/ML
INJECTION, SOLUTION EPIDURAL; INFILTRATION; INTRACAUDAL AS NEEDED
Status: DISCONTINUED | OUTPATIENT
Start: 2023-12-03 | End: 2023-12-03 | Stop reason: HOSPADM

## 2023-12-03 RX ORDER — HYDROMORPHONE HCL IN WATER/PF 6 MG/30 ML
0.2 PATIENT CONTROLLED ANALGESIA SYRINGE INTRAVENOUS EVERY 4 HOURS PRN
Status: DISCONTINUED | OUTPATIENT
Start: 2023-12-03 | End: 2023-12-04 | Stop reason: HOSPADM

## 2023-12-03 RX ORDER — ONDANSETRON 2 MG/ML
4 INJECTION INTRAMUSCULAR; INTRAVENOUS ONCE AS NEEDED
Status: DISCONTINUED | OUTPATIENT
Start: 2023-12-03 | End: 2023-12-03 | Stop reason: HOSPADM

## 2023-12-03 RX ORDER — SODIUM CHLORIDE, SODIUM LACTATE, POTASSIUM CHLORIDE, CALCIUM CHLORIDE 600; 310; 30; 20 MG/100ML; MG/100ML; MG/100ML; MG/100ML
INJECTION, SOLUTION INTRAVENOUS CONTINUOUS PRN
Status: DISCONTINUED | OUTPATIENT
Start: 2023-12-03 | End: 2023-12-03

## 2023-12-03 RX ORDER — POLYETHYLENE GLYCOL 3350 17 G/17G
17 POWDER, FOR SOLUTION ORAL DAILY
Status: DISCONTINUED | OUTPATIENT
Start: 2023-12-03 | End: 2023-12-03

## 2023-12-03 RX ORDER — OXYCODONE HYDROCHLORIDE 5 MG/1
5 TABLET ORAL EVERY 4 HOURS PRN
Status: DISCONTINUED | OUTPATIENT
Start: 2023-12-03 | End: 2023-12-04 | Stop reason: HOSPADM

## 2023-12-03 RX ORDER — VANCOMYCIN HYDROCHLORIDE 1 G/20ML
INJECTION, POWDER, LYOPHILIZED, FOR SOLUTION INTRAVENOUS AS NEEDED
Status: DISCONTINUED | OUTPATIENT
Start: 2023-12-03 | End: 2023-12-03

## 2023-12-03 RX ORDER — HYDROMORPHONE HCL IN WATER/PF 6 MG/30 ML
0.2 PATIENT CONTROLLED ANALGESIA SYRINGE INTRAVENOUS EVERY 6 HOURS PRN
Status: DISCONTINUED | OUTPATIENT
Start: 2023-12-03 | End: 2023-12-03

## 2023-12-03 RX ORDER — TEMAZEPAM 15 MG/1
15 CAPSULE ORAL
Status: DISCONTINUED | OUTPATIENT
Start: 2023-12-03 | End: 2023-12-04 | Stop reason: HOSPADM

## 2023-12-03 RX ORDER — HYDROMORPHONE HCL/PF 1 MG/ML
0.5 SYRINGE (ML) INJECTION ONCE
Status: COMPLETED | OUTPATIENT
Start: 2023-12-03 | End: 2023-12-03

## 2023-12-03 RX ORDER — METOPROLOL SUCCINATE 25 MG/1
25 TABLET, EXTENDED RELEASE ORAL DAILY
Status: DISCONTINUED | OUTPATIENT
Start: 2023-12-03 | End: 2023-12-04 | Stop reason: HOSPADM

## 2023-12-03 RX ORDER — FLUTICASONE PROPIONATE 50 MCG
2 SPRAY, SUSPENSION (ML) NASAL DAILY
Status: DISCONTINUED | OUTPATIENT
Start: 2023-12-03 | End: 2023-12-04 | Stop reason: HOSPADM

## 2023-12-03 RX ORDER — ROCURONIUM BROMIDE 10 MG/ML
INJECTION, SOLUTION INTRAVENOUS AS NEEDED
Status: DISCONTINUED | OUTPATIENT
Start: 2023-12-03 | End: 2023-12-03

## 2023-12-03 RX ORDER — HEPARIN SODIUM 5000 [USP'U]/ML
5000 INJECTION, SOLUTION INTRAVENOUS; SUBCUTANEOUS EVERY 8 HOURS SCHEDULED
Status: DISCONTINUED | OUTPATIENT
Start: 2023-12-03 | End: 2023-12-04 | Stop reason: HOSPADM

## 2023-12-03 RX ORDER — ONDANSETRON 2 MG/ML
INJECTION INTRAMUSCULAR; INTRAVENOUS AS NEEDED
Status: DISCONTINUED | OUTPATIENT
Start: 2023-12-03 | End: 2023-12-03

## 2023-12-03 RX ORDER — HYDROXYZINE HYDROCHLORIDE 25 MG/1
25 TABLET, FILM COATED ORAL EVERY 6 HOURS PRN
Status: DISCONTINUED | OUTPATIENT
Start: 2023-12-03 | End: 2023-12-04 | Stop reason: HOSPADM

## 2023-12-03 RX ORDER — MAGNESIUM HYDROXIDE 1200 MG/15ML
LIQUID ORAL AS NEEDED
Status: DISCONTINUED | OUTPATIENT
Start: 2023-12-03 | End: 2023-12-03 | Stop reason: HOSPADM

## 2023-12-03 RX ORDER — FENTANYL CITRATE/PF 50 MCG/ML
50 SYRINGE (ML) INJECTION
Status: DISCONTINUED | OUTPATIENT
Start: 2023-12-03 | End: 2023-12-03 | Stop reason: HOSPADM

## 2023-12-03 RX ORDER — PANTOPRAZOLE SODIUM 40 MG/1
40 TABLET, DELAYED RELEASE ORAL
Status: DISCONTINUED | OUTPATIENT
Start: 2023-12-03 | End: 2023-12-04 | Stop reason: HOSPADM

## 2023-12-03 RX ADMIN — FLUTICASONE PROPIONATE 2 SPRAY: 50 SPRAY, METERED NASAL at 08:56

## 2023-12-03 RX ADMIN — TRAZODONE HYDROCHLORIDE 100 MG: 100 TABLET ORAL at 21:25

## 2023-12-03 RX ADMIN — PROPOFOL 150 MG: 10 INJECTION, EMULSION INTRAVENOUS at 12:43

## 2023-12-03 RX ADMIN — CEFAZOLIN SODIUM 1000 MG: 1 SOLUTION INTRAVENOUS at 12:46

## 2023-12-03 RX ADMIN — FENTANYL CITRATE 25 MCG: 50 INJECTION INTRAMUSCULAR; INTRAVENOUS at 13:37

## 2023-12-03 RX ADMIN — ROCURONIUM BROMIDE 50 MG: 10 INJECTION, SOLUTION INTRAVENOUS at 12:50

## 2023-12-03 RX ADMIN — Medication 100 MG: at 12:43

## 2023-12-03 RX ADMIN — PIPERACILLIN AND TAZOBACTAM 3.38 G: 36; 4.5 INJECTION, POWDER, FOR SOLUTION INTRAVENOUS at 15:03

## 2023-12-03 RX ADMIN — TOPIRAMATE 25 MG: 25 TABLET, FILM COATED ORAL at 17:37

## 2023-12-03 RX ADMIN — CLOZAPINE 100 MG: 100 TABLET ORAL at 17:37

## 2023-12-03 RX ADMIN — SODIUM CHLORIDE, SODIUM LACTATE, POTASSIUM CHLORIDE, AND CALCIUM CHLORIDE: .6; .31; .03; .02 INJECTION, SOLUTION INTRAVENOUS at 12:36

## 2023-12-03 RX ADMIN — HEPARIN SODIUM 5000 UNITS: 5000 INJECTION INTRAVENOUS; SUBCUTANEOUS at 12:04

## 2023-12-03 RX ADMIN — CARBIDOPA AND LEVODOPA 1 TABLET: 25; 100 TABLET ORAL at 17:37

## 2023-12-03 RX ADMIN — METOROPROLOL TARTRATE 1 MG: 5 INJECTION, SOLUTION INTRAVENOUS at 13:12

## 2023-12-03 RX ADMIN — HEPARIN SODIUM 5000 UNITS: 5000 INJECTION INTRAVENOUS; SUBCUTANEOUS at 21:24

## 2023-12-03 RX ADMIN — LIDOCAINE HYDROCHLORIDE 50 MG: 10 INJECTION, SOLUTION EPIDURAL; INFILTRATION; INTRACAUDAL at 12:43

## 2023-12-03 RX ADMIN — METOROPROLOL TARTRATE 1 MG: 5 INJECTION, SOLUTION INTRAVENOUS at 13:06

## 2023-12-03 RX ADMIN — PIPERACILLIN AND TAZOBACTAM 3.38 G: 36; 4.5 INJECTION, POWDER, FOR SOLUTION INTRAVENOUS at 08:55

## 2023-12-03 RX ADMIN — SUGAMMADEX 200 MG: 100 INJECTION, SOLUTION INTRAVENOUS at 13:35

## 2023-12-03 RX ADMIN — CLOZAPINE 200 MG: 100 TABLET ORAL at 21:23

## 2023-12-03 RX ADMIN — PANTOPRAZOLE SODIUM 40 MG: 40 TABLET, DELAYED RELEASE ORAL at 06:45

## 2023-12-03 RX ADMIN — PIPERACILLIN AND TAZOBACTAM 3.38 G: 36; 4.5 INJECTION, POWDER, FOR SOLUTION INTRAVENOUS at 02:06

## 2023-12-03 RX ADMIN — VANCOMYCIN HYDROCHLORIDE 1 G: 1 INJECTION, POWDER, LYOPHILIZED, FOR SOLUTION INTRAVENOUS at 12:59

## 2023-12-03 RX ADMIN — DEXAMETHASONE SODIUM PHOSPHATE 10 MG: 10 INJECTION, SOLUTION INTRAMUSCULAR; INTRAVENOUS at 12:46

## 2023-12-03 RX ADMIN — HYDROMORPHONE HYDROCHLORIDE 0.5 MG: 1 INJECTION, SOLUTION INTRAMUSCULAR; INTRAVENOUS; SUBCUTANEOUS at 03:10

## 2023-12-03 RX ADMIN — TEMAZEPAM 15 MG: 15 CAPSULE ORAL at 21:24

## 2023-12-03 RX ADMIN — SODIUM CHLORIDE, SODIUM LACTATE, POTASSIUM CHLORIDE, AND CALCIUM CHLORIDE 100 ML/HR: .6; .31; .03; .02 INJECTION, SOLUTION INTRAVENOUS at 14:37

## 2023-12-03 RX ADMIN — HYDROMORPHONE HYDROCHLORIDE 0.5 MG: 1 INJECTION, SOLUTION INTRAMUSCULAR; INTRAVENOUS; SUBCUTANEOUS at 01:03

## 2023-12-03 RX ADMIN — FENTANYL CITRATE 25 MCG: 50 INJECTION INTRAMUSCULAR; INTRAVENOUS at 13:35

## 2023-12-03 RX ADMIN — ATORVASTATIN CALCIUM 40 MG: 40 TABLET, FILM COATED ORAL at 17:37

## 2023-12-03 RX ADMIN — Medication 100 MCG: at 12:52

## 2023-12-03 RX ADMIN — TAMSULOSIN HYDROCHLORIDE 0.4 MG: 0.4 CAPSULE ORAL at 21:24

## 2023-12-03 RX ADMIN — FENTANYL CITRATE 100 MCG: 50 INJECTION INTRAMUSCULAR; INTRAVENOUS at 12:43

## 2023-12-03 RX ADMIN — ONDANSETRON 4 MG: 2 INJECTION INTRAMUSCULAR; INTRAVENOUS at 13:35

## 2023-12-03 RX ADMIN — IOHEXOL 100 ML: 350 INJECTION, SOLUTION INTRAVENOUS at 00:50

## 2023-12-03 RX ADMIN — CARBIDOPA AND LEVODOPA 1 TABLET: 25; 100 TABLET ORAL at 21:30

## 2023-12-03 RX ADMIN — PIPERACILLIN AND TAZOBACTAM 3.38 G: 36; 4.5 INJECTION, POWDER, FOR SOLUTION INTRAVENOUS at 21:32

## 2023-12-03 RX ADMIN — ALBUTEROL SULFATE 8 PUFF: 90 AEROSOL, METERED RESPIRATORY (INHALATION) at 13:16

## 2023-12-03 RX ADMIN — SODIUM CHLORIDE, SODIUM LACTATE, POTASSIUM CHLORIDE, AND CALCIUM CHLORIDE 75 ML/HR: .6; .31; .03; .02 INJECTION, SOLUTION INTRAVENOUS at 03:01

## 2023-12-03 NOTE — H&P
[address]  H&P  Name: Cristina Briones 68 y.o. male I MRN: [id number]  Unit/Bed#: ED 24 I Date of Admission: 12/2/2023   Date of Service: 12/3/2023 I Hospital Day: 0      Assessment/Plan   * Cholecystitis  Assessment & Plan  Patient reports development of epigastric abd pain with associated N/V after eating dinner tonight. He resides at [facility] and reports staff there gave him "radiation sickness" after they microwaved his ensure drink. After ED interventions he reports improvement in his pain and resolution of his N/V, and otherwise denies other acute symptom/complaint at this time. /79 (BP Location: Left arm)   Pulse 67   Temp 97.8 °F (36.6 °C) (Oral)   Resp 22   SpO2 99%     CT abd/pelvis showing cholelithiasis w/ mild GB wall edema concerning for possible cholecystitis as well as indeterminate hepatic lesions which may represent abscesses vs metastatic disease  Not meeting SEPSIS criteria on admission  Alk phos 124; other LFT's WNL  Received IV zosyn in ED   Discussed w/ ED provider; to admit for further gen surg and GI consultation  Continue IV zosyn  Will keep NPO overnight  PRN pain control  GI and general surgery consulted; recommendations appreciated     Parkinson disease  Assessment & Plan  Continue home sinemet    Paranoid schizophrenia ([address])  Assessment & Plan  Continue home trazosone, restoril, atarax, and  Home austedo will need to be brought in    Hypertension  Assessment & Plan  /79 on admission  Continue home metoprolol  Monitor BP per unit protocol          VTE Pharmacologic Prophylaxis: VTE Score: 3 Moderate Risk (Score 3-4) - Pharmacological DVT Prophylaxis Ordered: heparin. Code Status: Prior   Discussion with family:  update in Am. Anticipated Length of Stay: Patient will be admitted on an inpatient basis with an anticipated length of stay of greater than 2 midnights secondary to cholecystis with possible liver abscesses.     Chief Complaint: abdominal pain    History of Present Illness: Tona Gowers is a 68 y.o. male with a PMH of schizoaffective disorder, parkinsons, and HTN who presents with abdominal pain. Patient reports development of epigastric abd pain with associated N/V after eating dinner tonight. He resides at [facility] and reports staff there gave him "radiation sickness" after they microwaved his ensure drink. After ED interventions he reports improvement in his pain and resolution of his N/V, and otherwise denies other acute symptom/complaint at this time. All questions answered at the bedside to the best of my ability. Review of Systems:  Review of Systems   Constitutional:  Negative for chills, fatigue and fever. HENT:  Negative for congestion, trouble swallowing and voice change. Respiratory:  Negative for shortness of breath and wheezing. Cardiovascular:  Negative for chest pain, palpitations and leg swelling. Gastrointestinal:  Positive for abdominal pain (epigastric; improved), nausea (resolved) and vomiting (resolved). Genitourinary:  Negative for difficulty urinating, dysuria, flank pain, frequency and hematuria. Skin:  Negative for rash and wound. Neurological:  Negative for weakness, light-headedness, numbness and headaches. Past Medical and Surgical History:   Past Medical History:   Diagnosis Date    Asthma     Benign prostatic hyperplasia     COPD (chronic obstructive pulmonary disease) ([facility])     GERD (gastroesophageal reflux disease)     Herpes zoster without complication 27/19/5634    Hypercholesteremia     Hypertension     Neuroleptic induced parkinsonism      Paranoid schizophrenia ([address] [facility])     Psychiatric disorder        Past Surgical History:   Procedure Laterality Date    CATARACT EXTRACTION      COLONOSCOPY         Meds/Allergies:  Prior to Admission medications    Medication Sig Start Date End Date Taking?  Authorizing Provider   acetaminophen (Acetaminophen Extra Strength) 500 mg tablet Take 1 tablet (500 mg total) by mouth every 6 (six) hours as needed for mild pain 3/13/23   Marcushemena Curling, CRNP   atorvastatin (LIPITOR) 40 mg tablet Take 1 tablet (40 mg total) by mouth daily 3/13/23   Marcushemena Curling, CRNP   Austedo 12 MG TABS  11/17/23   Historical Provider, MD   carbidopa-levodopa (SINEMET)  mg per tablet Take 1 tablet by mouth 3 (three) times a day 3/2/23   Tatianna Zhou MD   Cholecalciferol (Vitamin D High Potency) 25 MCG (1000 UT) capsule Take 1 capsule (1,000 Units total) by mouth daily 3/13/23   Marcushemena Curling, CRNP   cloZAPine (CLOZARIL) 100 mg tablet Take 100 mg by mouth 2 (two) times a day    Historical Provider, MD   clozapine (CLOZARIL) 200 MG tablet Take 200 mg by mouth daily at bedtime  4/8/19   Historical Provider, MD   fluticasone (FLONASE) 50 mcg/act nasal spray 2 sprays into each nostril daily 3/13/23   Marcushemena Curling, CRNP   hydrOXYzine HCL (ATARAX) 25 mg tablet Take 1 tablet (25 mg total) by mouth every 6 (six) hours as needed for itching 4/26/23   LIUDMILA Saenz   metoprolol succinate (TOPROL-XL) 25 mg 24 hr tablet Take 1 tablet (25 mg total) by mouth daily 3/13/23   Marcushemena Curling, CRNP   Nutritional Supplements (Ensure High Protein) LIQD Take 237 mL by mouth 2 (two) times a day 10/24/23 11/23/23  Kely Herr MD   omeprazole (PriLOSEC) 40 MG capsule Take 1 capsule (40 mg total) by mouth daily 3/13/23   Marcushemena Curling, CRNP   polyethylene glycol (MIRALAX) 17 g packet Take 17 g by mouth daily 5/12/23   LIUDMILA Saenz   tamsulosin [facility]) 0.4 mg Take 1 capsule (0.4 mg total) by mouth daily at bedtime 3/13/23   Marcushemena Curling, CRNP   temazepam (RESTORIL) 15 mg capsule Take 15 mg by mouth daily at bedtime    Historical Provider, MD   topiramate (TOPAMAX) 25 mg tablet Take 25 mg by mouth 2 (two) times a day 7/1/19   Historical Provider, MD   traZODone (DESYREL) 100 mg tablet Take 100 mg by mouth daily at bedtime 19   Historical Provider, MD CHAN have reviewed home medications using recent Epic encounter. Allergies: No Known Allergies    Social History:  Marital Status: Single   Occupation: N/A  Patient Pre-hospital Level of Mobility: walks  Patient Pre-hospital Diet Restrictions: None  Substance Use History:   Social History     Substance and Sexual Activity   Alcohol Use Not Currently     Social History     Tobacco Use   Smoking Status Former    Packs/day: 1.00    Years: 20.00    Total pack years: 20.00    Types: Cigarettes    Quit date:     Years since quittin.9   Smokeless Tobacco Never     Social History     Substance and Sexual Activity   Drug Use Never       Family History:  Family History   Problem Relation Age of Onset    No Known Problems Mother     No Known Problems Father     Parkinsonism Son        Physical Exam:     Vitals:   Blood Pressure: 156/74 (23)  Pulse: 72 (23)  Temperature: 97.8 °F (36.6 °C) (23)  Temp Source: Oral (23)  Respirations: 15 (23)  SpO2: 96 % (23)    Physical Exam  Vitals and nursing note reviewed. Constitutional:       General: He is not in acute distress. Appearance: Normal appearance. HENT:      Head: Normocephalic and atraumatic. Right Ear: External ear normal.      Left Ear: External ear normal.      Nose: Nose normal.      Mouth/Throat:      Mouth: Mucous membranes are moist.   Eyes:      Pupils: Pupils are equal, round, and reactive to light. Cardiovascular:      Rate and Rhythm: Normal rate and regular rhythm. Pulses: Normal pulses. Heart sounds: Normal heart sounds. No murmur heard. Pulmonary:      Effort: Pulmonary effort is normal. No respiratory distress. Breath sounds: Normal breath sounds. No wheezing or rales. Chest:      Chest wall: No tenderness. Abdominal:      General: Bowel sounds are normal. There is no distension.       Palpations: Abdomen is soft. There is no mass. Tenderness: There is abdominal tenderness (epigastric/RUQ). There is no guarding. Musculoskeletal:         General: No swelling or tenderness. Cervical back: Normal range of motion and neck supple. No rigidity or tenderness. Right lower leg: No edema. Left lower leg: No edema. Skin:     General: Skin is warm and dry. Capillary Refill: Capillary refill takes less than 2 seconds. Findings: No lesion or rash. Neurological:      General: No focal deficit present. Mental Status: He is alert. Psychiatric:         Mood and Affect: Mood normal.          Additional Data:     Lab Results:  Results from last 7 days   Lab Units 12/02/23 2059   WBC Thousand/uL 5.94   HEMOGLOBIN g/dL 13.2   HEMATOCRIT % 39.2   PLATELETS Thousands/uL 168   NEUTROS PCT % 49   LYMPHS PCT % 35   MONOS PCT % 11   EOS PCT % 4     Results from last 7 days   Lab Units 12/02/23 2059   SODIUM mmol/L 140   POTASSIUM mmol/L 3.9   CHLORIDE mmol/L 108   CO2 mmol/L 29   BUN mg/dL 23   CREATININE mg/dL 0.86   ANION GAP mmol/L 3   CALCIUM mg/dL 8.8   ALBUMIN g/dL 3.8   TOTAL BILIRUBIN mg/dL 0.41   ALK PHOS U/L 124*   ALT U/L 25   AST U/L 24   GLUCOSE RANDOM mg/dL 98                       Lines/Drains:  Invasive Devices       Peripheral Intravenous Line  Duration             Peripheral IV 12/02/23 Right;Upper Forearm <1 day                        Imaging: Reviewed radiology reports from this admission including: abdominal/pelvic CT  CT abdomen pelvis with contrast   Final Result by Heather Webb DO (12/03 0141)      Indeterminate hepatic lesions. Given that these were not present definitively on prior studies, this raises suspicion for hepatic abscesses. Metastatic disease not excluded. Consider evaluation with follow-up MRI with and without contrast.      Cholelithiasis and mild gallbladder wall edema.  Correlate for acute cholecystitis         I personally discussed this study with Marjorie Wiggins on 12/3/2023 1:40 AM.            Workstation performed: [id number]             EKG and Other Studies Reviewed on Admission:   EKG:  EKG ordered . ** Please Note: This note has been constructed using a voice recognition system.  **

## 2023-12-03 NOTE — ANESTHESIA PREPROCEDURE EVALUATION
Procedure  CHOLECYSTECTOMY LAPAROSCOPIC, possible open (Abdomen)     Review of Systems/Medical History  Patient summary reviewed. Chart reviewed. No history of anesthetic complications     Cardiovascular  EKG reviewed. Exercise tolerance (METS): >4. Hyperlipidemia, Hypertension on > 1 medication PVD   Pulmonary   COPD moderate- medication dependent        GI/Hepatic     GERD well controlled            Endo/Other     GYN       Hematology   Musculoskeletal       Neurology   Psychology    No depression , Schizophrenia                Physical Exam    Airway  Comment: Marta Masters present  Mallampati score: II  TM Distance: >3 FB  Neck ROM: full     Dental    upper dentures,     Cardiovascular  Rhythm: regular, Cardiovascular exam normal    Pulmonary  Pulmonary exam normal Breath sounds clear to auscultation    Other Findings        Anesthesia Plan  ASA Score- 3     Anesthesia Type- IV sedation with anesthesia with ASA Monitors. Additional Monitors:     Airway Plan:            Plan Factors-Exercise tolerance (METS): >4.    Chart reviewed. EKG reviewed. Patient summary reviewed. Induction- intravenous. Postoperative Plan-     Informed Consent- Anesthetic plan and risks discussed with patient. I personally reviewed this patient with the CRNA. Discussed and agreed on the Anesthesia Plan with the CRNA. Mague Bowser

## 2023-12-03 NOTE — ED NOTES
Patient transported to [address] [place] [address], [address] [place] [address]  12/03/23 1928

## 2023-12-03 NOTE — CONSULTS
Consult- General Surgery   Talia Babcock 68 y.o. male MRN: [id number]  Unit/Bed#: ED 24 Encounter: [id number]      Assessment/Plan     Talia Babcock is a 68 y.o. male with epigastric abdominal pain. AVSS, no leukocytosis, liver enzymes and T bilirubin within normal limits, remainder of labs unremarkable. CTAP: Indeterminate hepatic lesions. Given that these were not present definitively on prior studies, this raises suspicion for hepatic abscesses. Metastatic disease not excluded. Consider evaluation with follow-up MRI with and without contrast.  Cholelithiasis and mild gallbladder wall edema. Correlate for acute cholecystitis    Plan:  OR today for laparoscopic cholecystectomy  N.p.o.  IV fluids  Antibiotics on-call the OR  Monitor abdominal exam, labs, vitals  PRN pain medication and anti-emetics  Encourage ambulation  DVT ppx: heparin  Incentive spirometry 10 times/hour while awake  Continue home medications as prescribed   Remainder of care per primary team-[facility]  General surgery will continue to follow. History of Present Illness     HPI:  Talia Babcock is a 68 y.o. male with a PMH of BPH, COPD, GERD, hyperlipidemia, hypertension, paranoid schizophrenia, neuroleptic induced parkinsonism who presents with epigastric abdominal pain, nausea, vomiting after eating dinner last night. Patient reports the epigastric and right upper quadrant abdominal pain began last night after eating dinner. He reports associated nausea and multiple episodes of clear emesis. Reports a history of similar symptoms over a year ago. Denies fever but reports chills last night. The patient denies CP, SOB, palpitations, headache, fever, chills, unintentional weight loss, night sweats, constipation, diarrhea. Review of Systems   Constitutional:  Positive for chills. Negative for fever. HENT:  Negative for ear pain and sore throat. Eyes:  Negative for pain and visual disturbance.    Respiratory:  Negative for cough and shortness of breath. Cardiovascular:  Negative for chest pain and palpitations. Gastrointestinal:  Positive for abdominal pain, nausea and vomiting. Negative for blood in stool, constipation and diarrhea. Genitourinary:  Negative for dysuria and hematuria. Musculoskeletal:  Negative for arthralgias and back pain. Skin:  Negative for color change and rash. Neurological:  Negative for seizures and syncope. All other systems reviewed and are negative.       Historical Information   Past Medical History:   Diagnosis Date    Asthma     Benign prostatic hyperplasia     COPD (chronic obstructive pulmonary disease) ([facility])     GERD (gastroesophageal reflux disease)     Herpes zoster without complication     Hypercholesteremia     Hypertension     Neuroleptic induced parkinsonism      Paranoid schizophrenia ([facility])     Psychiatric disorder      Past Surgical History:   Procedure Laterality Date    CATARACT EXTRACTION      COLONOSCOPY       Social History   Social History     Substance and Sexual Activity   Alcohol Use Not Currently     Social History     Substance and Sexual Activity   Drug Use Never     Social History     Tobacco Use   Smoking Status Former    Packs/day: 1.00    Years: 20.00    Total pack years: 20.00    Types: Cigarettes    Quit date:     Years since quittin.9   Smokeless Tobacco Never     Family History: non-contributory    Meds/Allergies   all medications and allergies reviewed  No Known Allergies    Objective   First Vitals:   Blood Pressure: (!) 182/88 (23)  Pulse: 70 (23)  Temperature: 97.8 °F (36.6 °C) (23)  Temp Source: Oral (23)  Respirations: 18 (23)  SpO2: 94 % (23)    Current Vitals:   Blood Pressure: 137/69 (23 0800)  Pulse: 88 (23 08)  Temperature: 97.8 °F (36.6 °C) (23)  Temp Source: Oral (23)  Respirations: 17 (23 08)  SpO2: 99 % (23 0800)      Intake/Output Summary (Last 24 hours) at 12/3/2023 0853  Last data filed at 12/3/2023 0236  Gross per 24 hour   Intake 100 ml   Output --   Net 100 ml       Invasive Devices       Peripheral Intravenous Line  Duration             Peripheral IV 12/02/23 Right;Upper Forearm <1 day                    Physical Exam  Vitals reviewed. Constitutional:       General: He is not in acute distress. Appearance: Normal appearance. He is not ill-appearing or toxic-appearing. HENT:      Head: Normocephalic and atraumatic. Right Ear: External ear normal.      Left Ear: External ear normal.      Nose: Nose normal.      Mouth/Throat:      Mouth: Mucous membranes are moist.   Eyes:      General: No scleral icterus. Right eye: No discharge. Left eye: No discharge. Conjunctiva/sclera: Conjunctivae normal.   Cardiovascular:      Rate and Rhythm: Normal rate and regular rhythm. Pulmonary:      Effort: Pulmonary effort is normal. No respiratory distress. Abdominal:      General: There is no distension. Palpations: Abdomen is soft. Tenderness: There is abdominal tenderness (RUQ and epigastric, +aguillon sign). There is no guarding. Musculoskeletal:         General: Normal range of motion. Cervical back: Normal range of motion. Skin:     General: Skin is warm. Coloration: Skin is not jaundiced. Neurological:      General: No focal deficit present. Mental Status: He is alert and oriented to person, place, and time. Psychiatric:         Mood and Affect: Mood normal.         Behavior: Behavior normal.         Thought Content: Thought content normal.         Judgment: Judgment normal.         Lab Results: I have personally reviewed pertinent lab results.     Recent Results (from the past 36 hour(s))   CBC and differential    Collection Time: 12/02/23  8:59 PM   Result Value Ref Range    WBC 5.94 4.31 - 10.16 Thousand/uL    RBC 3.95 3.88 - 5.62 Million/uL    Hemoglobin 13.2 12.0 - 17.0 g/dL    Hematocrit 39.2 36.5 - 49.3 %    MCV 99 (H) 82 - 98 fL    MCH 33.4 26.8 - 34.3 pg    MCHC 33.7 31.4 - 37.4 g/dL    RDW 14.0 11.6 - 15.1 %    MPV 8.9 8.9 - 12.7 fL    Platelets 838 030 - 549 Thousands/uL    nRBC 0 /100 WBCs    Neutrophils Relative 49 43 - 75 %    Immat GRANS % 0 0 - 2 %    Lymphocytes Relative 35 14 - 44 %    Monocytes Relative 11 4 - 12 %    Eosinophils Relative 4 0 - 6 %    Basophils Relative 1 0 - 1 %    Neutrophils Absolute 2.93 1.85 - 7.62 Thousands/µL    Immature Grans Absolute 0.01 0.00 - 0.20 Thousand/uL    Lymphocytes Absolute 2.08 0.60 - 4.47 Thousands/µL    Monocytes Absolute 0.65 0.17 - 1.22 Thousand/µL    Eosinophils Absolute 0.23 0.00 - 0.61 Thousand/µL    Basophils Absolute 0.04 0.00 - 0.10 Thousands/µL   Comprehensive metabolic panel    Collection Time: 12/02/23  8:59 PM   Result Value Ref Range    Sodium 140 135 - 147 mmol/L    Potassium 3.9 3.5 - 5.3 mmol/L    Chloride 108 96 - 108 mmol/L    CO2 29 21 - 32 mmol/L    ANION GAP 3 mmol/L    BUN 23 5 - 25 mg/dL    Creatinine 0.86 0.60 - 1.30 mg/dL    Glucose 98 65 - 140 mg/dL    Calcium 8.8 8.4 - 10.2 mg/dL    AST 24 13 - 39 U/L    ALT 25 7 - 52 U/L    Alkaline Phosphatase 124 (H) 34 - 104 U/L    Total Protein 6.6 6.4 - 8.4 g/dL    Albumin 3.8 3.5 - 5.0 g/dL    Total Bilirubin 0.41 0.20 - 1.00 mg/dL    eGFR 86 ml/min/1.73sq m   Lipase    Collection Time: 12/02/23  8:59 PM   Result Value Ref Range    Lipase 47 11 - 82 u/L   ECG 12 lead    Collection Time: 12/03/23  2:03 AM   Result Value Ref Range    Ventricular Rate 75 BPM    Atrial Rate 75 BPM    VT Interval 190 ms    QRSD Interval 98 ms    QT Interval 430 ms    QTC Interval 480 ms    P [place] 49 degrees    QRS Axis 40 degrees    T Wave Axis 29 degrees     Imaging: I have personally reviewed pertinent reports. CT abdomen pelvis with contrast    Result Date: 12/3/2023  Impression: Indeterminate hepatic lesions.  Given that these were not present definitively on prior studies, this raises suspicion for hepatic abscesses. Metastatic disease not excluded. Consider evaluation with follow-up MRI with and without contrast. Cholelithiasis and mild gallbladder wall edema. Correlate for acute cholecystitis I personally discussed this study with Nikolay Curtis on 12/3/2023 1:40 AM. Workstation performed: [id number]       EKG, Pathology, and Other Studies: I have personally reviewed pertinent reports.

## 2023-12-03 NOTE — ED PROVIDER NOTES
History  Chief Complaint   Patient presents with    Abdominal Pain     Pt arrives EMS from [facility] c/o abd pain and "radiation sickness" after the staff there microwaved his Ensure drink. Hx of schizoaffective and bipolar      79-year-old male history of hypertension, GERD, schizophrenia presenting with abdominal pain nausea vomiting. Patient ate dinner tonight and had some epigastric pain and nausea vomiting. Some mild epigastric pain and nausea at this time. Denies any chest pain shortness of breath. Denies any diarrhea. Reports feels like usual GERD. Denies any other complaints. Chart reviewed. 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 Taking?    Austedo 12 MG TABS   Yes No   Cholecalciferol (Vitamin D High Potency) 25 MCG (1000 UT) capsule  Care Giver No No   Sig: Take 1 capsule (1,000 Units total) by mouth daily   Nutritional Supplements (Ensure High Protein) LIQD   No No   Sig: Take 237 mL by mouth 2 (two) times a day   acetaminophen (Acetaminophen Extra Strength) 500 mg tablet  Care Giver No No   Sig: Take 1 tablet (500 mg total) by mouth every 6 (six) hours as needed for mild pain   atorvastatin (LIPITOR) 40 mg tablet  Care Giver No No   Sig: Take 1 tablet (40 mg total) by mouth daily   carbidopa-levodopa (SINEMET)  mg per tablet  Care Giver No No   Sig: Take 1 tablet by mouth 3 (three) times a day   cloZAPine (CLOZARIL) 100 mg tablet  Care Giver Yes No   Sig: Take 100 mg by mouth 2 (two) times a day   clozapine (CLOZARIL) 200 MG tablet  Care Giver Yes No   Sig: Take 200 mg by mouth daily at bedtime    fluticasone (FLONASE) 50 mcg/act nasal spray  Care Giver No No   Si sprays into each nostril daily   hydrOXYzine HCL (ATARAX) 25 mg tablet  Care Giver No No   Sig: Take 1 tablet (25 mg total) by mouth every 6 (six) hours as needed for itching   metoprolol succinate (TOPROL-XL) 25 mg 24 hr tablet  Care Giver No No   Sig: Take 1 tablet (25 mg total) by mouth daily   omeprazole (PriLOSEC) 40 MG capsule  Care Giver No No   Sig: Take 1 capsule (40 mg total) by mouth daily   polyethylene glycol (MIRALAX) 17 g packet  Care Giver No No   Sig: Take 17 g by mouth daily   tamsulosin (FLOMAX) 0.4 mg  Care Giver No No   Sig: Take 1 capsule (0.4 mg total) by mouth daily at bedtime   temazepam (RESTORIL) 15 mg capsule  Care Giver Yes No   Sig: Take 15 mg by mouth daily at bedtime   topiramate (TOPAMAX) 25 mg tablet  Care Giver Yes No   Sig: Take 25 mg by mouth 2 (two) times a day   traZODone (DESYREL) 100 mg tablet  Care Giver Yes No   Sig: Take 100 mg by mouth daily at bedtime      Facility-Administered Medications: None       Past Medical History:   Diagnosis Date    Asthma     Benign prostatic hyperplasia     COPD (chronic obstructive pulmonary disease) ([facility])     GERD (gastroesophageal reflux disease)     Herpes zoster without complication     Hypercholesteremia     Hypertension     Neuroleptic induced parkinsonism      Paranoid schizophrenia ([address] [facility])     Psychiatric disorder        Past Surgical History:   Procedure Laterality Date    CATARACT EXTRACTION      COLONOSCOPY         Family History   Problem Relation Age of Onset    No Known Problems Mother     No Known Problems Father     Parkinsonism Son      I have reviewed and agree with the history as documented.     E-Cigarette/Vaping    E-Cigarette Use Never User      E-Cigarette/Vaping Substances    Nicotine No     THC No     CBD No     Flavoring No Other No     Unknown No      Social History     Tobacco Use    Smoking status: Former     Packs/day: 1.00     Years: 20.00     Total pack years: 20.00     Types: Cigarettes     Quit date:      Years since quittin.9    Smokeless tobacco: Never   Vaping Use    Vaping Use: Never used   Substance Use Topics    Alcohol use: Not Currently    Drug use: Never       Review of Systems   Constitutional:  Negative for appetite change, chills, diaphoresis, fever and unexpected weight change. HENT:  Negative for congestion and rhinorrhea. Eyes:  Negative for photophobia and visual disturbance. Respiratory:  Negative for cough, chest tightness and shortness of breath. Cardiovascular:  Negative for chest pain, palpitations and leg swelling. Gastrointestinal:  Positive for abdominal pain, nausea and vomiting. Negative for abdominal distention, blood in stool, constipation and diarrhea. Genitourinary:  Negative for dysuria and hematuria. Musculoskeletal:  Negative for back pain, joint swelling, neck pain and neck stiffness. Skin:  Negative for color change, pallor, rash and wound. Neurological:  Negative for dizziness, syncope, weakness, light-headedness and headaches. Psychiatric/Behavioral:  Negative for agitation. All other systems reviewed and are negative. Physical Exam  Physical Exam  Vitals and nursing note reviewed. Constitutional:       General: He is not in acute distress. Appearance: Normal appearance. He is well-developed. He is not ill-appearing, toxic-appearing or diaphoretic. HENT:      Head: Normocephalic and atraumatic. Nose: Nose normal. No congestion or rhinorrhea. Mouth/Throat:      Mouth: Mucous membranes are moist.      Pharynx: Oropharynx is clear. No oropharyngeal exudate or posterior oropharyngeal erythema. Eyes:      General: No scleral icterus. Right eye: No discharge. Left eye: No discharge.       Extraocular Movements: Extraocular movements intact. Conjunctiva/sclera: Conjunctivae normal.      Pupils: Pupils are equal, round, and reactive to light. Neck:      Vascular: No JVD. Trachea: No tracheal deviation. Comments: Supple. Normal range of motion. Cardiovascular:      Rate and Rhythm: Normal rate and regular rhythm. Heart sounds: Normal heart sounds. No murmur heard. No friction rub. No gallop. Comments: Normal rate and regular rhythm  Pulmonary:      Effort: Pulmonary effort is normal. No respiratory distress. Breath sounds: Normal breath sounds. No stridor. No wheezing or rales. Comments: Clear to auscultation bilaterally  Chest:      Chest wall: No tenderness. Abdominal:      General: Bowel sounds are normal. There is no distension. Palpations: Abdomen is soft. Tenderness: There is abdominal tenderness in the epigastric area. There is no right CVA tenderness, left CVA tenderness, guarding or rebound. Comments: Mild epigastric tenderness without guarding. Otherwise soft and nondistended. Normal bowel sounds throughout   Musculoskeletal:         General: No swelling, tenderness, deformity or signs of injury. Normal range of motion. Cervical back: Normal range of motion and neck supple. No rigidity. No muscular tenderness. Right lower leg: No edema. Left lower leg: No edema. Lymphadenopathy:      Cervical: No cervical adenopathy. Skin:     General: Skin is warm and dry. Coloration: Skin is not pale. Findings: No erythema or rash. Neurological:      General: No focal deficit present. Mental Status: He is alert. Mental status is at baseline. Sensory: No sensory deficit. Motor: No weakness or abnormal muscle tone. Coordination: Coordination normal.      Gait: Gait normal.      Comments: Alert. Strength and sensation grossly intact. Ambulatory without difficulty at baseline.     Psychiatric:         Behavior: Behavior normal. Thought Content:  Thought content normal.         Vital Signs  ED Triage Vitals   Temperature Pulse Respirations Blood Pressure SpO2   12/02/23 2046 12/02/23 2045 12/02/23 2045 12/02/23 2045 12/02/23 2045   97.8 °F (36.6 °C) 70 18 (!) 182/88 94 %      Temp Source Heart Rate Source Patient Position - Orthostatic VS BP Location FiO2 (%)   12/02/23 2046 12/02/23 2045 12/03/23 0000 12/02/23 2045 --   Oral Monitor Lying Left arm       Pain Score       12/02/23 2201       10 - Worst Possible Pain           Vitals:    12/02/23 2100 12/02/23 2300 12/03/23 0000 12/03/23 0130   BP: (!) 177/85 165/80 164/79 156/74   Pulse: 68 63 67 72   Patient Position - Orthostatic VS:   Lying Lying         Visual Acuity      ED Medications  Medications   piperacillin-tazobactam (ZOSYN) 3.375 g in sodium chloride 0.9 % 100 mL IVPB (has no administration in time range)   sodium chloride 0.9 % bolus 500 mL (0 mL Intravenous Stopped 12/2/23 2304)   ondansetron (ZOFRAN) injection 4 mg (4 mg Intravenous Given 12/2/23 2058)   aluminum-magnesium hydroxide-simethicone (MAALOX) oral suspension 30 mL (30 mL Oral Given 12/2/23 2058)   fentanyl citrate (PF) 100 MCG/2ML 50 mcg (50 mcg Intravenous Given 12/2/23 2201)   Famotidine (PF) (PEPCID) injection 20 mg (20 mg Intravenous Given 12/2/23 2201)   ondansetron (ZOFRAN) injection 4 mg (4 mg Intravenous Given 12/2/23 2317)   HYDROmorphone (DILAUDID) injection 0.5 mg (0.5 mg Intravenous Given 12/2/23 2317)   HYDROmorphone (DILAUDID) injection 0.5 mg (0.5 mg Intravenous Given 12/3/23 0103)   iohexol (OMNIPAQUE) 350 MG/ML injection (MULTI-DOSE) 100 mL (100 mL Intravenous Given 12/3/23 0050)       Diagnostic Studies  Results Reviewed       Procedure Component Value Units Date/Time    Comprehensive metabolic panel [[id number]]  (Abnormal) Collected: 12/02/23 2059    Lab Status: Final result Specimen: Blood from Arm, Right Updated: 12/02/23 2124     Sodium 140 mmol/L      Potassium 3.9 mmol/L      Chloride 108 mmol/L      CO2 29 mmol/L      ANION GAP 3 mmol/L      BUN 23 mg/dL      Creatinine 0.86 mg/dL      Glucose 98 mg/dL      Calcium 8.8 mg/dL      AST 24 U/L      ALT 25 U/L      Alkaline Phosphatase 124 U/L      Total Protein 6.6 g/dL      Albumin 3.8 g/dL      Total Bilirubin 0.41 mg/dL      eGFR 86 ml/min/1.73sq m     Narrative:      National Kidney Disease Foundation guidelines for Chronic Kidney Disease (CKD):     Stage 1 with normal or high GFR (GFR > 90 mL/min/1.73 square meters)    Stage 2 Mild CKD (GFR = 60-89 mL/min/1.73 square meters)    Stage 3A Moderate CKD (GFR = 45-59 mL/min/1.73 square meters)    Stage 3B Moderate CKD (GFR = 30-44 mL/min/1.73 square meters)    Stage 4 Severe CKD (GFR = 15-29 mL/min/1.73 square meters)    Stage 5 End Stage CKD (GFR <15 mL/min/1.73 square meters)  Note: GFR calculation is accurate only with a steady state creatinine    Lipase [[id number]]  (Normal) Collected: 12/02/23 2059    Lab Status: Final result Specimen: Blood from Arm, Right Updated: 12/02/23 2124     Lipase 47 u/L     CBC and differential [[id number]]  (Abnormal) Collected: 12/02/23 2059    Lab Status: Final result Specimen: Blood from Arm, Right Updated: 12/02/23 2104     WBC 5.94 Thousand/uL      RBC 3.95 Million/uL      Hemoglobin 13.2 g/dL      Hematocrit 39.2 %      MCV 99 fL      MCH 33.4 pg      MCHC 33.7 g/dL      RDW 14.0 %      MPV 8.9 fL      Platelets 772 Thousands/uL      nRBC 0 /100 WBCs      Neutrophils Relative 49 %      Immat GRANS % 0 %      Lymphocytes Relative 35 %      Monocytes Relative 11 %      Eosinophils Relative 4 %      Basophils Relative 1 %      Neutrophils Absolute 2.93 Thousands/µL      Immature Grans Absolute 0.01 Thousand/uL      Lymphocytes Absolute 2.08 Thousands/µL      Monocytes Absolute 0.65 Thousand/µL      Eosinophils Absolute 0.23 Thousand/µL      Basophils Absolute 0.04 Thousands/µL                    CT abdomen pelvis with contrast   Final Result by Piero Reed,  (12/03 0141)      Indeterminate hepatic lesions. Given that these were not present definitively on prior studies, this raises suspicion for hepatic abscesses. Metastatic disease not excluded. Consider evaluation with follow-up MRI with and without contrast.      Cholelithiasis and mild gallbladder wall edema. Correlate for acute cholecystitis         I personally discussed this study with Corine Rodgers on 12/3/2023 1:40 AM.            Workstation performed: [id number]                    Procedures  Procedures         ED Course                                             Medical Decision Making  66-year-old male history of hypertension, GERD, schizophrenia presenting with abdominal pain nausea vomiting. Epigastric pain nausea vomiting after eating feels like previous GERD. Suspect likely GERD. Plan for symptom management with oral and IV medication. IV fluids. CT imaging. Labs including abdominal labs. Reassess. Labs there is significant acute process. Patient with recurring pain with additional improvement with IV medications. CT imaging concerning for possible cholecystitis and potentially liver abscesses given new lesions not previously seen on recent CT imaging. IV antibiotics. Surgery consult. Plan for further evaluation management inpatient. Admitted. Amount and/or Complexity of Data Reviewed  Labs: ordered. Radiology: ordered. Risk  OTC drugs. Prescription drug management. Decision regarding hospitalization.              Disposition  Final diagnoses:   Epigastric pain   Nausea and vomiting   Acute cholecystitis     Time reflects when diagnosis was documented in both MDM as applicable and the Disposition within this note       Time User Action Codes Description Comment    12/2/2023  8:56 PM Arelia Muta Add [R10.13] Epigastric pain     12/2/2023  8:56 PM Arelia Muta Add [R11.2] Nausea and vomiting     12/3/2023  1:41 AM Arelia Muta Add [K81.0] Acute cholecystitis     12/3/2023  1:41 AM Narciso Conway Modify [R10.13] Epigastric pain     12/3/2023  1:41 AM Narciso Conway Modify [K81.0] Acute cholecystitis           ED Disposition       ED Disposition   Admit    Condition   Stable    Date/Time   Sun Dec 3, 2023  1:56 AM    Comment   Case was discussed with GEORGE and the patient's admission status was agreed to be Admission Status: inpatient status to the service of Dr. Horace Vizcarra . Follow-up Information       Follow up With Specialties Details Why Contact Info    Yinka oJhnson MD Family Medicine Schedule an appointment as soon as possible for a visit in 1 week  [address] [place] [address],[address] [facility]  [phone]              Patient's Medications   Discharge Prescriptions    No medications on file       No discharge procedures on file.     PDMP Review         Value Time User    PDMP Reviewed  Yes 9/29/2023 11:21 PM LIUDMILA Pinedo            ED Provider  Electronically Signed by             Vaishali Barnett MD  12/03/23 0157

## 2023-12-03 NOTE — PLAN OF CARE

## 2023-12-03 NOTE — DISCHARGE INSTR - AVS FIRST PAGE
Follow up: Following discharge from the hospital call the office in 1-2 days to set up a post operative appointment to be seen in 2 weeks. [phone]  Call the office if you have increased pain not relieved with pain medicine. Call the office if you have a fever,redness, the wound opens up, you have pus draining from your incision. AFTER YOU LEAVE: Following discharge from the hospital, you may have some questions about your procedure, your activities or your general condition. These instructions may answer some of your questions and help you adjust during the first few days following your operation. You can expect to be sore and tender mostly around the incisions. This pain should last approximally 5 days and gradually improve daily. Incisions: You may apply ice to the incisions to help with pain. It is normal to have some bruising, swelling or mild discoloration around the incision. If increasing redness or pain develops, call our office immediately. Do not apply any creams, lotions, or ointments. If you have dressings: You may remove the gauze dressing from your incisions 48 hours after surgery. Underneath this dressing is a tape like dressing called Steri Strips. Leave the steri strips in place for 5-7 days. They may fall off on their own. This is okay. Bathing: You may shower daily with soap and water the day after the procedure. It is OK to GENTLY wash the incision with soap and water then pat dry. DO NOT SCRUB. Do NOT soak incision in a tub, pool, or hot tub for 2 weeks. Diet:   Resume your normal diet unless specified otherwise. We recommend you slowly advance your diet. Try to start with softer bland foods and gradually advance as tolerated. Be sure to consume plenty of water. Avoid alcohol. Activity/Restrictions: The evening following the procedure you should rest as much as possible, sitting, lying or reclining.   you should be sure someone remains with you until the next morning. Gradually increase your activity daily. Walking 3-4 times daily is good and stairs are ok. Listen to your body. If you start to get tired or sore then rest.   No strenuous activity or exercise for 3-4 weeks. No heavy lifting, pushing or pulling. No driving for 5 days or while taking narcotics for pain. Return or work: You may return to work or other activities as soon as your pain is controlled and you feel comfortable. For many people, this is 5 to 7 days after surgery. If your job requires heavy lifting you will need to be on light duty for 2-3 weeks. Medication:   If you were given a prescription for Percocet, Norco, or Vicodin for pain be sure to eat prior to taking as these medications as they may cause nausea and vomiting on an empty stomach. If you do not want to take stronger medications for pain, you may take Tylenol, Aleve OR Ibuprofen  DO NOT take Tylenol  (acetaminophen) with these medication for a fever or for further pain control as these medications already contain Tylenol in them. Take one or the other. Do not exceed more than 4000 mg of acetaminophen in 24 hours or 3000 mg if you have liver disease. If you were given an antibiotic take it until it is finished. Constipation:   Patients often experience constipation after surgery. You may take a stool softener (Colace) to help prevent constipation. If you experience significant nausea or vomiting after abdominal surgery, call the office before trying any stronger medications or laxatives  Call the office if you haven't had a Bowel movement in 2-3days after surgery    Contact your healthcare provider if:   You have a fever over 101°F (38°C) or chills. You have pain or nausea that is not relieved by medicine. You have redness and swelling around your incisions, or blood or pus is leaking from your incisions. You are constipated or have diarrhea.     Your skin or eyes are yellow, or your bowel movements are pale. You have questions or concerns about your surgery, condition, or care. Seek care immediately or call 911 if:   You cannot stop vomiting. Your bowel movements are black or bloody. You have pain in your abdomen and it is swollen or hard. Your arm or leg feels warm, tender, and painful. It may look swollen and red. You feel lightheaded, short of breath, and have chest pain. You cough up blood.

## 2023-12-03 NOTE — ASSESSMENT & PLAN NOTE
Patient reports development of epigastric abd pain with associated N/V after eating dinner tonight. He resides at [facility] and reports staff there gave him "radiation sickness" after they microwaved his ensure drink. After ED interventions he reports improvement in his pain and resolution of his N/V, and otherwise denies other acute symptom/complaint at this time.      /79 (BP Location: Left arm)   Pulse 67   Temp 97.8 °F (36.6 °C) (Oral)   Resp 22   SpO2 99%     CT abd/pelvis showing cholelithiasis w/ mild GB wall edema concerning for possible cholecystitis as well as indeterminate hepatic lesions which may represent abscesses vs metastatic disease  Not meeting SEPSIS criteria on admission  Alk phos 124; other LFT's WNL  Received IV zosyn in ED   Discussed w/ ED provider; to admit for further gen surg and GI consultation  Continue IV zosyn  Will keep NPO overnight  PRN pain control  GI and general surgery consulted; recommendations appreciated

## 2023-12-03 NOTE — ASSESSMENT & PLAN NOTE
Presented initially with epigastric pain with associated nausea and vomiting that occurred after eating dinner last night  CT scan revealed possible cholecystitis  Underwent cholecystectomy on 12/3  Tolerating full diet adequately  Medically clear for discharge appreciate surgery recommendations

## 2023-12-03 NOTE — CONSULTS
Consultation - [address] [facility]  Harl Code 68 y.o. male MRN: [id number]  Unit/Bed#: ED 24 Encounter: [id number]        Inpatient consult to gastroenterology  Consult performed by: Richelle Cuevas PA-C  Consult ordered by: Jose Nascimento PA-C          Reason for Consult / Principal Problem: Liver Lesions    HPI: Patricia Narvaez is a 69 yo M with a PMH of paranoid schizophrenia, Parkinson's, HTN, who presented yesterday from his group home with upper abdominal pain, nausea, and vomiting after eating dinner. He reports he had similar pain about 8 months ago. He denies any known fevers or chills associated with his symptoms.  He is feeling much better now with mild discomfort in the epigastric region and no further vomiting since early this AM.    REVIEW OF SYSTEMS: Negative except for as stated above    Historical Information   Past Medical History:   Diagnosis Date    Asthma     Benign prostatic hyperplasia     COPD (chronic obstructive pulmonary disease) (HCC)     GERD (gastroesophageal reflux disease)     Herpes zoster without complication     Hypercholesteremia     Hypertension     Neuroleptic induced parkinsonism      Paranoid schizophrenia ([facility])     Psychiatric disorder      Past Surgical History:   Procedure Laterality Date    CATARACT EXTRACTION      COLONOSCOPY       Social History   Social History     Substance and Sexual Activity   Alcohol Use Not Currently     Social History     Substance and Sexual Activity   Drug Use Never     Social History     Tobacco Use   Smoking Status Former    Packs/day: 1.00    Years: 20.00    Total pack years: 20.00    Types: Cigarettes    Quit date:     Years since quittin.9   Smokeless Tobacco Never     Family History   Problem Relation Age of Onset    No Known Problems Mother     No Known Problems Father     Parkinsonism Son        Meds/Allergies     (Not in a hospital admission)    Current Facility-Administered Medications   Medication Dose Route Frequency    atorvastatin (LIPITOR) tablet 40 mg  40 mg Oral Daily With Dinner    carbidopa-levodopa (SINEMET)  mg per tablet 1 tablet  1 tablet Oral TID    cloZAPine (CLOZARIL) tablet 100 mg  100 mg Oral BID    cloZAPine (CLOZARIL) tablet 200 mg  200 mg Oral HS    fluticasone (FLONASE) 50 mcg/act nasal spray 2 spray  2 spray Nasal Daily    HYDROmorphone (DILAUDID) injection 0.5 mg  0.5 mg Intravenous Q6H PRN    HYDROmorphone HCl (DILAUDID) injection 0.2 mg  0.2 mg Intravenous Q6H PRN    hydrOXYzine HCL (ATARAX) tablet 25 mg  25 mg Oral Q6H PRN    lactated ringers infusion  75 mL/hr Intravenous Continuous    metoprolol succinate (TOPROL-XL) 24 hr tablet 25 mg  25 mg Oral Daily    ondansetron (ZOFRAN) injection 4 mg  4 mg Intravenous Q6H PRN    pantoprazole (PROTONIX) EC tablet 40 mg  40 mg Oral Early Morning    piperacillin-tazobactam (ZOSYN) 3.375 g in sodium chloride 0.9 % 100 mL IVPB  3.375 g Intravenous Q6H    polyethylene glycol (MIRALAX) packet 17 g  17 g Oral Daily    tamsulosin (FLOMAX) capsule 0.4 mg  0.4 mg Oral HS    temazepam (RESTORIL) capsule 15 mg  15 mg Oral HS    topiramate (TOPAMAX) tablet 25 mg  25 mg Oral BID    traZODone (DESYREL) tablet 100 mg  100 mg Oral HS       No Known Allergies        Objective     Blood pressure 131/66, pulse 85, temperature 97.8 °F (36.6 °C), temperature source Oral, resp. rate 17, SpO2 96 %. Intake/Output Summary (Last 24 hours) at 12/3/2023 1130  Last data filed at 12/3/2023 0236  Gross per 24 hour   Intake 100 ml   Output --   Net 100 ml         PHYSICAL EXAM:      General Appearance:   Alert, cooperative, no distress, appears stated age    HEENT:   Normocephalic, atraumatic, anicteric. Neck:  Supple, symmetrical, trachea midline   Lungs:   Clear to auscultation bilaterally; no rales, rhonchi or wheezing; respirations unlabored    Heart[de-identified]   S1 and S2 normal; regular rate and rhythm; no murmur, rub, or gallop.    Abdomen:   Non-distended, soft, BS active, (+) epigastric and RUQ TTP   Rectal:   Deferred    Extremities:  No cyanosis, clubbing or edema    Pulses:  2+ and symmetric all extremities    Skin:  Skin color, texture, turgor normal, no rashes or lesions      Lab Results:   Results from last 7 days   Lab Units 12/02/23 2059   WBC Thousand/uL 5.94   HEMOGLOBIN g/dL 13.2   HEMATOCRIT % 39.2   PLATELETS Thousands/uL 168   NEUTROS PCT % 49   LYMPHS PCT % 35   MONOS PCT % 11   EOS PCT % 4     Results from last 7 days   Lab Units 12/02/23 2059   POTASSIUM mmol/L 3.9   CHLORIDE mmol/L 108   CO2 mmol/L 29   BUN mg/dL 23   CREATININE mg/dL 0.86   CALCIUM mg/dL 8.8   ALK PHOS U/L 124*   ALT U/L 25   AST U/L 24         Results from last 7 days   Lab Units 12/02/23 2059   LIPASE u/L 47       Imaging Studies: I have personally reviewed pertinent imaging studies. CT abdomen pelvis with contrast  Result Date: 12/3/2023  Impression: Indeterminate hepatic lesions. Given that these were not present definitively on prior studies, this raises suspicion for hepatic abscesses. Metastatic disease not excluded. Consider evaluation with follow-up MRI with and without contrast. Cholelithiasis and mild gallbladder wall edema. Correlate for acute cholecystitis I personally discussed this study with Anna Taylor on 12/3/2023 1:40 AM. Workstation performed: [id number]       ASSESSMENT and PLAN:      Abdominal Pain  ? Cholecystitis  Liver Lesions  - Presented with postprandial epigastric/RUQ pain with nausea and vomiting concerning for cholecystitis, also found to have 2 lesions on the liver about 1.5 cm each concerning for mets v abscess  - Lap cholecystectomy per surgical team  - Alk phos minimally elevated, otherwise LFTs normal so no indication for MRCP/ERCP at this time  - Plan for MRI abdomen w contrast to further evaluate the liver lesions noted on imaging  - NPO  - Serial abdominal exams  - Supportive care      The patient will be seen by Dr. Rosa Powers.

## 2023-12-03 NOTE — OP NOTE
OPERATIVE REPORT  PATIENT NAME: Mary Lugo    :  1950  MRN: [id number]  Pt Location: [facility] OR ROOM 02    SURGERY DATE: 12/3/2023    Surgeon(s) and Role:     * Sly Olivera MD - Primary     * Karlene Morales PA-C - Assisting    Preop Diagnosis:  Cholecystitis [K81.9]    Post-Op Diagnosis Codes:     * Cholecystitis [K81.9]  Acute calculus cholecystitis with empyema    Procedure(s):  CHOLECYSTECTOMY LAPAROSCOPIC WITH INTRAOPERATIVE CHOLANGIOGRAM    Specimen(s):  ID Type Source Tests Collected by Time Destination   1 : gallbladder and contents Tissue Gallbladder TISSUE EXAM Sly Olivera MD 12/3/2023 1307        Estimated Blood Loss:   Minimal    Drains:  None    Anesthesia Type:   General    Operative Indications:  Acute cholecystitis [K81.9]    Operative Findings:  Gallbladder was markedly distended and tense, no evidence of necrosis, multiple adhesions between the gallbladder and surrounding fatty tissue. Aspiration of the gallbladder revealed pus, consistent with empyema of the gallbladder. Liver surface appeared normal.  Intraoperative cholangiograms with the help of the C arm failed to reveal any proximal or distal filling defects and the contrast went freely into the small bowel. The rest of the abdominal cavity showed no evidence of inflammatory or neoplastic process. There were some adhesions between the omentum and anterior abdominal wall in the lower abdomen from prior abdominal surgery. Complications:   None    Procedure and Technique:  The patient was identified and the patient was placed in the operating table in a supine position. After adequate esthesia induction and satisfactory endotracheal intubation the abdomen was prepped and draped in sterile usual fashion with ChloraPrep. Timeout was called the patient was identified as postsurgical site.     Abdominal wall was elevated with towel clips, and incision was made through umbilicus and 5 mm trocar was introduced, after verifying the positioning the abdomen was insufflated with CO2. After obtaining adequate pneumoperitoneum the scope was advanced and exploration was performed with above findings. 11 mm trocar was placed in the epigastric area, 5 mm trocar the midclavicular and anterior axillary line, all the trochars were inserted under direct vision. The fundus of the gallbladder was grasped and pulled towards the right shoulder, the adhesions were carefully taken down using cautery and dissector. Wilhelm's pouch was grasped and pulled towards the right side, the cystic duct was identified, dissected and stapled distally, and incision was made over the cystic duct and a stone was milked out from the proximal cystic duct then cholangiogram catheter was inserted into the cystic duct through a separate stab wound and secured with Endo Clip. Intraoperative cholangiograms were performed with the help of C arm with the above findings. The cholangiogram catheter was removed and the cystic duct was stapled proximally x2 and then divided with scissors. Cystic artery was also identified, dissected and the stapled proximally and distally and then divided with scissors. Gallbladder was removed from the gallbladder fossa using cautery. Gallbladder was retrieved through the epigastric port site with the help of the Endo Catch. The abdominal cavity was copiously irrigated with saline solution. Gallbladder fossa was dry without evidence of bleeding or bile leak. Cystic duct and cystic artery were reinspected with no evidence of bile leak or bleeding respectively. Ports were removed under direct vision without evidence of bleeding from abdominal wall. Epigastric port site fascia was closed with 0 Vicryl interrupted figure-of-eight fashion. Subcutaneous tissue on all the incisions were infiltrated with 0.25% of Marcaine and the skin was closed with 4-0 Vicryl in an interrupted subcuticular fashion on all the incisions.   At the end of the case instrument, needles, and sponge counts were correct. Patient tolerated the procedure well. I was present for the entire procedure., A qualified resident physician was not available. , and A physician assistant was required during the procedure for retraction, tissue handling, dissection and suturing.     Patient Disposition:  PACU , hemodynamically stable, and extubated and stable        SIGNATURE: Philippe Linton MD  DATE: December 3, 2023  TIME: 1:34 PM

## 2023-12-03 NOTE — ANESTHESIA POSTPROCEDURE EVALUATION
Post-Op Assessment Note    CV Status:  Stable  Pain Score: 0    Pain management: adequate       Mental Status:  Awake and sleepy   Hydration Status:  Euvolemic   PONV Controlled:  Controlled   Airway Patency:  Patent and adequate  Two or more mitigation strategies used for obstructive sleep apnea   Post Op Vitals Reviewed: Yes    No anethesia notable event occurred.     Staff: CRNA               BP   142/65   Temp 98.4   Pulse 88   Resp 20   SpO2 100

## 2023-12-04 VITALS
TEMPERATURE: 97.5 F | WEIGHT: 155.65 LBS | DIASTOLIC BLOOD PRESSURE: 64 MMHG | SYSTOLIC BLOOD PRESSURE: 123 MMHG | HEART RATE: 83 BPM | BODY MASS INDEX: 21.79 KG/M2 | RESPIRATION RATE: 16 BRPM | OXYGEN SATURATION: 95 % | HEIGHT: 71 IN

## 2023-12-04 LAB
ALBUMIN SERPL BCP-MCNC: 3.2 G/DL (ref 3.5–5)
ALP SERPL-CCNC: 87 U/L (ref 34–104)
ALT SERPL W P-5'-P-CCNC: 15 U/L (ref 7–52)
ANION GAP SERPL CALCULATED.3IONS-SCNC: 4 MMOL/L
AST SERPL W P-5'-P-CCNC: 41 U/L (ref 13–39)
ATRIAL RATE: 75 BPM
BASOPHILS # BLD AUTO: 0.01 THOUSANDS/ÂΜL (ref 0–0.1)
BASOPHILS NFR BLD AUTO: 0 % (ref 0–1)
BILIRUB SERPL-MCNC: 0.73 MG/DL (ref 0.2–1)
BUN SERPL-MCNC: 16 MG/DL (ref 5–25)
CALCIUM ALBUM COR SERPL-MCNC: 8.8 MG/DL (ref 8.3–10.1)
CALCIUM SERPL-MCNC: 8.2 MG/DL (ref 8.4–10.2)
CHLORIDE SERPL-SCNC: 109 MMOL/L (ref 96–108)
CO2 SERPL-SCNC: 25 MMOL/L (ref 21–32)
CREAT SERPL-MCNC: 0.62 MG/DL (ref 0.6–1.3)
EOSINOPHIL # BLD AUTO: 0 THOUSAND/ÂΜL (ref 0–0.61)
EOSINOPHIL NFR BLD AUTO: 0 % (ref 0–6)
ERYTHROCYTE [DISTWIDTH] IN BLOOD BY AUTOMATED COUNT: 14.1 % (ref 11.6–15.1)
GFR SERPL CREATININE-BSD FRML MDRD: 98 ML/MIN/1.73SQ M
GLUCOSE SERPL-MCNC: 127 MG/DL (ref 65–140)
HCT VFR BLD AUTO: 34.5 % (ref 36.5–49.3)
HGB BLD-MCNC: 11.6 G/DL (ref 12–17)
IMM GRANULOCYTES # BLD AUTO: 0.03 THOUSAND/UL (ref 0–0.2)
IMM GRANULOCYTES NFR BLD AUTO: 0 % (ref 0–2)
LYMPHOCYTES # BLD AUTO: 0.71 THOUSANDS/ÂΜL (ref 0.6–4.47)
LYMPHOCYTES NFR BLD AUTO: 7 % (ref 14–44)
MCH RBC QN AUTO: 33.1 PG (ref 26.8–34.3)
MCHC RBC AUTO-ENTMCNC: 33.6 G/DL (ref 31.4–37.4)
MCV RBC AUTO: 99 FL (ref 82–98)
MONOCYTES # BLD AUTO: 0.62 THOUSAND/ÂΜL (ref 0.17–1.22)
MONOCYTES NFR BLD AUTO: 6 % (ref 4–12)
NEUTROPHILS # BLD AUTO: 8.3 THOUSANDS/ÂΜL (ref 1.85–7.62)
NEUTS SEG NFR BLD AUTO: 87 % (ref 43–75)
NRBC BLD AUTO-RTO: 0 /100 WBCS
P AXIS: 49 DEGREES
PLATELET # BLD AUTO: 141 THOUSANDS/UL (ref 149–390)
PMV BLD AUTO: 8.9 FL (ref 8.9–12.7)
POTASSIUM SERPL-SCNC: 3.5 MMOL/L (ref 3.5–5.3)
PR INTERVAL: 190 MS
PROT SERPL-MCNC: 5.7 G/DL (ref 6.4–8.4)
QRS AXIS: 40 DEGREES
QRSD INTERVAL: 98 MS
QT INTERVAL: 430 MS
QTC INTERVAL: 480 MS
RBC # BLD AUTO: 3.5 MILLION/UL (ref 3.88–5.62)
SODIUM SERPL-SCNC: 138 MMOL/L (ref 135–147)
T WAVE AXIS: 29 DEGREES
VENTRICULAR RATE: 75 BPM
WBC # BLD AUTO: 9.67 THOUSAND/UL (ref 4.31–10.16)

## 2023-12-04 PROCEDURE — 87081 CULTURE SCREEN ONLY: CPT | Performed by: STUDENT IN AN ORGANIZED HEALTH CARE EDUCATION/TRAINING PROGRAM

## 2023-12-04 PROCEDURE — 99239 HOSP IP/OBS DSCHRG MGMT >30: CPT | Performed by: INTERNAL MEDICINE

## 2023-12-04 PROCEDURE — 80053 COMPREHEN METABOLIC PANEL: CPT

## 2023-12-04 PROCEDURE — 87147 CULTURE TYPE IMMUNOLOGIC: CPT | Performed by: STUDENT IN AN ORGANIZED HEALTH CARE EDUCATION/TRAINING PROGRAM

## 2023-12-04 PROCEDURE — 85025 COMPLETE CBC W/AUTO DIFF WBC: CPT

## 2023-12-04 RX ORDER — TEMAZEPAM 15 MG/1
15 CAPSULE ORAL
Qty: 30 CAPSULE | Refills: 0 | Status: SHIPPED | OUTPATIENT
Start: 2023-12-04 | End: 2024-01-03

## 2023-12-04 RX ORDER — IVERMECTIN 3 MG/1
TABLET ORAL
COMMUNITY
Start: 2023-11-20

## 2023-12-04 RX ORDER — SODIUM CHLORIDE 9 MG/ML
50 INJECTION, SOLUTION INTRAVENOUS CONTINUOUS
Status: DISCONTINUED | OUTPATIENT
Start: 2023-12-04 | End: 2023-12-04

## 2023-12-04 RX ADMIN — CLOZAPINE 100 MG: 100 TABLET ORAL at 11:25

## 2023-12-04 RX ADMIN — TOPIRAMATE 25 MG: 25 TABLET, FILM COATED ORAL at 09:53

## 2023-12-04 RX ADMIN — METOPROLOL SUCCINATE 25 MG: 25 TABLET, FILM COATED, EXTENDED RELEASE ORAL at 09:53

## 2023-12-04 RX ADMIN — PIPERACILLIN AND TAZOBACTAM 3.38 G: 36; 4.5 INJECTION, POWDER, FOR SOLUTION INTRAVENOUS at 11:25

## 2023-12-04 RX ADMIN — CARBIDOPA AND LEVODOPA 1 TABLET: 25; 100 TABLET ORAL at 09:53

## 2023-12-04 RX ADMIN — PANTOPRAZOLE SODIUM 40 MG: 40 TABLET, DELAYED RELEASE ORAL at 05:51

## 2023-12-04 RX ADMIN — HEPARIN SODIUM 5000 UNITS: 5000 INJECTION INTRAVENOUS; SUBCUTANEOUS at 05:51

## 2023-12-04 RX ADMIN — FLUTICASONE PROPIONATE 2 SPRAY: 50 SPRAY, METERED NASAL at 11:25

## 2023-12-04 RX ADMIN — Medication 2.5 MG: at 03:57

## 2023-12-04 RX ADMIN — PIPERACILLIN AND TAZOBACTAM 3.38 G: 36; 4.5 INJECTION, POWDER, FOR SOLUTION INTRAVENOUS at 02:39

## 2023-12-04 NOTE — PLAN OF CARE
Problem: Potential for Falls  Goal: Patient will remain free of falls  Description: INTERVENTIONS:  - Educate patient/family on patient safety including physical limitations  - Instruct patient to call for assistance with activity   - Consult OT/PT to assist with strengthening/mobility   - Keep Call bell within reach  - Keep bed low and locked with side rails adjusted as appropriate  - Keep care items and personal belongings within reach  - Initiate and maintain comfort rounds  - Make Fall Risk Sign visible to staff  - Offer Toileting every 2 Hours, in advance of need  - Initiate/Maintain bed alarm  - Obtain necessary fall risk management equipment: walker  Problem: INFECTION - ADULT  Goal: Absence or prevention of progression during hospitalization  Description: INTERVENTIONS:  - Assess and monitor for signs and symptoms of infection  - Monitor lab/diagnostic results  - Monitor all insertion sites, i.e. indwelling lines, tubes, and drains  - Monitor endotracheal if appropriate and nasal secretions for changes in amount and color  - [place] appropriate cooling/warming therapies per order  - Administer medications as ordered  - Instruct and encourage patient and family to use good hand hygiene technique  - Identify and instruct in appropriate isolation precautions for identified infection/condition  Outcome: Progressing  Goal: Absence of fever/infection during neutropenic period  Description: INTERVENTIONS:  - Monitor WBC    Outcome: Progressing     - Apply yellow socks and bracelet for high fall risk patients  - Consider moving patient to room near nurses station  Outcome: Progressing

## 2023-12-04 NOTE — DISCHARGE SUMMARY
[address]  Discharge- Jose Antonio Orn 1950, 68 y.o. male MRN: [id number]  Unit/Bed#: -Isaac Encounter: [id number]  Primary Care Provider: Bull Hughes MD   Date and time admitted to hospital: 12/2/2023  8:43 PM    * Cholecystitis  Assessment & Plan  Presented initially with epigastric pain with associated nausea and vomiting that occurred after eating dinner last night  CT scan revealed possible cholecystitis  Underwent cholecystectomy on 12/3  Tolerating full diet adequately  Medically clear for discharge appreciate surgery recommendations    Parkinson disease  Assessment & Plan  Continue Sinemet    Paranoid schizophrenia (HCC)  Assessment & Plan  Continue Clozaril, Restoril, trazodone    Hypertension  Assessment & Plan  BP currently controlled  Continue Toprol    Discharging Physician / Practitioner: Luan Soto MD  PCP: Bull Hughes MD  Admission Date:   Admission Orders (From admission, onward)       Ordered        12/03/23 0156  INPATIENT ADMISSION  Once                          Discharge Date: 12/04/23    Medical Problems       Resolved Problems  Date Reviewed: 12/4/2023   None         Consultations During Hospital Stay:  General surgery    Procedures Performed:   Perc calvin    Significant Findings / Test Results:   CT abdomen pelvis with contrast    Result Date: 12/3/2023  Impression: Indeterminate hepatic lesions. Given that these were not present definitively on prior studies, this raises suspicion for hepatic abscesses. Metastatic disease not excluded. Consider evaluation with follow-up MRI with and without contrast. Cholelithiasis and mild gallbladder wall edema.  Correlate for acute cholecystitis I personally discussed this study with Janene Martinez on 12/3/2023 1:40 AM. Workstation performed: [id number]      Incidental Findings:   none    Test Results Pending at Discharge (will require follow up):   none     Outpatient Tests Requested:  none    Complications: none    Reason for Admission: Acute cholecystitis    Hospital Course: Gege Medina is a 68 y.o. male with past medical history significant schizophrenia who originally presented to the hospital on 12/2/2023 due to acute cholecystitis. Status post cholecystectomy. No further abdominal pain. Tolerating full diet adequately. Will be discharged back to group home    Please see above list of diagnoses and related plan for additional information. Condition at Discharge: stable     Discharge Day Visit / Exam:     Subjective: Denies chest pain, shortness of breath, cough, fevers, chills and urinary  Vitals: Blood Pressure: 123/64 (12/04/23 0951)  Pulse: 83 (12/04/23 0951)  Temperature: 97.5 °F (36.4 °C) (12/04/23 0457)  Temp Source: Oral (12/04/23 0457)  Respirations: 16 (12/04/23 0457)  Height: 5' 11" (180.3 cm) (12/04/23 0457)  Weight - Scale: 70.6 kg (155 lb 10.3 oz) (12/04/23 0457)  SpO2: 95 % (12/04/23 0951)  Exam:   Physical Exam  Constitutional:       General: He is not in acute distress. Appearance: He is well-developed. He is not diaphoretic. HENT:      Head: Normocephalic and atraumatic. Nose: Nose normal.      Mouth/Throat:      Pharynx: No oropharyngeal exudate. Eyes:      General: No scleral icterus. Conjunctiva/sclera: Conjunctivae normal.   Cardiovascular:      Rate and Rhythm: Normal rate and regular rhythm. Heart sounds: Normal heart sounds. No murmur heard. No friction rub. No gallop. Pulmonary:      Effort: Pulmonary effort is normal. No respiratory distress. Breath sounds: Normal breath sounds. No wheezing or rales. Chest:      Chest wall: No tenderness. Abdominal:      General: Bowel sounds are normal. There is no distension. Palpations: Abdomen is soft. Tenderness: There is no abdominal tenderness. There is no guarding. Musculoskeletal:         General: No tenderness or deformity. Normal range of motion.       Cervical back: Normal range of motion and neck supple. Skin:     General: Skin is warm and dry. Findings: No erythema. Neurological:      Mental Status: He is alert. Mental status is at baseline. Discussion with Family: pt, sister left voicemail    Discharge instructions/Information to patient and family:   See after visit summary for information provided to patient and family. Provisions for Follow-Up Care:  See after visit summary for information related to follow-up care and any pertinent home health orders. Disposition:     Group home    For Discharges to [facility] SNF:   Not Applicable to this Patient - Not Applicable to this Patient    Planned Readmission: none     Discharge Statement:  I spent 60 minutes discharging the patient. This time was spent on the day of discharge. I had direct contact with the patient on the day of discharge. Greater than 50% of the total time was spent examining patient, answering all patient questions, arranging and discussing plan of care with patient as well as directly providing post-discharge instructions. Additional time then spent on discharge activities. Discharge Medications:  See after visit summary for reconciled discharge medications provided to patient and family.       ** Please Note: This note has been constructed using a voice recognition system **

## 2023-12-04 NOTE — PROGRESS NOTES
Progress Note - General Surgery   Michelle Garcia 68 y.o. male MRN: [id number]  Unit/Bed#: -01 Encounter: [id number]    Assessment:  Michelle Garcia is a 68 y.o. male POD1 s/p laparoscopic cholecystectomy with intraoperative cholangiography    AVSS, no leukocytosis, labs within normal limits. Plan:  Advance to regular diet this a.m. as patient denies nausea, vomiting, abdominal pain. Okay for discharge today if tolerating regular diet. Surgical dressings can be removed tomorrow. Discharge and follow-up instructions provided. Discontinue IV fluids  Continue IV antibiotics for 2 more doses today. No further antibiotics indicated at that time. Monitor abdominal exam, labs, vitals  PRN pain medication and anti-emetics  Encourage ambulation  DVT ppx: heparin  Incentive spirometry 10 times/hour while awake  Continue home medications as prescribed   Remainder of care per primary team-SLIM    Subjective/Objective    Subjective: No acute events overnight. Objective:     Blood pressure 114/64, pulse 91, temperature 97.5 °F (36.4 °C), temperature source Oral, resp. rate 16, height 5' 11" (1.803 m), weight 70.6 kg (155 lb 10.3 oz), SpO2 95 %. ,Body mass index is 21.71 kg/m². Intake/Output Summary (Last 24 hours) at 12/4/2023 0840  Last data filed at 12/4/2023 0553  Gross per 24 hour   Intake 200 ml   Output 1045 ml   Net -845 ml       Invasive Devices       Peripheral Intravenous Line  Duration             Peripheral IV 12/02/23 Right;Upper Forearm 1 day                    Physical Exam:   GEN: NAD  HEENT: NCAT, MMM  CV: RRR, no m/r/g  Lung: Normal effort, CTA B/L, no w/r/r  Ab: Soft, ND, appropriately tender to palpation around surgical incisions. Surgical dressings clean dry and intact without surrounding erythema, edema, exudate. Extrem: No CCE   Neuro: A+Ox3     Lab, Imaging and other studies:I have personally reviewed pertinent lab results.      VTE Pharmacologic Prophylaxis: Heparin  VTE Mechanical Prophylaxis: sequential compression device    Recent Results (from the past 36 hour(s))   CBC and differential    Collection Time: 12/02/23  8:59 PM   Result Value Ref Range    WBC 5.94 4.31 - 10.16 Thousand/uL    RBC 3.95 3.88 - 5.62 Million/uL    Hemoglobin 13.2 12.0 - 17.0 g/dL    Hematocrit 39.2 36.5 - 49.3 %    MCV 99 (H) 82 - 98 fL    MCH 33.4 26.8 - 34.3 pg    MCHC 33.7 31.4 - 37.4 g/dL    RDW 14.0 11.6 - 15.1 %    MPV 8.9 8.9 - 12.7 fL    Platelets 931 809 - 644 Thousands/uL    nRBC 0 /100 WBCs    Neutrophils Relative 49 43 - 75 %    Immat GRANS % 0 0 - 2 %    Lymphocytes Relative 35 14 - 44 %    Monocytes Relative 11 4 - 12 %    Eosinophils Relative 4 0 - 6 %    Basophils Relative 1 0 - 1 %    Neutrophils Absolute 2.93 1.85 - 7.62 Thousands/µL    Immature Grans Absolute 0.01 0.00 - 0.20 Thousand/uL    Lymphocytes Absolute 2.08 0.60 - 4.47 Thousands/µL    Monocytes Absolute 0.65 0.17 - 1.22 Thousand/µL    Eosinophils Absolute 0.23 0.00 - 0.61 Thousand/µL    Basophils Absolute 0.04 0.00 - 0.10 Thousands/µL   Comprehensive metabolic panel    Collection Time: 12/02/23  8:59 PM   Result Value Ref Range    Sodium 140 135 - 147 mmol/L    Potassium 3.9 3.5 - 5.3 mmol/L    Chloride 108 96 - 108 mmol/L    CO2 29 21 - 32 mmol/L    ANION GAP 3 mmol/L    BUN 23 5 - 25 mg/dL    Creatinine 0.86 0.60 - 1.30 mg/dL    Glucose 98 65 - 140 mg/dL    Calcium 8.8 8.4 - 10.2 mg/dL    AST 24 13 - 39 U/L    ALT 25 7 - 52 U/L    Alkaline Phosphatase 124 (H) 34 - 104 U/L    Total Protein 6.6 6.4 - 8.4 g/dL    Albumin 3.8 3.5 - 5.0 g/dL    Total Bilirubin 0.41 0.20 - 1.00 mg/dL    eGFR 86 ml/min/1.73sq m   Lipase    Collection Time: 12/02/23  8:59 PM   Result Value Ref Range    Lipase 47 11 - 82 u/L   ECG 12 lead    Collection Time: 12/03/23  2:03 AM   Result Value Ref Range    Ventricular Rate 75 BPM    Atrial Rate 75 BPM    LA Interval 190 ms    QRSD Interval 98 ms    QT Interval 430 ms    QTC Interval 480 ms    P Axis 49 degrees QRS Axis 40 degrees    T Wave Axis 29 degrees   CBC and differential    Collection Time: 12/04/23  4:52 AM   Result Value Ref Range    WBC 9.67 4.31 - 10.16 Thousand/uL    RBC 3.50 (L) 3.88 - 5.62 Million/uL    Hemoglobin 11.6 (L) 12.0 - 17.0 g/dL    Hematocrit 34.5 (L) 36.5 - 49.3 %    MCV 99 (H) 82 - 98 fL    MCH 33.1 26.8 - 34.3 pg    MCHC 33.6 31.4 - 37.4 g/dL    RDW 14.1 11.6 - 15.1 %    MPV 8.9 8.9 - 12.7 fL    Platelets 600 (L) 790 - 390 Thousands/uL    nRBC 0 /100 WBCs    Neutrophils Relative 87 (H) 43 - 75 %    Immat GRANS % 0 0 - 2 %    Lymphocytes Relative 7 (L) 14 - 44 %    Monocytes Relative 6 4 - 12 %    Eosinophils Relative 0 0 - 6 %    Basophils Relative 0 0 - 1 %    Neutrophils Absolute 8.30 (H) 1.85 - 7.62 Thousands/µL    Immature Grans Absolute 0.03 0.00 - 0.20 Thousand/uL    Lymphocytes Absolute 0.71 0.60 - 4.47 Thousands/µL    Monocytes Absolute 0.62 0.17 - 1.22 Thousand/µL    Eosinophils Absolute 0.00 0.00 - 0.61 Thousand/µL    Basophils Absolute 0.01 0.00 - 0.10 Thousands/µL   Comprehensive metabolic panel    Collection Time: 12/04/23  4:52 AM   Result Value Ref Range    Sodium 138 135 - 147 mmol/L    Potassium 3.5 3.5 - 5.3 mmol/L    Chloride 109 (H) 96 - 108 mmol/L    CO2 25 21 - 32 mmol/L    ANION GAP 4 mmol/L    BUN 16 5 - 25 mg/dL    Creatinine 0.62 0.60 - 1.30 mg/dL    Glucose 127 65 - 140 mg/dL    Calcium 8.2 (L) 8.4 - 10.2 mg/dL    Corrected Calcium 8.8 8.3 - 10.1 mg/dL    AST 41 (H) 13 - 39 U/L    ALT 15 7 - 52 U/L    Alkaline Phosphatase 87 34 - 104 U/L    Total Protein 5.7 (L) 6.4 - 8.4 g/dL    Albumin 3.2 (L) 3.5 - 5.0 g/dL    Total Bilirubin 0.73 0.20 - 1.00 mg/dL    eGFR 98 ml/min/1.73sq m

## 2023-12-04 NOTE — CASE MANAGEMENT
Case Management Assessment & Discharge Planning Note    Patient name Jani Leon  Location [address] [facility] [id number] MRN [id number]  : 1950 Date 2023       Current Admission Date: 2023  Current Admission Diagnosis:Cholecystitis   Patient Active Problem List    Diagnosis Date Noted    Cholecystitis 2023    Medicare annual wellness visit, subsequent 2023    Scabies 2023    Orthostatic lightheadedness 2023    SBO (small bowel obstruction) ([address]) 2023    Parkinson disease 2023    Mild protein-calorie malnutrition ([address]) 02/10/2023    Aortic root dilatation ([address]) 02/10/2023    Neoplasm of uncertain behavior of left kidney 2022    BPH with obstruction/lower urinary tract symptoms 2022    Chronic constipation 2022    Overactive bladder 2022    Chronic prostatitis 2022    Chronic obstructive pulmonary disease, unspecified COPD type ([address]) 2022    Fall 2022    Groin rash 2022    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    Combined form of senile cataract 2020    GERD (gastroesophageal reflux disease) 10/22/2019    Hypertension 10/22/2019    Hypercholesteremia 10/22/2019    Paranoid schizophrenia ([address]) 10/22/2019    Neuro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  2016      LOS (days): 1  Geometric Mean LOS (GMLOS) (days):   Days to GMLOS:     OBJECTIVE:    Risk of Unplanned Readmission Score: 20.95         Current admission status: Inpatient       Preferred Pharmacy:   Melisa Esquivel ([facility], [facility]. [address] [place].   [address]  Phone: [phone] Fax: [phone]    Primary Care Provider: Sly Neves MD    Primary Insurance: MEDICARE  Secondary Insurance:     ASSESSMENT:  [address] [place], [place] [address] Representative -    Primary Phone: [phone] (Mobile)                           Readmission Root Cause  30 Day Readmission: No    Patient Information  Admitted from[de-identified] Facility (Pt resides at [facility])  Mental Status: Alert  During Assessment patient was accompanied by: Not accompanied during assessment  Assessment information provided by[de-identified] Patient  Primary Caregiver: Self  Support Systems: Family members, Other (Comment) ([facility] staff)  [place]  Residence: [address] [place] [address] do you live in?: [address] entry access options.  Select all that apply.: No steps to enter home  Type of Current Residence: Group home  Upon entering residence, is there a bedroom on the main floor (no further steps)?: Yes  Upon entering residence, is there a bathroom on the main floor (no further steps)?: Yes  Living Arrangements: Other (Comment) (lives at [facility] but has his own room)  Is patient a ?: No    Activities of Daily Living Prior to Admission  Functional Status: Independent  Completes ADLs independently?: Yes  Ambulates independently?: Yes  Does patient use assisted devices?: No  Does patient currently own DME?: No  Does patient have a history of Outpatient Therapy (PT/OT)?: No  Does the patient have a history of Short-Term Rehab?: No  Does patient have a history of HHC?: No  Does patient currently have [facility]?: No         Patient Information Continued  Income Source: Unemployed  Does patient have prescription coverage?: Yes  Does patient receive dialysis treatments?: No  Does patient have a history of substance abuse?: No  Does patient have a history of Mental Health Diagnosis?: No    PHQ 2/9 Screening   Reviewed PHQ 2/9 Depression Screening Score?: No    Means of Transportation  Means of Transport to Appts[de-identified] Family transport      Housing Stability: Low Risk  (10/2/2023)    Housing Stability Vital Sign     Unable to Pay for Housing in the Last Year: No     Number of Places Lived in the Last Year: 1     Unstable Housing in the Last Year: No   Food Insecurity: No Food Insecurity (10/2/2023)    Hunger Vital Sign     Worried About Running Out of Food in the Last Year: Never true     [address] in the Last Year: Never true   Transportation Needs: No Transportation Needs (11/20/2023)    PRAPARE - Transportation     Lack of Transportation (Medical): No     Lack of Transportation (Non-Medical): No   Utilities: Not on file       DISCHARGE DETAILS:    Discharge planning discussed with[de-identified] patient, [facility] coordinator, and sister Tan Peer of Choice: Yes  Comments - Freedom of Choice: Pt is cleared to return to [facility] with no needs. CM spoke to [facility] coordinator and informed her that pt would be returning via Lyft at 14:00. Sister Imer Long informed of discharge and is agreeable to him returning to [facility]. Pt has no money to pay for a Lyft and [facility] does not supply transportation. Both Mirian and Imer Long feel pt is fine returning via Lyft. Imer Long states that this is how he he returned to the group home in the past. Waiver signed by pt and transport will be arranged for 14:00  CM contacted family/caregiver?: Yes  Were Treatment Team discharge recommendations reviewed with patient/caregiver?: Yes  Did patient/caregiver verbalize understanding of patient care needs?: Yes  Were patient/caregiver advised of the risks associated with not following Treatment Team discharge recommendations?: Yes    Contacts  Patient Contacts: Imer Long  Relationship to Patient[de-identified] Family  Contact Method: Phone  Phone Number: [phone]  Reason/Outcome: Discharge Planning, [address]         Is the patient interested in [facility] at discharge?: No    DME Referral Provided  Referral made for DME?: No    Other Referral/Resources/Interventions Provided:  Interventions: Group Home, Transportation  Referral Comments: Pt to return to [facility] via [place] transport. Waiver signed.     Would you like to participate in our [address] service program?  : No - Declined    Treatment Team Recommendation: Group Home  Discharge Destination Plan[de-identified] Group Home  Transport at Discharge :  Other (Comment) ([facility])  Dispatcher Contacted: Yes  Number/Name of Dispatcher: Roundtrip     ETA of Transport (Date): 12/04/23  ETA of Transport (Time): 1400

## 2023-12-04 NOTE — NURSING NOTE
AVS reviewed with pt. No questions or concerns at this time. IV site removed. All belongings with pt and accounted for. Pt to be transported home via lyft.

## 2023-12-04 NOTE — PLAN OF CARE
Problem: Potential for Falls  Goal: Patient will remain free of falls  Description: INTERVENTIONS:  - Educate patient/family on patient safety including physical limitations  - Instruct patient to call for assistance with activity   - Consult OT/PT to assist with strengthening/mobility   - Keep Call bell within reach  - Keep bed low and locked with side rails adjusted as appropriate  - Keep care items and personal belongings within reach  - Initiate and maintain comfort rounds  - Make Fall Risk Sign visible to staff  - Offer Toileting every  Hours, in advance of need  - Initiate/Maintain alarm  - Obtain necessary fall risk management equipment:   - Apply yellow socks and bracelet for high fall risk patients  - Consider moving patient to room near nurses station  Outcome: Progressing     Problem: PAIN - ADULT  Goal: Verbalizes/displays adequate comfort level or baseline comfort level  Description: Interventions:  - Encourage patient to monitor pain and request assistance  - Assess pain using appropriate pain scale  - Administer analgesics based on type and severity of pain and evaluate response  - Implement non-pharmacological measures as appropriate and evaluate response  - Consider cultural and social influences on pain and pain management  - Notify physician/advanced practitioner if interventions unsuccessful or patient reports new pain  Outcome: Progressing     Problem: INFECTION - ADULT  Goal: Absence or prevention of progression during hospitalization  Description: INTERVENTIONS:  - Assess and monitor for signs and symptoms of infection  - Monitor lab/diagnostic results  - Monitor all insertion sites, i.e. indwelling lines, tubes, and drains  - Monitor endotracheal if appropriate and nasal secretions for changes in amount and color  - [place] appropriate cooling/warming therapies per order  - Administer medications as ordered  - Instruct and encourage patient and family to use good hand hygiene technique  - Identify and instruct in appropriate isolation precautions for identified infection/condition  Outcome: Progressing  Goal: Absence of fever/infection during neutropenic period  Description: INTERVENTIONS:  - Monitor WBC    Outcome: Progressing     Problem: SAFETY ADULT  Goal: Patient will remain free of falls  Description: INTERVENTIONS:  - Educate patient/family on patient safety including physical limitations  - Instruct patient to call for assistance with activity   - Consult OT/PT to assist with strengthening/mobility   - Keep Call bell within reach  - Keep bed low and locked with side rails adjusted as appropriate  - Keep care items and personal belongings within reach  - Initiate and maintain comfort rounds  - Make Fall Risk Sign visible to staff  - Offer Toileting every  Hours, in advance of need  - Initiate/Maintain alarm  - Obtain necessary fall risk management equipment:   - Apply yellow socks and bracelet for high fall risk patients  - Consider moving patient to room near nurses station  Outcome: Progressing  Goal: Maintain or return to baseline ADL function  Description: INTERVENTIONS:  -  Assess patient's ability to carry out ADLs; assess patient's baseline for ADL function and identify physical deficits which impact ability to perform ADLs (bathing, care of mouth/teeth, toileting, grooming, dressing, etc.)  - Assess/evaluate cause of self-care deficits   - Assess range of motion  - Assess patient's mobility; develop plan if impaired  - Assess patient's need for assistive devices and provide as appropriate  - Encourage maximum independence but intervene and supervise when necessary  - Involve family in performance of ADLs  - Assess for home care needs following discharge   - Consider OT consult to assist with ADL evaluation and planning for discharge  - Provide patient education as appropriate  Outcome: Progressing  Goal: Maintains/Returns to pre admission functional level  Description: INTERVENTIONS:  - Perform AM-PAC 6 Click Basic Mobility/ Daily Activity assessment daily.  - Set and communicate daily mobility goal to care team and patient/family/caregiver. - Collaborate with rehabilitation services on mobility goals if consulted  - Perform Range of Motion  times a day. - Reposition patient every  hours. - Dangle patient  times a day  - Stand patient  times a day  - Ambulate patient  times a day  - Out of bed to chair  times a day   - Out of bed for meal times a day  - Out of bed for toileting  - Record patient progress and toleration of activity level   Outcome: Progressing     Problem: DISCHARGE PLANNING  Goal: Discharge to home or other facility with appropriate resources  Description: INTERVENTIONS:  - Identify barriers to discharge w/patient and caregiver  - Arrange for needed discharge resources and transportation as appropriate  - Identify discharge learning needs (meds, wound care, etc.)  - Arrange for interpretive services to assist at discharge as needed  - Refer to Case Management Department for coordinating discharge planning if the patient needs post-hospital services based on physician/advanced practitioner order or complex needs related to functional status, cognitive ability, or social support system  Outcome: Progressing     Problem: Knowledge Deficit  Goal: Patient/family/caregiver demonstrates understanding of disease process, treatment plan, medications, and discharge instructions  Description: Complete learning assessment and assess knowledge base.   Interventions:  - Provide teaching at level of understanding  - Provide teaching via preferred learning methods  Outcome: Progressing     Problem: Prexisting or High Potential for Compromised Skin Integrity  Goal: Skin integrity is maintained or improved  Description: INTERVENTIONS:  - Identify patients at risk for skin breakdown  - Assess and monitor skin integrity  - Assess and monitor nutrition and hydration status  - Monitor labs   - Assess for incontinence   - Turn and reposition patient  - Assist with mobility/ambulation  - Relieve pressure over bony prominences  - Avoid friction and shearing  - Provide appropriate hygiene as needed including keeping skin clean and dry  - Evaluate need for skin moisturizer/barrier cream  - Collaborate with interdisciplinary team   - Patient/family teaching  - Consider wound care consult   Outcome: Progressing

## 2023-12-05 ENCOUNTER — TRANSITIONAL CARE MANAGEMENT (OUTPATIENT)
Age: 73
End: 2023-12-05

## 2023-12-05 LAB
MRSA NOSE QL CULT: ABNORMAL
MRSA NOSE QL CULT: ABNORMAL

## 2023-12-06 ENCOUNTER — OFFICE VISIT (OUTPATIENT)
Dept: GASTROENTEROLOGY | Facility: CLINIC | Age: 73
End: 2023-12-06
Payer: MEDICARE

## 2023-12-06 VITALS
DIASTOLIC BLOOD PRESSURE: 68 MMHG | SYSTOLIC BLOOD PRESSURE: 118 MMHG | WEIGHT: 155 LBS | HEIGHT: 71 IN | OXYGEN SATURATION: 99 % | HEART RATE: 90 BPM | BODY MASS INDEX: 21.7 KG/M2

## 2023-12-06 DIAGNOSIS — K80.10 CALCULUS OF GALLBLADDER WITH CHOLECYSTITIS WITHOUT BILIARY OBSTRUCTION, UNSPECIFIED CHOLECYSTITIS ACUITY: ICD-10-CM

## 2023-12-06 DIAGNOSIS — E44.1 MILD PROTEIN-CALORIE MALNUTRITION (HCC): ICD-10-CM

## 2023-12-06 DIAGNOSIS — K81.9 CHOLECYSTITIS: ICD-10-CM

## 2023-12-06 DIAGNOSIS — K76.9 LIVER LESION: Primary | ICD-10-CM

## 2023-12-06 PROCEDURE — 99214 OFFICE O/P EST MOD 30 MIN: CPT | Performed by: PHYSICIAN ASSISTANT

## 2023-12-06 PROCEDURE — 88304 TISSUE EXAM BY PATHOLOGIST: CPT | Performed by: PATHOLOGY

## 2023-12-06 RX ORDER — FEEDER CONTAINER WITH PUMP SET
237 EACH MISCELLANEOUS 2 TIMES DAILY
Qty: 237 ML | Refills: 10 | Status: SHIPPED | OUTPATIENT
Start: 2023-12-06 | End: 2024-01-05

## 2023-12-06 NOTE — PROGRESS NOTES
[address] [place] [address] [facility]  [place]ia Latonia 68 y.o. male MRN: [id number]       CC: Posthospitalization follow-up    HPI: Candelaria Jimenez is a pleasant 80-year-old male with history of paranoid schizophrenia, COPD, BPH, hypertension and previous partial colon resection for unknown etiology. Patient resides in a group home, and is accompanied by a caretaker. Patient was hospitalized last week for nausea, vomiting and abdominal pain. CT on admission revealing 2 indeterminate lesions measuring 1.5 and 1.4 cm, as well as findings suspicious for cholecystitis. Was recommended follow-up MRI. LFTs within normal limits after cholecystectomy performed by Dr. Carvel Landau on 12/3. Patient reports that overall he is doing well after he had surgery. Patient is fearful of eating certain foods after he had mild abdominal pain after eating beans and tacos. Patient's last colonoscopy was performed by Dr. Indy Maxwell in 2019 revealing diverticulosis and end-to-end colonic anastomosis in the rectosigmoid. Review of Systems:    CONSTITUTIONAL: Denies any fever, chills, or rigors. Good appetite, and no recent weight loss. HEENT: No earache or tinnitus. Denies hearing loss or visual disturbances. CARDIOVASCULAR: No chest pain or palpitations. RESPIRATORY: Denies any cough, hemoptysis, shortness of breath or dyspnea on exertion. GASTROINTESTINAL: As noted in the History of Present Illness. GENITOURINARY: No problems with urination. Denies any hematuria or dysuria. NEUROLOGIC: No dizziness or vertigo, denies headaches. MUSCULOSKELETAL: Denies any muscle or joint pain. SKIN: Denies skin rashes or itching. ENDOCRINE: Denies excessive thirst. Denies intolerance to heat or cold. PSYCHOSOCIAL: Denies depression or anxiety. Denies any recent memory loss.        Current Outpatient Medications   Medication Sig Dispense Refill    acetaminophen (Acetaminophen Extra Strength) 500 mg tablet Take 1 tablet (500 mg total) by mouth every 6 (six) hours as needed for mild pain 30 tablet 11    atorvastatin (LIPITOR) 40 mg tablet Take 1 tablet (40 mg total) by mouth daily 31 tablet 11    Austedo 12 MG TABS       carbidopa-levodopa (SINEMET)  mg per tablet Take 1 tablet by mouth 3 (three) times a day 270 tablet 3    Cholecalciferol (Vitamin D High Potency) 25 MCG (1000 UT) capsule Take 1 capsule (1,000 Units total) by mouth daily 31 capsule 6    cloZAPine (CLOZARIL) 100 mg tablet Take 100 mg by mouth 2 (two) times a day      clozapine (CLOZARIL) 200 MG tablet Take 200 mg by mouth daily at bedtime       fluticasone (FLONASE) 50 mcg/act nasal spray 2 sprays into each nostril daily 16 g 11    hydrOXYzine HCL (ATARAX) 25 mg tablet Take 1 tablet (25 mg total) by mouth every 6 (six) hours as needed for itching 30 tablet 5    ivermectin (STROMECTOL) 3 MG TABS       metoprolol succinate (TOPROL-XL) 25 mg 24 hr tablet Take 1 tablet (25 mg total) by mouth daily 31 tablet 11    omeprazole (PriLOSEC) 40 MG capsule Take 1 capsule (40 mg total) by mouth daily 31 capsule 11    polyethylene glycol (MIRALAX) 17 g packet Take 17 g by mouth daily 31 each 11    tamsulosin (FLOMAX) 0.4 mg Take 1 capsule (0.4 mg total) by mouth daily at bedtime 90 capsule 3    temazepam (RESTORIL) 15 mg capsule Take 1 capsule (15 mg total) by mouth daily at bedtime 30 capsule 0    topiramate (TOPAMAX) 25 mg tablet Take 25 mg by mouth 2 (two) times a day  1    traZODone (DESYREL) 100 mg tablet Take 100 mg by mouth daily at bedtime      Nutritional Supplements (Ensure High Protein) LIQD Take 237 mL by mouth 2 (two) times a day 237 mL 10     No current facility-administered medications for this visit.      Past Medical History:   Diagnosis Date    Asthma     Benign prostatic hyperplasia     COPD (chronic obstructive pulmonary disease) (HCC)     GERD (gastroesophageal reflux disease)     Herpes zoster without complication 13/91/4270    Hypercholesteremia     Hypertension     Neuroleptic induced parkinsonism      Paranoid schizophrenia ([facility])     Psychiatric disorder      Past Surgical History:   Procedure Laterality Date    CATARACT EXTRACTION      CHOLECYSTECTOMY LAPAROSCOPIC N/A 12/3/2023    Procedure: CHOLECYSTECTOMY LAPAROSCOPIC WITH INTRAOPERATIVE CHOLANGIOGRAM;  Surgeon: Johnathon Jaramillo MD;  Location: [facility];  Service: General    COLONOSCOPY       Social History     Socioeconomic History    Marital status: Single     Spouse name: None    Number of children: None    Years of education: None    Highest education level: None   Occupational History    None   Tobacco Use    Smoking status: Former     Packs/day: 1.00     Years: 20.00     Total pack years: 20.00     Types: Cigarettes     Quit date:      Years since quittin.9    Smokeless tobacco: Never   Vaping Use    Vaping Use: Never used   Substance and Sexual Activity    Alcohol use: Not Currently    Drug use: Never    Sexual activity: Not Currently   Other Topics Concern    None   Social History Narrative    None     Social Determinants of Health     Financial Resource Strain: Low Risk  (2023)    Overall Financial Resource Strain (CARDIA)     Difficulty of Paying Living Expenses: Not hard at all   Food Insecurity: No Food Insecurity (10/2/2023)    Hunger Vital Sign     Worried About Running Out of Food in the Last Year: Never true     Ran Out of Food in the Last Year: Never true   Transportation Needs: No Transportation Needs (2023)    PRAPARE - Transportation     Lack of Transportation (Medical): No     Lack of Transportation (Non-Medical):  No   Physical Activity: Inactive (2023)    Exercise Vital Sign     Days of Exercise per Week: 0 days     Minutes of Exercise per Session: 0 min   Stress: No Stress Concern Present (10/11/2023)    [facility]     Feeling of Stress : Not at all   Social Connections: Not on file   Intimate Partner Violence: Not on file Housing Stability: Low Risk  (10/2/2023)    Housing Stability Vital Sign     Unable to Pay for Housing in the Last Year: No     Number of Places Lived in the Last Year: 1     Unstable Housing in the Last Year: No     Family History   Problem Relation Age of Onset    No Known Problems Mother     No Known Problems Father     Parkinsonism Son             PHYSICAL EXAM:    Vitals:    12/06/23 1053   BP: 118/68   Pulse: 90   SpO2: 99%   Weight: 70.3 kg (155 lb)   Height: 5' 11" (1.803 m)     General Appearance:   Alert and oriented x 3. Cooperative, and in no respiratory distress   HEENT:   Normocephalic, atraumatic, anicteric.      Neck:  Supple, symmetrical, trachea midline   Lungs:   Clear to auscultation bilaterally    Heart[de-identified]   Regular rate and rhythm   Abdomen:   Soft, non-tender, non-distended; normal bowel sounds; no masses, no organomegaly    Genitalia:   Deferred    Rectal:   Deferred    Extremities:  No cyanosis, clubbing or edema    Pulses:  2+ and symmetric all extremities    Skin:  Skin color, texture, turgor normal, no rashes or lesions    Lymph nodes:  No palpable cervical or supraclavicular lymphadenopathy        Lab Results:   Results from last 6 Months   Lab Units 12/04/23  0452   WBC Thousand/uL 9.67   HEMOGLOBIN g/dL 11.6*   HEMATOCRIT % 34.5*   PLATELETS Thousands/uL 141*   NEUTROS PCT % 87*   LYMPHS PCT % 7*   MONOS PCT % 6   EOS PCT % 0     Results from last 6 Months   Lab Units 12/04/23  0452   POTASSIUM mmol/L 3.5   CHLORIDE mmol/L 109*   CO2 mmol/L 25   BUN mg/dL 16   CREATININE mg/dL 0.62   CALCIUM mg/dL 8.2*   ALK PHOS U/L 87   ALT U/L 15   AST U/L 41*     Results from last 6 Months   Lab Units 09/29/23  1931   INR  1.09     Results from last 6 Months   Lab Units 12/02/23  2059   LIPASE u/L 47       Imaging Studies:   XR cholangiogram intraoperative    Result Date: 12/5/2023  Impression: Fluoroscopic guidance provided for intra-operative cholangiogram. Please see procedure report for further details. Workstation performed: [id number]     CT abdomen pelvis with contrast    Result Date: 12/3/2023  Impression: Indeterminate hepatic lesions. Given that these were not present definitively on prior studies, this raises suspicion for hepatic abscesses. Metastatic disease not excluded. Consider evaluation with follow-up MRI with and without contrast. Cholelithiasis and mild gallbladder wall edema. Correlate for acute cholecystitis I personally discussed this study with Stephanie Griffith on 12/3/2023 1:40 AM. Workstation performed: [id number]       ASSESSMENT and PLAN:      1) Indeterminate liver lesion - Our team recommended MRI abdomen. Will order for patient to have performed to determine etiology of liver lesion noted in above CT report. Group home will arrange, central scheduling number provided. 2) Acute calculous cholecystitis status post cholecystectomy - Surgery on 12/3 by Dr Ely Adkins while inpatient. Will have patient and staff from group home arrange follow-up with Dr. Ely Adkins with our  staff. No evidence of cellulitis on exam today. Low fat diet. Follow up with general surgery. Portions of the record may have been created with voice recognition software. Occasional wrong word or "sound a like" substitutions may have occurred due to the inherent limitations of voice recognition software. Read the chart carefully and recognize, using context, where substitutions have occurred.

## 2024-01-01 ENCOUNTER — HOSPITAL ENCOUNTER (EMERGENCY)
Facility: HOSPITAL | Age: 74
Discharge: HOME | End: 2024-01-02
Attending: EMERGENCY MEDICINE
Payer: MEDICARE

## 2024-01-01 ENCOUNTER — APPOINTMENT (EMERGENCY)
Dept: CT IMAGING | Facility: HOSPITAL | Age: 74
End: 2024-01-01
Payer: MEDICARE

## 2024-01-01 DIAGNOSIS — M54.9 BACK PAIN: Primary | ICD-10-CM

## 2024-01-01 LAB
BACTERIA UR QL AUTO: ABNORMAL /HPF
BILIRUB UR QL STRIP: NEGATIVE
CLARITY UR: CLEAR
COLOR UR: YELLOW
GLUCOSE UR STRIP-MCNC: NEGATIVE MG/DL
HGB UR QL STRIP.AUTO: NEGATIVE
KETONES UR STRIP-MCNC: NEGATIVE MG/DL
LEUKOCYTE ESTERASE UR QL STRIP: NEGATIVE
MUCOUS THREADS UR QL AUTO: ABNORMAL
NITRITE UR QL STRIP: NEGATIVE
NON-SQ EPI CELLS URNS QL MICRO: ABNORMAL /HPF
PH UR STRIP.AUTO: 6 [PH]
PROT UR STRIP-MCNC: ABNORMAL MG/DL
RBC #/AREA URNS AUTO: ABNORMAL /HPF
SP GR UR STRIP.AUTO: 1.02 (ref 1–1.03)
UROBILINOGEN UR STRIP-ACNC: <2 MG/DL
WBC #/AREA URNS AUTO: ABNORMAL /HPF

## 2024-01-01 PROCEDURE — 81001 URINALYSIS AUTO W/SCOPE: CPT | Performed by: EMERGENCY MEDICINE

## 2024-01-01 PROCEDURE — 99284 EMERGENCY DEPT VISIT MOD MDM: CPT | Performed by: EMERGENCY MEDICINE

## 2024-01-01 PROCEDURE — G1004 CDSM NDSC: HCPCS

## 2024-01-01 PROCEDURE — 99284 EMERGENCY DEPT VISIT MOD MDM: CPT

## 2024-01-01 PROCEDURE — 96372 THER/PROPH/DIAG INJ SC/IM: CPT

## 2024-01-01 PROCEDURE — 74176 CT ABD & PELVIS W/O CONTRAST: CPT

## 2024-01-01 RX ORDER — OLOPATADINE HYDROCHLORIDE 1 MG/ML
1 SOLUTION/ DROPS OPHTHALMIC
COMMUNITY

## 2024-01-01 RX ORDER — KETOROLAC TROMETHAMINE 30 MG/ML
15 INJECTION, SOLUTION INTRAMUSCULAR; INTRAVENOUS ONCE
Status: COMPLETED | OUTPATIENT
Start: 2024-01-01 | End: 2024-01-01

## 2024-01-01 RX ORDER — DOCUSATE SODIUM 100 MG/1
100 CAPSULE, LIQUID FILLED ORAL 2 TIMES DAILY
COMMUNITY

## 2024-01-01 RX ORDER — CLOZAPINE 100 MG/1
300 TABLET ORAL ONCE
Status: DISCONTINUED | OUTPATIENT
Start: 2024-01-01 | End: 2024-01-01

## 2024-01-01 RX ORDER — LIDOCAINE 50 MG/G
1 PATCH TOPICAL ONCE
Status: COMPLETED | OUTPATIENT
Start: 2024-01-01 | End: 2024-01-02

## 2024-01-01 RX ORDER — LIDOCAINE 50 MG/G
1 PATCH TOPICAL DAILY
Qty: 30 PATCH | Refills: 0 | Status: SHIPPED | OUTPATIENT
Start: 2024-01-01

## 2024-01-01 RX ORDER — METHYLPREDNISOLONE 4 MG/1
TABLET ORAL
Qty: 21 TABLET | Refills: 0 | Status: SHIPPED | OUTPATIENT
Start: 2024-01-01

## 2024-01-01 RX ORDER — DOCUSATE SODIUM 100 MG/1
100 CAPSULE, LIQUID FILLED ORAL ONCE
Status: COMPLETED | OUTPATIENT
Start: 2024-01-01 | End: 2024-01-01

## 2024-01-01 RX ORDER — CLOZAPINE 100 MG/1
300 TABLET ORAL ONCE
Status: COMPLETED | OUTPATIENT
Start: 2024-01-01 | End: 2024-01-01

## 2024-01-01 RX ORDER — CLOZAPINE 100 MG/1
100 TABLET ORAL ONCE
Status: DISCONTINUED | OUTPATIENT
Start: 2024-01-01 | End: 2024-01-01

## 2024-01-01 RX ORDER — ACETAMINOPHEN 325 MG/1
975 TABLET ORAL ONCE
Status: COMPLETED | OUTPATIENT
Start: 2024-01-01 | End: 2024-01-01

## 2024-01-01 RX ADMIN — CARBIDOPA AND LEVODOPA 1 TABLET: 25; 100 TABLET ORAL at 20:50

## 2024-01-01 RX ADMIN — LIDOCAINE 1 PATCH: 50 PATCH TOPICAL at 17:23

## 2024-01-01 RX ADMIN — KETOROLAC TROMETHAMINE 15 MG: 30 INJECTION, SOLUTION INTRAMUSCULAR; INTRAVENOUS at 17:20

## 2024-01-01 RX ADMIN — CLOZAPINE 300 MG: 100 TABLET ORAL at 22:44

## 2024-01-01 RX ADMIN — DOCUSATE SODIUM 100 MG: 100 CAPSULE, LIQUID FILLED ORAL at 20:50

## 2024-01-01 RX ADMIN — ACETAMINOPHEN 975 MG: 325 TABLET, FILM COATED ORAL at 16:38

## 2024-01-01 NOTE — ED PROVIDER NOTES
History  Chief Complaint   Patient presents with   • Back Pain     Pt. Presents to ER from [facility] with c/o back pain.      Patient 73-year-old male past medical history of hypertension, hyperlipidemia, schizophrenia, COPD, kidney cancer, Parkinson disease, SBO, asthma presenting with back pain.  Patient states that he began having nonradiating low back pain over the sacrum at 12 30-1 o'clock roughly 4 hours ago after lunch.  States it is worse with standing and bending over and nonradiating.  Declined any pain medication at facility and wanted to go to the hospital.  Denies any recent falls, bowel or bladder incontinence, urinary tension, saddle anesthesia, nausea/vomiting/diarrhea/constipation, dysuria, chest pain, shortness of breath, dizziness, rashes, vision changes.        Prior to Admission Medications   Prescriptions Last Dose Informant Patient Reported? Taking?   Austedo 12 MG TABS  Care Giver Yes No   Cholecalciferol (Vitamin D High Potency) 25 MCG (1000 UT) capsule  Care Giver No No   Sig: Take 1 capsule (1,000 Units total) by mouth daily   Nutritional Supplements (Ensure High Protein) LIQD   No No   Sig: Take 237 mL by mouth 2 (two) times a day   acetaminophen (Acetaminophen Extra Strength) 500 mg tablet  Care Giver No No   Sig: Take 1 tablet (500 mg total) by mouth every 6 (six) hours as needed for mild pain   atorvastatin (LIPITOR) 40 mg tablet  Care Giver No No   Sig: Take 1 tablet (40 mg total) by mouth daily   carbidopa-levodopa (SINEMET)  mg per tablet  Care Giver No No   Sig: Take 1 tablet by mouth 3 (three) times a day   cloZAPine (CLOZARIL) 100 mg tablet  Care Giver Yes No   Sig: Take 100 mg by mouth 2 (two) times a day   clozapine (CLOZARIL) 200 MG tablet  Care Giver Yes No   Sig: Take 200 mg by mouth daily at bedtime    fluticasone (FLONASE) 50 mcg/act nasal spray  Care Giver No No   Si sprays into each nostril daily   hydrOXYzine HCL (ATARAX) 25 mg tablet  Care Giver No No    Sig: Take 1 tablet (25 mg total) by mouth every 6 (six) hours as needed for itching   ivermectin (STROMECTOL) 3 MG TABS  Care Giver Yes No   metoprolol succinate (TOPROL-XL) 25 mg 24 hr tablet  Care Giver No No   Sig: Take 1 tablet (25 mg total) by mouth daily   omeprazole (PriLOSEC) 40 MG capsule  Care Giver No No   Sig: Take 1 capsule (40 mg total) by mouth daily   polyethylene glycol (MIRALAX) 17 g packet  Care Giver No No   Sig: Take 17 g by mouth daily   tamsulosin (FLOMAX) 0.4 mg  Care Giver No No   Sig: Take 1 capsule (0.4 mg total) by mouth daily at bedtime   temazepam (RESTORIL) 15 mg capsule  Care Giver No No   Sig: Take 1 capsule (15 mg total) by mouth daily at bedtime   topiramate (TOPAMAX) 25 mg tablet  Care Giver Yes No   Sig: Take 25 mg by mouth 2 (two) times a day   traZODone (DESYREL) 100 mg tablet  Care Giver Yes No   Sig: Take 100 mg by mouth daily at bedtime      Facility-Administered Medications: None       Past Medical History:   Diagnosis Date   • Asthma    • Benign prostatic hyperplasia    • COPD (chronic obstructive pulmonary disease) (HCC)    • GERD (gastroesophageal reflux disease)    • Herpes zoster without complication 12/10/2021   • Hypercholesteremia    • Hypertension    • Neuroleptic induced parkinsonism     • Paranoid schizophrenia (HCC)    • Psychiatric disorder        Past Surgical History:   Procedure Laterality Date   • CATARACT EXTRACTION     • CHOLECYSTECTOMY LAPAROSCOPIC N/A 12/3/2023    Procedure: CHOLECYSTECTOMY LAPAROSCOPIC WITH INTRAOPERATIVE CHOLANGIOGRAM;  Surgeon: Maverick Tamayo MD;  Location: [facility];  Service: General   • COLONOSCOPY         Family History   Problem Relation Age of Onset   • No Known Problems Mother    • No Known Problems Father    • Parkinsonism Son      I have reviewed and agree with the history as documented.    E-Cigarette/Vaping   • E-Cigarette Use Never User      E-Cigarette/Vaping Substances   • Nicotine No    • THC No    • CBD No    •  Flavoring No    • Other No    • Unknown No      Social History     Tobacco Use   • Smoking status: Former     Current packs/day: 0.00     Average packs/day: 1 pack/day for 20.0 years (20.0 ttl pk-yrs)     Types: Cigarettes     Start date:      Quit date:      Years since quittin.0   • Smokeless tobacco: Never   Vaping Use   • Vaping status: Never Used   Substance Use Topics   • Alcohol use: Not Currently   • Drug use: Never       Review of Systems   All other systems reviewed and are negative.      Physical Exam  Physical Exam  Vitals reviewed.   Constitutional:       General: He is not in acute distress.     Appearance: Normal appearance. He is not ill-appearing.   HENT:      Mouth/Throat:      Mouth: Mucous membranes are moist.   Eyes:      Conjunctiva/sclera: Conjunctivae normal.   Cardiovascular:      Rate and Rhythm: Normal rate and regular rhythm.      Pulses: Normal pulses.      Heart sounds: Normal heart sounds.   Pulmonary:      Effort: Pulmonary effort is normal.      Breath sounds: Normal breath sounds.   Abdominal:      General: Abdomen is flat.      Palpations: Abdomen is soft.      Tenderness: There is no abdominal tenderness.   Musculoskeletal:         General: Tenderness present. No swelling. Normal range of motion.      Cervical back: Neck supple.      Comments: Tenderness over sacrum, negative straight leg raise bilaterally   Skin:     General: Skin is warm and dry.   Neurological:      General: No focal deficit present.      Mental Status: He is alert.      Sensory: No sensory deficit.      Motor: No weakness.   Psychiatric:         Mood and Affect: Mood normal.         Vital Signs  ED Triage Vitals [24 1620]   Temperature Pulse Respirations Blood Pressure SpO2   97.9 °F (36.6 °C) 72 18 115/62 97 %      Temp Source Heart Rate Source Patient Position - Orthostatic VS BP Location FiO2 (%)   Oral Monitor Sitting Left arm --      Pain Score       --           Vitals:    24  1620   BP: 115/62   Pulse: 72   Patient Position - Orthostatic VS: Sitting         Visual Acuity      ED Medications  Medications   acetaminophen (TYLENOL) tablet 975 mg (has no administration in time range)   ketorolac (TORADOL) injection 15 mg (has no administration in time range)   lidocaine (LIDODERM) 5 % patch 1 patch (has no administration in time range)       Diagnostic Studies  Results Reviewed       None                   No orders to display              Procedures  Procedures         ED Course  ED Course as of 01/01/24 2353   Mon Jan 01, 2024   1811 Workup only remarkable for L3 radiculitis, will give Medrol Dosepak and have discussed return precautions and outpatient follow-up which patient states he understands.               Identification of Seniors at Risk      Flowsheet Row Most Recent Value   (ISAR) Identification of Seniors at Risk    Before the illness or injury that brought you to the Emergency, did you need someone to help you on a regular basis? 1 Filed at: 01/01/2024 1622   In the last 24 hours, have you needed more help than usual? 0 Filed at: 01/01/2024 1622   Have you been hospitalized for one or more nights during the past 6 months? 1 Filed at: 01/01/2024 1622   In general, do you see well? 0 Filed at: 01/01/2024 1622   In general, do you have serious problems with your memory? 0 Filed at: 01/01/2024 1622   Do you take more than three different medications every day? 1 Filed at: 01/01/2024 1622   ISAR Score 3 Filed at: 01/01/2024 1622                        SBIRT 20yo+      Flowsheet Row Most Recent Value   Initial Alcohol Screen: US AUDIT-C     1. How often do you have a drink containing alcohol? 0 Filed at: 01/01/2024 1623   2. How many drinks containing alcohol do you have on a typical day you are drinking?  0 Filed at: 01/01/2024 1623   3a. Male UNDER 65: How often do you have five or more drinks on one occasion? 0 Filed at: 01/01/2024 1623   Audit-C Score 0 Filed at: 01/01/2024 1623    RAFA: How many times in the past year have you...    Used an illegal drug or used a prescription medication for non-medical reasons? Never Filed at: 01/01/2024 1623                      Medical Decision Making  Patient 73-year-old male past medical history of COPD, asthma, SBO, kidney cancer, Parkinson disease, hypertension, hyperlipidemia, schizophrenia presenting with back pain.  Patient is well-appearing at bedside with stable vitals and in no acute distress.  He has no abdominal tenderness, mild S1 tenderness, equal strength and sensation of the lower extremities and no other significant physical exam findings.  Due to patient's age comorbidities obtain CT abdomen pelvis and lumbar spine to rule out stones, intra-abdominal pathology, spinal fractures, urinalysis to assess for signs of infection though suspect musculoskeletal pain, give pain control and reassess.    Amount and/or Complexity of Data Reviewed  Radiology: ordered.    Risk  OTC drugs.  Prescription drug management.             Disposition  Final diagnoses:   None     ED Disposition       None          Follow-up Information    None         Patient's Medications   Discharge Prescriptions    No medications on file       No discharge procedures on file.    PDMP Review         Value Time User    PDMP Reviewed  Yes 9/29/2023 11:21 PM LIUDMILA Pinedo            ED Provider  Electronically Signed by             Bita Rosenbaum DO  01/01/24 1812

## 2024-01-01 NOTE — ED NOTES
"This RN attempted to call report to [employer] x3 at  the phone rings and goes to  a voicemail box that is full and unable to leave a message.  Pt. States \"I am able to get to the door and someone is always there.\"      Florinda Maurer RN  01/01/24 9874    "

## 2024-01-02 VITALS
OXYGEN SATURATION: 100 % | RESPIRATION RATE: 18 BRPM | HEART RATE: 75 BPM | SYSTOLIC BLOOD PRESSURE: 133 MMHG | TEMPERATURE: 97.8 F | DIASTOLIC BLOOD PRESSURE: 72 MMHG

## 2024-01-02 NOTE — ED NOTES
This writer set up roundtrip to return pt home. Awaiting roundtrip to be claimed. Will update when transportation accommodated        Gianna Lucas RN  01/01/24 1953

## 2024-01-04 ENCOUNTER — TELEPHONE (OUTPATIENT)
Dept: PHYSICAL THERAPY | Facility: OTHER | Age: 74
End: 2024-01-04

## 2024-01-04 NOTE — TELEPHONE ENCOUNTER
Call placed to the patient per [facility] referral.    Phone umber is for [facility]. An employee named Jillian answered the phone. She took down comp spine info to forward to the patient's care coordinator. Pt's care coordinator's name is Mirian    Will await a call back from facility    This is the 1st attempt to reach the patient.  Will defer per protocol.

## 2024-01-05 ENCOUNTER — NURSE TRIAGE (OUTPATIENT)
Dept: PHYSICAL THERAPY | Facility: OTHER | Age: 74
End: 2024-01-05

## 2024-01-05 NOTE — TELEPHONE ENCOUNTER
Spoke with Mirian Cha from [facility].     Mirian is the case coordinator and listed as emergency contact as well.     Patient unable to answer questions. Mirian is the one in charge of all his paperwork , appointments etc.    Additional Information   Negative: Is this related to a work injury?   Negative: Is this related to an MVA?   Negative: Are you currently recieving homecare services?    Background - Initial Assessment  Clinical complaint: ED visit 01/01/24 due to B/L Lower Back Pain. This is a new pain that started 2 weeks ago. No radiation. No numbness or tingling. NKI. Not seeing a Dr for this pain. Intermittent, worse with movement and certain position. Described as aching.   Date of onset: 2 weeks ago  Frequency of pain: intermittent  Quality of pain: aching    Protocols used: [facility] Protocol

## 2024-01-05 NOTE — TELEPHONE ENCOUNTER
Called placed to Mirian Cha (care coordinator) trev the [facility] to complete the triage call for Sumit Nails.    VM states the mailbox is full and not accepting new messages.    Referral Closed.  Triage will be completed if a call back is received.

## 2024-01-10 ENCOUNTER — TELEPHONE (OUTPATIENT)
Dept: SURGERY | Facility: CLINIC | Age: 74
End: 2024-01-10

## 2024-01-10 NOTE — TELEPHONE ENCOUNTER
"I spoke with several different people at the nursing home where Sumit resides.   They did not know his Medicare ID # is now \" REJECTED\". They did not have any new information with his new number and didn't know anything about it. Mirian ))said she will look into it and will get back to us.   Although his appointment tomorrow is a Post op ( non billable visit) I told her I that since the Medicare on file is coming in as rejected, I have to remove it and replace it with something so I need that information asap.  "

## 2024-01-11 ENCOUNTER — OFFICE VISIT (OUTPATIENT)
Dept: SURGERY | Facility: CLINIC | Age: 74
End: 2024-01-11

## 2024-01-11 VITALS
RESPIRATION RATE: 18 BRPM | HEIGHT: 71 IN | SYSTOLIC BLOOD PRESSURE: 118 MMHG | WEIGHT: 149.8 LBS | BODY MASS INDEX: 20.97 KG/M2 | HEART RATE: 68 BPM | DIASTOLIC BLOOD PRESSURE: 68 MMHG | TEMPERATURE: 97.5 F

## 2024-01-11 DIAGNOSIS — Z48.89 POSTOPERATIVE VISIT: Primary | ICD-10-CM

## 2024-01-11 PROCEDURE — 99024 POSTOP FOLLOW-UP VISIT: CPT | Performed by: SURGERY

## 2024-01-11 NOTE — PROGRESS NOTES
Post-Op Follow Up- General Surgery   Sumit Nails 73 y.o. male MRN: [id number]  Unit/Bed#:  Encounter: [id number]    Assessment/Plan     Assessment:  Status post laparoscopic cholecystectomy, improved  Plan:  Patient is doing quite well from a surgical standpoint.  He is discharged from my care and I will be glad to see him if any problem arises in the future.    History of Present Illness     HPI:  Sumit Nails is a 73 y.o. male who presents to my office accompanied by healthcare worker for first postop follow-up after laparoscopic cholecystectomy for acute calculus cholecystitis from December 3.  He offers no complaints at this time.    Historical Information   Past Medical History:   Diagnosis Date    Asthma     Benign prostatic hyperplasia     COPD (chronic obstructive pulmonary disease) (HCC)     GERD (gastroesophageal reflux disease)     Herpes zoster without complication 12/10/2021    Hypercholesteremia     Hypertension     Neuroleptic induced parkinsonism      Paranoid schizophrenia (HCC)     Psychiatric disorder      Past Surgical History:   Procedure Laterality Date    CATARACT EXTRACTION      CHOLECYSTECTOMY LAPAROSCOPIC N/A 12/3/2023    Procedure: CHOLECYSTECTOMY LAPAROSCOPIC WITH INTRAOPERATIVE CHOLANGIOGRAM;  Surgeon: Maverick Tamayo MD;  Location: [facility];  Service: General    COLONOSCOPY       Social History   Social History     Substance and Sexual Activity   Alcohol Use Not Currently     Social History     Substance and Sexual Activity   Drug Use Never     Social History     Tobacco Use   Smoking Status Former    Current packs/day: 0.00    Average packs/day: 1 pack/day for 20.0 years (20.0 ttl pk-yrs)    Types: Cigarettes    Start date:     Quit date: 2000    Years since quittin.0    Passive exposure: Past   Smokeless Tobacco Never     Family History: non-contributory    Meds/Allergies   all medications and allergies reviewed     Current Outpatient Medications:     acetaminophen  (Acetaminophen Extra Strength) 500 mg tablet, Take 1 tablet (500 mg total) by mouth every 6 (six) hours as needed for mild pain, Disp: 30 tablet, Rfl: 11    atorvastatin (LIPITOR) 40 mg tablet, Take 1 tablet (40 mg total) by mouth daily, Disp: 31 tablet, Rfl: 11    Austedo 12 MG TABS, Take 24 mg by mouth in the morning, Disp: , Rfl:     carbidopa-levodopa (SINEMET)  mg per tablet, Take 1 tablet by mouth 3 (three) times a day (Patient taking differently: Take 1 tablet by mouth 2 (two) times a day), Disp: 270 tablet, Rfl: 3    Cholecalciferol (Vitamin D High Potency) 25 MCG (1000 UT) capsule, Take 1 capsule (1,000 Units total) by mouth daily, Disp: 31 capsule, Rfl: 6    cloZAPine (CLOZARIL) 100 mg tablet, Take 100 mg by mouth 2 (two) times a day, Disp: , Rfl:     clozapine (CLOZARIL) 200 MG tablet, Take 200 mg by mouth daily at bedtime , Disp: , Rfl:     docusate sodium (COLACE) 100 mg capsule, Take 100 mg by mouth 2 (two) times a day, Disp: , Rfl:     fluticasone (FLONASE) 50 mcg/act nasal spray, 2 sprays into each nostril daily, Disp: 16 g, Rfl: 11    hydrOXYzine HCL (ATARAX) 25 mg tablet, Take 1 tablet (25 mg total) by mouth every 6 (six) hours as needed for itching, Disp: 30 tablet, Rfl: 5    ivermectin (STROMECTOL) 3 MG TABS, , Disp: , Rfl:     lidocaine (LIDODERM) 5 %, Apply 1 patch topically over 12 hours daily Remove & Discard patch within 12 hours or as directed by MD, Disp: 30 patch, Rfl: 0    methylPREDNISolone 4 MG tablet therapy pack, Use as directed on package, Disp: 21 tablet, Rfl: 0    metoprolol succinate (TOPROL-XL) 25 mg 24 hr tablet, Take 1 tablet (25 mg total) by mouth daily, Disp: 31 tablet, Rfl: 11    Nutritional Supplements (Ensure High Protein) LIQD, Take 237 mL by mouth 2 (two) times a day, Disp: 237 mL, Rfl: 10    olopatadine (PATANOL) 0.1 % ophthalmic solution, Administer 1 drop to both eyes daily at bedtime, Disp: , Rfl:     omeprazole (PriLOSEC) 40 MG capsule, Take 1 capsule (40 mg  "total) by mouth daily, Disp: 31 capsule, Rfl: 11    polyethylene glycol (MIRALAX) 17 g packet, Take 17 g by mouth daily, Disp: 31 each, Rfl: 11    tamsulosin (FLOMAX) 0.4 mg, Take 1 capsule (0.4 mg total) by mouth daily at bedtime, Disp: 90 capsule, Rfl: 3    temazepam (RESTORIL) 15 mg capsule, Take 1 capsule (15 mg total) by mouth daily at bedtime, Disp: 30 capsule, Rfl: 0    topiramate (TOPAMAX) 25 mg tablet, Take 25 mg by mouth 2 (two) times a day, Disp: , Rfl: 1    traZODone (DESYREL) 100 mg tablet, Take 100 mg by mouth daily at bedtime, Disp: , Rfl:   No Known Allergies    Objective     Current Vitals:   Blood Pressure: 118/68 (01/11/24 1113)  Pulse: 68 (01/11/24 1113)  Temperature: 97.5 °F (36.4 °C) (01/11/24 1113)  Respirations: 18 (01/11/24 1113)  Height: 5' 11\" (180.3 cm) (01/11/24 1113)  Weight - Scale: 67.9 kg (149 lb 12.8 oz) (01/11/24 1113)    Physical Exam  Vitals and nursing note reviewed.   Abdominal:      Comments: Abdomen is soft, nondistended and nontender.  Incisions are well-healed without evidence of infection or incisional hernia.       "

## 2024-01-12 ENCOUNTER — TELEPHONE (OUTPATIENT)
Age: 74
End: 2024-01-12

## 2024-01-19 PROBLEM — Z00.00 MEDICARE ANNUAL WELLNESS VISIT, SUBSEQUENT: Status: RESOLVED | Noted: 2023-11-20 | Resolved: 2024-01-19

## 2024-01-19 PROBLEM — B86 SCABIES: Status: RESOLVED | Noted: 2023-11-20 | Resolved: 2024-01-19

## 2024-01-30 DIAGNOSIS — R09.81 NASAL CONGESTION: ICD-10-CM

## 2024-01-30 RX ORDER — FLUTICASONE PROPIONATE 50 MCG
2 SPRAY, SUSPENSION (ML) NASAL DAILY
Qty: 16 G | Refills: 11 | Status: SHIPPED | OUTPATIENT
Start: 2024-01-30

## 2024-02-01 ENCOUNTER — APPOINTMENT (OUTPATIENT)
Age: 74
End: 2024-02-01
Payer: MEDICARE

## 2024-02-01 DIAGNOSIS — Z79.899 ENCOUNTER FOR LONG-TERM (CURRENT) USE OF OTHER MEDICATIONS: ICD-10-CM

## 2024-02-01 DIAGNOSIS — F20.0 PARANOID SCHIZOPHRENIA, SUBCHRONIC CONDITION (HCC): ICD-10-CM

## 2024-02-01 LAB
BASOPHILS # BLD AUTO: 0.02 THOUSANDS/ÂΜL (ref 0–0.1)
BASOPHILS NFR BLD AUTO: 1 % (ref 0–1)
EOSINOPHIL # BLD AUTO: 0.08 THOUSAND/ÂΜL (ref 0–0.61)
EOSINOPHIL NFR BLD AUTO: 2 % (ref 0–6)
ERYTHROCYTE [DISTWIDTH] IN BLOOD BY AUTOMATED COUNT: 14.6 % (ref 11.6–15.1)
HCT VFR BLD AUTO: 40.7 % (ref 36.5–49.3)
HGB BLD-MCNC: 13.4 G/DL (ref 12–17)
IMM GRANULOCYTES # BLD AUTO: 0.01 THOUSAND/UL (ref 0–0.2)
IMM GRANULOCYTES NFR BLD AUTO: 0 % (ref 0–2)
LYMPHOCYTES # BLD AUTO: 0.9 THOUSANDS/ÂΜL (ref 0.6–4.47)
LYMPHOCYTES NFR BLD AUTO: 25 % (ref 14–44)
MCH RBC QN AUTO: 32.8 PG (ref 26.8–34.3)
MCHC RBC AUTO-ENTMCNC: 32.9 G/DL (ref 31.4–37.4)
MCV RBC AUTO: 100 FL (ref 82–98)
MONOCYTES # BLD AUTO: 0.44 THOUSAND/ÂΜL (ref 0.17–1.22)
MONOCYTES NFR BLD AUTO: 12 % (ref 4–12)
NEUTROPHILS # BLD AUTO: 2.2 THOUSANDS/ÂΜL (ref 1.85–7.62)
NEUTS SEG NFR BLD AUTO: 60 % (ref 43–75)
NRBC BLD AUTO-RTO: 0 /100 WBCS
PLATELET # BLD AUTO: 161 THOUSANDS/UL (ref 149–390)
PMV BLD AUTO: 9.8 FL (ref 8.9–12.7)
RBC # BLD AUTO: 4.09 MILLION/UL (ref 3.88–5.62)
WBC # BLD AUTO: 3.65 THOUSAND/UL (ref 4.31–10.16)

## 2024-02-01 PROCEDURE — 36415 COLL VENOUS BLD VENIPUNCTURE: CPT

## 2024-02-01 PROCEDURE — 85025 COMPLETE CBC W/AUTO DIFF WBC: CPT

## 2024-03-06 ENCOUNTER — APPOINTMENT (OUTPATIENT)
Age: 74
End: 2024-03-06
Payer: MEDICARE

## 2024-03-06 DIAGNOSIS — N13.8 BPH WITH OBSTRUCTION/LOWER URINARY TRACT SYMPTOMS: ICD-10-CM

## 2024-03-06 DIAGNOSIS — Z79.899 ENCOUNTER FOR LONG-TERM (CURRENT) USE OF OTHER MEDICATIONS: ICD-10-CM

## 2024-03-06 DIAGNOSIS — F20.0 PARANOID SCHIZOPHRENIA, SUBCHRONIC CONDITION (HCC): ICD-10-CM

## 2024-03-06 DIAGNOSIS — N40.1 BPH WITH OBSTRUCTION/LOWER URINARY TRACT SYMPTOMS: ICD-10-CM

## 2024-03-06 LAB
ALBUMIN SERPL BCP-MCNC: 3.7 G/DL (ref 3.5–5)
ALP SERPL-CCNC: 119 U/L (ref 34–104)
ALT SERPL W P-5'-P-CCNC: 41 U/L (ref 7–52)
ANION GAP SERPL CALCULATED.3IONS-SCNC: 7 MMOL/L
AST SERPL W P-5'-P-CCNC: 48 U/L (ref 13–39)
BASOPHILS # BLD AUTO: 0.02 THOUSANDS/ÂΜL (ref 0–0.1)
BASOPHILS NFR BLD AUTO: 1 % (ref 0–1)
BILIRUB SERPL-MCNC: 0.51 MG/DL (ref 0.2–1)
BUN SERPL-MCNC: 17 MG/DL (ref 5–25)
CALCIUM SERPL-MCNC: 8.9 MG/DL (ref 8.4–10.2)
CHLORIDE SERPL-SCNC: 106 MMOL/L (ref 96–108)
CO2 SERPL-SCNC: 28 MMOL/L (ref 21–32)
CREAT SERPL-MCNC: 0.59 MG/DL (ref 0.6–1.3)
EOSINOPHIL # BLD AUTO: 0.11 THOUSAND/ÂΜL (ref 0–0.61)
EOSINOPHIL NFR BLD AUTO: 3 % (ref 0–6)
ERYTHROCYTE [DISTWIDTH] IN BLOOD BY AUTOMATED COUNT: 14.4 % (ref 11.6–15.1)
GFR SERPL CREATININE-BSD FRML MDRD: 100 ML/MIN/1.73SQ M
GLUCOSE P FAST SERPL-MCNC: 76 MG/DL (ref 65–99)
HCT VFR BLD AUTO: 39.9 % (ref 36.5–49.3)
HGB BLD-MCNC: 13.7 G/DL (ref 12–17)
IMM GRANULOCYTES # BLD AUTO: 0.01 THOUSAND/UL (ref 0–0.2)
IMM GRANULOCYTES NFR BLD AUTO: 0 % (ref 0–2)
LYMPHOCYTES # BLD AUTO: 1.03 THOUSANDS/ÂΜL (ref 0.6–4.47)
LYMPHOCYTES NFR BLD AUTO: 29 % (ref 14–44)
MCH RBC QN AUTO: 33.6 PG (ref 26.8–34.3)
MCHC RBC AUTO-ENTMCNC: 34.3 G/DL (ref 31.4–37.4)
MCV RBC AUTO: 98 FL (ref 82–98)
MONOCYTES # BLD AUTO: 0.4 THOUSAND/ÂΜL (ref 0.17–1.22)
MONOCYTES NFR BLD AUTO: 11 % (ref 4–12)
NEUTROPHILS # BLD AUTO: 1.95 THOUSANDS/ÂΜL (ref 1.85–7.62)
NEUTS SEG NFR BLD AUTO: 56 % (ref 43–75)
NRBC BLD AUTO-RTO: 0 /100 WBCS
PLATELET # BLD AUTO: 148 THOUSANDS/UL (ref 149–390)
PMV BLD AUTO: 10 FL (ref 8.9–12.7)
POTASSIUM SERPL-SCNC: 4 MMOL/L (ref 3.5–5.3)
PROT SERPL-MCNC: 6.1 G/DL (ref 6.4–8.4)
PSA SERPL-MCNC: 0.65 NG/ML (ref 0–4)
RBC # BLD AUTO: 4.08 MILLION/UL (ref 3.88–5.62)
SODIUM SERPL-SCNC: 141 MMOL/L (ref 135–147)
WBC # BLD AUTO: 3.52 THOUSAND/UL (ref 4.31–10.16)

## 2024-03-06 PROCEDURE — 84153 ASSAY OF PSA TOTAL: CPT

## 2024-03-06 PROCEDURE — 85025 COMPLETE CBC W/AUTO DIFF WBC: CPT

## 2024-03-06 PROCEDURE — 80053 COMPREHEN METABOLIC PANEL: CPT

## 2024-03-06 PROCEDURE — 36415 COLL VENOUS BLD VENIPUNCTURE: CPT

## 2024-03-10 ENCOUNTER — APPOINTMENT (EMERGENCY)
Dept: CT IMAGING | Facility: HOSPITAL | Age: 74
DRG: 057 | End: 2024-03-10
Payer: MEDICARE

## 2024-03-10 ENCOUNTER — HOSPITAL ENCOUNTER (INPATIENT)
Facility: HOSPITAL | Age: 74
LOS: 2 days | Discharge: HOME HEALTH | DRG: 057 | End: 2024-03-13
Attending: EMERGENCY MEDICINE | Admitting: INTERNAL MEDICINE
Payer: MEDICARE

## 2024-03-10 DIAGNOSIS — R42 DIZZINESS: Primary | ICD-10-CM

## 2024-03-10 DIAGNOSIS — K21.9 GASTROESOPHAGEAL REFLUX DISEASE WITHOUT ESOPHAGITIS: ICD-10-CM

## 2024-03-10 LAB
ALBUMIN SERPL BCP-MCNC: 3.5 G/DL (ref 3.5–5)
ALP SERPL-CCNC: 115 U/L (ref 34–104)
ALT SERPL W P-5'-P-CCNC: 25 U/L (ref 7–52)
ANION GAP SERPL CALCULATED.3IONS-SCNC: 3 MMOL/L
APTT PPP: 39 SECONDS (ref 23–37)
AST SERPL W P-5'-P-CCNC: 37 U/L (ref 13–39)
BASOPHILS # BLD AUTO: 0.02 THOUSANDS/ÂΜL (ref 0–0.1)
BASOPHILS NFR BLD AUTO: 1 % (ref 0–1)
BILIRUB SERPL-MCNC: 0.42 MG/DL (ref 0.2–1)
BUN SERPL-MCNC: 21 MG/DL (ref 5–25)
CALCIUM SERPL-MCNC: 8.6 MG/DL (ref 8.4–10.2)
CARDIAC TROPONIN I PNL SERPL HS: 3 NG/L
CHLORIDE SERPL-SCNC: 110 MMOL/L (ref 96–108)
CO2 SERPL-SCNC: 28 MMOL/L (ref 21–32)
CREAT SERPL-MCNC: 0.7 MG/DL (ref 0.6–1.3)
EOSINOPHIL # BLD AUTO: 0.16 THOUSAND/ÂΜL (ref 0–0.61)
EOSINOPHIL NFR BLD AUTO: 4 % (ref 0–6)
ERYTHROCYTE [DISTWIDTH] IN BLOOD BY AUTOMATED COUNT: 14.5 % (ref 11.6–15.1)
FLUAV RNA RESP QL NAA+PROBE: NEGATIVE
FLUBV RNA RESP QL NAA+PROBE: NEGATIVE
GFR SERPL CREATININE-BSD FRML MDRD: 93 ML/MIN/1.73SQ M
GLUCOSE SERPL-MCNC: 86 MG/DL (ref 65–140)
GLUCOSE SERPL-MCNC: 95 MG/DL (ref 65–140)
HCT VFR BLD AUTO: 39 % (ref 36.5–49.3)
HGB BLD-MCNC: 13.4 G/DL (ref 12–17)
IMM GRANULOCYTES # BLD AUTO: 0.01 THOUSAND/UL (ref 0–0.2)
IMM GRANULOCYTES NFR BLD AUTO: 0 % (ref 0–2)
INR PPP: 1.09 (ref 0.84–1.19)
LYMPHOCYTES # BLD AUTO: 1.15 THOUSANDS/ÂΜL (ref 0.6–4.47)
LYMPHOCYTES NFR BLD AUTO: 29 % (ref 14–44)
MCH RBC QN AUTO: 33.8 PG (ref 26.8–34.3)
MCHC RBC AUTO-ENTMCNC: 34.4 G/DL (ref 31.4–37.4)
MCV RBC AUTO: 98 FL (ref 82–98)
MONOCYTES # BLD AUTO: 0.44 THOUSAND/ÂΜL (ref 0.17–1.22)
MONOCYTES NFR BLD AUTO: 11 % (ref 4–12)
NEUTROPHILS # BLD AUTO: 2.26 THOUSANDS/ÂΜL (ref 1.85–7.62)
NEUTS SEG NFR BLD AUTO: 55 % (ref 43–75)
NRBC BLD AUTO-RTO: 0 /100 WBCS
PLATELET # BLD AUTO: 143 THOUSANDS/UL (ref 149–390)
PMV BLD AUTO: 9.5 FL (ref 8.9–12.7)
POTASSIUM SERPL-SCNC: 4 MMOL/L (ref 3.5–5.3)
PROT SERPL-MCNC: 5.8 G/DL (ref 6.4–8.4)
PROTHROMBIN TIME: 14.7 SECONDS (ref 11.6–14.5)
RBC # BLD AUTO: 3.97 MILLION/UL (ref 3.88–5.62)
RSV RNA RESP QL NAA+PROBE: NEGATIVE
SARS-COV-2 RNA RESP QL NAA+PROBE: NEGATIVE
SODIUM SERPL-SCNC: 141 MMOL/L (ref 135–147)
WBC # BLD AUTO: 4.04 THOUSAND/UL (ref 4.31–10.16)

## 2024-03-10 PROCEDURE — 0241U HB NFCT DS VIR RESP RNA 4 TRGT: CPT | Performed by: EMERGENCY MEDICINE

## 2024-03-10 PROCEDURE — NC001 PR NO CHARGE: Performed by: PSYCHIATRY & NEUROLOGY

## 2024-03-10 PROCEDURE — 82948 REAGENT STRIP/BLOOD GLUCOSE: CPT

## 2024-03-10 PROCEDURE — 80053 COMPREHEN METABOLIC PANEL: CPT | Performed by: EMERGENCY MEDICINE

## 2024-03-10 PROCEDURE — 85730 THROMBOPLASTIN TIME PARTIAL: CPT | Performed by: EMERGENCY MEDICINE

## 2024-03-10 PROCEDURE — 84484 ASSAY OF TROPONIN QUANT: CPT | Performed by: EMERGENCY MEDICINE

## 2024-03-10 PROCEDURE — 85025 COMPLETE CBC W/AUTO DIFF WBC: CPT | Performed by: EMERGENCY MEDICINE

## 2024-03-10 PROCEDURE — 85610 PROTHROMBIN TIME: CPT | Performed by: EMERGENCY MEDICINE

## 2024-03-10 PROCEDURE — 36415 COLL VENOUS BLD VENIPUNCTURE: CPT | Performed by: EMERGENCY MEDICINE

## 2024-03-10 PROCEDURE — 70498 CT ANGIOGRAPHY NECK: CPT

## 2024-03-10 PROCEDURE — 93005 ELECTROCARDIOGRAM TRACING: CPT

## 2024-03-10 PROCEDURE — 70496 CT ANGIOGRAPHY HEAD: CPT

## 2024-03-10 PROCEDURE — 99285 EMERGENCY DEPT VISIT HI MDM: CPT | Performed by: EMERGENCY MEDICINE

## 2024-03-10 PROCEDURE — 99285 EMERGENCY DEPT VISIT HI MDM: CPT

## 2024-03-10 RX ORDER — MECLIZINE HYDROCHLORIDE 25 MG/1
25 TABLET ORAL ONCE
Status: COMPLETED | OUTPATIENT
Start: 2024-03-10 | End: 2024-03-10

## 2024-03-10 RX ADMIN — IOHEXOL 85 ML: 350 INJECTION, SOLUTION INTRAVENOUS at 22:05

## 2024-03-10 RX ADMIN — MECLIZINE HYDROCHLORIDE 25 MG: 25 TABLET ORAL at 22:57

## 2024-03-11 ENCOUNTER — APPOINTMENT (INPATIENT)
Dept: VASCULAR ULTRASOUND | Facility: HOSPITAL | Age: 74
DRG: 057 | End: 2024-03-11
Payer: MEDICARE

## 2024-03-11 ENCOUNTER — APPOINTMENT (OUTPATIENT)
Dept: NON INVASIVE DIAGNOSTICS | Facility: HOSPITAL | Age: 74
DRG: 057 | End: 2024-03-11
Payer: MEDICARE

## 2024-03-11 ENCOUNTER — APPOINTMENT (OUTPATIENT)
Dept: MRI IMAGING | Facility: HOSPITAL | Age: 74
DRG: 057 | End: 2024-03-11
Payer: MEDICARE

## 2024-03-11 PROBLEM — D72.819 LEUKOPENIA: Status: ACTIVE | Noted: 2024-03-11

## 2024-03-11 LAB
2HR DELTA HS TROPONIN: 0 NG/L
4HR DELTA HS TROPONIN: 0 NG/L
ANION GAP SERPL CALCULATED.3IONS-SCNC: 1 MMOL/L
AORTIC ROOT: 3.7 CM
AORTIC VALVE MEAN VELOCITY: 16.3 M/S
APICAL FOUR CHAMBER EJECTION FRACTION: 60 %
ASCENDING AORTA: 3.7 CM
ATRIAL RATE: 71 BPM
AV AREA BY CONTINUOUS VTI: 1.7 CM2
AV AREA PEAK VELOCITY: 1.9 CM2
AV LVOT MEAN GRADIENT: 3 MMHG
AV LVOT PEAK GRADIENT: 6 MMHG
AV MEAN GRADIENT: 12 MMHG
AV PEAK GRADIENT: 20 MMHG
AV VALVE AREA: 1.67 CM2
BSA FOR ECHO PROCEDURE: 1.9 M2
BUN SERPL-MCNC: 18 MG/DL (ref 5–25)
CALCIUM SERPL-MCNC: 8.7 MG/DL (ref 8.4–10.2)
CARDIAC TROPONIN I PNL SERPL HS: 3 NG/L
CARDIAC TROPONIN I PNL SERPL HS: 3 NG/L
CHLORIDE SERPL-SCNC: 109 MMOL/L (ref 96–108)
CHOLEST SERPL-MCNC: 58 MG/DL
CO2 SERPL-SCNC: 31 MMOL/L (ref 21–32)
CREAT SERPL-MCNC: 0.62 MG/DL (ref 0.6–1.3)
DOP CALC AO VTI: 49.2 CM
DOP CALC LVOT AREA: 3.46
DOP CALC LVOT DIAMETER: 2.1 CM
DOP CALC LVOT PEAK VEL VTI: 23.8 CM
DOP CALC LVOT PEAK VEL: 1.21 M/S
DOP CALC LVOT STROKE INDEX: 43.2 ML/M2
DOP CALC LVOT STROKE VOLUME: 82.39
E WAVE DECELERATION TIME: 196 MS
E/A RATIO: 1.05
ERYTHROCYTE [DISTWIDTH] IN BLOOD BY AUTOMATED COUNT: 14.4 % (ref 11.6–15.1)
EST. AVERAGE GLUCOSE BLD GHB EST-MCNC: 91 MG/DL
FRACTIONAL SHORTENING: 31 (ref 28–44)
GFR SERPL CREATININE-BSD FRML MDRD: 98 ML/MIN/1.73SQ M
GLUCOSE SERPL-MCNC: 96 MG/DL (ref 65–140)
HBA1C MFR BLD: 4.8 %
HCT VFR BLD AUTO: 38.2 % (ref 36.5–49.3)
HDLC SERPL-MCNC: 30 MG/DL
HGB BLD-MCNC: 13 G/DL (ref 12–17)
INTERVENTRICULAR SEPTUM IN DIASTOLE (PARASTERNAL SHORT AXIS VIEW): 0.9 CM
INTERVENTRICULAR SEPTUM: 0.9 CM (ref 0.6–1.1)
LA/AORTA RATIO 2D: 1.08
LAAS-AP2: 22.5 CM2
LAAS-AP4: 15.4 CM2
LDLC SERPL CALC-MCNC: 18 MG/DL (ref 0–100)
LEFT ATRIUM SIZE: 4 CM
LEFT ATRIUM VOLUME (MOD BIPLANE): 55 ML
LEFT ATRIUM VOLUME INDEX (MOD BIPLANE): 28.9 ML/M2
LEFT INTERNAL DIMENSION IN SYSTOLE: 2.9 CM (ref 2.1–4)
LEFT VENTRICULAR INTERNAL DIMENSION IN DIASTOLE: 4.2 CM (ref 3.5–6)
LEFT VENTRICULAR POSTERIOR WALL IN END DIASTOLE: 1.2 CM
LEFT VENTRICULAR STROKE VOLUME: 47 ML
LVSV (TEICH): 47 ML
MCH RBC QN AUTO: 33.4 PG (ref 26.8–34.3)
MCHC RBC AUTO-ENTMCNC: 34 G/DL (ref 31.4–37.4)
MCV RBC AUTO: 98 FL (ref 82–98)
MV E'TISSUE VEL-SEP: 8 CM/S
MV PEAK A VEL: 0.79 M/S
MV PEAK E VEL: 83 CM/S
MV STENOSIS PRESSURE HALF TIME: 57 MS
MV VALVE AREA P 1/2 METHOD: 3.86
P AXIS: 64 DEGREES
PLATELET # BLD AUTO: 137 THOUSANDS/UL (ref 149–390)
PMV BLD AUTO: 9.3 FL (ref 8.9–12.7)
POTASSIUM SERPL-SCNC: 3.8 MMOL/L (ref 3.5–5.3)
PR INTERVAL: 204 MS
QRS AXIS: 63 DEGREES
QRSD INTERVAL: 104 MS
QT INTERVAL: 414 MS
QTC INTERVAL: 449 MS
RBC # BLD AUTO: 3.89 MILLION/UL (ref 3.88–5.62)
RIGHT VENTRICLE ID DIMENSION: 3.5 CM
SL CV LV EF: 60
SL CV PED ECHO LEFT VENTRICLE DIASTOLIC VOLUME (MOD BIPLANE) 2D: 78 ML
SL CV PED ECHO LEFT VENTRICLE SYSTOLIC VOLUME (MOD BIPLANE) 2D: 31 ML
SODIUM SERPL-SCNC: 141 MMOL/L (ref 135–147)
T WAVE AXIS: 67 DEGREES
TR MAX PG: 18 MMHG
TR PEAK VELOCITY: 2.2 M/S
TRICUSPID ANNULAR PLANE SYSTOLIC EXCURSION: 2.8 CM
TRICUSPID VALVE PEAK REGURGITATION VELOCITY: 2.15 M/S
TRIGL SERPL-MCNC: 50 MG/DL
VENTRICULAR RATE: 71 BPM
WBC # BLD AUTO: 4.11 THOUSAND/UL (ref 4.31–10.16)

## 2024-03-11 PROCEDURE — 80048 BASIC METABOLIC PNL TOTAL CA: CPT | Performed by: INTERNAL MEDICINE

## 2024-03-11 PROCEDURE — 84484 ASSAY OF TROPONIN QUANT: CPT | Performed by: EMERGENCY MEDICINE

## 2024-03-11 PROCEDURE — 99223 1ST HOSP IP/OBS HIGH 75: CPT | Performed by: INTERNAL MEDICINE

## 2024-03-11 PROCEDURE — 83036 HEMOGLOBIN GLYCOSYLATED A1C: CPT | Performed by: INTERNAL MEDICINE

## 2024-03-11 PROCEDURE — 99215 OFFICE O/P EST HI 40 MIN: CPT | Performed by: PSYCHIATRY & NEUROLOGY

## 2024-03-11 PROCEDURE — 93010 ELECTROCARDIOGRAM REPORT: CPT | Performed by: INTERNAL MEDICINE

## 2024-03-11 PROCEDURE — 70551 MRI BRAIN STEM W/O DYE: CPT

## 2024-03-11 PROCEDURE — 93923 UPR/LXTR ART STDY 3+ LVLS: CPT | Performed by: SURGERY

## 2024-03-11 PROCEDURE — 80061 LIPID PANEL: CPT | Performed by: INTERNAL MEDICINE

## 2024-03-11 PROCEDURE — 93306 TTE W/DOPPLER COMPLETE: CPT

## 2024-03-11 PROCEDURE — 97163 PT EVAL HIGH COMPLEX 45 MIN: CPT

## 2024-03-11 PROCEDURE — 36415 COLL VENOUS BLD VENIPUNCTURE: CPT | Performed by: EMERGENCY MEDICINE

## 2024-03-11 PROCEDURE — 97167 OT EVAL HIGH COMPLEX 60 MIN: CPT

## 2024-03-11 PROCEDURE — 93306 TTE W/DOPPLER COMPLETE: CPT | Performed by: INTERNAL MEDICINE

## 2024-03-11 PROCEDURE — 93923 UPR/LXTR ART STDY 3+ LVLS: CPT

## 2024-03-11 PROCEDURE — 85027 COMPLETE CBC AUTOMATED: CPT | Performed by: INTERNAL MEDICINE

## 2024-03-11 RX ORDER — TEMAZEPAM 15 MG/1
15 CAPSULE ORAL
Status: DISCONTINUED | OUTPATIENT
Start: 2024-03-11 | End: 2024-03-13 | Stop reason: HOSPADM

## 2024-03-11 RX ORDER — HYDROXYZINE HYDROCHLORIDE 25 MG/1
25 TABLET, FILM COATED ORAL EVERY 6 HOURS PRN
Status: DISCONTINUED | OUTPATIENT
Start: 2024-03-11 | End: 2024-03-13 | Stop reason: HOSPADM

## 2024-03-11 RX ORDER — KETOTIFEN FUMARATE 0.35 MG/ML
1 SOLUTION/ DROPS OPHTHALMIC
Status: DISCONTINUED | OUTPATIENT
Start: 2024-03-11 | End: 2024-03-13 | Stop reason: HOSPADM

## 2024-03-11 RX ORDER — IBUPROFEN 600 MG/1
600 TABLET ORAL EVERY 6 HOURS PRN
Status: DISCONTINUED | OUTPATIENT
Start: 2024-03-11 | End: 2024-03-11

## 2024-03-11 RX ORDER — TOPIRAMATE 25 MG/1
25 TABLET ORAL 2 TIMES DAILY
Status: DISCONTINUED | OUTPATIENT
Start: 2024-03-11 | End: 2024-03-13 | Stop reason: HOSPADM

## 2024-03-11 RX ORDER — PANTOPRAZOLE SODIUM 40 MG/1
40 TABLET, DELAYED RELEASE ORAL
Status: DISCONTINUED | OUTPATIENT
Start: 2024-03-11 | End: 2024-03-13 | Stop reason: HOSPADM

## 2024-03-11 RX ORDER — ASPIRIN 81 MG/1
81 TABLET, CHEWABLE ORAL DAILY
Status: DISCONTINUED | OUTPATIENT
Start: 2024-03-11 | End: 2024-03-13 | Stop reason: HOSPADM

## 2024-03-11 RX ORDER — FLUTICASONE PROPIONATE 50 MCG
2 SPRAY, SUSPENSION (ML) NASAL DAILY
Status: DISCONTINUED | OUTPATIENT
Start: 2024-03-11 | End: 2024-03-13 | Stop reason: HOSPADM

## 2024-03-11 RX ORDER — DEUTETRABENAZINE 24 MG/1
24 TABLET, FILM COATED, EXTENDED RELEASE ORAL DAILY
COMMUNITY

## 2024-03-11 RX ORDER — METOPROLOL SUCCINATE 25 MG/1
25 TABLET, EXTENDED RELEASE ORAL DAILY
Status: DISCONTINUED | OUTPATIENT
Start: 2024-03-11 | End: 2024-03-13 | Stop reason: HOSPADM

## 2024-03-11 RX ORDER — IBUPROFEN 600 MG/1
600 TABLET ORAL EVERY 6 HOURS PRN
Status: DISCONTINUED | OUTPATIENT
Start: 2024-03-11 | End: 2024-03-13 | Stop reason: HOSPADM

## 2024-03-11 RX ORDER — DOCUSATE SODIUM 100 MG/1
100 CAPSULE, LIQUID FILLED ORAL 2 TIMES DAILY
Status: DISCONTINUED | OUTPATIENT
Start: 2024-03-11 | End: 2024-03-13 | Stop reason: HOSPADM

## 2024-03-11 RX ORDER — MELATONIN
1000 DAILY
Status: DISCONTINUED | OUTPATIENT
Start: 2024-03-11 | End: 2024-03-13 | Stop reason: HOSPADM

## 2024-03-11 RX ORDER — PSYLLIUM HUSK (WITH SUGAR) 3 G/12 G
1 POWDER (GRAM) ORAL 2 TIMES DAILY
COMMUNITY
End: 2024-03-20 | Stop reason: SDUPTHER

## 2024-03-11 RX ORDER — ENOXAPARIN SODIUM 100 MG/ML
40 INJECTION SUBCUTANEOUS DAILY
Status: DISCONTINUED | OUTPATIENT
Start: 2024-03-11 | End: 2024-03-13 | Stop reason: HOSPADM

## 2024-03-11 RX ORDER — POLYETHYLENE GLYCOL 3350 17 G/17G
17 POWDER, FOR SOLUTION ORAL DAILY
Status: DISCONTINUED | OUTPATIENT
Start: 2024-03-11 | End: 2024-03-13 | Stop reason: HOSPADM

## 2024-03-11 RX ORDER — SODIUM LACTATE/LA/MIN OIL/WATR
1 CREAM (ML) TOPICAL AS NEEDED
COMMUNITY

## 2024-03-11 RX ORDER — TAMSULOSIN HYDROCHLORIDE 0.4 MG/1
0.4 CAPSULE ORAL
Status: DISCONTINUED | OUTPATIENT
Start: 2024-03-11 | End: 2024-03-13 | Stop reason: HOSPADM

## 2024-03-11 RX ORDER — ACETAMINOPHEN 325 MG/1
650 TABLET ORAL EVERY 6 HOURS PRN
Status: DISCONTINUED | OUTPATIENT
Start: 2024-03-11 | End: 2024-03-13 | Stop reason: HOSPADM

## 2024-03-11 RX ORDER — MECLIZINE HYDROCHLORIDE 25 MG/1
25 TABLET ORAL EVERY 8 HOURS PRN
Status: DISCONTINUED | OUTPATIENT
Start: 2024-03-11 | End: 2024-03-13 | Stop reason: HOSPADM

## 2024-03-11 RX ORDER — CLOZAPINE 100 MG/1
200 TABLET ORAL
Status: DISCONTINUED | OUTPATIENT
Start: 2024-03-11 | End: 2024-03-13 | Stop reason: HOSPADM

## 2024-03-11 RX ORDER — ACETAMINOPHEN 325 MG/1
650 TABLET ORAL ONCE
Status: COMPLETED | OUTPATIENT
Start: 2024-03-11 | End: 2024-03-11

## 2024-03-11 RX ORDER — CLOZAPINE 100 MG/1
100 TABLET ORAL 2 TIMES DAILY
Status: DISCONTINUED | OUTPATIENT
Start: 2024-03-11 | End: 2024-03-13 | Stop reason: HOSPADM

## 2024-03-11 RX ORDER — TRAZODONE HYDROCHLORIDE 100 MG/1
100 TABLET ORAL
Status: DISCONTINUED | OUTPATIENT
Start: 2024-03-11 | End: 2024-03-13 | Stop reason: HOSPADM

## 2024-03-11 RX ORDER — ATORVASTATIN CALCIUM 40 MG/1
40 TABLET, FILM COATED ORAL
Status: DISCONTINUED | OUTPATIENT
Start: 2024-03-11 | End: 2024-03-13 | Stop reason: HOSPADM

## 2024-03-11 RX ORDER — ZINC OXIDE AND DIMETHICONE 120; 10 MG/G; MG/G
1 CREAM TOPICAL ONCE AS NEEDED
COMMUNITY

## 2024-03-11 RX ADMIN — TOPIRAMATE 25 MG: 25 TABLET, FILM COATED ORAL at 17:44

## 2024-03-11 RX ADMIN — PANTOPRAZOLE SODIUM 40 MG: 40 TABLET, DELAYED RELEASE ORAL at 05:38

## 2024-03-11 RX ADMIN — CARBIDOPA AND LEVODOPA 1 TABLET: 25; 100 TABLET ORAL at 20:31

## 2024-03-11 RX ADMIN — CARBIDOPA AND LEVODOPA 1 TABLET: 25; 100 TABLET ORAL at 09:46

## 2024-03-11 RX ADMIN — ASPIRIN 81 MG: 81 TABLET, CHEWABLE ORAL at 08:47

## 2024-03-11 RX ADMIN — TAMSULOSIN HYDROCHLORIDE 0.4 MG: 0.4 CAPSULE ORAL at 21:47

## 2024-03-11 RX ADMIN — HYDROXYZINE HYDROCHLORIDE 25 MG: 25 TABLET ORAL at 08:47

## 2024-03-11 RX ADMIN — TRAZODONE HYDROCHLORIDE 100 MG: 100 TABLET ORAL at 21:46

## 2024-03-11 RX ADMIN — CLOZAPINE 100 MG: 100 TABLET ORAL at 08:47

## 2024-03-11 RX ADMIN — CLOZAPINE 200 MG: 100 TABLET ORAL at 22:34

## 2024-03-11 RX ADMIN — DOCUSATE SODIUM 100 MG: 100 CAPSULE, LIQUID FILLED ORAL at 08:47

## 2024-03-11 RX ADMIN — DOCUSATE SODIUM 100 MG: 100 CAPSULE, LIQUID FILLED ORAL at 17:44

## 2024-03-11 RX ADMIN — FLUTICASONE PROPIONATE 2 SPRAY: 50 SPRAY, METERED NASAL at 08:48

## 2024-03-11 RX ADMIN — TOPIRAMATE 25 MG: 25 TABLET, FILM COATED ORAL at 08:54

## 2024-03-11 RX ADMIN — POLYETHYLENE GLYCOL 3350 17 G: 17 POWDER, FOR SOLUTION ORAL at 08:54

## 2024-03-11 RX ADMIN — MECLIZINE HYDROCHLORIDE 25 MG: 25 TABLET ORAL at 08:47

## 2024-03-11 RX ADMIN — ATORVASTATIN CALCIUM 40 MG: 40 TABLET, FILM COATED ORAL at 17:44

## 2024-03-11 RX ADMIN — Medication 1000 UNITS: at 08:47

## 2024-03-11 RX ADMIN — METOPROLOL SUCCINATE 25 MG: 25 TABLET, EXTENDED RELEASE ORAL at 08:47

## 2024-03-11 RX ADMIN — ACETAMINOPHEN 650 MG: 325 TABLET, FILM COATED ORAL at 00:30

## 2024-03-11 RX ADMIN — ENOXAPARIN SODIUM 40 MG: 40 INJECTION SUBCUTANEOUS at 08:48

## 2024-03-11 RX ADMIN — TEMAZEPAM 15 MG: 15 CAPSULE ORAL at 21:47

## 2024-03-11 RX ADMIN — CLOZAPINE 100 MG: 100 TABLET ORAL at 20:31

## 2024-03-11 NOTE — ED NOTES
Mirian from [facility] where patient resides updated on patient status.   Mirian is aware that the patient will be staying and is waiting for brain MRI at this time.        Florinda Gaston RN  03/11/24 0945       Florinda Gaston RN  03/11/24 0945

## 2024-03-11 NOTE — QUICK NOTE
Persistent dizziness  Possibly peripheral vertigo  Given risk factors MRI ordered  Await results  Follow-up neurology consultation

## 2024-03-11 NOTE — ASSESSMENT & PLAN NOTE
Sumit Nails is a 73 y.o. male with HTN, HLD, paranoid schizophrenia, neuroleptic induced parkinsonism, BPH, prior tobacco abuse, COPD who presented to [place] ED on 3/10/2024 as a stroke alert with acute onset dizziness.  BP on presentation 126/75.  NIHSS of 0.  CTH/CTA H&N imaging unremarkable.  Patient was not an IV TNK candidate as symptoms were improving and low NIHSS.    Workup:  - CT head: Unremarkable for acute intracranial abnormalities  - CTA head and neck:   Unremarkable for large vessel occlusion or critical stenosis  Incidental finding of fetal left PCA  - Lipid panel: Total cholesterol 58, triglycerides 50, HDL 30, LDL 18  - Hemoglobin A1c: 4.8  - Labs WNL: Troponin, flu/RSV/COVID    Dizziness described as room spinning associated with nausea.  Minimal relief with meclizine dose on arrival.  Stronger suspicion for peripheral etiology as patient appears to have had ear cleaning on 3/8/2024 vs secondary to orthostatic hypotension.  Cannot rule out acute infarct, will obtain MRI brain.    Plan:  - MRI brain pending  - Echo pending  - Started on ASA 81 mg daily, plan to continue  - Continue home atorvastatin 40 mg daily  - Meclizine PRN  - Telemetry  - Orthostatics pending   - Frequent neurochecks  - PT/OT/speech  - Stroke education  - Medical management and supportive care per primary team, notify with changes

## 2024-03-11 NOTE — ED PROVIDER NOTES
"History  Chief Complaint   Patient presents with    Dizziness     Patient arrives via EMS from [facility] with co Dizziness and nausea that started around 1830. Patient reports hx of this.      73-year-old male presents from [facility] due to sudden onset dizziness that started around 1830 hrs. according to the patient.    Patient does have a history of paranoid schizophrenia though he appears to be a reliable historian and is adamant that the timing started at that hour.    Patient states he was getting ready to go to bed and \"I got in the bed, I took my medicine, and the whole room was spinning around.\"  Patient further describes that he felt as though there was \"radiation in my room\" and he felt \"the radiation was coming in.\"  Patient has further difficulty characterizing this though he appears lucid and denies hallucinations.  Patient states his dizziness has improved though he states he feels some intermittent symptoms and further describes it as \"spinning like I am going to fall down.\"    Patient states he told the facility workers and subsequently called his sister.  EMS was subsequently contacted who states the patient was nauseous upon their arrival though he did not vomit.  Patient states his nausea has persisted though improved.    Patient does note that they recently added a pain medication approximately 4 days ago but otherwise denies any medication changes.    Patient denies any otalgia or any recent illnesses.    Patient denies any history of prior CVA though he does have a history of parkinsonism for which he is on levodopa carbidopa.  Patient also notably is on clozapine he states they have not changed his medication recently.    Impression and plan: Dizziness with a broad differential.  Based on patient's history and age, stroke alert initiated upon initial arrival.  Discussed with neurology, no plans for thrombolytics considering patient's NIH stroke scale is 0 and patient's symptoms " appear to be improving.  Patient unable to accurately state the year though it is unclear if this is his baseline and he is able to state the month appropriately.  Symptoms could represent peripheral etiology though no clear findings on physical examination though history seems to be more consistent with peripheral etiology, could represent central pathology considering sudden onset nature and associated symptoms.  Patient able to walk in the room unassisted though he notes worsening symptoms when attempting this.  Will obtain CT imaging, monitor, reassess, with plans for admission on stroke pathway.        Prior to Admission Medications   Prescriptions Last Dose Informant Patient Reported? Taking?   Cholecalciferol (Vitamin D High Potency) 25 MCG (1000 UT) capsule  Care Giver No No   Sig: Take 1 capsule (1,000 Units total) by mouth daily   Deutetrabenazine ER (Austedo XR) 24 MG TB24   Yes Yes   Sig: Take 24 mg by mouth daily   Emollient (Lubriderm Advanced Therapy) LOTN   Yes Yes   Sig: Apply 1 Application topically as needed (rash)   Nutritional Supplements (Ensure High Protein) LIQD  Care Giver No No   Sig: Take 237 mL by mouth 2 (two) times a day   Patient taking differently: Take 237 mL by mouth 2 (two) times a day vanilla   Psyllium (Reguloid) 28.3 % POWD   Yes Yes   Sig: Take 1 Scoop by mouth 2 (two) times a day   Zinc Oxide (Geni Protect Moisture Barrier) 12 % CREA   Yes Yes   Sig: Apply 1 Application topically once as needed (wound care)   acetaminophen (Acetaminophen Extra Strength) 500 mg tablet  Care Giver No No   Sig: Take 1 tablet (500 mg total) by mouth every 6 (six) hours as needed for mild pain   atorvastatin (LIPITOR) 40 mg tablet  Care Giver No No   Sig: Take 1 tablet (40 mg total) by mouth daily   carbidopa-levodopa (SINEMET)  mg per tablet  Care Giver No No   Sig: Take 1 tablet by mouth 3 (three) times a day   Patient taking differently: Take 1 tablet by mouth 2 (two) times a day    cloZAPine (CLOZARIL) 100 mg tablet  Care Giver Yes No   Sig: Take 100 mg by mouth 2 (two) times a day   clozapine (CLOZARIL) 200 MG tablet  Care Giver Yes No   Sig: Take 200 mg by mouth daily at bedtime    docusate sodium (COLACE) 100 mg capsule  Care Giver Yes No   Sig: Take 100 mg by mouth 2 (two) times a day   fluticasone (FLONASE) 50 mcg/act nasal spray   No No   Si sprays into each nostril daily   hydrOXYzine HCL (ATARAX) 25 mg tablet  Care Giver No No   Sig: Take 1 tablet (25 mg total) by mouth every 6 (six) hours as needed for itching   metoprolol succinate (TOPROL-XL) 25 mg 24 hr tablet  Care Giver No No   Sig: Take 1 tablet (25 mg total) by mouth daily   olopatadine (PATANOL) 0.1 % ophthalmic solution  Care Giver Yes No   Sig: Administer 1 drop to both eyes daily at bedtime   omeprazole (PriLOSEC) 40 MG capsule  Care Giver No No   Sig: Take 1 capsule (40 mg total) by mouth daily   polyethylene glycol (MIRALAX) 17 g packet  Care Giver No No   Sig: Take 17 g by mouth daily   tamsulosin (FLOMAX) 0.4 mg  Care Giver No No   Sig: Take 1 capsule (0.4 mg total) by mouth daily at bedtime   temazepam (RESTORIL) 15 mg capsule 3/10/2024 Care Giver No Yes   Sig: Take 1 capsule (15 mg total) by mouth daily at bedtime   topiramate (TOPAMAX) 25 mg tablet  Care Giver Yes No   Sig: Take 25 mg by mouth 2 (two) times a day   traZODone (DESYREL) 100 mg tablet  Care Giver Yes No   Sig: Take 100 mg by mouth daily at bedtime      Facility-Administered Medications: None       Past Medical History:   Diagnosis Date    Asthma     Benign prostatic hyperplasia     COPD (chronic obstructive pulmonary disease) (HCC)     GERD (gastroesophageal reflux disease)     Herpes zoster without complication 12/10/2021    Hypercholesteremia     Hypertension     Neuroleptic induced parkinsonism      Paranoid schizophrenia (HCC)     Psychiatric disorder        Past Surgical History:   Procedure Laterality Date    CATARACT EXTRACTION       CHOLECYSTECTOMY LAPAROSCOPIC N/A 12/3/2023    Procedure: CHOLECYSTECTOMY LAPAROSCOPIC WITH INTRAOPERATIVE CHOLANGIOGRAM;  Surgeon: Maverick Tamayo MD;  Location: [facility];  Service: General    COLONOSCOPY         Family History   Problem Relation Age of Onset    No Known Problems Mother     No Known Problems Father     Parkinsonism Son      I have reviewed and agree with the history as documented.    E-Cigarette/Vaping    E-Cigarette Use Never User      E-Cigarette/Vaping Substances    Nicotine No     THC No     CBD No     Flavoring No     Other No     Unknown No      Social History     Tobacco Use    Smoking status: Former     Current packs/day: 0.00     Average packs/day: 1 pack/day for 20.0 years (20.0 ttl pk-yrs)     Types: Cigarettes     Start date:      Quit date:      Years since quittin.2     Passive exposure: Past    Smokeless tobacco: Never   Vaping Use    Vaping status: Never Used   Substance Use Topics    Alcohol use: Not Currently    Drug use: Never       Review of Systems    Physical Exam  Physical Exam  Vitals reviewed.   HENT:      Head: Atraumatic.   Eyes:      General: No visual field deficit.     Pupils: Pupils are equal, round, and reactive to light.   Cardiovascular:      Rate and Rhythm: Normal rate.   Abdominal:      General: There is no distension.   Musculoskeletal:         General: No deformity.      Cervical back: Neck supple.   Skin:     General: Skin is warm and dry.   Neurological:      General: No focal deficit present.      Mental Status: He is alert. He is disoriented.      Cranial Nerves: No cranial nerve deficit, dysarthria or facial asymmetry.      Sensory: No sensory deficit.      Motor: No weakness.      Coordination: Coordination is intact. Coordination normal. Finger-Nose-Finger Test normal.      Gait: Gait is intact.      Comments: Oriented to person and place.  Oriented to month though inconsistently states the year as 23 or 40.  Unclear baseline.    Patient  identifies common objects appropriately with no difficulty.  Fluent speech without difficulty understanding.    Able to maintain against gravity for 10 seconds with upper and 5 seconds with lower with no drift.    There is a mild chewing effect likely from patient's underlying psychiatric medications.         Vital Signs  ED Triage Vitals   Temperature Pulse Respirations Blood Pressure SpO2   03/10/24 2150 03/10/24 2150 03/10/24 2150 03/10/24 2150 03/10/24 2150   98.8 °F (37.1 °C) 70 18 126/75 97 %      Temp Source Heart Rate Source Patient Position - Orthostatic VS BP Location FiO2 (%)   03/10/24 2150 03/10/24 2150 03/10/24 2150 03/10/24 2150 --   Oral Monitor Sitting Right arm       Pain Score       03/11/24 0030       8           Vitals:    03/12/24 1456 03/12/24 1500 03/12/24 1900 03/12/24 2300   BP: 119/71 119/71 137/79 134/90   Pulse: 66 70  68   Patient Position - Orthostatic VS:  Sitting  Lying         Visual Acuity  Visual Acuity      Flowsheet Row Most Recent Value   L Pupil Size (mm) 3   R Pupil Size (mm) 3   L Pupil Shape Round   R Pupil Shape Round            ED Medications  Medications   acetaminophen (TYLENOL) tablet 650 mg (has no administration in time range)   enoxaparin (LOVENOX) subcutaneous injection 40 mg (40 mg Subcutaneous Given 3/12/24 0844)   atorvastatin (LIPITOR) tablet 40 mg (40 mg Oral Given 3/12/24 1739)   carbidopa-levodopa (SINEMET)  mg per tablet 1 tablet (1 tablet Oral Given 3/12/24 2000)   cholecalciferol (VITAMIN D3) tablet 1,000 Units (1,000 Units Oral Given 3/12/24 0844)   cloZAPine (CLOZARIL) tablet 100 mg (100 mg Oral Given 3/12/24 2000)   cloZAPine (CLOZARIL) tablet 200 mg (200 mg Oral Given 3/12/24 2135)   docusate sodium (COLACE) capsule 100 mg (100 mg Oral Given 3/12/24 1739)   fluticasone (FLONASE) 50 mcg/act nasal spray 2 spray (2 sprays Nasal Given 3/12/24 0845)   hydrOXYzine HCL (ATARAX) tablet 25 mg (25 mg Oral Given 3/11/24 8294)   metoprolol succinate  (TOPROL-XL) 24 hr tablet 25 mg (25 mg Oral Given 3/12/24 0844)   Ketotifen Fumarate (ZADITOR) 0.035 % ophthalmic solution 1 drop (1 drop Both Eyes Not Given 3/12/24 2136)   pantoprazole (PROTONIX) EC tablet 40 mg (40 mg Oral Given 3/12/24 0610)   polyethylene glycol (MIRALAX) packet 17 g (17 g Oral Given 3/12/24 0844)   tamsulosin (FLOMAX) capsule 0.4 mg (0.4 mg Oral Given 3/12/24 2136)   temazepam (RESTORIL) capsule 15 mg (15 mg Oral Given 3/12/24 2135)   topiramate (TOPAMAX) tablet 25 mg (25 mg Oral Given 3/12/24 1739)   traZODone (DESYREL) tablet 100 mg (100 mg Oral Given 3/12/24 2135)   aspirin chewable tablet 81 mg (81 mg Oral Given 3/12/24 0844)   meclizine (ANTIVERT) tablet 25 mg (25 mg Oral Given 3/11/24 0847)   ibuprofen (MOTRIN) tablet 600 mg (has no administration in time range)   iohexol (OMNIPAQUE) 350 MG/ML injection (MULTI-DOSE) 85 mL (85 mL Intravenous Given 3/10/24 2205)   meclizine (ANTIVERT) tablet 25 mg (25 mg Oral Given 3/10/24 2257)   acetaminophen (TYLENOL) tablet 650 mg (650 mg Oral Given 3/11/24 0030)       Diagnostic Studies  Results Reviewed       Procedure Component Value Units Date/Time    Basic metabolic panel [[id number]]  (Abnormal) Collected: 03/12/24 0518    Lab Status: Final result Specimen: Blood from Hand, Right Updated: 03/12/24 0502     Sodium 141 mmol/L      Potassium 3.9 mmol/L      Chloride 110 mmol/L      CO2 26 mmol/L      ANION GAP 5 mmol/L      BUN 13 mg/dL      Creatinine 0.59 mg/dL      Glucose 88 mg/dL      Calcium 8.8 mg/dL      eGFR 100 ml/min/1.73sq m     Narrative:      National Kidney Disease Foundation guidelines for Chronic Kidney Disease (CKD):     Stage 1 with normal or high GFR (GFR > 90 mL/min/1.73 square meters)    Stage 2 Mild CKD (GFR = 60-89 mL/min/1.73 square meters)    Stage 3A Moderate CKD (GFR = 45-59 mL/min/1.73 square meters)    Stage 3B Moderate CKD (GFR = 30-44 mL/min/1.73 square meters)    Stage 4 Severe CKD (GFR = 15-29 mL/min/1.73 square  meters)    Stage 5 End Stage CKD (GFR <15 mL/min/1.73 square meters)  Note: GFR calculation is accurate only with a steady state creatinine    CBC and differential [[id number]]  (Abnormal) Collected: 03/12/24 0439    Lab Status: Final result Specimen: Blood from Hand, Right Updated: 03/12/24 0447     WBC 4.49 Thousand/uL      RBC 4.04 Million/uL      Hemoglobin 13.6 g/dL      Hematocrit 39.6 %      MCV 98 fL      MCH 33.7 pg      MCHC 34.3 g/dL      RDW 14.5 %      MPV 9.4 fL      Platelets 132 Thousands/uL      nRBC 0 /100 WBCs      Neutrophils Relative 55 %      Immature Grans % 0 %      Lymphocytes Relative 29 %      Monocytes Relative 11 %      Eosinophils Relative 4 %      Basophils Relative 1 %      Neutrophils Absolute 2.47 Thousands/µL      Absolute Immature Grans 0.01 Thousand/uL      Absolute Lymphocytes 1.31 Thousands/µL      Absolute Monocytes 0.49 Thousand/µL      Eosinophils Absolute 0.18 Thousand/µL      Basophils Absolute 0.03 Thousands/µL     Hemoglobin A1C [[id number]] Collected: 03/11/24 0551    Lab Status: Final result Specimen: Blood from Arm, Left Updated: 03/11/24 0855     Hemoglobin A1C 4.8 %      EAG 91 mg/dl     HS Troponin I 4hr [[id number]]  (Normal) Collected: 03/11/24 0553    Lab Status: Final result Specimen: Blood from Arm, Left Updated: 03/11/24 0631     hs TnI 4hr 3 ng/L      Delta 4hr hsTnI 0 ng/L     Lipid Panel with Direct LDL reflex [[id number]]  (Abnormal) Collected: 03/11/24 0551    Lab Status: Final result Specimen: Blood from Arm, Left Updated: 03/11/24 0627     Cholesterol 58 mg/dL      Triglycerides 50 mg/dL      HDL, Direct 30 mg/dL      LDL Calculated 18 mg/dL     Basic metabolic panel [[id number]]  (Abnormal) Collected: 03/11/24 0551    Lab Status: Final result Specimen: Blood from Arm, Left Updated: 03/11/24 0627     Sodium 141 mmol/L      Potassium 3.8 mmol/L      Chloride 109 mmol/L      CO2 31 mmol/L      ANION GAP 1 mmol/L      BUN 18 mg/dL      Creatinine 0.62 mg/dL       Glucose 96 mg/dL      Calcium 8.7 mg/dL      eGFR 98 ml/min/1.73sq m     Narrative:      National Kidney Disease Foundation guidelines for Chronic Kidney Disease (CKD):     Stage 1 with normal or high GFR (GFR > 90 mL/min/1.73 square meters)    Stage 2 Mild CKD (GFR = 60-89 mL/min/1.73 square meters)    Stage 3A Moderate CKD (GFR = 45-59 mL/min/1.73 square meters)    Stage 3B Moderate CKD (GFR = 30-44 mL/min/1.73 square meters)    Stage 4 Severe CKD (GFR = 15-29 mL/min/1.73 square meters)    Stage 5 End Stage CKD (GFR <15 mL/min/1.73 square meters)  Note: GFR calculation is accurate only with a steady state creatinine    CBC (With Platelets) [[id number]]  (Abnormal) Collected: 03/11/24 0551    Lab Status: Final result Specimen: Blood from Arm, Left Updated: 03/11/24 0601     WBC 4.11 Thousand/uL      RBC 3.89 Million/uL      Hemoglobin 13.0 g/dL      Hematocrit 38.2 %      MCV 98 fL      MCH 33.4 pg      MCHC 34.0 g/dL      RDW 14.4 %      Platelets 137 Thousands/uL      MPV 9.3 fL     HS Troponin I 2hr [[id number]]  (Normal) Collected: 03/11/24 0009    Lab Status: Final result Specimen: Blood from Arm, Left Updated: 03/11/24 0039     hs TnI 2hr 3 ng/L      Delta 2hr hsTnI 0 ng/L     FLU/RSV/COVID - if FLU/RSV clinically relevant [[id number]]  (Normal) Collected: 03/10/24 2155    Lab Status: Final result Specimen: Nares from Nose Updated: 03/10/24 2255     SARS-CoV-2 Negative     INFLUENZA A PCR Negative     INFLUENZA B PCR Negative     RSV PCR Negative    Narrative:      FOR PEDIATRIC PATIENTS - copy/paste COVID Guidelines URL to browser: [email or web address]    SARS-CoV-2 assay is a Nucleic Acid Amplification assay intended for the  qualitative detection of nucleic acid from SARS-CoV-2 in nasopharyngeal  swabs. Results are for the presumptive identification of SARS-CoV-2 RNA.    Positive results are indicative of infection with SARS-CoV-2, the virus  causing  COVID-19, but do not rule out bacterial infection or co-infection  with other viruses. Laboratories within the [place] and its  territories are required to report all positive results to the appropriate  public health authorities. Negative results do not preclude SARS-CoV-2  infection and should not be used as the sole basis for treatment or other  patient management decisions. Negative results must be combined with  clinical observations, patient history, and epidemiological information.  This test has not been FDA cleared or approved.    This test has been authorized by FDA under an Emergency Use Authorization  (EUA). This test is only authorized for the duration of time the  declaration that circumstances exist justifying the authorization of the  emergency use of an in vitro diagnostic tests for detection of SARS-CoV-2  virus and/or diagnosis of COVID-19 infection under section 564(b)(1) of  the Act, 21 U.S.C. 360bbb-3(b)(1), unless the authorization is terminated  or revoked sooner. The test has been validated but independent review by FDA  and CLIA is pending.    Test performed using [employer] GeneXpert: This RT-PCR assay targets N2,  a region unique to SARS-CoV-2. A conserved region in the E-gene was chosen  for pan-Sarbecovirus detection which includes SARS-CoV-2.    According to CMS-2020-01-R, this platform meets the definition of high-throughput technology.    HS Troponin 0hr (reflex protocol) [[id number]]  (Normal) Collected: 03/10/24 2155    Lab Status: Final result Specimen: Blood from Arm, Left Updated: 03/10/24 2235     hs TnI 0hr 3 ng/L     Protime-INR [[id number]]  (Abnormal) Collected: 03/10/24 2155    Lab Status: Final result Specimen: Blood from Arm, Left Updated: 03/10/24 2222     Protime 14.7 seconds      INR 1.09    APTT [[id number]]  (Abnormal) Collected: 03/10/24 2155    Lab Status: Final result Specimen: Blood from Arm, Left Updated: 03/10/24 2222     PTT 39 seconds     Comprehensive  metabolic panel [[id number]]  (Abnormal) Collected: 03/10/24 2155    Lab Status: Final result Specimen: Blood from Arm, Left Updated: 03/10/24 2218     Sodium 141 mmol/L      Potassium 4.0 mmol/L      Chloride 110 mmol/L      CO2 28 mmol/L      ANION GAP 3 mmol/L      BUN 21 mg/dL      Creatinine 0.70 mg/dL      Glucose 86 mg/dL      Calcium 8.6 mg/dL      AST 37 U/L      ALT 25 U/L      Alkaline Phosphatase 115 U/L      Total Protein 5.8 g/dL      Albumin 3.5 g/dL      Total Bilirubin 0.42 mg/dL      eGFR 93 ml/min/1.73sq m     Narrative:      National Kidney Disease Foundation guidelines for Chronic Kidney Disease (CKD):     Stage 1 with normal or high GFR (GFR > 90 mL/min/1.73 square meters)    Stage 2 Mild CKD (GFR = 60-89 mL/min/1.73 square meters)    Stage 3A Moderate CKD (GFR = 45-59 mL/min/1.73 square meters)    Stage 3B Moderate CKD (GFR = 30-44 mL/min/1.73 square meters)    Stage 4 Severe CKD (GFR = 15-29 mL/min/1.73 square meters)    Stage 5 End Stage CKD (GFR <15 mL/min/1.73 square meters)  Note: GFR calculation is accurate only with a steady state creatinine    CBC and differential [[id number]]  (Abnormal) Collected: 03/10/24 2155    Lab Status: Final result Specimen: Blood from Arm, Left Updated: 03/10/24 2202     WBC 4.04 Thousand/uL      RBC 3.97 Million/uL      Hemoglobin 13.4 g/dL      Hematocrit 39.0 %      MCV 98 fL      MCH 33.8 pg      MCHC 34.4 g/dL      RDW 14.5 %      MPV 9.5 fL      Platelets 143 Thousands/uL      nRBC 0 /100 WBCs      Neutrophils Relative 55 %      Immature Grans % 0 %      Lymphocytes Relative 29 %      Monocytes Relative 11 %      Eosinophils Relative 4 %      Basophils Relative 1 %      Neutrophils Absolute 2.26 Thousands/µL      Absolute Immature Grans 0.01 Thousand/uL      Absolute Lymphocytes 1.15 Thousands/µL      Absolute Monocytes 0.44 Thousand/µL      Eosinophils Absolute 0.16 Thousand/µL      Basophils Absolute 0.02 Thousands/µL     Fingerstick Glucose (POCT)  [[id number]]  (Normal) Collected: 03/10/24 2153    Lab Status: Final result Specimen: Blood Updated: 03/10/24 2153     POC Glucose 95 mg/dl                    VAS KARLOS & waveform analysis, multiple levels   Final Result by Gaudencio Murray MD (03/11 1944)      MRI brain wo contrast   Final Result by Ravinder Gay MD (03/11 1408)      No acute intracranial abnormality.      Minimal chronic microangiopathy.      Workstation performed: [id number]         CTA stroke alert (head/neck)   Final Result by Bradley Leong MD (03/10 2242)      Unremarkable CTA neck exam with widely patent carotid/vertebral arteries bilaterally. Unremarkable CTA brain exam without large vessel arterial occlusion, focal saccular aneurysm, or significant flow-limiting stenosis identified. Incidentally noted fetal    origin of the left posterior cerebral artery.      No enhancing brain mass/fluid collection or vascular malformation. Major intracranial dural venous sinuses are grossly patent.      No enhancing soft tissue neck mass/fluid collection or cervical lymphadenopathy.      Findings were directly discussed with Dr. Reyes at 10:38 p.m.                           Workstation performed: [id number]         CT stroke alert brain   Final Result by Bradley Leong MD (03/10 2240)      No acute intracranial abnormality.      Findings were directly discussed with Dr. Reyes      Workstation performed: [id number]                    Procedures  Procedures         ED Course  ED Course as of 03/13/24 0350   Sun Mar 10, 2024   2248 EKG demonstrates normal sinus rhythm no acute ST segment changes.   Mon Mar 11, 2024   0055 Discussed with radiology, CT without acute findings.  Patient notes persistent dizziness, some improvement following treatment though mainly with movement.  Patient notes persistent intermittent leg pain though this had resolved on my repeat evaluation.  Patient has 2+ pedal pulses with appropriate capillary refill.  Sensory  is intact in the lower extremities in all dermatomes with normal motor including dorsiflexion plantarflexion of the great toe.  Patient was recently placed on acetaminophen per the medical record and we will order this.  Considering diagnostic uncertainty, will admit patient for continued monitoring on stroke pathway for evaluation by neurology and continued management.               Identification of Seniors at Risk      Flowsheet Row Most Recent Value   (ISAR) Identification of Seniors at Risk    Before the illness or injury that brought you to the Emergency, did you need someone to help you on a regular basis? 0 Filed at: 03/10/2024 2153   In the last 24 hours, have you needed more help than usual? 0 Filed at: 03/10/2024 2153   Have you been hospitalized for one or more nights during the past 6 months? 0 Filed at: 03/10/2024 2153   In general, do you see well? 0 Filed at: 03/10/2024 2153   In general, do you have serious problems with your memory? 0 Filed at: 03/10/2024 2153   Do you take more than three different medications every day? 1 Filed at: 03/10/2024 2153   ISAR Score 1 Filed at: 03/10/2024 2153           Stroke Assessment       Row Name 03/10/24 2212             NIH Stroke Scale    Interval Baseline      Level of Consciousness (1a.) 0      LOC Questions (1b.) 0      LOC Commands (1c.) 0      Best Gaze (2.) 0      Visual (3.) 0      Facial Palsy (4.) 0      Motor Arm, Left (5a.) 0      Motor Arm, Right (5b.) 0      Motor Leg, Left (6a.) 0      Motor Leg, Right (6b.) 0      Limb Ataxia (7.) 0      Sensory (8.) 0      Best Language (9.) 0      Dysarthria (10.) --      Extinction and Inattention (11.) (Formerly Neglect) 0      Total --                    Flowsheet Row Most Recent Value   Thrombolytic Decision Options    Thrombolytic Decision Patient not a candidate.   Patient is not a candidate options Symptoms resolved/clearly non disabling.                      SBIRT 20yo+      Flowsheet Row Most Recent  Value   Initial Alcohol Screen: US AUDIT-C     1. How often do you have a drink containing alcohol? 0 Filed at: 03/10/2024 2153   2. How many drinks containing alcohol do you have on a typical day you are drinking?  0 Filed at: 03/10/2024 2153   3a. Male UNDER 65: How often do you have five or more drinks on one occasion? 0 Filed at: 03/10/2024 2153   Audit-C Score 0 Filed at: 03/10/2024 2153   RAFA: How many times in the past year have you...    Used an illegal drug or used a prescription medication for non-medical reasons? Never Filed at: 03/10/2024 2153                      Medical Decision Making  Amount and/or Complexity of Data Reviewed  Labs: ordered.  Radiology: ordered.    Risk  OTC drugs.  Prescription drug management.  Decision regarding hospitalization.             Disposition  Final diagnoses:   Dizziness     Time reflects when diagnosis was documented in both MDM as applicable and the Disposition within this note       Time User Action Codes Description Comment    3/10/2024  9:54 PM Tito Reyes Add [R42] Dizziness           ED Disposition       ED Disposition   Admit    Condition   Stable    Date/Time   Mon Mar 11, 2024 0028    Comment   Case was discussed with GEORGE and the patient's admission status was agreed to be Admission Status: observation status to the service of Dr. TERI Krishnamurthy               Follow-up Information    None         Current Discharge Medication List        CONTINUE these medications which have NOT CHANGED    Details   Deutetrabenazine ER (Austedo XR) 24 MG TB24 Take 24 mg by mouth daily      Emollient (Lubriderm Advanced Therapy) LOTN Apply 1 Application topically as needed (rash)      Psyllium (Reguloid) 28.3 % POWD Take 1 Scoop by mouth 2 (two) times a day      temazepam (RESTORIL) 15 mg capsule Take 1 capsule (15 mg total) by mouth daily at bedtime  Qty: 30 capsule, Refills: 0    Associated Diagnoses: Paranoid schizophrenia (HCC)      Zinc Oxide (Geni Protect Moisture Barrier) 12 %  CREA Apply 1 Application topically once as needed (wound care)      acetaminophen (Acetaminophen Extra Strength) 500 mg tablet Take 1 tablet (500 mg total) by mouth every 6 (six) hours as needed for mild pain  Qty: 30 tablet, Refills: 11    Comments: Hi! Abby Expke is the new pharmacy provider for individuals with [facility] We need new prescriptions to dispense medications - please advise, thank you for your help.  Associated Diagnoses: Pain      atorvastatin (LIPITOR) 40 mg tablet Take 1 tablet (40 mg total) by mouth daily  Qty: 31 tablet, Refills: 11    Comments: Hi! Abby Expke is the new pharmacy provider for individuals with [facility]. We need new prescriptions to dispense medications - please advise, thank you for your help.  Associated Diagnoses: Hypercholesteremia      carbidopa-levodopa (SINEMET)  mg per tablet Take 1 tablet by mouth 3 (three) times a day  Qty: 270 tablet, Refills: 3    Associated Diagnoses: Neuro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       Cholecalciferol (Vitamin D High Potency) 25 MCG (1000 UT) capsule Take 1 capsule (1,000 Units total) by mouth daily  Qty: 31 capsule, Refills: 6    Comments: Hi! Abby Expke is the new pharmacy provider for individuals with [facility] We need new prescriptions to dispense medications - please advise, thank you for your help.  Associated Diagnoses: Vitamin D deficiency      !! cloZAPine (CLOZARIL) 100 mg tablet Take 100 mg by mouth 2 (two) times a day      !! clozapine (CLOZARIL) 200 MG tablet Take 200 mg by mouth daily at bedtime       docusate sodium (COLACE) 100 mg capsule Take 100 mg by mouth 2 (two) times a day      fluticasone (FLONASE) 50 mcg/act nasal spray 2 sprays into each nostril daily  Qty: 16 g, Refills: 11    Associated Diagnoses: Nasal congestion      hydrOXYzine HCL (ATARAX) 25 mg tablet Take 1 tablet (25 mg total) by mouth every 6 (six) hours as needed for itching  Qty: 30 tablet, Refills: 5     Associated Diagnoses: Contact dermatitis, unspecified contact dermatitis type, unspecified trigger      metoprolol succinate (TOPROL-XL) 25 mg 24 hr tablet Take 1 tablet (25 mg total) by mouth daily  Qty: 31 tablet, Refills: 11    Comments: Hi! Abby Piper is the new pharmacy provider for individuals with [facility]. We need new prescriptions to dispense medications - please advise, thank you for your help.  Associated Diagnoses: Aortic root dilatation (HCC)      Nutritional Supplements (Ensure High Protein) LIQD Take 237 mL by mouth 2 (two) times a day  Qty: 237 mL, Refills: 10    Comments: Unsure of the dispense in ml for the 30 days, and will not let me put in the total of cans/mL for the 30 days.  Associated Diagnoses: Mild protein-calorie malnutrition (HCC)      olopatadine (PATANOL) 0.1 % ophthalmic solution Administer 1 drop to both eyes daily at bedtime      omeprazole (PriLOSEC) 40 MG capsule Take 1 capsule (40 mg total) by mouth daily  Qty: 31 capsule, Refills: 11    Comments: Hi! Abby Piper is the new pharmacy provider for individuals with [facility]. We need new prescriptions to dispense medications - please advise, thank you for your help.  Associated Diagnoses: Gastroesophageal reflux disease      polyethylene glycol (MIRALAX) 17 g packet Take 17 g by mouth daily  Qty: 31 each, Refills: 11    Associated Diagnoses: Chronic constipation      tamsulosin (FLOMAX) 0.4 mg Take 1 capsule (0.4 mg total) by mouth daily at bedtime  Qty: 90 capsule, Refills: 3    Associated Diagnoses: Post-void dribbling      topiramate (TOPAMAX) 25 mg tablet Take 25 mg by mouth 2 (two) times a day  Refills: 1      traZODone (DESYREL) 100 mg tablet Take 100 mg by mouth daily at bedtime       !! - Potential duplicate medications found. Please discuss with provider.          No discharge procedures on file.    PDMP Review         Value Time User    PDMP Reviewed  Yes 9/29/2023 11:21 PM Pippa  LIUDMILA Caro            ED Provider  Electronically Signed by             Tito Reyes MD  03/13/24 0353

## 2024-03-11 NOTE — PHYSICAL THERAPY NOTE
Physical Therapy Evaluation     Patient's Name: Sumit Nails    Admitting Diagnosis  Dizziness [R42]    Problem List  Patient Active Problem List   Diagnosis    GERD (gastroesophageal reflux disease)    Neuro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     Hypertension    Hypercholesteremia    Paranoid schizophrenia (HCC)    Combined form of senile cataract    MRSA carrier    Groin rash    Fall    Chronic obstructive pulmonary disease, unspecified COPD type (HCC)    Neoplasm of uncertain behavior of left kidney    BPH with obstruction/lower urinary tract symptoms    Chronic constipation    Overactive bladder    Chronic prostatitis    Mild protein-calorie malnutrition (HCC)    Aortic root dilatation (HCC)    SBO (small bowel obstruction) (HCC)    Parkinson disease    Dizziness    Cholecystitis    Leukopenia       Past Medical History  Past Medical History:   Diagnosis Date    Asthma     Benign prostatic hyperplasia     COPD (chronic obstructive pulmonary disease) (HCC)     GERD (gastroesophageal reflux disease)     Herpes zoster without complication 12/10/2021    Hypercholesteremia     Hypertension     Neuroleptic induced parkinsonism      Paranoid schizophrenia (HCC)     Psychiatric disorder        Past Surgical History  Past Surgical History:   Procedure Laterality Date    CATARACT EXTRACTION      CHOLECYSTECTOMY LAPAROSCOPIC N/A 12/3/2023    Procedure: CHOLECYSTECTOMY LAPAROSCOPIC WITH INTRAOPERATIVE CHOLANGIOGRAM;  Surgeon: Maverick Tamayo MD;  Location: [facility];  Service: General    COLONOSCOPY          03/11/24 0829   PT Last Visit   PT Visit Date 03/11/24   Note Type   Note type Evaluation   Pain Assessment   Pain Assessment Tool 0-10   Pain Score No Pain   Restrictions/Precautions   Weight Bearing Precautions Per Order No   Braces or Orthoses Other (Comment)  (none at baseline)   Other Precautions Telemetry;Fall Risk   Home Living   Type of Home Group Home  ([facility])   Home Layout Two level;Performs ADLs on one  "level;Able to live on main level with bedroom/bathroom  (no PHYLICIA, 1 flight to the dining room with available stair glide (doesn't use))   Bathroom Shower/Tub Walk-in shower   Bathroom Toilet Standard   Bathroom Equipment   (none at baseline)   Bathroom Accessibility Accessible   Home Equipment   (none at baseline)   Prior Function   Level of [place] Independent with ADLs;Independent with functional mobility   Lives With Facility staff   Receives Help From Personal care attendant   IADLs Family/Friend/Other provides transportation;Family/Friend/Other provides meals;Family/Friend/Other provides medication management   Falls in the last 6 months 0   Vocational Retired  (\"many jobs\")   General   Family/Caregiver Present No   Cognition   Arousal/Participation Alert   Orientation Level Oriented to person;Oriented to place;Oriented to time;Oriented to situation   Memory Within functional limits   Following Commands Follows multistep commands without difficulty   Comments pt agreeable to PT evaluation   RLE Assessment   RLE Assessment   (knee extension, hip flexion 4/5, symmetrical)   LLE Assessment   LLE Assessment   (knee extension, hip flexion 4/5, symmetrical)   Coordination   Movements are Fluid and Coordinated 1   Sensation   (pt reporting tingling to B/L LEs)   Bed Mobility   Supine to Sit 5  Supervision   Additional items Assist x 1;HOB elevated;Increased time required;Verbal cues;Bedrails   Sit to Supine 5  Supervision   Additional items Assist x 1;HOB elevated;Increased time required;Verbal cues;Bedrails   Transfers   Sit to Stand 5  Supervision   Additional items Assist x 1;Increased time required;Verbal cues   Stand to Sit 5  Supervision   Additional items Assist x 1;Increased time required;Verbal cues   Additional Comments pt reporting dizziness with transitional movements, improved with rest periods. Pt reporting \"room spinning\" dizziness with gait trial, does admit improved from yesterday "   Ambulation/Elevation   Gait pattern Short stride;Step to;Inconsistent lois;Decreased hip extension;Decreased heel strike;Decreased toe off  (guarded)   Gait Assistance 4  Minimal assist  (CGA)   Additional items Assist x 1;Verbal cues   Assistive Device Rolling walker   Distance 80'   Balance   Static Sitting Fair +   Dynamic Sitting Fair   Static Standing Fair   Dynamic Standing Fair -   Ambulatory Fair -  (Fair-/Poor+ (moreso with narrow based turns))   Endurance Deficit   Endurance Deficit Yes   Activity Tolerance   Activity Tolerance Patient tolerated treatment well   Medical Staff Made Aware Pt seen as a co-eval with OT due to the patient's co-morbidities, clinically unstable presentation, and present impairments which are a regression from the patient's baseline.   Nurse Made Aware RN Florinda   Assessment   Prognosis Good   Problem List Decreased strength;Decreased endurance;Impaired balance;Decreased mobility;Impaired sensation   Assessment Pt is 73 y.o. male seen for PT evaluation on 3/11/2024 s/p admit to [facility] on 3/10/2024 w/ Dizziness. PT was consulted to assess pt's functional mobility and d/c needs. Order placed for PT eval and tx. PTA, pt resides at [facility], ambulatory without AD. At time of eval, pt performing bed mobility, transfers SBA; CGA for household distance gait trial with use of RW/ pt reporting continued dizziness but improvements from yesterday. Upon evaluation, pt presenting with impaired functional mobility d/t decreased strength, decreased endurance, impaired balance, decreased mobility, impaired sensation, and activity intolerance. Pertinent PMHx and current co-morbidities affecting pt's physical performance at time of assessment include: dizziness, neuro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, HTN, paranoid schizophrenia, COPD, BPH, leukopenia, GERD, MRSA carrier, combined form of senile cataract, fall, mild protein calorie malnutrition, aortic root dilation, SBO,  Parkinson disease, cholecystitis. Personal factors affecting pt at time of eval include: ambulating w/ assistive device, inability to navigate community distances, and positive fall history. The following objective measures performed on IE also reveal limitations: Barthel Index: 55/100, Modified [place]: 3 (moderate disability), and AM-PAC 6-Clicks: 17/24. Pt's clinical presentation is currently unstable/unpredictable seen in pt's presentation of abnormal lab value(s), ongoing medical assessment, on telemetry monitoring, and pending brain MRI to r/o CVA . Overall, pt's rehab potential and prognosis to return to PLOF is good as impacted by objective findings, warranting pt to receive further skilled PT interventions to address identified impairments, activity limitation(s), and participation restriction(s). Pt to benefit from continued PT tx to address deficits as defined above and maximize level of functional independent mobility. From PT/mobility standpoint, recommend level 2, moderate resource intensity in order to facilitate return to PLOF.   Goals   Patient Goals to get back to doing everything   STG Expiration Date 03/21/24   Short Term Goal #1 In 7-10 days: Increase bilateral LE strength 1/2 grade to facilitate independent mobility, Perform all bed mobility tasks modified independent to decrease caregiver burden, Perform all transfers modified independent to improve independence, Ambulate > 300 ft. with least restrictive assistive device modified independent w/o LOB and w/ normalized gait pattern 100% of the time, Navigate 13 stairs modified independent with unilateral handrail to facilitate return to previous living environment, and Increase all balance 1/2 grade to decrease risk for falls   PT Treatment Day 0   Plan   Treatment/Interventions Functional transfer training;LE strengthening/ROM;Elevations;Therapeutic exercise;Endurance training;Patient/family training;Equipment eval/education;Bed mobility;Gait  training;Spoke to nursing   PT Frequency 2-3x/wk   Discharge Recommendation   Rehab Resource Intensity Level, PT II (Moderate Resource Intensity)   AM-PAC Basic Mobility Inpatient   Turning in Flat Bed Without Bedrails 3   Lying on Back to Sitting on Edge of Flat Bed Without Bedrails 3   Moving Bed to Chair 3   Standing Up From Chair Using Arms 3   Walk in Room 3   Climb 3-5 Stairs With Railing 2   Basic Mobility Inpatient Raw Score 17   Basic Mobility Standardized Score 39.67   Highest Level Of Mobility   -HLM Goal 5: Stand one or more mins   -HLM Achieved 7: Walk 25 feet or more   Modified Seymour Scale   Modified [place] Scale 3   Barthel Index   Feeding 10   Bathing 0   Grooming Score 5   Dressing Score 5   Bladder Score 10   Bowels Score 10   Toilet Use Score 5   Transfers (Bed/Chair) Score 10   Mobility (Level Surface) Score 0   Stairs Score 0   Barthel Index Score 55           Bri Bain, PT, DPT

## 2024-03-11 NOTE — ASSESSMENT & PLAN NOTE
Likely secondary to vertigo, peripheral etiology  However given risk factors r/o CVA  S/p neurology input, proceed with stroke pathway  S/p CT head. 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 Consideration for vestibular therapy as outpatient  CVA prophylaxis with asa/lipitor  Trial of meclizine. Cont supportive care  Doubt cardiac etiology, no ischemic EKG changes and neg trop

## 2024-03-11 NOTE — ASSESSMENT & PLAN NOTE
Likely secondary to vertigo, peripheral etiology  However given risk factors r/o CVA  S/p neurology input, proceed with stroke pathway  S/p CT head. 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 Consideration for vestibular therapy as outpatient  CVA prophylaxis with asa/lipitor  Trial of meclizine. Cont supportive care

## 2024-03-11 NOTE — CONSULTS
Consultation - Neurology   Sumit Nails 73 y.o. male MRN: [id number]  Unit/Bed#: ED 20 Encounter: [id number]      Assessment/Plan   * Dizziness  Assessment & Plan  Sumit Nails is a 73 y.o. male with HTN, HLD, paranoid schizophrenia, neuroleptic induced parkinsonism, BPH, prior tobacco abuse, COPD who presented to [facility] ED on 3/10/2024 as a stroke alert with acute onset dizziness.  BP on presentation 126/75.  NIHSS of 0.  CTH/CTA H&N imaging unremarkable.  Patient was not an IV TNK candidate as symptoms were improving and low NIHSS.    Workup:  - CT head: Unremarkable for acute intracranial abnormalities  - CTA head and neck:   Unremarkable for large vessel occlusion or critical stenosis  Incidental finding of fetal left PCA  - Lipid panel: Total cholesterol 58, triglycerides 50, HDL 30, LDL 18  - Hemoglobin A1c: 4.8  - Labs WNL: Troponin, flu/RSV/COVID    Dizziness described as room spinning associated with nausea.  Minimal relief with meclizine dose on arrival.  Stronger suspicion for peripheral etiology as patient appears to have had ear cleaning on 3/8/2024 vs secondary to orthostatic hypotension.  Cannot rule out acute infarct, will obtain MRI brain.    Plan:  - MRI brain pending  - Echo pending  - Started on ASA 81 mg daily, plan to continue  - Continue home atorvastatin 40 mg daily  - Meclizine PRN  - Telemetry  - Orthostatics pending   - Frequent neurochecks  - PT/OT/speech  - Stroke education  - Medical management and supportive care per primary team, notify with changes    Neuro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   Assessment & Plan  Continue carbidopa levodopa  mg/tab TID  Follows with outpatient neurology, next appointment scheduled for 5/2/2024; plan to keep appointment      Recommendations for outpatient neurological follow up have yet to be determined.    History of Present Illness     Reason for Consult / Principal Problem: Dizziness    HPI: Sumit Nails is a 73 y.o.  male with HTN, HLD,  paranoid schizophrenia, neuroleptic induced parkinsonism, BPH, prior tobacco abuse, COPD who presented to [place] ED on 3/10/2024 as a stroke alert with acute onset dizziness.    History obtained per chart review and per discussion with patient. He reports developing acute onset dizziness at 6:30 PM on 3/10/2024. Patient was able to report that he had gotten into bed, taken his medicine, then developed the acute dizziness which she describes as a spinning sensation.  Patient reports that the dizziness had improved, however he was still having intermittent symptoms. Upon EMS arrival, patient was found to be nauseous without any episodes of emesis. Denies associated vision changes, one sided weakness, and speech changes. He states he notices the dizziness more when attempting to stand.     Patient resides at a group home who helps him with his medications.  Patient is able to admit the of vertigo approximately 5 to 6 months ago.  Per chart review, patient had been evaluated by inpatient neurology on 9/30/2023 for lightheadedness/imbalance upon standing, etiology at that time thought to be orthostatic hypotension.    Of note, patient was recently evaluated by ENT on 3/8/2024.  During this visit patient had reported bilateral ear fullness and states that this issue started more than 1 month ago.  On exam per chart review, patient was found to have impacted cerumen in bilateral ears.  Ears were irrigated during this visit on 3/8, which patient was documented to have tolerated. Patient admits to starting a new pain medication, name unclear. Patient denied any recent illnesses    Patient presented to [facility] ED on 3/10/2024 as a stroke alert, /75.  CT head and CTA head and neck unremarkable for acute intracranial abnormalities or evidence of LVO/critical stenosis.    Inpatient consult to Neurology  Consult performed by: Dunia Hendrix PA-C  Consult ordered by: Roseann Rossi DO      Inpatient consult to  Neurology  Consult performed by: Dunia Hendrix PA-C  Consult ordered by: Tito Reyes MD        Review of Systems  12 point ROS performed, as stated above, all others negative.  Historical Information   Past Medical History:   Diagnosis Date    Asthma     Benign prostatic hyperplasia     COPD (chronic obstructive pulmonary disease) (HCC)     GERD (gastroesophageal reflux disease)     Herpes zoster without complication 12/10/2021    Hypercholesteremia     Hypertension     Neuroleptic induced parkinsonism      Paranoid schizophrenia (HCC)     Psychiatric disorder      Past Surgical History:   Procedure Laterality Date    CATARACT EXTRACTION      CHOLECYSTECTOMY LAPAROSCOPIC N/A 12/3/2023    Procedure: CHOLECYSTECTOMY LAPAROSCOPIC WITH INTRAOPERATIVE CHOLANGIOGRAM;  Surgeon: Maverick Tamayo MD;  Location: [facility];  Service: General    COLONOSCOPY       Social History   Social History     Substance and Sexual Activity   Alcohol Use Not Currently     Social History     Substance and Sexual Activity   Drug Use Never     E-Cigarette/Vaping    E-Cigarette Use Never User      E-Cigarette/Vaping Substances    Nicotine No     THC No     CBD No     Flavoring No     Other No     Unknown No      Social History     Tobacco Use   Smoking Status Former    Current packs/day: 0.00    Average packs/day: 1 pack/day for 20.0 years (20.0 ttl pk-yrs)    Types: Cigarettes    Start date:     Quit date:     Years since quittin.2    Passive exposure: Past   Smokeless Tobacco Never     Family History:   Family History   Problem Relation Age of Onset    No Known Problems Mother     No Known Problems Father     Parkinsonism Son        Review of previous medical records was completed.    Meds/Allergies   all current active meds have been reviewed, current meds:   Current Facility-Administered Medications   Medication Dose Route Frequency    acetaminophen (TYLENOL) tablet 650 mg  650 mg Oral Q6H PRN    aspirin chewable tablet 81  mg  81 mg Oral Daily    atorvastatin (LIPITOR) tablet 40 mg  40 mg Oral Daily With Dinner    carbidopa-levodopa (SINEMET)  mg per tablet 1 tablet  1 tablet Oral BID    cholecalciferol (VITAMIN D3) tablet 1,000 Units  1,000 Units Oral Daily    cloZAPine (CLOZARIL) tablet 100 mg  100 mg Oral BID    cloZAPine (CLOZARIL) tablet 200 mg  200 mg Oral HS    Deutetrabenazine ER TB24 24 mg  24 mg Oral Daily    docusate sodium (COLACE) capsule 100 mg  100 mg Oral BID    enoxaparin (LOVENOX) subcutaneous injection 40 mg  40 mg Subcutaneous Daily    fluticasone (FLONASE) 50 mcg/act nasal spray 2 spray  2 spray Nasal Daily    hydrOXYzine HCL (ATARAX) tablet 25 mg  25 mg Oral Q6H PRN    Ketotifen Fumarate (ZADITOR) 0.035 % ophthalmic solution 1 drop  1 drop Both Eyes HS    meclizine (ANTIVERT) tablet 25 mg  25 mg Oral Q8H PRN    metoprolol succinate (TOPROL-XL) 24 hr tablet 25 mg  25 mg Oral Daily    pantoprazole (PROTONIX) EC tablet 40 mg  40 mg Oral Early Morning    polyethylene glycol (MIRALAX) packet 17 g  17 g Oral Daily    tamsulosin (FLOMAX) capsule 0.4 mg  0.4 mg Oral HS    temazepam (RESTORIL) capsule 15 mg  15 mg Oral HS    topiramate (TOPAMAX) tablet 25 mg  25 mg Oral BID    traZODone (DESYREL) tablet 100 mg  100 mg Oral HS   , and PTA meds:   Prior to Admission Medications   Prescriptions Last Dose Informant Patient Reported? Taking?   Cholecalciferol (Vitamin D High Potency) 25 MCG (1000 UT) capsule  Care Giver No No   Sig: Take 1 capsule (1,000 Units total) by mouth daily   Deutetrabenazine ER (Austedo XR) 24 MG TB24   Yes Yes   Sig: Take 24 mg by mouth daily   Emollient (Lubriderm Advanced Therapy) LOTN   Yes Yes   Sig: Apply 1 Application topically as needed (rash)   Nutritional Supplements (Ensure High Protein) LIQD  Care Giver No No   Sig: Take 237 mL by mouth 2 (two) times a day   Patient taking differently: Take 237 mL by mouth 2 (two) times a day vanilla   Psyllium (Reguloid) 28.3 % POWD   Yes Yes   Sig:  Take 1 Scoop by mouth 2 (two) times a day   Zinc Oxide (Geni Protect Moisture Barrier) 12 % CREA   Yes Yes   Sig: Apply 1 Application topically once as needed (wound care)   acetaminophen (Acetaminophen Extra Strength) 500 mg tablet  Care Giver No No   Sig: Take 1 tablet (500 mg total) by mouth every 6 (six) hours as needed for mild pain   atorvastatin (LIPITOR) 40 mg tablet  Care Giver No No   Sig: Take 1 tablet (40 mg total) by mouth daily   carbidopa-levodopa (SINEMET)  mg per tablet  Care Giver No No   Sig: Take 1 tablet by mouth 3 (three) times a day   Patient taking differently: Take 1 tablet by mouth 2 (two) times a day   cloZAPine (CLOZARIL) 100 mg tablet  Care Giver Yes No   Sig: Take 100 mg by mouth 2 (two) times a day   clozapine (CLOZARIL) 200 MG tablet  Care Giver Yes No   Sig: Take 200 mg by mouth daily at bedtime    docusate sodium (COLACE) 100 mg capsule  Care Giver Yes No   Sig: Take 100 mg by mouth 2 (two) times a day   fluticasone (FLONASE) 50 mcg/act nasal spray   No No   Si sprays into each nostril daily   hydrOXYzine HCL (ATARAX) 25 mg tablet  Care Giver No No   Sig: Take 1 tablet (25 mg total) by mouth every 6 (six) hours as needed for itching   metoprolol succinate (TOPROL-XL) 25 mg 24 hr tablet  Care Giver No No   Sig: Take 1 tablet (25 mg total) by mouth daily   olopatadine (PATANOL) 0.1 % ophthalmic solution  Care Giver Yes No   Sig: Administer 1 drop to both eyes daily at bedtime   omeprazole (PriLOSEC) 40 MG capsule  Care Giver No No   Sig: Take 1 capsule (40 mg total) by mouth daily   polyethylene glycol (MIRALAX) 17 g packet  Care Giver No No   Sig: Take 17 g by mouth daily   tamsulosin (FLOMAX) 0.4 mg  Care Giver No No   Sig: Take 1 capsule (0.4 mg total) by mouth daily at bedtime   temazepam (RESTORIL) 15 mg capsule 3/10/2024 Care Giver No Yes   Sig: Take 1 capsule (15 mg total) by mouth daily at bedtime   topiramate (TOPAMAX) 25 mg tablet  Care Giver Yes No   Sig: Take 25  "mg by mouth 2 (two) times a day   traZODone (DESYREL) 100 mg tablet  Care Giver Yes No   Sig: Take 100 mg by mouth daily at bedtime      Facility-Administered Medications: None       No Known Allergies    Objective   Vitals:Blood pressure 120/68, pulse 73, temperature 98.8 °F (37.1 °C), temperature source Oral, resp. rate 22, height 5' 10\" (1.778 m), weight 72.6 kg (160 lb), SpO2 99%.,Body mass index is 22.96 kg/m².  No intake or output data in the 24 hours ending 24 1222    Invasive Devices:   Invasive Devices       Peripheral Intravenous Line  Duration             Peripheral IV 03/10/24 Distal;Left;Upper;Ventral (anterior) Arm <1 day                    Physical Exam  Vitals and nursing note reviewed.   Constitutional:       General: He is not in acute distress.     Appearance: He is normal weight. He is not ill-appearing, toxic-appearing or diaphoretic.   HENT:      Head: Normocephalic and atraumatic.   Eyes:      General: No scleral icterus.        Right eye: No discharge.         Left eye: No discharge.      Conjunctiva/sclera: Conjunctivae normal.   Musculoskeletal:         General: Normal range of motion.   Skin:     General: Skin is warm and dry.      Coloration: Skin is not jaundiced or pale.   Neurological:      Mental Status: He is alert.   Psychiatric:         Mood and Affect: Mood normal.         Behavior: Behavior normal.         Thought Content: Thought content normal.         Judgment: Judgment normal.       Neurologic Exam     Mental Status   Patient is alert, sitting up in bed. Oriented x 3. Able to state . No dysarthria or aphasia noted. Able to follow central or appendicular commands, and answers all questions appropriately. Repeats phrases      Cranial Nerves   Primary gaze midline, conjugate gaze noted   EOMs intact, unable to reach horizontal end gaze bilaterally   No evidence of nystagmus   No obvious facial asymmetry, limited to large beard   No facial asymmetry      Motor Exam " "  Muscle bulk: normal  Overall muscle tone: normal  BUE strength 5/5 throughout   Right hip flexion weakness noted 4/5, limited to pain   Left hip flexion 5/5      Sensory Exam   Light touch normal.   Temperature sensation intact throughout      Gait, Coordination, and Reflexes   No obvious ataxia or dysmetria noted in BUE finger to nose testing   No ataxia in BLE heel to shin     Mouth tremors at rest   Tremulous in BUE, worsening with action    No rhythmic seizure like activity noted throughout exam      Lab Results: I have personally reviewed pertinent reports.  Recent Results (from the past 24 hour(s))   Fingerstick Glucose (POCT)    Collection Time: 03/10/24  9:53 PM   Result Value Ref Range    POC Glucose 95 65 - 140 mg/dl   Protime-INR    Collection Time: 03/10/24  9:55 PM   Result Value Ref Range    Protime 14.7 (H) 11.6 - 14.5 seconds    INR 1.09 0.84 - 1.19   APTT    Collection Time: 03/10/24  9:55 PM   Result Value Ref Range    PTT 39 (H) 23 - 37 seconds   HS Troponin 0hr (reflex protocol)    Collection Time: 03/10/24  9:55 PM   Result Value Ref Range    hs TnI 0hr 3 \"Refer to ACS Flowchart\"- see link ng/L   FLU/RSV/COVID - if FLU/RSV clinically relevant    Collection Time: 03/10/24  9:55 PM    Specimen: Nose; Nares   Result Value Ref Range    SARS-CoV-2 Negative Negative    INFLUENZA A PCR Negative Negative    INFLUENZA B PCR Negative Negative    RSV PCR Negative Negative   CBC and differential    Collection Time: 03/10/24  9:55 PM   Result Value Ref Range    WBC 4.04 (L) 4.31 - 10.16 Thousand/uL    RBC 3.97 3.88 - 5.62 Million/uL    Hemoglobin 13.4 12.0 - 17.0 g/dL    Hematocrit 39.0 36.5 - 49.3 %    MCV 98 82 - 98 fL    MCH 33.8 26.8 - 34.3 pg    MCHC 34.4 31.4 - 37.4 g/dL    RDW 14.5 11.6 - 15.1 %    MPV 9.5 8.9 - 12.7 fL    Platelets 143 (L) 149 - 390 Thousands/uL    nRBC 0 /100 WBCs    Neutrophils Relative 55 43 - 75 %    Immat GRANS % 0 0 - 2 %    Lymphocytes Relative 29 14 - 44 %    Monocytes " "Relative 11 4 - 12 %    Eosinophils Relative 4 0 - 6 %    Basophils Relative 1 0 - 1 %    Neutrophils Absolute 2.26 1.85 - 7.62 Thousands/µL    Immature Grans Absolute 0.01 0.00 - 0.20 Thousand/uL    Lymphocytes Absolute 1.15 0.60 - 4.47 Thousands/µL    Monocytes Absolute 0.44 0.17 - 1.22 Thousand/µL    Eosinophils Absolute 0.16 0.00 - 0.61 Thousand/µL    Basophils Absolute 0.02 0.00 - 0.10 Thousands/µL   Comprehensive metabolic panel    Collection Time: 03/10/24  9:55 PM   Result Value Ref Range    Sodium 141 135 - 147 mmol/L    Potassium 4.0 3.5 - 5.3 mmol/L    Chloride 110 (H) 96 - 108 mmol/L    CO2 28 21 - 32 mmol/L    ANION GAP 3 mmol/L    BUN 21 5 - 25 mg/dL    Creatinine 0.70 0.60 - 1.30 mg/dL    Glucose 86 65 - 140 mg/dL    Calcium 8.6 8.4 - 10.2 mg/dL    AST 37 13 - 39 U/L    ALT 25 7 - 52 U/L    Alkaline Phosphatase 115 (H) 34 - 104 U/L    Total Protein 5.8 (L) 6.4 - 8.4 g/dL    Albumin 3.5 3.5 - 5.0 g/dL    Total Bilirubin 0.42 0.20 - 1.00 mg/dL    eGFR 93 ml/min/1.73sq m   HS Troponin I 2hr    Collection Time: 03/11/24 12:09 AM   Result Value Ref Range    hs TnI 2hr 3 \"Refer to ACS Flowchart\"- see link ng/L    Delta 2hr hsTnI 0 <20 ng/L   Lipid Panel with Direct LDL reflex    Collection Time: 03/11/24  5:51 AM   Result Value Ref Range    Cholesterol 58 See Comment mg/dL    Triglycerides 50 See Comment mg/dL    HDL, Direct 30 (L) >=40 mg/dL    LDL Calculated 18 0 - 100 mg/dL   Basic metabolic panel    Collection Time: 03/11/24  5:51 AM   Result Value Ref Range    Sodium 141 135 - 147 mmol/L    Potassium 3.8 3.5 - 5.3 mmol/L    Chloride 109 (H) 96 - 108 mmol/L    CO2 31 21 - 32 mmol/L    ANION GAP 1 mmol/L    BUN 18 5 - 25 mg/dL    Creatinine 0.62 0.60 - 1.30 mg/dL    Glucose 96 65 - 140 mg/dL    Calcium 8.7 8.4 - 10.2 mg/dL    eGFR 98 ml/min/1.73sq m   CBC (With Platelets)    Collection Time: 03/11/24  5:51 AM   Result Value Ref Range    WBC 4.11 (L) 4.31 - 10.16 Thousand/uL    RBC 3.89 3.88 - 5.62 " "Million/uL    Hemoglobin 13.0 12.0 - 17.0 g/dL    Hematocrit 38.2 36.5 - 49.3 %    MCV 98 82 - 98 fL    MCH 33.4 26.8 - 34.3 pg    MCHC 34.0 31.4 - 37.4 g/dL    RDW 14.4 11.6 - 15.1 %    Platelets 137 (L) 149 - 390 Thousands/uL    MPV 9.3 8.9 - 12.7 fL   Hemoglobin A1C    Collection Time: 03/11/24  5:51 AM   Result Value Ref Range    Hemoglobin A1C 4.8 Normal 4.0-5.6%; PreDiabetic 5.7-6.4%; Diabetic >=6.5%; Glycemic control for adults with diabetes <7.0% %    EAG 91 mg/dl   HS Troponin I 4hr    Collection Time: 03/11/24  5:53 AM   Result Value Ref Range    hs TnI 4hr 3 \"Refer to ACS Flowchart\"- see link ng/L    Delta 4hr hsTnI 0 <20 ng/L   Echo complete w/ contrast if indicated    Collection Time: 03/11/24  9:37 AM   Result Value Ref Range    BSA 1.9 m2    A4C EF 60 %    LVOT stroke volume 82.00     LVOT stroke volume index 43.20 ml/m2    LVIDd 4.20 cm    LVIDS 2.90 cm    IVSd 0.90 cm    LVPWd 1.20 cm    LVOT diameter 2.1 cm    LVOT area 3.50     LVOT peak VTI 23.8 cm    FS 31 28 - 44    MV E' Tissue Velocity Septal 8 cm/s    LA Volume Index (BP) 28.9 mL/m2    E/A ratio 1.05     E wave deceleration time 196 ms    MV Peak E Jacques 83 cm/s    MV Peak A Jacques 0.79 m/s    AV LVOT peak gradient 6 mmHg    LVOT peak jacques 1.21 m/s    RVID d 3.5 cm    Tricuspid annular plane systolic excursion 2.80 cm    LA size 4 cm    LA/Ao Ratio 2D 1.08     LA volume (BP) 55 mL    Ao VTI 49.2 cm    AV mean gradient 12 mmHg    LVOT mn grad 3.0 mmHg    AV peak gradient 20 mmHg    AV area by cont VTI 1.7 cm2    AV area peak jacques 1.9 cm2    MV stenosis pressure 1/2 time 57 ms    MV valve area p 1/2 method 3.90     TR Peak Jacques 2.2 m/s    Triscuspid Valve Regurgitation Peak Gradient 18.0 mmHg    Ao root 3.70 cm    Asc Ao 3.7 cm    Aortic valve mean velocity 16.30 m/s    Tricuspid valve peak regurgitation velocity 2.15 m/s    Left ventricular stroke volume (2D) 47.00 mL    IVS 0.9 cm    LEFT VENTRICLE SYSTOLIC VOLUME (MOD BIPLANE) 2D 31 mL    LV " DIASTOLIC VOLUME (MOD BIPLANE) 2D 78 mL    Left Atrium Area-systolic Four Chamber 15.4 cm2    Left Atrium Area-systolic Apical Two Chamber 22.5 cm2    LVSV, 2D 47 mL     Imaging Studies: I have personally reviewed pertinent reports and I have personally reviewed pertinent films in PACS.    EKG, Pathology, and Other Studies: I have personally reviewed pertinent reports.    VTE Prophylaxis: Enoxaparin (Lovenox)    Code Status: Level 1 - Full Code    Dictation voice to text software has been used in the creation of this document.  Please consider this in light of any contextual or grammatical errors.

## 2024-03-11 NOTE — OCCUPATIONAL THERAPY NOTE
"          Occupational Therapy Evaluation        Patient Name: Sumit Nails  Today's Date: 3/11/2024       03/11/24 0828   OT Last Visit   OT Visit Date 03/11/24   Note Type   Note type Evaluation   Pain Assessment   Pain Assessment Tool 0-10   Pain Score No Pain   Restrictions/Precautions   Weight Bearing Precautions Per Order No   Braces or Orthoses Other (Comment)  (none at baseline)   Other Precautions Telemetry;Fall Risk   Home Living   Type of Home Group Home  ([place])   Home Layout Two level  (no PHYLICIA, 1 flight to the dining room)   Bathroom Shower/Tub Walk-in shower   Bathroom Toilet Standard   Bathroom Equipment Other (Comment)  (none at baseline)   Bathroom Accessibility Accessible   Home Equipment Other (Comment)  (none at baseline)   Prior Function   Level of [place] Independent with ADLs;Independent with functional mobility   Lives With Facility staff   Receives Help From Personal care attendant   IADLs Family/Friend/Other provides transportation;Family/Friend/Other provides meals;Family/Friend/Other provides medication management   Falls in the last 6 months 0   Vocational Retired  (\"many jobs\")   Lifestyle   Autonomy Patient reported independnet with ADLs/  facility staff assists with IADLs. Patient resides at [facility], 2 stories with flight of steps to dining ortiz with available stair glide.   Reciprocal Relationships Supportive staff and patient's sister   Service to Others Retired   General   Family/Caregiver Present No   ADL   Where Assessed Other (Comment)  (ADL levels based on functional performance during OT eval)   Eating Assistance 6  Modified independent   Grooming Assistance 5  Supervision/Setup   UB Bathing Assistance 4  Minimal Assistance   LB Bathing Assistance 3  Moderate Assistance   UB Dressing Assistance 4  Minimal Assistance   LB Dressing Assistance 3  Moderate Assistance   Toileting Assistance  3  Moderate Assistance   Functional Assistance 4  Minimal " "Assistance   Bed Mobility   Supine to Sit 5  Supervision   Additional items Assist x 1;HOB elevated;Increased time required;Verbal cues;Bedrails   Sit to Supine 5  Supervision   Additional items Assist x 1;HOB elevated;Increased time required;Verbal cues;Bedrails   Transfers   Sit to Stand 5  Supervision   Additional items Assist x 1;Increased time required;Verbal cues   Stand to Sit 5  Supervision   Additional items Assist x 1;Increased time required;Verbal cues   Additional Comments pt reporting dizziness with transitional movements, improved with rest periods. Pt reporting \"room spinning\" dizziness with gait trial, does admit improved from yesterday   Functional Mobility   Functional Mobility 4  Minimal assistance   Additional Comments close contact guard and assist to maneuver walker   Additional items Rolling walker   Balance   Static Sitting Fair +   Dynamic Sitting Fair   Static Standing Fair   Dynamic Standing Fair -   Ambulatory Fair -   Activity Tolerance   Activity Tolerance Patient limited by fatigue   Medical Staff Made Aware Pt seen as a co-eval with PT due to the patient's co-morbidities, clinically unstable presentation, and present impairments which are a regression from the patient's baseline.   Nurse Made Aware DARVIN Neely   RUPREMA Assessment   RUE Assessment X  (limited overhead movements, R > L . Patient complained of Right arm \"soreness\", strength deficit noted throughout.)   RUE Strength   RUE Overall Strength Deficits  (proximal - not tested due to pain, distal 3+/5)   LUE Assessment   LUE Assessment X  (limited overhead movements)   LUE Strength   LUE Overall Strength Deficits  (3+/5)   Hand Function   Gross Motor Coordination Impaired   Fine Motor Coordination Functional   Sensation   Light Touch No apparent deficits  (BUEs)   Proprioception   Proprioception No apparent deficits   Vision-Basic Assessment   Current Vision Does not wear glasses   Patient Visual Report Other (Comment)  (no " significant changes reported)   Psychosocial   Psychosocial (WDL) WDL   Cognition   Overall Cognitive Status WFL   Arousal/Participation Alert;Responsive;Cooperative   Attention Within functional limits   Orientation Level Oriented X4   Memory Within functional limits   Following Commands Follows multistep commands without difficulty   Assessment   Limitation Decreased ADL status;Decreased endurance;Decreased UE strength;Decreased UE ROM;Decreased self-care trans;Decreased high-level ADLs   Prognosis Good   Assessment Patient is a 73 y.o. male seen for OT evaluation s/p admit to [facility] on 3/10/2024 w/Dizziness. Commorbidities affecting patient's functional performance at time of assessment include: dizziness, neuro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, HTN, paranoid schizophrenia, COPD, BPH, leukopenia, GERD, MRSA carrier, combined form of senile cataract, fall, mild protein calorie malnutrition, aortic root dilation, SBO, Parkinson disease, cholecystitis.  Orders placed for OT evaluation and treatment.  Performed at least two patient identifiers during session including name and wristband.  Prior to admission,  Patient reported independnet with ADLs/ facility staff assists with IADLs. Patient resides at [facility], [address] [facility] with flight of steps to dining ortiz with available stair glide. Personal factors affecting patient at time of initial evaluation include: difficulty performing ADLs and difficulty performing IADLs. Upon evaluation, patient requires minimal  assist for UB ADLs, maximal assist for LB ADLs, transfers and functional ambulation in room and bathroom with minimal  assist, with the use of Rolling Walker.  Occupational performance is affected by the following deficits: decreased functional use of BUEs, decreased muscle strength, degenerative arthritic joint changes, impaired gross motor coordination, dynamic sit/ stand balance deficit with poor standing tolerance time for self  "care and functional mobility, decreased activity tolerance, decreased safety awareness, delayed righting and equilibrium reactions, and postural control and postural alignment deficit, requiring external assistance to complete transitional movements.  Patient to benefit from continued Occupational Therapy treatment while in the hospital to address deficits as defined above and maximize level of functional independence with ADLs and functional mobility. Occupational Performance areas to address include: bathing/ shower, dressing, toilet hygiene, transfer to all surfaces, functional mobility, health maintenance, and IADLs: safety procedures. From OT standpoint, recommendation at time of d/c would be Level 2.   Goals   Patient Goals \"to get back to doing everything\"   Plan   Treatment Interventions ADL retraining;Functional transfer training;UE strengthening/ROM;Endurance training;Patient/family training;Equipment evaluation/education;Compensatory technique education;Continued evaluation;Energy conservation;Activityengagement   Goal Expiration Date 03/25/24   OT Frequency 3-5x/wk   Discharge Recommendation   Rehab Resource Intensity Level, OT II (Moderate Resource Intensity)   AM-PAC Daily Activity Inpatient   Lower Body Dressing 2   Bathing 2   Toileting 3   Upper Body Dressing 3   Grooming 3   Eating 4   Daily Activity Raw Score 17   Daily Activity Standardized Score (Calc for Raw Score >=11) 37.26   AM-PAC Applied Cognition Inpatient   Following a Speech/Presentation 4   Understanding Ordinary Conversation 4   Taking Medications 4   Remembering Where Things Are Placed or Put Away 4   Remembering List of 4-5 Errands 4   Taking Care of Complicated Tasks 4   Applied Cognition Raw Score 24   Applied Cognition Standardized Score 62.21   Barthel Index   Feeding 10   Bathing 0   Grooming Score 5   Dressing Score 5   Bladder Score 10   Bowels Score 10   Toilet Use Score 5   Transfers (Bed/Chair) Score 10   Mobility (Level " Surface) Score 0   Stairs Score 0   Barthel Index Score 55   Modified [place] Scale   Modified [place] Scale 3   End of Consult   Patient Position at End of Consult Supine;All needs within reach       1 - Patient will verbalize and demonstrate use of energy conservation/ deep breathing technique and work simplification skills during functional activity with no verbal cues.    2 - Patient will verbalize and demonstrate good body mechanics and joint protection techniques during  ADLs/ IADLs with no verbal cues.    3 - Patient will increase OOB/ sitting tolerance to 2-4 hours per day for increased participation in self care and leisure tasks with no s/s of exertion.    4 - Patient will increase standing tolerance time to 5  minutes with unilateral UE support to complete sink level ADLs@ mod I level.    5 - Patient will increase sitting tolerance at edge of bed to 20 minutes to complete UB ADLs @ set up assist level.    6 - Patient will transfer bed to Chair / toilet at Set up assist level with AD as indicated.     7 - Patient will complete UB ADLs with set up assist.     8 - Patient will complete LB ADLs with min assist with the use of adaptive equipment.     9 - Patient will complete toileting hygiene with set up assist/ supervision for thoroughness    10 - Patient/ Family  will demonstrate competency with UE Home Exercise Program.

## 2024-03-11 NOTE — ASSESSMENT & PLAN NOTE
Continue carbidopa levodopa  mg/tab TID  Follows with outpatient neurology, next appointment scheduled for 5/2/2024; plan to keep appointment

## 2024-03-11 NOTE — SPEECH THERAPY NOTE
Speech Language/Pathology      SLP orders placed as per stroke pathway.  At present pt denies difficulties or changes in speech/language/swallowing function.  Dysphagia screening completed and patient w/ no signs of difficulty following intake of soft solids, thin liquids, mixed texture .  Need for formal evaluation is not indicated at this time.  Please re-order should status change or concerns arise.     Kassandra Thomas MS, CCC-SLP  Speech-Language Pathologist  [place] #[id number]  [place] #[id number]

## 2024-03-11 NOTE — PLAN OF CARE
Problem: OCCUPATIONAL THERAPY ADULT  Goal: Performs self-care activities at highest level of function for planned discharge setting.  See evaluation for individualized goals.  Description: Treatment Interventions: ADL retraining, Functional transfer training, UE strengthening/ROM, Endurance training, Patient/family training, Equipment evaluation/education, Compensatory technique education, Continued evaluation, Energy conservation, Activityengagement          See flowsheet documentation for full assessment, interventions and recommendations.   Note: Limitation: Decreased ADL status, Decreased endurance, Decreased UE strength, Decreased UE ROM, Decreased self-care trans, Decreased high-level ADLs  Prognosis: Good  Assessment: Patient is a 73 y.o. male seen for OT evaluation s/p admit to [facility] on 3/10/2024 w/Dizziness. Commorbidities affecting patient's functional performance at time of assessment include: dizziness, neuro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, HTN, paranoid schizophrenia, COPD, BPH, leukopenia, GERD, MRSA carrier, combined form of senile cataract, fall, mild protein calorie malnutrition, aortic root dilation, SBO, Parkinson disease, cholecystitis.  Orders placed for OT evaluation and treatment.  Performed at least two patient identifiers during session including name and wristband.  Prior to admission,  Patient reported independnet with ADLs/ facility staff assists with IADLs. Patient resides at [facility], 2 stories with flight of steps to dining ortiz with available stair glide. Personal factors affecting patient at time of initial evaluation include: difficulty performing ADLs and difficulty performing IADLs. Upon evaluation, patient requires minimal  assist for UB ADLs, maximal assist for LB ADLs, transfers and functional ambulation in room and bathroom with minimal  assist, with the use of Rolling Walker.  Occupational performance is affected by the following deficits:  decreased functional use of BUEs, decreased muscle strength, degenerative arthritic joint changes, impaired gross motor coordination, dynamic sit/ stand balance deficit with poor standing tolerance time for self care and functional mobility, decreased activity tolerance, decreased safety awareness, delayed righting and equilibrium reactions, and postural control and postural alignment deficit, requiring external assistance to complete transitional movements.  Patient to benefit from continued Occupational Therapy treatment while in the hospital to address deficits as defined above and maximize level of functional independence with ADLs and functional mobility. Occupational Performance areas to address include: bathing/ shower, dressing, toilet hygiene, transfer to all surfaces, functional mobility, health maintenance, and IADLs: safety procedures. From OT standpoint, recommendation at time of d/c would be Level 2.     Rehab Resource Intensity Level, OT: II (Moderate Resource Intensity)

## 2024-03-11 NOTE — PROGRESS NOTES
Called by  regarding stroke alert at 9:54 PM, neuro response was immediate.    73year old man presenting with vertigo.  - NIHSS 1 (orientation question  - LKW 1830  - Patient is normotensive, denies any focal complaints nor there are any gross deficits on exam as per ED      CTH & CTA were unremarkable for any acute pathology, LVO, or other IR target.     IV thrombolysis was given due to nondisabling symptoms and suspicion for nonvascular etiology.      Recommend treating his peripheral vertigo.

## 2024-03-11 NOTE — H&P
Critical access hospital  H&P  Name: Sumit Nails 73 y.o. male I MRN: [id number]  Unit/Bed#: ED 20 I Date of Admission: 3/10/2024   Date of Service: 3/11/2024 I Hospital Day: 0      Assessment/Plan   * Dizziness  Assessment & Plan  Likely secondary to vertigo, peripheral etiology  However given risk factors r/o CVA  S/p neurology input, proceed with stroke pathway  S/p CT head. 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 Consideration for vestibular therapy as outpatient  CVA prophylaxis with asa/lipitor  Trial of meclizine. Cont supportive care  Doubt cardiac etiology, no ischemic EKG changes and neg trop      Leukopenia  Assessment & Plan  Chronic, likely medication induced ie clozapine    BPH with obstruction/lower urinary tract symptoms  Assessment & Plan  Cont flomax    Chronic obstructive pulmonary disease, unspecified COPD type ([facility])  Assessment & Plan  No sign of exacerbation    Paranoid schizophrenia ([facility])  Assessment & Plan  Cont psych meds    Hypertension  Assessment & Plan  Stable  Cont metoprolol    Neuro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   Assessment & Plan  Cont sinemet       VTE Prophylaxis: Enoxaparin (Lovenox)  / sequential compression device   Code Status: Full code  POLST: There is no POLST form on file for this patient (pre-hospital)  Discussion with family: Plan of care d/w patient    Anticipated Length of Stay:  Patient will be admitted on an Observation basis with an anticipated length of stay of  < 2 midnights.   Justification for Hospital Stay: Dizziness    Total Time for Visit, including Counseling / Coordination of Care: 60 minutes.  Greater than 50% of this total time spent on direct patient counseling and coordination of care.    Chief Complaint:    Dizziness    History of Present Illness:    Sumit Nails is a 73 y.o. male medical history significant for hypertension, schizophrenia, neuro epileptic induced Parkinson's presents to the ER with complaint of dizziness.  He reports the symptoms occurred yesterday evening when he subsequently got up from bed.  He reports of the sensation of the room spinning.  He reports of nausea but denies vomiting.  He denies any change in speech or unilateral weakness.  He reports that he has been experiencing lower extremity pain.  He reports that he just recently had his ear cleaned 2 weeks ago.  He reports of prior history of vertiginous symptoms about 5 to 6 months ago.  He denies fever, chills, chest pain, shortness of breath, palpitations.    Upon presentation to the ER, case was discussed with neurology, recommended admission for stroke pathway.  He has been given a dose of meclizine with minimal relief.    Review of Systems:    Review of Systems   Gastrointestinal:  Positive for nausea.   Musculoskeletal:  Positive for arthralgias.   Neurological:  Positive for dizziness.       Past Medical and Surgical History:     Past Medical History:   Diagnosis Date    Asthma     Benign prostatic hyperplasia     COPD (chronic obstructive pulmonary disease) (HCC)     GERD (gastroesophageal reflux disease)     Herpes zoster without complication 12/10/2021    Hypercholesteremia     Hypertension     Neuroleptic induced parkinsonism      Paranoid schizophrenia (HCC)     Psychiatric disorder        Past Surgical History:   Procedure Laterality Date    CATARACT EXTRACTION      CHOLECYSTECTOMY LAPAROSCOPIC N/A 12/3/2023    Procedure: CHOLECYSTECTOMY LAPAROSCOPIC WITH INTRAOPERATIVE CHOLANGIOGRAM;  Surgeon: Maverick Tamayo MD;  Location: [facility] OR;  Service: General    COLONOSCOPY         Meds/Allergies:    Prior to Admission medications    Medication Sig Start Date End Date Taking? Authorizing Provider   Deutetrabenazine ER (Austedo XR)  24 MG TB24 Take 24 mg by mouth daily   Yes Historical Provider, MD   Emollient (Lubriderm Advanced Therapy) LOTN Apply 1 Application topically as needed (rash)   Yes Historical Provider, MD   Psyllium (Reguloid) 28.3 % POWD Take 1 Scoop by mouth 2 (two) times a day   Yes Historical Provider, MD   temazepam (RESTORIL) 15 mg capsule Take 1 capsule (15 mg total) by mouth daily at bedtime 12/4/23 1/11/24 Yes Christopher Briones MD   Zinc Oxide (Geni Protect Moisture Barrier) 12 % CREA Apply 1 Application topically once as needed (wound care)   Yes Historical Provider, MD   acetaminophen (Acetaminophen Extra Strength) 500 mg tablet Take 1 tablet (500 mg total) by mouth every 6 (six) hours as needed for mild pain 3/13/23   LIUDMILA Chatman   atorvastatin (LIPITOR) 40 mg tablet Take 1 tablet (40 mg total) by mouth daily 3/13/23   LIUDMILA Chatman   carbidopa-levodopa (SINEMET)  mg per tablet Take 1 tablet by mouth 3 (three) times a day  Patient taking differently: Take 1 tablet by mouth 2 (two) times a day 3/2/23   Poonam Solis MD   Cholecalciferol (Vitamin D High Potency) 25 MCG (1000 UT) capsule Take 1 capsule (1,000 Units total) by mouth daily 3/13/23   LIUDMILA Chatman   cloZAPine (CLOZARIL) 100 mg tablet Take 100 mg by mouth 2 (two) times a day    Historical Provider, MD   clozapine (CLOZARIL) 200 MG tablet Take 200 mg by mouth daily at bedtime  4/8/19   Historical Provider, MD   docusate sodium (COLACE) 100 mg capsule Take 100 mg by mouth 2 (two) times a day    Historical Provider, MD   fluticasone (FLONASE) 50 mcg/act nasal spray 2 sprays into each nostril daily 1/30/24   Garry Hidalgo MD   hydrOXYzine HCL (ATARAX) 25 mg tablet Take 1 tablet (25 mg total) by mouth every 6 (six) hours as needed for itching 4/26/23   LIUDMILA Pelayo   metoprolol succinate (TOPROL-XL) 25 mg 24 hr tablet Take 1 tablet (25 mg total) by mouth daily 3/13/23   LIUDMILA Chatman   Nutritional  Supplements (Ensure High Protein) LIQD Take 237 mL by mouth 2 (two) times a day  Patient taking differently: Take 237 mL by mouth 2 (two) times a day vanilla 12/6/23 1/11/24  Garry Hidalgo MD   olopatadine (PATANOL) 0.1 % ophthalmic solution Administer 1 drop to both eyes daily at bedtime    Historical Provider, MD   omeprazole (PriLOSEC) 40 MG capsule Take 1 capsule (40 mg total) by mouth daily 3/13/23   LIUDMILA Chatman   polyethylene glycol (MIRALAX) 17 g packet Take 17 g by mouth daily 5/12/23   LIUDMILA Pelayo   tamsulosin (FLOMAX) 0.4 mg Take 1 capsule (0.4 mg total) by mouth daily at bedtime 3/13/23   LIUDMILA Chatman   topiramate (TOPAMAX) 25 mg tablet Take 25 mg by mouth 2 (two) times a day 7/1/19   Historical Provider, MD   traZODone (DESYREL) 100 mg tablet Take 100 mg by mouth daily at bedtime 4/8/19   Historical Provider, MD   Austedo 12 MG TABS Take 24 mg by mouth in the morning 11/17/23 3/11/24  Historical Provider, MD   ivermectin (STROMECTOL) 3 MG TABS  11/20/23 3/11/24  Historical Provider, MD   lidocaine (LIDODERM) 5 % Apply 1 patch topically over 12 hours daily Remove & Discard patch within 12 hours or as directed by MD 1/1/24 3/11/24  Bita Rosenbaum DO   methylPREDNISolone 4 MG tablet therapy pack Use as directed on package 1/1/24 3/11/24  Bita Rosenbaum DO     I have reveiwed home medications using records provided by [facility].    Allergies: No Known Allergies    Social History:     Marital Status: Single   Occupation:   Patient Pre-hospital Living Situation: Resides in a group home  Patient Pre-hospital Level of Mobility: Independent  Patient Pre-hospital Diet Restrictions: None  Substance Use History:   Social History     Substance and Sexual Activity   Alcohol Use Not Currently     Social History     Tobacco Use   Smoking Status Former    Current packs/day: 0.00    Average packs/day: 1 pack/day for 20.0 years (20.0 ttl pk-yrs)    Types: Cigarettes    Start  "date:     Quit date:     Years since quittin.2    Passive exposure: Past   Smokeless Tobacco Never     Social History     Substance and Sexual Activity   Drug Use Never       Family History:    Family History   Problem Relation Age of Onset    No Known Problems Mother     No Known Problems Father     Parkinsonism Son        Physical Exam:     Vitals:   Blood Pressure: 157/70 (24)  Pulse: 66 (24)  Temperature: 98.8 °F (37.1 °C) (03/10/24 2150)  Temp Source: Oral (03/10/24 2150)  Respirations: 20 (24)  Height: 5' 10\" (177.8 cm) (03/10/24 2150)  Weight - Scale: 72.7 kg (160 lb 4.4 oz) (03/10/24 2150)  SpO2: 99 % (24)    Physical Exam  Cardiovascular:      Rate and Rhythm: Normal rate and regular rhythm.      Pulses: Normal pulses.      Heart sounds: Normal heart sounds.   Pulmonary:      Effort: Pulmonary effort is normal. No respiratory distress.      Breath sounds: Normal breath sounds. No wheezing or rales.   Abdominal:      General: Abdomen is flat. Bowel sounds are normal. There is no distension.      Palpations: Abdomen is soft.      Tenderness: There is no abdominal tenderness. There is no guarding.   Musculoskeletal:         General: Normal range of motion.      Cervical back: Normal range of motion and neck supple.      Right lower leg: No edema.      Left lower leg: No edema.   Skin:     General: Skin is warm and dry.   Neurological:      General: No focal deficit present.      Mental Status: He is alert. Mental status is at baseline.      Cranial Nerves: No cranial nerve deficit.      Motor: No weakness.         Additional Data:     Lab Results: I have personally reviewed pertinent reports.      Results from last 7 days   Lab Units 24  0551 03/10/24  2155   WBC Thousand/uL 4.11* 4.04*   HEMOGLOBIN g/dL 13.0 13.4   HEMATOCRIT % 38.2 39.0   PLATELETS Thousands/uL 137* 143*   NEUTROS PCT %  --  55   LYMPHS PCT %  --  29   MONOS PCT %  --  11   EOS " PCT %  --  4     Results from last 7 days   Lab Units 03/11/24  0551 03/10/24  2155   SODIUM mmol/L 141 141   POTASSIUM mmol/L 3.8 4.0   CHLORIDE mmol/L 109* 110*   CO2 mmol/L 31 28   BUN mg/dL 18 21   CREATININE mg/dL 0.62 0.70   ANION GAP mmol/L 1 3   CALCIUM mg/dL 8.7 8.6   ALBUMIN g/dL  --  3.5   TOTAL BILIRUBIN mg/dL  --  0.42   ALK PHOS U/L  --  115*   ALT U/L  --  25   AST U/L  --  37   GLUCOSE RANDOM mg/dL 96 86     Results from last 7 days   Lab Units 03/10/24  2155   INR  1.09     Results from last 7 days   Lab Units 03/10/24  2153   POC GLUCOSE mg/dl 95               Imaging: I have personally reviewed pertinent reports.      CTA stroke alert (head/neck)   Final Result by Bradley Leong MD (03/10 2242)      Unremarkable CTA neck exam with widely patent carotid/vertebral arteries bilaterally. Unremarkable CTA brain exam without large vessel arterial occlusion, focal saccular aneurysm, or significant flow-limiting stenosis identified. Incidentally noted fetal    origin of the left posterior cerebral artery.      No enhancing brain mass/fluid collection or vascular malformation. Major intracranial dural venous sinuses are grossly patent.      No enhancing soft tissue neck mass/fluid collection or cervical lymphadenopathy.      Findings were directly discussed with Dr. Reyes at 10:38 p.m.                           Workstation performed: [id number]         CT stroke alert brain   Final Result by Bradley Leong MD (03/10 2240)      No acute intracranial abnormality.      Findings were directly discussed with Dr. Reyes      Workstation performed: [id number]         MRI Inpatient Order    (Results Pending)       EKG, Pathology, and Other Studies Reviewed on Admission:   EKG: NSR @ 71bpm    Allscripts / Epic Records Reviewed: Yes     ** Please Note: This note has been constructed using a voice recognition system. **

## 2024-03-11 NOTE — CASE MANAGEMENT
Case Management Discharge Planning Note    Patient name Sumit Nails  Location ED 20/ED 20 MRN [id number]  : 1950 Date 3/11/2024       Current Admission Date: 3/10/2024  Current Admission Diagnosis:Dizziness   Patient Active Problem List    Diagnosis Date Noted    Leukopenia 2024    Cholecystitis 2023    Dizziness 2023    SBO (small bowel obstruction) (HCC) 2023    Parkinson disease 2023    Mild protein-calorie malnutrition (HCC) 02/10/2023    Aortic root dilatation (HCC) 02/10/2023    Neoplasm of uncertain behavior of left kidney 2022    BPH with obstruction/lower urinary tract symptoms 2022    Chronic constipation 2022    Overactive bladder 2022    Chronic prostatitis 2022    Chronic obstructive pulmonary disease, unspecified COPD type (HCC) 2022    Fall 2022    Groin rash 2022    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    Combined form of senile cataract 2020    GERD (gastroesophageal reflux disease) 10/22/2019    Hypertension 10/22/2019    Hypercholesteremia 10/22/2019    Paranoid schizophrenia (HCC) 10/22/2019    Neuro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  2016      LOS (days): 0  Geometric Mean LOS (GMLOS) (days):   Days to GMLOS:     OBJECTIVE:            Current admission status: Observation   Preferred Pharmacy:   Abby CORTEZ ([facility]) - [facility] [address].  [address].  [place] [address]  Phone: [phone] Fax: [phone]    Primary Care Provider: Garry Hidalgo MD    Primary Insurance: MEDICARE  Secondary Insurance:     DISCHARGE DETAILS:    Discharge planning discussed with:: sister Maryanne Erickson  Freedom of Choice: Yes  Comments - Freedom of Choice: CM discussed PT's recommendation of STR and pt asks that CM contact his sister Maryanne.  Maryanne is agreeable to [facility] or back to [facility] with [facility] if pt improves.   Referrals placed.  CM contacted  family/caregiver?: Yes  Were Treatment Team discharge recommendations reviewed with patient/caregiver?: Yes  Did patient/caregiver verbalize understanding of patient care needs?: Yes  Were patient/caregiver advised of the risks associated with not following Treatment Team discharge recommendations?: Yes    Contacts  Patient Contacts: Maryanne  Relationship to Patient:: Family  Contact Method: Phone  Phone Number: [phone]  Reason/Outcome: Discharge Planning    Requested Home Health Care         Is the patient interested in HHC at discharge?: Yes    DME Referral Provided  Referral made for DME?: No    Other Referral/Resources/Interventions Provided:  Interventions: Short Term Rehab  Referral Comments: [facility]ie pended along with [facility] per sister Maryanne's request.  CM will continue to follow and assess needs as hospitalization progresses.    Would you like to participate in our [facility] service program?  : No - Declined    Treatment Team Recommendation: Short Term Rehab

## 2024-03-11 NOTE — CASE MANAGEMENT
Case Management Assessment & Discharge Planning Note    Patient name Sumit Nails  Location ED 20/ED 20 MRN [id number]  : 1950 Date 3/11/2024       Current Admission Date: 3/10/2024  Current Admission Diagnosis:Dizziness   Patient Active Problem List    Diagnosis Date Noted    Leukopenia 2024    Cholecystitis 2023    Dizziness 2023    SBO (small bowel obstruction) (HCC) 2023    Parkinson disease 2023    Mild protein-calorie malnutrition (HCC) 02/10/2023    Aortic root dilatation (HCC) 02/10/2023    Neoplasm of uncertain behavior of left kidney 2022    BPH with obstruction/lower urinary tract symptoms 2022    Chronic constipation 2022    Overactive bladder 2022    Chronic prostatitis 2022    Chronic obstructive pulmonary disease, unspecified COPD type (HCC) 2022    Fall 2022    Groin rash 2022    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    Combined form of senile cataract 2020    GERD (gastroesophageal reflux disease) 10/22/2019    Hypertension 10/22/2019    Hypercholesteremia 10/22/2019    Paranoid schizophrenia ([facility]) 10/22/2019    Neuro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  2016      LOS (days): 0  Geometric Mean LOS (GMLOS) (days):   Days to GMLOS:     OBJECTIVE:              Current admission status: Observation       Preferred Pharmacy:   Abby CORTEZ ([facility]) - [facility] [address] [id number] [facility].  [address] [address]  Phone: [phone] Fax: [phone]    Primary Care Provider: Garry Hidalgo MD    Primary Insurance: MEDICARE  Secondary Insurance:     ASSESSMENT:  Active Health Care Proxies       Mirian Cha Health Care Representative -    Primary Phone: [phone] (Mobile)                 Advance Directives  Does patient have a Health Care POA?: No  Was patient offered paperwork?: Yes (declined)  Does patient currently have a Health Care decision maker?: Yes, please see  Health Care Proxy section  Does patient have Advance Directives?: No  Was patient offered paperwork?: Yes (declined)  Primary Contact: [place] -Mirian Cha and sister Maryanne Erickson    Obs Notice Signed: 03/11/24    Readmission Root Cause  30 Day Readmission: No    Patient Information  Admitted from:: Facility (Pt resides at [facility])  Mental Status: Alert  During Assessment patient was accompanied by: Not accompanied during assessment  Assessment information provided by:: Patient  Primary Caregiver: Other (Comment)  Caregiver's Name:: [facility]  Caregiver's Relationship to Patient:: Other (Specify)  Caregiver's Telephone Number:: [phone]  Support Systems: Family members, Other (Comment) (sister Maryanne and [place] caregiver-Mirian Starla)  County of Residence: [place]  What city do you live in?: [place]  Home entry access options. Select all that apply.: No steps to enter home  Type of Current Residence: Group home  Upon entering residence, is there a bedroom on the main floor (no further steps)?: Yes  Upon entering residence, is there a bathroom on the main floor (no further steps)?: Yes  Living Arrangements: Other (Comment) (Lives at [facility], but has his own room)  Is patient a ?: No    Activities of Daily Living Prior to Admission  Functional Status: Independent  Completes ADLs independently?: Yes  Ambulates independently?: Yes  Does patient use assisted devices?: No  Does patient currently own DME?: No  Does patient have a history of Outpatient Therapy (PT/OT)?: No  Does the patient have a history of Short-Term Rehab?: No  Does patient have a history of HHC?: Yes ([facility])  Does patient currently have HHC?: No         Patient Information Continued  Income Source: Unemployed  Does patient have prescription coverage?: Yes  Does patient receive dialysis treatments?: No  Does patient have a history of substance abuse?: No  Does patient have a history of Mental  Health Diagnosis?: Yes  Is patient receiving treatment for mental health?: Yes (Pt is treated for paranoid schizophrenia by a psychiatrist and resides at [facility] that provides assistance with this issue)  Has patient received inpatient treatment related to mental health in the last 2 years?: No    PHQ 2/9 Screening   Reviewed PHQ 2/9 Depression Screening Score?: No    Means of Transportation  Means of Transport to Appts:: Other (Comment) ([facility] provides transport to all appointments and shopping)      Social Determinants of Health (SDOH)      Flowsheet Row Most Recent Value   Housing Stability    In the last 12 months, was there a time when you were not able to pay the mortgage or rent on time? N   In the last 12 months, how many places have you lived? 1   In the last 12 months, was there a time when you did not have a steady place to sleep or slept in a shelter (including now)? N   Transportation Needs    In the past 12 months, has lack of transportation kept you from medical appointments or from getting medications? no   In the past 12 months, has lack of transportation kept you from meetings, work, or from getting things needed for daily living? No   Food Insecurity    Within the past 12 months, you worried that your food would run out before you got the money to buy more. Never true   Within the past 12 months, the food you bought just didn't last and you didn't have money to get more. Never true   Utilities    In the past 12 months has the electric, gas, oil, or water company threatened to shut off services in your home? No            DISCHARGE DETAILS:    Discharge planning discussed with:: patient  Freedom of Choice: Yes  Comments - Freedom of Choice: Pt is agreeable to [facility] services for nursing and PT on d/c.  He resides at [facility] and will return when medically cleared.  CM contacted family/caregiver?: No- see comments (CM left message for [facility]  Mirian Cha  to call back)  Were Treatment Team discharge recommendations reviewed with patient/caregiver?: Yes  Did patient/caregiver verbalize understanding of patient care needs?: Yes  Were patient/caregiver advised of the risks associated with not following Treatment Team discharge recommendations?: Yes    Contacts  Patient Contacts: Maryanne (sister) Mirian ([place] )  Relationship to Patient:: Family  Contact Method: Phone  Phone Number: on facesheet  Reason/Outcome: Discharge Planning    Requested Home Health Care         Is the patient interested in HHC at discharge?: Yes  Home Health Discipline requested:: Nursing, Physical Therapy  Home Health Agency Name:: [facility] External Referral Reason (only applicable if external HHA name selected): Patient has established relationship with provider  Home Health Follow-Up Provider:: PCP  Home Health Services Needed:: Evaluate Functional Status and Safety, Gait/ADL Training, COPD Management, Strengthening/Theraputic Exercises to Improve Function  Homebound Criteria Met:: Requires the Assistance of Another Person for Safe Ambulation or to Leave the Home  Supporting Clincal Findings:: Fatigues Easliy in Short Distances, Dyspnea with Exertion, Limited Endurance    DME Referral Provided  Referral made for DME?: No    Other Referral/Resources/Interventions Provided:  Interventions: HHC  Referral Comments: C pended    Would you like to participate in our [facility] service program?  : No - Declined    Treatment Team Recommendation: Home with Home Health Care  Discharge Destination Plan:: Home with Home Health Care

## 2024-03-11 NOTE — PLAN OF CARE
Problem: PHYSICAL THERAPY ADULT  Goal: Performs mobility at highest level of function for planned discharge setting.  See evaluation for individualized goals.  Description: Treatment/Interventions: Functional transfer training, LE strengthening/ROM, Elevations, Therapeutic exercise, Endurance training, Patient/family training, Equipment eval/education, Bed mobility, Gait training, Spoke to nursing          See flowsheet documentation for full assessment, interventions and recommendations.  Note: Prognosis: Good  Problem List: Decreased strength, Decreased endurance, Impaired balance, Decreased mobility, Impaired sensation  Assessment: Pt is 73 y.o. male seen for PT evaluation on 3/11/2024 s/p admit to [facility] on 3/10/2024 w/ Dizziness. PT was consulted to assess pt's functional mobility and d/c needs. Order placed for PT eval and tx. PTA, pt resides at [facility], ambulatory without AD. At time of eval, pt performing bed mobility, transfers SBA; CGA for household distance gait trial with use of RW/ pt reporting continued dizziness but improvements from yesterday. Upon evaluation, pt presenting with impaired functional mobility d/t decreased strength, decreased endurance, impaired balance, decreased mobility, impaired sensation, and activity intolerance. Pertinent PMHx and current co-morbidities affecting pt's physical performance at time of assessment include: dizziness, neuro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, HTN, paranoid schizophrenia, COPD, BPH, leukopenia, GERD, MRSA carrier, combined form of senile cataract, fall, mild protein calorie malnutrition, aortic root dilation, SBO, Parkinson disease, cholecystitis. Personal factors affecting pt at time of eval include: ambulating w/ assistive device, inability to navigate community distances, and positive fall history. The following objective measures performed on IE also reveal limitations: Barthel Index: 55/100, Modified [place]: 3 (moderate  disability), and -PAC 6-Clicks: 17/24. Pt's clinical presentation is currently unstable/unpredictable seen in pt's presentation of abnormal lab value(s), ongoing medical assessment, on telemetry monitoring, and pending brain MRI to r/o CVA . Overall, pt's rehab potential and prognosis to return to PLOF is good as impacted by objective findings, warranting pt to receive further skilled PT interventions to address identified impairments, activity limitation(s), and participation restriction(s). Pt to benefit from continued PT tx to address deficits as defined above and maximize level of functional independent mobility. From PT/mobility standpoint, recommend level 2, moderate resource intensity in order to facilitate return to PLOF.        Rehab Resource Intensity Level, PT: II (Moderate Resource Intensity)    See flowsheet documentation for full assessment.

## 2024-03-12 LAB
ANION GAP SERPL CALCULATED.3IONS-SCNC: 5 MMOL/L
BASOPHILS # BLD AUTO: 0.03 THOUSANDS/ÂΜL (ref 0–0.1)
BASOPHILS NFR BLD AUTO: 1 % (ref 0–1)
BUN SERPL-MCNC: 13 MG/DL (ref 5–25)
CALCIUM SERPL-MCNC: 8.8 MG/DL (ref 8.4–10.2)
CHLORIDE SERPL-SCNC: 110 MMOL/L (ref 96–108)
CO2 SERPL-SCNC: 26 MMOL/L (ref 21–32)
CREAT SERPL-MCNC: 0.59 MG/DL (ref 0.6–1.3)
EOSINOPHIL # BLD AUTO: 0.18 THOUSAND/ÂΜL (ref 0–0.61)
EOSINOPHIL NFR BLD AUTO: 4 % (ref 0–6)
ERYTHROCYTE [DISTWIDTH] IN BLOOD BY AUTOMATED COUNT: 14.5 % (ref 11.6–15.1)
GFR SERPL CREATININE-BSD FRML MDRD: 100 ML/MIN/1.73SQ M
GLUCOSE SERPL-MCNC: 88 MG/DL (ref 65–140)
HCT VFR BLD AUTO: 39.6 % (ref 36.5–49.3)
HGB BLD-MCNC: 13.6 G/DL (ref 12–17)
IMM GRANULOCYTES # BLD AUTO: 0.01 THOUSAND/UL (ref 0–0.2)
IMM GRANULOCYTES NFR BLD AUTO: 0 % (ref 0–2)
LYMPHOCYTES # BLD AUTO: 1.31 THOUSANDS/ÂΜL (ref 0.6–4.47)
LYMPHOCYTES NFR BLD AUTO: 29 % (ref 14–44)
MCH RBC QN AUTO: 33.7 PG (ref 26.8–34.3)
MCHC RBC AUTO-ENTMCNC: 34.3 G/DL (ref 31.4–37.4)
MCV RBC AUTO: 98 FL (ref 82–98)
MONOCYTES # BLD AUTO: 0.49 THOUSAND/ÂΜL (ref 0.17–1.22)
MONOCYTES NFR BLD AUTO: 11 % (ref 4–12)
NEUTROPHILS # BLD AUTO: 2.47 THOUSANDS/ÂΜL (ref 1.85–7.62)
NEUTS SEG NFR BLD AUTO: 55 % (ref 43–75)
NRBC BLD AUTO-RTO: 0 /100 WBCS
PLATELET # BLD AUTO: 132 THOUSANDS/UL (ref 149–390)
PMV BLD AUTO: 9.4 FL (ref 8.9–12.7)
POTASSIUM SERPL-SCNC: 3.9 MMOL/L (ref 3.5–5.3)
RBC # BLD AUTO: 4.04 MILLION/UL (ref 3.88–5.62)
SODIUM SERPL-SCNC: 141 MMOL/L (ref 135–147)
WBC # BLD AUTO: 4.49 THOUSAND/UL (ref 4.31–10.16)

## 2024-03-12 PROCEDURE — 87147 CULTURE TYPE IMMUNOLOGIC: CPT | Performed by: INTERNAL MEDICINE

## 2024-03-12 PROCEDURE — 87081 CULTURE SCREEN ONLY: CPT | Performed by: INTERNAL MEDICINE

## 2024-03-12 PROCEDURE — 99233 SBSQ HOSP IP/OBS HIGH 50: CPT | Performed by: INTERNAL MEDICINE

## 2024-03-12 PROCEDURE — 85025 COMPLETE CBC W/AUTO DIFF WBC: CPT | Performed by: INTERNAL MEDICINE

## 2024-03-12 PROCEDURE — 80048 BASIC METABOLIC PNL TOTAL CA: CPT | Performed by: INTERNAL MEDICINE

## 2024-03-12 RX ADMIN — ENOXAPARIN SODIUM 40 MG: 40 INJECTION SUBCUTANEOUS at 08:44

## 2024-03-12 RX ADMIN — POLYETHYLENE GLYCOL 3350 17 G: 17 POWDER, FOR SOLUTION ORAL at 08:44

## 2024-03-12 RX ADMIN — CARBIDOPA AND LEVODOPA 1 TABLET: 25; 100 TABLET ORAL at 20:00

## 2024-03-12 RX ADMIN — METOPROLOL SUCCINATE 25 MG: 25 TABLET, EXTENDED RELEASE ORAL at 08:44

## 2024-03-12 RX ADMIN — CLOZAPINE 100 MG: 100 TABLET ORAL at 20:00

## 2024-03-12 RX ADMIN — TAMSULOSIN HYDROCHLORIDE 0.4 MG: 0.4 CAPSULE ORAL at 21:36

## 2024-03-12 RX ADMIN — TRAZODONE HYDROCHLORIDE 100 MG: 100 TABLET ORAL at 21:35

## 2024-03-12 RX ADMIN — CLOZAPINE 100 MG: 100 TABLET ORAL at 08:46

## 2024-03-12 RX ADMIN — TEMAZEPAM 15 MG: 15 CAPSULE ORAL at 21:35

## 2024-03-12 RX ADMIN — ATORVASTATIN CALCIUM 40 MG: 40 TABLET, FILM COATED ORAL at 17:39

## 2024-03-12 RX ADMIN — TOPIRAMATE 25 MG: 25 TABLET, FILM COATED ORAL at 17:39

## 2024-03-12 RX ADMIN — Medication 1000 UNITS: at 08:44

## 2024-03-12 RX ADMIN — FLUTICASONE PROPIONATE 2 SPRAY: 50 SPRAY, METERED NASAL at 08:45

## 2024-03-12 RX ADMIN — ASPIRIN 81 MG: 81 TABLET, CHEWABLE ORAL at 08:44

## 2024-03-12 RX ADMIN — DOCUSATE SODIUM 100 MG: 100 CAPSULE, LIQUID FILLED ORAL at 17:39

## 2024-03-12 RX ADMIN — TOPIRAMATE 25 MG: 25 TABLET, FILM COATED ORAL at 08:44

## 2024-03-12 RX ADMIN — PANTOPRAZOLE SODIUM 40 MG: 40 TABLET, DELAYED RELEASE ORAL at 06:10

## 2024-03-12 RX ADMIN — CARBIDOPA AND LEVODOPA 1 TABLET: 25; 100 TABLET ORAL at 08:44

## 2024-03-12 RX ADMIN — DOCUSATE SODIUM 100 MG: 100 CAPSULE, LIQUID FILLED ORAL at 08:44

## 2024-03-12 RX ADMIN — CLOZAPINE 200 MG: 100 TABLET ORAL at 21:35

## 2024-03-12 NOTE — PROGRESS NOTES
Former[facility]  Progress Note  Name: Sumit Nails I  MRN: [id number]  Unit/Bed#: -01 I Date of Admission: 3/10/2024   Date of Service: 3/12/2024 I Hospital Day: 1    Assessment/Plan   Leukopenia  Assessment & Plan  Chronic, likely medication induced ie clozapine    BPH with obstruction/lower urinary tract symptoms  Assessment & Plan  Cont flomax    Chronic obstructive pulmonary disease, unspecified COPD type (HCC)  Assessment & Plan  No sign of exacerbation    Paranoid schizophrenia ([facility])  Assessment & Plan  Cont psych meds    Hypertension  Assessment & Plan  Stable  Cont metoprolol    Neuro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   Assessment & Plan  Cont sinemet    * Dizziness  Assessment & Plan  Likely secondary to vertigo, peripheral etiology  , Vertigo could also possibly related to neuroleptic induced Parkinson's a/e/b unremarkable Head CT and Brain MRI, treated with Meclizine, Neurology Consult, and Orthostatic BP Monitoring     Continue meclizine, improving but not completely better yet  PT/OT         VTE Pharmacologic Prophylaxis:   Pharmacologic: Enoxaparin (Lovenox)  Mechanical VTE Prophylaxis in Place: Yes    Patient Centered Rounds: I have performed bedside rounds with nursing staff today.    Discussions with Specialists or Other Care Team Provider: Discussed with care management team    Education and Discussions with Family / Patient: Patient    Time Spent for Care: 45 minutes.  More than 50% of total time spent on counseling and coordination of care as described above.    Current Length of Stay: 1 day(s)    Current Patient Status: Inpatient   Certification Statement: The patient will continue to require additional inpatient hospital stay due to Improvement in symptoms    Discharge Plan: 24-48h    Code Status: Level 1 - Full Code      Subjective:     Patient valuated this morning.  He denies any chest pain nausea vomiting.  Still with some dizziness.  No other events  reported.    Objective:     Vitals:   Temp (24hrs), Av.9 °F (36.6 °C), Min:97.7 °F (36.5 °C), Max:98.2 °F (36.8 °C)    Temp:  [97.7 °F (36.5 °C)-98.2 °F (36.8 °C)] 97.7 °F (36.5 °C)  HR:  [66-78] 71  Resp:  [16-20] 18  BP: (107-138)/(62-86) 123/71  SpO2:  [96 %-100 %] 98 %  Body mass index is 22.96 kg/m².     Input and Output Summary (last 24 hours):       Intake/Output Summary (Last 24 hours) at 3/12/2024 1426  Last data filed at 3/12/2024 1410  Gross per 24 hour   Intake 240 ml   Output 1450 ml   Net -1210 ml       Physical Exam:     Physical Exam  Vitals and nursing note reviewed.   Constitutional:       Appearance: Normal appearance.      Comments: Male patient in bed, awake   HENT:      Head: Normocephalic and atraumatic.      Right Ear: External ear normal.      Left Ear: External ear normal.      Nose: Nose normal. No congestion or rhinorrhea.      Mouth/Throat:      Mouth: Mucous membranes are moist.      Pharynx: Oropharynx is clear. No oropharyngeal exudate or posterior oropharyngeal erythema.   Eyes:      General: No scleral icterus.        Right eye: No discharge.         Left eye: No discharge.      Pupils: Pupils are equal, round, and reactive to light.   Neck:      Vascular: No carotid bruit.   Cardiovascular:      Rate and Rhythm: Normal rate and regular rhythm.      Pulses: Normal pulses.      Heart sounds: No murmur heard.     No friction rub. No gallop.   Pulmonary:      Effort: Pulmonary effort is normal. No respiratory distress.      Breath sounds: Normal breath sounds. No stridor. No wheezing, rhonchi or rales.   Abdominal:      General: Abdomen is flat. Bowel sounds are normal. There is no distension.      Palpations: Abdomen is soft. There is no mass.      Tenderness: There is no abdominal tenderness. There is no guarding or rebound.      Hernia: No hernia is present.   Musculoskeletal:         General: No swelling, tenderness, deformity or signs of injury. Normal range of motion.       Cervical back: Normal range of motion. No rigidity. No muscular tenderness.   Lymphadenopathy:      Cervical: No cervical adenopathy.   Skin:     General: Skin is warm and dry.      Capillary Refill: Capillary refill takes less than 2 seconds.      Coloration: Skin is not jaundiced or pale.      Findings: No bruising or erythema.   Neurological:      General: No focal deficit present.      Mental Status: He is alert and oriented to person, place, and time. Mental status is at baseline.      Cranial Nerves: No cranial nerve deficit.      Sensory: No sensory deficit.      Motor: No weakness.      Coordination: Coordination normal.      Deep Tendon Reflexes: Reflexes normal.   Psychiatric:         Mood and Affect: Mood normal.         Behavior: Behavior normal.         Thought Content: Thought content normal.         Judgment: Judgment normal.           Additional Data:     Labs:    Results from last 7 days   Lab Units 03/12/24 0439   WBC Thousand/uL 4.49   HEMOGLOBIN g/dL 13.6   HEMATOCRIT % 39.6   PLATELETS Thousands/uL 132*   NEUTROS PCT % 55   LYMPHS PCT % 29   MONOS PCT % 11   EOS PCT % 4     Results from last 7 days   Lab Units 03/12/24 0439 03/11/24  0551 03/10/24  2155   SODIUM mmol/L 141   < > 141   POTASSIUM mmol/L 3.9   < > 4.0   CHLORIDE mmol/L 110*   < > 110*   CO2 mmol/L 26   < > 28   BUN mg/dL 13   < > 21   CREATININE mg/dL 0.59*   < > 0.70   ANION GAP mmol/L 5   < > 3   CALCIUM mg/dL 8.8   < > 8.6   ALBUMIN g/dL  --   --  3.5   TOTAL BILIRUBIN mg/dL  --   --  0.42   ALK PHOS U/L  --   --  115*   ALT U/L  --   --  25   AST U/L  --   --  37   GLUCOSE RANDOM mg/dL 88   < > 86    < > = values in this interval not displayed.     Results from last 7 days   Lab Units 03/10/24  2155   INR  1.09     Results from last 7 days   Lab Units 03/10/24  2153   POC GLUCOSE mg/dl 95     Results from last 7 days   Lab Units 03/11/24  0551   HEMOGLOBIN A1C % 4.8               * I Have Reviewed All Lab Data Listed  Above.  * Additional Pertinent Lab Tests Reviewed: All Labs For Current Hospital Admission Reviewed      Recent Cultures (last 7 days):           Last 24 Hours Medication List:   Current Facility-Administered Medications   Medication Dose Route Frequency Provider Last Rate    acetaminophen  650 mg Oral Q6H PRN Roseannjoshua Rossi, DO      aspirin  81 mg Oral Daily Roseann Lorena Rossi, DO      atorvastatin  40 mg Oral Daily With Dinner Roseann Rossi, DO      carbidopa-levodopa  1 tablet Oral BID Roseannjoshua Rossi, DO      cholecalciferol  1,000 Units Oral Daily Roseann Lorena Rossi, DO      cloZAPine  100 mg Oral BID Roseannjoshua Rossi, DO      clozapine  200 mg Oral HS Roseannjoshua Rossi, DO      docusate sodium  100 mg Oral BID Roseannjoshua Rossi, DO      enoxaparin  40 mg Subcutaneous Daily Roseannreagan Rossi, DO      fluticasone  2 spray Nasal Daily Roseannjoshua Rossi, DO      hydrOXYzine HCL  25 mg Oral Q6H PRN Roseann Rossi, DO      ibuprofen  600 mg Oral Q6H PRN LIUDMILA Pinedo      Ketotifen Fumarate  1 drop Both Eyes HS Roseannreagan Rossi, DO      meclizine  25 mg Oral Q8H PRN Roseannjoshua Davisunrandi Rossi, DO      metoprolol succinate  25 mg Oral Daily Roseannreagan Rossi, DO      pantoprazole  40 mg Oral Early Morning Roseannjoshua Rossi, DO      polyethylene glycol  17 g Oral Daily Roseannjoshua Rossi, DO      tamsulosin  0.4 mg Oral HS Roseannreagan Rossi, DO      temazepam  15 mg Oral HS Roseannreagan Rossi, DO      topiramate  25 mg Oral BID Roseann Rossi, DO      traZODone  100 mg Oral HS Roseann Lorenarandi Rossi, DO          Today, Patient Was Seen By: Joaquin Carter MD    ** Please Note: Dictation voice to text software may have been used in the creation of this document. **

## 2024-03-12 NOTE — PLAN OF CARE
Problem: PAIN - ADULT  Goal: Verbalizes/displays adequate comfort level or baseline comfort level  Description: Interventions:  - Encourage patient to monitor pain and request assistance  - Assess pain using appropriate pain scale  - Administer analgesics based on type and severity of pain and evaluate response  - Implement non-pharmacological measures as appropriate and evaluate response  - Consider cultural and social influences on pain and pain management  - Notify physician/advanced practitioner if interventions unsuccessful or patient reports new pain  Outcome: Progressing     Problem: SAFETY ADULT  Goal: Patient will remain free of falls  Description: INTERVENTIONS:  - Educate patient/family on patient safety including physical limitations  - Instruct patient to call for assistance with activity   - Consult OT/PT to assist with strengthening/mobility   - Keep Call bell within reach  - Keep bed low and locked with side rails adjusted as appropriate  - Keep care items and personal belongings within reach  - Initiate and maintain comfort rounds  - Make Fall Risk Sign visible to staff  - Offer Toileting every 2 Hours, in advance of need  - Initiate/Maintain  bed alarm  - Obtain necessary fall risk management equipment:   - Apply yellow socks and bracelet for high fall risk patients  - Consider moving patient to room near nurses station  Outcome: Progressing     Problem: Knowledge Deficit  Goal: Patient/family/caregiver demonstrates understanding of disease process, treatment plan, medications, and discharge instructions  Description: Complete learning assessment and assess knowledge base.  Interventions:  - Provide teaching at level of understanding  - Provide teaching via preferred learning methods  Outcome: Progressing

## 2024-03-12 NOTE — CASE MANAGEMENT
Case Management Progress Note    Patient name Sumit Nails  Location /-01 MRN [id number]  : 1950 Date 3/12/2024       LOS (days): 1  Geometric Mean LOS (GMLOS) (days): 1.9  Days to GMLOS:0.9        OBJECTIVE:        Current admission status: Inpatient  Preferred Pharmacy:   Abby CORTEZ ([facility]) - [facility] [address]  [address]  [address]  [place] [address]  Phone: [phone] Fax: [phone]    Primary Care Provider: Garry Hidalgo MD    Primary Insurance: MEDICARE  Secondary Insurance:     PROGRESS NOTE:  CM left VM for pt's Bethany SPRINGER, Mirian, [phone] informing that pt will be stable for discharge tomorrow. CM requested return pc to discuss discharge planning.

## 2024-03-12 NOTE — QUICK NOTE
Notified by pharmacy patient is unable to bring in medication from home, deutetrabenazine and it is not currently on formulary. Will hold medication for now while patient is inpatient. If inpatient stay is prolonged consider appropriate alternative.

## 2024-03-13 VITALS
HEART RATE: 77 BPM | OXYGEN SATURATION: 98 % | TEMPERATURE: 97.8 F | DIASTOLIC BLOOD PRESSURE: 72 MMHG | RESPIRATION RATE: 18 BRPM | WEIGHT: 160 LBS | BODY MASS INDEX: 22.9 KG/M2 | SYSTOLIC BLOOD PRESSURE: 126 MMHG | HEIGHT: 70 IN

## 2024-03-13 LAB
ANION GAP SERPL CALCULATED.3IONS-SCNC: 3 MMOL/L (ref 4–13)
BASOPHILS # BLD AUTO: 0.02 THOUSANDS/ÂΜL (ref 0–0.1)
BASOPHILS NFR BLD AUTO: 1 % (ref 0–1)
BUN SERPL-MCNC: 13 MG/DL (ref 5–25)
CALCIUM SERPL-MCNC: 8.9 MG/DL (ref 8.4–10.2)
CHLORIDE SERPL-SCNC: 109 MMOL/L (ref 96–108)
CO2 SERPL-SCNC: 27 MMOL/L (ref 21–32)
CREAT SERPL-MCNC: 0.6 MG/DL (ref 0.6–1.3)
EOSINOPHIL # BLD AUTO: 0.19 THOUSAND/ÂΜL (ref 0–0.61)
EOSINOPHIL NFR BLD AUTO: 4 % (ref 0–6)
ERYTHROCYTE [DISTWIDTH] IN BLOOD BY AUTOMATED COUNT: 14.5 % (ref 11.6–15.1)
GFR SERPL CREATININE-BSD FRML MDRD: 99 ML/MIN/1.73SQ M
GLUCOSE SERPL-MCNC: 99 MG/DL (ref 65–140)
HCT VFR BLD AUTO: 39.9 % (ref 36.5–49.3)
HGB BLD-MCNC: 13.8 G/DL (ref 12–17)
IMM GRANULOCYTES # BLD AUTO: 0.01 THOUSAND/UL (ref 0–0.2)
IMM GRANULOCYTES NFR BLD AUTO: 0 % (ref 0–2)
LYMPHOCYTES # BLD AUTO: 1.35 THOUSANDS/ÂΜL (ref 0.6–4.47)
LYMPHOCYTES NFR BLD AUTO: 31 % (ref 14–44)
MCH RBC QN AUTO: 33.6 PG (ref 26.8–34.3)
MCHC RBC AUTO-ENTMCNC: 34.6 G/DL (ref 31.4–37.4)
MCV RBC AUTO: 97 FL (ref 82–98)
MONOCYTES # BLD AUTO: 0.44 THOUSAND/ÂΜL (ref 0.17–1.22)
MONOCYTES NFR BLD AUTO: 10 % (ref 4–12)
MRSA NOSE QL CULT: ABNORMAL
MRSA NOSE QL CULT: ABNORMAL
NEUTROPHILS # BLD AUTO: 2.36 THOUSANDS/ÂΜL (ref 1.85–7.62)
NEUTS SEG NFR BLD AUTO: 54 % (ref 43–75)
NRBC BLD AUTO-RTO: 0 /100 WBCS
PLATELET # BLD AUTO: 140 THOUSANDS/UL (ref 149–390)
PMV BLD AUTO: 9.3 FL (ref 8.9–12.7)
POTASSIUM SERPL-SCNC: 3.8 MMOL/L (ref 3.5–5.3)
RBC # BLD AUTO: 4.11 MILLION/UL (ref 3.88–5.62)
SODIUM SERPL-SCNC: 139 MMOL/L (ref 135–147)
WBC # BLD AUTO: 4.37 THOUSAND/UL (ref 4.31–10.16)

## 2024-03-13 PROCEDURE — 85025 COMPLETE CBC W/AUTO DIFF WBC: CPT | Performed by: INTERNAL MEDICINE

## 2024-03-13 PROCEDURE — 99239 HOSP IP/OBS DSCHRG MGMT >30: CPT | Performed by: INTERNAL MEDICINE

## 2024-03-13 PROCEDURE — 80048 BASIC METABOLIC PNL TOTAL CA: CPT | Performed by: INTERNAL MEDICINE

## 2024-03-13 RX ORDER — MECLIZINE HYDROCHLORIDE 25 MG/1
25 TABLET ORAL EVERY 8 HOURS PRN
Qty: 30 TABLET | Refills: 0 | Status: SHIPPED | OUTPATIENT
Start: 2024-03-13 | End: 2024-03-13

## 2024-03-13 RX ORDER — MECLIZINE HYDROCHLORIDE 25 MG/1
25 TABLET ORAL EVERY 8 HOURS PRN
Qty: 30 TABLET | Refills: 0 | Status: SHIPPED | OUTPATIENT
Start: 2024-03-13 | End: 2024-03-20 | Stop reason: SDUPTHER

## 2024-03-13 RX ADMIN — POLYETHYLENE GLYCOL 3350 17 G: 17 POWDER, FOR SOLUTION ORAL at 09:49

## 2024-03-13 RX ADMIN — CARBIDOPA AND LEVODOPA 1 TABLET: 25; 100 TABLET ORAL at 09:49

## 2024-03-13 RX ADMIN — Medication 1000 UNITS: at 09:49

## 2024-03-13 RX ADMIN — TOPIRAMATE 25 MG: 25 TABLET, FILM COATED ORAL at 09:49

## 2024-03-13 RX ADMIN — FLUTICASONE PROPIONATE 2 SPRAY: 50 SPRAY, METERED NASAL at 09:50

## 2024-03-13 RX ADMIN — ASPIRIN 81 MG: 81 TABLET, CHEWABLE ORAL at 09:49

## 2024-03-13 RX ADMIN — DOCUSATE SODIUM 100 MG: 100 CAPSULE, LIQUID FILLED ORAL at 09:49

## 2024-03-13 RX ADMIN — ENOXAPARIN SODIUM 40 MG: 40 INJECTION SUBCUTANEOUS at 09:49

## 2024-03-13 RX ADMIN — CLOZAPINE 100 MG: 100 TABLET ORAL at 09:56

## 2024-03-13 RX ADMIN — METOPROLOL SUCCINATE 25 MG: 25 TABLET, EXTENDED RELEASE ORAL at 09:50

## 2024-03-13 RX ADMIN — PANTOPRAZOLE SODIUM 40 MG: 40 TABLET, DELAYED RELEASE ORAL at 06:01

## 2024-03-13 NOTE — DISCHARGE SUMMARY
Former[facility]  Discharge- Sumit Nails 1950, 73 y.o. male MRN: [id number]  Unit/Bed#: -01 Encounter: [id number]  Primary Care Provider: Garry Hidalgo MD   Date and time admitted to hospital: 3/10/2024  9:45 PM      Discharge diagnosis:    Dizziness, likely peripheral vertigo, resolved, MRI negative for stroke  BPH  Schizophrenia  COPD  Hypertension  Parkinsonism    Discharging Physician / Practitioner: Joaquin Carter MD  PCP: Garry Hidalgo MD  Admission Date:   Admission Orders (From admission, onward)       Ordered        03/11/24 1241  Inpatient Admission  Once            03/11/24 0028  Place in Observation  Once                          Discharge Date: 03/13/24    Consultations During Hospital Stay:  Neurology     Outpatient follow up Requested:  PCP  Neurology    Complications:  None    Reason for Admission: Dizziness    HPI:  Sumit Nails is a 73 y.o. male medical history significant for hypertension, schizophrenia, neuro epileptic induced Parkinson's presents to the ER with complaint of dizziness.  He reports the symptoms occurred yesterday evening when he subsequently got up from bed.  He reports of the sensation of the room spinning.  He reports of nausea but denies vomiting.  He denies any change in speech or unilateral weakness.  He reports that he has been experiencing lower extremity pain.  He reports that he just recently had his ear cleaned 2 weeks ago.  He reports of prior history of vertiginous symptoms about 5 to 6 months ago.  He denies fever, chills, chest pain, shortness of breath, palpitations.     Upon presentation to the ER, case was discussed with neurology, recommended admission for stroke pathway.  He has been given a dose of meclizine with minimal relief.    Hospital Course:     The patient was hospitalized.  Underwent workup which included MRI which was negative for acute stroke.  Was seen by neurology.  Was started on meclizine and  "fluids with improvement in his symptoms.  He is able to ambulate without difficulty.  Back to baseline.  Discussed with family.  Patient cleared for discharge back to group home.  Being discharged in stable condition, should continue meclizine as needed.  She will follow-up with primary care physician and neurologist in the outpatient setting.  If persistent symptoms can consider also vestibular therapy.    Condition at Discharge: good     Discharge Day Visit / Exam:     Subjective:    Patient evaluated this morning.  He denies any chest pain nausea vomiting.  Tolerating p.o. intake, no further vertigo.    Vitals: Blood Pressure: 126/72 (03/13/24 1051)  Pulse: 77 (03/13/24 1051)  Temperature: 97.8 °F (36.6 °C) (03/13/24 1051)  Temp Source: Oral (03/13/24 1051)  Respirations: 18 (03/13/24 1051)  Height: 5' 10\" (177.8 cm) (03/11/24 0930)  Weight - Scale: 72.6 kg (160 lb) (03/11/24 0930)  SpO2: 98 % (03/13/24 1051)    Exam:   Physical Exam  Vitals and nursing note reviewed.   Constitutional:       Appearance: Normal appearance.      Comments: Male patient in bed, awake   HENT:      Head: Normocephalic and atraumatic.      Right Ear: External ear normal.      Left Ear: External ear normal.      Nose: Nose normal. No congestion or rhinorrhea.      Mouth/Throat:      Mouth: Mucous membranes are moist.      Pharynx: Oropharynx is clear. No oropharyngeal exudate or posterior oropharyngeal erythema.   Eyes:      General: No scleral icterus.        Right eye: No discharge.         Left eye: No discharge.      Pupils: Pupils are equal, round, and reactive to light.   Neck:      Vascular: No carotid bruit.   Cardiovascular:      Rate and Rhythm: Normal rate and regular rhythm.      Pulses: Normal pulses.      Heart sounds: No murmur heard.     No friction rub. No gallop.   Pulmonary:      Effort: Pulmonary effort is normal. No respiratory distress.      Breath sounds: Normal breath sounds. No stridor. No wheezing, rhonchi or " rales.   Abdominal:      General: Abdomen is flat. Bowel sounds are normal. There is no distension.      Palpations: Abdomen is soft. There is no mass.      Tenderness: There is no abdominal tenderness. There is no guarding or rebound.      Hernia: No hernia is present.   Musculoskeletal:         General: No swelling, tenderness, deformity or signs of injury. Normal range of motion.      Cervical back: Normal range of motion. No rigidity. No muscular tenderness.   Lymphadenopathy:      Cervical: No cervical adenopathy.   Skin:     General: Skin is warm and dry.      Capillary Refill: Capillary refill takes less than 2 seconds.      Coloration: Skin is not jaundiced or pale.      Findings: No bruising or erythema.   Neurological:      General: No focal deficit present.      Mental Status: He is alert and oriented to person, place, and time. Mental status is at baseline.      Cranial Nerves: No cranial nerve deficit.      Sensory: No sensory deficit.      Motor: No weakness.      Coordination: Coordination normal.      Deep Tendon Reflexes: Reflexes normal.      Comments: AAOx4, GCS15, no dysmetria or dysdiadochokinesis   Psychiatric:         Mood and Affect: Mood normal.         Behavior: Behavior normal.         Thought Content: Thought content normal.         Judgment: Judgment normal.           Discussion with Family: Family has been made aware of discharge planning    Discharge instructions/Information to patient and family:   See after visit summary for information provided to patient and family.      Provisions for Follow-Up Care:  See after visit summary for information related to follow-up care and any pertinent home health orders.      Disposition:     Home - group home    For Discharges to [facility] SNF:   Not Applicable to this Patient - Not Applicable to this Patient    Planned Readmission: No     Discharge Statement:  I spent 84 minutes discharging the patient. This time was spent on the day  of discharge. I had direct contact with the patient on the day of discharge. Greater than 50% of the total time was spent examining patient, answering all patient questions, arranging and discussing plan of care with patient as well as directly providing post-discharge instructions.  Additional time then spent on discharge activities.    Discharge Medications:  See after visit summary for reconciled discharge medications provided to patient and family.      ** Please Note: This note has been constructed using a voice recognition system **

## 2024-03-13 NOTE — PROGRESS NOTES
Patient:  TOMMIE العلي    MRN:  [id number]    Aidin Request ID:  [id number]    Level of care reserved:  Home Health Agency    Partner Reserved:  [facility] Del[place] [id number] [phone]    Clinical needs requested:    Geography searched:  [address]    Start of Service:    Request sent:  12:22pm EDT on 3/13/2024 by Derick Monahan    Partner reserved:  1:12pm EDT on 3/13/2024 by Derick Monahan    Choice list shared:  1:11pm EDT on 3/13/2024 by Derick Monahan

## 2024-03-13 NOTE — PROGRESS NOTES
Patient:  TOMMIE العلي    MRN:  [id number]    Aidin Request ID:  [id number]    Level of care reserved:    Partner Reserved:    Clinical needs requested:    Geography searched:  [address]    Start of Service:    Request sent:  12:36pm EDT on 3/11/2024 by Sara Holliday    Partner reserved:    Choice list shared:

## 2024-03-13 NOTE — CASE MANAGEMENT
Case Management Discharge Planning Note    Patient name Sumit Nails  Location /-01 MRN [id number]  : 1950 Date 3/13/2024       Current Admission Date: 3/10/2024  Current Admission Diagnosis:Dizziness   Patient Active Problem List    Diagnosis Date Noted    Leukopenia 2024    Cholecystitis 2023    Dizziness 2023    SBO (small bowel obstruction) ([facility]) 2023    Parkinson disease 2023    Mild protein-calorie malnutrition (HCC) 02/10/2023    Aortic root dilatation ([facility]) 02/10/2023    Neoplasm of uncertain behavior of left kidney 2022    BPH with obstruction/lower urinary tract symptoms 2022    Chronic constipation 2022    Overactive bladder 2022    Chronic prostatitis 2022    Chronic obstructive pulmonary disease, unspecified COPD type ([facility]) 2022    Fall 2022    Groin rash 2022    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    Combined form of senile cataract 2020    GERD (gastroesophageal reflux disease) 10/22/2019    Hypertension 10/22/2019    Hypercholesteremia 10/22/2019    Paranoid schizophrenia (HCC) 10/22/2019    Neuro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  2016      LOS (days): 2  Geometric Mean LOS (GMLOS) (days): 4  Days to GMLOS:2     OBJECTIVE:  Risk of Unplanned Readmission Score: 15.88     Current admission status: Inpatient   Preferred Pharmacy:   [facility] ([facility]) - [facility] [address] [facility]  [address] [facility].  [address] [address]  Phone: [phone] Fax: [phone]    Primary Care Provider: Garry Hidalgo MD  Primary Insurance: MEDICARE  Secondary Insurance:     DISCHARGE DETAILS:    Discharge planning discussed with:: Mirian at [facility],  Maryanne via phone.  Freedom of Choice: Yes  Comments - Freedom of Choice: FOC maintained - Patient for return to [facility] today.  Open referral to [facility] for C.  CM to confirm.  Patient does not need placement at this time.  Contact info  for [facility] given to sister at her request for future discussion of LTC.  CM contacted family/caregiver?: Yes  Were Treatment Team discharge recommendations reviewed with patient/caregiver?: Yes (As it pertains to d/c planning and CM role.)  Did patient/caregiver verbalize understanding of patient care needs?: Yes (As it pertains to d/c planning and CM role.)  Were patient/caregiver advised of the risks associated with not following Treatment Team discharge recommendations?: Yes (As it pertains to d/c planning and CM role.)    Contacts  Patient Contacts: Maryanne () & Mirian ([place])  Relationship to Patient:: Family  Contact Method: Phone  Phone Number: Maryanne ([phone]), Mirian ([phone]).  Reason/Outcome: Continuity of Care, Discharge Planning    Requested Home Health Care         Is the patient interested in HHC at discharge?: Yes  Home Health Discipline requested:: Nursing, Occupational Therapy, Physical Therapy  [facility] Reason (only applicable if external HHA name selected): Patient has established relationship with provider  Home Health Follow-Up Provider:: PCP (Garry Hidalgo MD)  Home Health Services Needed:: Evaluate Functional Status and Safety, Gait/ADL Training, Strengthening/Theraputic Exercises to Improve Function, COPD Management  Homebound Criteria Met:: Requires the Assistance of Another Person for Safe Ambulation or to Leave the Home  Supporting Clincal Findings:: Fatigues Easliy in Short Distances, Limited Endurance, Dyspnea with Exertion    DME Referral Provided  Referral made for DME?: No    Other Referral/Resources/Interventions Provided:  Interventions: HHC, Transportation  Referral Comments: [facility] pending approval.  Transport requested for w/c van via [employer].  Programs:: COPD    Treatment Team Recommendation: Home with Home Health Care  Discharge Destination Plan:: Home with Home Health Care  Transport at Discharge : Wheelchair  [facility]  Dispatcher Contacted: Yes  Number/Name of Dispatcher: Kelly [phone]     ETA of Transport (Date): 03/13/24  ETA of Transport (Time): 1330

## 2024-03-13 NOTE — QUICK NOTE
Sumit Nails is a 73 y.o. male with HTN, HLD, paranoid schizophrenia, neuroleptic induced parkinsonism, BPH, prior tobacco abuse, COPD who presented to [facility] ED on 3/10/2024 as a stroke alert with acute onset dizziness.      BP on presentation 126/75  NIHSS of 0  CTH/CTA H&N imaging unremarkable for acute acute intracranial abnormalities/LVO  Patient was not an IV TNK candidate as symptoms were improving and low NIHSS  MRI brain unremarkable for evidence of acute infarct.    Suspect orthostatic hypotension as etiology of reported dizziness/lightheadedness.  Per chart review, patient evaluated by inpatient neurology in September 2023 for similar lightheadedness and imbalance upon standing.  Found to be orthostatic during that admission. Will plan to repeat orthostats this admission.  Other consideration includes peripheral etiology in the setting of recent ear cleaning on 3/8/2024.    No further inpatient neurology recommendations at this time.  Patient with outpatient neurology appointment scheduled for 5/2/2024 with Dr. Solis.  Plan to keep appointment.  No further outpatient imaging/testing needed prior to appointment.

## 2024-03-13 NOTE — PROGRESS NOTES
Patient:  TOMMIE العلي    MRN:  [id number]    [facility]an[facility] Request ID:  [id number]    Level of care reserved:    Partner Reserved:    Clinical needs requested:    Geography searched:  10 miles around [id number]    Start of Service:    Request sent:  12:37pm EDT on 3/11/2024 by Sara Holliday    Partner reserved:    Choice list shared:

## 2024-03-13 NOTE — CASE MANAGEMENT
Case Management Discharge Planning Note    Patient name Sumit Nails  Location /-01 MRN [id number]  : 1950 Date 3/13/2024       Current Admission Date: 3/10/2024  Current Admission Diagnosis:Dizziness   Patient Active Problem List    Diagnosis Date Noted    Leukopenia 2024    Cholecystitis 2023    Dizziness 2023    SBO (small bowel obstruction) ([facility]) 2023    Parkinson disease 2023    Mild protein-calorie malnutrition (HCC) 02/10/2023    Aortic root dilatation ([facility]) 02/10/2023    Neoplasm of uncertain behavior of left kidney 2022    BPH with obstruction/lower urinary tract symptoms 2022    Chronic constipation 2022    Overactive bladder 2022    Chronic prostatitis 2022    Chronic obstructive pulmonary disease, unspecified COPD type ([facility]) 2022    Fall 2022    Groin rash 2022    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    Combined form of senile cataract 2020    GERD (gastroesophageal reflux disease) 10/22/2019    Hypertension 10/22/2019    Hypercholesteremia 10/22/2019    Paranoid schizophrenia (HCC) 10/22/2019    Neuro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  2016      LOS (days): 2  Geometric Mean LOS (GMLOS) (days): 4  Days to GMLOS:2     OBJECTIVE:  Risk of Unplanned Readmission Score: 15.88     Current admission status: Inpatient   Preferred Pharmacy:   [facility]TE[facility] ([facility]) - [facility] [address] [facility].  [address] [facility] [facility] [facility].  [address]  [facility] [address]  Phone: [phone] Fax: [phone]    Primary Care Provider: Garry Hidalgo MD  Primary Insurance: MEDICARE  Secondary Insurance:     DISCHARGE DETAILS:    Discharge planning discussed with:: Maryanne - sister via phone.  Freedom of Choice: Yes  Comments - Freedom of Choice: FOC maintained - [facility] unable to accept.  [facility] has had patient before and can accept.  CM reserved in [facility].  Family and [place] updated.  CM contacted family/caregiver?:  Yes  Were Treatment Team discharge recommendations reviewed with patient/caregiver?: Yes  Did patient/caregiver verbalize understanding of patient care needs?: Yes  Were patient/caregiver advised of the risks associated with not following Treatment Team discharge recommendations?: Yes    Contacts  Patient Contacts: Maryanne (sister) & Mirian ([place])  Relationship to Patient:: Family  Contact Method: Phone  Phone Number: Maryanne ([phone]), Mirian kevin lee ([phone])  Reason/Outcome: Continuity of Care, Discharge Planning    Requested Home Health Care         Home Health Agency Name:: [facility]  [facility] External Referral Reason (only applicable if external HHA name selected): Scheduling access issues    Other Referral/Resources/Interventions Provided:  Interventions: C  Referral Comments: [facility] reserved in [facility] & AVS.    Transported by (Company and Unit #): [facility] [phone]  ETA of Transport (Date): 03/13/24  ETA of Transport (Time): 1400

## 2024-03-14 ENCOUNTER — TRANSITIONAL CARE MANAGEMENT (OUTPATIENT)
Age: 74
End: 2024-03-14

## 2024-03-15 DIAGNOSIS — E78.00 HYPERCHOLESTEREMIA: ICD-10-CM

## 2024-03-15 DIAGNOSIS — I77.810 AORTIC ROOT DILATATION (HCC): ICD-10-CM

## 2024-03-15 RX ORDER — METOPROLOL SUCCINATE 25 MG/1
25 TABLET, EXTENDED RELEASE ORAL DAILY
Qty: 90 TABLET | Refills: 1 | Status: SHIPPED | OUTPATIENT
Start: 2024-03-15 | End: 2024-03-20 | Stop reason: SDUPTHER

## 2024-03-15 RX ORDER — ATORVASTATIN CALCIUM 40 MG/1
40 TABLET, FILM COATED ORAL DAILY
Qty: 90 TABLET | Refills: 1 | Status: SHIPPED | OUTPATIENT
Start: 2024-03-15 | End: 2024-03-20 | Stop reason: SDUPTHER

## 2024-03-15 NOTE — TELEPHONE ENCOUNTER
Marleny requesting refills.     Reason for call:   [x] Refill   [] Prior Auth  [] Other:     Office:   [x] PCP/Provider -   [] Specialty/Provider -     Medication:       Quantity: 90/90  Pharmacy: [facility]    Does the patient have enough for 3 days?   [x] Yes   [] No - Send as HP to POD

## 2024-03-20 ENCOUNTER — OFFICE VISIT (OUTPATIENT)
Age: 74
End: 2024-03-20
Payer: MEDICARE

## 2024-03-20 VITALS
HEART RATE: 80 BPM | TEMPERATURE: 94.8 F | BODY MASS INDEX: 21.96 KG/M2 | DIASTOLIC BLOOD PRESSURE: 70 MMHG | WEIGHT: 153.4 LBS | SYSTOLIC BLOOD PRESSURE: 110 MMHG | OXYGEN SATURATION: 100 % | HEIGHT: 70 IN

## 2024-03-20 DIAGNOSIS — K21.9 GASTROESOPHAGEAL REFLUX DISEASE: ICD-10-CM

## 2024-03-20 DIAGNOSIS — F20.0 PARANOID SCHIZOPHRENIA (HCC): ICD-10-CM

## 2024-03-20 DIAGNOSIS — I77.810 AORTIC ROOT DILATATION (HCC): ICD-10-CM

## 2024-03-20 DIAGNOSIS — K59.09 CHRONIC CONSTIPATION: ICD-10-CM

## 2024-03-20 DIAGNOSIS — N39.43 POST-VOID DRIBBLING: ICD-10-CM

## 2024-03-20 DIAGNOSIS — G21.11 NEUROLEPTIC-INDUCED PARKINSONISM (HCC): ICD-10-CM

## 2024-03-20 DIAGNOSIS — T43.505A NEUROLEPTIC-INDUCED PARKINSONISM (HCC): ICD-10-CM

## 2024-03-20 DIAGNOSIS — E78.00 HYPERCHOLESTEREMIA: ICD-10-CM

## 2024-03-20 DIAGNOSIS — R09.81 NASAL CONGESTION: ICD-10-CM

## 2024-03-20 DIAGNOSIS — R42 DIZZINESS: ICD-10-CM

## 2024-03-20 DIAGNOSIS — L25.9 CONTACT DERMATITIS, UNSPECIFIED CONTACT DERMATITIS TYPE, UNSPECIFIED TRIGGER: ICD-10-CM

## 2024-03-20 PROCEDURE — G2211 COMPLEX E/M VISIT ADD ON: HCPCS

## 2024-03-20 PROCEDURE — 99215 OFFICE O/P EST HI 40 MIN: CPT

## 2024-03-20 RX ORDER — POLYETHYLENE GLYCOL 3350 17 G/17G
17 POWDER, FOR SOLUTION ORAL DAILY
Qty: 31 EACH | Refills: 11 | Status: SHIPPED | OUTPATIENT
Start: 2024-03-20

## 2024-03-20 RX ORDER — CLOZAPINE 200 MG/1
200 TABLET ORAL
Status: CANCELLED | OUTPATIENT
Start: 2024-03-20

## 2024-03-20 RX ORDER — PSYLLIUM HUSK (WITH SUGAR) 3 G/12 G
1 POWDER (GRAM) ORAL 2 TIMES DAILY
Qty: 44 G | Refills: 1 | Status: SHIPPED | OUTPATIENT
Start: 2024-03-20 | End: 2024-04-19

## 2024-03-20 RX ORDER — MECLIZINE HYDROCHLORIDE 25 MG/1
25 TABLET ORAL EVERY 8 HOURS PRN
Qty: 30 TABLET | Refills: 0 | Status: SHIPPED | OUTPATIENT
Start: 2024-03-20 | End: 2024-03-20

## 2024-03-20 RX ORDER — METOPROLOL SUCCINATE 25 MG/1
25 TABLET, EXTENDED RELEASE ORAL DAILY
Qty: 90 TABLET | Refills: 1 | Status: SHIPPED | OUTPATIENT
Start: 2024-03-20

## 2024-03-20 RX ORDER — CLOZAPINE 100 MG/1
100 TABLET ORAL 2 TIMES DAILY
Status: CANCELLED | OUTPATIENT
Start: 2024-03-20

## 2024-03-20 RX ORDER — OMEPRAZOLE 40 MG/1
40 CAPSULE, DELAYED RELEASE ORAL DAILY
Qty: 31 CAPSULE | Refills: 11 | Status: SHIPPED | OUTPATIENT
Start: 2024-03-20

## 2024-03-20 RX ORDER — MECLIZINE HYDROCHLORIDE 25 MG/1
25 TABLET ORAL EVERY 8 HOURS PRN
Qty: 30 TABLET | Refills: 3 | Status: SHIPPED | OUTPATIENT
Start: 2024-03-20

## 2024-03-20 RX ORDER — DEUTETRABENAZINE 24 MG/1
24 TABLET, FILM COATED, EXTENDED RELEASE ORAL DAILY
Status: CANCELLED | OUTPATIENT
Start: 2024-03-20

## 2024-03-20 RX ORDER — TAMSULOSIN HYDROCHLORIDE 0.4 MG/1
0.4 CAPSULE ORAL
Qty: 90 CAPSULE | Refills: 3 | Status: SHIPPED | OUTPATIENT
Start: 2024-03-20

## 2024-03-20 RX ORDER — FLUTICASONE PROPIONATE 50 MCG
2 SPRAY, SUSPENSION (ML) NASAL DAILY
Qty: 16 G | Refills: 11 | Status: SHIPPED | OUTPATIENT
Start: 2024-03-20

## 2024-03-20 RX ORDER — TEMAZEPAM 15 MG/1
15 CAPSULE ORAL
Qty: 30 CAPSULE | Refills: 0 | Status: CANCELLED | OUTPATIENT
Start: 2024-03-20 | End: 2024-04-19

## 2024-03-20 RX ORDER — TOPIRAMATE 25 MG/1
25 TABLET ORAL 2 TIMES DAILY
Refills: 1 | Status: CANCELLED | OUTPATIENT
Start: 2024-03-20

## 2024-03-20 RX ORDER — DOCUSATE SODIUM 100 MG/1
100 CAPSULE, LIQUID FILLED ORAL 2 TIMES DAILY
Qty: 60 CAPSULE | Refills: 1 | Status: SHIPPED | OUTPATIENT
Start: 2024-03-20 | End: 2024-05-19

## 2024-03-20 RX ORDER — ZINC OXIDE AND DIMETHICONE 120; 10 MG/G; MG/G
1 CREAM TOPICAL ONCE AS NEEDED
Status: CANCELLED | OUTPATIENT
Start: 2024-03-20

## 2024-03-20 RX ORDER — HYDROXYZINE HYDROCHLORIDE 25 MG/1
25 TABLET, FILM COATED ORAL EVERY 6 HOURS PRN
Qty: 30 TABLET | Refills: 5 | Status: CANCELLED | OUTPATIENT
Start: 2024-03-20

## 2024-03-20 RX ORDER — ATORVASTATIN CALCIUM 40 MG/1
40 TABLET, FILM COATED ORAL DAILY
Qty: 90 TABLET | Refills: 1 | Status: SHIPPED | OUTPATIENT
Start: 2024-03-20

## 2024-03-20 RX ORDER — TRAZODONE HYDROCHLORIDE 100 MG/1
100 TABLET ORAL
Status: CANCELLED | OUTPATIENT
Start: 2024-03-20

## 2024-03-20 NOTE — PROGRESS NOTES
Assessment & Plan     1. Dizziness  Comments:  Refilled meclizine, controlled- No symptoms since discharge. MRI scheduled 4/4, Neuro consult 5/2. CT and CTA unremarkable in hosp  Orders:  -     meclizine (ANTIVERT) 25 mg tablet; Take 1 tablet (25 mg total) by mouth every 8 (eight) hours as needed for dizziness    2. Hypercholesteremia  -     atorvastatin (LIPITOR) 40 mg tablet; Take 1 tablet (40 mg total) by mouth daily    3. Neuro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   Comments:  Controlled, follows with psych, no refills needed    4. Nasal congestion  -     fluticasone (FLONASE) 50 mcg/act nasal spray; 2 sprays into each nostril daily    5. Contact dermatitis, unspecified contact dermatitis type, unspecified trigger  Comments:  Gluteal cleft ecezematic rash without a sore. Checked body for wounds and didnt find any after seeing MRSA culture. Will call if needs antibiotic. S/S reviewed    6. Aortic root dilatation (HCC)  -     metoprolol succinate (TOPROL-XL) 25 mg 24 hr tablet; Take 1 tablet (25 mg total) by mouth daily    7. Gastroesophageal reflux disease  -     omeprazole (PriLOSEC) 40 MG capsule; Take 1 capsule (40 mg total) by mouth daily    8. Chronic constipation  -     docusate sodium (COLACE) 100 mg capsule; Take 1 capsule (100 mg total) by mouth 2 (two) times a day  -     polyethylene glycol (MIRALAX) 17 g packet; Take 17 g by mouth daily  -     Psyllium (Reguloid) 28.3 % POWD; Take 1 Scoop by mouth 2 (two) times a day    9. Post-void dribbling  -     tamsulosin (FLOMAX) 0.4 mg; Take 1 capsule (0.4 mg total) by mouth daily at bedtime    10. Paranoid schizophrenia (HCC)  Comments:  Controlled, continue following with psychiatry    Hx leukopenia and Platelets low, but chronic. Will continue to monitor but likely medication induced, on clozapine.     Subjective     Transitional Care Management Review:   Sumit Nails is a 73 y.o. male here for TCM follow up.     During the TCM phone call patient stated:  TCM Call        Date and time call was made  3/14/2024 10:25 AM    Hospital care reviewed  Records reviewed    Patient was hospitial[facility] at  [facility]    Date of Admission  03/10/24    Date of discharge  03/13/24    Diagnosis  Dizziness    Disposition  Home    Current Symptoms  None          TCM Call       Post hospital issues  None    Scheduled for follow up?  Not clinically warranted  transferred to SNF    Patients specialists  Other (comment)    Other specialists names  AISSATOU-Julieta Edwards PA-C, [phone]    Did you obtain your prescribed medications  --  meclizine    I have advised the patient to call PCP with any new or worsening symptoms  Yaneth Daigle LPN    Are you recieving any outpatient services  Yes    What type of services  MRI    Are you recieving home care services  Yes    Types of home care services  Home PT; Nurse visit          HPI  Presents with care coordinator for follow up from discharge from hospital for dizziness. Since has resolved, taking meclizine as needed. Running low and needs refills. Fells back to normal now. No complaints. Recently had cerumen impaction flush by ENT. Has neuro appt scheduled and MRI of the brain scheduled for 4/4. Does not note any rashes, wounds or falls.  Review of Systems   Constitutional:  Negative for activity change, diaphoresis, fatigue and fever.   HENT:  Positive for congestion and rhinorrhea. Negative for ear pain, hearing loss, mouth sores, postnasal drip, sinus pressure, sinus pain, sneezing and sore throat.    Eyes:  Negative for photophobia, pain, discharge, redness, itching and visual disturbance.   Respiratory:  Negative for cough, chest tightness and shortness of breath.    Cardiovascular:  Negative for chest pain and palpitations.   Gastrointestinal: Negative.    Endocrine: Negative for polydipsia, polyphagia and polyuria.   Genitourinary:  Negative for dysuria, flank pain, frequency, hematuria and urgency.   Musculoskeletal:  Negative for back  "pain, joint swelling, neck pain and neck stiffness.   Skin: Negative.    Neurological:  Negative for dizziness, tremors, speech difficulty, weakness, light-headedness, numbness and headaches.   Hematological:  Negative for adenopathy.   Psychiatric/Behavioral:  Negative for agitation, behavioral problems, confusion, decreased concentration, hallucinations, self-injury and sleep disturbance. The patient is not nervous/anxious.        Objective     /70   Pulse 80   Temp (!) 94.8 °F (34.9 °C)   Ht 5' 10\" (1.778 m)   Wt 69.6 kg (153 lb 6.4 oz)   SpO2 100%   BMI 22.01 kg/m²      Physical Exam  Vitals and nursing note reviewed.   Constitutional:       General: He is not in acute distress.     Appearance: Normal appearance. He is well-developed and normal weight. He is not ill-appearing, toxic-appearing or diaphoretic.   HENT:      Head: Normocephalic and atraumatic.      Right Ear: Tympanic membrane, ear canal and external ear normal. There is no impacted cerumen.      Left Ear: Tympanic membrane, ear canal and external ear normal. There is no impacted cerumen.      Nose: Congestion and rhinorrhea present.      Mouth/Throat:      Mouth: Mucous membranes are moist.      Pharynx: No oropharyngeal exudate or posterior oropharyngeal erythema.   Eyes:      General: No scleral icterus.        Right eye: No discharge.         Left eye: No discharge.      Extraocular Movements: Extraocular movements intact.      Conjunctiva/sclera: Conjunctivae normal.      Pupils: Pupils are equal, round, and reactive to light.   Cardiovascular:      Rate and Rhythm: Normal rate and regular rhythm.      Heart sounds: No murmur heard.  Pulmonary:      Effort: Pulmonary effort is normal. No respiratory distress.      Breath sounds: Normal breath sounds.   Abdominal:      Palpations: Abdomen is soft.      Tenderness: There is no abdominal tenderness.   Musculoskeletal:         General: No swelling.      Cervical back: Neck supple. "   Skin:     General: Skin is warm and dry.      Capillary Refill: Capillary refill takes less than 2 seconds.      Coloration: Skin is not jaundiced or pale.      Findings: No bruising, erythema, lesion or rash.   Neurological:      General: No focal deficit present.      Mental Status: He is alert. Mental status is at baseline.   Psychiatric:         Mood and Affect: Mood normal.         Behavior: Behavior normal.         Thought Content: Thought content normal.         Judgment: Judgment normal.       Medications have been reviewed by provider in current encounter  I have spent a total time of 60 minutes on 03/20/24 in caring for this patient including Diagnostic results, Prognosis, Risks and benefits of tx options, Instructions for management, Patient and family education, Risk factor reductions, Counseling / Coordination of care, Documenting in the medical record, Reviewing / ordering tests, medicine, procedures  , and Obtaining or reviewing history  .   Thaddeus Babin PA-C

## 2024-04-01 ENCOUNTER — OFFICE VISIT (OUTPATIENT)
Age: 74
End: 2024-04-01
Payer: MEDICARE

## 2024-04-01 VITALS
OXYGEN SATURATION: 100 % | RESPIRATION RATE: 18 BRPM | HEART RATE: 76 BPM | DIASTOLIC BLOOD PRESSURE: 80 MMHG | WEIGHT: 154.6 LBS | SYSTOLIC BLOOD PRESSURE: 130 MMHG | HEIGHT: 70 IN | TEMPERATURE: 97.5 F | BODY MASS INDEX: 22.13 KG/M2

## 2024-04-01 DIAGNOSIS — K64.9 HEMORRHOIDS, UNSPECIFIED HEMORRHOID TYPE: ICD-10-CM

## 2024-04-01 DIAGNOSIS — K59.09 CHRONIC CONSTIPATION: Primary | ICD-10-CM

## 2024-04-01 PROCEDURE — 99214 OFFICE O/P EST MOD 30 MIN: CPT

## 2024-04-01 RX ORDER — BISACODYL 10 MG
10 SUPPOSITORY, RECTAL RECTAL DAILY PRN
COMMUNITY
Start: 2024-03-30 | End: 2024-04-01 | Stop reason: ALTCHOICE

## 2024-04-01 RX ORDER — POLYETHYLENE GLYCOL 3350 17 G/17G
POWDER, FOR SOLUTION ORAL
Qty: 31 EACH | Refills: 11 | Status: SHIPPED | OUTPATIENT
Start: 2024-04-01

## 2024-04-01 RX ORDER — SENNOSIDES A AND B 8.6 MG/1
8.6 TABLET, FILM COATED ORAL 2 TIMES DAILY
COMMUNITY
Start: 2024-03-30

## 2024-04-01 NOTE — PROGRESS NOTES
Assessment & Plan     1. Chronic constipation  Assessment & Plan:  Continue current medications as prescribed before hospital visit and d/c newly prescribed ones. Increase miralax to as needed and take an extra dose if constipation returns.  Pt edu that most important intervention is to drink more water and eat more fiber. Also stay active and avoid sitting or laying for 6-8 hours at a time during the day. Get up every 30 min to an hour.    Orders:  -     polyethylene glycol (MIRALAX) 17 g packet; Take once a day, may take an extra dose prn    2. Hemorrhoids, unspecified hemorrhoid type         Subjective     Transitional Care Management Review:   Sumit Nails is a 73 y.o. male here for TCM follow up.     During the TCM phone call patient stated:  TCM Call       Date and time call was made  3/14/2024 10:25 AM    Hospital care reviewed  Records reviewed    Patient was hospitialized at  [facility]    Date of Admission  03/10/24    Date of discharge  03/13/24    Diagnosis  Dizziness    Disposition  Home    Current Symptoms  None          TCM Call       Post hospital issues  None    Scheduled for follow up?  Not clinically warranted  transferred to SNF    Patients specialists  Other (comment)    Other specialists names  GI-Julieta Edwards PA-C, [phone]    Did you obtain your prescribed medications  --  meclizine    I have advised the patient to call PCP with any new or worsening symptoms  Yaneth Daigle LPN    Are you recieving any outpatient services  Yes    What type of services  MRI    Are you recieving home care services  Yes    Types of home care services  Home PT; Nurse visit          Presents s/p brief hospital visit for acute on chronic constipation with a fecal impaction and a hemorrhoid. Pt last BM was today and warm formed and a large volume. No SOB or fatigue. Pt reports he only drinks about 1 cup of water total during the day and it is to take his medications. PT also had blood on the toilet  "paper when wiping that he was concerned of. No blood seen today when he had a BM.      Review of Systems   Constitutional:  Negative for activity change, appetite change, fatigue, fever and unexpected weight change.   HENT: Negative.     Eyes: Negative.    Respiratory:  Negative for cough, chest tightness and shortness of breath.    Cardiovascular:  Negative for chest pain, palpitations and leg swelling.   Gastrointestinal:  Positive for anal bleeding (resolved), blood in stool (resolved) and constipation (resolved). Negative for abdominal distention, abdominal pain, diarrhea, nausea, rectal pain and vomiting.   Endocrine: Negative.    Genitourinary: Negative.    Musculoskeletal: Negative.    Skin: Negative.    Neurological: Negative.    Hematological: Negative.    Psychiatric/Behavioral: Negative.         Objective     /80 (BP Location: Right arm, Patient Position: Sitting, Cuff Size: Standard)   Pulse 76   Temp 97.5 °F (36.4 °C) (Tympanic)   Resp 18   Ht 5' 10\" (1.778 m)   Wt 70.1 kg (154 lb 9.6 oz)   SpO2 100%   BMI 22.18 kg/m²      Physical Exam  Vitals and nursing note reviewed.   Constitutional:       General: He is not in acute distress.     Appearance: Normal appearance. He is normal weight. He is not ill-appearing, toxic-appearing or diaphoretic.   HENT:      Head: Normocephalic and atraumatic.      Mouth/Throat:      Mouth: Mucous membranes are moist.      Pharynx: Oropharynx is clear. No oropharyngeal exudate.   Eyes:      General: No scleral icterus.        Right eye: No discharge.         Left eye: No discharge.      Extraocular Movements: Extraocular movements intact.      Conjunctiva/sclera: Conjunctivae normal.   Cardiovascular:      Rate and Rhythm: Normal rate and regular rhythm.      Heart sounds: No murmur heard.  Pulmonary:      Effort: Pulmonary effort is normal.      Breath sounds: Normal breath sounds.   Abdominal:      General: Abdomen is flat. Bowel sounds are normal.      " Palpations: There is no mass.      Tenderness: There is no abdominal tenderness. There is no rebound.   Musculoskeletal:      Cervical back: Normal range of motion and neck supple.   Skin:     General: Skin is warm and dry.      Findings: No rash.   Neurological:      Mental Status: He is alert. Mental status is at baseline.      Sensory: No sensory deficit.      Motor: No weakness.   Psychiatric:         Mood and Affect: Mood normal.         Behavior: Behavior normal.         Thought Content: Thought content normal.         Judgment: Judgment normal.       Medications have been reviewed by provider in current encounter    Thaddeus Babin PA-C

## 2024-04-01 NOTE — PATIENT INSTRUCTIONS
Increase fluids to eight 8oz cups per day. Try to eat more fruits and vegetables. Continue current laxatives and d/c newly prescribed ones. If coming back, double miralax dose and skip a meal and increase fluids.

## 2024-04-01 NOTE — ASSESSMENT & PLAN NOTE
Continue current medications as prescribed before hospital visit and d/c newly prescribed ones. Increase miralax to as needed and take an extra dose if constipation returns.  Pt edu that most important intervention is to drink more water and eat more fiber. Also stay active and avoid sitting or laying for 6-8 hours at a time during the day. Get up every 30 min to an hour.

## 2024-04-04 ENCOUNTER — HOSPITAL ENCOUNTER (OUTPATIENT)
Dept: MRI IMAGING | Facility: HOSPITAL | Age: 74
End: 2024-04-04
Payer: MEDICARE

## 2024-04-04 DIAGNOSIS — K76.9 LIVER LESION: ICD-10-CM

## 2024-04-04 DIAGNOSIS — E55.9 VITAMIN D DEFICIENCY: ICD-10-CM

## 2024-04-04 PROCEDURE — 74183 MRI ABD W/O CNTR FLWD CNTR: CPT

## 2024-04-04 PROCEDURE — A9585 GADOBUTROL INJECTION: HCPCS | Performed by: PHYSICIAN ASSISTANT

## 2024-04-04 PROCEDURE — G1004 CDSM NDSC: HCPCS

## 2024-04-04 RX ORDER — GADOBUTROL 604.72 MG/ML
7 INJECTION INTRAVENOUS
Status: COMPLETED | OUTPATIENT
Start: 2024-04-04 | End: 2024-04-04

## 2024-04-04 RX ORDER — CHOLECALCIFEROL (VITAMIN D3) 25 MCG
1000 CAPSULE ORAL DAILY
Qty: 30 CAPSULE | Refills: 6 | Status: SHIPPED | OUTPATIENT
Start: 2024-04-04

## 2024-04-04 RX ADMIN — GADOBUTROL 7 ML: 604.72 INJECTION INTRAVENOUS at 14:27

## 2024-04-04 NOTE — TELEPHONE ENCOUNTER
Refill on:    Cholecalciferol (Vitamin D High Potency) 25 MCG (1000 UT) capsule   [facility]

## 2024-04-15 ENCOUNTER — TELEPHONE (OUTPATIENT)
Dept: GASTROENTEROLOGY | Facility: CLINIC | Age: 74
End: 2024-04-15

## 2024-04-15 NOTE — TELEPHONE ENCOUNTER
Attempted to call pt received message saying  could not complete call at this time and call hung.  Will try calling again later.

## 2024-04-15 NOTE — TELEPHONE ENCOUNTER
I called [facility] at [phone] listed on communication sheet (Mirian ). I was informed Mirian is out on medical leave and call was transferred to Shayy Hall , no answer on her line. I left message requesting a call back. I did send message to QUENTIN Edwards for input to speak with  at [facility] and she approves speaking with  at [facility] to convey provider's information MRI result note.

## 2024-04-15 NOTE — TELEPHONE ENCOUNTER
----- Message from Jacklyn Casas PA-C sent at 4/14/2024  8:56 AM EDT -----  Please let the group home know that the areas we saw on his liver have completely resolved, and may have been abscess. Thanks!

## 2024-04-17 ENCOUNTER — HOSPITAL ENCOUNTER (EMERGENCY)
Facility: HOSPITAL | Age: 74
Discharge: HOME | End: 2024-04-18
Attending: EMERGENCY MEDICINE
Payer: MEDICARE

## 2024-04-17 ENCOUNTER — APPOINTMENT (EMERGENCY)
Dept: CT IMAGING | Facility: HOSPITAL | Age: 74
End: 2024-04-17
Payer: MEDICARE

## 2024-04-17 DIAGNOSIS — T88.7XXA MEDICATION SIDE EFFECT: ICD-10-CM

## 2024-04-17 DIAGNOSIS — K59.00 CONSTIPATION: ICD-10-CM

## 2024-04-17 DIAGNOSIS — R25.1 TREMOR: Primary | ICD-10-CM

## 2024-04-17 LAB
ALBUMIN SERPL BCP-MCNC: 3.5 G/DL (ref 3.5–5)
ALP SERPL-CCNC: 109 U/L (ref 34–104)
ALT SERPL W P-5'-P-CCNC: 40 U/L (ref 7–52)
ANION GAP SERPL CALCULATED.3IONS-SCNC: 1 MMOL/L (ref 4–13)
AST SERPL W P-5'-P-CCNC: 28 U/L (ref 13–39)
BACTERIA UR QL AUTO: ABNORMAL /HPF
BASOPHILS # BLD AUTO: 0.02 THOUSANDS/ÂΜL (ref 0–0.1)
BASOPHILS NFR BLD AUTO: 0 % (ref 0–1)
BILIRUB SERPL-MCNC: 0.41 MG/DL (ref 0.2–1)
BILIRUB UR QL STRIP: NEGATIVE
BUN SERPL-MCNC: 16 MG/DL (ref 5–25)
CALCIUM SERPL-MCNC: 8.4 MG/DL (ref 8.4–10.2)
CHLORIDE SERPL-SCNC: 110 MMOL/L (ref 96–108)
CLARITY UR: CLEAR
CO2 SERPL-SCNC: 31 MMOL/L (ref 21–32)
COLOR UR: YELLOW
CREAT SERPL-MCNC: 0.6 MG/DL (ref 0.6–1.3)
EOSINOPHIL # BLD AUTO: 0.15 THOUSAND/ÂΜL (ref 0–0.61)
EOSINOPHIL NFR BLD AUTO: 3 % (ref 0–6)
ERYTHROCYTE [DISTWIDTH] IN BLOOD BY AUTOMATED COUNT: 14.6 % (ref 11.6–15.1)
GFR SERPL CREATININE-BSD FRML MDRD: 99 ML/MIN/1.73SQ M
GLUCOSE SERPL-MCNC: 70 MG/DL (ref 65–140)
GLUCOSE UR STRIP-MCNC: NEGATIVE MG/DL
HCT VFR BLD AUTO: 39.8 % (ref 36.5–49.3)
HGB BLD-MCNC: 13.2 G/DL (ref 12–17)
HGB UR QL STRIP.AUTO: NEGATIVE
IMM GRANULOCYTES # BLD AUTO: 0.01 THOUSAND/UL (ref 0–0.2)
IMM GRANULOCYTES NFR BLD AUTO: 0 % (ref 0–2)
KETONES UR STRIP-MCNC: NEGATIVE MG/DL
LEUKOCYTE ESTERASE UR QL STRIP: NEGATIVE
LIPASE SERPL-CCNC: 37 U/L (ref 11–82)
LYMPHOCYTES # BLD AUTO: 1.18 THOUSANDS/ÂΜL (ref 0.6–4.47)
LYMPHOCYTES NFR BLD AUTO: 26 % (ref 14–44)
MCH RBC QN AUTO: 33.7 PG (ref 26.8–34.3)
MCHC RBC AUTO-ENTMCNC: 33.2 G/DL (ref 31.4–37.4)
MCV RBC AUTO: 102 FL (ref 82–98)
MONOCYTES # BLD AUTO: 0.54 THOUSAND/ÂΜL (ref 0.17–1.22)
MONOCYTES NFR BLD AUTO: 12 % (ref 4–12)
MUCOUS THREADS UR QL AUTO: ABNORMAL
NEUTROPHILS # BLD AUTO: 2.68 THOUSANDS/ÂΜL (ref 1.85–7.62)
NEUTS SEG NFR BLD AUTO: 59 % (ref 43–75)
NITRITE UR QL STRIP: NEGATIVE
NON-SQ EPI CELLS URNS QL MICRO: ABNORMAL /HPF
NRBC BLD AUTO-RTO: 0 /100 WBCS
PH UR STRIP.AUTO: 6.5 [PH]
PLATELET # BLD AUTO: 162 THOUSANDS/UL (ref 149–390)
PMV BLD AUTO: 9.2 FL (ref 8.9–12.7)
POTASSIUM SERPL-SCNC: 3.8 MMOL/L (ref 3.5–5.3)
PROT SERPL-MCNC: 5.8 G/DL (ref 6.4–8.4)
PROT UR STRIP-MCNC: ABNORMAL MG/DL
RBC # BLD AUTO: 3.92 MILLION/UL (ref 3.88–5.62)
RBC #/AREA URNS AUTO: ABNORMAL /HPF
SODIUM SERPL-SCNC: 142 MMOL/L (ref 135–147)
SP GR UR STRIP.AUTO: 1.03 (ref 1–1.03)
UROBILINOGEN UR STRIP-ACNC: 2 MG/DL
WBC # BLD AUTO: 4.58 THOUSAND/UL (ref 4.31–10.16)
WBC #/AREA URNS AUTO: ABNORMAL /HPF

## 2024-04-17 PROCEDURE — 83690 ASSAY OF LIPASE: CPT | Performed by: EMERGENCY MEDICINE

## 2024-04-17 PROCEDURE — 36415 COLL VENOUS BLD VENIPUNCTURE: CPT | Performed by: EMERGENCY MEDICINE

## 2024-04-17 PROCEDURE — 85025 COMPLETE CBC W/AUTO DIFF WBC: CPT | Performed by: EMERGENCY MEDICINE

## 2024-04-17 PROCEDURE — 81001 URINALYSIS AUTO W/SCOPE: CPT | Performed by: EMERGENCY MEDICINE

## 2024-04-17 PROCEDURE — 96360 HYDRATION IV INFUSION INIT: CPT

## 2024-04-17 PROCEDURE — 70450 CT HEAD/BRAIN W/O DYE: CPT

## 2024-04-17 PROCEDURE — 99284 EMERGENCY DEPT VISIT MOD MDM: CPT

## 2024-04-17 PROCEDURE — 96361 HYDRATE IV INFUSION ADD-ON: CPT

## 2024-04-17 PROCEDURE — 80053 COMPREHEN METABOLIC PANEL: CPT | Performed by: EMERGENCY MEDICINE

## 2024-04-17 RX ORDER — IBUPROFEN 400 MG/1
800 TABLET ORAL ONCE
Status: COMPLETED | OUTPATIENT
Start: 2024-04-17 | End: 2024-04-17

## 2024-04-17 RX ADMIN — IBUPROFEN 800 MG: 400 TABLET, FILM COATED ORAL at 22:43

## 2024-04-17 RX ADMIN — SODIUM CHLORIDE 1000 ML: 0.9 INJECTION, SOLUTION INTRAVENOUS at 20:17

## 2024-04-17 NOTE — TELEPHONE ENCOUNTER
Usha Houston  called back to review previous message. I reviewed result note and explained patient was in the office for visit 12/6/23 and MRI ordered at that time in regards to hospitalization the previous week  and the CT on admission revealed 2 indeterminate lesions measuring 1.5 and 1.4 cm, as well as findings suspicious for cholecystitis. Was recommended follow-up MRI and that is the test he completed 4/4/24.

## 2024-04-17 NOTE — TELEPHONE ENCOUNTER
I called group home, spoke with Hansa and provided ALEYDA Casas result note message. Provided our number if needed to call back.

## 2024-04-18 ENCOUNTER — TELEPHONE (OUTPATIENT)
Age: 74
End: 2024-04-18

## 2024-04-18 VITALS
HEART RATE: 66 BPM | OXYGEN SATURATION: 99 % | RESPIRATION RATE: 18 BRPM | DIASTOLIC BLOOD PRESSURE: 75 MMHG | SYSTOLIC BLOOD PRESSURE: 139 MMHG | TEMPERATURE: 98.5 F
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
## 2024-04-18 DIAGNOSIS — T43.505A NEUROLEPTIC-INDUCED PARKINSONISM (HCC): Primary | ICD-10-CM

## 2024-04-18 DIAGNOSIS — G21.11 NEUROLEPTIC-INDUCED PARKINSONISM (HCC): Primary | ICD-10-CM

## 2024-04-18 PROCEDURE — 99284 EMERGENCY DEPT VISIT MOD MDM: CPT | Performed by: EMERGENCY MEDICINE

## 2024-04-18 NOTE — ED NOTES
Patient given meal and wtg for wheelchair van transport at this time.      Rosa Holcomb RN  04/18/24 0002

## 2024-04-18 NOTE — TELEPHONE ENCOUNTER
[facility]   HOME   NURSING    CALLED    SAID  THEY   GOT   A  REFERRAL  FROM  THE    [facility]  BUT  IT  WASN'T   MADE   OUT  TO  [facility]   NEEDS    A NEW  ORDER   TO  [facility]  AND   NEEDS  DIAG    ON  FORM

## 2024-04-22 ENCOUNTER — OFFICE VISIT (OUTPATIENT)
Age: 74
End: 2024-04-22
Payer: MEDICARE

## 2024-04-22 VITALS
WEIGHT: 151.6 LBS | TEMPERATURE: 95.5 F | HEIGHT: 70 IN | DIASTOLIC BLOOD PRESSURE: 70 MMHG | HEART RATE: 85 BPM | BODY MASS INDEX: 21.7 KG/M2 | OXYGEN SATURATION: 98 % | SYSTOLIC BLOOD PRESSURE: 128 MMHG

## 2024-04-22 DIAGNOSIS — K59.09 CHRONIC CONSTIPATION: ICD-10-CM

## 2024-04-22 DIAGNOSIS — I10 PRIMARY HYPERTENSION: ICD-10-CM

## 2024-04-22 DIAGNOSIS — G20.A1 PARKINSON'S DISEASE, UNSPECIFIED WHETHER DYSKINESIA PRESENT, UNSPECIFIED WHETHER MANIFESTATIONS FLUCTUATE: Primary | ICD-10-CM

## 2024-04-22 PROCEDURE — G2211 COMPLEX E/M VISIT ADD ON: HCPCS

## 2024-04-22 PROCEDURE — 99214 OFFICE O/P EST MOD 30 MIN: CPT

## 2024-04-22 RX ORDER — PSYLLIUM HUSK (WITH SUGAR) 3 G/12 G
1 POWDER (GRAM) ORAL 2 TIMES DAILY
Qty: 44 G | Refills: 1 | Status: SHIPPED | OUTPATIENT
Start: 2024-04-22 | End: 2024-05-22

## 2024-04-22 NOTE — PROGRESS NOTES
Name: Sumit Nails      : 1950      MRN: [id number]  Encounter Provider: Thaddeus Babin PA-C  Encounter Date: 2024   Encounter department: [facility] [place]    Assessment & Plan     1. Parkinson's disease, unspecified whether dyskinesia present, unspecified whether manifestations fluctuate  Comments:  Tremors resolved, continue current medications. Discussed increasing fluid intake. Avoid lactose, try almond milk for resolution of possible intolerance    2. Chronic constipation  Comments:  Discussed staying hydrated and moving around more. Will now try to take out lactose dairy products from diet    3. Primary hypertension  Comments:  Controlled, BP today at 128/70. Continue currrent medications           Subjective      Pt presents for ER follow up. Was having tremors, dizziness. Has a hx of parkinson's disease on sinemet. After testing, was discharged w a dx of medication side effects. Care giver reports frequent vomiting shortly after he drinks milk. Currently no symptoms and have been resolved since being discharged.      Review of Systems   Constitutional:  Negative for activity change, appetite change, fatigue, fever and unexpected weight change.   HENT: Negative.     Eyes: Negative.    Respiratory:  Negative for cough, chest tightness and shortness of breath.    Cardiovascular:  Negative for chest pain, palpitations and leg swelling.   Gastrointestinal: Negative.    Endocrine: Negative.    Genitourinary: Negative.    Musculoskeletal: Negative.    Skin: Negative.    Neurological:  Positive for dizziness (resolved), tremors (resolved) and speech difficulty (chronic. stable). Negative for seizures, syncope, facial asymmetry, weakness, light-headedness, numbness and headaches.   Hematological: Negative.    Psychiatric/Behavioral: Negative.         Current Outpatient Medications on File Prior to Visit   Medication Sig    acetaminophen (Acetaminophen Extra Strength) 500  mg tablet Take 1 tablet (500 mg total) by mouth every 6 (six) hours as needed for mild pain    atorvastatin (LIPITOR) 40 mg tablet Take 1 tablet (40 mg total) by mouth daily    carbidopa-levodopa (SINEMET)  mg per tablet Take 1 tablet by mouth 3 (three) times a day (Patient taking differently: Take 1 tablet by mouth 2 (two) times a day)    Cholecalciferol (Vitamin D High Potency) 25 MCG (1000 UT) capsule Take 1 capsule (1,000 Units total) by mouth daily    cloZAPine (CLOZARIL) 100 mg tablet Take 100 mg by mouth 2 (two) times a day    clozapine (CLOZARIL) 200 MG tablet Take 200 mg by mouth daily at bedtime     docusate sodium (COLACE) 100 mg capsule Take 1 capsule (100 mg total) by mouth 2 (two) times a day    fluticasone (FLONASE) 50 mcg/act nasal spray 2 sprays into each nostril daily    hydrOXYzine HCL (ATARAX) 25 mg tablet Take 1 tablet (25 mg total) by mouth every 6 (six) hours as needed for itching    meclizine (ANTIVERT) 25 mg tablet Take 1 tablet (25 mg total) by mouth every 8 (eight) hours as needed for dizziness    metoprolol succinate (TOPROL-XL) 25 mg 24 hr tablet Take 1 tablet (25 mg total) by mouth daily    olopatadine (PATANOL) 0.1 % ophthalmic solution Administer 1 drop to both eyes daily at bedtime    omeprazole (PriLOSEC) 40 MG capsule Take 1 capsule (40 mg total) by mouth daily    polyethylene glycol (MIRALAX) 17 g packet Take once a day, may take an extra dose prn    tamsulosin (FLOMAX) 0.4 mg Take 1 capsule (0.4 mg total) by mouth daily at bedtime    topiramate (TOPAMAX) 25 mg tablet Take 25 mg by mouth 2 (two) times a day    traZODone (DESYREL) 100 mg tablet Take 100 mg by mouth daily at bedtime    Deutetrabenazine ER (Austedo XR) 24 MG TB24 Take 24 mg by mouth daily (Patient not taking: Reported on 4/1/2024)    Emollient (Lubriderm Advanced Therapy) LOTN Apply 1 Application topically as needed (rash) (Patient not taking: Reported on 4/1/2024)    Nutritional Supplements (Ensure High  "Protein) LIQD Take 237 mL by mouth 2 (two) times a day (Patient taking differently: Take 237 mL by mouth 2 (two) times a day vanilla)    Psyllium (Reguloid) 28.3 % POWD Take 1 Scoop by mouth 2 (two) times a day    senna (Senokot) 8.6 MG tablet Take 8.6 mg by mouth 2 (two) times a day (Patient not taking: Reported on 4/1/2024)    temazepam (RESTORIL) 15 mg capsule Take 1 capsule (15 mg total) by mouth daily at bedtime    Zinc Oxide (Geni Protect Moisture Barrier) 12 % CREA Apply 1 Application topically once as needed (wound care) (Patient not taking: Reported on 4/1/2024)       Objective     /70   Pulse 85   Temp (!) 95.5 °F (35.3 °C)   Ht 5' 10\" (1.778 m)   Wt 68.8 kg (151 lb 9.6 oz)   SpO2 98%   BMI 21.75 kg/m²     Physical Exam  Vitals and nursing note reviewed.   Constitutional:       General: He is not in acute distress.     Appearance: Normal appearance. He is not ill-appearing, toxic-appearing or diaphoretic.   HENT:      Head: Normocephalic and atraumatic.      Right Ear: External ear normal.      Left Ear: External ear normal.   Eyes:      General: No scleral icterus.        Right eye: No discharge.         Left eye: No discharge.      Extraocular Movements: Extraocular movements intact.      Conjunctiva/sclera: Conjunctivae normal.   Neck:      Vascular: No carotid bruit.   Cardiovascular:      Rate and Rhythm: Normal rate and regular rhythm.      Pulses: Normal pulses.      Heart sounds: Normal heart sounds. No murmur heard.  Pulmonary:      Effort: Pulmonary effort is normal.      Breath sounds: Normal breath sounds.   Abdominal:      General: Abdomen is flat. Bowel sounds are normal.      Palpations: There is no mass.      Tenderness: There is no abdominal tenderness. There is no right CVA tenderness, left CVA tenderness or guarding.      Hernia: No hernia is present.   Musculoskeletal:         General: No deformity. Normal range of motion.      Cervical back: Normal range of motion.      " Right lower leg: No edema.      Left lower leg: No edema.   Skin:     General: Skin is warm and dry.      Capillary Refill: Capillary refill takes less than 2 seconds.      Findings: No lesion or rash.   Neurological:      Mental Status: He is alert. Mental status is at baseline.   Psychiatric:         Mood and Affect: Mood normal.         Behavior: Behavior normal.         Thought Content: Thought content normal.         Judgment: Judgment normal.     I have spent a total time of 35 minutes on 04/22/24 in caring for this patient including Diagnostic results, Prognosis, Risks and benefits of tx options, Instructions for management, Patient and family education, Importance of tx compliance, Risk factor reductions, Documenting in the medical record, Reviewing / ordering tests, medicine, procedures  , and Obtaining or reviewing history  .    Thaddeus Babin PA-C

## 2024-04-23 ENCOUNTER — TELEPHONE (OUTPATIENT)
Age: 74
End: 2024-04-23

## 2024-04-23 NOTE — TELEPHONE ENCOUNTER
Nurse frequency orders.  1x a week for 3 weeks for med management.        DARVIN Neely from [facility] called for the following home health orders;    Nurse frequency orders:    1x a week for 3 weeks for med management

## 2024-04-25 NOTE — ED PROVIDER NOTES
History  Chief Complaint   Patient presents with    Tremors     Pt reports after he ate dinner he began with tremors, per EMS pt with a hx of Parkinson's and has had these tremors before. Pt also c/o constipation for 2 days     73-year-old male presents emergency department for evaluation of tremors.  Patient has history of occasion induced parkinsonism, has chronic tremors.  Complains of facial tremors.  Secondarily complains of constipation, has had a bowel movement for the past several days, still passing gas.  No nausea or vomiting no fevers or chills.        Prior to Admission Medications   Prescriptions Last Dose Informant Patient Reported? Taking?   Cholecalciferol (Vitamin D High Potency) 25 MCG (1000 UT) capsule   No Yes   Sig: Take 1 capsule (1,000 Units total) by mouth daily   Deutetrabenazine ER (Austedo XR) 24 MG TB24   Yes No   Sig: Take 24 mg by mouth daily   Patient not taking: Reported on 4/1/2024   Emollient (Lubriderm Advanced Therapy) LOTN   Yes No   Sig: Apply 1 Application topically as needed (rash)   Patient not taking: Reported on 4/1/2024   Nutritional Supplements (Ensure High Protein) LIQD  Care Giver No Yes   Sig: Take 237 mL by mouth 2 (two) times a day   Patient taking differently: Take 237 mL by mouth 2 (two) times a day vanilla   Zinc Oxide (Geni Protect Moisture Barrier) 12 % CREA   Yes No   Sig: Apply 1 Application topically once as needed (wound care)   Patient not taking: Reported on 4/1/2024   acetaminophen (Acetaminophen Extra Strength) 500 mg tablet  Care Giver No Yes   Sig: Take 1 tablet (500 mg total) by mouth every 6 (six) hours as needed for mild pain   atorvastatin (LIPITOR) 40 mg tablet   No Yes   Sig: Take 1 tablet (40 mg total) by mouth daily   carbidopa-levodopa (SINEMET)  mg per tablet  Care Giver No Yes   Sig: Take 1 tablet by mouth 3 (three) times a day   Patient taking differently: Take 1 tablet by mouth 2 (two) times a day   cloZAPine (CLOZARIL) 100 mg tablet   Care Giver Yes Yes   Sig: Take 100 mg by mouth 2 (two) times a day   clozapine (CLOZARIL) 200 MG tablet  Care Giver Yes Yes   Sig: Take 200 mg by mouth daily at bedtime    docusate sodium (COLACE) 100 mg capsule   No Yes   Sig: Take 1 capsule (100 mg total) by mouth 2 (two) times a day   fluticasone (FLONASE) 50 mcg/act nasal spray   No Yes   Si sprays into each nostril daily   hydrOXYzine HCL (ATARAX) 25 mg tablet  Care Giver No Yes   Sig: Take 1 tablet (25 mg total) by mouth every 6 (six) hours as needed for itching   meclizine (ANTIVERT) 25 mg tablet   No Yes   Sig: Take 1 tablet (25 mg total) by mouth every 8 (eight) hours as needed for dizziness   metoprolol succinate (TOPROL-XL) 25 mg 24 hr tablet   No Yes   Sig: Take 1 tablet (25 mg total) by mouth daily   olopatadine (PATANOL) 0.1 % ophthalmic solution  Care Giver Yes Yes   Sig: Administer 1 drop to both eyes daily at bedtime   omeprazole (PriLOSEC) 40 MG capsule   No Yes   Sig: Take 1 capsule (40 mg total) by mouth daily   polyethylene glycol (MIRALAX) 17 g packet   No Yes   Sig: Take once a day, may take an extra dose prn   senna (Senokot) 8.6 MG tablet   Yes No   Sig: Take 8.6 mg by mouth 2 (two) times a day   Patient not taking: Reported on 2024   tamsulosin (FLOMAX) 0.4 mg   No Yes   Sig: Take 1 capsule (0.4 mg total) by mouth daily at bedtime   temazepam (RESTORIL) 15 mg capsule  Care Giver No Yes   Sig: Take 1 capsule (15 mg total) by mouth daily at bedtime   topiramate (TOPAMAX) 25 mg tablet  Care Giver Yes Yes   Sig: Take 25 mg by mouth 2 (two) times a day   traZODone (DESYREL) 100 mg tablet  Care Giver Yes Yes   Sig: Take 100 mg by mouth daily at bedtime      Facility-Administered Medications: None       Past Medical History:   Diagnosis Date    Asthma     Benign prostatic hyperplasia     COPD (chronic obstructive pulmonary disease) (HCC)     GERD (gastroesophageal reflux disease)     Herpes zoster without complication 12/10/2021     Hypercholesteremia     Hypertension     Neuroleptic induced parkinsonism (HCC)     Paranoid schizophrenia (HCC)     Psychiatric disorder        Past Surgical History:   Procedure Laterality Date    CATARACT EXTRACTION      CHOLECYSTECTOMY LAPAROSCOPIC N/A 12/3/2023    Procedure: CHOLECYSTECTOMY LAPAROSCOPIC WITH INTRAOPERATIVE CHOLANGIOGRAM;  Surgeon: Maverick Tamayo MD;  Location: [facility];  Service: General    COLONOSCOPY         Family History   Problem Relation Age of Onset    No Known Problems Mother     No Known Problems Father     Parkinsonism Son      I have reviewed and agree with the history as documented.    E-Cigarette/Vaping    E-Cigarette Use Never User      E-Cigarette/Vaping Substances    Nicotine No     THC No     CBD No     Flavoring No     Other No     Unknown No      Social History     Tobacco Use    Smoking status: Former     Current packs/day: 0.00     Average packs/day: 1 pack/day for 20.0 years (20.0 ttl pk-yrs)     Types: Cigarettes     Start date:      Quit date:      Years since quittin.3     Passive exposure: Past    Smokeless tobacco: Never   Vaping Use    Vaping status: Never Used   Substance Use Topics    Alcohol use: Not Currently    Drug use: Never       Review of Systems   Constitutional:  Negative for appetite change, chills, fatigue and fever.   HENT:  Negative for sneezing and sore throat.    Eyes:  Negative for visual disturbance.   Respiratory:  Negative for cough, choking, chest tightness, shortness of breath and wheezing.    Cardiovascular:  Negative for chest pain and palpitations.   Gastrointestinal:  Positive for constipation. Negative for abdominal pain, diarrhea, nausea and vomiting.   Genitourinary:  Negative for difficulty urinating and dysuria.   Neurological:  Positive for tremors. Negative for dizziness, weakness, light-headedness, numbness and headaches.   All other systems reviewed and are negative.      Physical Exam  Physical Exam    Vital  Signs  ED Triage Vitals [04/17/24 2000]   Temperature Pulse Respirations Blood Pressure SpO2   98.5 °F (36.9 °C) 68 18 147/76 100 %      Temp Source Heart Rate Source Patient Position - Orthostatic VS BP Location FiO2 (%)   Oral Monitor -- -- --      Pain Score       No Pain           Vitals:    04/17/24 2000 04/17/24 2100 04/17/24 2200 04/17/24 2330   BP: 147/76 115/66 139/75    Pulse: 68 68 69 66         Visual Acuity      ED Medications  Medications   sodium chloride 0.9 % bolus 1,000 mL (0 mL Intravenous Stopped 4/17/24 2217)   ibuprofen (MOTRIN) tablet 800 mg (800 mg Oral Given 4/17/24 2243)       Diagnostic Studies  Results Reviewed       Procedure Component Value Units Date/Time    Urine Microscopic [[id number]]  (Abnormal) Collected: 04/17/24 2243    Lab Status: Final result Specimen: Urine, Clean Catch Updated: 04/17/24 2257     RBC, UA 1-2 /hpf      WBC, UA None Seen /hpf      Epithelial Cells None Seen /hpf      Bacteria, UA None Seen /hpf      MUCUS THREADS Occasional    UA w Reflex to Microscopic w Reflex to Culture [[id number]]  (Abnormal) Collected: 04/17/24 2243    Lab Status: Final result Specimen: Urine, Clean Catch Updated: 04/17/24 2256     Color, UA Yellow     Clarity, UA Clear     Specific Gravity, UA 1.030     pH, UA 6.5     Leukocytes, UA Negative     Nitrite, UA Negative     Protein, UA Trace mg/dl      Glucose, UA Negative mg/dl      Ketones, UA Negative mg/dl      Urobilinogen, UA 2.0 mg/dl      Bilirubin, UA Negative     Occult Blood, UA Negative    Comprehensive metabolic panel [[id number]]  (Abnormal) Collected: 04/17/24 2015    Lab Status: Final result Specimen: Blood from Arm, Right Updated: 04/17/24 2045     Sodium 142 mmol/L      Potassium 3.8 mmol/L      Chloride 110 mmol/L      CO2 31 mmol/L      ANION GAP 1 mmol/L      BUN 16 mg/dL      Creatinine 0.60 mg/dL      Glucose 70 mg/dL      Calcium 8.4 mg/dL      AST 28 U/L      ALT 40 U/L      Alkaline Phosphatase 109 U/L      Total  Protein 5.8 g/dL      Albumin 3.5 g/dL      Total Bilirubin 0.41 mg/dL      eGFR 99 ml/min/1.73sq m     Narrative:      National Kidney Disease Foundation guidelines for Chronic Kidney Disease (CKD):     Stage 1 with normal or high GFR (GFR > 90 mL/min/1.73 square meters)    Stage 2 Mild CKD (GFR = 60-89 mL/min/1.73 square meters)    Stage 3A Moderate CKD (GFR = 45-59 mL/min/1.73 square meters)    Stage 3B Moderate CKD (GFR = 30-44 mL/min/1.73 square meters)    Stage 4 Severe CKD (GFR = 15-29 mL/min/1.73 square meters)    Stage 5 End Stage CKD (GFR <15 mL/min/1.73 square meters)  Note: GFR calculation is accurate only with a steady state creatinine    Lipase [[id number]]  (Normal) Collected: 04/17/24 2015    Lab Status: Final result Specimen: Blood from Arm, Right Updated: 04/17/24 2045     Lipase 37 u/L     CBC and differential [[id number]]  (Abnormal) Collected: 04/17/24 2015    Lab Status: Final result Specimen: Blood from Arm, Right Updated: 04/17/24 2027     WBC 4.58 Thousand/uL      RBC 3.92 Million/uL      Hemoglobin 13.2 g/dL      Hematocrit 39.8 %       fL      MCH 33.7 pg      MCHC 33.2 g/dL      RDW 14.6 %      MPV 9.2 fL      Platelets 162 Thousands/uL      nRBC 0 /100 WBCs      Segmented % 59 %      Immature Grans % 0 %      Lymphocytes % 26 %      Monocytes % 12 %      Eosinophils Relative 3 %      Basophils Relative 0 %      Absolute Neutrophils 2.68 Thousands/µL      Absolute Immature Grans 0.01 Thousand/uL      Absolute Lymphocytes 1.18 Thousands/µL      Absolute Monocytes 0.54 Thousand/µL      Eosinophils Absolute 0.15 Thousand/µL      Basophils Absolute 0.02 Thousands/µL                    CT head without contrast   Final Result by Josué Mesa MD (04/17 2246)      No acute intracranial abnormality.                  Workstation performed: [id number]                    Procedures  Procedures         ED Course               Identification of Seniors at Risk      Flowsheet Row Most Recent Value    (ISAR) Identification of Seniors at Risk    Before the illness or injury that brought you to the Emergency, did you need someone to help you on a regular basis? 0 Filed at: 04/17/2024 1957   In the last 24 hours, have you needed more help than usual? 0 Filed at: 04/17/2024 1957   Have you been hospitalized for one or more nights during the past 6 months? 1 Filed at: 04/17/2024 1957   In general, do you see well? 0 Filed at: 04/17/2024 1957   In general, do you have serious problems with your memory? 0 Filed at: 04/17/2024 1957   Do you take more than three different medications every day? 1 Filed at: 04/17/2024 1957   ISAR Score 2 Filed at: 04/17/2024 1957                        SBIRT 20yo+      Flowsheet Row Most Recent Value   Initial Alcohol Screen: US AUDIT-C     1. How often do you have a drink containing alcohol? 0 Filed at: 04/17/2024 1957   2. How many drinks containing alcohol do you have on a typical day you are drinking?  0 Filed at: 04/17/2024 1957   3a. Male UNDER 65: How often do you have five or more drinks on one occasion? 0 Filed at: 04/17/2024 1957   3b. FEMALE Any Age, or MALE 65+: How often do you have 4 or more drinks on one occassion? 0 Filed at: 04/17/2024 1957   Audit-C Score 0 Filed at: 04/17/2024 1957   RAFA: How many times in the past year have you...    Used an illegal drug or used a prescription medication for non-medical reasons? Never Filed at: 04/17/2024 1957                      Medical Decision Making  73-year-old male with medication induced side effect, explained to him that this is likely secondary to medications that he has been on long-term.  Will also evaluate for constipation, rule out obstruction, recommend bowel regimen.    Amount and/or Complexity of Data Reviewed  Labs: ordered.  Radiology: ordered.    Risk  Prescription drug management.             Disposition  Final diagnoses:   Tremor   Medication side effect   Constipation     Time reflects when diagnosis was  documented in both MDM as applicable and the Disposition within this note       Time User Action Codes Description Comment    4/17/2024 11:16 PM Esteban Paige [R25.1] Tremor     4/17/2024 11:16 PM Esteban Paige [T88.7XXA] Medication side effect     4/17/2024 11:17 PM Esteban Paige [K59.00] Constipation           ED Disposition       ED Disposition   Discharge    Condition   Stable    Date/Time   Wed Apr 17, 2024 1107    Comment   Sumit Nails discharge to home/self care.                   Follow-up Information       Follow up With Specialties Details Why Contact Info    Garry Hidalgo MD Family Medicine   [address] [place] [phone]              Discharge Medication List as of 4/17/2024 11:21 PM        CONTINUE these medications which have NOT CHANGED    Details   acetaminophen (Acetaminophen Extra Strength) 500 mg tablet Take 1 tablet (500 mg total) by mouth every 6 (six) hours as needed for mild pain, Starting Mon 3/13/2023, Normal      atorvastatin (LIPITOR) 40 mg tablet Take 1 tablet (40 mg total) by mouth daily, Starting Wed 3/20/2024, Normal      carbidopa-levodopa (SINEMET)  mg per tablet Take 1 tablet by mouth 3 (three) times a day, Starting Thu 3/2/2023, Normal      Cholecalciferol (Vitamin D High Potency) 25 MCG (1000 UT) capsule Take 1 capsule (1,000 Units total) by mouth daily, Starting Thu 4/4/2024, Normal      !! cloZAPine (CLOZARIL) 100 mg tablet Take 100 mg by mouth 2 (two) times a day, Historical Med      !! clozapine (CLOZARIL) 200 MG tablet Take 200 mg by mouth daily at bedtime , Starting Mon 4/8/2019, Historical Med      docusate sodium (COLACE) 100 mg capsule Take 1 capsule (100 mg total) by mouth 2 (two) times a day, Starting Wed 3/20/2024, Until Sun 5/19/2024, Normal      fluticasone (FLONASE) 50 mcg/act nasal spray 2 sprays into each nostril daily, Starting Wed 3/20/2024, Normal      hydrOXYzine HCL (ATARAX) 25 mg tablet Take 1 tablet  (25 mg total) by mouth every 6 (six) hours as needed for itching, Starting Wed 4/26/2023, Normal      meclizine (ANTIVERT) 25 mg tablet Take 1 tablet (25 mg total) by mouth every 8 (eight) hours as needed for dizziness, Starting Wed 3/20/2024, Normal      metoprolol succinate (TOPROL-XL) 25 mg 24 hr tablet Take 1 tablet (25 mg total) by mouth daily, Starting Wed 3/20/2024, Normal      Nutritional Supplements (Ensure High Protein) LIQD Take 237 mL by mouth 2 (two) times a day, Starting Wed 12/6/2023, Until Wed 4/17/2024, Normal      olopatadine (PATANOL) 0.1 % ophthalmic solution Administer 1 drop to both eyes daily at bedtime, Historical Med      omeprazole (PriLOSEC) 40 MG capsule Take 1 capsule (40 mg total) by mouth daily, Starting Wed 3/20/2024, Normal      polyethylene glycol (MIRALAX) 17 g packet Take once a day, may take an extra dose prn, Normal      tamsulosin (FLOMAX) 0.4 mg Take 1 capsule (0.4 mg total) by mouth daily at bedtime, Starting Wed 3/20/2024, Normal      temazepam (RESTORIL) 15 mg capsule Take 1 capsule (15 mg total) by mouth daily at bedtime, Starting Mon 12/4/2023, Until Wed 4/17/2024, Print      topiramate (TOPAMAX) 25 mg tablet Take 25 mg by mouth 2 (two) times a day, Starting Mon 7/1/2019, Historical Med      traZODone (DESYREL) 100 mg tablet Take 100 mg by mouth daily at bedtime, Starting Mon 4/8/2019, Historical Med      Psyllium (Reguloid) 28.3 % POWD Take 1 Scoop by mouth 2 (two) times a day, Starting Wed 3/20/2024, Until Fri 4/19/2024, Normal      Deutetrabenazine ER (Austedo XR) 24 MG TB24 Take 24 mg by mouth daily, Historical Med      Emollient (Lubriderm Advanced Therapy) LOTN Apply 1 Application topically as needed (rash), Historical Med      senna (Senokot) 8.6 MG tablet Take 8.6 mg by mouth 2 (two) times a day, Starting Sat 3/30/2024, Historical Med      Zinc Oxide (Geni Protect Moisture Barrier) 12 % CREA Apply 1 Application topically once as needed (wound care), Historical Med        !! - Potential duplicate medications found. Please discuss with provider.          No discharge procedures on file.    PDMP Review         Value Time User    PDMP Reviewed  Yes 9/29/2023 11:21 PM LIUDMILA Pinedo            ED Provider  Electronically Signed by             Esteban Paige MD  04/25/24 1609

## 2024-04-26 ENCOUNTER — HOSPITAL ENCOUNTER (EMERGENCY)
Facility: HOSPITAL | Age: 74
Discharge: HOME | End: 2024-04-27
Attending: EMERGENCY MEDICINE | Admitting: EMERGENCY MEDICINE
Payer: MEDICARE

## 2024-04-26 ENCOUNTER — TELEPHONE (OUTPATIENT)
Dept: NEUROLOGY | Facility: CLINIC | Age: 74
End: 2024-04-26

## 2024-04-26 ENCOUNTER — APPOINTMENT (EMERGENCY)
Dept: MRI IMAGING | Facility: HOSPITAL | Age: 74
End: 2024-04-26
Payer: MEDICARE

## 2024-04-26 ENCOUNTER — APPOINTMENT (OUTPATIENT)
Age: 74
End: 2024-04-26
Payer: MEDICARE

## 2024-04-26 ENCOUNTER — APPOINTMENT (EMERGENCY)
Dept: RADIOLOGY | Facility: HOSPITAL | Age: 74
End: 2024-04-26
Payer: MEDICARE

## 2024-04-26 ENCOUNTER — APPOINTMENT (EMERGENCY)
Dept: CT IMAGING | Facility: HOSPITAL | Age: 74
End: 2024-04-26
Payer: MEDICARE

## 2024-04-26 DIAGNOSIS — R42 VERTIGO: Primary | ICD-10-CM

## 2024-04-26 DIAGNOSIS — F20.0 PARANOID SCHIZOPHRENIA, SUBCHRONIC CONDITION (HCC): ICD-10-CM

## 2024-04-26 DIAGNOSIS — Z79.899 ENCOUNTER FOR LONG-TERM (CURRENT) USE OF OTHER MEDICATIONS: ICD-10-CM

## 2024-04-26 LAB
ALBUMIN SERPL BCP-MCNC: 3.5 G/DL (ref 3.5–5)
ALP SERPL-CCNC: 97 U/L (ref 34–104)
ALT SERPL W P-5'-P-CCNC: 19 U/L (ref 7–52)
ANION GAP SERPL CALCULATED.3IONS-SCNC: 3 MMOL/L (ref 4–13)
AST SERPL W P-5'-P-CCNC: 34 U/L (ref 13–39)
BASOPHILS # BLD AUTO: 0.02 THOUSANDS/ÂΜL (ref 0–0.1)
BASOPHILS # BLD AUTO: 0.02 THOUSANDS/ÂΜL (ref 0–0.1)
BASOPHILS NFR BLD AUTO: 1 % (ref 0–1)
BASOPHILS NFR BLD AUTO: 1 % (ref 0–1)
BILIRUB SERPL-MCNC: 0.43 MG/DL (ref 0.2–1)
BUN SERPL-MCNC: 19 MG/DL (ref 5–25)
CALCIUM SERPL-MCNC: 8.5 MG/DL (ref 8.4–10.2)
CARDIAC TROPONIN I PNL SERPL HS: 3 NG/L
CHLORIDE SERPL-SCNC: 109 MMOL/L (ref 96–108)
CO2 SERPL-SCNC: 27 MMOL/L (ref 21–32)
CREAT SERPL-MCNC: 0.56 MG/DL (ref 0.6–1.3)
EOSINOPHIL # BLD AUTO: 0.07 THOUSAND/ÂΜL (ref 0–0.61)
EOSINOPHIL # BLD AUTO: 0.12 THOUSAND/ÂΜL (ref 0–0.61)
EOSINOPHIL NFR BLD AUTO: 2 % (ref 0–6)
EOSINOPHIL NFR BLD AUTO: 3 % (ref 0–6)
ERYTHROCYTE [DISTWIDTH] IN BLOOD BY AUTOMATED COUNT: 14.3 % (ref 11.6–15.1)
ERYTHROCYTE [DISTWIDTH] IN BLOOD BY AUTOMATED COUNT: 14.4 % (ref 11.6–15.1)
GFR SERPL CREATININE-BSD FRML MDRD: 102 ML/MIN/1.73SQ M
GLUCOSE SERPL-MCNC: 133 MG/DL (ref 65–140)
GLUCOSE SERPL-MCNC: 95 MG/DL (ref 65–140)
HCT VFR BLD AUTO: 38.8 % (ref 36.5–49.3)
HCT VFR BLD AUTO: 40.2 % (ref 36.5–49.3)
HGB BLD-MCNC: 13.3 G/DL (ref 12–17)
HGB BLD-MCNC: 13.7 G/DL (ref 12–17)
IMM GRANULOCYTES # BLD AUTO: 0 THOUSAND/UL (ref 0–0.2)
IMM GRANULOCYTES # BLD AUTO: 0.01 THOUSAND/UL (ref 0–0.2)
IMM GRANULOCYTES NFR BLD AUTO: 0 % (ref 0–2)
IMM GRANULOCYTES NFR BLD AUTO: 0 % (ref 0–2)
LYMPHOCYTES # BLD AUTO: 0.94 THOUSANDS/ÂΜL (ref 0.6–4.47)
LYMPHOCYTES # BLD AUTO: 1.11 THOUSANDS/ÂΜL (ref 0.6–4.47)
LYMPHOCYTES NFR BLD AUTO: 25 % (ref 14–44)
LYMPHOCYTES NFR BLD AUTO: 26 % (ref 14–44)
MCH RBC QN AUTO: 34.4 PG (ref 26.8–34.3)
MCH RBC QN AUTO: 34.5 PG (ref 26.8–34.3)
MCHC RBC AUTO-ENTMCNC: 34.1 G/DL (ref 31.4–37.4)
MCHC RBC AUTO-ENTMCNC: 34.3 G/DL (ref 31.4–37.4)
MCV RBC AUTO: 100 FL (ref 82–98)
MCV RBC AUTO: 101 FL (ref 82–98)
MONOCYTES # BLD AUTO: 0.31 THOUSAND/ÂΜL (ref 0.17–1.22)
MONOCYTES # BLD AUTO: 0.48 THOUSAND/ÂΜL (ref 0.17–1.22)
MONOCYTES NFR BLD AUTO: 11 % (ref 4–12)
MONOCYTES NFR BLD AUTO: 8 % (ref 4–12)
NEUTROPHILS # BLD AUTO: 2.42 THOUSANDS/ÂΜL (ref 1.85–7.62)
NEUTROPHILS # BLD AUTO: 2.56 THOUSANDS/ÂΜL (ref 1.85–7.62)
NEUTS SEG NFR BLD AUTO: 59 % (ref 43–75)
NEUTS SEG NFR BLD AUTO: 64 % (ref 43–75)
NRBC BLD AUTO-RTO: 0 /100 WBCS
NRBC BLD AUTO-RTO: 0 /100 WBCS
PLATELET # BLD AUTO: 143 THOUSANDS/UL (ref 149–390)
PLATELET # BLD AUTO: 148 THOUSANDS/UL (ref 149–390)
PMV BLD AUTO: 9.5 FL (ref 8.9–12.7)
PMV BLD AUTO: 9.6 FL (ref 8.9–12.7)
POTASSIUM SERPL-SCNC: 4.3 MMOL/L (ref 3.5–5.3)
PROT SERPL-MCNC: 5.9 G/DL (ref 6.4–8.4)
RBC # BLD AUTO: 3.87 MILLION/UL (ref 3.88–5.62)
RBC # BLD AUTO: 3.97 MILLION/UL (ref 3.88–5.62)
SODIUM SERPL-SCNC: 139 MMOL/L (ref 135–147)
WBC # BLD AUTO: 3.76 THOUSAND/UL (ref 4.31–10.16)
WBC # BLD AUTO: 4.3 THOUSAND/UL (ref 4.31–10.16)

## 2024-04-26 PROCEDURE — 99285 EMERGENCY DEPT VISIT HI MDM: CPT | Performed by: EMERGENCY MEDICINE

## 2024-04-26 PROCEDURE — 80053 COMPREHEN METABOLIC PANEL: CPT | Performed by: EMERGENCY MEDICINE

## 2024-04-26 PROCEDURE — 36415 COLL VENOUS BLD VENIPUNCTURE: CPT

## 2024-04-26 PROCEDURE — 0241U HB NFCT DS VIR RESP RNA 4 TRGT: CPT | Performed by: EMERGENCY MEDICINE

## 2024-04-26 PROCEDURE — 99285 EMERGENCY DEPT VISIT HI MDM: CPT

## 2024-04-26 PROCEDURE — 70551 MRI BRAIN STEM W/O DYE: CPT

## 2024-04-26 PROCEDURE — 71045 X-RAY EXAM CHEST 1 VIEW: CPT

## 2024-04-26 PROCEDURE — 84484 ASSAY OF TROPONIN QUANT: CPT | Performed by: EMERGENCY MEDICINE

## 2024-04-26 PROCEDURE — 85025 COMPLETE CBC W/AUTO DIFF WBC: CPT | Performed by: EMERGENCY MEDICINE

## 2024-04-26 PROCEDURE — 93005 ELECTROCARDIOGRAM TRACING: CPT

## 2024-04-26 PROCEDURE — 70498 CT ANGIOGRAPHY NECK: CPT

## 2024-04-26 PROCEDURE — 82948 REAGENT STRIP/BLOOD GLUCOSE: CPT

## 2024-04-26 PROCEDURE — 85025 COMPLETE CBC W/AUTO DIFF WBC: CPT

## 2024-04-26 PROCEDURE — 70496 CT ANGIOGRAPHY HEAD: CPT

## 2024-04-26 RX ORDER — MECLIZINE HCL 12.5 MG/1
12.5 TABLET ORAL ONCE
Status: COMPLETED | OUTPATIENT
Start: 2024-04-26 | End: 2024-04-26

## 2024-04-26 RX ADMIN — MECLIZINE HCL 12.5 MG 12.5 MG: 12.5 TABLET ORAL at 23:36

## 2024-04-26 RX ADMIN — IOHEXOL 85 ML: 350 INJECTION, SOLUTION INTRAVENOUS at 22:27

## 2024-04-27 VITALS
DIASTOLIC BLOOD PRESSURE: 69 MMHG | BODY MASS INDEX: 21.75 KG/M2 | HEIGHT: 70 IN | TEMPERATURE: 98 F | HEART RATE: 67 BPM | OXYGEN SATURATION: 98 % | RESPIRATION RATE: 17 BRPM | SYSTOLIC BLOOD PRESSURE: 133 MMHG

## 2024-04-27 LAB
2HR DELTA HS TROPONIN: 0 NG/L
APTT PPP: 36 SECONDS (ref 23–37)
CARDIAC TROPONIN I PNL SERPL HS: 3 NG/L
FLUAV RNA RESP QL NAA+PROBE: NEGATIVE
FLUBV RNA RESP QL NAA+PROBE: NEGATIVE
INR PPP: 1.05 (ref 0.84–1.19)
PROTHROMBIN TIME: 14.3 SECONDS (ref 11.6–14.5)
RSV RNA RESP QL NAA+PROBE: NEGATIVE
SARS-COV-2 RNA RESP QL NAA+PROBE: NEGATIVE

## 2024-04-27 PROCEDURE — 84484 ASSAY OF TROPONIN QUANT: CPT | Performed by: EMERGENCY MEDICINE

## 2024-04-27 PROCEDURE — 36415 COLL VENOUS BLD VENIPUNCTURE: CPT | Performed by: EMERGENCY MEDICINE

## 2024-04-27 PROCEDURE — 85730 THROMBOPLASTIN TIME PARTIAL: CPT | Performed by: EMERGENCY MEDICINE

## 2024-04-27 PROCEDURE — 85610 PROTHROMBIN TIME: CPT | Performed by: EMERGENCY MEDICINE

## 2024-04-27 NOTE — STROKE DOCUMENTATION
Pt returned from MRI testing.A&Ox4 , breathing is even, spontaneous, unlabored. Pt report pain to both legs 4/10, Pt placed on ccm and continuous pulse Ox monitoring. In the bed right now, waiting for further dispo

## 2024-04-27 NOTE — ED PROVIDER NOTES
"Pt Name: Smuit Nails  MRN: [id number]  Birthdate 1950  Age/Sex: 73 y.o. male  Date of evaluation: 4/26/2024  PCP: Garry Hidalgo MD    CHIEF COMPLAINT    Chief Complaint   Patient presents with    Dizziness     Pt complains of dizziness due to \"radiation in the house\" pt comes from [facility]          HPI    73 y.o. male presenting with vertigo.  Patient states that he went to bed at 7 PM tonight, woke at about 8:15 PM with a sensation of the room spinning.  He notes throwing up once, states that the sensation of things spinning seems to have improved somewhat but is still present.  He denies headache, chest pain, nausea, vomiting, diarrhea, fever, change in speech or vision, other symptoms.  Patient notes similar prior episodes in the past, states he was worked up but no clear cause was found.  Patient expresses concern that his symptoms may be caused  by radiation from the microwave at the house where he lives.      HPI      Past Medical and Surgical History    Past Medical History:   Diagnosis Date    Asthma     Benign prostatic hyperplasia     COPD (chronic obstructive pulmonary disease) (HCC)     GERD (gastroesophageal reflux disease)     Herpes zoster without complication 12/10/2021    Hypercholesteremia     Hypertension     Neuroleptic induced parkinsonism (HCC)     Paranoid schizophrenia (HCC)     Psychiatric disorder        Past Surgical History:   Procedure Laterality Date    CATARACT EXTRACTION      CHOLECYSTECTOMY LAPAROSCOPIC N/A 12/3/2023    Procedure: CHOLECYSTECTOMY LAPAROSCOPIC WITH INTRAOPERATIVE CHOLANGIOGRAM;  Surgeon: Maverick Tamayo MD;  Location: [facility];  Service: General    COLONOSCOPY         Family History   Problem Relation Age of Onset    No Known Problems Mother     No Known Problems Father     Parkinsonism Son        Social History     Tobacco Use    Smoking status: Former     Current packs/day: 0.00     Average packs/day: 1 pack/day for 20.0 years (20.0 ttl " pk-yrs)     Types: Cigarettes     Start date:      Quit date: 2000     Years since quittin.3     Passive exposure: Past    Smokeless tobacco: Never   Vaping Use    Vaping status: Never Used   Substance Use Topics    Alcohol use: Not Currently    Drug use: Never           Allergies    No Known Allergies    Home Medications    Prior to Admission medications    Medication Sig Start Date End Date Taking? Authorizing Provider   Psyllium (Reguloid) 28.3 % POWD Take 1 Scoop by mouth 2 (two) times a day 24  Thaddeus Babin PA-C   acetaminophen (Acetaminophen Extra Strength) 500 mg tablet Take 1 tablet (500 mg total) by mouth every 6 (six) hours as needed for mild pain 3/13/23   LIUDMILA Chatman   atorvastatin (LIPITOR) 40 mg tablet Take 1 tablet (40 mg total) by mouth daily 3/20/24   Thaddeus Babin PA-C   carbidopa-levodopa (SINEMET)  mg per tablet Take 1 tablet by mouth 3 (three) times a day  Patient taking differently: Take 1 tablet by mouth 2 (two) times a day 3/2/23   Poonam Solis MD   Cholecalciferol (Vitamin D High Potency) 25 MCG (1000 UT) capsule Take 1 capsule (1,000 Units total) by mouth daily 24   Garry Hidalgo MD   cloZAPine (CLOZARIL) 100 mg tablet Take 100 mg by mouth 2 (two) times a day    Historical Provider, MD   clozapine (CLOZARIL) 200 MG tablet Take 200 mg by mouth daily at bedtime  19   Historical Provider, MD   Deutetrabenazine ER (Austedo XR) 24 MG TB24 Take 24 mg by mouth daily  Patient not taking: Reported on 2024    Historical Provider, MD   docusate sodium (COLACE) 100 mg capsule Take 1 capsule (100 mg total) by mouth 2 (two) times a day 3/20/24 5/19/24  Thaddeus Babin PA-C   Emollient (Lubriderm Advanced Therapy) LOTN Apply 1 Application topically as needed (rash)  Patient not taking: Reported on 2024    Historical Provider, MD   fluticasone (FLONASE) 50 mcg/act nasal spray 2 sprays into each nostril daily  3/20/24   Thaddeus Babin PA-C   hydrOXYzine HCL (ATARAX) 25 mg tablet Take 1 tablet (25 mg total) by mouth every 6 (six) hours as needed for itching 4/26/23   LIUDMILA Pelayo   meclizine (ANTIVERT) 25 mg tablet Take 1 tablet (25 mg total) by mouth every 8 (eight) hours as needed for dizziness 3/20/24   Thaddeus Babin PA-C   metoprolol succinate (TOPROL-XL) 25 mg 24 hr tablet Take 1 tablet (25 mg total) by mouth daily 3/20/24   Thaddeus Babin PA-C   Nutritional Supplements (Ensure High Protein) LIQD Take 237 mL by mouth 2 (two) times a day  Patient taking differently: Take 237 mL by mouth 2 (two) times a day vanilla 12/6/23 4/17/24  Garry Hidalgo MD   olopatadine (PATANOL) 0.1 % ophthalmic solution Administer 1 drop to both eyes daily at bedtime    Historical Provider, MD   omeprazole (PriLOSEC) 40 MG capsule Take 1 capsule (40 mg total) by mouth daily 3/20/24   Thaddeus Babin PA-C   polyethylene glycol (MIRALAX) 17 g packet Take once a day, may take an extra dose prn 4/1/24   Thaddeus Babin PA-C   senna (Senokot) 8.6 MG tablet Take 8.6 mg by mouth 2 (two) times a day  Patient not taking: Reported on 4/1/2024 3/30/24   Historical Provider, MD   tamsulosin (FLOMAX) 0.4 mg Take 1 capsule (0.4 mg total) by mouth daily at bedtime 3/20/24   Thaddeus Babin PA-C   temazepam (RESTORIL) 15 mg capsule Take 1 capsule (15 mg total) by mouth daily at bedtime 12/4/23 4/17/24  Christopher Briones MD   topiramate (TOPAMAX) 25 mg tablet Take 25 mg by mouth 2 (two) times a day 7/1/19   Historical Provider, MD   traZODone (DESYREL) 100 mg tablet Take 100 mg by mouth daily at bedtime 4/8/19   Historical Provider, MD   Zinc Oxide (Geni Protect Moisture Barrier) 12 % CREA Apply 1 Application topically once as needed (wound care)  Patient not taking: Reported on 4/1/2024    Historical Provider, MD           Review of Systems    Review of Systems   Constitutional:   Negative for appetite change, chills and diaphoresis.   HENT:  Negative for drooling, facial swelling, trouble swallowing and voice change.    Respiratory:  Negative for apnea, shortness of breath and wheezing.    Cardiovascular:  Negative for chest pain and leg swelling.   Gastrointestinal:  Negative for abdominal distention, abdominal pain, diarrhea, nausea and vomiting.   Genitourinary:  Negative for dysuria and urgency.   Musculoskeletal:  Negative for arthralgias, back pain, gait problem and neck pain.   Skin:  Negative for color change, rash and wound.   Neurological:  Positive for dizziness. Negative for seizures, speech difficulty, weakness and headaches.   Psychiatric/Behavioral:  Negative for agitation, behavioral problems and dysphoric mood. The patient is not nervous/anxious.            All other systems reviewed and negative.    Physical Exam      ED Triage Vitals [04/26/24 2124]   Temperature Pulse Respirations Blood Pressure SpO2   97.6 °F (36.4 °C) 72 18 105/69 98 %      Temp Source Heart Rate Source Patient Position - Orthostatic VS BP Location FiO2 (%)   Oral Monitor Sitting Left arm --      Pain Score       --               Physical Exam  Vitals and nursing note reviewed.   Constitutional:       General: He is not in acute distress.     Appearance: He is well-developed. He is not ill-appearing, toxic-appearing or diaphoretic.   HENT:      Head: Normocephalic and atraumatic.      Right Ear: External ear normal.      Left Ear: External ear normal.      Nose: Nose normal. No congestion or rhinorrhea.      Mouth/Throat:      Mouth: Mucous membranes are moist.      Pharynx: Oropharynx is clear. No oropharyngeal exudate or posterior oropharyngeal erythema.   Eyes:      Conjunctiva/sclera: Conjunctivae normal.      Pupils: Pupils are equal, round, and reactive to light.   Neck:      Trachea: No tracheal deviation.   Cardiovascular:      Rate and Rhythm: Normal rate and regular rhythm.      Pulses: Normal  pulses.      Heart sounds: Normal heart sounds. No murmur heard.  Pulmonary:      Effort: Pulmonary effort is normal. No respiratory distress.      Breath sounds: Normal breath sounds. No stridor. No wheezing or rales.   Abdominal:      General: There is no distension.      Palpations: Abdomen is soft.      Tenderness: There is no abdominal tenderness. There is no guarding or rebound.   Musculoskeletal:         General: No deformity. Normal range of motion.      Cervical back: Normal range of motion and neck supple. No rigidity or tenderness.   Skin:     General: Skin is warm and dry.      Capillary Refill: Capillary refill takes less than 2 seconds.      Findings: No rash.   Neurological:      Mental Status: He is alert and oriented to person, place, and time.      Cranial Nerves: No cranial nerve deficit.      Sensory: No sensory deficit.      Motor: Weakness present.      Coordination: Coordination normal.      Comments: Cranial nerves II through XII intact, 5 out of 5 strength in all extremities with the exception of mild weakness in the right upper extremity, slight pronator drift of about 2 inches, does not hit bed, at chronic baseline per patient.  Normal speech and coordination.  No visual field cuts.  NIH stroke scale of 1 for chronic weakness of the right arm   Psychiatric:         Behavior: Behavior normal.         Thought Content: Thought content normal.         Judgment: Judgment normal.              Diagnostic Results  EKG Interpretation    Rate:  73  BPM  Rhythm:  Normal Sinus Rhythm   Axis:  Normal   Intervals: Normal, no blocks, QTc  453 ms  Q waves:  No pathologic Q waves   T waves:  Normal   ST segments:  No significant elevations or depressions     Impression:  Normal sinus rhythm without evidence of acute ischemia or significant arrhythmia      EKG for comparison: EKG dated 10 March 2020 for similar in character no major changes    EKG interpreted by me.       Labs:    Results Reviewed        Procedure Component Value Units Date/Time    HS Troponin I 2hr [[id number]]  (Normal) Collected: 04/27/24 0003    Lab Status: Final result Specimen: Blood from Arm, Right Updated: 04/27/24 0036     hs TnI 2hr 3 ng/L      Delta 2hr hsTnI 0 ng/L     Protime-INR [[id number]]  (Normal) Collected: 04/27/24 0003    Lab Status: Final result Specimen: Blood from Arm, Right Updated: 04/27/24 0020     Protime 14.3 seconds      INR 1.05    APTT [[id number]]  (Normal) Collected: 04/27/24 0003    Lab Status: Final result Specimen: Blood from Arm, Right Updated: 04/27/24 0020     PTT 36 seconds     FLU/RSV/COVID - if FLU/RSV clinically relevant [[id number]]  (Normal) Collected: 04/26/24 2235    Lab Status: Final result Specimen: Nares from Nose Updated: 04/27/24 0011     SARS-CoV-2 Negative     INFLUENZA A PCR Negative     INFLUENZA B PCR Negative     RSV PCR Negative    Narrative:      FOR PEDIATRIC PATIENTS - copy/paste COVID Guidelines URL to browser: [email or web address]    SARS-CoV-2 assay is a Nucleic Acid Amplification assay intended for the  qualitative detection of nucleic acid from SARS-CoV-2 in nasopharyngeal  swabs. Results are for the presumptive identification of SARS-CoV-2 RNA.    Positive results are indicative of infection with SARS-CoV-2, the virus  causing COVID-19, but do not rule out bacterial infection or co-infection  with other viruses. Laboratories within the [place] and its  territories are required to report all positive results to the appropriate  public health authorities. Negative results do not preclude SARS-CoV-2  infection and should not be used as the sole basis for treatment or other  patient management decisions. Negative results must be combined with  clinical observations, patient history, and epidemiological information.  This test has not been FDA cleared or approved.    This test has been authorized by FDA under an Emergency Use  Authorization  (EUA). This test is only authorized for the duration of time the  declaration that circumstances exist justifying the authorization of the  emergency use of an in vitro diagnostic tests for detection of SARS-CoV-2  virus and/or diagnosis of COVID-19 infection under section 564(b)(1) of  the Act, 21 U.S.C. 360bbb-3(b)(1), unless the authorization is terminated  or revoked sooner. The test has been validated but independent review by FDA  and CLIA is pending.    Test performed using Triductorpert: This RT-PCR assay targets N2,  a region unique to SARS-CoV-2. A conserved region in the E-gene was chosen  for pan-Sarbecovirus detection which includes SARS-CoV-2.    According to CMS-2020-01-R, this platform meets the definition of high-throughput technology.    HS Troponin I 4hr [[id number]]     Lab Status: No result Specimen: Blood     Fingerstick Glucose (POCT) [[id number]]  (Normal) Collected: 04/26/24 2237    Lab Status: Final result Specimen: Blood Updated: 04/26/24 2238     POC Glucose 133 mg/dl     HS Troponin 0hr (reflex protocol) [[id number]]  (Normal) Collected: 04/26/24 2145    Lab Status: Final result Specimen: Blood from Arm, Right Updated: 04/26/24 2216     hs TnI 0hr 3 ng/L     Comprehensive metabolic panel [[id number]]  (Abnormal) Collected: 04/26/24 2145    Lab Status: Final result Specimen: Blood from Arm, Right Updated: 04/26/24 2206     Sodium 139 mmol/L      Potassium 4.3 mmol/L      Chloride 109 mmol/L      CO2 27 mmol/L      ANION GAP 3 mmol/L      BUN 19 mg/dL      Creatinine 0.56 mg/dL      Glucose 95 mg/dL      Calcium 8.5 mg/dL      AST 34 U/L      ALT 19 U/L      Alkaline Phosphatase 97 U/L      Total Protein 5.9 g/dL      Albumin 3.5 g/dL      Total Bilirubin 0.43 mg/dL      eGFR 102 ml/min/1.73sq m     Narrative:      National Kidney Disease Foundation guidelines for Chronic Kidney Disease (CKD):     Stage 1 with normal or high GFR (GFR > 90 mL/min/1.73 square meters)    Stage  2 Mild CKD (GFR = 60-89 mL/min/1.73 square meters)    Stage 3A Moderate CKD (GFR = 45-59 mL/min/1.73 square meters)    Stage 3B Moderate CKD (GFR = 30-44 mL/min/1.73 square meters)    Stage 4 Severe CKD (GFR = 15-29 mL/min/1.73 square meters)    Stage 5 End Stage CKD (GFR <15 mL/min/1.73 square meters)  Note: GFR calculation is accurate only with a steady state creatinine    CBC and differential [[id number]]  (Abnormal) Collected: 04/26/24 2145    Lab Status: Final result Specimen: Blood from Arm, Right Updated: 04/26/24 2151     WBC 4.30 Thousand/uL      RBC 3.87 Million/uL      Hemoglobin 13.3 g/dL      Hematocrit 38.8 %       fL      MCH 34.4 pg      MCHC 34.3 g/dL      RDW 14.4 %      MPV 9.5 fL      Platelets 143 Thousands/uL      nRBC 0 /100 WBCs      Segmented % 59 %      Immature Grans % 0 %      Lymphocytes % 26 %      Monocytes % 11 %      Eosinophils Relative 3 %      Basophils Relative 1 %      Absolute Neutrophils 2.56 Thousands/µL      Absolute Immature Grans 0.01 Thousand/uL      Absolute Lymphocytes 1.11 Thousands/µL      Absolute Monocytes 0.48 Thousand/µL      Eosinophils Absolute 0.12 Thousand/µL      Basophils Absolute 0.02 Thousands/µL             All labs reviewed and utilized in the medical decision making process    Radiology:    MRI brain wo contrast   Final Result      No acute intracranial abnormality. No restricted diffusion to suggest acute ischemia.            I personally discussed this study with Mauricio Nunez on 4/26/2024 11:22 p.m.               Workstation performed: [id number]         CTA stroke alert (head/neck)   Final Result      No large vessel occlusion. No focal stenosis or saccular aneurysm within the [place].      No hemodynamically significant stenosis within either common or internal carotid artery. Less than 50% stenosis by NASCET criteria.               I personally discussed this study with Mauricio Nunez on 4/26/2024 10:48 PM.                        Workstation  performed: [id number]         CT stroke alert brain   Final Result      No acute intracranial abnormality.               I personally discussed this study with Mauricio Enrique on 4/26/2024 10:48 PM.                  Workstation performed: [id number]         XR chest 1 view portable   ED Interpretation   No acute cardiopulmonary process or osseous abnormality.                  All radiology studies independently viewed by me and interpreted by the radiologist.    Procedure    Procedures        ED Course of Care and Re-Assessments      Based on new onset vertigo with last known normal approximately 2-1/2 hours prior to arrival, stroke alert called.     After discussion with stroke neurology, patient initially was being considered for TNK.  I had extensive discussion of risks and benefits of TNK with the patient, based on this discussion, patient demonstrate capacity to make this medical decision.  Despite discussion of extremely time sensitive nature of this intervention, patient eventually refused TNK at this time, states he would reconsider if an MRI shows a stroke.  We discussed that it may be very difficult to get an MRI within timeframe for administration of that medication but patient still declined TNK.  I attempted to call both of his healthcare agents but neither answered the phone or returned messages.    Stat MRI obtained, showed no stroke.     Symptoms resolved with meclizine.    Medications   meclizine (ANTIVERT) tablet 12.5 mg (12.5 mg Oral Given 4/26/24 2336)   iohexol (OMNIPAQUE) 350 MG/ML injection (MULTI-DOSE) 85 mL (85 mL Intravenous Given 4/26/24 2227)           FINAL IMPRESSION    Final diagnoses:   Vertigo         DISPOSITION/PLAN    Presentation as above with acute vertigo.  Vital signs reassuring, examination as above.  NIH stroke scale of 1 although this appears to be patient's baseline, still within window for TNK.  On review of the EMR, patient had similar episode about a month ago and was admitted  for same, had negative MRI of the brain with no acute stroke at that time.  Patient has been prescribed meclizine for his vertigo in the past.  Strong suspicion for similar episode, with relatively low suspicion for acute stroke although unable to definitively rule out that diagnosis based on physical exam alone.  Stroke alert called, initial imaging with CT reassuring, further imaging obtained with MRI which showed no stroke.  Symptoms resolved with meclizine in emergency department, overall episode seems consistent with patient's previously diagnosed vertigo.  Discharged with strict return precautions, follow-up with primary care doctor.  Time reflects when diagnosis was documented in both MDM as applicable and the Disposition within this note       Time User Action Codes Description Comment    4/26/2024 10:03 PM Reggie Holt Add [R42] Dizziness     4/27/2024 12:50 AM Reggie Holt Add [R42] Vertigo     4/27/2024 12:50 AM Reggie Holt Modify [R42] Vertigo     4/27/2024 12:50 AM Reggie Holt Remove [R42] Dizziness           ED Disposition       ED Disposition   Discharge    Condition   Stable    Date/Time   Sat Apr 27, 2024 12:40 AM    Comment   Sumit Nails discharge to home/self care.                   Follow-up Information       Follow up With Specialties Details Why Contact Info Additional Information    [facility] Emergency Department Emergency Medicine Go to  If symptoms worsen [address] [facility] [id number] [phone] [facility] Emergency Department, [address] [place], [place], [address]    Garry Hidalgo MD Family Medicine Call in 2 days To discuss this visit and schedule close follow-up [address] [facility] [address] [phone]                 PATIENT REFERRED TO:    [facility] Emergency Department  [address]  "[id number] [phone]  Go to   If symptoms worsen    Garry Hidalgo MD  [address] [place] [address] [phone]    Call in 2 days  To discuss this visit and schedule close follow-up      DISCHARGE MEDICATIONS:    Patient's Medications   Discharge Prescriptions    No medications on file       No discharge procedures on file.         Reggie Holt MD    Portions of the record may have been created with voice recognition software.  Occasional wrong word or \"sound alike\" substitutions may have occurred due to the inherent limitations of voice recognition software.  Please read the chart carefully and recognize, using context, where substitutions have occurred     Reggie Holt MD  04/27/24 0532    "

## 2024-05-01 ENCOUNTER — TELEPHONE (OUTPATIENT)
Dept: NEUROLOGY | Facility: CLINIC | Age: 74
End: 2024-05-01

## 2024-05-01 LAB
ATRIAL RATE: 72 BPM
ATRIAL RATE: 73 BPM
P AXIS: 69 DEGREES
P AXIS: 75 DEGREES
PR INTERVAL: 192 MS
PR INTERVAL: 198 MS
QRS AXIS: 69 DEGREES
QRS AXIS: 77 DEGREES
QRSD INTERVAL: 94 MS
QRSD INTERVAL: 96 MS
QT INTERVAL: 412 MS
QT INTERVAL: 436 MS
QTC INTERVAL: 453 MS
QTC INTERVAL: 477 MS
T WAVE AXIS: 74 DEGREES
T WAVE AXIS: 79 DEGREES
VENTRICULAR RATE: 72 BPM
VENTRICULAR RATE: 73 BPM

## 2024-05-01 PROCEDURE — 93010 ELECTROCARDIOGRAM REPORT: CPT | Performed by: INTERNAL MEDICINE

## 2024-05-01 NOTE — TELEPHONE ENCOUNTER
Spoke to Florinda gave verbal to ok visit for med management and we will sign orders when they come over

## 2024-05-02 ENCOUNTER — TELEPHONE (OUTPATIENT)
Dept: NEUROLOGY | Facility: CLINIC | Age: 74
End: 2024-05-02

## 2024-05-02 NOTE — TELEPHONE ENCOUNTER
Patient's sister Maryanne called in stating that call should be made to Facility because this is where patient lives at [place]     Contact Information  [phone]       Patient states the appointment shoud not be cancelled because they should be aware. But should give them a call.

## 2024-05-03 ENCOUNTER — OFFICE VISIT (OUTPATIENT)
Dept: NEUROLOGY | Facility: CLINIC | Age: 74
End: 2024-05-03
Payer: MEDICARE

## 2024-05-03 VITALS
SYSTOLIC BLOOD PRESSURE: 110 MMHG | DIASTOLIC BLOOD PRESSURE: 60 MMHG | HEIGHT: 70 IN | HEART RATE: 64 BPM | BODY MASS INDEX: 21.59 KG/M2 | WEIGHT: 150.8 LBS

## 2024-05-03 DIAGNOSIS — F20.0 PARANOID SCHIZOPHRENIA (HCC): ICD-10-CM

## 2024-05-03 DIAGNOSIS — G21.11 NEUROLEPTIC-INDUCED PARKINSONISM (HCC): ICD-10-CM

## 2024-05-03 DIAGNOSIS — T43.505A NEUROLEPTIC-INDUCED PARKINSONISM (HCC): ICD-10-CM

## 2024-05-03 DIAGNOSIS — R42 DIZZINESS: Primary | ICD-10-CM

## 2024-05-03 PROCEDURE — 99214 OFFICE O/P EST MOD 30 MIN: CPT | Performed by: PSYCHIATRY & NEUROLOGY

## 2024-05-03 NOTE — PROGRESS NOTES
Sumit Nails is a 73 y.o. male.   Chief Complaint   Patient presents with    Neuro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    Dizziness       Assessment:  1. Dizziness    2. Neuro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 (HCC)    3. Paranoid schizophrenia (HCC)         Plan:  Patient's recent MRI scan of the brain results from 4/26/2024 and a CTA results were reviewed with him and the caretaker there was no evidence of acute stroke or hemodynamically significant stenosis, it was felt that his dizziness was most likely vestibular in etiology.    He does have neuroleptic induced parkinsonism and is supposed to be on Sinemet 25/100 mg 3 times a day but according to the notes it looks like that he is on twice a day, I have advised that if he can tolerate it to increase it to 3 times a day and to keep himself well-hydrated to take fall and safety precautions he would benefit from seeing an ENT surgeon.    The caretaker is going to make a separate appointment to assess his cognitive function at the next visit, to keep his blood pressure cholesterol and sugar under control to go to the hospital if has any recurrence of strokelike symptoms and call us otherwise to see me back in 6 months or sooner if needed and follow-up with his other physicians.          Subjective:    Patient is here in follow-up for an episode of dizziness that he was recently discharged from the hospital in January 2024 he was admitted to [facility] and in that time was felt that his dizziness and vertigo was mostly vestibular or peripheral in etiology, he had an MRI scan of the brain that did not show evidence of any stroke and had a CTA of the head and neck that did not show evidence of any significant stenosis, he currently denies having any dizziness there is no bowel and bladder incontinence he has history of paranoid schizophrenia currently he is not having any hallucinations he is being treated by a psychiatrist for that and has been on antipsychotic  "medications for a long time, according to the caretaker currently is not having any cognitive issues but would need to be evaluated in the future for his memory and they can make an appointment, appetite is good weight has been stable he has not had any falls no freezing episodes no other complaints.      Vitals:    05/03/24 1353   BP: 110/60   BP Location: Left arm   Patient Position: Sitting   Cuff Size: Standard   Pulse: 64   Height: 5' 10\" (1.778 m)       Current Medications    Current Outpatient Medications:     acetaminophen (Acetaminophen Extra Strength) 500 mg tablet, Take 1 tablet (500 mg total) by mouth every 6 (six) hours as needed for mild pain, Disp: 30 tablet, Rfl: 11    atorvastatin (LIPITOR) 40 mg tablet, Take 1 tablet (40 mg total) by mouth daily, Disp: 90 tablet, Rfl: 1    carbidopa-levodopa (SINEMET)  mg per tablet, Take 1 tablet by mouth 3 (three) times a day (Patient taking differently: Take 1 tablet by mouth 2 (two) times a day), Disp: 270 tablet, Rfl: 3    Cholecalciferol (Vitamin D High Potency) 25 MCG (1000 UT) capsule, Take 1 capsule (1,000 Units total) by mouth daily, Disp: 30 capsule, Rfl: 6    cloZAPine (CLOZARIL) 100 mg tablet, Take 100 mg by mouth 2 (two) times a day, Disp: , Rfl:     clozapine (CLOZARIL) 200 MG tablet, Take 200 mg by mouth daily at bedtime , Disp: , Rfl:     Deutetrabenazine ER (Austedo XR) 24 MG TB24, Take 24 mg by mouth daily, Disp: , Rfl:     docusate sodium (COLACE) 100 mg capsule, Take 1 capsule (100 mg total) by mouth 2 (two) times a day, Disp: 60 capsule, Rfl: 1    hydrOXYzine HCL (ATARAX) 25 mg tablet, Take 1 tablet (25 mg total) by mouth every 6 (six) hours as needed for itching, Disp: 30 tablet, Rfl: 5    meclizine (ANTIVERT) 25 mg tablet, Take 1 tablet (25 mg total) by mouth every 8 (eight) hours as needed for dizziness, Disp: 30 tablet, Rfl: 3    metoprolol succinate (TOPROL-XL) 25 mg 24 hr tablet, Take 1 tablet (25 mg total) by mouth daily, Disp: 90 " tablet, Rfl: 1    olopatadine (PATANOL) 0.1 % ophthalmic solution, Administer 1 drop to both eyes daily at bedtime, Disp: , Rfl:     omeprazole (PriLOSEC) 40 MG capsule, Take 1 capsule (40 mg total) by mouth daily, Disp: 31 capsule, Rfl: 11    Psyllium (Reguloid) 28.3 % POWD, Take 1 Scoop by mouth 2 (two) times a day, Disp: 44 g, Rfl: 1    tamsulosin (FLOMAX) 0.4 mg, Take 1 capsule (0.4 mg total) by mouth daily at bedtime, Disp: 90 capsule, Rfl: 3    temazepam (RESTORIL) 15 mg capsule, Take 1 capsule (15 mg total) by mouth daily at bedtime, Disp: 30 capsule, Rfl: 0    topiramate (TOPAMAX) 25 mg tablet, Take 25 mg by mouth 2 (two) times a day, Disp: , Rfl: 1    traZODone (DESYREL) 100 mg tablet, Take 100 mg by mouth daily at bedtime, Disp: , Rfl:     Emollient (Lubriderm Advanced Therapy) LOTN, Apply 1 Application topically as needed (rash) (Patient not taking: Reported on 4/1/2024), Disp: , Rfl:     fluticasone (FLONASE) 50 mcg/act nasal spray, 2 sprays into each nostril daily (Patient not taking: Reported on 5/3/2024), Disp: 16 g, Rfl: 11    Nutritional Supplements (Ensure High Protein) LIQD, Take 237 mL by mouth 2 (two) times a day (Patient taking differently: Take 237 mL by mouth 2 (two) times a day vanilla), Disp: 237 mL, Rfl: 10    polyethylene glycol (MIRALAX) 17 g packet, Take once a day, may take an extra dose prn (Patient not taking: Reported on 5/3/2024), Disp: 31 each, Rfl: 11    senna (Senokot) 8.6 MG tablet, Take 8.6 mg by mouth 2 (two) times a day (Patient not taking: Reported on 4/1/2024), Disp: , Rfl:     Zinc Oxide (Geni Protect Moisture Barrier) 12 % CREA, Apply 1 Application topically once as needed (wound care) (Patient not taking: Reported on 4/1/2024), Disp: , Rfl:       Allergies  Patient has no known allergies.    Past Medical History  Past Medical History:   Diagnosis Date    Asthma     Benign prostatic hyperplasia     COPD (chronic obstructive pulmonary disease) (HCC)     GERD  (gastroesophageal reflux disease)     Herpes zoster without complication 12/10/2021    Hypercholesteremia     Hypertension     Neuroleptic induced parkinsonism (HCC)     Paranoid schizophrenia (HCC)     Psychiatric disorder          Past Surgical History:  Past Surgical History:   Procedure Laterality Date    CATARACT EXTRACTION      CHOLECYSTECTOMY LAPAROSCOPIC N/A 12/3/2023    Procedure: CHOLECYSTECTOMY LAPAROSCOPIC WITH INTRAOPERATIVE CHOLANGIOGRAM;  Surgeon: Maverick Tamayo MD;  Location: [facility] OR;  Service: General    COLONOSCOPY           Family History:  Family History   Problem Relation Age of Onset    No Known Problems Mother     No Known Problems Father     Parkinsonism Son        Social History:   reports that he quit smoking about 24 years ago. His smoking use included cigarettes. He started smoking about 44 years ago. He has a 20 pack-year smoking history. He has been exposed to tobacco smoke. He has never used smokeless tobacco. He reports that he does not currently use alcohol. He reports that he does not use drugs.    I have reviewed the past medical history, surgical history, social and family history, current medications, allergies vitals, review of systems, and updated this information as appropriate today.   Objective:    Physical Exam    Neurological Exam     GENERAL:  Cooperative in no acute distress. Well-developed and well-nourished     HEAD and NECK   Head is atraumatic normocephalic with no lesions or masses. Neck is supple with full range of motion     CARDIOVASCULAR  Carotid Arteries-no carotid bruits.     NEUROLOGIC:  Mental Status-the patient is awake alert and oriented without aphasia or apraxia  Cranial Nerves: Visual fields are full to confrontation.   Extraocular movements are full without nystagmus. Pupils are 2-1/2 mm and reactive. Face is symmetrical to light touch. Movements of facial expression move symmetrically. Hearing is normal to finger rub bilaterally. Soft palate lifts  symmetrically. Shoulder shrug is symmetrical. Tongue is midline without atrophy.  Motor: No drift is noted on arm extension. Strength is full in the upper and lower extremities with normal bulk and cogwheeling rigidity of bilateral upper extremity, no resting tremor, mild tremor of the mouth.  Gait has slightly stooped posture with decreased arm swing    ROS:  Review of Systems   Constitutional:  Negative for appetite change, fatigue and fever.   HENT: Negative.  Negative for hearing loss, tinnitus, trouble swallowing and voice change.    Eyes: Negative.  Negative for photophobia, pain and visual disturbance.   Respiratory: Negative.  Negative for shortness of breath.    Cardiovascular: Negative.  Negative for palpitations.   Gastrointestinal: Negative.  Negative for nausea and vomiting.   Endocrine: Negative.  Negative for cold intolerance.   Genitourinary: Negative.  Negative for dysuria, frequency and urgency.   Musculoskeletal:  Negative for back pain, gait problem, myalgias, neck pain and neck stiffness.   Skin: Negative.  Negative for rash.   Allergic/Immunologic: Negative.    Neurological:  Positive for dizziness and tremors. Negative for seizures, syncope, facial asymmetry, speech difficulty, weakness, light-headedness, numbness and headaches.   Hematological: Negative.  Does not bruise/bleed easily.   Psychiatric/Behavioral: Negative.  Negative for confusion, hallucinations and sleep disturbance.

## 2024-05-14 DIAGNOSIS — K59.09 CHRONIC CONSTIPATION: ICD-10-CM

## 2024-05-14 RX ORDER — PSYLLIUM HUSK (WITH SUGAR) 3 G/12 G
1 POWDER (GRAM) ORAL 2 TIMES DAILY
Qty: 44 G | Refills: 1 | Status: SHIPPED | OUTPATIENT
Start: 2024-05-14 | End: 2024-06-13

## 2024-05-16 ENCOUNTER — APPOINTMENT (EMERGENCY)
Dept: RADIOLOGY | Facility: HOSPITAL | Age: 74
End: 2024-05-16
Payer: MEDICARE

## 2024-05-16 ENCOUNTER — HOSPITAL ENCOUNTER (EMERGENCY)
Facility: HOSPITAL | Age: 74
Discharge: HOME | End: 2024-05-17
Attending: EMERGENCY MEDICINE
Payer: MEDICARE

## 2024-05-16 VITALS
RESPIRATION RATE: 19 BRPM | HEART RATE: 69 BPM | SYSTOLIC BLOOD PRESSURE: 127 MMHG | DIASTOLIC BLOOD PRESSURE: 68 MMHG | TEMPERATURE: 97.9 F | OXYGEN SATURATION: 97 %

## 2024-05-16 DIAGNOSIS — R42 DIZZINESS: Primary | ICD-10-CM

## 2024-05-16 LAB
ALBUMIN SERPL BCP-MCNC: 3.7 G/DL (ref 3.5–5)
ALP SERPL-CCNC: 119 U/L (ref 34–104)
ALT SERPL W P-5'-P-CCNC: 22 U/L (ref 7–52)
ANION GAP SERPL CALCULATED.3IONS-SCNC: 3 MMOL/L (ref 4–13)
AST SERPL W P-5'-P-CCNC: 34 U/L (ref 13–39)
BACTERIA UR QL AUTO: ABNORMAL /HPF
BASOPHILS # BLD AUTO: 0.04 THOUSANDS/ÂΜL (ref 0–0.1)
BASOPHILS NFR BLD AUTO: 1 % (ref 0–1)
BILIRUB SERPL-MCNC: 0.48 MG/DL (ref 0.2–1)
BILIRUB UR QL STRIP: NEGATIVE
BUN SERPL-MCNC: 21 MG/DL (ref 5–25)
CALCIUM SERPL-MCNC: 8.9 MG/DL (ref 8.4–10.2)
CARDIAC TROPONIN I PNL SERPL HS: 3 NG/L
CHLORIDE SERPL-SCNC: 110 MMOL/L (ref 96–108)
CLARITY UR: CLEAR
CO2 SERPL-SCNC: 27 MMOL/L (ref 21–32)
COLOR UR: YELLOW
CREAT SERPL-MCNC: 0.68 MG/DL (ref 0.6–1.3)
EOSINOPHIL # BLD AUTO: 0.13 THOUSAND/ÂΜL (ref 0–0.61)
EOSINOPHIL NFR BLD AUTO: 3 % (ref 0–6)
ERYTHROCYTE [DISTWIDTH] IN BLOOD BY AUTOMATED COUNT: 14.2 % (ref 11.6–15.1)
GFR SERPL CREATININE-BSD FRML MDRD: 94 ML/MIN/1.73SQ M
GLUCOSE SERPL-MCNC: 58 MG/DL (ref 65–140)
GLUCOSE UR STRIP-MCNC: NEGATIVE MG/DL
HCT VFR BLD AUTO: 38.6 % (ref 36.5–49.3)
HGB BLD-MCNC: 13 G/DL (ref 12–17)
HGB UR QL STRIP.AUTO: NEGATIVE
IMM GRANULOCYTES # BLD AUTO: 0.01 THOUSAND/UL (ref 0–0.2)
IMM GRANULOCYTES NFR BLD AUTO: 0 % (ref 0–2)
KETONES UR STRIP-MCNC: NEGATIVE MG/DL
LEUKOCYTE ESTERASE UR QL STRIP: NEGATIVE
LIPASE SERPL-CCNC: 37 U/L (ref 11–82)
LYMPHOCYTES # BLD AUTO: 1.19 THOUSANDS/ÂΜL (ref 0.6–4.47)
LYMPHOCYTES NFR BLD AUTO: 30 % (ref 14–44)
MCH RBC QN AUTO: 34.8 PG (ref 26.8–34.3)
MCHC RBC AUTO-ENTMCNC: 33.7 G/DL (ref 31.4–37.4)
MCV RBC AUTO: 103 FL (ref 82–98)
MONOCYTES # BLD AUTO: 0.55 THOUSAND/ÂΜL (ref 0.17–1.22)
MONOCYTES NFR BLD AUTO: 14 % (ref 4–12)
MUCOUS THREADS UR QL AUTO: ABNORMAL
NEUTROPHILS # BLD AUTO: 2 THOUSANDS/ÂΜL (ref 1.85–7.62)
NEUTS SEG NFR BLD AUTO: 52 % (ref 43–75)
NITRITE UR QL STRIP: NEGATIVE
NON-SQ EPI CELLS URNS QL MICRO: ABNORMAL /HPF
NRBC BLD AUTO-RTO: 0 /100 WBCS
PH UR STRIP.AUTO: 6 [PH]
PLATELET # BLD AUTO: 160 THOUSANDS/UL (ref 149–390)
PMV BLD AUTO: 9.7 FL (ref 8.9–12.7)
POTASSIUM SERPL-SCNC: 4 MMOL/L (ref 3.5–5.3)
PROT SERPL-MCNC: 6.3 G/DL (ref 6.4–8.4)
PROT UR STRIP-MCNC: ABNORMAL MG/DL
RBC # BLD AUTO: 3.74 MILLION/UL (ref 3.88–5.62)
RBC #/AREA URNS AUTO: ABNORMAL /HPF
SODIUM SERPL-SCNC: 140 MMOL/L (ref 135–147)
SP GR UR STRIP.AUTO: 1.03 (ref 1–1.03)
UROBILINOGEN UR STRIP-ACNC: 2 MG/DL
WBC # BLD AUTO: 3.92 THOUSAND/UL (ref 4.31–10.16)
WBC #/AREA URNS AUTO: ABNORMAL /HPF

## 2024-05-16 PROCEDURE — 81001 URINALYSIS AUTO W/SCOPE: CPT | Performed by: EMERGENCY MEDICINE

## 2024-05-16 PROCEDURE — 93005 ELECTROCARDIOGRAM TRACING: CPT

## 2024-05-16 PROCEDURE — 71045 X-RAY EXAM CHEST 1 VIEW: CPT

## 2024-05-16 PROCEDURE — 85025 COMPLETE CBC W/AUTO DIFF WBC: CPT | Performed by: EMERGENCY MEDICINE

## 2024-05-16 PROCEDURE — 83690 ASSAY OF LIPASE: CPT | Performed by: EMERGENCY MEDICINE

## 2024-05-16 PROCEDURE — 80053 COMPREHEN METABOLIC PANEL: CPT | Performed by: EMERGENCY MEDICINE

## 2024-05-16 PROCEDURE — 84484 ASSAY OF TROPONIN QUANT: CPT | Performed by: EMERGENCY MEDICINE

## 2024-05-16 PROCEDURE — 36415 COLL VENOUS BLD VENIPUNCTURE: CPT | Performed by: EMERGENCY MEDICINE

## 2024-05-16 PROCEDURE — 99284 EMERGENCY DEPT VISIT MOD MDM: CPT

## 2024-05-17 LAB
2HR DELTA HS TROPONIN: 0 NG/L
ATRIAL RATE: 70 BPM
CARDIAC TROPONIN I PNL SERPL HS: 3 NG/L
P AXIS: 76 DEGREES
PR INTERVAL: 196 MS
QRS AXIS: 73 DEGREES
QRSD INTERVAL: 92 MS
QT INTERVAL: 402 MS
QTC INTERVAL: 434 MS
T WAVE AXIS: 76 DEGREES
VENTRICULAR RATE: 70 BPM

## 2024-05-17 PROCEDURE — 99284 EMERGENCY DEPT VISIT MOD MDM: CPT | Performed by: EMERGENCY MEDICINE

## 2024-05-17 PROCEDURE — 93010 ELECTROCARDIOGRAM REPORT: CPT | Performed by: INTERNAL MEDICINE

## 2024-05-17 PROCEDURE — 84484 ASSAY OF TROPONIN QUANT: CPT | Performed by: EMERGENCY MEDICINE

## 2024-05-17 PROCEDURE — 36415 COLL VENOUS BLD VENIPUNCTURE: CPT | Performed by: EMERGENCY MEDICINE

## 2024-05-17 RX ORDER — IBUPROFEN 400 MG/1
800 TABLET ORAL ONCE
Status: COMPLETED | OUTPATIENT
Start: 2024-05-17 | End: 2024-05-17

## 2024-05-17 RX ADMIN — IBUPROFEN 800 MG: 400 TABLET, FILM COATED ORAL at 00:43

## 2024-06-11 NOTE — ED PROVIDER NOTES
History  Chief Complaint   Patient presents with    Dizziness    Vomiting     74-year-old male presented to the emergency department for evaluation of lightheadedness.  Patient had a near syncopal episode, felt lightheaded and nauseous, 1 episode of vomiting which is since passed, he is presently asymptomatic.  Has had no headache vertigo numbness weakness tingling visual changes chest pain palpitations or shortness of breath.        Prior to Admission Medications   Prescriptions Last Dose Informant Patient Reported? Taking?   Cholecalciferol (Vitamin D High Potency) 25 MCG (1000 UT) capsule  Care Giver No No   Sig: Take 1 capsule (1,000 Units total) by mouth daily   Deutetrabenazine ER (Austedo XR) 24 MG TB24  Care Giver Yes No   Sig: Take 24 mg by mouth daily   Emollient (Lubriderm Advanced Therapy) LOTN  Care Giver Yes No   Sig: Apply 1 Application topically as needed (rash)   Patient not taking: Reported on 4/1/2024   Nutritional Supplements (Ensure High Protein) LIQD  Care Giver No No   Sig: Take 237 mL by mouth 2 (two) times a day   Patient taking differently: Take 237 mL by mouth 2 (two) times a day vanilla   Psyllium (Reguloid) 28.3 % POWD   No No   Sig: Take 1 Scoop by mouth 2 (two) times a day   Zinc Oxide (Geni Protect Moisture Barrier) 12 % CREA  Care Giver Yes No   Sig: Apply 1 Application topically once as needed (wound care)   Patient not taking: Reported on 4/1/2024   acetaminophen (Acetaminophen Extra Strength) 500 mg tablet  Care Giver No No   Sig: Take 1 tablet (500 mg total) by mouth every 6 (six) hours as needed for mild pain   atorvastatin (LIPITOR) 40 mg tablet  Care Giver No No   Sig: Take 1 tablet (40 mg total) by mouth daily   carbidopa-levodopa (SINEMET)  mg per tablet  Care Giver No No   Sig: Take 1 tablet by mouth 3 (three) times a day   Patient taking differently: Take 1 tablet by mouth 2 (two) times a day   cloZAPine (CLOZARIL) 100 mg tablet  Care Giver Yes No   Sig: Take 100 mg  by mouth 2 (two) times a day   clozapine (CLOZARIL) 200 MG tablet  Care Giver Yes No   Sig: Take 200 mg by mouth daily at bedtime    docusate sodium (COLACE) 100 mg capsule  Care Giver No No   Sig: Take 1 capsule (100 mg total) by mouth 2 (two) times a day   fluticasone (FLONASE) 50 mcg/act nasal spray  Care Giver No No   Si sprays into each nostril daily   Patient not taking: Reported on 5/3/2024   hydrOXYzine HCL (ATARAX) 25 mg tablet  Care Giver No No   Sig: Take 1 tablet (25 mg total) by mouth every 6 (six) hours as needed for itching   meclizine (ANTIVERT) 25 mg tablet  Care Giver No No   Sig: Take 1 tablet (25 mg total) by mouth every 8 (eight) hours as needed for dizziness   metoprolol succinate (TOPROL-XL) 25 mg 24 hr tablet  Care Giver No No   Sig: Take 1 tablet (25 mg total) by mouth daily   olopatadine (PATANOL) 0.1 % ophthalmic solution  Care Giver Yes No   Sig: Administer 1 drop to both eyes daily at bedtime   omeprazole (PriLOSEC) 40 MG capsule  Care Giver No No   Sig: Take 1 capsule (40 mg total) by mouth daily   polyethylene glycol (MIRALAX) 17 g packet  Care Giver No No   Sig: Take once a day, may take an extra dose prn   Patient not taking: Reported on 5/3/2024   senna (Senokot) 8.6 MG tablet  Care Giver Yes No   Sig: Take 8.6 mg by mouth 2 (two) times a day   Patient not taking: Reported on 2024   tamsulosin (FLOMAX) 0.4 mg  Care Giver No No   Sig: Take 1 capsule (0.4 mg total) by mouth daily at bedtime   temazepam (RESTORIL) 15 mg capsule  Care Giver No No   Sig: Take 1 capsule (15 mg total) by mouth daily at bedtime   topiramate (TOPAMAX) 25 mg tablet  Care Giver Yes No   Sig: Take 25 mg by mouth 2 (two) times a day   traZODone (DESYREL) 100 mg tablet  Care Giver Yes No   Sig: Take 100 mg by mouth daily at bedtime      Facility-Administered Medications: None       Past Medical History:   Diagnosis Date    Asthma     Benign prostatic hyperplasia     COPD (chronic obstructive pulmonary  disease) (HCC)     GERD (gastroesophageal reflux disease)     Herpes zoster without complication 12/10/2021    Hypercholesteremia     Hypertension     Neuroleptic induced parkinsonism (HCC)     Paranoid schizophrenia (HCC)     Psychiatric disorder        Past Surgical History:   Procedure Laterality Date    CATARACT EXTRACTION      CHOLECYSTECTOMY LAPAROSCOPIC N/A 12/3/2023    Procedure: CHOLECYSTECTOMY LAPAROSCOPIC WITH INTRAOPERATIVE CHOLANGIOGRAM;  Surgeon: Maverick Tamayo MD;  Location: [facility] OR;  Service: General    COLONOSCOPY         Family History   Problem Relation Age of Onset    No Known Problems Mother     No Known Problems Father     Parkinsonism Son      I have reviewed and agree with the history as documented.    E-Cigarette/Vaping    E-Cigarette Use Never User      E-Cigarette/Vaping Substances    Nicotine No     THC No     CBD No     Flavoring No     Other No     Unknown No      Social History     Tobacco Use    Smoking status: Former     Current packs/day: 0.00     Average packs/day: 1 pack/day for 20.0 years (20.0 ttl pk-yrs)     Types: Cigarettes     Start date:      Quit date:      Years since quittin.4     Passive exposure: Past    Smokeless tobacco: Never   Vaping Use    Vaping status: Never Used   Substance Use Topics    Alcohol use: Not Currently    Drug use: Never       Review of Systems   Constitutional:  Negative for appetite change, chills, fatigue and fever.   HENT:  Negative for sneezing and sore throat.    Eyes:  Negative for visual disturbance.   Respiratory:  Negative for cough, choking, chest tightness, shortness of breath and wheezing.    Cardiovascular:  Negative for chest pain and palpitations.   Gastrointestinal:  Negative for abdominal pain, constipation, diarrhea, nausea and vomiting.   Genitourinary:  Negative for difficulty urinating and dysuria.   Neurological:  Positive for light-headedness. Negative for dizziness, weakness, numbness and headaches.   All  other systems reviewed and are negative.      Physical Exam  Physical Exam  Vitals and nursing note reviewed.   Constitutional:       General: He is not in acute distress.     Appearance: He is well-developed. He is not diaphoretic.   HENT:      Head: Normocephalic and atraumatic.   Eyes:      Pupils: Pupils are equal, round, and reactive to light.   Neck:      Vascular: No JVD.      Trachea: No tracheal deviation.   Cardiovascular:      Rate and Rhythm: Normal rate and regular rhythm.      Heart sounds: Normal heart sounds. No murmur heard.     No friction rub. No gallop.   Pulmonary:      Effort: Pulmonary effort is normal. No respiratory distress.      Breath sounds: Normal breath sounds. No wheezing or rales.   Abdominal:      General: Bowel sounds are normal. There is no distension.      Palpations: Abdomen is soft.      Tenderness: There is no abdominal tenderness. There is no guarding or rebound.   Skin:     General: Skin is warm and dry.      Coloration: Skin is not pale.   Neurological:      Mental Status: He is alert and oriented to person, place, and time.      Cranial Nerves: No cranial nerve deficit.      Motor: No abnormal muscle tone.   Psychiatric:         Behavior: Behavior normal.         Vital Signs  ED Triage Vitals   Temperature Pulse Respirations Blood Pressure SpO2   05/16/24 2126 05/16/24 2126 05/16/24 2126 05/16/24 2126 05/16/24 2126   97.9 °F (36.6 °C) 69 19 127/68 97 %      Temp src Heart Rate Source Patient Position - Orthostatic VS BP Location FiO2 (%)   -- -- -- -- --             Pain Score       05/17/24 0043       6           Vitals:    05/16/24 2126   BP: 127/68   Pulse: 69         Visual Acuity  Visual Acuity      Flowsheet Row Most Recent Value   L Pupil Size (mm) 3   R Pupil Size (mm) 3            ED Medications  Medications   ibuprofen (MOTRIN) tablet 800 mg (800 mg Oral Given 5/17/24 0043)       Diagnostic Studies  Results Reviewed       Procedure Component Value Units Date/Time     HS Troponin I 2hr [[id number]]  (Normal) Collected: 05/17/24 0045    Lab Status: Final result Specimen: Blood from Arm, Right Updated: 05/17/24 0131     hs TnI 2hr 3 ng/L      Delta 2hr hsTnI 0 ng/L     Urine Microscopic [[id number]]  (Abnormal) Collected: 05/16/24 2337    Lab Status: Final result Specimen: Urine, Clean Catch Updated: 05/16/24 2350     RBC, UA 1-2 /hpf      WBC, UA 1-2 /hpf      Epithelial Cells Occasional /hpf      Bacteria, UA None Seen /hpf      MUCUS THREADS Occasional    UA w Reflex to Microscopic w Reflex to Culture [[id number]]  (Abnormal) Collected: 05/16/24 2337    Lab Status: Final result Specimen: Urine, Clean Catch Updated: 05/16/24 2349     Color, UA Yellow     Clarity, UA Clear     Specific Gravity, UA 1.031     pH, UA 6.0     Leukocytes, UA Negative     Nitrite, UA Negative     Protein, UA Trace mg/dl      Glucose, UA Negative mg/dl      Ketones, UA Negative mg/dl      Urobilinogen, UA 2.0 mg/dl      Bilirubin, UA Negative     Occult Blood, UA Negative    Comprehensive metabolic panel [[id number]]  (Abnormal) Collected: 05/16/24 2237    Lab Status: Final result Specimen: Blood from Arm, Right Updated: 05/16/24 2327     Sodium 140 mmol/L      Potassium 4.0 mmol/L      Chloride 110 mmol/L      CO2 27 mmol/L      ANION GAP 3 mmol/L      BUN 21 mg/dL      Creatinine 0.68 mg/dL      Glucose 58 mg/dL      Calcium 8.9 mg/dL      AST 34 U/L      ALT 22 U/L      Alkaline Phosphatase 119 U/L      Total Protein 6.3 g/dL      Albumin 3.7 g/dL      Total Bilirubin 0.48 mg/dL      eGFR 94 ml/min/1.73sq m     Narrative:      National Kidney Disease Foundation guidelines for Chronic Kidney Disease (CKD):     Stage 1 with normal or high GFR (GFR > 90 mL/min/1.73 square meters)    Stage 2 Mild CKD (GFR = 60-89 mL/min/1.73 square meters)    Stage 3A Moderate CKD (GFR = 45-59 mL/min/1.73 square meters)    Stage 3B Moderate CKD (GFR = 30-44 mL/min/1.73 square meters)    Stage 4 Severe CKD (GFR = 15-29  mL/min/1.73 square meters)    Stage 5 End Stage CKD (GFR <15 mL/min/1.73 square meters)  Note: GFR calculation is accurate only with a steady state creatinine    Lipase [[id number]]  (Normal) Collected: 05/16/24 2237    Lab Status: Final result Specimen: Blood from Arm, Right Updated: 05/16/24 2327     Lipase 37 u/L     HS Troponin 0hr (reflex protocol) [[id number]]  (Normal) Collected: 05/16/24 2237    Lab Status: Final result Specimen: Blood from Arm, Right Updated: 05/16/24 2320     hs TnI 0hr 3 ng/L     CBC and differential [[id number]]  (Abnormal) Collected: 05/16/24 2237    Lab Status: Final result Specimen: Blood from Arm, Right Updated: 05/16/24 2242     WBC 3.92 Thousand/uL      RBC 3.74 Million/uL      Hemoglobin 13.0 g/dL      Hematocrit 38.6 %       fL      MCH 34.8 pg      MCHC 33.7 g/dL      RDW 14.2 %      MPV 9.7 fL      Platelets 160 Thousands/uL      nRBC 0 /100 WBCs      Segmented % 52 %      Immature Grans % 0 %      Lymphocytes % 30 %      Monocytes % 14 %      Eosinophils Relative 3 %      Basophils Relative 1 %      Absolute Neutrophils 2.00 Thousands/µL      Absolute Immature Grans 0.01 Thousand/uL      Absolute Lymphocytes 1.19 Thousands/µL      Absolute Monocytes 0.55 Thousand/µL      Eosinophils Absolute 0.13 Thousand/µL      Basophils Absolute 0.04 Thousands/µL                    XR chest 1 view portable   Final Result by Marlo Villeda MD (05/17 1410)      No acute cardiopulmonary disease.            Workstation performed: [id number]                    Procedures  Procedures         ED Course                               SBIRT 20yo+      Flowsheet Row Most Recent Value   Initial Alcohol Screen: US AUDIT-C     1. How often do you have a drink containing alcohol? 0 Filed at: 05/17/2024 0228   2. How many drinks containing alcohol do you have on a typical day you are drinking?  0 Filed at: 05/17/2024 0228   3b. FEMALE Any Age, or MALE 65+: How often do you have 4 or more drinks on  one occassion? 0 Filed at: 05/17/2024 0228   Audit-C Score 0 Filed at: 05/17/2024 0228   RAFA: How many times in the past year have you...    Used an illegal drug or used a prescription medication for non-medical reasons? Never Filed at: 05/17/2024 0228                      Medical Decision Making  74-year-old male with near syncope, will check cardiac labs, observe, reassess.    Amount and/or Complexity of Data Reviewed  Labs: ordered.  Radiology: ordered.    Risk  Prescription drug management.             Disposition  Final diagnoses:   Dizziness     Time reflects when diagnosis was documented in both MDM as applicable and the Disposition within this note       Time User Action Codes Description Comment    5/17/2024  1:46 AM Esteban Paige Add [R42] Dizziness           ED Disposition       ED Disposition   Discharge    Condition   Stable    Date/Time   Fri May 17, 2024 0146    Comment   Sumit Nails discharge to home/self care.                   Follow-up Information       Follow up With Specialties Details Why Contact Info    Garry Hidalgo MD Family Medicine   [address] [place] [id number] [phone]              Discharge Medication List as of 5/17/2024  1:47 AM        CONTINUE these medications which have NOT CHANGED    Details   acetaminophen (Acetaminophen Extra Strength) 500 mg tablet Take 1 tablet (500 mg total) by mouth every 6 (six) hours as needed for mild pain, Starting Mon 3/13/2023, Normal      atorvastatin (LIPITOR) 40 mg tablet Take 1 tablet (40 mg total) by mouth daily, Starting Wed 3/20/2024, Normal      carbidopa-levodopa (SINEMET)  mg per tablet Take 1 tablet by mouth 3 (three) times a day, Starting Thu 3/2/2023, Normal      Cholecalciferol (Vitamin D High Potency) 25 MCG (1000 UT) capsule Take 1 capsule (1,000 Units total) by mouth daily, Starting Thu 4/4/2024, Normal      !! cloZAPine (CLOZARIL) 100 mg tablet Take 100 mg by mouth 2 (two) times a day,  Historical Med      !! clozapine (CLOZARIL) 200 MG tablet Take 200 mg by mouth daily at bedtime , Starting Mon 4/8/2019, Historical Med      Deutetrabenazine ER (Austedo XR) 24 MG TB24 Take 24 mg by mouth daily, Historical Med      docusate sodium (COLACE) 100 mg capsule Take 1 capsule (100 mg total) by mouth 2 (two) times a day, Starting Wed 3/20/2024, Until Sun 5/19/2024, Normal      Emollient (Lubriderm Advanced Therapy) LOTN Apply 1 Application topically as needed (rash), Historical Med      fluticasone (FLONASE) 50 mcg/act nasal spray 2 sprays into each nostril daily, Starting Wed 3/20/2024, Normal      hydrOXYzine HCL (ATARAX) 25 mg tablet Take 1 tablet (25 mg total) by mouth every 6 (six) hours as needed for itching, Starting Wed 4/26/2023, Normal      meclizine (ANTIVERT) 25 mg tablet Take 1 tablet (25 mg total) by mouth every 8 (eight) hours as needed for dizziness, Starting Wed 3/20/2024, Normal      metoprolol succinate (TOPROL-XL) 25 mg 24 hr tablet Take 1 tablet (25 mg total) by mouth daily, Starting Wed 3/20/2024, Normal      Nutritional Supplements (Ensure High Protein) LIQD Take 237 mL by mouth 2 (two) times a day, Starting Wed 12/6/2023, Until Wed 4/17/2024, Normal      olopatadine (PATANOL) 0.1 % ophthalmic solution Administer 1 drop to both eyes daily at bedtime, Historical Med      omeprazole (PriLOSEC) 40 MG capsule Take 1 capsule (40 mg total) by mouth daily, Starting Wed 3/20/2024, Normal      polyethylene glycol (MIRALAX) 17 g packet Take once a day, may take an extra dose prn, Normal      Psyllium (Reguloid) 28.3 % POWD Take 1 Scoop by mouth 2 (two) times a day, Starting Tue 5/14/2024, Until Thu 6/13/2024, Normal      senna (Senokot) 8.6 MG tablet Take 8.6 mg by mouth 2 (two) times a day, Starting Sat 3/30/2024, Historical Med      tamsulosin (FLOMAX) 0.4 mg Take 1 capsule (0.4 mg total) by mouth daily at bedtime, Starting Wed 3/20/2024, Normal      temazepam (RESTORIL) 15 mg capsule Take 1  capsule (15 mg total) by mouth daily at bedtime, Starting Mon 12/4/2023, Until Fri 5/3/2024, Print      topiramate (TOPAMAX) 25 mg tablet Take 25 mg by mouth 2 (two) times a day, Starting Mon 7/1/2019, Historical Med      traZODone (DESYREL) 100 mg tablet Take 100 mg by mouth daily at bedtime, Starting Mon 4/8/2019, Historical Med      Zinc Oxide (Geni Protect Moisture Barrier) 12 % CREA Apply 1 Application topically once as needed (wound care), Historical Med       !! - Potential duplicate medications found. Please discuss with provider.          No discharge procedures on file.    PDMP Review         Value Time User    PDMP Reviewed  Yes 9/29/2023 11:21 PM LIUDMILA Pinedo            ED Provider  Electronically Signed by             Esteban Paige MD  06/10/24 2027

## 2024-06-18 ENCOUNTER — HOSPITAL ENCOUNTER (EMERGENCY)
Facility: HOSPITAL | Age: 74
Discharge: HOME | End: 2024-06-18
Attending: EMERGENCY MEDICINE
Payer: MEDICARE

## 2024-06-18 ENCOUNTER — APPOINTMENT (EMERGENCY)
Dept: CT IMAGING | Facility: HOSPITAL | Age: 74
End: 2024-06-18
Payer: MEDICARE

## 2024-06-18 VITALS
RESPIRATION RATE: 18 BRPM | TEMPERATURE: 97.9 F | SYSTOLIC BLOOD PRESSURE: 116 MMHG | HEART RATE: 82 BPM | OXYGEN SATURATION: 97 % | WEIGHT: 162.92 LBS | DIASTOLIC BLOOD PRESSURE: 69 MMHG | BODY MASS INDEX: 23.38 KG/M2

## 2024-06-18 DIAGNOSIS — K59.00 CONSTIPATION: ICD-10-CM

## 2024-06-18 DIAGNOSIS — M54.50 ACUTE LOW BACK PAIN: Primary | ICD-10-CM

## 2024-06-18 PROCEDURE — 99283 EMERGENCY DEPT VISIT LOW MDM: CPT

## 2024-06-18 PROCEDURE — 74176 CT ABD & PELVIS W/O CONTRAST: CPT

## 2024-06-18 PROCEDURE — 99284 EMERGENCY DEPT VISIT MOD MDM: CPT

## 2024-06-18 RX ORDER — POLYETHYLENE GLYCOL 3350 17 G/17G
17 POWDER, FOR SOLUTION ORAL DAILY
Qty: 10 EACH | Refills: 0 | Status: SHIPPED | OUTPATIENT
Start: 2024-06-18

## 2024-06-18 RX ORDER — ACETAMINOPHEN 325 MG/1
650 TABLET ORAL ONCE
Status: COMPLETED | OUTPATIENT
Start: 2024-06-18 | End: 2024-06-18

## 2024-06-18 RX ORDER — IBUPROFEN 600 MG/1
600 TABLET ORAL EVERY 6 HOURS PRN
Qty: 30 TABLET | Refills: 0 | Status: SHIPPED | OUTPATIENT
Start: 2024-06-18

## 2024-06-18 RX ORDER — ACETAMINOPHEN 500 MG
1000 TABLET ORAL EVERY 8 HOURS
Qty: 40 TABLET | Refills: 0 | Status: SHIPPED | OUTPATIENT
Start: 2024-06-18

## 2024-06-18 RX ORDER — HYDROCODONE BITARTRATE AND ACETAMINOPHEN 5; 325 MG/1; MG/1
1 TABLET ORAL ONCE
Status: COMPLETED | OUTPATIENT
Start: 2024-06-18 | End: 2024-06-18

## 2024-06-18 RX ADMIN — ACETAMINOPHEN 650 MG: 325 TABLET, FILM COATED ORAL at 20:14

## 2024-06-18 RX ADMIN — HYDROCODONE BITARTRATE AND ACETAMINOPHEN 1 TABLET: 5; 325 TABLET ORAL at 20:14

## 2024-06-19 ENCOUNTER — TELEPHONE (OUTPATIENT)
Dept: PHYSICAL THERAPY | Facility: OTHER | Age: 74
End: 2024-06-19

## 2024-06-19 NOTE — TELEPHONE ENCOUNTER
Call placed to the patient per [facility] referral.    Phone number is for [facility] facility where the patient lives.     Voice message left for patient/facility to call back. Phone number and hours of business provided.      This is the 1st attempt to reach the patient.  Will defer per protocol.

## 2024-06-19 NOTE — DISCHARGE INSTRUCTIONS
Today I provided prescription for Tylenol, ibuprofen for your low back pain.  Have provided prescription for MiraLAX for your constipation.  Take these medications as directed.  Provided referral to the comprehensive spine program.  Follow-up with their service for further evaluation and treatment of low back pain.  Return to ED for new or worsening symptoms.

## 2024-06-19 NOTE — ED NOTES
Patient ambulated out of the ED and in the WR- Waiting for lyft, arranged via round trip, AAOx4 resp even and unlabored with no S$S of distress.       Michelle Pace RN  06/18/24 0499

## 2024-06-19 NOTE — TELEPHONE ENCOUNTER
Blanca Tovar from [facility] called into [facility] today after receiving a message from [facility] triage nurse in regards patient's referral.    Spoke with Blanca, explained program, protocol and reason for the call.    She tried to get patient on the phone to answer the questions but patient was at lunch. Blanca will call back once patient is available to proceed with the triage.    This is the 2nd attempt to reach the patient. Will defer referral per protocol.

## 2024-06-22 ENCOUNTER — HOSPITAL ENCOUNTER (EMERGENCY)
Facility: HOSPITAL | Age: 74
Discharge: HOME | End: 2024-06-22
Attending: EMERGENCY MEDICINE
Payer: MEDICARE

## 2024-06-22 VITALS
OXYGEN SATURATION: 100 % | SYSTOLIC BLOOD PRESSURE: 122 MMHG | RESPIRATION RATE: 16 BRPM | TEMPERATURE: 98.7 F | HEART RATE: 73 BPM | DIASTOLIC BLOOD PRESSURE: 68 MMHG

## 2024-06-22 DIAGNOSIS — Z00.00 EVALUATION BY MEDICAL SERVICE REQUIRED: Primary | ICD-10-CM

## 2024-06-22 LAB
ALBUMIN SERPL BCG-MCNC: 3.5 G/DL (ref 3.5–5)
ALP SERPL-CCNC: 104 U/L (ref 34–104)
ALT SERPL W P-5'-P-CCNC: 32 U/L (ref 7–52)
ANION GAP SERPL CALCULATED.3IONS-SCNC: 3 MMOL/L (ref 4–13)
AST SERPL W P-5'-P-CCNC: 25 U/L (ref 13–39)
BASOPHILS # BLD AUTO: 0.02 THOUSANDS/ÂΜL (ref 0–0.1)
BASOPHILS NFR BLD AUTO: 1 % (ref 0–1)
BILIRUB SERPL-MCNC: 0.33 MG/DL (ref 0.2–1)
BUN SERPL-MCNC: 24 MG/DL (ref 5–25)
CALCIUM SERPL-MCNC: 8.6 MG/DL (ref 8.4–10.2)
CHLORIDE SERPL-SCNC: 106 MMOL/L (ref 96–108)
CO2 SERPL-SCNC: 27 MMOL/L (ref 21–32)
CREAT SERPL-MCNC: 0.64 MG/DL (ref 0.6–1.3)
EOSINOPHIL # BLD AUTO: 0.17 THOUSAND/ÂΜL (ref 0–0.61)
EOSINOPHIL NFR BLD AUTO: 4 % (ref 0–6)
ERYTHROCYTE [DISTWIDTH] IN BLOOD BY AUTOMATED COUNT: 13.4 % (ref 11.6–15.1)
GFR SERPL CREATININE-BSD FRML MDRD: 96 ML/MIN/1.73SQ M
GLUCOSE SERPL-MCNC: 92 MG/DL (ref 65–140)
HCT VFR BLD AUTO: 36.1 % (ref 36.5–49.3)
HGB BLD-MCNC: 12.5 G/DL (ref 12–17)
IMM GRANULOCYTES # BLD AUTO: 0 THOUSAND/UL (ref 0–0.2)
IMM GRANULOCYTES NFR BLD AUTO: 0 % (ref 0–2)
INR PPP: 0.93 (ref 0.84–1.19)
LIPASE SERPL-CCNC: 42 U/L (ref 11–82)
LYMPHOCYTES # BLD AUTO: 0.91 THOUSANDS/ÂΜL (ref 0.6–4.47)
LYMPHOCYTES NFR BLD AUTO: 22 % (ref 14–44)
MCH RBC QN AUTO: 34.7 PG (ref 26.8–34.3)
MCHC RBC AUTO-ENTMCNC: 34.6 G/DL (ref 31.4–37.4)
MCV RBC AUTO: 100 FL (ref 82–98)
MONOCYTES # BLD AUTO: 0.41 THOUSAND/ÂΜL (ref 0.17–1.22)
MONOCYTES NFR BLD AUTO: 10 % (ref 4–12)
NEUTROPHILS # BLD AUTO: 2.69 THOUSANDS/ÂΜL (ref 1.85–7.62)
NEUTS SEG NFR BLD AUTO: 63 % (ref 43–75)
NRBC BLD AUTO-RTO: 0 /100 WBCS
PLATELET # BLD AUTO: 135 THOUSANDS/UL (ref 149–390)
PMV BLD AUTO: 9 FL (ref 8.9–12.7)
POTASSIUM SERPL-SCNC: 4 MMOL/L (ref 3.5–5.3)
PROT SERPL-MCNC: 5.9 G/DL (ref 6.4–8.4)
PROTHROMBIN TIME: 13.1 SECONDS (ref 11.6–14.5)
RBC # BLD AUTO: 3.6 MILLION/UL (ref 3.88–5.62)
SODIUM SERPL-SCNC: 136 MMOL/L (ref 135–147)
WBC # BLD AUTO: 4.2 THOUSAND/UL (ref 4.31–10.16)

## 2024-06-22 PROCEDURE — 36415 COLL VENOUS BLD VENIPUNCTURE: CPT | Performed by: EMERGENCY MEDICINE

## 2024-06-22 PROCEDURE — 85025 COMPLETE CBC W/AUTO DIFF WBC: CPT | Performed by: EMERGENCY MEDICINE

## 2024-06-22 PROCEDURE — 85610 PROTHROMBIN TIME: CPT | Performed by: EMERGENCY MEDICINE

## 2024-06-22 PROCEDURE — 99283 EMERGENCY DEPT VISIT LOW MDM: CPT

## 2024-06-22 PROCEDURE — 99284 EMERGENCY DEPT VISIT MOD MDM: CPT | Performed by: EMERGENCY MEDICINE

## 2024-06-22 PROCEDURE — 83690 ASSAY OF LIPASE: CPT | Performed by: EMERGENCY MEDICINE

## 2024-06-22 PROCEDURE — 80053 COMPREHEN METABOLIC PANEL: CPT | Performed by: EMERGENCY MEDICINE

## 2024-06-23 NOTE — ED PROVIDER NOTES
"History  Chief Complaint   Patient presents with    Medical Problem     Pt BIBA stating that he ate a meatball sub at [facility] and believes that it had rat poison in it d/t there being \"black stuff in the meatball\". He noticed in the first one and then proceeded to ingest the other four. Per EMS, they asked the other residents who had the same dinner and they felt fine. Pt is requesting his blood to be check to make sure everything is okay.      34-year-old male presenting to the emergency department for evaluation of abdominal pain.  Patient had some mid abdominal pain after he ate meatball sub-.  Patient is concerned specifically that there was rat poison in his meatball subhowever he cannot expressively tell me why he feels this way just that he saw something that looked abnormal however did not stop him from eating the entire sub-.  Has not had any blood in the stool or hematemesis.        Prior to Admission Medications   Prescriptions Last Dose Informant Patient Reported? Taking?   Cholecalciferol (Vitamin D High Potency) 25 MCG (1000 UT) capsule  Care Giver No No   Sig: Take 1 capsule (1,000 Units total) by mouth daily   Deutetrabenazine ER (Austedo XR) 24 MG TB24  Care Giver Yes No   Sig: Take 24 mg by mouth daily   Emollient (Lubriderm Advanced Therapy) LOTN  Care Giver Yes No   Sig: Apply 1 Application topically as needed (rash)   Patient not taking: Reported on 4/1/2024   Nutritional Supplements (Ensure High Protein) LIQD  Care Giver No No   Sig: Take 237 mL by mouth 2 (two) times a day   Patient taking differently: Take 237 mL by mouth 2 (two) times a day vanilla   Psyllium (Reguloid) 28.3 % POWD   No No   Sig: Take 1 Scoop by mouth 2 (two) times a day   Zinc Oxide (Geni Protect Moisture Barrier) 12 % CREA  Care Giver Yes No   Sig: Apply 1 Application topically once as needed (wound care)   Patient not taking: Reported on 4/1/2024   acetaminophen (Acetaminophen Extra Strength) 500 mg tablet  Care " Giver No No   Sig: Take 1 tablet (500 mg total) by mouth every 6 (six) hours as needed for mild pain   acetaminophen (TYLENOL) 500 mg tablet   No No   Sig: Take 2 tablets (1,000 mg total) by mouth every 8 (eight) hours   atorvastatin (LIPITOR) 40 mg tablet  Care Giver No No   Sig: Take 1 tablet (40 mg total) by mouth daily   carbidopa-levodopa (SINEMET)  mg per tablet  Care Giver No No   Sig: Take 1 tablet by mouth 3 (three) times a day   Patient taking differently: Take 1 tablet by mouth 2 (two) times a day   cloZAPine (CLOZARIL) 100 mg tablet  Care Giver Yes No   Sig: Take 100 mg by mouth 2 (two) times a day   clozapine (CLOZARIL) 200 MG tablet  Care Giver Yes No   Sig: Take 200 mg by mouth daily at bedtime    docusate sodium (COLACE) 100 mg capsule  Care Giver No No   Sig: Take 1 capsule (100 mg total) by mouth 2 (two) times a day   fluticasone (FLONASE) 50 mcg/act nasal spray  Care Giver No No   Si sprays into each nostril daily   Patient not taking: Reported on 5/3/2024   hydrOXYzine HCL (ATARAX) 25 mg tablet  Care Giver No No   Sig: Take 1 tablet (25 mg total) by mouth every 6 (six) hours as needed for itching   ibuprofen (MOTRIN) 600 mg tablet   No No   Sig: Take 1 tablet (600 mg total) by mouth every 6 (six) hours as needed for mild pain   meclizine (ANTIVERT) 25 mg tablet  Care Giver No No   Sig: Take 1 tablet (25 mg total) by mouth every 8 (eight) hours as needed for dizziness   metoprolol succinate (TOPROL-XL) 25 mg 24 hr tablet  Care Giver No No   Sig: Take 1 tablet (25 mg total) by mouth daily   olopatadine (PATANOL) 0.1 % ophthalmic solution  Care Giver Yes No   Sig: Administer 1 drop to both eyes daily at bedtime   omeprazole (PriLOSEC) 40 MG capsule  Care Giver No No   Sig: Take 1 capsule (40 mg total) by mouth daily   polyethylene glycol (MIRALAX) 17 g packet  Care Giver No No   Sig: Take once a day, may take an extra dose prn   Patient not taking: Reported on 5/3/2024   polyethylene  glycol (MIRALAX) 17 g packet   No No   Sig: Take 17 g by mouth daily   senna (Senokot) 8.6 MG tablet  Care Giver Yes No   Sig: Take 8.6 mg by mouth 2 (two) times a day   Patient not taking: Reported on 4/1/2024   tamsulosin (FLOMAX) 0.4 mg  Care Giver No No   Sig: Take 1 capsule (0.4 mg total) by mouth daily at bedtime   temazepam (RESTORIL) 15 mg capsule  Care Giver No No   Sig: Take 1 capsule (15 mg total) by mouth daily at bedtime   topiramate (TOPAMAX) 25 mg tablet  Care Giver Yes No   Sig: Take 25 mg by mouth 2 (two) times a day   traZODone (DESYREL) 100 mg tablet  Care Giver Yes No   Sig: Take 100 mg by mouth daily at bedtime      Facility-Administered Medications: None       Past Medical History:   Diagnosis Date    Asthma     Benign prostatic hyperplasia     COPD (chronic obstructive pulmonary disease) (HCC)     GERD (gastroesophageal reflux disease)     Herpes zoster without complication 12/10/2021    Hypercholesteremia     Hypertension     Neuroleptic induced parkinsonism (HCC)     Paranoid schizophrenia (HCC)     Psychiatric disorder        Past Surgical History:   Procedure Laterality Date    CATARACT EXTRACTION      CHOLECYSTECTOMY LAPAROSCOPIC N/A 12/3/2023    Procedure: CHOLECYSTECTOMY LAPAROSCOPIC WITH INTRAOPERATIVE CHOLANGIOGRAM;  Surgeon: Maverick Tamayo MD;  Location: [facility];  Service: General    COLONOSCOPY         Family History   Problem Relation Age of Onset    No Known Problems Mother     No Known Problems Father     Parkinsonism Son      I have reviewed and agree with the history as documented.    E-Cigarette/Vaping    E-Cigarette Use Never User      E-Cigarette/Vaping Substances    Nicotine No     THC No     CBD No     Flavoring No     Other No     Unknown No      Social History     Tobacco Use    Smoking status: Former     Current packs/day: 0.00     Average packs/day: 1 pack/day for 20.0 years (20.0 ttl pk-yrs)     Types: Cigarettes     Start date: 1980     Quit date: 2000     Years  since quittin.4     Passive exposure: Past    Smokeless tobacco: Never   Vaping Use    Vaping status: Never Used   Substance Use Topics    Alcohol use: Not Currently    Drug use: Never       Review of Systems   Constitutional:  Negative for appetite change, chills, fatigue and fever.   HENT:  Negative for sneezing and sore throat.    Eyes:  Negative for visual disturbance.   Respiratory:  Negative for cough, choking, chest tightness, shortness of breath and wheezing.    Cardiovascular:  Negative for chest pain and palpitations.   Gastrointestinal:  Positive for abdominal pain. Negative for constipation, diarrhea, nausea and vomiting.   Genitourinary:  Negative for difficulty urinating and dysuria.   Neurological:  Negative for dizziness, weakness, light-headedness, numbness and headaches.   All other systems reviewed and are negative.      Physical Exam  Physical Exam    Vital Signs  ED Triage Vitals [24]   Temperature Pulse Respirations Blood Pressure SpO2   98.7 °F (37.1 °C) 73 18 140/67 96 %      Temp Source Heart Rate Source Patient Position - Orthostatic VS BP Location FiO2 (%)   Oral Monitor Sitting Right arm --      Pain Score       --           Vitals:    24   BP: 140/67 122/68   Pulse: 73 73   Patient Position - Orthostatic VS: Sitting Sitting         Visual Acuity      ED Medications  Medications - No data to display    Diagnostic Studies  Results Reviewed       Procedure Component Value Units Date/Time    Lipase [[id number]]  (Normal) Collected: 24    Lab Status: Final result Specimen: Blood from Arm, Right Updated: 24     Lipase 42 u/L     Comprehensive metabolic panel [[id number]]  (Abnormal) Collected: 24    Lab Status: Final result Specimen: Blood from Arm, Right Updated: 24     Sodium 136 mmol/L      Potassium 4.0 mmol/L      Chloride 106 mmol/L      CO2 27 mmol/L      ANION GAP 3 mmol/L      BUN 24 mg/dL       Creatinine 0.64 mg/dL      Glucose 92 mg/dL      Calcium 8.6 mg/dL      AST 25 U/L      ALT 32 U/L      Alkaline Phosphatase 104 U/L      Total Protein 5.9 g/dL      Albumin 3.5 g/dL      Total Bilirubin 0.33 mg/dL      eGFR 96 ml/min/1.73sq m     Narrative:      National Kid[employer] Disease Foundation guidelines for Chronic Kidney Disease (CKD):     Stage 1 with normal or high GFR (GFR > 90 mL/min/1.73 square meters)    Stage 2 Mild CKD (GFR = 60-89 mL/min/1.73 square meters)    Stage 3A Moderate CKD (GFR = 45-59 mL/min/1.73 square meters)    Stage 3B Moderate CKD (GFR = 30-44 mL/min/1.73 square meters)    Stage 4 Severe CKD (GFR = 15-29 mL/min/1.73 square meters)    Stage 5 End Stage CKD (GFR <15 mL/min/1.73 square meters)  Note: GFR calculation is accurate only with a steady state creatinine    Protime-INR [[id number]]  (Normal) Collected: 06/22/24 2043    Lab Status: Final result Specimen: Blood from Arm, Right Updated: 06/22/24 2105     Protime 13.1 seconds      INR 0.93    CBC and differential [[id number]]  (Abnormal) Collected: 06/22/24 2043    Lab Status: Final result Specimen: Blood from Arm, Right Updated: 06/22/24 2050     WBC 4.20 Thousand/uL      RBC 3.60 Million/uL      Hemoglobin 12.5 g/dL      Hematocrit 36.1 %       fL      MCH 34.7 pg      MCHC 34.6 g/dL      RDW 13.4 %      MPV 9.0 fL      Platelets 135 Thousands/uL      nRBC 0 /100 WBCs      Segmented % 63 %      Immature Grans % 0 %      Lymphocytes % 22 %      Monocytes % 10 %      Eosinophils Relative 4 %      Basophils Relative 1 %      Absolute Neutrophils 2.69 Thousands/µL      Absolute Immature Grans 0.00 Thousand/uL      Absolute Lymphocytes 0.91 Thousands/µL      Absolute Monocytes 0.41 Thousand/µL      Eosinophils Absolute 0.17 Thousand/µL      Basophils Absolute 0.02 Thousands/µL                    No orders to display              Procedures  Procedures         ED Course                               SBIRT 20yo+      Flowsheet Row  Most Recent Value   Initial Alcohol Screen: US AUDIT-C     1. How often do you have a drink containing alcohol? 0 Filed at: 06/22/2024 2009   2. How many drinks containing alcohol do you have on a typical day you are drinking?  0 Filed at: 06/22/2024 2009   3b. FEMALE Any Age, or MALE 65+: How often do you have 4 or more drinks on one occassion? 0 Filed at: 06/22/2024 2009   Audit-C Score 0 Filed at: 06/22/2024 2009   RAFA: How many times in the past year have you...    Used an illegal drug or used a prescription medication for non-medical reasons? Never Filed at: 06/22/2024 2009                      Medical Decision Making  74-year-old male with concern for possible poisoning fortunately given his specific concern for being poisoned by rat poison we can screen for this by checking INR.  He had some epigastric postprandial pain but he has nontender exam here, may be component of gastritis versus hepatobiliary disease, will check labs, including INR, and reassess.    Amount and/or Complexity of Data Reviewed  Labs: ordered.             Disposition  Final diagnoses:   Evaluation by medical service required     Time reflects when diagnosis was documented in both MDM as applicable and the Disposition within this note       Time User Action Codes Description Comment    6/22/2024  9:31 PM Esteban Paige Add [Z00.00] Evaluation by medical service required           ED Disposition       ED Disposition   Discharge    Condition   Stable    Date/Time   Sat Jun 22, 2024 2130    Comment   Sumit Nails discharge to home/self care.                   Follow-up Information    None         Discharge Medication List as of 6/22/2024  9:32 PM        CONTINUE these medications which have NOT CHANGED    Details   !! acetaminophen (Acetaminophen Extra Strength) 500 mg tablet Take 1 tablet (500 mg total) by mouth every 6 (six) hours as needed for mild pain, Starting Mon 3/13/2023, Normal      !! acetaminophen (TYLENOL) 500 mg tablet Take 2  tablets (1,000 mg total) by mouth every 8 (eight) hours, Starting Tue 6/18/2024, Normal      atorvastatin (LIPITOR) 40 mg tablet Take 1 tablet (40 mg total) by mouth daily, Starting Wed 3/20/2024, Normal      carbidopa-levodopa (SINEMET)  mg per tablet Take 1 tablet by mouth 3 (three) times a day, Starting Thu 3/2/2023, Normal      Cholecalciferol (Vitamin D High Potency) 25 MCG (1000 UT) capsule Take 1 capsule (1,000 Units total) by mouth daily, Starting Thu 4/4/2024, Normal      !! cloZAPine (CLOZARIL) 100 mg tablet Take 100 mg by mouth 2 (two) times a day, Historical Med      !! clozapine (CLOZARIL) 200 MG tablet Take 200 mg by mouth daily at bedtime , Starting Mon 4/8/2019, Historical Med      Deutetrabenazine ER (Austedo XR) 24 MG TB24 Take 24 mg by mouth daily, Historical Med      docusate sodium (COLACE) 100 mg capsule Take 1 capsule (100 mg total) by mouth 2 (two) times a day, Starting Wed 3/20/2024, Until Sun 5/19/2024, Normal      Emollient (Lubriderm Advanced Therapy) LOTN Apply 1 Application topically as needed (rash), Historical Med      fluticasone (FLONASE) 50 mcg/act nasal spray 2 sprays into each nostril daily, Starting Wed 3/20/2024, Normal      hydrOXYzine HCL (ATARAX) 25 mg tablet Take 1 tablet (25 mg total) by mouth every 6 (six) hours as needed for itching, Starting Wed 4/26/2023, Normal      ibuprofen (MOTRIN) 600 mg tablet Take 1 tablet (600 mg total) by mouth every 6 (six) hours as needed for mild pain, Starting Tue 6/18/2024, Normal      meclizine (ANTIVERT) 25 mg tablet Take 1 tablet (25 mg total) by mouth every 8 (eight) hours as needed for dizziness, Starting Wed 3/20/2024, Normal      metoprolol succinate (TOPROL-XL) 25 mg 24 hr tablet Take 1 tablet (25 mg total) by mouth daily, Starting Wed 3/20/2024, Normal      Nutritional Supplements (Ensure High Protein) LIQD Take 237 mL by mouth 2 (two) times a day, Starting Wed 12/6/2023, Until Wed 4/17/2024, Normal      olopatadine  (PATANOL) 0.1 % ophthalmic solution Administer 1 drop to both eyes daily at bedtime, Historical Med      omeprazole (PriLOSEC) 40 MG capsule Take 1 capsule (40 mg total) by mouth daily, Starting Wed 3/20/2024, Normal      !! polyethylene glycol (MIRALAX) 17 g packet Take once a day, may take an extra dose prn, Normal      !! polyethylene glycol (MIRALAX) 17 g packet Take 17 g by mouth daily, Starting Tue 6/18/2024, Normal      Psyllium (Reguloid) 28.3 % POWD Take 1 Scoop by mouth 2 (two) times a day, Starting Tue 5/14/2024, Until Thu 6/13/2024, Normal      senna (Senokot) 8.6 MG tablet Take 8.6 mg by mouth 2 (two) times a day, Starting Sat 3/30/2024, Historical Med      tamsulosin (FLOMAX) 0.4 mg Take 1 capsule (0.4 mg total) by mouth daily at bedtime, Starting Wed 3/20/2024, Normal      temazepam (RESTORIL) 15 mg capsule Take 1 capsule (15 mg total) by mouth daily at bedtime, Starting Mon 12/4/2023, Until Fri 5/3/2024, Print      topiramate (TOPAMAX) 25 mg tablet Take 25 mg by mouth 2 (two) times a day, Starting Mon 7/1/2019, Historical Med      traZODone (DESYREL) 100 mg tablet Take 100 mg by mouth daily at bedtime, Starting Mon 4/8/2019, Historical Med      Zinc Oxide (Geni Protect Moisture Barrier) 12 % CREA Apply 1 Application topically once as needed (wound care), Historical Med       !! - Potential duplicate medications found. Please discuss with provider.          No discharge procedures on file.    PDMP Review         Value Time User    PDMP Reviewed  Yes 6/18/2024  9:41 PM Judson Jaimes PA-C            ED Provider  Electronically Signed by             Esteban Paige MD  06/23/24 2450

## 2024-06-23 NOTE — DISCHARGE INSTRUCTIONS
You were seen here for the emergency department for evaluation of some abdominal discomfort and concern for possible poisoning by rat poison after eating some food.  Your blood test did not show any elevation of INR which is the test to evaluate for Coumadin poisoning.

## 2024-06-24 DIAGNOSIS — K59.09 CHRONIC CONSTIPATION: ICD-10-CM

## 2024-06-24 RX ORDER — PSYLLIUM HUSK (WITH SUGAR) 3 G/12 G
1 POWDER (GRAM) ORAL 2 TIMES DAILY
Qty: 44 G | Refills: 1 | Status: SHIPPED | OUTPATIENT
Start: 2024-06-24 | End: 2024-07-24

## 2024-06-30 ENCOUNTER — APPOINTMENT (EMERGENCY)
Dept: CT IMAGING | Facility: HOSPITAL | Age: 74
End: 2024-06-30
Payer: MEDICARE

## 2024-06-30 ENCOUNTER — HOSPITAL ENCOUNTER (EMERGENCY)
Facility: HOSPITAL | Age: 74
Discharge: HOME | End: 2024-07-01
Attending: EMERGENCY MEDICINE
Payer: MEDICARE

## 2024-06-30 DIAGNOSIS — R53.83 FATIGUE: Primary | ICD-10-CM

## 2024-06-30 LAB
ALBUMIN SERPL BCG-MCNC: 3.7 G/DL (ref 3.5–5)
ALP SERPL-CCNC: 107 U/L (ref 34–104)
ALT SERPL W P-5'-P-CCNC: 14 U/L (ref 7–52)
ANION GAP SERPL CALCULATED.3IONS-SCNC: 5 MMOL/L (ref 4–13)
AST SERPL W P-5'-P-CCNC: 26 U/L (ref 13–39)
BASOPHILS # BLD AUTO: 0.02 THOUSANDS/ÂΜL (ref 0–0.1)
BASOPHILS NFR BLD AUTO: 1 % (ref 0–1)
BILIRUB SERPL-MCNC: 0.59 MG/DL (ref 0.2–1)
BUN SERPL-MCNC: 19 MG/DL (ref 5–25)
CALCIUM SERPL-MCNC: 9.3 MG/DL (ref 8.4–10.2)
CARDIAC TROPONIN I PNL SERPL HS: 4 NG/L
CHLORIDE SERPL-SCNC: 107 MMOL/L (ref 96–108)
CO2 SERPL-SCNC: 26 MMOL/L (ref 21–32)
CREAT SERPL-MCNC: 0.63 MG/DL (ref 0.6–1.3)
EOSINOPHIL # BLD AUTO: 0.21 THOUSAND/ÂΜL (ref 0–0.61)
EOSINOPHIL NFR BLD AUTO: 6 % (ref 0–6)
ERYTHROCYTE [DISTWIDTH] IN BLOOD BY AUTOMATED COUNT: 13.2 % (ref 11.6–15.1)
GFR SERPL CREATININE-BSD FRML MDRD: 97 ML/MIN/1.73SQ M
GLUCOSE SERPL-MCNC: 93 MG/DL (ref 65–140)
HCT VFR BLD AUTO: 39.8 % (ref 36.5–49.3)
HGB BLD-MCNC: 13.8 G/DL (ref 12–17)
IMM GRANULOCYTES # BLD AUTO: 0 THOUSAND/UL (ref 0–0.2)
IMM GRANULOCYTES NFR BLD AUTO: 0 % (ref 0–2)
INR PPP: 0.97 (ref 0.84–1.19)
LYMPHOCYTES # BLD AUTO: 1.01 THOUSANDS/ÂΜL (ref 0.6–4.47)
LYMPHOCYTES NFR BLD AUTO: 26 % (ref 14–44)
MCH RBC QN AUTO: 34.4 PG (ref 26.8–34.3)
MCHC RBC AUTO-ENTMCNC: 34.7 G/DL (ref 31.4–37.4)
MCV RBC AUTO: 99 FL (ref 82–98)
MONOCYTES # BLD AUTO: 0.5 THOUSAND/ÂΜL (ref 0.17–1.22)
MONOCYTES NFR BLD AUTO: 13 % (ref 4–12)
NEUTROPHILS # BLD AUTO: 2.1 THOUSANDS/ÂΜL (ref 1.85–7.62)
NEUTS SEG NFR BLD AUTO: 54 % (ref 43–75)
NRBC BLD AUTO-RTO: 0 /100 WBCS
PLATELET # BLD AUTO: 164 THOUSANDS/UL (ref 149–390)
PMV BLD AUTO: 9.4 FL (ref 8.9–12.7)
POTASSIUM SERPL-SCNC: 3.6 MMOL/L (ref 3.5–5.3)
PROT SERPL-MCNC: 6.3 G/DL (ref 6.4–8.4)
PROTHROMBIN TIME: 13.5 SECONDS (ref 11.6–14.5)
RBC # BLD AUTO: 4.01 MILLION/UL (ref 3.88–5.62)
SODIUM SERPL-SCNC: 138 MMOL/L (ref 135–147)
TSH SERPL DL<=0.05 MIU/L-ACNC: 3.37 UIU/ML (ref 0.45–4.5)
WBC # BLD AUTO: 3.84 THOUSAND/UL (ref 4.31–10.16)

## 2024-06-30 PROCEDURE — 85610 PROTHROMBIN TIME: CPT | Performed by: EMERGENCY MEDICINE

## 2024-06-30 PROCEDURE — 99285 EMERGENCY DEPT VISIT HI MDM: CPT

## 2024-06-30 PROCEDURE — 99284 EMERGENCY DEPT VISIT MOD MDM: CPT | Performed by: EMERGENCY MEDICINE

## 2024-06-30 PROCEDURE — 36415 COLL VENOUS BLD VENIPUNCTURE: CPT | Performed by: EMERGENCY MEDICINE

## 2024-06-30 PROCEDURE — 80053 COMPREHEN METABOLIC PANEL: CPT | Performed by: EMERGENCY MEDICINE

## 2024-06-30 PROCEDURE — 84443 ASSAY THYROID STIM HORMONE: CPT | Performed by: EMERGENCY MEDICINE

## 2024-06-30 PROCEDURE — 93005 ELECTROCARDIOGRAM TRACING: CPT

## 2024-06-30 PROCEDURE — 84484 ASSAY OF TROPONIN QUANT: CPT | Performed by: EMERGENCY MEDICINE

## 2024-06-30 PROCEDURE — 74177 CT ABD & PELVIS W/CONTRAST: CPT

## 2024-06-30 PROCEDURE — 85025 COMPLETE CBC W/AUTO DIFF WBC: CPT | Performed by: EMERGENCY MEDICINE

## 2024-06-30 RX ADMIN — IOHEXOL 100 ML: 350 INJECTION, SOLUTION INTRAVENOUS at 23:58

## 2024-07-01 VITALS
OXYGEN SATURATION: 98 % | HEART RATE: 70 BPM | DIASTOLIC BLOOD PRESSURE: 58 MMHG | TEMPERATURE: 97.8 F | RESPIRATION RATE: 20 BRPM | SYSTOLIC BLOOD PRESSURE: 111 MMHG

## 2024-07-01 LAB
2HR DELTA HS TROPONIN: -1 NG/L
AMORPH URATE CRY URNS QL MICRO: ABNORMAL
ATRIAL RATE: 74 BPM
BACTERIA UR QL AUTO: ABNORMAL /HPF
BILIRUB UR QL STRIP: NEGATIVE
CARDIAC TROPONIN I PNL SERPL HS: 3 NG/L
CLARITY UR: ABNORMAL
COLOR UR: YELLOW
GLUCOSE UR STRIP-MCNC: NEGATIVE MG/DL
HGB UR QL STRIP.AUTO: NEGATIVE
KETONES UR STRIP-MCNC: NEGATIVE MG/DL
LEUKOCYTE ESTERASE UR QL STRIP: NEGATIVE
NITRITE UR QL STRIP: NEGATIVE
NON-SQ EPI CELLS URNS QL MICRO: ABNORMAL /HPF
P AXIS: 69 DEGREES
PH UR STRIP.AUTO: 7 [PH]
PR INTERVAL: 190 MS
PROT UR STRIP-MCNC: NEGATIVE MG/DL
QRS AXIS: 70 DEGREES
QRSD INTERVAL: 90 MS
QT INTERVAL: 380 MS
QTC INTERVAL: 421 MS
RBC #/AREA URNS AUTO: ABNORMAL /HPF
SP GR UR STRIP.AUTO: 1.02 (ref 1–1.03)
T WAVE AXIS: 77 DEGREES
UROBILINOGEN UR STRIP-ACNC: 2 MG/DL
VENTRICULAR RATE: 74 BPM
WBC #/AREA URNS AUTO: ABNORMAL /HPF

## 2024-07-01 PROCEDURE — 93010 ELECTROCARDIOGRAM REPORT: CPT | Performed by: INTERNAL MEDICINE

## 2024-07-01 PROCEDURE — 84484 ASSAY OF TROPONIN QUANT: CPT | Performed by: EMERGENCY MEDICINE

## 2024-07-01 PROCEDURE — 81001 URINALYSIS AUTO W/SCOPE: CPT | Performed by: EMERGENCY MEDICINE

## 2024-07-01 PROCEDURE — 36415 COLL VENOUS BLD VENIPUNCTURE: CPT | Performed by: EMERGENCY MEDICINE

## 2024-07-01 NOTE — ED PROVIDER NOTES
History  Chief Complaint   Patient presents with    Weakness - Generalized     Pt arrived via ems from [facility] with c/o feeling tired and weak for approx an hour.     74 year old male pt comes in to the ED with cc of abdominal pain.  He states he also feels lightheaded but thinks its because he did not eat enough dinner.  He has no other complaints.      History provided by:  Patient   used: No    Medical Problem  Severity:  Mild  Onset quality:  Gradual  Timing:  Constant  Progression:  Worsening  Chronicity:  New  Associated symptoms: no abdominal pain, no congestion, no headaches, no myalgias and no sore throat        Prior to Admission Medications   Prescriptions Last Dose Informant Patient Reported? Taking?   Cholecalciferol (Vitamin D High Potency) 25 MCG (1000 UT) capsule  Care Giver No No   Sig: Take 1 capsule (1,000 Units total) by mouth daily   Deutetrabenazine ER (Austedo XR) 24 MG TB24  Care Giver Yes No   Sig: Take 24 mg by mouth daily   Emollient (Lubriderm Advanced Therapy) LOTN  Care Giver Yes No   Sig: Apply 1 Application topically as needed (rash)   Patient not taking: Reported on 4/1/2024   Nutritional Supplements (Ensure High Protein) LIQD  Care Giver No No   Sig: Take 237 mL by mouth 2 (two) times a day   Patient taking differently: Take 237 mL by mouth 2 (two) times a day vanilla   Psyllium (Reguloid) 28.3 % POWD   No No   Sig: Take 1 Scoop by mouth 2 (two) times a day   Zinc Oxide (Geni Protect Moisture Barrier) 12 % CREA  Care Giver Yes No   Sig: Apply 1 Application topically once as needed (wound care)   Patient not taking: Reported on 4/1/2024   acetaminophen (Acetaminophen Extra Strength) 500 mg tablet  Care Giver No No   Sig: Take 1 tablet (500 mg total) by mouth every 6 (six) hours as needed for mild pain   acetaminophen (TYLENOL) 500 mg tablet   No No   Sig: Take 2 tablets (1,000 mg total) by mouth every 8 (eight) hours   atorvastatin (LIPITOR) 40 mg tablet   Care Giver No No   Sig: Take 1 tablet (40 mg total) by mouth daily   carbidopa-levodopa (SINEMET)  mg per tablet  Care Giver No No   Sig: Take 1 tablet by mouth 3 (three) times a day   Patient taking differently: Take 1 tablet by mouth 2 (two) times a day   cloZAPine (CLOZARIL) 100 mg tablet  Care Giver Yes No   Sig: Take 100 mg by mouth 2 (two) times a day   clozapine (CLOZARIL) 200 MG tablet  Care Giver Yes No   Sig: Take 200 mg by mouth daily at bedtime    docusate sodium (COLACE) 100 mg capsule  Care Giver No No   Sig: Take 1 capsule (100 mg total) by mouth 2 (two) times a day   fluticasone (FLONASE) 50 mcg/act nasal spray  Care Giver No No   Si sprays into each nostril daily   Patient not taking: Reported on 5/3/2024   hydrOXYzine HCL (ATARAX) 25 mg tablet  Care Giver No No   Sig: Take 1 tablet (25 mg total) by mouth every 6 (six) hours as needed for itching   ibuprofen (MOTRIN) 600 mg tablet   No No   Sig: Take 1 tablet (600 mg total) by mouth every 6 (six) hours as needed for mild pain   meclizine (ANTIVERT) 25 mg tablet  Care Giver No No   Sig: Take 1 tablet (25 mg total) by mouth every 8 (eight) hours as needed for dizziness   metoprolol succinate (TOPROL-XL) 25 mg 24 hr tablet  Care Giver No No   Sig: Take 1 tablet (25 mg total) by mouth daily   olopatadine (PATANOL) 0.1 % ophthalmic solution  Care Giver Yes No   Sig: Administer 1 drop to both eyes daily at bedtime   omeprazole (PriLOSEC) 40 MG capsule  Care Giver No No   Sig: Take 1 capsule (40 mg total) by mouth daily   polyethylene glycol (MIRALAX) 17 g packet  Care Giver No No   Sig: Take once a day, may take an extra dose prn   Patient not taking: Reported on 5/3/2024   polyethylene glycol (MIRALAX) 17 g packet   No No   Sig: Take 17 g by mouth daily   senna (Senokot) 8.6 MG tablet  Care Giver Yes No   Sig: Take 8.6 mg by mouth 2 (two) times a day   Patient not taking: Reported on 2024   tamsulosin (FLOMAX) 0.4 mg  Care Giver No No    Sig: Take 1 capsule (0.4 mg total) by mouth daily at bedtime   temazepam (RESTORIL) 15 mg capsule  Care Giver No No   Sig: Take 1 capsule (15 mg total) by mouth daily at bedtime   topiramate (TOPAMAX) 25 mg tablet  Care Giver Yes No   Sig: Take 25 mg by mouth 2 (two) times a day   traZODone (DESYREL) 100 mg tablet  Care Giver Yes No   Sig: Take 100 mg by mouth daily at bedtime      Facility-Administered Medications: None       Past Medical History:   Diagnosis Date    Asthma     Benign prostatic hyperplasia     COPD (chronic obstructive pulmonary disease) (HCC)     GERD (gastroesophageal reflux disease)     Herpes zoster without complication 12/10/2021    Hypercholesteremia     Hypertension     Neuroleptic induced parkinsonism (HCC)     Paranoid schizophrenia (HCC)     Psychiatric disorder        Past Surgical History:   Procedure Laterality Date    CATARACT EXTRACTION      CHOLECYSTECTOMY LAPAROSCOPIC N/A 12/3/2023    Procedure: CHOLECYSTECTOMY LAPAROSCOPIC WITH INTRAOPERATIVE CHOLANGIOGRAM;  Surgeon: Maverick Tamayo MD;  Location: [facility] OR;  Service: General    COLONOSCOPY         Family History   Problem Relation Age of Onset    No Known Problems Mother     No Known Problems Father     Parkinsonism Son      I have reviewed and agree with the history as documented.    E-Cigarette/Vaping    E-Cigarette Use Never User      E-Cigarette/Vaping Substances    Nicotine No     THC No     CBD No     Flavoring No     Other No     Unknown No      Social History     Tobacco Use    Smoking status: Former     Current packs/day: 0.00     Average packs/day: 1 pack/day for 20.0 years (20.0 ttl pk-yrs)     Types: Cigarettes     Start date:      Quit date: 2000     Years since quittin.5     Passive exposure: Past    Smokeless tobacco: Never   Vaping Use    Vaping status: Never Used   Substance Use Topics    Alcohol use: Not Currently    Drug use: Never       Review of Systems   HENT:  Negative for congestion and sore  throat.    Gastrointestinal:  Negative for abdominal pain.   Musculoskeletal:  Negative for myalgias.   Neurological:  Negative for headaches.   All other systems reviewed and are negative.      Physical Exam  Physical Exam  Vitals and nursing note reviewed.   Constitutional:       General: He is not in acute distress.     Appearance: He is well-developed. He is not diaphoretic.   HENT:      Head: Normocephalic and atraumatic.      Right Ear: External ear normal.      Left Ear: External ear normal.   Eyes:      General: No scleral icterus.        Right eye: No discharge.         Left eye: No discharge.      Conjunctiva/sclera: Conjunctivae normal.   Neck:      Thyroid: No thyromegaly.      Vascular: No JVD.      Trachea: No tracheal deviation.   Cardiovascular:      Rate and Rhythm: Normal rate and regular rhythm.   Pulmonary:      Effort: Pulmonary effort is normal. No respiratory distress.      Breath sounds: Normal breath sounds. No stridor. No wheezing or rales.   Abdominal:      General: Bowel sounds are normal. There is no distension.      Palpations: Abdomen is soft.      Tenderness: There is no abdominal tenderness.   Musculoskeletal:         General: No tenderness or deformity. Normal range of motion.      Cervical back: Normal range of motion and neck supple.   Skin:     General: Skin is warm and dry.   Neurological:      Mental Status: He is alert and oriented to person, place, and time.      Cranial Nerves: No cranial nerve deficit.      Coordination: Coordination normal.   Psychiatric:         Behavior: Behavior normal.         Vital Signs  ED Triage Vitals   Temperature Pulse Respirations Blood Pressure SpO2   06/30/24 2335 06/30/24 2300 06/30/24 2300 06/30/24 2300 06/30/24 2300   97.8 °F (36.6 °C) 72 18 127/75 95 %      Temp Source Heart Rate Source Patient Position - Orthostatic VS BP Location FiO2 (%)   06/30/24 2335 06/30/24 2300 06/30/24 2300 06/30/24 2300 --   Oral Monitor Sitting Right arm        Pain Score       --                  Vitals:    06/30/24 2300 07/01/24 0030 07/01/24 0130   BP: 127/75 122/65 111/58   Pulse: 72 69 70   Patient Position - Orthostatic VS: Sitting           Visual Acuity      ED Medications  Medications   iohexol (OMNIPAQUE) 350 MG/ML injection (MULTI-DOSE) 100 mL (100 mL Intravenous Given 6/30/24 2358)       Diagnostic Studies  Results Reviewed       Procedure Component Value Units Date/Time    HS Troponin I 2hr [[id number]]  (Normal) Collected: 07/01/24 0101    Lab Status: Final result Specimen: Blood from Arm, Left Updated: 07/01/24 0130     hs TnI 2hr 3 ng/L      Delta 2hr hsTnI -1 ng/L     Urinalysis with microscopic [[id number]]  (Abnormal) Collected: 07/01/24 0057    Lab Status: Final result Specimen: Urine, Clean Catch Updated: 07/01/24 0112     Color, UA Yellow     Clarity, UA Turbid     Specific Gravity, UA 1.020     pH, UA 7.0     Leukocytes, UA Negative     Nitrite, UA Negative     Protein, UA Negative mg/dl      Glucose, UA Negative mg/dl      Ketones, UA Negative mg/dl      Urobilinogen, UA 2.0 mg/dl      Bilirubin, UA Negative     Occult Blood, UA Negative     RBC, UA 1-2 /hpf      WBC, UA 2-4 /hpf      Epithelial Cells Occasional /hpf      Bacteria, UA None Seen /hpf      Amorphous Crystals, UA Occasional    HS Troponin I 4hr [[id number]]     Lab Status: No result Specimen: Blood     TSH, 3rd generation with Free T4 reflex [[id number]]  (Normal) Collected: 06/30/24 2313    Lab Status: Final result Specimen: Blood from Arm, Left Updated: 06/30/24 2357     TSH 3RD GENERATON 3.373 uIU/mL     HS Troponin 0hr (reflex protocol) [[id number]]  (Normal) Collected: 06/30/24 2313    Lab Status: Final result Specimen: Blood from Arm, Left Updated: 06/30/24 2348     hs TnI 0hr 4 ng/L     Comprehensive metabolic panel [[id number]]  (Abnormal) Collected: 06/30/24 2313    Lab Status: Final result Specimen: Blood from Arm, Left Updated: 06/30/24 2339     Sodium 138 mmol/L      Potassium  3.6 mmol/L      Chloride 107 mmol/L      CO2 26 mmol/L      ANION GAP 5 mmol/L      BUN 19 mg/dL      Creatinine 0.63 mg/dL      Glucose 93 mg/dL      Calcium 9.3 mg/dL      AST 26 U/L      ALT 14 U/L      Alkaline Phosphatase 107 U/L      Total Protein 6.3 g/dL      Albumin 3.7 g/dL      Total Bilirubin 0.59 mg/dL      eGFR 97 ml/min/1.73sq m     Narrative:      National Kid[employer] guidelines for Chronic Kidney Disease (CKD):     Stage 1 with normal or high GFR (GFR > 90 mL/min/1.73 square meters)    Stage 2 Mild CKD (GFR = 60-89 mL/min/1.73 square meters)    Stage 3A Moderate CKD (GFR = 45-59 mL/min/1.73 square meters)    Stage 3B Moderate CKD (GFR = 30-44 mL/min/1.73 square meters)    Stage 4 Severe CKD (GFR = 15-29 mL/min/1.73 square meters)    Stage 5 End Stage CKD (GFR <15 mL/min/1.73 square meters)  Note: GFR calculation is accurate only with a steady state creatinine    Protime-INR [[id number]]  (Normal) Collected: 06/30/24 2313    Lab Status: Final result Specimen: Blood from Arm, Left Updated: 06/30/24 2337     Protime 13.5 seconds      INR 0.97    CBC and differential [[id number]]  (Abnormal) Collected: 06/30/24 2313    Lab Status: Final result Specimen: Blood from Arm, Left Updated: 06/30/24 2325     WBC 3.84 Thousand/uL      RBC 4.01 Million/uL      Hemoglobin 13.8 g/dL      Hematocrit 39.8 %      MCV 99 fL      MCH 34.4 pg      MCHC 34.7 g/dL      RDW 13.2 %      MPV 9.4 fL      Platelets 164 Thousands/uL      nRBC 0 /100 WBCs      Segmented % 54 %      Immature Grans % 0 %      Lymphocytes % 26 %      Monocytes % 13 %      Eosinophils Relative 6 %      Basophils Relative 1 %      Absolute Neutrophils 2.10 Thousands/µL      Absolute Immature Grans 0.00 Thousand/uL      Absolute Lymphocytes 1.01 Thousands/µL      Absolute Monocytes 0.50 Thousand/µL      Eosinophils Absolute 0.21 Thousand/µL      Basophils Absolute 0.02 Thousands/µL                    CT abdomen pelvis with contrast   Final  Result by Pa Cesar MD (07/01 0113)      No acute inflammatory process identified within the abdomen or pelvis.         Workstation performed: [id number]                    Procedures  Procedures         ED Course                               SBIRT 20yo+      Flowsheet Row Most Recent Value   Initial Alcohol Screen: US AUDIT-C     1. How often do you have a drink containing alcohol? 0 Filed at: 06/30/2024 2300   2. How many drinks containing alcohol do you have on a typical day you are drinking?  0 Filed at: 06/30/2024 2300   3a. Male UNDER 65: How often do you have five or more drinks on one occasion? 0 Filed at: 06/30/2024 2300   Audit-C Score 0 Filed at: 06/30/2024 2300   RAFA: How many times in the past year have you...    Used an illegal drug or used a prescription medication for non-medical reasons? Never Filed at: 06/30/2024 2300                      Medical Decision Making  Amount and/or Complexity of Data Reviewed  Labs: ordered.  Radiology: ordered.    Risk  Prescription drug management.             Disposition  Final diagnoses:   Fatigue     Time reflects when diagnosis was documented in both MDM as applicable and the Disposition within this note       Time User Action Codes Description Comment    7/1/2024  2:05 AM Irvin Powers Add [R53.83] Fatigue           ED Disposition       ED Disposition   Discharge    Condition   Stable    Date/Time   Mon Jul 1, 2024 0205    Comment   Sumit Nails discharge to home/self care.                   Follow-up Information       Follow up With Specialties Details Why Contact Info    Garry Hidalgo MD Family Medicine   [address] [place] [id number] [phone]              Patient's Medications   Discharge Prescriptions    No medications on file       No discharge procedures on file.    PDMP Review         Value Time User    PDMP Reviewed  Yes 6/18/2024  9:41 PM Judson Jaimes PA-C            ED Provider  Electronically Signed  by             Irvin Powers, DO  07/01/24 0215

## 2024-07-03 NOTE — ED PROVIDER NOTES
History  Chief Complaint   Patient presents with    Back Pain     Patient in the ED via EMS for lower back pain unrelieved with ibuprofen. Denies any trauma to the back.      74-year-old male with history of BPH, COPD, GERD, hypertension, hypercholesterolemia presents to ED for evaluation of low back pain.  Patient states that this morning he woke up and has low back pain.  Pain is located in the midline region.  Denies any recent falls.  States that he fell a while ago.  Tried ibuprofen today without much relief of his pain.  No other symptoms at this time.  Denies fever, chills, nausea, vomiting, dysuria, hematuria, increased urinary frequency, constipation, diarrhea, abdominal pain.  Denies saddle anesthesia, acute loss of bowel or bladder continence.  Denies rash.         Prior to Admission Medications   Prescriptions Last Dose Informant Patient Reported? Taking?   Cholecalciferol (Vitamin D High Potency) 25 MCG (1000 UT) capsule  Care Giver No No   Sig: Take 1 capsule (1,000 Units total) by mouth daily   Deutetrabenazine ER (Austedo XR) 24 MG TB24  Care Giver Yes No   Sig: Take 24 mg by mouth daily   Emollient (Lubriderm Advanced Therapy) LOTN  Care Giver Yes No   Sig: Apply 1 Application topically as needed (rash)   Patient not taking: Reported on 4/1/2024   Nutritional Supplements (Ensure High Protein) LIQD  Care Giver No No   Sig: Take 237 mL by mouth 2 (two) times a day   Patient taking differently: Take 237 mL by mouth 2 (two) times a day vanilla   Psyllium (Reguloid) 28.3 % POWD   No No   Sig: Take 1 Scoop by mouth 2 (two) times a day   Zinc Oxide (Geni Protect Moisture Barrier) 12 % CREA  Care Giver Yes No   Sig: Apply 1 Application topically once as needed (wound care)   Patient not taking: Reported on 4/1/2024   acetaminophen (Acetaminophen Extra Strength) 500 mg tablet  Care Giver No No   Sig: Take 1 tablet (500 mg total) by mouth every 6 (six) hours as needed for mild pain   atorvastatin (LIPITOR) 40  mg tablet  Care Giver No No   Sig: Take 1 tablet (40 mg total) by mouth daily   carbidopa-levodopa (SINEMET)  mg per tablet  Care Giver No No   Sig: Take 1 tablet by mouth 3 (three) times a day   Patient taking differently: Take 1 tablet by mouth 2 (two) times a day   cloZAPine (CLOZARIL) 100 mg tablet  Care Giver Yes No   Sig: Take 100 mg by mouth 2 (two) times a day   clozapine (CLOZARIL) 200 MG tablet  Care Giver Yes No   Sig: Take 200 mg by mouth daily at bedtime    docusate sodium (COLACE) 100 mg capsule  Care Giver No No   Sig: Take 1 capsule (100 mg total) by mouth 2 (two) times a day   fluticasone (FLONASE) 50 mcg/act nasal spray  Care Giver No No   Si sprays into each nostril daily   Patient not taking: Reported on 5/3/2024   hydrOXYzine HCL (ATARAX) 25 mg tablet  Care Giver No No   Sig: Take 1 tablet (25 mg total) by mouth every 6 (six) hours as needed for itching   meclizine (ANTIVERT) 25 mg tablet  Care Giver No No   Sig: Take 1 tablet (25 mg total) by mouth every 8 (eight) hours as needed for dizziness   metoprolol succinate (TOPROL-XL) 25 mg 24 hr tablet  Care Giver No No   Sig: Take 1 tablet (25 mg total) by mouth daily   olopatadine (PATANOL) 0.1 % ophthalmic solution  Care Giver Yes No   Sig: Administer 1 drop to both eyes daily at bedtime   omeprazole (PriLOSEC) 40 MG capsule  Care Giver No No   Sig: Take 1 capsule (40 mg total) by mouth daily   polyethylene glycol (MIRALAX) 17 g packet  Care Giver No No   Sig: Take once a day, may take an extra dose prn   Patient not taking: Reported on 5/3/2024   senna (Senokot) 8.6 MG tablet  Care Giver Yes No   Sig: Take 8.6 mg by mouth 2 (two) times a day   Patient not taking: Reported on 2024   tamsulosin (FLOMAX) 0.4 mg  Care Giver No No   Sig: Take 1 capsule (0.4 mg total) by mouth daily at bedtime   temazepam (RESTORIL) 15 mg capsule  Care Giver No No   Sig: Take 1 capsule (15 mg total) by mouth daily at bedtime   topiramate (TOPAMAX) 25 mg  tablet  Care Giver Yes No   Sig: Take 25 mg by mouth 2 (two) times a day   traZODone (DESYREL) 100 mg tablet  Care Giver Yes No   Sig: Take 100 mg by mouth daily at bedtime      Facility-Administered Medications: None       Past Medical History:   Diagnosis Date    Asthma     Benign prostatic hyperplasia     COPD (chronic obstructive pulmonary disease) (HCC)     GERD (gastroesophageal reflux disease)     Herpes zoster without complication 12/10/2021    Hypercholesteremia     Hypertension     Neuroleptic induced parkinsonism (HCC)     Paranoid schizophrenia (HCC)     Psychiatric disorder        Past Surgical History:   Procedure Laterality Date    CATARACT EXTRACTION      CHOLECYSTECTOMY LAPAROSCOPIC N/A 12/3/2023    Procedure: CHOLECYSTECTOMY LAPAROSCOPIC WITH INTRAOPERATIVE CHOLANGIOGRAM;  Surgeon: Maverick Tamayo MD;  Location: [facility];  Service: General    COLONOSCOPY         Family History   Problem Relation Age of Onset    No Known Problems Mother     No Known Problems Father     Parkinsonism Son      I have reviewed and agree with the history as documented.    E-Cigarette/Vaping    E-Cigarette Use Never User      E-Cigarette/Vaping Substances    Nicotine No     THC No     CBD No     Flavoring No     Other No     Unknown No      Social History     Tobacco Use    Smoking status: Former     Current packs/day: 0.00     Average packs/day: 1 pack/day for 20.0 years (20.0 ttl pk-yrs)     Types: Cigarettes     Start date:      Quit date: 2000     Years since quittin.4     Passive exposure: Past    Smokeless tobacco: Never   Vaping Use    Vaping status: Never Used   Substance Use Topics    Alcohol use: Not Currently    Drug use: Never       Review of Systems   Musculoskeletal:  Positive for back pain.   All other systems reviewed and are negative.      Physical Exam  Physical Exam  Vitals and nursing note reviewed.   Constitutional:       General: He is not in acute distress.     Appearance: Normal  Number Of Unique Sources Reviewed: 1 appearance. He is well-developed. He is not ill-appearing, toxic-appearing or diaphoretic.   HENT:      Head: Normocephalic and atraumatic.      Nose: Nose normal.      Mouth/Throat:      Mouth: Mucous membranes are moist.      Pharynx: Oropharynx is clear.   Eyes:      Extraocular Movements: Extraocular movements intact.      Conjunctiva/sclera: Conjunctivae normal.      Pupils: Pupils are equal, round, and reactive to light.   Cardiovascular:      Rate and Rhythm: Normal rate and regular rhythm.      Pulses: Normal pulses.      Heart sounds: Normal heart sounds. No murmur heard.     No friction rub. No gallop.   Pulmonary:      Effort: Pulmonary effort is normal. No respiratory distress.      Breath sounds: Normal breath sounds. No stridor. No wheezing, rhonchi or rales.   Abdominal:      Palpations: Abdomen is soft.      Tenderness: There is no abdominal tenderness.   Musculoskeletal:         General: No swelling.      Cervical back: Neck supple.      Lumbar back: Tenderness and bony tenderness present. No swelling, edema, deformity, lacerations or spasms. Negative right straight leg raise test and negative left straight leg raise test.        Back:    Skin:     General: Skin is warm and dry.      Capillary Refill: Capillary refill takes less than 2 seconds.   Neurological:      Mental Status: He is alert.   Psychiatric:         Mood and Affect: Mood normal.         Vital Signs  ED Triage Vitals   Temperature Pulse Respirations Blood Pressure SpO2   06/18/24 1952 06/18/24 1952 06/18/24 1952 06/18/24 1952 06/18/24 1952   97.9 °F (36.6 °C) 82 18 116/69 97 %      Temp Source Heart Rate Source Patient Position - Orthostatic VS BP Location FiO2 (%)   06/18/24 1952 06/18/24 1952 06/18/24 1952 06/18/24 1952 --   Oral Monitor Lying Right arm       Pain Score       06/18/24 1956       8           Vitals:    06/18/24 1952   BP: 116/69   Pulse: 82   Patient Position - Orthostatic VS: Lying         Visual Acuity      ED  Detail Level: Zone Medications  Medications   HYDROcodone-acetaminophen (NORCO) 5-325 mg per tablet 1 tablet (1 tablet Oral Given 6/18/24 2014)   acetaminophen (TYLENOL) tablet 650 mg (650 mg Oral Given 6/18/24 2014)       Diagnostic Studies  Results Reviewed       None                   CT renal stone study abdomen pelvis without contrast   Final Result by Bruno Bagley DO (06/18 2125)      No noncontrast CT evidence of acute findings.   Moderate colonic stool burden..         Workstation performed: [id number]                    Procedures  Procedures         ED Course                               SBIRT 20yo+      Flowsheet Row Most Recent Value   Initial Alcohol Screen: US AUDIT-C     1. How often do you have a drink containing alcohol? 0 Filed at: 06/18/2024 1958   2. How many drinks containing alcohol do you have on a typical day you are drinking?  0 Filed at: 06/18/2024 1958   3a. Male UNDER 65: How often do you have five or more drinks on one occasion? 0 Filed at: 06/18/2024 1958   Audit-C Score 0 Filed at: 06/18/2024 1958   RAFA: How many times in the past year have you...    Used an illegal drug or used a prescription medication for non-medical reasons? Never Filed at: 06/18/2024 1958                      Medical Decision Making  74-year-old male presents to ED for evaluation of low back pain as above.  On physical examination vital signs stable.  No murmur.  Normal breath sounds.  Tender to palpation of the lumbar spine midline.  No overlying skin changes of lumbar spine.  No rashes.  No loss of motor function of extremities.  Extremities well-perfused.  Plan to obtain CT renal study to rule out urinary stone, fractures, bladder wall thickening to suggest urinary tract infection, acute intra-abdominal abnormality.  Percocet, Tylenol for pain control.    CT renal study returned without evidence of acute abdomen.  Moderate colonic stool burden present.  Discussed this finding with patient.  He states that he has been having  difficulty with having regular bowel movements.  Plan to discharge patient with MiraLAX for constipation.  Also providing prescription for Tylenol, ibuprofen for lower back pain.  Suspect patient experiencing musculoskeletal low back pain.  Imaging shows significant lumbar degenerative changes.  This likely contributes to his pain.  Provided referral to the [facility] for further evaluation and treatment of low back.  Advised to return to ED for new or worsening symptoms.  Patient agreed with plan.  Patient discharged.    Prior to discharge, discharge instructions were discussed with patient at bedside. Patient was provided both verbal and written instructions. Patient is understanding of the discharge instructions and is agreeable to plan of care. Return precautions were discussed with patient bedside, patient verbalized understanding of signs and symptoms that would necessitate return to the ED. All questions were answered. Patient was comfortable with the plan of care and discharged to home.     Amount and/or Complexity of Data Reviewed  Radiology: ordered.    Risk  OTC drugs.  Prescription drug management.             Disposition  Final diagnoses:   Acute low back pain   Constipation     Time reflects when diagnosis was documented in both MDM as applicable and the Disposition within this note       Time User Action Codes Description Comment    6/18/2024  9:33 PM Judson Jaimes Add [M54.50] Acute low back pain     6/18/2024  9:33 PM Judson Jaimes Add [K59.00] Constipation           ED Disposition       ED Disposition   Discharge    Condition   Stable    Date/Time   Tue Jun 18, 2024 2133    Comment   Sumit Nails discharge to home/self care.                   Follow-up Information       Follow up With Specialties Details Why Contact Info Additional Information    Garry Hidalgo MD Family Medicine   [address] [place] [address] [id number] [phone]       [facility] Physical Therapy   [phone]            Discharge Medication List as of 6/18/2024  9:35 PM        START taking these medications    Details   !! acetaminophen (TYLENOL) 500 mg tablet Take 2 tablets (1,000 mg total) by mouth every 8 (eight) hours, Starting Tue 6/18/2024, Normal      ibuprofen (MOTRIN) 600 mg tablet Take 1 tablet (600 mg total) by mouth every 6 (six) hours as needed for mild pain, Starting Tue 6/18/2024, Normal      !! polyethylene glycol (MIRALAX) 17 g packet Take 17 g by mouth daily, Starting Tue 6/18/2024, Normal       !! - Potential duplicate medications found. Please discuss with provider.        CONTINUE these medications which have NOT CHANGED    Details   !! acetaminophen (Acetaminophen Extra Strength) 500 mg tablet Take 1 tablet (500 mg total) by mouth every 6 (six) hours as needed for mild pain, Starting Mon 3/13/2023, Normal      atorvastatin (LIPITOR) 40 mg tablet Take 1 tablet (40 mg total) by mouth daily, Starting Wed 3/20/2024, Normal      carbidopa-levodopa (SINEMET)  mg per tablet Take 1 tablet by mouth 3 (three) times a day, Starting Thu 3/2/2023, Normal      Cholecalciferol (Vitamin D High Potency) 25 MCG (1000 UT) capsule Take 1 capsule (1,000 Units total) by mouth daily, Starting Thu 4/4/2024, Normal      !! cloZAPine (CLOZARIL) 100 mg tablet Take 100 mg by mouth 2 (two) times a day, Historical Med      !! clozapine (CLOZARIL) 200 MG tablet Take 200 mg by mouth daily at bedtime , Starting Mon 4/8/2019, Historical Med      Deutetrabenazine ER (Austedo XR) 24 MG TB24 Take 24 mg by mouth daily, Historical Med      docusate sodium (COLACE) 100 mg capsule Take 1 capsule (100 mg total) by mouth 2 (two) times a day, Starting Wed 3/20/2024, Until Sun 5/19/2024, Normal      Emollient (Lubriderm Advanced Therapy) LOTN Apply 1 Application topically as needed (rash), Historical Med      fluticasone (FLONASE) 50 mcg/act nasal spray 2 sprays  into each nostril daily, Starting Wed 3/20/2024, Normal      hydrOXYzine HCL (ATARAX) 25 mg tablet Take 1 tablet (25 mg total) by mouth every 6 (six) hours as needed for itching, Starting Wed 4/26/2023, Normal      meclizine (ANTIVERT) 25 mg tablet Take 1 tablet (25 mg total) by mouth every 8 (eight) hours as needed for dizziness, Starting Wed 3/20/2024, Normal      metoprolol succinate (TOPROL-XL) 25 mg 24 hr tablet Take 1 tablet (25 mg total) by mouth daily, Starting Wed 3/20/2024, Normal      Nutritional Supplements (Ensure High Protein) LIQD Take 237 mL by mouth 2 (two) times a day, Starting Wed 12/6/2023, Until Wed 4/17/2024, Normal      olopatadine (PATANOL) 0.1 % ophthalmic solution Administer 1 drop to both eyes daily at bedtime, Historical Med      omeprazole (PriLOSEC) 40 MG capsule Take 1 capsule (40 mg total) by mouth daily, Starting Wed 3/20/2024, Normal      !! polyethylene glycol (MIRALAX) 17 g packet Take once a day, may take an extra dose prn, Normal      Psyllium (Reguloid) 28.3 % POWD Take 1 Scoop by mouth 2 (two) times a day, Starting Tue 5/14/2024, Until Thu 6/13/2024, Normal      senna (Senokot) 8.6 MG tablet Take 8.6 mg by mouth 2 (two) times a day, Starting Sat 3/30/2024, Historical Med      tamsulosin (FLOMAX) 0.4 mg Take 1 capsule (0.4 mg total) by mouth daily at bedtime, Starting Wed 3/20/2024, Normal      temazepam (RESTORIL) 15 mg capsule Take 1 capsule (15 mg total) by mouth daily at bedtime, Starting Mon 12/4/2023, Until Fri 5/3/2024, Print      topiramate (TOPAMAX) 25 mg tablet Take 25 mg by mouth 2 (two) times a day, Starting Mon 7/1/2019, Historical Med      traZODone (DESYREL) 100 mg tablet Take 100 mg by mouth daily at bedtime, Starting Mon 4/8/2019, Historical Med      Zinc Oxide (Geni Protect Moisture Barrier) 12 % CREA Apply 1 Application topically once as needed (wound care), Historical Med       !! - Potential duplicate medications found. Please discuss with provider.               PDMP Review         Value Time User    PDMP Reviewed  Yes 6/18/2024  9:41 PM Judson Jaimes PA-C            ED Provider  Electronically Signed by             Judson Jaimes PA-C  06/19/24 0750

## 2024-07-29 DIAGNOSIS — I77.810 AORTIC ROOT DILATATION (HCC): ICD-10-CM

## 2024-07-30 RX ORDER — METOPROLOL SUCCINATE 25 MG/1
25 TABLET, EXTENDED RELEASE ORAL DAILY
Qty: 90 TABLET | Refills: 1 | Status: SHIPPED | OUTPATIENT
Start: 2024-07-30

## 2024-08-02 ENCOUNTER — HOSPITAL ENCOUNTER (EMERGENCY)
Facility: HOSPITAL | Age: 74
Discharge: HOME | End: 2024-08-02
Payer: MEDICARE

## 2024-08-02 ENCOUNTER — APPOINTMENT (EMERGENCY)
Dept: RADIOLOGY | Facility: HOSPITAL | Age: 74
End: 2024-08-02
Payer: MEDICARE

## 2024-08-02 VITALS
RESPIRATION RATE: 18 BRPM | OXYGEN SATURATION: 99 % | HEART RATE: 75 BPM | TEMPERATURE: 97.8 F | DIASTOLIC BLOOD PRESSURE: 70 MMHG | SYSTOLIC BLOOD PRESSURE: 123 MMHG

## 2024-08-02 DIAGNOSIS — M21.621 TAILOR'S BUNION OF BOTH FEET: Primary | ICD-10-CM

## 2024-08-02 DIAGNOSIS — M21.622 TAILOR'S BUNION OF BOTH FEET: Primary | ICD-10-CM

## 2024-08-02 PROCEDURE — 99283 EMERGENCY DEPT VISIT LOW MDM: CPT

## 2024-08-02 PROCEDURE — 73620 X-RAY EXAM OF FOOT: CPT

## 2024-08-02 PROCEDURE — 99284 EMERGENCY DEPT VISIT MOD MDM: CPT

## 2024-08-02 NOTE — ED PROVIDER NOTES
"History  Chief Complaint   Patient presents with    Foot Pain     Bilateral pain to lateral aspects of their feet for \"a long time\". Patient reports being seen here previously for same but reports pain is so bad while ambulating they are now having trouble walking. Describes pain as \"burning and shooting\" but denies travel of pain up their legs.      Patient is a 74-year-old male with a past medical history significant for dyslipidemia, hypertension, neuroleptic induced parkinsonism, paranoid schizophrenia who resides at [facility] presenting for evaluation of foot pain.  Patient has been seen many times in the past.  He notes lateral foot pain near the base of his fifth digit on both feet.  He endorses pain on the underside of his foot.  He notes he is ambulatory at baseline.  When asked about numbness he notes \"for a while \".  When asked about falls he says he fell twice in the last 10 years.  Patient does have an established history of panic schizophrenia.  I did speak with the facility resides at[facility], with an employee Mirian who states she knows the patient well and that he has chronically paranoid.  She states he has been at baseline for a while and that there are no new acute changes.  She additionally reports patient has a follow-up with podiatry on August 20.        Prior to Admission Medications   Prescriptions Last Dose Informant Patient Reported? Taking?   Cholecalciferol (Vitamin D High Potency) 25 MCG (1000 UT) capsule  Care Giver No No   Sig: Take 1 capsule (1,000 Units total) by mouth daily   Deutetrabenazine ER (Austedo XR) 24 MG TB24  Care Giver Yes No   Sig: Take 24 mg by mouth daily   Emollient (Lubriderm Advanced Therapy) LOTN  Care Giver Yes No   Sig: Apply 1 Application topically as needed (rash)   Patient not taking: Reported on 4/1/2024   Nutritional Supplements (Ensure High Protein) LIQD  Care Giver No No   Sig: Take 237 mL by mouth 2 (two) times a day   Patient taking " differently: Take 237 mL by mouth 2 (two) times a day vanilla   Psyllium (Reguloid) 28.3 % POWD   No No   Sig: Take 1 Scoop by mouth 2 (two) times a day   Zinc Oxide (Geni Protect Moisture Barrier) 12 % CREA  Care Giver Yes No   Sig: Apply 1 Application topically once as needed (wound care)   Patient not taking: Reported on 2024   acetaminophen (Acetaminophen Extra Strength) 500 mg tablet  Care Giver No No   Sig: Take 1 tablet (500 mg total) by mouth every 6 (six) hours as needed for mild pain   acetaminophen (TYLENOL) 500 mg tablet   No No   Sig: Take 2 tablets (1,000 mg total) by mouth every 8 (eight) hours   atorvastatin (LIPITOR) 40 mg tablet  Care Giver No No   Sig: Take 1 tablet (40 mg total) by mouth daily   carbidopa-levodopa (SINEMET)  mg per tablet  Care Giver No No   Sig: Take 1 tablet by mouth 3 (three) times a day   Patient taking differently: Take 1 tablet by mouth 2 (two) times a day   cloZAPine (CLOZARIL) 100 mg tablet  Care Giver Yes No   Sig: Take 100 mg by mouth 2 (two) times a day   clozapine (CLOZARIL) 200 MG tablet  Care Giver Yes No   Sig: Take 200 mg by mouth daily at bedtime    docusate sodium (COLACE) 100 mg capsule  Care Giver No No   Sig: Take 1 capsule (100 mg total) by mouth 2 (two) times a day   fluticasone (FLONASE) 50 mcg/act nasal spray  Care Giver No No   Si sprays into each nostril daily   Patient not taking: Reported on 5/3/2024   hydrOXYzine HCL (ATARAX) 25 mg tablet  Care Giver No No   Sig: Take 1 tablet (25 mg total) by mouth every 6 (six) hours as needed for itching   ibuprofen (MOTRIN) 600 mg tablet   No No   Sig: Take 1 tablet (600 mg total) by mouth every 6 (six) hours as needed for mild pain   meclizine (ANTIVERT) 25 mg tablet  Care Giver No No   Sig: Take 1 tablet (25 mg total) by mouth every 8 (eight) hours as needed for dizziness   metoprolol succinate (TOPROL-XL) 25 mg 24 hr tablet   No No   Sig: Take 1 tablet (25 mg total) by mouth daily   olopatadine  (PATANOL) 0.1 % ophthalmic solution  Care Giver Yes No   Sig: Administer 1 drop to both eyes daily at bedtime   omeprazole (PriLOSEC) 40 MG capsule  Care Giver No No   Sig: Take 1 capsule (40 mg total) by mouth daily   polyethylene glycol (MIRALAX) 17 g packet  Care Giver No No   Sig: Take once a day, may take an extra dose prn   Patient not taking: Reported on 5/3/2024   polyethylene glycol (MIRALAX) 17 g packet   No No   Sig: Take 17 g by mouth daily   senna (Senokot) 8.6 MG tablet  Care Giver Yes No   Sig: Take 8.6 mg by mouth 2 (two) times a day   Patient not taking: Reported on 4/1/2024   tamsulosin (FLOMAX) 0.4 mg  Care Giver No No   Sig: Take 1 capsule (0.4 mg total) by mouth daily at bedtime   temazepam (RESTORIL) 15 mg capsule  Care Giver No No   Sig: Take 1 capsule (15 mg total) by mouth daily at bedtime   topiramate (TOPAMAX) 25 mg tablet  Care Giver Yes No   Sig: Take 25 mg by mouth 2 (two) times a day   traZODone (DESYREL) 100 mg tablet  Care Giver Yes No   Sig: Take 100 mg by mouth daily at bedtime      Facility-Administered Medications: None       Past Medical History:   Diagnosis Date    Asthma     Benign prostatic hyperplasia     COPD (chronic obstructive pulmonary disease) (HCC)     GERD (gastroesophageal reflux disease)     Herpes zoster without complication 12/10/2021    Hypercholesteremia     Hypertension     Neuroleptic induced parkinsonism (HCC)     Paranoid schizophrenia (HCC)     Psychiatric disorder        Past Surgical History:   Procedure Laterality Date    CATARACT EXTRACTION      CHOLECYSTECTOMY LAPAROSCOPIC N/A 12/3/2023    Procedure: CHOLECYSTECTOMY LAPAROSCOPIC WITH INTRAOPERATIVE CHOLANGIOGRAM;  Surgeon: Maverick Tamayo MD;  Location: [facility] OR;  Service: General    COLONOSCOPY         Family History   Problem Relation Age of Onset    No Known Problems Mother     No Known Problems Father     Parkinsonism Son      I have reviewed and agree with the history as  documented.    E-Cigarette/Vaping    E-Cigarette Use Never User      E-Cigarette/Vaping Substances    Nicotine No     THC No     CBD No     Flavoring No     Other No     Unknown No      Social History     Tobacco Use    Smoking status: Former     Current packs/day: 0.00     Average packs/day: 1 pack/day for 20.0 years (20.0 ttl pk-yrs)     Types: Cigarettes     Start date:      Quit date: 2000     Years since quittin.6     Passive exposure: Past    Smokeless tobacco: Never   Vaping Use    Vaping status: Never Used   Substance Use Topics    Alcohol use: Not Currently    Drug use: Never       Review of Systems   Constitutional:  Negative for chills and fever.   HENT:  Negative for ear pain and sore throat.    Eyes:  Negative for pain and visual disturbance.   Respiratory:  Negative for cough and shortness of breath.    Cardiovascular:  Negative for chest pain and palpitations.   Gastrointestinal:  Negative for abdominal pain and vomiting.   Genitourinary:  Negative for dysuria and hematuria.   Musculoskeletal:  Negative for arthralgias and back pain.        Foot pain   Skin:  Negative for color change and rash.   Neurological:  Negative for seizures and syncope.   All other systems reviewed and are negative.      Physical Exam  Physical Exam  Vitals and nursing note reviewed.   Constitutional:       General: He is not in acute distress.     Appearance: Normal appearance. He is not ill-appearing, toxic-appearing or diaphoretic.   HENT:      Head: Normocephalic and atraumatic.   Eyes:      General: No scleral icterus.        Right eye: No discharge.         Left eye: No discharge.      Extraocular Movements: Extraocular movements intact.      Conjunctiva/sclera: Conjunctivae normal.   Cardiovascular:      Rate and Rhythm: Normal rate.      Pulses: Normal pulses.      Heart sounds: Normal heart sounds. No murmur heard.     No friction rub. No gallop.      Comments: 2+ DP BL  Pulmonary:      Effort: Pulmonary  effort is normal. No respiratory distress.      Breath sounds: Normal breath sounds. No stridor. No wheezing, rhonchi or rales.   Abdominal:      General: Abdomen is flat. Bowel sounds are normal. There is no distension.      Palpations: Abdomen is soft.      Tenderness: There is no abdominal tenderness. There is no guarding or rebound.   Musculoskeletal:         General: No swelling. Normal range of motion.      Cervical back: Normal range of motion. No rigidity.      Right lower leg: No edema.      Left lower leg: No edema.        Feet:       Comments: No pain w/ palpation of BL calf.  Able to range all toes.  No significant foot erythema or wounds. Bunions appreciated on BL 5th toes; patient endorses pain w/ manipulation.   Skin:     General: Skin is warm and dry.      Capillary Refill: Capillary refill takes less than 2 seconds.      Coloration: Skin is not jaundiced.      Findings: No bruising or lesion.   Neurological:      General: No focal deficit present.      Mental Status: He is alert and oriented to person, place, and time. Mental status is at baseline.   Psychiatric:         Mood and Affect: Mood normal.         Behavior: Behavior normal.         Thought Content: Thought content is paranoid.         Vital Signs  ED Triage Vitals [08/02/24 1009]   Temperature Pulse Respirations Blood Pressure SpO2   97.8 °F (36.6 °C) 75 18 123/70 99 %      Temp Source Heart Rate Source Patient Position - Orthostatic VS BP Location FiO2 (%)   Oral Monitor Lying Left arm --      Pain Score       --           Vitals:    08/02/24 1009   BP: 123/70   Pulse: 75   Patient Position - Orthostatic VS: Lying         Visual Acuity      ED Medications  Medications - No data to display    Diagnostic Studies  Results Reviewed       None                   XR foot 2 views RIGHT   ED Interpretation by Ava Ovalle DO (08/02 1110)   No acute fracture, dislocation      XR foot 2 views LEFT   ED Interpretation by Ava  Hubert Ovalle DO (08/02 1111)   No acute fracture, dislocation                 Procedures  Procedures         ED Course  ED Course as of 08/02/24 1119   Fri Aug 02, 2024   1025 Did discuss with Mirian at [facility], patient's residence, about patient paranoia.   I spoke with reports this is chronic, she has known him for greater than 10 years and he is at baseline.  She also affirmed patient has a follow-up appointment with podiatry on August 20. Patient does not manage his own medication   1111 X-rays within normal limits, plan on DC home with podiatry follow-up.                                               Medical Decision Making  74-year-old male with history of paranoid schizophrenia presenting for foot pain    Physical exam very reassuring.  Doubt acute fracture, dislocation.  Will obtain x-ray to rule out the same.    Areas of tenderness appear to be bunions.  Patient does have follow-up with podiatry, this is appropriate and patient can keep his appointment however will place additional amatory referral for patient to be seen to ensure follow-up. Patient remained stable in the emergency department during his visit.  Patient discharged.    Amount and/or Complexity of Data Reviewed  Radiology: ordered and independent interpretation performed.                 Disposition  Final diagnoses:   Tailor's bunion of both feet     Time reflects when diagnosis was documented in both MDM as applicable and the Disposition within this note       Time User Action Codes Description Comment    8/2/2024 11:15 AM Ava Ovalle Add [M21.621,  M21.622] Tailor's bunion of both feet           ED Disposition       ED Disposition   Discharge    Condition   Stable    Date/Time   Fri Aug 2, 2024 11:14 AM    Comment   Sumit Nails discharge to home/self care.                   Follow-up Information       Follow up With Specialties Details Why Contact Info    [facility] Schedule an appointment as  soon as possible for a visit   [address] [place] [address] [place]   [facility] [place] [id number]  [phone]              Patient's Medications   Discharge Prescriptions    No medications on file           PDMP Review         Value Time User    PDMP Reviewed  Yes 6/18/2024  9:41 PM Judson Jaimes PA-C            ED Provider  Electronically Signed by             Ava Ovalle DO  08/02/24 111

## 2024-08-09 ENCOUNTER — APPOINTMENT (EMERGENCY)
Dept: CT IMAGING | Facility: HOSPITAL | Age: 74
DRG: 866 | End: 2024-08-09
Payer: MEDICARE

## 2024-08-09 ENCOUNTER — APPOINTMENT (EMERGENCY)
Dept: RADIOLOGY | Facility: HOSPITAL | Age: 74
DRG: 866 | End: 2024-08-09
Payer: MEDICARE

## 2024-08-09 ENCOUNTER — HOSPITAL ENCOUNTER (INPATIENT)
Facility: HOSPITAL | Age: 74
LOS: 2 days | Discharge: NURSING, REHAB OR LONG-TERM CARE | DRG: 866 | End: 2024-08-12
Attending: EMERGENCY MEDICINE | Admitting: INTERNAL MEDICINE
Payer: MEDICARE

## 2024-08-09 DIAGNOSIS — R42 DIZZINESS: Primary | ICD-10-CM

## 2024-08-09 LAB
2HR DELTA HS TROPONIN: 0 NG/L
ALBUMIN SERPL BCG-MCNC: 3.7 G/DL (ref 3.5–5)
ALP SERPL-CCNC: 101 U/L (ref 34–104)
ALT SERPL W P-5'-P-CCNC: 12 U/L (ref 7–52)
ANION GAP SERPL CALCULATED.3IONS-SCNC: 3 MMOL/L (ref 4–13)
APTT PPP: 38 SECONDS (ref 23–34)
AST SERPL W P-5'-P-CCNC: 24 U/L (ref 13–39)
BASOPHILS # BLD AUTO: 0.04 THOUSANDS/ÂΜL (ref 0–0.1)
BASOPHILS NFR BLD AUTO: 1 % (ref 0–1)
BILIRUB SERPL-MCNC: 0.42 MG/DL (ref 0.2–1)
BILIRUB UR QL STRIP: NEGATIVE
BNP SERPL-MCNC: 22 PG/ML (ref 0–100)
BUN SERPL-MCNC: 19 MG/DL (ref 5–25)
CALCIUM SERPL-MCNC: 8.8 MG/DL (ref 8.4–10.2)
CARDIAC TROPONIN I PNL SERPL HS: 3 NG/L
CARDIAC TROPONIN I PNL SERPL HS: 3 NG/L
CHLORIDE SERPL-SCNC: 106 MMOL/L (ref 96–108)
CLARITY UR: CLEAR
CO2 SERPL-SCNC: 29 MMOL/L (ref 21–32)
COLOR UR: NORMAL
CREAT SERPL-MCNC: 0.64 MG/DL (ref 0.6–1.3)
EOSINOPHIL # BLD AUTO: 0.37 THOUSAND/ÂΜL (ref 0–0.61)
EOSINOPHIL NFR BLD AUTO: 9 % (ref 0–6)
ERYTHROCYTE [DISTWIDTH] IN BLOOD BY AUTOMATED COUNT: 13.7 % (ref 11.6–15.1)
GFR SERPL CREATININE-BSD FRML MDRD: 96 ML/MIN/1.73SQ M
GLUCOSE SERPL-MCNC: 77 MG/DL (ref 65–140)
GLUCOSE SERPL-MCNC: 93 MG/DL (ref 65–140)
GLUCOSE UR STRIP-MCNC: NEGATIVE MG/DL
HCT VFR BLD AUTO: 38.7 % (ref 36.5–49.3)
HGB BLD-MCNC: 13.3 G/DL (ref 12–17)
HGB UR QL STRIP.AUTO: NEGATIVE
IMM GRANULOCYTES # BLD AUTO: 0.01 THOUSAND/UL (ref 0–0.2)
IMM GRANULOCYTES NFR BLD AUTO: 0 % (ref 0–2)
INR PPP: 1.02 (ref 0.85–1.19)
KETONES UR STRIP-MCNC: NEGATIVE MG/DL
LEUKOCYTE ESTERASE UR QL STRIP: NEGATIVE
LYMPHOCYTES # BLD AUTO: 1.19 THOUSANDS/ÂΜL (ref 0.6–4.47)
LYMPHOCYTES NFR BLD AUTO: 29 % (ref 14–44)
MCH RBC QN AUTO: 34.9 PG (ref 26.8–34.3)
MCHC RBC AUTO-ENTMCNC: 34.4 G/DL (ref 31.4–37.4)
MCV RBC AUTO: 102 FL (ref 82–98)
MONOCYTES # BLD AUTO: 0.61 THOUSAND/ÂΜL (ref 0.17–1.22)
MONOCYTES NFR BLD AUTO: 15 % (ref 4–12)
NEUTROPHILS # BLD AUTO: 1.88 THOUSANDS/ÂΜL (ref 1.85–7.62)
NEUTS SEG NFR BLD AUTO: 46 % (ref 43–75)
NITRITE UR QL STRIP: NEGATIVE
NRBC BLD AUTO-RTO: 0 /100 WBCS
PH UR STRIP.AUTO: 6.5 [PH]
PLATELET # BLD AUTO: 143 THOUSANDS/UL (ref 149–390)
PMV BLD AUTO: 9.3 FL (ref 8.9–12.7)
POTASSIUM SERPL-SCNC: 3.9 MMOL/L (ref 3.5–5.3)
PROT SERPL-MCNC: 6.2 G/DL (ref 6.4–8.4)
PROT UR STRIP-MCNC: NEGATIVE MG/DL
PROTHROMBIN TIME: 14.1 SECONDS (ref 12.3–15)
RBC # BLD AUTO: 3.81 MILLION/UL (ref 3.88–5.62)
SODIUM SERPL-SCNC: 138 MMOL/L (ref 135–147)
SP GR UR STRIP.AUTO: 1.02 (ref 1–1.03)
UROBILINOGEN UR STRIP-ACNC: <2 MG/DL
WBC # BLD AUTO: 4.1 THOUSAND/UL (ref 4.31–10.16)

## 2024-08-09 PROCEDURE — 99285 EMERGENCY DEPT VISIT HI MDM: CPT

## 2024-08-09 PROCEDURE — 85610 PROTHROMBIN TIME: CPT

## 2024-08-09 PROCEDURE — 83880 ASSAY OF NATRIURETIC PEPTIDE: CPT

## 2024-08-09 PROCEDURE — 71045 X-RAY EXAM CHEST 1 VIEW: CPT

## 2024-08-09 PROCEDURE — 81003 URINALYSIS AUTO W/O SCOPE: CPT

## 2024-08-09 PROCEDURE — 82948 REAGENT STRIP/BLOOD GLUCOSE: CPT

## 2024-08-09 PROCEDURE — 70450 CT HEAD/BRAIN W/O DYE: CPT

## 2024-08-09 PROCEDURE — 80053 COMPREHEN METABOLIC PANEL: CPT

## 2024-08-09 PROCEDURE — 85730 THROMBOPLASTIN TIME PARTIAL: CPT

## 2024-08-09 PROCEDURE — 84484 ASSAY OF TROPONIN QUANT: CPT

## 2024-08-09 PROCEDURE — 93005 ELECTROCARDIOGRAM TRACING: CPT

## 2024-08-09 PROCEDURE — 85025 COMPLETE CBC W/AUTO DIFF WBC: CPT

## 2024-08-09 PROCEDURE — 96360 HYDRATION IV INFUSION INIT: CPT

## 2024-08-09 PROCEDURE — 36415 COLL VENOUS BLD VENIPUNCTURE: CPT

## 2024-08-09 RX ORDER — MECLIZINE HYDROCHLORIDE 25 MG/1
25 TABLET ORAL ONCE
Status: COMPLETED | OUTPATIENT
Start: 2024-08-09 | End: 2024-08-09

## 2024-08-09 RX ADMIN — SODIUM CHLORIDE 500 ML: 0.9 INJECTION, SOLUTION INTRAVENOUS at 23:14

## 2024-08-09 RX ADMIN — MECLIZINE HYDROCHLORIDE 25 MG: 25 TABLET ORAL at 23:14

## 2024-08-10 LAB
ATRIAL RATE: 67 BPM
FLUAV RNA RESP QL NAA+PROBE: NEGATIVE
FLUBV RNA RESP QL NAA+PROBE: NEGATIVE
P AXIS: 61 DEGREES
PR INTERVAL: 206 MS
QRS AXIS: 71 DEGREES
QRSD INTERVAL: 92 MS
QT INTERVAL: 416 MS
QTC INTERVAL: 439 MS
RSV RNA RESP QL NAA+PROBE: NEGATIVE
SARS-COV-2 RNA RESP QL NAA+PROBE: NEGATIVE
T WAVE AXIS: 74 DEGREES
VENTRICULAR RATE: 67 BPM

## 2024-08-10 PROCEDURE — 99221 1ST HOSP IP/OBS SF/LOW 40: CPT | Performed by: FAMILY MEDICINE

## 2024-08-10 PROCEDURE — 87147 CULTURE TYPE IMMUNOLOGIC: CPT | Performed by: FAMILY MEDICINE

## 2024-08-10 PROCEDURE — 96361 HYDRATE IV INFUSION ADD-ON: CPT

## 2024-08-10 PROCEDURE — 97167 OT EVAL HIGH COMPLEX 60 MIN: CPT

## 2024-08-10 PROCEDURE — 97163 PT EVAL HIGH COMPLEX 45 MIN: CPT

## 2024-08-10 PROCEDURE — 93010 ELECTROCARDIOGRAM REPORT: CPT | Performed by: INTERNAL MEDICINE

## 2024-08-10 PROCEDURE — 0241U HB NFCT DS VIR RESP RNA 4 TRGT: CPT | Performed by: INTERNAL MEDICINE

## 2024-08-10 PROCEDURE — 87081 CULTURE SCREEN ONLY: CPT | Performed by: FAMILY MEDICINE

## 2024-08-10 PROCEDURE — 99232 SBSQ HOSP IP/OBS MODERATE 35: CPT | Performed by: INTERNAL MEDICINE

## 2024-08-10 RX ORDER — CARBIDOPA AND LEVODOPA 25; 100 MG/1; MG/1
1 TABLET ORAL 2 TIMES DAILY
Status: DISCONTINUED | OUTPATIENT
Start: 2024-08-10 | End: 2024-08-12 | Stop reason: HOSPADM

## 2024-08-10 RX ORDER — TOPIRAMATE 25 MG/1
25 TABLET, FILM COATED ORAL 2 TIMES DAILY
Status: DISCONTINUED | OUTPATIENT
Start: 2024-08-10 | End: 2024-08-12 | Stop reason: HOSPADM

## 2024-08-10 RX ORDER — CLOZAPINE 100 MG/1
200 TABLET ORAL
Status: DISCONTINUED | OUTPATIENT
Start: 2024-08-10 | End: 2024-08-12 | Stop reason: HOSPADM

## 2024-08-10 RX ORDER — TAMSULOSIN HYDROCHLORIDE 0.4 MG/1
0.4 CAPSULE ORAL
Status: DISCONTINUED | OUTPATIENT
Start: 2024-08-10 | End: 2024-08-12 | Stop reason: HOSPADM

## 2024-08-10 RX ORDER — PANTOPRAZOLE SODIUM 20 MG/1
20 TABLET, DELAYED RELEASE ORAL
Status: DISCONTINUED | OUTPATIENT
Start: 2024-08-10 | End: 2024-08-10

## 2024-08-10 RX ORDER — ACETAMINOPHEN 325 MG/1
650 TABLET ORAL EVERY 6 HOURS PRN
Status: DISCONTINUED | OUTPATIENT
Start: 2024-08-10 | End: 2024-08-12 | Stop reason: HOSPADM

## 2024-08-10 RX ORDER — HEPARIN SODIUM 5000 [USP'U]/ML
5000 INJECTION, SOLUTION INTRAVENOUS; SUBCUTANEOUS EVERY 8 HOURS SCHEDULED
Status: DISCONTINUED | OUTPATIENT
Start: 2024-08-10 | End: 2024-08-12 | Stop reason: HOSPADM

## 2024-08-10 RX ORDER — ACETAMINOPHEN 325 MG/1
650 TABLET ORAL EVERY 6 HOURS PRN
Status: DISCONTINUED | OUTPATIENT
Start: 2024-08-10 | End: 2024-08-10

## 2024-08-10 RX ORDER — METOPROLOL SUCCINATE 25 MG/1
25 TABLET, EXTENDED RELEASE ORAL DAILY
Status: DISCONTINUED | OUTPATIENT
Start: 2024-08-10 | End: 2024-08-12 | Stop reason: HOSPADM

## 2024-08-10 RX ORDER — SODIUM CHLORIDE, SODIUM GLUCONATE, SODIUM ACETATE, POTASSIUM CHLORIDE, MAGNESIUM CHLORIDE, SODIUM PHOSPHATE, DIBASIC, AND POTASSIUM PHOSPHATE .53; .5; .37; .037; .03; .012; .00082 G/100ML; G/100ML; G/100ML; G/100ML; G/100ML; G/100ML; G/100ML
75 INJECTION, SOLUTION INTRAVENOUS CONTINUOUS
Status: DISCONTINUED | OUTPATIENT
Start: 2024-08-10 | End: 2024-08-12 | Stop reason: HOSPADM

## 2024-08-10 RX ORDER — CLOZAPINE 100 MG/1
100 TABLET ORAL 2 TIMES DAILY
Status: DISCONTINUED | OUTPATIENT
Start: 2024-08-10 | End: 2024-08-12 | Stop reason: HOSPADM

## 2024-08-10 RX ORDER — POLYETHYLENE GLYCOL 3350 17 G/17G
17 POWDER, FOR SOLUTION ORAL DAILY
Status: DISCONTINUED | OUTPATIENT
Start: 2024-08-10 | End: 2024-08-12 | Stop reason: HOSPADM

## 2024-08-10 RX ORDER — DOCUSATE SODIUM 100 MG/1
100 CAPSULE, LIQUID FILLED ORAL 2 TIMES DAILY
Status: DISCONTINUED | OUTPATIENT
Start: 2024-08-10 | End: 2024-08-12 | Stop reason: HOSPADM

## 2024-08-10 RX ORDER — ATORVASTATIN CALCIUM 40 MG/1
40 TABLET, FILM COATED ORAL
Status: DISCONTINUED | OUTPATIENT
Start: 2024-08-10 | End: 2024-08-12 | Stop reason: HOSPADM

## 2024-08-10 RX ORDER — MECLIZINE HYDROCHLORIDE 25 MG/1
25 TABLET ORAL EVERY 8 HOURS SCHEDULED
Status: DISCONTINUED | OUTPATIENT
Start: 2024-08-10 | End: 2024-08-12 | Stop reason: HOSPADM

## 2024-08-10 RX ORDER — TRAZODONE HYDROCHLORIDE 100 MG/1
100 TABLET ORAL
Status: DISCONTINUED | OUTPATIENT
Start: 2024-08-10 | End: 2024-08-12 | Stop reason: HOSPADM

## 2024-08-10 RX ADMIN — MECLIZINE HYDROCHLORIDE 25 MG: 25 TABLET ORAL at 03:58

## 2024-08-10 RX ADMIN — HEPARIN SODIUM 5000 UNITS: 5000 INJECTION INTRAVENOUS; SUBCUTANEOUS at 10:45

## 2024-08-10 RX ADMIN — METOPROLOL SUCCINATE 25 MG: 25 TABLET, EXTENDED RELEASE ORAL at 09:09

## 2024-08-10 RX ADMIN — TOPIRAMATE 25 MG: 25 TABLET, FILM COATED ORAL at 09:09

## 2024-08-10 RX ADMIN — DOCUSATE SODIUM 100 MG: 100 CAPSULE, LIQUID FILLED ORAL at 17:12

## 2024-08-10 RX ADMIN — PANTOPRAZOLE SODIUM 20 MG: 20 TABLET, DELAYED RELEASE ORAL at 05:17

## 2024-08-10 RX ADMIN — MECLIZINE HYDROCHLORIDE 25 MG: 25 TABLET ORAL at 23:59

## 2024-08-10 RX ADMIN — SODIUM CHLORIDE, SODIUM GLUCONATE, SODIUM ACETATE, POTASSIUM CHLORIDE, MAGNESIUM CHLORIDE, SODIUM PHOSPHATE, DIBASIC, AND POTASSIUM PHOSPHATE 75 ML/HR: .53; .5; .37; .037; .03; .012; .00082 INJECTION, SOLUTION INTRAVENOUS at 16:03

## 2024-08-10 RX ADMIN — TRAZODONE HYDROCHLORIDE 100 MG: 100 TABLET ORAL at 21:06

## 2024-08-10 RX ADMIN — CLOZAPINE 100 MG: 100 TABLET ORAL at 17:12

## 2024-08-10 RX ADMIN — CARBIDOPA AND LEVODOPA 1 TABLET: 25; 100 TABLET ORAL at 03:13

## 2024-08-10 RX ADMIN — ATORVASTATIN CALCIUM 40 MG: 40 TABLET, FILM COATED ORAL at 16:03

## 2024-08-10 RX ADMIN — HEPARIN SODIUM 5000 UNITS: 5000 INJECTION INTRAVENOUS; SUBCUTANEOUS at 21:07

## 2024-08-10 RX ADMIN — CARBIDOPA AND LEVODOPA 1 TABLET: 25; 100 TABLET ORAL at 21:06

## 2024-08-10 RX ADMIN — TAMSULOSIN HYDROCHLORIDE 0.4 MG: 0.4 CAPSULE ORAL at 21:06

## 2024-08-10 RX ADMIN — TOPIRAMATE 25 MG: 25 TABLET, FILM COATED ORAL at 17:12

## 2024-08-10 RX ADMIN — HEPARIN SODIUM 5000 UNITS: 5000 INJECTION INTRAVENOUS; SUBCUTANEOUS at 03:13

## 2024-08-10 RX ADMIN — DOCUSATE SODIUM 100 MG: 100 CAPSULE, LIQUID FILLED ORAL at 09:09

## 2024-08-10 RX ADMIN — CLOZAPINE 200 MG: 100 TABLET ORAL at 03:58

## 2024-08-10 RX ADMIN — CLOZAPINE 100 MG: 100 TABLET ORAL at 09:10

## 2024-08-10 RX ADMIN — MECLIZINE HYDROCHLORIDE 25 MG: 25 TABLET ORAL at 16:03

## 2024-08-10 RX ADMIN — CARBIDOPA AND LEVODOPA 1 TABLET: 25; 100 TABLET ORAL at 09:09

## 2024-08-10 RX ADMIN — SODIUM CHLORIDE, SODIUM GLUCONATE, SODIUM ACETATE, POTASSIUM CHLORIDE, MAGNESIUM CHLORIDE, SODIUM PHOSPHATE, DIBASIC, AND POTASSIUM PHOSPHATE 75 ML/HR: .53; .5; .37; .037; .03; .012; .00082 INJECTION, SOLUTION INTRAVENOUS at 03:06

## 2024-08-10 RX ADMIN — TAMSULOSIN HYDROCHLORIDE 0.4 MG: 0.4 CAPSULE ORAL at 03:13

## 2024-08-10 RX ADMIN — TRAZODONE HYDROCHLORIDE 100 MG: 100 TABLET ORAL at 03:14

## 2024-08-10 RX ADMIN — MECLIZINE HYDROCHLORIDE 25 MG: 25 TABLET ORAL at 09:13

## 2024-08-10 NOTE — ASSESSMENT & PLAN NOTE
Hold off patient's home-scheduled Protonix 20 mg p.o. daily, given the potential for this medication to cause falls, especially in this patient was complaining of increasing dizzines, in the setting of acute viral syndrome.s

## 2024-08-10 NOTE — PROGRESS NOTES
[facility]  Progress Note  Name: Sumit Nails I  MRN: [id number]  Unit/Bed#: -01 I Date of Admission: 8/9/2024   Date of Service: 8/10/2024 I Hospital Day: 0    Assessment & Plan   BPH with obstruction/lower urinary tract symptoms  Assessment & Plan  Asymptomatic with patient maintaining brisk urine output with no need for Soto catheter.  Continue expulsive therapy using patient's home-scheduled Flomax 0.4 mg p.o. daily while in [facility]    Paranoid schizophrenia (HCC)  Assessment & Plan  Asymptomatic on Clozaril 100 mg p.o. twice daily, Clozaril 200 mg p.o. nightly, Deutetrabenazine 24 mg p.o. daily, and trazadone 100 mg p.o. nightly at home and in [facility]    Hypercholesteremia  Assessment & Plan  Asymptomatic on patient's home-scheduled atorvastatin 40 mg p.o. q. dinner.  Hence, patient will continue with this medication while in [facility]    Hypertension  Assessment & Plan  Well-controlled with /71 (08/10/2024, 3:27 PM) on metoprolol 25 mg p.o. daily at home and in [facility]  Continue to check BP every 8 hours while patient remains in [facility]    Neuro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 (HCC)  Assessment & Plan  Asymptomatic on Sinemet 25 mg - 100 mg tablet, 1 tablet p.o. twice daily at home and in [facility]    GERD (gastroesophageal reflux disease)  Assessment & Plan  Hold off patient's home-scheduled Protonix 20 mg p.o. daily, given the potential for this medication to cause falls, especially in this patient was complaining of increasing dizzines, in the setting of acute viral syndrome.s    * Dizziness  Assessment & Plan  Etiology of dizziness remains unclear, but I surmise the patient has an acute viral syndrome, based on the finding of acute peripheral monocytosis with white blood cell count depressed  "at 4.1 with 46% neutrophils 29% lymphocytes 15% monocytes, 9%  eosinophils, and 1% basophil on 08/09/2024, 9:02 PM).    Continue supportive treatment of patient's dizziness using patient's home-scheduled Antivert 25 mg p.o. every 8 hours while patient remains in [facility] ([facility]    Await PT/OT service evaluations in the 08/11/2024 AM, to determine if patient may be discharged back to his home, or if patient may benefit from discharge to SNF for short-term rehab.               VTE Pharmacologic Prophylaxis: VTE Score: 4 Moderate Risk (Score 3-4) - Pharmacological DVT Prophylaxis Ordered: heparin.    Mobility:   Basic Mobility Inpatient Raw Score: 17  JH-HLM Goal: 5: Stand one or more mins  JH-HLM Achieved: 7: Walk 25 feet or more  JH-HLM Goal NOT achieved. Continue with multidisciplinary rounding and encourage appropriate mobility to improve upon JH-HLM goals.    Patient Centered Rounds: I performed bedside rounds with nursing staff today.   Discussions with Specialists or Other Care Team Provider: No.    Education and Discussions with Family / Patient: Patient declined call to .     Total Time Spent on Date of Encounter in care of patient: 24 mins. This time was spent on one or more of the following: performing physical exam; counseling and coordination of care; obtaining or reviewing history; documenting in the medical record; reviewing/ordering tests, medications or procedures; communicating with other healthcare professionals and discussing with patient's family/caregivers.    Current Length of Stay: 0 day(s)  Current Patient Status: Inpatient   Certification Statement: The patient, admitted on an observation basis, will now require > 2 midnight hospital stay due to ambulatory dysfunction due to acute viral syndrome.  Discharge Plan: Anticipate discharge in 48 hrs to home.    Code Status: Level 1 - Full Code    Subjective:   \"I feel less dizzy right now.\"    Objective: "     Vitals:   Temp (24hrs), Av.9 °F (36.6 °C), Min:97.3 °F (36.3 °C), Max:98.3 °F (36.8 °C)    Temp:  [97.3 °F (36.3 °C)-98.3 °F (36.8 °C)] 98.3 °F (36.8 °C)  HR:  [60-71] 70  Resp:  [13-22] 20  BP: (112-147)/(66-84) 129/71  SpO2:  [95 %-100 %] 95 %  Body mass index is 24.71 kg/m².     Input and Output Summary (last 24 hours):     Intake/Output Summary (Last 24 hours) at 8/10/2024 1905  Last data filed at 8/10/2024 1603  Gross per 24 hour   Intake 1720 ml   Output 480 ml   Net 1240 ml       Physical Exam:   Physical Exam  Vitals reviewed.   Constitutional:       Appearance: Normal appearance. He is normal weight.   HENT:      Head: Normocephalic and atraumatic.      Nose: Nose normal.      Mouth/Throat:      Mouth: Mucous membranes are moist.      Pharynx: Oropharynx is clear.   Eyes:      Extraocular Movements: Extraocular movements intact.      Conjunctiva/sclera: Conjunctivae normal.      Pupils: Pupils are equal, round, and reactive to light.   Cardiovascular:      Rate and Rhythm: Normal rate and regular rhythm.      Pulses: Normal pulses.      Heart sounds: Normal heart sounds.   Pulmonary:      Effort: Pulmonary effort is normal.      Breath sounds: Normal breath sounds.   Abdominal:      General: Abdomen is flat. Bowel sounds are normal.      Palpations: Abdomen is soft.   Musculoskeletal:         General: Normal range of motion.      Cervical back: Normal range of motion and neck supple.   Skin:     General: Skin is warm and dry.      Capillary Refill: Capillary refill takes less than 2 seconds.   Neurological:      General: No focal deficit present.      Mental Status: He is alert and oriented to person, place, and time. Mental status is at baseline.   Psychiatric:         Mood and Affect: Mood normal.         Thought Content: Thought content normal.         Judgment: Judgment normal.     No psychosis.    Additional Data:     Labs:  Results from last 7 days   Lab Units 24  2102   WBC Thousand/uL  4.10*   HEMOGLOBIN g/dL 13.3   HEMATOCRIT % 38.7   PLATELETS Thousands/uL 143*   SEGS PCT % 46   LYMPHO PCT % 29   MONO PCT % 15*   EOS PCT % 9*     Results from last 7 days   Lab Units 08/09/24  2102   SODIUM mmol/L 138   POTASSIUM mmol/L 3.9   CHLORIDE mmol/L 106   CO2 mmol/L 29   BUN mg/dL 19   CREATININE mg/dL 0.64   ANION GAP mmol/L 3*   CALCIUM mg/dL 8.8   ALBUMIN g/dL 3.7   TOTAL BILIRUBIN mg/dL 0.42   ALK PHOS U/L 101   ALT U/L 12   AST U/L 24   GLUCOSE RANDOM mg/dL 77     Results from last 7 days   Lab Units 08/09/24  2102   INR  1.02     Results from last 7 days   Lab Units 08/09/24  2105   POC GLUCOSE mg/dl 93               Lines/Drains:  Invasive Devices       Peripheral Intravenous Line  Duration             Peripheral IV 08/09/24 Left;Ventral (anterior) Forearm <1 day                          Imaging: No pertinent imaging reviewed.    Recent Cultures (last 7 days):         Last 24 Hours Medication List:   Current Facility-Administered Medications   Medication Dose Route Frequency Provider Last Rate    acetaminophen  650 mg Oral Q6H PRN Flo Avelar MD      atorvastatin  40 mg Oral Daily With Dinner Adan Holden MD      carbidopa-levodopa  1 tablet Oral BID Adan Holden MD      cloZAPine  100 mg Oral BID Adan Holden MD      clozapine  200 mg Oral HS Adan Holden MD      Deutetrabenazine ER  24 mg Oral Daily Adan Holden MD      docusate sodium  100 mg Oral BID Adan Holden MD      heparin (porcine)  5,000 Units Subcutaneous Q8H JOSH Adan Holden MD      meclizine  25 mg Oral Q8H JOSH Adan Holden MD      metoprolol succinate  25 mg Oral Daily Adan Holden MD      multi-electrolyte  75 mL/hr Intravenous Continuous Adan Holden MD 75 mL/hr (08/10/24 1603)    polyethylene glycol  17 g Oral Daily Adan Holden MD      tamsulosin  0.4 mg Oral HS dAan Holden MD      topiramate  25 mg Oral BID Adan Holden MD       traZODone  100 mg Oral HS Adan Jonathan Holden MD          Today, Patient Was Seen By: Flo Avelar MD    **Please Note: This note may have been constructed using a voice recognition system.**

## 2024-08-10 NOTE — ASSESSMENT & PLAN NOTE
Asymptomatic on Clozaril 100 mg p.o. twice daily, Clozaril 200 mg p.o. nightly, Deutetrabenazine 24 mg p.o. daily, and trazadone 100 mg p.o. nightly at home and in [facility]

## 2024-08-10 NOTE — PHYSICAL THERAPY NOTE
Physical Therapy Evaluation    Patient's Name: Sumit Nails    Admitting Diagnosis  Dizziness [R42]    Problem List  Patient Active Problem List   Diagnosis    GERD (gastroesophageal reflux disease)    Neuro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 (HCC)    Hypertension    Hypercholesteremia    Paranoid schizophrenia (HCC)    Combined form of senile cataract    MRSA carrier    Groin rash    Fall    Chronic obstructive pulmonary disease, unspecified COPD type (HCC)    Neoplasm of uncertain behavior of left kidney    BPH with obstruction/lower urinary tract symptoms    Chronic constipation    Overactive bladder    Chronic prostatitis    Mild protein-calorie malnutrition (HCC)    Aortic root dilatation (HCC)    SBO (small bowel obstruction) (HCC)    Parkinson disease    Dizziness    Cholecystitis    Leukopenia       Past Medical History  Past Medical History:   Diagnosis Date    Asthma     Benign prostatic hyperplasia     COPD (chronic obstructive pulmonary disease) (HCC)     GERD (gastroesophageal reflux disease)     Herpes zoster without complication 12/10/2021    Hypercholesteremia     Hypertension     Neuroleptic induced parkinsonism (HCC)     Paranoid schizophrenia (HCC)     Psychiatric disorder        Past Surgical History  Past Surgical History:   Procedure Laterality Date    CATARACT EXTRACTION      CHOLECYSTECTOMY LAPAROSCOPIC N/A 12/3/2023    Procedure: CHOLECYSTECTOMY LAPAROSCOPIC WITH INTRAOPERATIVE CHOLANGIOGRAM;  Surgeon: Maverick Tamayo MD;  Location: [facility] OR;  Service: General    COLONOSCOPY         Recent Imaging  CT head without contrast   Final Result by Bruno Bagley DO (08/09 2127)      No acute intracranial abnormality.                  Workstation performed: [id number]         XR chest 1 view portable   Final Result by Irasema Lucero MD (08/09 2153)      No acute cardiopulmonary disease.            Workstation performed: [id number]             Recent Vital Signs  Vitals:    08/10/24 0710 08/10/24  1059 08/10/24 1102 08/10/24 1105   BP: 131/78 128/69 134/72 112/66   BP Location:       Pulse: 70      Resp: 16      Temp: (!) 97.3 °F (36.3 °C)      TempSrc:       SpO2: 95%      Weight:       Height:              08/10/24 1119   PT Last Visit   PT Visit Date 08/10/24   Note Type   Note type Evaluation   Pain Assessment   Pain Assessment Tool 0-10   Pain Score 4   Pain Location/Orientation Location: Abdomen;Orientation: Left   Pain Onset/Description Onset: Ongoing   Patient's Stated Pain Goal No pain   Hospital Pain Intervention(s) Repositioned;Ambulation/increased activity   Restrictions/Precautions   Weight Bearing Precautions Per Order No   Braces or Orthoses   (none reported)   Other Precautions Chair Alarm;Bed Alarm;Multiple lines;Fall Risk;Pain   Home Living   Type of Home Other (Comment)  ([facility] [place])   Home Layout Performs ADLs on one level;Able to live on main level with bedroom/bathroom   Bathroom Shower/Tub Walk-in shower   Bathroom Toilet Standard   Bathroom Equipment Grab bars in shower   Bathroom Accessibility Accessible   Home Equipment Other (Comment)  (none)   Prior Function   Level of [place] Independent with functional mobility;Independent with ADLs;Needs assistance with IADLS   Lives With Facility staff   Receives Help From Other (Comment)  (staff)   IADLs Family/Friend/Other provides transportation;Family/Friend/Other provides meals;Family/Friend/Other provides medication management   Falls in the last 6 months 1 to 4   Vocational Retired   General   Family/Caregiver Present No   Cognition   Overall Cognitive Status WFL   Arousal/Participation Alert   Orientation Level Oriented X4   Memory Within functional limits   Following Commands Follows multistep commands without difficulty   Comments Pt agreeable to PT evaluation   RLE Assessment   RLE Assessment WFL  (4-/5)   LLE Assessment   LLE Assessment WFL  (4-/5)   Vision-Basic Assessment   Current Vision No visual  deficits   Patient Visual Report Balance difficulty   Coordination   Sensation WFL   Bed Mobility   Supine to Sit 5  Supervision   Additional items Bedrails;Increased time required;Verbal cues   Transfers   Sit to Stand   (CGA)   Additional items Assist x 1;Bedrails;Increased time required;Verbal cues   Stand to Sit   (CGA)   Additional items Assist x 1;Bedrails;Increased time required;Verbal cues   Additional Comments with RW   Ambulation/Elevation   Gait pattern Decreased hip extension;Decreased heel strike;Decreased toe off;Knees flexed;Excessively slow;Short stride   Gait Assistance 4  Minimal assist   Additional items Assist x 1;Verbal cues   Assistive Device Rolling walker   Distance 15' x2   Balance   Static Sitting Fair   Dynamic Sitting Fair   Static Standing Fair -   Dynamic Standing Poor +   Ambulatory Poor +   Endurance Deficit   Endurance Deficit Yes   Activity Tolerance   Activity Tolerance Patient limited by fatigue   Medical Staff Made Aware CLIFF Presley  (Pt seen as co-evaluation with OT due to pt's co-morbidities, clinically unstable presentation, and present impairments which are a regression from their baseline.)   Nurse Made Aware DARVIN Pham   Assessment   Prognosis Good   Problem List Decreased strength;Decreased endurance;Impaired balance;Decreased mobility;Pain   Assessment Sumit Nails is a 74 y.o. male admitted [facility] on 8/9/2024 for Dizziness, hypertension, neuro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. Pt  has a past medical history of Asthma, Benign prostatic hyperplasia, COPD (chronic obstructive pulmonary disease) ([facility]), GERD (gastroesophageal reflux disease), Herpes zoster without complication (12/10/2021), Hypercholesteremia, Hypertension, Neuroleptic induced parkinsonism ([facility]), Paranoid schizophrenia ([facility]), and Psychiatric disorder.. Order placed for PT eval and tx. PT was consulted and pt was seen on 8/10/2024 for mobility assessment and d/c planning. Chart review and two person identifiers  were completed.   Currently pt presents with decreased strength , decreased static sitting balance , decreased dynamic sitting balance , decreased static standing balance, decreased dynamic standing balance , decreased gait speed, decreased step length , and decreased muscular endurance . Due to these impairments, they will require assistance to perform bed mobility, sit to stand , ambulation, stair negotiation, and transfers. Pt is currently functioning at a supervision assistance x1 level for bed mobility, contact guard assistance x1 level for transfers, minimum assistance x1 level for ambulation with Rolling Walker. These activity limitations significantly impact their ability to participate in previous home and community roles and responsibilities  and ambulation in home. Pt denied any dizziness/lightheadedness during session. Pt's supine /69, sitting /72, standing /66. The patient's AM-PAC Basic Mobility Inpatient Short Form Raw Score is 17. PT recommends level II moderate resource intensity. They will benefit from skilled therapy to to reduce the risk of falls, to allow for safe ambulation, and to maximize functional potential.   Barriers to Discharge Other (Comment)  (decrease in functional mobility)   Goals   Patient Goals to used the bathroom   STG Expiration Date 08/20/24   Short Term Goal #1 Within 10 days patient will complete: 1) Bed mobility skills with modified independent assistance to facilitate safe return to previous living environment 2) Functional transfers with modified independent assistance to facilitate safe return to previous living environment  3) Ambulation with least restrictive AD modified independent assistance without LOB and stable vitals for safe ambulation home/ community distances. 4) Stair training up/down flight 4 step/s with appropriate rail/s and modified independent assistance for safe access to previous living environment. 5) Improve balance grades to fair  + to reduce risk of falls. 6)Improve LE strength grades by 1 to increase independence w/ transfers and gait.  7) PT for ongoing pt and family education; DME needs and D/C planning to promote highest level of function in least restrictive environment.   Plan   Treatment/Interventions Functional transfer training;LE strengthening/ROM;Therapeutic exercise;Elevations;Endurance training;Equipment eval/education;Patient/family training;Bed mobility;Gait training;Continued evaluation;OT   PT Frequency 3-5x/wk   Discharge Recommendation   Rehab Resource Intensity Level, PT II (Moderate Resource Intensity)   AM-PAC Basic Mobility Inpatient   Turning in Flat Bed Without Bedrails 3   Lying on Back to Sitting on Edge of Flat Bed Without Bedrails 3   Moving Bed to Chair 3   Standing Up From Chair Using Arms 3   Walk in Room 3   Climb 3-5 Stairs With Railing 2   Basic Mobility Inpatient Raw Score 17   Basic Mobility Standardized Score 39.67   [facility] Highest Level Of Mobility   -HL Goal 5: Stand one or more mins   JH-HLM Achieved 7: Walk 25 feet or more   End of Consult   Patient Position at End of Consult Bedside chair;Bed/Chair alarm activated;All needs within reach       Recommendations                                                                                                              Pt will benefit from continued skilled IP PT to address the above mentioned impairments in order to maximize recovery and increase functional independence when completing mobility and ADLs. See flow sheet for goals and POC.     PT Evaluation Time: 0719-4531    Nguyễn Pleitez, PT, DPT

## 2024-08-10 NOTE — ED PROVIDER NOTES
History  Chief Complaint   Patient presents with    Dizziness     BIBA from home for complaints of dizziness after having dinner, Pt. Also complaint of nausea, denies vomiting.     The patient is a 74 y.o. male with a history of asthma, BPH, COPD, GERD, hypercholesterolemia, hypertension, neuroleptic induced parkinsonism, paranoid schizophrenia, psychiatric disorder who presents to [facility] with a chief complaint of dizziness. Symptoms began tonight after dinner around 6:30 pm and lasted only about 3-5 seconds per the patient and it was right after he took his medicine and had stood up. He denies any dizziness currently. He currently denies any pain anyewhere. Associated symptoms include nausea which he says is normal for him. Symptoms are aggravated with none noted and alleviating factors include none noted. The patient denies fever, chills, night sweats, shortness of breath, cough, sputum, falls, trauma, syncope, seizure, loss of consciousness, numbness, tingling, weakness.  No other reported symptoms at this time.  Patient denies allergies to anything          History provided by:  Patient   used: No    Dizziness  Associated symptoms: nausea    Associated symptoms: no chest pain, no palpitations, no shortness of breath and no vomiting        Prior to Admission Medications   Prescriptions Last Dose Informant Patient Reported? Taking?   Cholecalciferol (Vitamin D High Potency) 25 MCG (1000 UT) capsule  Care Giver No No   Sig: Take 1 capsule (1,000 Units total) by mouth daily   Deutetrabenazine ER (Austedo XR) 24 MG TB24  Care Giver Yes No   Sig: Take 24 mg by mouth daily   Emollient (Lubriderm Advanced Therapy) LOTN  Care Giver Yes No   Sig: Apply 1 Application topically as needed (rash)   Patient not taking: Reported on 4/1/2024   Nutritional Supplements (Ensure High Protein) LIQD  Care Giver No No   Sig: Take 237 mL by mouth 2 (two) times a day   Patient taking differently:  Take 237 mL by mouth 2 (two) times a day vanilla   Psyllium (Reguloid) 28.3 % POWD   No No   Sig: Take 1 Scoop by mouth 2 (two) times a day   Zinc Oxide (Geni Protect Moisture Barrier) 12 % CREA  Care Giver Yes No   Sig: Apply 1 Application topically once as needed (wound care)   Patient not taking: Reported on 2024   acetaminophen (Acetaminophen Extra Strength) 500 mg tablet  Care Giver No No   Sig: Take 1 tablet (500 mg total) by mouth every 6 (six) hours as needed for mild pain   acetaminophen (TYLENOL) 500 mg tablet   No No   Sig: Take 2 tablets (1,000 mg total) by mouth every 8 (eight) hours   atorvastatin (LIPITOR) 40 mg tablet 2024 Care Giver No Yes   Sig: Take 1 tablet (40 mg total) by mouth daily   carbidopa-levodopa (SINEMET)  mg per tablet  Care Giver No No   Sig: Take 1 tablet by mouth 3 (three) times a day   Patient taking differently: Take 1 tablet by mouth 2 (two) times a day   cloZAPine (CLOZARIL) 100 mg tablet  Care Giver Yes No   Sig: Take 100 mg by mouth 2 (two) times a day   clozapine (CLOZARIL) 200 MG tablet  Care Giver Yes No   Sig: Take 200 mg by mouth daily at bedtime    docusate sodium (COLACE) 100 mg capsule  Care Giver No No   Sig: Take 1 capsule (100 mg total) by mouth 2 (two) times a day   fluticasone (FLONASE) 50 mcg/act nasal spray  Care Giver No No   Si sprays into each nostril daily   Patient not taking: Reported on 5/3/2024   hydrOXYzine HCL (ATARAX) 25 mg tablet  Care Giver No No   Sig: Take 1 tablet (25 mg total) by mouth every 6 (six) hours as needed for itching   ibuprofen (MOTRIN) 600 mg tablet   No No   Sig: Take 1 tablet (600 mg total) by mouth every 6 (six) hours as needed for mild pain   meclizine (ANTIVERT) 25 mg tablet  Care Giver No No   Sig: Take 1 tablet (25 mg total) by mouth every 8 (eight) hours as needed for dizziness   metoprolol succinate (TOPROL-XL) 25 mg 24 hr tablet   No No   Sig: Take 1 tablet (25 mg total) by mouth daily   olopatadine  (PATANOL) 0.1 % ophthalmic solution  Care Giver Yes No   Sig: Administer 1 drop to both eyes daily at bedtime   omeprazole (PriLOSEC) 40 MG capsule  Care Giver No No   Sig: Take 1 capsule (40 mg total) by mouth daily   polyethylene glycol (MIRALAX) 17 g packet  Care Giver No No   Sig: Take once a day, may take an extra dose prn   Patient not taking: Reported on 5/3/2024   polyethylene glycol (MIRALAX) 17 g packet   No No   Sig: Take 17 g by mouth daily   senna (Senokot) 8.6 MG tablet  Care Giver Yes No   Sig: Take 8.6 mg by mouth 2 (two) times a day   Patient not taking: Reported on 4/1/2024   tamsulosin (FLOMAX) 0.4 mg  Care Giver No No   Sig: Take 1 capsule (0.4 mg total) by mouth daily at bedtime   temazepam (RESTORIL) 15 mg capsule  Care Giver No No   Sig: Take 1 capsule (15 mg total) by mouth daily at bedtime   topiramate (TOPAMAX) 25 mg tablet  Care Giver Yes No   Sig: Take 25 mg by mouth 2 (two) times a day   traZODone (DESYREL) 100 mg tablet  Care Giver Yes No   Sig: Take 100 mg by mouth daily at bedtime      Facility-Administered Medications: None       Past Medical History:   Diagnosis Date    Asthma     Benign prostatic hyperplasia     COPD (chronic obstructive pulmonary disease) (HCC)     GERD (gastroesophageal reflux disease)     Herpes zoster without complication 12/10/2021    Hypercholesteremia     Hypertension     Neuroleptic induced parkinsonism (HCC)     Paranoid schizophrenia (HCC)     Psychiatric disorder        Past Surgical History:   Procedure Laterality Date    CATARACT EXTRACTION      CHOLECYSTECTOMY LAPAROSCOPIC N/A 12/3/2023    Procedure: CHOLECYSTECTOMY LAPAROSCOPIC WITH INTRAOPERATIVE CHOLANGIOGRAM;  Surgeon: Maverick Tamayo MD;  Location: [facility] [place] OR;  Service: General    COLONOSCOPY         Family History   Problem Relation Age of Onset    No Known Problems Mother     No Known Problems Father     Parkinsonism Son      I have reviewed and agree with the history as  documented.    E-Cigarette/Vaping    E-Cigarette Use Never User      E-Cigarette/Vaping Substances    Nicotine No     THC No     CBD No     Flavoring No     Other No     Unknown No      Social History     Tobacco Use    Smoking status: Former     Current packs/day: 0.00     Average packs/day: 1 pack/day for 20.0 years (20.0 ttl pk-yrs)     Types: Cigarettes     Start date:      Quit date: 2000     Years since quittin.6     Passive exposure: Past    Smokeless tobacco: Never   Vaping Use    Vaping status: Never Used   Substance Use Topics    Alcohol use: Not Currently    Drug use: Never       Review of Systems   Constitutional:  Negative for chills and fever.   HENT:  Negative for ear pain and sore throat.    Eyes:  Negative for pain and visual disturbance.   Respiratory:  Negative for cough and shortness of breath.    Cardiovascular:  Negative for chest pain and palpitations.   Gastrointestinal:  Positive for nausea. Negative for abdominal pain and vomiting.   Genitourinary:  Negative for dysuria and hematuria.   Musculoskeletal:  Negative for arthralgias and back pain.   Skin:  Negative for color change and rash.   Neurological:  Positive for dizziness. Negative for seizures and syncope.   All other systems reviewed and are negative.      Physical Exam  Physical Exam  Vitals reviewed.   Constitutional:       General: He is not in acute distress.     Appearance: Normal appearance. He is not ill-appearing, toxic-appearing or diaphoretic.   HENT:      Head: Normocephalic.      Right Ear: Tympanic membrane, ear canal and external ear normal.      Left Ear: Tympanic membrane, ear canal and external ear normal.      Nose: Nose normal.      Mouth/Throat:      Mouth: Mucous membranes are moist.      Pharynx: Oropharynx is clear. No oropharyngeal exudate.   Eyes:      General: No scleral icterus.     Extraocular Movements: Extraocular movements intact.      Conjunctiva/sclera: Conjunctivae normal.      Pupils:  Pupils are equal, round, and reactive to light.   Cardiovascular:      Rate and Rhythm: Normal rate.      Pulses: Normal pulses.   Pulmonary:      Effort: Pulmonary effort is normal. No respiratory distress.      Breath sounds: Normal breath sounds. No stridor. No wheezing, rhonchi or rales.   Abdominal:      General: Abdomen is flat. Bowel sounds are normal.      Palpations: Abdomen is soft.      Tenderness: There is no abdominal tenderness.   Musculoskeletal:         General: Normal range of motion.      Right lower leg: No edema.      Left lower leg: No edema.   Skin:     General: Skin is warm and dry.      Capillary Refill: Capillary refill takes less than 2 seconds.      Coloration: Skin is not jaundiced or pale.      Findings: No bruising, erythema or lesion.   Neurological:      General: No focal deficit present.      Mental Status: He is alert and oriented to person, place, and time. Mental status is at baseline.      GCS: GCS eye subscore is 4. GCS verbal subscore is 5. GCS motor subscore is 6.      Cranial Nerves: No cranial nerve deficit.      Sensory: No sensory deficit.      Motor: No weakness.      Coordination: Coordination is intact. Finger-Nose-Finger Test normal.      Gait: Gait is intact. Gait normal.         Vital Signs  ED Triage Vitals   Temperature Pulse Respirations Blood Pressure SpO2   08/09/24 2039 08/09/24 2039 08/09/24 2039 08/09/24 2039 08/09/24 2040   97.8 °F (36.6 °C) 71 16 136/72 98 %      Temp Source Heart Rate Source Patient Position - Orthostatic VS BP Location FiO2 (%)   08/10/24 0248 08/09/24 2039 08/09/24 2039 08/09/24 2039 --   Oral Monitor Sitting Right arm       Pain Score       08/10/24 0248       2           Vitals:    08/09/24 2230 08/09/24 2300 08/10/24 0000 08/10/24 0248   BP: 125/67 135/72 147/72 143/76   Pulse: 60 62 66 66   Patient Position - Orthostatic VS: Sitting Sitting Sitting Lying         Visual Acuity  Visual Acuity      Flowsheet Row Most Recent Value   L  Pupil Size (mm) 2   R Pupil Size (mm) 2            ED Medications  Medications   atorvastatin (LIPITOR) tablet 40 mg (has no administration in time range)   carbidopa-levodopa (SINEMET)  mg per tablet 1 tablet (1 tablet Oral Given 8/10/24 0313)   cloZAPine (CLOZARIL) tablet 100 mg (has no administration in time range)   cloZAPine (CLOZARIL) tablet 200 mg (200 mg Oral Given 8/10/24 0358)   Deutetrabenazine ER TB24 24 mg (has no administration in time range)   docusate sodium (COLACE) capsule 100 mg (has no administration in time range)   meclizine (ANTIVERT) tablet 25 mg (25 mg Oral Given 8/10/24 0358)   metoprolol succinate (TOPROL-XL) 24 hr tablet 25 mg (has no administration in time range)   pantoprazole (PROTONIX) EC tablet 20 mg (has no administration in time range)   polyethylene glycol (MIRALAX) packet 17 g (has no administration in time range)   tamsulosin (FLOMAX) capsule 0.4 mg (0.4 mg Oral Given 8/10/24 0313)   topiramate (TOPAMAX) tablet 25 mg (has no administration in time range)   traZODone (DESYREL) tablet 100 mg (100 mg Oral Given 8/10/24 0314)   acetaminophen (TYLENOL) tablet 650 mg (has no administration in time range)   heparin (porcine) subcutaneous injection 5,000 Units (5,000 Units Subcutaneous Given 8/10/24 0313)   multi-electrolyte (PLASMALYTE-A/ISOLYTE-S PH 7.4) IV solution (75 mL/hr Intravenous New Bag 8/10/24 0306)   sodium chloride 0.9 % bolus 500 mL (0 mL Intravenous Stopped 8/10/24 0122)   meclizine (ANTIVERT) tablet 25 mg (25 mg Oral Given 8/9/24 2314)       Diagnostic Studies  Results Reviewed       Procedure Component Value Units Date/Time    HS Troponin I 2hr [[id number]]  (Normal) Collected: 08/09/24 2311    Lab Status: Final result Specimen: Blood from Arm, Left Updated: 08/09/24 1029     hs TnI 2hr 3 ng/L      Delta 2hr hsTnI 0 ng/L     UA w Reflex to Microscopic w Reflex to Culture [[id number]] Collected: 08/09/24 1799    Lab Status: Final result Specimen: Urine, Clean Catch  Updated: 08/09/24 2159     Color, UA Light Yellow     Clarity, UA Clear     Specific Gravity, UA 1.016     pH, UA 6.5     Leukocytes, UA Negative     Nitrite, UA Negative     Protein, UA Negative mg/dl      Glucose, UA Negative mg/dl      Ketones, UA Negative mg/dl      Urobilinogen, UA <2.0 mg/dl      Bilirubin, UA Negative     Occult Blood, UA Negative    B-Type Natriuretic Peptide(BNP) [[id number]]  (Normal) Collected: 08/09/24 2102    Lab Status: Final result Specimen: Blood from Arm, Left Updated: 08/09/24 2140     BNP 22 pg/mL     HS Troponin 0hr (reflex protocol) [[id number]]  (Normal) Collected: 08/09/24 2102    Lab Status: Final result Specimen: Blood from Arm, Left Updated: 08/09/24 2135     hs TnI 0hr 3 ng/L     Protime-INR [[id number]]  (Normal) Collected: 08/09/24 2102    Lab Status: Final result Specimen: Blood from Arm, Left Updated: 08/09/24 2131     Protime 14.1 seconds      INR 1.02    Narrative:      INR Therapeutic Range    Indication                                             INR Range      Atrial Fibrillation                                               2.0-3.0  Hypercoagulable State                                    2.0.2.3  Left Ventricular Asist Device                            2.0-3.0  Mechanical Heart Valve                                  -    Aortic(with afib, MI, embolism, HF, LA enlargement,    and/or coagulopathy)                                     2.0-3.0 (2.5-3.5)     Mitral                                                             2.5-3.5  Prosthetic/Bioprosthetic Heart Valve               2.0-3.0  Venous thromboembolism (VTE: VT, PE        2.0-3.0    APTT [[id number]]  (Abnormal) Collected: 08/09/24 2102    Lab Status: Final result Specimen: Blood from Arm, Left Updated: 08/09/24 2131     PTT 38 seconds     Comprehensive metabolic panel [[id number]]  (Abnormal) Collected: 08/09/24 2102    Lab Status: Final result Specimen: Blood from Arm, Left Updated: 08/09/24 2126     Sodium  138 mmol/L      Potassium 3.9 mmol/L      Chloride 106 mmol/L      CO2 29 mmol/L      ANION GAP 3 mmol/L      BUN 19 mg/dL      Creatinine 0.64 mg/dL      Glucose 77 mg/dL      Calcium 8.8 mg/dL      AST 24 U/L      ALT 12 U/L      Alkaline Phosphatase 101 U/L      Total Protein 6.2 g/dL      Albumin 3.7 g/dL      Total Bilirubin 0.42 mg/dL      eGFR 96 ml/min/1.73sq m     Narrative:      National Kid[employer] Foundation guidelines for Chronic Kidney Disease (CKD):     Stage 1 with normal or high GFR (GFR > 90 mL/min/1.73 square meters)    Stage 2 Mild CKD (GFR = 60-89 mL/min/1.73 square meters)    Stage 3A Moderate CKD (GFR = 45-59 mL/min/1.73 square meters)    Stage 3B Moderate CKD (GFR = 30-44 mL/min/1.73 square meters)    Stage 4 Severe CKD (GFR = 15-29 mL/min/1.73 square meters)    Stage 5 End Stage CKD (GFR <15 mL/min/1.73 square meters)  Note: GFR calculation is accurate only with a steady state creatinine    CBC and differential [[id number]]  (Abnormal) Collected: 08/09/24 2102    Lab Status: Final result Specimen: Blood from Arm, Left Updated: 08/09/24 2109     WBC 4.10 Thousand/uL      RBC 3.81 Million/uL      Hemoglobin 13.3 g/dL      Hematocrit 38.7 %       fL      MCH 34.9 pg      MCHC 34.4 g/dL      RDW 13.7 %      MPV 9.3 fL      Platelets 143 Thousands/uL      nRBC 0 /100 WBCs      Segmented % 46 %      Immature Grans % 0 %      Lymphocytes % 29 %      Monocytes % 15 %      Eosinophils Relative 9 %      Basophils Relative 1 %      Absolute Neutrophils 1.88 Thousands/µL      Absolute Immature Grans 0.01 Thousand/uL      Absolute Lymphocytes 1.19 Thousands/µL      Absolute Monocytes 0.61 Thousand/µL      Eosinophils Absolute 0.37 Thousand/µL      Basophils Absolute 0.04 Thousands/µL     Fingerstick Glucose (POCT) [[id number]]  (Normal) Collected: 08/09/24 2105    Lab Status: Final result Specimen: Blood Updated: 08/09/24 2106     POC Glucose 93 mg/dl                    CT head without contrast    Final Result by Bruno Bagley DO (08/09 2127)      No acute intracranial abnormality.                  Workstation performed: [id number]         XR chest 1 view portable   Final Result by Irasema Lucero MD (08/09 2153)      No acute cardiopulmonary disease.            Workstation performed: [id number]                    Procedures  Procedures         ED Course  ED Course as of 08/10/24 0442   Fri Aug 09, 2024   2133 CT head without contrast  No acute intracranial abnormality.        2136 hs TnI 0hr: 3   2156 XR chest 1 view portable    No acute cardiopulmonary disease.      2342 Delta 2hr hsTnI: 0   Sat Aug 10, 2024   0022 Patient ambulating around the unit again without assistance                HEART Risk Score      Flowsheet Row Most Recent Value   Heart Score Risk Calculator    History 0 Filed at: 08/10/2024 0302   ECG 1 Filed at: 08/10/2024 0302   Age 2 Filed at: 08/10/2024 0302   Risk Factors 1 Filed at: 08/10/2024 0302   Troponin 0 Filed at: 08/10/2024 0302   HEART Score 4 Filed at: 08/10/2024 0302                          SBIRT 22yo+      Flowsheet Row Most Recent Value   Initial Alcohol Screen: US AUDIT-C     1. How often do you have a drink containing alcohol? 0 Filed at: 08/09/2024 2046   2. How many drinks containing alcohol do you have on a typical day you are drinking?  0 Filed at: 08/09/2024 2046   3a. Male UNDER 65: How often do you have five or more drinks on one occasion? 0 Filed at: 08/09/2024 2046   3b. FEMALE Any Age, or MALE 65+: How often do you have 4 or more drinks on one occassion? 0 Filed at: 08/09/2024 2046   Audit-C Score 0 Filed at: 08/09/2024 2046   RAFA: How many times in the past year have you...    Used an illegal drug or used a prescription medication for non-medical reasons? Never Filed at: 08/09/2024 2046                      Medical Decision Making  This patient presents with symptoms concerning for TIA vs vertigo. Other items on the differential include dissection, AMI,  hypoglycemia or other metabolic derangement such as hepatic/uremic encephalopathy, medication side effect, or post-ictal Pipo's paralysis. EKG without evidence of STEMI or ischemia, labs with no hypoglycemia, metabolic derangements, and clinical picture does not suggest other stroke mimic. CT head showed no acute intracranial abnormality. Patient has no deficits on exam and upon arrival to the ER his dizziness had resolved. Patient was able to ambulate without assistance however was complaining of dizziness after ambulation. EKG reviewed by myself without signs of ischemia, negative troponins. Patient has no nystagmus and no focal neurological deficits on exam. Patient takes meclizine at home as well. Given his symptoms resolved and he returned to baseline, unlikely large vessel occlusion. Patient is although is reporting symptoms on standing and ambulation. Will admit for persistent symptoms. Patient understands and agrees with treatment plan.    Problems Addressed:  Dizziness: acute illness or injury    Amount and/or Complexity of Data Reviewed  Labs: ordered. Decision-making details documented in ED Course.  Radiology: ordered. Decision-making details documented in ED Course.    Risk  Decision regarding hospitalization.                 Disposition  Final diagnoses:   Dizziness     Time reflects when diagnosis was documented in both MDM as applicable and the Disposition within this note       Time User Action Codes Description Comment    8/10/2024  1:43 AM Adan Holden Add [R42] Dizziness     8/10/2024  1:44 AM Sim Cabrera Modify [R42] Dizziness           ED Disposition       ED Disposition   Admit    Condition   Stable    Date/Time   Sat Aug 10, 2024 0144    Comment   Case was discussed with GEORGE and the patient's admission status was agreed to be Admission Status: observation status to the service of Dr. Holden .               Follow-up Information    None         Current Discharge Medication List         CONTINUE these medications which have NOT CHANGED    Details   atorvastatin (LIPITOR) 40 mg tablet Take 1 tablet (40 mg total) by mouth daily  Qty: 90 tablet, Refills: 1    Comments: Hi! [facility] is the new pharmacy provider for individuals with [facility] [place] [facility]. We need new prescriptions to dispense medications - please advise, thank you for your help.  Associated Diagnoses: Hypercholesteremia      !! acetaminophen (Acetaminophen Extra Strength) 500 mg tablet Take 1 tablet (500 mg total) by mouth every 6 (six) hours as needed for mild pain  Qty: 30 tablet, Refills: 11    Comments: Hi! [facility] is the new pharmacy provider for individuals with [facility]. We need new prescriptions to dispense medications - please advise, thank you for your help.  Associated Diagnoses: Pain      !! acetaminophen (TYLENOL) 500 mg tablet Take 2 tablets (1,000 mg total) by mouth every 8 (eight) hours  Qty: 40 tablet, Refills: 0    Associated Diagnoses: Acute low back pain      carbidopa-levodopa (SINEMET)  mg per tablet Take 1 tablet by mouth 3 (three) times a day  Qty: 270 tablet, Refills: 3    Associated Diagnoses: Neuro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 (HCC)      Cholecalciferol (Vitamin D High Potency) 25 MCG (1000 UT) capsule Take 1 capsule (1,000 Units total) by mouth daily  Qty: 30 capsule, Refills: 6    Comments: Hi! [facility] is the new pharmacy provider for individuals with [facility] [place] [facility]. We need new prescriptions to dispense medications - please advise, thank you for your help.  Associated Diagnoses: Vitamin D deficiency      !! cloZAPine (CLOZARIL) 100 mg tablet Take 100 mg by mouth 2 (two) times a day      !! clozapine (CLOZARIL) 200 MG tablet Take 200 mg by mouth daily at bedtime       Deutetrabenazine ER (Austedo XR) 24 MG TB24 Take 24 mg by mouth daily      docusate sodium (COLACE) 100 mg capsule Take 1 capsule (100 mg total) by mouth 2 (two) times a day  Qty: 60 capsule, Refills: 1     Associated Diagnoses: Chronic constipation      Emollient (Lubriderm Advanced Therapy) LOTN Apply 1 Application topically as needed (rash)      fluticasone (FLONASE) 50 mcg/act nasal spray 2 sprays into each nostril daily  Qty: 16 g, Refills: 11    Associated Diagnoses: Nasal congestion      hydrOXYzine HCL (ATARAX) 25 mg tablet Take 1 tablet (25 mg total) by mouth every 6 (six) hours as needed for itching  Qty: 30 tablet, Refills: 5    Associated Diagnoses: Contact dermatitis, unspecified contact dermatitis type, unspecified trigger      ibuprofen (MOTRIN) 600 mg tablet Take 1 tablet (600 mg total) by mouth every 6 (six) hours as needed for mild pain  Qty: 30 tablet, Refills: 0    Associated Diagnoses: Acute low back pain      meclizine (ANTIVERT) 25 mg tablet Take 1 tablet (25 mg total) by mouth every 8 (eight) hours as needed for dizziness  Qty: 30 tablet, Refills: 3    Associated Diagnoses: Dizziness      metoprolol succinate (TOPROL-XL) 25 mg 24 hr tablet Take 1 tablet (25 mg total) by mouth daily  Qty: 90 tablet, Refills: 1    Comments: Hi! [facility] the new pharmacy provider for individuals with [facility] [place] [facility]. We need new prescriptions to dispense medications - please advise, thank you for your help.  Associated Diagnoses: Aortic root dilatation (HCC)      Nutritional Supplements (Ensure High Protein) LIQD Take 237 mL by mouth 2 (two) times a day  Qty: 237 mL, Refills: 10    Comments: Unsure of the dispense in ml for the 30 days, and will not let me put in the total of cans/mL for the 30 days.  Associated Diagnoses: Mild protein-calorie malnutrition (HCC)      olopatadine (PATANOL) 0.1 % ophthalmic solution Administer 1 drop to both eyes daily at bedtime      omeprazole (PriLOSEC) 40 MG capsule Take 1 capsule (40 mg total) by mouth daily  Qty: 31 capsule, Refills: 11    Comments: Hi! [facility] is the new pharmacy provider for individuals with [facility] [place] [facility]. We need new  prescriptions to dispense medications - please advise, thank you for your help.  Associated Diagnoses: Gastroesophageal reflux disease      !! polyethylene glycol (MIRALAX) 17 g packet Take once a day, may take an extra dose prn  Qty: 31 each, Refills: 11    Associated Diagnoses: Chronic constipation      !! polyethylene glycol (MIRALAX) 17 g packet Take 17 g by mouth daily  Qty: 10 each, Refills: 0    Associated Diagnoses: Constipation      Psyllium (Reguloid) 28.3 % POWD Take 1 Scoop by mouth 2 (two) times a day  Qty: 44 g, Refills: 1    Associated Diagnoses: Chronic constipation      senna (Senokot) 8.6 MG tablet Take 8.6 mg by mouth 2 (two) times a day      tamsulosin (FLOMAX) 0.4 mg Take 1 capsule (0.4 mg total) by mouth daily at bedtime  Qty: 90 capsule, Refills: 3    Associated Diagnoses: Post-void dribbling      temazepam (RESTORIL) 15 mg capsule Take 1 capsule (15 mg total) by mouth daily at bedtime  Qty: 30 capsule, Refills: 0    Associated Diagnoses: Paranoid schizophrenia (HCC)      topiramate (TOPAMAX) 25 mg tablet Take 25 mg by mouth 2 (two) times a day  Refills: 1      traZODone (DESYREL) 100 mg tablet Take 100 mg by mouth daily at bedtime      Zinc Oxide (Geni Protect Moisture Barrier) 12 % CREA Apply 1 Application topically once as needed (wound care)       !! - Potential duplicate medications found. Please discuss with provider.          No discharge procedures on file.    PDMP Review         Value Time User    PDMP Reviewed  Yes 6/18/2024  9:41 PM Judson Jaimes PA-C            ED Provider  Electronically Signed by             Sim Cabrera PA-C  08/10/24 0438       Sim Cabrera PA-C  08/10/24 0440       Sim Cabrera PA-C  08/10/24 044

## 2024-08-10 NOTE — ASSESSMENT & PLAN NOTE
Asymptomatic with patient maintaining brisk urine output with no need for Soto catheter.  Continue expulsive therapy using patient's home-scheduled Flomax 0.4 mg p.o. daily while in [facility] ([facility]).

## 2024-08-10 NOTE — ED NOTES
Pt. Ambulated steadily with very minimal assistance. Complaint of slight dizziness during ambulation. Upon reaching his room pt. Verbalized that the room is spinning, then asked for an ice cream. Franklin Montemayor made aware.     Floyd Mendoza RN  08/09/24 0166

## 2024-08-10 NOTE — ASSESSMENT & PLAN NOTE
Asymptomatic on patient's home-scheduled atorvastatin 40 mg p.o. q. dinner.  Hence, patient will continue with this medication while in [facility] ([facility]).

## 2024-08-10 NOTE — PLAN OF CARE
Problem: INFECTION - ADULT  Goal: Absence or prevention of progression during hospitalization  Description: INTERVENTIONS:  - Assess and monitor for signs and symptoms of infection  - Monitor lab/diagnostic results  - Monitor all insertion sites, i.e. indwelling lines, tubes, and drains  - Monitor endotracheal if appropriate and nasal secretions for changes in amount and color  - [place] appropriate cooling/warming therapies per order  - Administer medications as ordered  - Instruct and encourage patient and family to use good hand hygiene technique  - Identify and instruct in appropriate isolation precautions for identified infection/condition  Outcome: Progressing  Goal: Absence of fever/infection during neutropenic period  Description: INTERVENTIONS:  - Monitor WBC    Outcome: Progressing     Problem: SAFETY ADULT  Goal: Patient will remain free of falls  Description: INTERVENTIONS:  - Educate patient/family on patient safety including physical limitations  - Instruct patient to call for assistance with activity   - Consult OT/PT to assist with strengthening/mobility   - Keep Call bell within reach  - Keep bed low and locked with side rails adjusted as appropriate  - Keep care items and personal belongings within reach  - Initiate and maintain comfort rounds  - Make Fall Risk Sign visible to staff  - Offer Toileting every 2 Hours, in advance of need  - Initiate/Maintain bed alarm  - Obtain necessary fall risk management equipment: yellow socks  - Apply yellow socks and bracelet for high fall risk patients  - Consider moving patient to room near nurses station  Outcome: Progressing  Goal: Maintain or return to baseline ADL function  Description: INTERVENTIONS:  -  Assess patient's ability to carry out ADLs; assess patient's baseline for ADL function and identify physical deficits which impact ability to perform ADLs (bathing, care of mouth/teeth, toileting, grooming, dressing, etc.)  - Assess/evaluate cause of  self-care deficits   - Assess range of motion  - Assess patient's mobility; develop plan if impaired  - Assess patient's need for assistive devices and provide as appropriate  - Encourage maximum independence but intervene and supervise when necessary  - Involve family in performance of ADLs  - Assess for home care needs following discharge   - Consider OT consult to assist with ADL evaluation and planning for discharge  - Provide patient education as appropriate  Outcome: Progressing  Goal: Maintains/Returns to pre admission functional level  Description: INTERVENTIONS:  - Perform AM-PAC 6 Click Basic Mobility/ Daily Activity assessment daily.  - Set and communicate daily mobility goal to care team and patient/family/caregiver.   - Collaborate with rehabilitation services on mobility goals if consulted  - Perform Range of Motion 2 times a day.  - Reposition patient every 2 hours.  - Dangle patient 2 times a day  - Stand patient 2 times a day  - Ambulate patient 2 times a day  - Out of bed to chair 2 times a day   - Out of bed for meals 2 times a day  - Out of bed for toileting  - Record patient progress and toleration of activity level   Outcome: Progressing

## 2024-08-10 NOTE — ASSESSMENT & PLAN NOTE
Asymptomatic on Sinemet 25 mg - 100 mg tablet, 1 tablet p.o. twice daily at home and in [facility] ([facility]).

## 2024-08-10 NOTE — PLAN OF CARE
Problem: OCCUPATIONAL THERAPY ADULT  Goal: Performs self-care activities at highest level of function for planned discharge setting.  See evaluation for individualized goals.  Description: Treatment Interventions: ADL retraining, UE strengthening/ROM, Functional transfer training, Endurance training, Patient/family training          See flowsheet documentation for full assessment, interventions and recommendations.   Note: Limitation: Decreased ADL status, Decreased UE strength, Decreased endurance  Prognosis: Fair  Assessment: Patient is a 74 y.o. male seen for OT evaluation s/p admit to [facility] on 8/9/2024 w/Dizziness. Commorbidities affecting patient's functional performance at time of assessment include: GERD, parkinsonism, HTN, paranoid schizophrenia, h/o fall, COPD, overactive bladder, SBO, dizziness, and leukopenia.  Orders placed for OT evaluation and treatment.  Performed at least two patient identifiers during session including name and wristband.  Prior to admission, patient was living at [facility] in a behavioral health unit functioning at a mod I level with ADLs, meals and med management are completed by staff and he is provided with transportation as needed . Personal factors affecting patient at time of initial evaluation include: limited insight into deficits and difficulty performing ADLs. Upon evaluation, patient requires supervision assist for UB ADLs, set up and contact guard assist for LB ADLs, transfers and functional ambulation in room and bathroom with minimal  assist, with the use of Rolling Walker.  Patient is oriented x 4 during evaluation.Occupational performance is affected by the following deficits: decreased muscle strength, dynamic sit/ stand balance deficit with poor standing tolerance time for self care and functional mobility, and decreased activity tolerance.  Patient to benefit from continued Occupational Therapy treatment while in the hospital to address  deficits as defined above and maximize level of functional independence with ADLs and functional mobility. Occupational Performance areas to address include: grooming , bathing/ shower, dressing, toilet hygiene, transfer to all surfaces, and functional mobility. From OT standpoint, recommendation at time of d/c would be level II, moderate resource intensity.     Rehab Resource Intensity Level, OT: II (Moderate Resource Intensity)

## 2024-08-10 NOTE — PLAN OF CARE
Problem: PAIN - ADULT  Goal: Verbalizes/displays adequate comfort level or baseline comfort level  Description: Interventions:  - Encourage patient to monitor pain and request assistance  - Assess pain using appropriate pain scale  - Administer analgesics based on type and severity of pain and evaluate response  - Implement non-pharmacological measures as appropriate and evaluate response  - Consider cultural and social influences on pain and pain management  - Notify physician/advanced practitioner if interventions unsuccessful or patient reports new pain  Outcome: Progressing     Problem: INFECTION - ADULT  Goal: Absence or prevention of progression during hospitalization  Description: INTERVENTIONS:  - Assess and monitor for signs and symptoms of infection  - Monitor lab/diagnostic results  - Monitor all insertion sites, i.e. indwelling lines, tubes, and drains  - Monitor endotracheal if appropriate and nasal secretions for changes in amount and color  - [place] appropriate cooling/warming therapies per order  - Administer medications as ordered  - Instruct and encourage patient and family to use good hand hygiene technique  - Identify and instruct in appropriate isolation precautions for identified infection/condition  Outcome: Progressing  Goal: Absence of fever/infection during neutropenic period  Description: INTERVENTIONS:  - Monitor WBC    Outcome: Progressing     Problem: SAFETY ADULT  Goal: Patient will remain free of falls  Description: INTERVENTIONS:  - Educate patient/family on patient safety including physical limitations  - Instruct patient to call for assistance with activity   - Consult OT/PT to assist with strengthening/mobility   - Keep Call bell within reach  - Keep bed low and locked with side rails adjusted as appropriate  - Keep care items and personal belongings within reach  - Initiate and maintain comfort rounds  - Make Fall Risk Sign visible to staff  - Offer Toileting every  Hours,  in advance of need  - Initiate/Maintain alarm  - Obtain necessary fall risk management equipment:   - Apply yellow socks and bracelet for high fall risk patients  - Consider moving patient to room near nurses station  Outcome: Progressing  Goal: Maintain or return to baseline ADL function  Description: INTERVENTIONS:  -  Assess patient's ability to carry out ADLs; assess patient's baseline for ADL function and identify physical deficits which impact ability to perform ADLs (bathing, care of mouth/teeth, toileting, grooming, dressing, etc.)  - Assess/evaluate cause of self-care deficits   - Assess range of motion  - Assess patient's mobility; develop plan if impaired  - Assess patient's need for assistive devices and provide as appropriate  - Encourage maximum independence but intervene and supervise when necessary  - Involve family in performance of ADLs  - Assess for home care needs following discharge   - Consider OT consult to assist with ADL evaluation and planning for discharge  - Provide patient education as appropriate  Outcome: Progressing  Goal: Maintains/Returns to pre admission functional level  Description: INTERVENTIONS:  - Perform AM-PAC 6 Click Basic Mobility/ Daily Activity assessment daily.  - Set and communicate daily mobility goal to care team and patient/family/caregiver.   - Collaborate with rehabilitation services on mobility goals if consulted  - Perform Range of Motion  times a day.  - Reposition patient every  hours.  - Dangle patient  times a day  - Stand patient  times a day  - Ambulate patient  times a day  - Out of bed to chair  times a day   - Out of bed for meals times a day  - Out of bed for toileting  - Record patient progress and toleration of activity level   Outcome: Progressing     Problem: DISCHARGE PLANNING  Goal: Discharge to home or other facility with appropriate resources  Description: INTERVENTIONS:  - Identify barriers to discharge w/patient and caregiver  - Arrange for  needed discharge resources and transportation as appropriate  - Identify discharge learning needs (meds, wound care, etc.)  - Arrange for interpretive services to assist at discharge as needed  - Refer to Case Management Department for coordinating discharge planning if the patient needs post-hospital services based on physician/advanced practitioner order or complex needs related to functional status, cognitive ability, or social support system  Outcome: Progressing

## 2024-08-10 NOTE — ASSESSMENT & PLAN NOTE
Well-controlled with /71 (08/10/2024, 3:27 PM) on metoprolol 25 mg p.o. daily at home and in [facility] ([facility]).  Continue to check BP every 8 hours while patient remains in [facility] ([facility]).

## 2024-08-10 NOTE — CASE MANAGEMENT
Case Management Progress Note    Patient name Sumit Nails  Location /-01 MRN [id number]  : 1950 Date 8/10/2024       LOS (days): 0  Geometric Mean LOS (GMLOS) (days):   Days to GMLOS:        OBJECTIVE:        Current admission status: Inpatient  Preferred Pharmacy:   [facility] ([facility]) - [place], [place] - [address]  [address]  [address]  [place] [address]  Phone: [phone] Fax: [phone]    Primary Care Provider: Garry Hidalgo MD    Primary Insurance: MEDICARE  Secondary Insurance:     PROGRESS NOTE:  CM [facility] at [phone] and left message to call back CM regarding discharge arrangements.

## 2024-08-10 NOTE — PLAN OF CARE
Problem: PHYSICAL THERAPY ADULT  Goal: Performs mobility at highest level of function for planned discharge setting.  See evaluation for individualized goals.  Description: Treatment/Interventions: Functional transfer training, LE strengthening/ROM, Therapeutic exercise, Elevations, Endurance training, Equipment eval/education, Patient/family training, Bed mobility, Gait training, Continued evaluation, OT          See flowsheet documentation for full assessment, interventions and recommendations.  Note: Prognosis: Good  Problem List: Decreased strength, Decreased endurance, Impaired balance, Decreased mobility, Pain  Assessment: Sumit Nails is a 74 y.o. male admitted to [facility] on 8/9/2024 for Dizziness, hypertension, neuro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. Pt  has a past medical history of Asthma, Benign prostatic hyperplasia, COPD (chronic obstructive pulmonary disease) ([facility]), GERD (gastroesophageal reflux disease), Herpes zoster without complication (12/10/2021), Hypercholesteremia, Hypertension, Neuroleptic induced parkinsonism ([facility]), Paranoid schizophrenia ([facility]), and Psychiatric disorder.. Order placed for PT eval and tx. PT was consulted and pt was seen on 8/10/2024 for mobility assessment and d/c planning. Chart review and two person identifiers were completed.   Currently pt presents with decreased strength , decreased static sitting balance , decreased dynamic sitting balance , decreased static standing balance, decreased dynamic standing balance , decreased gait speed, decreased step length , and decreased muscular endurance . Due to these impairments, they will require assistance to perform bed mobility, sit to stand , ambulation, stair negotiation, and transfers. Pt is currently functioning at a supervision assistance x1 level for bed mobility, contact guard assistance x1 level for transfers, minimum assistance x1 level for ambulation with Rolling Walker. These activity limitations significantly impact their  ability to participate in previous home and community roles and responsibilities  and ambulation in home. Pt denied any dizziness/lightheadedness during session. Pt's supine /69, sitting /72, standing /66. The patient's AM-PAC Basic Mobility Inpatient Short Form Raw Score is 17. PT recommends level II moderate resource intensity. They will benefit from skilled therapy to to reduce the risk of falls, to allow for safe ambulation, and to maximize functional potential.  Barriers to Discharge: Other (Comment) (decrease in functional mobility)     Rehab Resource Intensity Level, PT: II (Moderate Resource Intensity)    See flowsheet documentation for full assessment.

## 2024-08-10 NOTE — ASSESSMENT & PLAN NOTE
Etiology of dizziness remains unclear, but I surmise the patient has an acute viral syndrome, based on the finding of acute peripheral monocytosis with white blood cell count depressed at 4.1 with 46% neutrophils 29% lymphocytes 15% monocytes, 9%  eosinophils, and 1% basophil on 08/09/2024, 9:02 PM).    Continue supportive treatment of patient's dizziness using patient's home-scheduled Antivert 25 mg p.o. every 8 hours while patient remains in [facility] ([facility]).    Await PT/OT service evaluations in the 08/11/2024 AM, to determine if patient may be discharged back to his home, or if patient may benefit from discharge to SNF for short-term rehab.

## 2024-08-10 NOTE — OCCUPATIONAL THERAPY NOTE
Occupational Therapy Evaluation        Patient Name: Sumit Nails  Today's Date: 8/10/2024           08/10/24 1052   OT Last Visit   OT Visit Date 08/10/24   Note Type   Note type Evaluation   Pain Assessment   Pain Assessment Tool 0-10   Pain Score 4   Pain Location/Orientation Location: Abdomen;Orientation: Left   Pain Onset/Description Onset: Ongoing   Patient's Stated Pain Goal No pain   Hospital Pain Intervention(s) Ambulation/increased activity;Repositioned   Restrictions/Precautions   Weight Bearing Precautions Per Order No   Braces or Orthoses Other (Comment)  (None)   Other Precautions Chair Alarm;Bed Alarm;Fall Risk;Pain   Home Living   Type of Home Other (Comment)  (Behavioral Health Unit- [place])   Home Layout Performs ADLs on one level   Bathroom Shower/Tub Walk-in shower   Bathroom Toilet Standard   Bathroom Equipment Grab bars in shower   Bathroom Accessibility Accessible   Home Equipment Other (Comment)  (None)   Prior Function   Level of [place] Independent with ADLs;Independent with functional mobility;Needs assistance with IADLS   Lives With Facility staff   Receives Help From Other (Comment)  (Staff)   IADLs Family/Friend/Other provides transportation;Family/Friend/Other provides meals;Family/Friend/Other provides medication management   Falls in the last 6 months 1 to 4   Vocational Retired   Lifestyle   Intrinsic Gratification Take walks, watch tv   ADL   Where Assessed Other (Comment)  (ADL levels based on functional performance during OT eval)   Eating Assistance 5  Supervision/Setup   Eating Deficit Setup   Grooming Assistance 5  Supervision/Setup   Grooming Deficit Setup   UB Bathing Assistance 5  Supervision/Setup   UB Bathing Deficit Setup   LB Bathing Assistance 4  Minimal Assistance  (CGA)   LB Bathing Deficit Setup;Supervision/safety   UB Dressing Assistance 5  Supervision/Setup   UB Dressing Deficit Supervision/safety;Increased time to complete   LB Dressing  Assistance 4  Minimal Assistance  (CGA)   LB Dressing Deficit Steadying;Increased time to complete;Supervision/safety   Toileting Assistance  4  Minimal Assistance   Toileting Deficit Steadying;Increased time to complete;Supervison/safety   Bed Mobility   Supine to Sit 5  Supervision   Additional items Bedrails;Increased time required   Transfers   Sit to Stand   (CGA)   Additional items Assist x 1;Bedrails   Stand to Sit   (CGA)   Additional items Assist x 1;Bedrails   Additional Comments Functional mobility with RW   Functional Mobility   Functional Mobility 4  Minimal assistance   Additional items Rolling walker  (Functional mobility in room and hallway with min A using RW)   Balance   Static Sitting Fair   Dynamic Sitting Fair   Static Standing Fair -   Dynamic Standing Poor +   Ambulatory Poor +   Activity Tolerance   Activity Tolerance Patient limited by fatigue   Medical Staff Made Aware TI Adrian  (Pt seen as a co-eval with PT due to the patient's co-morbidities, clinically unstable presentation, and present impairments which are a regression from the patient's baseline.)   Nurse Made Aware DARVIN Pham   RUE Overall AROM   R Shoulder Flexion Grossly 80 degrees   Hand Function   Gross Motor Coordination Functional   Fine Motor Coordination Functional   Vision-Basic Assessment   Current Vision No visual deficits   Visual History Other (Comment)  (None reported)   Patient Visual Report Balance difficulty   Psychosocial   Psychosocial (WDL) WDL   Perception   Inattention/Neglect Appears intact   Motor Planning Appears intact   Cognition   Overall Cognitive Status WFL   Arousal/Participation Alert;Responsive   Orientation Level Oriented X4   Memory Within functional limits   Following Commands Follows multistep commands without difficulty   Comments Pt agreeable to evaluation   Assessment   Limitation Decreased ADL status;Decreased UE strength;Decreased endurance   Prognosis Fair   Assessment Patient is a 74  y.o. male seen for OT evaluation s/p [facility] w/Dizziness. Commorbidities affecting patient's functional performance at time of assessment include: GERD, parkinsonism, HTN, paranoid schizophrenia, h/o fall, COPD, overactive bladder, SBO, dizziness, and leukopenia.  Orders placed for OT evaluation and treatment.  Performed at least two patient identifiers during session including name and wristband.  Prior to admission, patient was living at [facility] in a behavioral health unit functioning at a mod I level with ADLs, meals and med management are completed by staff and he is provided with transportation as needed . Personal factors affecting patient at time of initial evaluation include: limited insight into deficits and difficulty performing ADLs. Upon evaluation, patient requires supervision assist for UB ADLs, set up and contact guard assist for LB ADLs, transfers and functional ambulation in room and bathroom with minimal  assist, with the use of Rolling Walker.  Patient is oriented x 4 during evaluation.Occupational performance is affected by the following deficits: decreased muscle strength, dynamic sit/ stand balance deficit with poor standing tolerance time for self care and functional mobility, and decreased activity tolerance.  Patient to benefit from continued Occupational Therapy treatment while in the hospital to address deficits as defined above and maximize level of functional independence with ADLs and functional mobility. Occupational Performance areas to address include: grooming , bathing/ shower, dressing, toilet hygiene, transfer to all surfaces, and functional mobility. From OT standpoint, recommendation at time of d/c would be level II, moderate resource intensity.   Goals   Patient Goals to stop dizziness   Plan   Treatment Interventions ADL retraining;UE strengthening/ROM;Functional transfer training;Endurance training;Patient/family training   Goal  Expiration Date 08/24/24   OT Frequency 2-3x/wk   Discharge Recommendation   Rehab Resource Intensity Level, OT II (Moderate Resource Intensity)   AM-PAC Daily Activity Inpatient   Lower Body Dressing 3   Bathing 3   Toileting 3   Upper Body Dressing 3   Grooming 3   Eating 4   Daily Activity Raw Score 19   Daily Activity Standardized Score (Calc for Raw Score >=11) 40.22   AM-PAC Applied Cognition Inpatient   Following a Speech/Presentation 4   Understanding Ordinary Conversation 4   Taking Medications 2   Remembering Where Things Are Placed or Put Away 4   Remembering List of 4-5 Errands 4   Taking Care of Complicated Tasks 3   Applied Cognition Raw Score 21   Applied Cognition Standardized Score 44.3   Barthel Index   Feeding 10   Bathing 0   Grooming Score 0   Dressing Score 5   Bladder Score 10   Bowels Score 10   Toilet Use Score 5   Transfers (Bed/Chair) Score 10   Mobility (Level Surface) Score 0   Stairs Score 5   Barthel Index Score 55       Occupational therapy Goals: In 7- 10 days:  1.  Patient will increase OOB/ sitting tolerance to 2-4 hours per day for increased participation in self care and leisure tasks with no s/s of exertion  2.  Patient will identify s/s of exertion during ADL and functional mobility with no  verbal cues.  3. Patient will complete grooming and hygiene while standing at sink x 10 minutes with supervision.   4. Patient will complete LB ADLs at Mod I level,  with the use of AE as indicated.  5. Patient will complete UB ADLs at a Mod I level.   9- Patient/ Family  will demonstrate competency with UE Home Exercise Program.

## 2024-08-10 NOTE — H&P
Critical access hospital  H&P  Name: Sumit Nails 74 y.o. male I MRN: [id number]  Unit/Bed#: ED 12 I Date of Admission: 8/9/2024   Date of Service: 8/10/2024 I Hospital Day: 0      Assessment & Plan   * Dizziness  Assessment & Plan  Patient has dizziness upon ambulation.  Does take Antivert at home    Will do Antivert 25 mg every 8 hours  IV fluids  PT OT consult    Paranoid schizophrenia (HCC)  Assessment & Plan  Continue home medication of Clozaril, Deutetrabenazine and trazadone  Followed by psychiatry    Neuro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 (HCC)  Assessment & Plan  Continue Sinemet    Hypertension  Assessment & Plan  Continue Lopressor    GERD (gastroesophageal reflux disease)  Assessment & Plan  Continue PPI    BPH with obstruction/lower urinary tract symptoms  Assessment & Plan  Continue Flomax    Hypercholesteremia  Assessment & Plan  Continue statin           Disposition  #1  IV fluids  #2  Antivert 25 mg p.o. every 8 hours  #3  PT OT consult, case management consultation  #4  Return back to behavioral [facility]        VTE Prophylaxis: Heparin  / sequential compression device   Code Status: Level 1 - Full Code       Anticipated Length of Stay:  Patient will be admitted on an Observation basis with an anticipated length of stay of less than 2 midnights.   Justification for Hospital Stay: Please see detailed plans noted above.    Chief Complaint:     Dizziness  History of Present Illness:  Sumit Nails is a 74 y.o. male who has past medical history significant for paranoid schizophrenia, hypertension, hyperlipidemia, GERD, BPH comes in for dizziness that happened after dinner around 6:30 PM.  Only lasted 3 to 5 seconds.  Right after he took his medication and stood up.  When he got to the ER dizziness was resolved.  But then when he tried to ambulate he had some dizziness.  He still complains of dizziness upon ambulation.  He states he lives at a behavioral health [facility].  Health      Review  of Systems:    Constitutional:  Denies fever or chills   Eyes:  Denies change in visual acuity   HENT:  Denies nasal congestion or sore throat   Respiratory:  Denies cough or shortness of breath   Cardiovascular:  Denies chest pain or edema   GI:  Denies abdominal pain or bloody stools  :  Denies dysuria   Musculoskeletal:  Denies back pain or joint pain   Integument:  Denies rash   Neurologic: Dizziness  Endocrine:  Denies polyuria or polydipsia   Lymphatic:  Denies swollen glands   Psychiatric:  Denies depression or anxiety     Past Medical and Surgical History:   Past Medical History:   Diagnosis Date    Asthma     Benign prostatic hyperplasia     COPD (chronic obstructive pulmonary disease) (HCC)     GERD (gastroesophageal reflux disease)     Herpes zoster without complication 12/10/2021    Hypercholesteremia     Hypertension     Neuroleptic induced parkinsonism (HCC)     Paranoid schizophrenia (HCC)     Psychiatric disorder      Past Surgical History:   Procedure Laterality Date    CATARACT EXTRACTION      CHOLECYSTECTOMY LAPAROSCOPIC N/A 12/3/2023    Procedure: CHOLECYSTECTOMY LAPAROSCOPIC WITH INTRAOPERATIVE CHOLANGIOGRAM;  Surgeon: Maverick Tamayo MD;  Location: [facility]IN [facility];  Service: General    COLONOSCOPY         Meds/Allergies:  No current facility-administered medications on file prior to encounter.     Current Outpatient Medications on File Prior to Encounter   Medication Sig Dispense Refill    acetaminophen (Acetaminophen Extra Strength) 500 mg tablet Take 1 tablet (500 mg total) by mouth every 6 (six) hours as needed for mild pain 30 tablet 11    acetaminophen (TYLENOL) 500 mg tablet Take 2 tablets (1,000 mg total) by mouth every 8 (eight) hours 40 tablet 0    atorvastatin (LIPITOR) 40 mg tablet Take 1 tablet (40 mg total) by mouth daily 90 tablet 1    carbidopa-levodopa (SINEMET)  mg per tablet Take 1 tablet by mouth 3 (three) times a day (Patient taking differently: Take 1 tablet by mouth 2  (two) times a day) 270 tablet 3    Cholecalciferol (Vitamin D High Potency) 25 MCG (1000 UT) capsule Take 1 capsule (1,000 Units total) by mouth daily 30 capsule 6    cloZAPine (CLOZARIL) 100 mg tablet Take 100 mg by mouth 2 (two) times a day      clozapine (CLOZARIL) 200 MG tablet Take 200 mg by mouth daily at bedtime       Deutetrabenazine ER (Austedo XR) 24 MG TB24 Take 24 mg by mouth daily      docusate sodium (COLACE) 100 mg capsule Take 1 capsule (100 mg total) by mouth 2 (two) times a day 60 capsule 1    Emollient (Lubriderm Advanced Therapy) LOTN Apply 1 Application topically as needed (rash) (Patient not taking: Reported on 4/1/2024)      fluticasone (FLONASE) 50 mcg/act nasal spray 2 sprays into each nostril daily (Patient not taking: Reported on 5/3/2024) 16 g 11    hydrOXYzine HCL (ATARAX) 25 mg tablet Take 1 tablet (25 mg total) by mouth every 6 (six) hours as needed for itching 30 tablet 5    ibuprofen (MOTRIN) 600 mg tablet Take 1 tablet (600 mg total) by mouth every 6 (six) hours as needed for mild pain 30 tablet 0    meclizine (ANTIVERT) 25 mg tablet Take 1 tablet (25 mg total) by mouth every 8 (eight) hours as needed for dizziness 30 tablet 3    metoprolol succinate (TOPROL-XL) 25 mg 24 hr tablet Take 1 tablet (25 mg total) by mouth daily 90 tablet 1    Nutritional Supplements (Ensure High Protein) LIQD Take 237 mL by mouth 2 (two) times a day (Patient taking differently: Take 237 mL by mouth 2 (two) times a day vanilla) 237 mL 10    olopatadine (PATANOL) 0.1 % ophthalmic solution Administer 1 drop to both eyes daily at bedtime      omeprazole (PriLOSEC) 40 MG capsule Take 1 capsule (40 mg total) by mouth daily 31 capsule 11    polyethylene glycol (MIRALAX) 17 g packet Take once a day, may take an extra dose prn (Patient not taking: Reported on 5/3/2024) 31 each 11    polyethylene glycol (MIRALAX) 17 g packet Take 17 g by mouth daily 10 each 0    Psyllium (Reguloid) 28.3 % POWD Take 1 Scoop by  mouth 2 (two) times a day 44 g 1    senna (Senokot) 8.6 MG tablet Take 8.6 mg by mouth 2 (two) times a day (Patient not taking: Reported on 2024)      tamsulosin (FLOMAX) 0.4 mg Take 1 capsule (0.4 mg total) by mouth daily at bedtime 90 capsule 3    temazepam (RESTORIL) 15 mg capsule Take 1 capsule (15 mg total) by mouth daily at bedtime 30 capsule 0    topiramate (TOPAMAX) 25 mg tablet Take 25 mg by mouth 2 (two) times a day  1    traZODone (DESYREL) 100 mg tablet Take 100 mg by mouth daily at bedtime      Zinc Oxide (Geni Protect Moisture Barrier) 12 % CREA Apply 1 Application topically once as needed (wound care) (Patient not taking: Reported on 2024)             Allergies: No Known Allergies    History:  Marital Status: Single     Substance Use History:   Social History     Substance and Sexual Activity   Alcohol Use Not Currently     Social History     Tobacco Use   Smoking Status Former    Current packs/day: 0.00    Average packs/day: 1 pack/day for 20.0 years (20.0 ttl pk-yrs)    Types: Cigarettes    Start date:     Quit date: 2000    Years since quittin.6    Passive exposure: Past   Smokeless Tobacco Never     Social History     Substance and Sexual Activity   Drug Use Never       Family History:  Family History   Problem Relation Age of Onset    No Known Problems Mother     No Known Problems Father     Parkinsonism Son        Physical Exam:     Vitals:   Blood Pressure: 147/72 (08/10/24 0000)  Pulse: 66 (08/10/24 0000)  Temperature: 97.8 °F (36.6 °C) (24)  Respirations: 20 (08/10/24 0000)  SpO2: 100 % (08/10/24 0000)    Constitutional:  Non-toxic appearance  Eyes:  EOMI, No scleral icterus   HENT:   oropharynx moist, external ears normal, external nose normal   Respiratory:  No respiratory distress, no wheezing   Cardiovascular:  Normal rate, no murmurs   GI:  Soft, nondistended, no guarding   :  No costovertebral angle tenderness   Musculoskeletal:  no tenderness, no  deformities.   Integument:  no jaundice, no rash   Neurologic:  Alert &awake, communicative, CN 2-12 normal,  no focal deficits noted   Psychiatric:  Speech and behavior appropriate       Lab Results: I have personally reviewed pertinent reports.      Results from last 7 days   Lab Units 08/09/24  2102   WBC Thousand/uL 4.10*   HEMOGLOBIN g/dL 13.3   HEMATOCRIT % 38.7   PLATELETS Thousands/uL 143*   SEGS PCT % 46   LYMPHO PCT % 29   MONO PCT % 15*   EOS PCT % 9*     Results from last 7 days   Lab Units 08/09/24  2102   POTASSIUM mmol/L 3.9   CHLORIDE mmol/L 106   CO2 mmol/L 29   BUN mg/dL 19   CREATININE mg/dL 0.64   CALCIUM mg/dL 8.8   ALK PHOS U/L 101   ALT U/L 12   AST U/L 24     Results from last 7 days   Lab Units 08/09/24  2102   INR  1.02           Imaging: I have personally reviewed pertinent reports.      XR chest 1 view portable    Result Date: 8/9/2024  Narrative: XR CHEST PORTABLE INDICATION: dizziness. COMPARISON: CXR 5/16/2024, abdomen CT 6/30/2024, chest CT 9/2/2023. FINDINGS: Clear lungs. No pneumothorax or pleural effusion. Normal cardiomediastinal silhouette. Bones are unremarkable for age. Normal upper abdomen.     Impression: No acute cardiopulmonary disease. Workstation performed: [id number]     CT head without contrast    Result Date: 8/9/2024  Narrative: CT BRAIN - WITHOUT CONTRAST INDICATION:   Dizziness. COMPARISON: MR brain dated 4/26/2024, CT head dated 4/26/2024 TECHNIQUE:  CT examination of the brain was performed.  Multiplanar 2D reformatted images were created from the source data. Radiation dose length product (DLP) for this visit:  836 mGy-cm .  This examination, like all CT scans performed in the [facility], was performed utilizing techniques to minimize radiation dose exposure, including the use of iterative reconstruction and automated exposure control. IMAGE QUALITY:  Diagnostic. FINDINGS: PARENCHYMA: Decreased attenuation is noted in periventricular and  subcortical white matter demonstrating an appearance that is statistically most likely to represent mild microangiopathic change. No CT signs of acute infarction.  No intracranial mass, mass effect or midline shift.  No acute parenchymal hemorrhage. VENTRICLES AND EXTRA-AXIAL SPACES:  Normal for the patient's age. VISUALIZED ORBITS: Bilateral lens surgery. PARANASAL SINUSES: Normal visualized paranasal sinuses. CALVARIUM AND EXTRACRANIAL SOFT TISSUES:  Normal.     Impression: No acute intracranial abnormality. Workstation performed: [id number]     XR foot 2 views LEFT    Result Date: 8/2/2024  Narrative: XR FOOT 2 VW LEFT INDICATION: lateral foot pain. COMPARISON: None FINDINGS: No acute fracture or dislocation. No significant degenerative changes. No lytic or blastic osseous lesion. Unremarkable soft tissues.     Impression: No acute osseous abnormality. Computerized Assisted Algorithm (CAA) may have been used to analyze all applicable images. Workstation performed: [id number]     XR foot 2 views RIGHT    Result Date: 8/2/2024  Narrative: XR FOOT 2 VW RIGHT INDICATION: lateral foot pain. COMPARISON: None FINDINGS: No acute fracture or dislocation. No significant degenerative changes. No lytic or blastic osseous lesion. Unremarkable soft tissues.     Impression: No acute osseous abnormality. Computerized Assisted Algorithm (CAA) may have been used to analyze all applicable images. Workstation performed: [id number]         Medical decision making: Low  Diagnosis addressed: 1 uncomplicated illness  Data:   Reviewed  CBC, CMP, troponin  Ordered CBC, BMP,  Discussion of management with ER provider: Antivert IV fluids           Risk:  Prescription drug management: IV fluids, Antivert  Discussion for hospitalization with ER provider: Observation for therapy and dizziness          Epic Records Reviewed as well as Records in Care Everywhere    ** Please Note: Dragon 360 Dictation voice to text software was used in the  creation of this document. **

## 2024-08-10 NOTE — ED NOTES
Ambulated patient again by PCA martín Zamarripa complaint of dizziness on ambulation and didn't go far at all. Will notify MD. Floyd Mendoza RN  08/10/24 0031

## 2024-08-10 NOTE — ASSESSMENT & PLAN NOTE
Patient has dizziness upon ambulation.  Does take Antivert at home    Will do Antivert 25 mg every 8 hours  IV fluids  PT OT consult

## 2024-08-11 LAB
ANION GAP SERPL CALCULATED.3IONS-SCNC: 3 MMOL/L (ref 4–13)
BASOPHILS # BLD AUTO: 0.03 THOUSANDS/ÂΜL (ref 0–0.1)
BASOPHILS NFR BLD AUTO: 1 % (ref 0–1)
BUN SERPL-MCNC: 15 MG/DL (ref 5–25)
CALCIUM SERPL-MCNC: 8.5 MG/DL (ref 8.4–10.2)
CHLORIDE SERPL-SCNC: 109 MMOL/L (ref 96–108)
CO2 SERPL-SCNC: 27 MMOL/L (ref 21–32)
CREAT SERPL-MCNC: 0.67 MG/DL (ref 0.6–1.3)
EOSINOPHIL # BLD AUTO: 0.19 THOUSAND/ÂΜL (ref 0–0.61)
EOSINOPHIL NFR BLD AUTO: 6 % (ref 0–6)
ERYTHROCYTE [DISTWIDTH] IN BLOOD BY AUTOMATED COUNT: 13.8 % (ref 11.6–15.1)
GFR SERPL CREATININE-BSD FRML MDRD: 94 ML/MIN/1.73SQ M
GLUCOSE SERPL-MCNC: 135 MG/DL (ref 65–140)
HCT VFR BLD AUTO: 37 % (ref 36.5–49.3)
HGB BLD-MCNC: 12.4 G/DL (ref 12–17)
IMM GRANULOCYTES # BLD AUTO: 0.01 THOUSAND/UL (ref 0–0.2)
IMM GRANULOCYTES NFR BLD AUTO: 0 % (ref 0–2)
LYMPHOCYTES # BLD AUTO: 0.79 THOUSANDS/ÂΜL (ref 0.6–4.47)
LYMPHOCYTES NFR BLD AUTO: 24 % (ref 14–44)
MCH RBC QN AUTO: 33.9 PG (ref 26.8–34.3)
MCHC RBC AUTO-ENTMCNC: 33.5 G/DL (ref 31.4–37.4)
MCV RBC AUTO: 101 FL (ref 82–98)
MONOCYTES # BLD AUTO: 0.3 THOUSAND/ÂΜL (ref 0.17–1.22)
MONOCYTES NFR BLD AUTO: 9 % (ref 4–12)
NEUTROPHILS # BLD AUTO: 2.01 THOUSANDS/ÂΜL (ref 1.85–7.62)
NEUTS SEG NFR BLD AUTO: 60 % (ref 43–75)
NRBC BLD AUTO-RTO: 0 /100 WBCS
PLATELET # BLD AUTO: 122 THOUSANDS/UL (ref 149–390)
PMV BLD AUTO: 9.3 FL (ref 8.9–12.7)
POTASSIUM SERPL-SCNC: 3.8 MMOL/L (ref 3.5–5.3)
RBC # BLD AUTO: 3.66 MILLION/UL (ref 3.88–5.62)
SODIUM SERPL-SCNC: 139 MMOL/L (ref 135–147)
WBC # BLD AUTO: 3.33 THOUSAND/UL (ref 4.31–10.16)

## 2024-08-11 PROCEDURE — 99232 SBSQ HOSP IP/OBS MODERATE 35: CPT | Performed by: INTERNAL MEDICINE

## 2024-08-11 PROCEDURE — 85025 COMPLETE CBC W/AUTO DIFF WBC: CPT | Performed by: INTERNAL MEDICINE

## 2024-08-11 PROCEDURE — 80048 BASIC METABOLIC PNL TOTAL CA: CPT | Performed by: INTERNAL MEDICINE

## 2024-08-11 RX ORDER — LIDOCAINE 50 MG/G
1 PATCH TOPICAL DAILY
Status: DISCONTINUED | OUTPATIENT
Start: 2024-08-12 | End: 2024-08-11

## 2024-08-11 RX ADMIN — TOPIRAMATE 25 MG: 25 TABLET, FILM COATED ORAL at 08:10

## 2024-08-11 RX ADMIN — SODIUM CHLORIDE, SODIUM GLUCONATE, SODIUM ACETATE, POTASSIUM CHLORIDE, MAGNESIUM CHLORIDE, SODIUM PHOSPHATE, DIBASIC, AND POTASSIUM PHOSPHATE 75 ML/HR: .53; .5; .37; .037; .03; .012; .00082 INJECTION, SOLUTION INTRAVENOUS at 03:29

## 2024-08-11 RX ADMIN — SODIUM CHLORIDE, SODIUM GLUCONATE, SODIUM ACETATE, POTASSIUM CHLORIDE, MAGNESIUM CHLORIDE, SODIUM PHOSPHATE, DIBASIC, AND POTASSIUM PHOSPHATE 75 ML/HR: .53; .5; .37; .037; .03; .012; .00082 INJECTION, SOLUTION INTRAVENOUS at 15:52

## 2024-08-11 RX ADMIN — CARBIDOPA AND LEVODOPA 1 TABLET: 25; 100 TABLET ORAL at 08:10

## 2024-08-11 RX ADMIN — MECLIZINE HYDROCHLORIDE 25 MG: 25 TABLET ORAL at 15:52

## 2024-08-11 RX ADMIN — TOPIRAMATE 25 MG: 25 TABLET, FILM COATED ORAL at 18:20

## 2024-08-11 RX ADMIN — HEPARIN SODIUM 5000 UNITS: 5000 INJECTION INTRAVENOUS; SUBCUTANEOUS at 11:59

## 2024-08-11 RX ADMIN — MECLIZINE HYDROCHLORIDE 25 MG: 25 TABLET ORAL at 06:26

## 2024-08-11 RX ADMIN — ATORVASTATIN CALCIUM 40 MG: 40 TABLET, FILM COATED ORAL at 15:52

## 2024-08-11 RX ADMIN — MECLIZINE HYDROCHLORIDE 25 MG: 25 TABLET ORAL at 22:12

## 2024-08-11 RX ADMIN — CLOZAPINE 200 MG: 100 TABLET ORAL at 03:28

## 2024-08-11 RX ADMIN — POLYETHYLENE GLYCOL 3350 17 G: 17 POWDER, FOR SOLUTION ORAL at 08:10

## 2024-08-11 RX ADMIN — TAMSULOSIN HYDROCHLORIDE 0.4 MG: 0.4 CAPSULE ORAL at 21:21

## 2024-08-11 RX ADMIN — CLOZAPINE 100 MG: 100 TABLET ORAL at 18:20

## 2024-08-11 RX ADMIN — DOCUSATE SODIUM 100 MG: 100 CAPSULE, LIQUID FILLED ORAL at 08:10

## 2024-08-11 RX ADMIN — DOCUSATE SODIUM 100 MG: 100 CAPSULE, LIQUID FILLED ORAL at 18:20

## 2024-08-11 RX ADMIN — METOPROLOL SUCCINATE 25 MG: 25 TABLET, EXTENDED RELEASE ORAL at 08:10

## 2024-08-11 RX ADMIN — TRAZODONE HYDROCHLORIDE 100 MG: 100 TABLET ORAL at 21:21

## 2024-08-11 RX ADMIN — HEPARIN SODIUM 5000 UNITS: 5000 INJECTION INTRAVENOUS; SUBCUTANEOUS at 18:20

## 2024-08-11 RX ADMIN — CARBIDOPA AND LEVODOPA 1 TABLET: 25; 100 TABLET ORAL at 22:12

## 2024-08-11 RX ADMIN — CLOZAPINE 100 MG: 100 TABLET ORAL at 08:10

## 2024-08-11 NOTE — PLAN OF CARE
Problem: PAIN - ADULT  Goal: Verbalizes/displays adequate comfort level or baseline comfort level  Description: Interventions:  - Encourage patient to monitor pain and request assistance  - Assess pain using appropriate pain scale  - Administer analgesics based on type and severity of pain and evaluate response  - Implement non-pharmacological measures as appropriate and evaluate response  - Consider cultural and social influences on pain and pain management  - Notify physician/advanced practitioner if interventions unsuccessful or patient reports new pain  Outcome: Progressing     Problem: INFECTION - ADULT  Goal: Absence or prevention of progression during hospitalization  Description: INTERVENTIONS:  - Assess and monitor for signs and symptoms of infection  - Monitor lab/diagnostic results  - Monitor all insertion sites, i.e. indwelling lines, tubes, and drains  - Monitor endotracheal if appropriate and nasal secretions for changes in amount and color  - [place] appropriate cooling/warming therapies per order  - Administer medications as ordered  - Instruct and encourage patient and family to use good hand hygiene technique  - Identify and instruct in appropriate isolation precautions for identified infection/condition  Outcome: Progressing  Goal: Absence of fever/infection during neutropenic period  Description: INTERVENTIONS:  - Monitor WBC    Outcome: Progressing     Problem: SAFETY ADULT  Goal: Patient will remain free of falls  Description: INTERVENTIONS:  - Educate patient/family on patient safety including physical limitations  - Instruct patient to call for assistance with activity   - Consult OT/PT to assist with strengthening/mobility   - Keep Call bell within reach  - Keep bed low and locked with side rails adjusted as appropriate  - Keep care items and personal belongings within reach  - Initiate and maintain comfort rounds  - Make Fall Risk Sign visible to staff  - Offer Toileting every  Hours,  in advance of need  - Initiate/Maintain alarm  - Obtain necessary fall risk management equipment:   - Apply yellow socks and bracelet for high fall risk patients  - Consider moving patient to room near nurses station  Outcome: Progressing  Goal: Maintain or return to baseline ADL function  Description: INTERVENTIONS:  -  Assess patient's ability to carry out ADLs; assess patient's baseline for ADL function and identify physical deficits which impact ability to perform ADLs (bathing, care of mouth/teeth, toileting, grooming, dressing, etc.)  - Assess/evaluate cause of self-care deficits   - Assess range of motion  - Assess patient's mobility; develop plan if impaired  - Assess patient's need for assistive devices and provide as appropriate  - Encourage maximum independence but intervene and supervise when necessary  - Involve family in performance of ADLs  - Assess for home care needs following discharge   - Consider OT consult to assist with ADL evaluation and planning for discharge  - Provide patient education as appropriate  Outcome: Progressing  Goal: Maintains/Returns to pre admission functional level  Description: INTERVENTIONS:  - Perform AM-PAC 6 Click Basic Mobility/ Daily Activity assessment daily.  - Set and communicate daily mobility goal to care team and patient/family/caregiver.   - Collaborate with rehabilitation services on mobility goals if consulted  - Perform Range of Motion  times a day.  - Reposition patient every  hours.  - Dangle patient  times a day  - Stand patient  times a day  - Ambulate patient  times a day  - Out of bed to chair  times a day   - Out of bed for meals times a day  - Out of bed for toileting  - Record patient progress and toleration of activity level   Outcome: Progressing     Problem: DISCHARGE PLANNING  Goal: Discharge to home or other facility with appropriate resources  Description: INTERVENTIONS:  - Identify barriers to discharge w/patient and caregiver  - Arrange for  needed discharge resources and transportation as appropriate  - Identify discharge learning needs (meds, wound care, etc.)  - Arrange for interpretive services to assist at discharge as needed  - Refer to Case Management Department for coordinating discharge planning if the patient needs post-hospital services based on physician/advanced practitioner order or complex needs related to functional status, cognitive ability, or social support system  Outcome: Progressing

## 2024-08-11 NOTE — PROGRESS NOTES
[facility]  Progress Note  Name: Sumit Nalis I  MRN: [id number]  Unit/Bed#: -01 I Date of Admission: 8/9/2024   Date of Service: 8/11/2024 I Hospital Day: 1    Assessment & Plan   BPH with obstruction/lower urinary tract symptoms  Assessment & Plan  Asymptomatic with patient maintaining brisk urine output with no need for Soto catheter.  Continue expulsive therapy using patient's home-scheduled Flomax 0.4 mg p.o. daily while in [facility]     Paranoid schizophrenia (HCC)  Assessment & Plan  Asymptomatic on patient's home-scheduled Clozaril 100 mg p.o. twice daily, Clozaril 200 mg p.o. nightly, Deutetrabenazine 24 mg p.o. daily, and trazadone 100 mg p.o. nightly at home and in [facility]     Hypercholesteremia  Assessment & Plan  Asymptomatic on patient's home-scheduled atorvastatin 40 mg p.o. q. dinner.  Hence, patient will continue with this medication while in [facility]     Hypertension  Assessment & Plan  Well-controlled with /71 (08/10/2024, 3:27 PM) and /69 (08/11/2024, 2:39 PM) on metoprolol 25 mg p.o. daily at home and in [facility]  Continue to check BP every 8 hours while patient remains in [facility]     Neuro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 (HCC)  Assessment & Plan  Asymptomatic on Sinemet 25 mg - 100 mg tablet, 1 tablet p.o. twice daily at home and in [facility]     GERD (gastroesophageal reflux disease)  Assessment & Plan  Hold off patient's home-scheduled Protonix 20 mg p.o. daily, given the potential for this medication to cause falls, especially in this patient was complaining of increasing dizzines, in the setting of presumed acute viral syndrome.     * Dizziness  Assessment & Plan  Etiology of dizziness remains unclear, but I surmise the patient has an acute viral syndrome, based on the  initial finding of acute peripheral monocytosis with white blood cell count depressed at 4.1 with 46% neutrophils 29% lymphocytes 15% monocytes, 9%  eosinophils, and 1% basophil on 08/09/2024, 9:02 PM).  Patient's white blood cell count remains depressed at 3.3 with 60% neutrophils 24% lymphocytes 9% monocytes 6% eosinophils, and 1% basophil on 08/11/2024, 10:13am; note that acute peripheral monocytosis has resolved.     Continue supportive treatment of patient's dizziness using patient's home-scheduled Antivert 25 mg p.o. every 8 hours while patient remains in [facility] ([facility] with patient reporting that his dizziness has improved today (08/11/2024).     Await PT/OT service evaluations in the 08/12/2024 AM, to determine if patient may be discharged back to his home, or if patient may benefit from discharge to SNF for short-term rehab.           VTE Pharmacologic Prophylaxis: VTE Score: 4 Moderate Risk (Score 3-4) - Pharmacological DVT Prophylaxis Ordered: heparin.    Mobility:   Basic Mobility Inpatient Raw Score: 17  JH-HLM Goal: 5: Stand one or more mins  JH-HLM Achieved: 8: Walk 250 feet ot more  JH-HLM Goal NOT achieved. Continue with multidisciplinary rounding and encourage appropriate mobility to improve upon JH-HLM goals.    Patient Centered Rounds: I performed bedside rounds with nursing staff today.   Discussions with Specialists or Other Care Team Provider: No.    Education and Discussions with Family / Patient: Patient declined call to .     Total Time Spent on Date of Encounter in care of patient: 24 mins. This time was spent on one or more of the following: performing physical exam; counseling and coordination of care; obtaining or reviewing history; documenting in the medical record; reviewing/ordering tests, medications or procedures; communicating with other healthcare professionals and discussing with patient's family/caregivers.    Current Length of Stay: 1  "day(s)  Current Patient Status: Inpatient   Certification Statement: The patient, admitted on an observation basis, will now require > 2 midnight hospital stay due to ambulatory dysfunction due to acute viral syndrome.  Discharge Plan: Anticipate discharge in 48 hrs to home.    Code Status: Level 1 - Full Code    Subjective:   \"I feel less dizzy right now.\"    Objective:     Vitals:   Temp (24hrs), Av.1 °F (36.7 °C), Min:98 °F (36.7 °C), Max:98.2 °F (36.8 °C)    Temp:  [98 °F (36.7 °C)-98.2 °F (36.8 °C)] 98.2 °F (36.8 °C)  HR:  [64-71] 71  Resp:  [16] 16  BP: (126-138)/(69-73) 126/69  SpO2:  [94 %-98 %] 98 %  Body mass index is 24.71 kg/m².     Input and Output Summary (last 24 hours):     Intake/Output Summary (Last 24 hours) at 2024 1612  Last data filed at 2024 1552  Gross per 24 hour   Intake 2097.5 ml   Output 2740 ml   Net -642.5 ml       Physical Exam:   Physical Exam  Vitals reviewed.   Constitutional:       Appearance: Normal appearance. He is normal weight.   HENT:      Head: Normocephalic and atraumatic.      Nose: Nose normal.      Mouth/Throat:      Mouth: Mucous membranes are moist.      Pharynx: Oropharynx is clear.   Eyes:      Extraocular Movements: Extraocular movements intact.      Conjunctiva/sclera: Conjunctivae normal.      Pupils: Pupils are equal, round, and reactive to light.   Cardiovascular:      Rate and Rhythm: Normal rate and regular rhythm.      Pulses: Normal pulses.      Heart sounds: Normal heart sounds.   Pulmonary:      Effort: Pulmonary effort is normal.      Breath sounds: Normal breath sounds.   Abdominal:      General: Abdomen is flat. Bowel sounds are normal.      Palpations: Abdomen is soft.   Musculoskeletal:         General: Normal range of motion.      Cervical back: Normal range of motion and neck supple.   Skin:     General: Skin is warm and dry.      Capillary Refill: Capillary refill takes less than 2 seconds.   Neurological:      General: No focal " deficit present.      Mental Status: He is alert and oriented to person, place, and time. Mental status is at baseline.   Psychiatric:         Mood and Affect: Mood normal.         Thought Content: Thought content normal.         Judgment: Judgment normal.     No psychosis.    Additional Data:     Labs:  Results from last 7 days   Lab Units 08/11/24  1013   WBC Thousand/uL 3.33*   HEMOGLOBIN g/dL 12.4   HEMATOCRIT % 37.0   PLATELETS Thousands/uL 122*   SEGS PCT % 60   LYMPHO PCT % 24   MONO PCT % 9   EOS PCT % 6     Results from last 7 days   Lab Units 08/11/24  1013 08/09/24  2102   SODIUM mmol/L 139 138   POTASSIUM mmol/L 3.8 3.9   CHLORIDE mmol/L 109* 106   CO2 mmol/L 27 29   BUN mg/dL 15 19   CREATININE mg/dL 0.67 0.64   ANION GAP mmol/L 3* 3*   CALCIUM mg/dL 8.5 8.8   ALBUMIN g/dL  --  3.7   TOTAL BILIRUBIN mg/dL  --  0.42   ALK PHOS U/L  --  101   ALT U/L  --  12   AST U/L  --  24   GLUCOSE RANDOM mg/dL 135 77     Results from last 7 days   Lab Units 08/09/24  2102   INR  1.02     Results from last 7 days   Lab Units 08/09/24  2105   POC GLUCOSE mg/dl 93               Lines/Drains:  Invasive Devices       Peripheral Intravenous Line  Duration             Peripheral IV 08/09/24 Left;Ventral (anterior) Forearm 1 day                          Imaging: No pertinent imaging reviewed.    Recent Cultures (last 7 days):         Last 24 Hours Medication List:   Current Facility-Administered Medications   Medication Dose Route Frequency Provider Last Rate    acetaminophen  650 mg Oral Q6H PRN Flo Avelar MD      atorvastatin  40 mg Oral Daily With Dinner Adan Holden MD      carbidopa-levodopa  1 tablet Oral BID Adan Holden MD      cloZAPine  100 mg Oral BID Adan Holden MD      clozapine  200 mg Oral HS Adan Holden MD      Deutetrabenazine ER  24 mg Oral Daily Adan Holden MD      docusate sodium  100 mg Oral BID Adan Holden MD      heparin (porcine)  5,000 Units  Subcutaneous Q8H JOSH Adan Holden MD      meclizine  25 mg Oral Q8H JOSH Adan Holden MD      metoprolol succinate  25 mg Oral Daily Adan Holden MD      multi-electrolyte  75 mL/hr Intravenous Continuous Adan Holden MD 75 mL/hr (08/11/24 1552)    polyethylene glycol  17 g Oral Daily Adan Holden MD      tamsulosin  0.4 mg Oral HS Adan Holden MD      topiramate  25 mg Oral BID Adan Holden MD      traZODone  100 mg Oral HS Adan Holden MD          Today, Patient Was Seen By: Flo Avelar MD    **Please Note: This note may have been constructed using a voice recognition system.**

## 2024-08-11 NOTE — PLAN OF CARE
Problem: PAIN - ADULT  Goal: Verbalizes/displays adequate comfort level or baseline comfort level  Description: Interventions:  - Encourage patient to monitor pain and request assistance  - Assess pain using appropriate pain scale  - Administer analgesics based on type and severity of pain and evaluate response  - Implement non-pharmacological measures as appropriate and evaluate response  - Consider cultural and social influences on pain and pain management  - Notify physician/advanced practitioner if interventions unsuccessful or patient reports new pain  Outcome: Progressing     Problem: INFECTION - ADULT  Goal: Absence or prevention of progression during hospitalization  Description: INTERVENTIONS:  - Assess and monitor for signs and symptoms of infection  - Monitor lab/diagnostic results  - Monitor all insertion sites, i.e. indwelling lines, tubes, and drains  - Monitor endotracheal if appropriate and nasal secretions for changes in amount and color  - [place] appropriate cooling/warming therapies per order  - Administer medications as ordered  - Instruct and encourage patient and family to use good hand hygiene technique  - Identify and instruct in appropriate isolation precautions for identified infection/condition  Outcome: Progressing  Goal: Absence of fever/infection during neutropenic period  Description: INTERVENTIONS:  - Monitor WBC    Outcome: Progressing     Problem: SAFETY ADULT  Goal: Patient will remain free of falls  Description: INTERVENTIONS:  - Educate patient/family on patient safety including physical limitations  - Instruct patient to call for assistance with activity   - Consult OT/PT to assist with strengthening/mobility   - Keep Call bell within reach  - Keep bed low and locked with side rails adjusted as appropriate  - Keep care items and personal belongings within reach  - Initiate and maintain comfort rounds  - Make Fall Risk Sign visible to staff  - Offer Toileting every 2 Hours,  in advance of need  - Initiate/Maintain bed alarm  - Obtain necessary fall risk management equipment: walker  - Apply yellow socks and bracelet for high fall risk patients  - Consider moving patient to room near nurses station  Outcome: Progressing  Goal: Maintain or return to baseline ADL function  Description: INTERVENTIONS:  -  Assess patient's ability to carry out ADLs; assess patient's baseline for ADL function and identify physical deficits which impact ability to perform ADLs (bathing, care of mouth/teeth, toileting, grooming, dressing, etc.)  - Assess/evaluate cause of self-care deficits   - Assess range of motion  - Assess patient's mobility; develop plan if impaired  - Assess patient's need for assistive devices and provide as appropriate  - Encourage maximum independence but intervene and supervise when necessary  - Involve family in performance of ADLs  - Assess for home care needs following discharge   - Consider OT consult to assist with ADL evaluation and planning for discharge  - Provide patient education as appropriate  Outcome: Progressing  Goal: Maintains/Returns to pre admission functional level  Description: INTERVENTIONS:  - Perform AM-PAC 6 Click Basic Mobility/ Daily Activity assessment daily.  - Set and communicate daily mobility goal to care team and patient/family/caregiver.   - Collaborate with rehabilitation services on mobility goals if consulted  - Perform Range of Motion 3 times a day.  - Reposition patient every 2 hours.  - Dangle patient 3 times a day  - Stand patient 3 times a day  - Ambulate patient 3 times a day  - Out of bed to chair 3 times a day   - Out of bed for meals 3 times a day  - Out of bed for toileting  - Record patient progress and toleration of activity level   Outcome: Progressing     Problem: DISCHARGE PLANNING  Goal: Discharge to home or other facility with appropriate resources  Description: INTERVENTIONS:  - Identify barriers to discharge w/patient and  caregiver  - Arrange for needed discharge resources and transportation as appropriate  - Identify discharge learning needs (meds, wound care, etc.)  - Arrange for interpretive services to assist at discharge as needed  - Refer to Case Management Department for coordinating discharge planning if the patient needs post-hospital services based on physician/advanced practitioner order or complex needs related to functional status, cognitive ability, or social support system  Outcome: Progressing     Problem: Knowledge Deficit  Goal: Patient/family/caregiver demonstrates understanding of disease process, treatment plan, medications, and discharge instructions  Description: Complete learning assessment and assess knowledge base.  Interventions:  - Provide teaching at level of understanding  - Provide teaching via preferred learning methods  Outcome: Progressing

## 2024-08-12 VITALS
TEMPERATURE: 97.4 F | BODY MASS INDEX: 24.78 KG/M2 | RESPIRATION RATE: 18 BRPM | WEIGHT: 167.33 LBS | SYSTOLIC BLOOD PRESSURE: 134 MMHG | OXYGEN SATURATION: 95 % | DIASTOLIC BLOOD PRESSURE: 71 MMHG | HEART RATE: 64 BPM | HEIGHT: 69 IN

## 2024-08-12 LAB
BASOPHILS # BLD AUTO: 0.03 THOUSANDS/ÂΜL (ref 0–0.1)
BASOPHILS NFR BLD AUTO: 1 % (ref 0–1)
EOSINOPHIL # BLD AUTO: 0.31 THOUSAND/ÂΜL (ref 0–0.61)
EOSINOPHIL NFR BLD AUTO: 7 % (ref 0–6)
ERYTHROCYTE [DISTWIDTH] IN BLOOD BY AUTOMATED COUNT: 13.8 % (ref 11.6–15.1)
HCT VFR BLD AUTO: 36.3 % (ref 36.5–49.3)
HGB BLD-MCNC: 12.3 G/DL (ref 12–17)
IMM GRANULOCYTES # BLD AUTO: 0.01 THOUSAND/UL (ref 0–0.2)
IMM GRANULOCYTES NFR BLD AUTO: 0 % (ref 0–2)
LYMPHOCYTES # BLD AUTO: 0.96 THOUSANDS/ÂΜL (ref 0.6–4.47)
LYMPHOCYTES NFR BLD AUTO: 20 % (ref 14–44)
MCH RBC QN AUTO: 34 PG (ref 26.8–34.3)
MCHC RBC AUTO-ENTMCNC: 33.9 G/DL (ref 31.4–37.4)
MCV RBC AUTO: 100 FL (ref 82–98)
MONOCYTES # BLD AUTO: 0.43 THOUSAND/ÂΜL (ref 0.17–1.22)
MONOCYTES NFR BLD AUTO: 9 % (ref 4–12)
MRSA NOSE QL CULT: ABNORMAL
NEUTROPHILS # BLD AUTO: 2.97 THOUSANDS/ÂΜL (ref 1.85–7.62)
NEUTS SEG NFR BLD AUTO: 63 % (ref 43–75)
NRBC BLD AUTO-RTO: 0 /100 WBCS
PLATELET # BLD AUTO: 130 THOUSANDS/UL (ref 149–390)
PMV BLD AUTO: 9.1 FL (ref 8.9–12.7)
RBC # BLD AUTO: 3.62 MILLION/UL (ref 3.88–5.62)
WBC # BLD AUTO: 4.71 THOUSAND/UL (ref 4.31–10.16)

## 2024-08-12 PROCEDURE — 99239 HOSP IP/OBS DSCHRG MGMT >30: CPT | Performed by: FAMILY MEDICINE

## 2024-08-12 PROCEDURE — 85025 COMPLETE CBC W/AUTO DIFF WBC: CPT | Performed by: INTERNAL MEDICINE

## 2024-08-12 RX ADMIN — CLOZAPINE 200 MG: 100 TABLET ORAL at 05:26

## 2024-08-12 RX ADMIN — MECLIZINE HYDROCHLORIDE 25 MG: 25 TABLET ORAL at 08:04

## 2024-08-12 RX ADMIN — TOPIRAMATE 25 MG: 25 TABLET, FILM COATED ORAL at 08:04

## 2024-08-12 RX ADMIN — POLYETHYLENE GLYCOL 3350 17 G: 17 POWDER, FOR SOLUTION ORAL at 08:04

## 2024-08-12 RX ADMIN — DOCUSATE SODIUM 100 MG: 100 CAPSULE, LIQUID FILLED ORAL at 08:04

## 2024-08-12 RX ADMIN — CLOZAPINE 100 MG: 100 TABLET ORAL at 09:11

## 2024-08-12 RX ADMIN — HEPARIN SODIUM 5000 UNITS: 5000 INJECTION INTRAVENOUS; SUBCUTANEOUS at 05:26

## 2024-08-12 RX ADMIN — SODIUM CHLORIDE, SODIUM GLUCONATE, SODIUM ACETATE, POTASSIUM CHLORIDE, MAGNESIUM CHLORIDE, SODIUM PHOSPHATE, DIBASIC, AND POTASSIUM PHOSPHATE 75 ML/HR: .53; .5; .37; .037; .03; .012; .00082 INJECTION, SOLUTION INTRAVENOUS at 05:27

## 2024-08-12 RX ADMIN — HEPARIN SODIUM 5000 UNITS: 5000 INJECTION INTRAVENOUS; SUBCUTANEOUS at 11:16

## 2024-08-12 RX ADMIN — METOPROLOL SUCCINATE 25 MG: 25 TABLET, EXTENDED RELEASE ORAL at 08:04

## 2024-08-12 RX ADMIN — CARBIDOPA AND LEVODOPA 1 TABLET: 25; 100 TABLET ORAL at 08:04

## 2024-08-12 NOTE — DISCHARGE SUMMARY
Discharge Summary - Sumit Nails 74 y.o. male MRN: [id number]    Unit/Bed#: -01 Encounter: [id number]    Admission Date:   Admission Orders (From admission, onward)       Ordered        08/10/24 1302  INPATIENT ADMISSION  Once            08/10/24 0143  Place in Observation  Once                            Admitting Diagnosis: Dizziness [R42]    HPI: Sumit Nails is a 74 y.o. male who has past medical history significant for paranoid schizophrenia, hypertension, hyperlipidemia, GERD, BPH comes in for dizziness that happened after dinner around 6:30 PM.  Only lasted 3 to 5 seconds.  Right after he took his medication and stood up.  When he got to the ER dizziness was resolved.  But then when he tried to ambulate he had some dizziness.  He still complains of dizziness upon ambulation.  He states he lives at a behavioral health facility [facility].  Health     Procedures Performed: No orders of the defined types were placed in this encounter.      Summary of Hospital Course:     Dizziness    74-year-old male with history of paranoid schizophrenia hypertension and GERD presented with a complaint of dizziness, placed on IV fluids and meclizine.  Underwent CT-guided imaging without acute abnormality.  Patient was seen by PT/OT, deemed moderate resource intensity.  He was AAOx4 upon my exam, and offers no complaints of dizziness.  Patient will be discharged back to group home.  It is possible that the it is due to polypharmacy, I did discontinue hydroxyzine on discharge as he was not utilizing it while hospitalized.    Paranoid schizophrenia no changes to his chronic medications      Significant Findings, Care, Treatment and Services Provided:     CT head    NAD    Complications: none    Discharge Diagnosis: see above    Medical Problems       Resolved Problems  Date Reviewed: 8/10/2024   None         Condition at Discharge: fair         Discharge instructions/Information to patient and family:   See after visit  summary for information provided to patient and family.      Provisions for Follow-Up Care:  See after visit summary for information related to follow-up care and any pertinent home health orders.      PCP: Garry Hidalgo MD    Disposition: Home    Planned Readmission: No      Discharge Statement   I spent 35 minutes discharging the patient. This time was spent on the day of discharge. I had direct contact with the patient on the day of discharge. Additional documentation is required if more than 30 minutes were spent on discharge.     Discharge Medications:  See after visit summary for reconciled discharge medications provided to patient and family.

## 2024-08-12 NOTE — PLAN OF CARE
Problem: PAIN - ADULT  Goal: Verbalizes/displays adequate comfort level or baseline comfort level  Description: Interventions:  - Encourage patient to monitor pain and request assistance  - Assess pain using appropriate pain scale  - Administer analgesics based on type and severity of pain and evaluate response  - Implement non-pharmacological measures as appropriate and evaluate response  - Consider cultural and social influences on pain and pain management  - Notify physician/advanced practitioner if interventions unsuccessful or patient reports new pain  Outcome: Progressing     Problem: INFECTION - ADULT  Goal: Absence or prevention of progression during hospitalization  Description: INTERVENTIONS:  - Assess and monitor for signs and symptoms of infection  - Monitor lab/diagnostic results  - Monitor all insertion sites, i.e. indwelling lines, tubes, and drains  - Monitor endotracheal if appropriate and nasal secretions for changes in amount and color  - [place] appropriate cooling/warming therapies per order  - Administer medications as ordered  - Instruct and encourage patient and family to use good hand hygiene technique  - Identify and instruct in appropriate isolation precautions for identified infection/condition  Outcome: Progressing  Goal: Absence of fever/infection during neutropenic period  Description: INTERVENTIONS:  - Monitor WBC    Outcome: Progressing     Problem: SAFETY ADULT  Goal: Patient will remain free of falls  Description: INTERVENTIONS:  - Educate patient/family on patient safety including physical limitations  - Instruct patient to call for assistance with activity   - Consult OT/PT to assist with strengthening/mobility   - Keep Call bell within reach  - Keep bed low and locked with side rails adjusted as appropriate  - Keep care items and personal belongings within reach  - Initiate and maintain comfort rounds  - Make Fall Risk Sign visible to staff    - Apply yellow socks and  bracelet for high fall risk patients  - Consider moving patient to room near nurses station  Outcome: Progressing  Goal: Maintain or return to baseline ADL function  Description: INTERVENTIONS:  -  Assess patient's ability to carry out ADLs; assess patient's baseline for ADL function and identify physical deficits which impact ability to perform ADLs (bathing, care of mouth/teeth, toileting, grooming, dressing, etc.)  - Assess/evaluate cause of self-care deficits   - Assess range of motion  - Assess patient's mobility; develop plan if impaired  - Assess patient's need for assistive devices and provide as appropriate  - Encourage maximum independence but intervene and supervise when necessary  - Involve family in performance of ADLs  - Assess for home care needs following discharge   - Consider OT consult to assist with ADL evaluation and planning for discharge  - Provide patient education as appropriate  Outcome: Progressing  Goal: Maintains/Returns to pre admission functional level  Description: INTERVENTIONS:  - Perform AM-PAC 6 Click Basic Mobility/ Daily Activity assessment daily.  - Set and communicate daily mobility goal to care team and patient/family/caregiver.   - Collaborate with rehabilitation services on mobility goals if consulted    - Out of bed for toileting  - Record patient progress and toleration of activity level   Outcome: Progressing     Problem: DISCHARGE PLANNING  Goal: Discharge to home or other facility with appropriate resources  Description: INTERVENTIONS:  - Identify barriers to discharge w/patient and caregiver  - Arrange for needed discharge resources and transportation as appropriate  - Identify discharge learning needs (meds, wound care, etc.)  - Arrange for interpretive services to assist at discharge as needed  - Refer to Case Management Department for coordinating discharge planning if the patient needs post-hospital services based on physician/advanced practitioner order or  complex needs related to functional status, cognitive ability, or social support system  Outcome: Progressing     Problem: Knowledge Deficit  Goal: Patient/family/caregiver demonstrates understanding of disease process, treatment plan, medications, and discharge instructions  Description: Complete learning assessment and assess knowledge base.  Interventions:  - Provide teaching at level of understanding  - Provide teaching via preferred learning methods  Outcome: Progressing

## 2024-08-12 NOTE — CASE MANAGEMENT
Case Management Discharge Planning Note    Patient name Sumit Nails  Location /-01 MRN [id number]  : 1950 Date 2024       Current Admission Date: 2024  Current Admission Diagnosis:Dizziness   Patient Active Problem List    Diagnosis Date Noted Date Diagnosed    Leukopenia 2024     Cholecystitis 2023     Dizziness 2023     SBO (small bowel obstruction) (HCC) 2023     Parkinson disease 2023     Mild protein-calorie malnutrition (HCC) 02/10/2023     Aortic root dilatation (HCC) 02/10/2023     Neoplasm of uncertain behavior of left kidney 2022     BPH with obstruction/lower urinary tract symptoms 2022     Chronic constipation 2022     Overactive bladder 2022     Chronic prostatitis 2022     Chronic obstructive pulmonary disease, unspecified COPD type (HCC) 2022     Fall 2022     Groin rash 2022     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     Combined form of senile cataract 2020     GERD (gastroesophageal reflux disease) 10/22/2019     Hypertension 10/22/2019     Hypercholesteremia 10/22/2019     Paranoid schizophrenia (HCC) 10/22/2019     Neuro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 ([facility]) 2016       LOS (days): 2  Geometric Mean LOS (GMLOS) (days): 2.8  Days to GMLOS:0.8     OBJECTIVE:  Risk of Unplanned Readmission Score: 22.92         Current admission status: Inpatient   Preferred Pharmacy:   Abby CORTEZ ([facility]) - [facility] [address]  [address]  [address] [place] [address]  Phone: [phone] Fax: [phone]    Primary Care Provider: Garry Hidalgo MD    Primary Insurance: MEDICARE  Secondary Insurance:     DISCHARGE DETAILS:    CM arranged wheelchair van for patient to be transported back to [place] after speaking with RACHEAL Cha at the facility. 2:00  time requested.

## 2024-08-13 ENCOUNTER — TRANSITIONAL CARE MANAGEMENT (OUTPATIENT)
Age: 74
End: 2024-08-13

## 2024-08-22 ENCOUNTER — OFFICE VISIT (OUTPATIENT)
Age: 74
End: 2024-08-22
Payer: MEDICARE

## 2024-08-22 VITALS
RESPIRATION RATE: 17 BRPM | WEIGHT: 158 LBS | BODY MASS INDEX: 23.4 KG/M2 | SYSTOLIC BLOOD PRESSURE: 110 MMHG | TEMPERATURE: 96.8 F | OXYGEN SATURATION: 98 % | DIASTOLIC BLOOD PRESSURE: 66 MMHG | HEART RATE: 72 BPM | HEIGHT: 69 IN

## 2024-08-22 DIAGNOSIS — R42 DIZZINESS: Primary | ICD-10-CM

## 2024-08-22 DIAGNOSIS — F20.0 PARANOID SCHIZOPHRENIA (HCC): ICD-10-CM

## 2024-08-22 DIAGNOSIS — G21.11 NEUROLEPTIC-INDUCED PARKINSONISM (HCC): ICD-10-CM

## 2024-08-22 DIAGNOSIS — T43.505A NEUROLEPTIC-INDUCED PARKINSONISM (HCC): ICD-10-CM

## 2024-08-22 PROCEDURE — 99495 TRANSJ CARE MGMT MOD F2F 14D: CPT | Performed by: STUDENT IN AN ORGANIZED HEALTH CARE EDUCATION/TRAINING PROGRAM

## 2024-08-22 RX ORDER — ONDANSETRON 4 MG/1
TABLET, ORALLY DISINTEGRATING ORAL
COMMUNITY
Start: 2024-05-28

## 2024-08-22 NOTE — PROGRESS NOTES
Transition of Care Visit  Name: Sumit Nails      : 1950      MRN: [id number]  Encounter Provider: Gaudencio Fraire MD  Encounter Date: 2024   Encounter department: [facility] [place]    Assessment & Plan   1. Dizziness  Resolved. Thought to be secondary to polypharmacy due to multiple psychiatric medications. Hydroxyzine discontinued at the hospital. Encourage adequate hydration, avoid overly sedating medications, and follow up with Psychiatry for medication management.    2. Paranoid schizophrenia (HCC)  Follow up Psychiatry. Made need medication adjustment, would avoid anticholinergics and overly sedating medications.    3. Neuro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 (HCC)         History of Present Illness     Transitional Care Management Review:   Sumit Nails is a 74 y.o. male here for TCM follow up.     During the TCM phone call patient stated:  TCM Call       Date and time call was made  3/14/2024 10:25 AM    Hospital care reviewed  Records reviewed    Patient was hospitialized at  [facility]    Date of Admission  24    Date of discharge  24    Diagnosis  Dizziness    Disposition  Assisted Living    Current Symptoms  None          TCM Call       Post hospital issues  None    Scheduled for follow up?  No  Mirian,  will call back to schedule    Patients specialists  Other (comment)    Other specialists names  AISSATOU-Julieta Edwards PA-C, [phone]    Did you obtain your prescribed medications  --  meclizine    Do you need help managing your prescriptions or medications  No    Is transportation to your appointment needed  No    I have advised the patient to call PCP with any new or worsening symptoms  Yaneth Daigle LPN    Are you recieving any outpatient services  Yes    What type of services  MRI    Are you recieving home care services  Yes    Types of home care services  Home PT; Nurse visit    Interperter language line needed  No     "Counseling  Patient    Counseling topics  patient and family education; Importance of RX compliance          Sumit Nails is a 75 yo M with PMH of paranoid schizophrenia, drug-induced parkinsons, COPD, and BPH who presents for TCM.  He was recently admitted from 8/9-8/12 for dizziness that was thought to be due to polypharmacy.  His hydroxyzine was discontinued.  His dizziness has resolved and not recurred.  He is accompanied by a staff member from the group home who helps provide history.  Patient has no symptomatic complaints at this time.      Review of Systems   Constitutional:  Negative for chills and fever.   Respiratory:  Negative for cough and shortness of breath.    Cardiovascular:  Negative for chest pain and leg swelling.   Gastrointestinal:  Negative for abdominal pain, constipation, diarrhea and nausea.   Neurological:  Negative for dizziness, light-headedness and headaches.   Psychiatric/Behavioral:  Negative for confusion.      Objective     /66 (BP Location: Left arm, Patient Position: Sitting, Cuff Size: Large)   Pulse 72   Temp (!) 96.8 °F (36 °C) (Tympanic)   Resp 17   Ht 5' 9\" (1.753 m)   Wt 71.7 kg (158 lb)   SpO2 98%   BMI 23.33 kg/m²     Physical Exam  Constitutional:       General: He is not in acute distress.  HENT:      Mouth/Throat:      Comments: Facial tremor  Eyes:      Conjunctiva/sclera: Conjunctivae normal.   Cardiovascular:      Rate and Rhythm: Normal rate and regular rhythm.      Heart sounds: Normal heart sounds.   Pulmonary:      Effort: Pulmonary effort is normal.      Breath sounds: Normal breath sounds.   Abdominal:      General: There is no distension.      Tenderness: There is no abdominal tenderness.   Skin:     General: Skin is warm and dry.   Neurological:      Mental Status: He is alert.   Psychiatric:         Speech: Speech normal.         Behavior: Behavior normal.       Medications have been reviewed by provider in current encounter    Administrative " Statements

## 2024-08-26 ENCOUNTER — HOSPITAL ENCOUNTER (EMERGENCY)
Facility: HOSPITAL | Age: 74
Discharge: HOME | End: 2024-08-27
Attending: EMERGENCY MEDICINE
Payer: MEDICARE

## 2024-08-26 DIAGNOSIS — R10.9 ABDOMINAL PAIN: Primary | ICD-10-CM

## 2024-08-26 LAB
ALBUMIN SERPL BCG-MCNC: 3.7 G/DL (ref 3.5–5)
ALP SERPL-CCNC: 92 U/L (ref 34–104)
ALT SERPL W P-5'-P-CCNC: 9 U/L (ref 7–52)
ANION GAP SERPL CALCULATED.3IONS-SCNC: 4 MMOL/L (ref 4–13)
AST SERPL W P-5'-P-CCNC: 22 U/L (ref 13–39)
BASOPHILS # BLD AUTO: 0.03 THOUSANDS/ÂΜL (ref 0–0.1)
BASOPHILS NFR BLD AUTO: 1 % (ref 0–1)
BILIRUB SERPL-MCNC: 0.5 MG/DL (ref 0.2–1)
BILIRUB UR QL STRIP: NEGATIVE
BUN SERPL-MCNC: 16 MG/DL (ref 5–25)
CALCIUM SERPL-MCNC: 8.8 MG/DL (ref 8.4–10.2)
CHLORIDE SERPL-SCNC: 108 MMOL/L (ref 96–108)
CLARITY UR: CLEAR
CO2 SERPL-SCNC: 26 MMOL/L (ref 21–32)
COLOR UR: COLORLESS
CREAT SERPL-MCNC: 0.7 MG/DL (ref 0.6–1.3)
EOSINOPHIL # BLD AUTO: 0.28 THOUSAND/ÂΜL (ref 0–0.61)
EOSINOPHIL NFR BLD AUTO: 7 % (ref 0–6)
ERYTHROCYTE [DISTWIDTH] IN BLOOD BY AUTOMATED COUNT: 13.3 % (ref 11.6–15.1)
GFR SERPL CREATININE-BSD FRML MDRD: 92 ML/MIN/1.73SQ M
GLUCOSE SERPL-MCNC: 82 MG/DL (ref 65–140)
GLUCOSE UR STRIP-MCNC: NEGATIVE MG/DL
HCT VFR BLD AUTO: 38.2 % (ref 36.5–49.3)
HGB BLD-MCNC: 13.2 G/DL (ref 12–17)
HGB UR QL STRIP.AUTO: NEGATIVE
IMM GRANULOCYTES # BLD AUTO: 0.01 THOUSAND/UL (ref 0–0.2)
IMM GRANULOCYTES NFR BLD AUTO: 0 % (ref 0–2)
KETONES UR STRIP-MCNC: NEGATIVE MG/DL
LEUKOCYTE ESTERASE UR QL STRIP: NEGATIVE
LIPASE SERPL-CCNC: 39 U/L (ref 11–82)
LYMPHOCYTES # BLD AUTO: 1.2 THOUSANDS/ÂΜL (ref 0.6–4.47)
LYMPHOCYTES NFR BLD AUTO: 30 % (ref 14–44)
MCH RBC QN AUTO: 34.1 PG (ref 26.8–34.3)
MCHC RBC AUTO-ENTMCNC: 34.6 G/DL (ref 31.4–37.4)
MCV RBC AUTO: 99 FL (ref 82–98)
MONOCYTES # BLD AUTO: 0.48 THOUSAND/ÂΜL (ref 0.17–1.22)
MONOCYTES NFR BLD AUTO: 12 % (ref 4–12)
NEUTROPHILS # BLD AUTO: 2.06 THOUSANDS/ÂΜL (ref 1.85–7.62)
NEUTS SEG NFR BLD AUTO: 50 % (ref 43–75)
NITRITE UR QL STRIP: NEGATIVE
NRBC BLD AUTO-RTO: 0 /100 WBCS
PH UR STRIP.AUTO: 6 [PH]
PLATELET # BLD AUTO: 132 THOUSANDS/UL (ref 149–390)
PMV BLD AUTO: 8.9 FL (ref 8.9–12.7)
POTASSIUM SERPL-SCNC: 3.6 MMOL/L (ref 3.5–5.3)
PROT SERPL-MCNC: 6 G/DL (ref 6.4–8.4)
PROT UR STRIP-MCNC: NEGATIVE MG/DL
RBC # BLD AUTO: 3.87 MILLION/UL (ref 3.88–5.62)
SODIUM SERPL-SCNC: 138 MMOL/L (ref 135–147)
SP GR UR STRIP.AUTO: 1.01 (ref 1–1.03)
UROBILINOGEN UR STRIP-ACNC: <2 MG/DL
WBC # BLD AUTO: 4.06 THOUSAND/UL (ref 4.31–10.16)

## 2024-08-26 PROCEDURE — 36415 COLL VENOUS BLD VENIPUNCTURE: CPT | Performed by: EMERGENCY MEDICINE

## 2024-08-26 PROCEDURE — 93005 ELECTROCARDIOGRAM TRACING: CPT

## 2024-08-26 PROCEDURE — 83690 ASSAY OF LIPASE: CPT | Performed by: EMERGENCY MEDICINE

## 2024-08-26 PROCEDURE — 81003 URINALYSIS AUTO W/O SCOPE: CPT | Performed by: EMERGENCY MEDICINE

## 2024-08-26 PROCEDURE — 99285 EMERGENCY DEPT VISIT HI MDM: CPT

## 2024-08-26 PROCEDURE — 80053 COMPREHEN METABOLIC PANEL: CPT | Performed by: EMERGENCY MEDICINE

## 2024-08-26 PROCEDURE — 96360 HYDRATION IV INFUSION INIT: CPT

## 2024-08-26 PROCEDURE — 85025 COMPLETE CBC W/AUTO DIFF WBC: CPT | Performed by: EMERGENCY MEDICINE

## 2024-08-26 RX ADMIN — SODIUM CHLORIDE 1000 ML: 0.9 INJECTION, SOLUTION INTRAVENOUS at 23:11

## 2024-08-27 ENCOUNTER — APPOINTMENT (EMERGENCY)
Dept: CT IMAGING | Facility: HOSPITAL | Age: 74
End: 2024-08-27
Payer: MEDICARE

## 2024-08-27 VITALS
RESPIRATION RATE: 18 BRPM | DIASTOLIC BLOOD PRESSURE: 72 MMHG | OXYGEN SATURATION: 100 % | HEART RATE: 66 BPM | SYSTOLIC BLOOD PRESSURE: 138 MMHG | TEMPERATURE: 97.7 F

## 2024-08-27 LAB
ATRIAL RATE: 70 BPM
P AXIS: 49 DEGREES
PR INTERVAL: 202 MS
QRS AXIS: 61 DEGREES
QRSD INTERVAL: 92 MS
QT INTERVAL: 424 MS
QTC INTERVAL: 457 MS
T WAVE AXIS: 72 DEGREES
VENTRICULAR RATE: 70 BPM

## 2024-08-27 PROCEDURE — 96361 HYDRATE IV INFUSION ADD-ON: CPT

## 2024-08-27 PROCEDURE — 99284 EMERGENCY DEPT VISIT MOD MDM: CPT | Performed by: EMERGENCY MEDICINE

## 2024-08-27 PROCEDURE — 93010 ELECTROCARDIOGRAM REPORT: CPT | Performed by: INTERNAL MEDICINE

## 2024-08-27 PROCEDURE — 74177 CT ABD & PELVIS W/CONTRAST: CPT

## 2024-08-27 RX ADMIN — IOHEXOL 100 ML: 350 INJECTION, SOLUTION INTRAVENOUS at 00:06

## 2024-08-27 NOTE — ED NOTES
[employer] claimed transport. Estimated p/u time 11:25am.      Lisa Callahan RN  08/27/24 0606

## 2024-08-27 NOTE — ED NOTES
Transport set up for transport back to [place]. Awaiting  time.      Lisa Callahan RN  08/27/24 0250

## 2024-08-27 NOTE — ED PROVIDER NOTES
History  Chief Complaint   Patient presents with    Abdominal Pain     Pt brought by EMS from [place]. Pt c/o  MID abd pain after dinner with dizziness. Denies N/V/D     74-year-old male presenting to the emergency department for evaluation of abdominal pain.  Patient resents from local skilled nursing facility with abdominal pain that is in his epigastrium after eating.  States that he had some dizziness which she describes as vertigo which has since passed.         Prior to Admission Medications   Prescriptions Last Dose Informant Patient Reported? Taking?   Cholecalciferol (Vitamin D High Potency) 25 MCG (1000 UT) capsule  Care Giver No No   Sig: Take 1 capsule (1,000 Units total) by mouth daily   Deutetrabenazine ER (Austedo XR) 24 MG TB24  Care Giver Yes No   Sig: Take 24 mg by mouth daily   Psyllium (Reguloid) 28.3 % POWD  Care Giver No No   Sig: Take 1 Scoop by mouth 2 (two) times a day   acetaminophen (Acetaminophen Extra Strength) 500 mg tablet  Care Giver No No   Sig: Take 1 tablet (500 mg total) by mouth every 6 (six) hours as needed for mild pain   atorvastatin (LIPITOR) 40 mg tablet  Care Giver No No   Sig: Take 1 tablet (40 mg total) by mouth daily   carbidopa-levodopa (SINEMET)  mg per tablet  Care Giver No No   Sig: Take 1 tablet by mouth 3 (three) times a day   Patient taking differently: Take 1 tablet by mouth 2 (two) times a day   cloZAPine (CLOZARIL) 100 mg tablet  Care Giver Yes No   Sig: Take 100 mg by mouth 2 (two) times a day   clozapine (CLOZARIL) 200 MG tablet  Care Giver Yes No   Sig: Take 200 mg by mouth daily at bedtime    docusate sodium (COLACE) 100 mg capsule  Care Giver No No   Sig: Take 1 capsule (100 mg total) by mouth 2 (two) times a day   ibuprofen (MOTRIN) 600 mg tablet  Care Giver No No   Sig: Take 1 tablet (600 mg total) by mouth every 6 (six) hours as needed for mild pain   meclizine (ANTIVERT) 25 mg tablet  Care Giver No No   Sig: Take 1 tablet (25 mg total) by mouth  every 8 (eight) hours as needed for dizziness   metoprolol succinate (TOPROL-XL) 25 mg 24 hr tablet  Care Giver No No   Sig: Take 1 tablet (25 mg total) by mouth daily   olopatadine (PATANOL) 0.1 % ophthalmic solution  Care Giver Yes No   Sig: Administer 1 drop to both eyes daily at bedtime   omeprazole (PriLOSEC) 40 MG capsule  Care Giver No No   Sig: Take 1 capsule (40 mg total) by mouth daily   ondansetron (ZOFRAN-ODT) 4 mg disintegrating tablet  Care Giver Yes No   tamsulosin (FLOMAX) 0.4 mg  Care Giver No No   Sig: Take 1 capsule (0.4 mg total) by mouth daily at bedtime   temazepam (RESTORIL) 15 mg capsule  Care Giver No No   Sig: Take 1 capsule (15 mg total) by mouth daily at bedtime   topiramate (TOPAMAX) 25 mg tablet  Care Giver Yes No   Sig: Take 25 mg by mouth 2 (two) times a day   traZODone (DESYREL) 100 mg tablet  Care Giver Yes No   Sig: Take 100 mg by mouth daily at bedtime      Facility-Administered Medications: None       Past Medical History:   Diagnosis Date    Asthma     Benign prostatic hyperplasia     COPD (chronic obstructive pulmonary disease) (HCC)     GERD (gastroesophageal reflux disease)     Herpes zoster without complication 12/10/2021    Hypercholesteremia     Hypertension     Neuroleptic induced parkinsonism (HCC)     Paranoid schizophrenia (HCC)     Parkinsons     Psychiatric disorder        Past Surgical History:   Procedure Laterality Date    CATARACT EXTRACTION      CHOLECYSTECTOMY LAPAROSCOPIC N/A 12/3/2023    Procedure: CHOLECYSTECTOMY LAPAROSCOPIC WITH INTRAOPERATIVE CHOLANGIOGRAM;  Surgeon: Maverick Tamayo MD;  Location: [facility];  Service: General    COLONOSCOPY         Family History   Problem Relation Age of Onset    No Known Problems Mother     No Known Problems Father     Parkinsonism Son      I have reviewed and agree with the history as documented.    E-Cigarette/Vaping    E-Cigarette Use Never User      E-Cigarette/Vaping Substances    Nicotine No     THC No     CBD No      Flavoring No     Other No     Unknown No      Social History     Tobacco Use    Smoking status: Former     Current packs/day: 0.00     Average packs/day: 1 pack/day for 20.0 years (20.0 ttl pk-yrs)     Types: Cigarettes     Start date:      Quit date:      Years since quittin.6     Passive exposure: Past    Smokeless tobacco: Never   Vaping Use    Vaping status: Never Used   Substance Use Topics    Alcohol use: Not Currently    Drug use: Never       Review of Systems   Constitutional:  Negative for appetite change, chills, fatigue and fever.   HENT:  Negative for sneezing and sore throat.    Eyes:  Negative for visual disturbance.   Respiratory:  Negative for cough, choking, chest tightness, shortness of breath and wheezing.    Cardiovascular:  Negative for chest pain and palpitations.   Gastrointestinal:  Positive for abdominal pain. Negative for constipation, diarrhea, nausea and vomiting.   Genitourinary:  Negative for difficulty urinating and dysuria.   Neurological:  Positive for dizziness. Negative for weakness, light-headedness, numbness and headaches.   All other systems reviewed and are negative.      Physical Exam  Physical Exam  Vitals and nursing note reviewed.   Constitutional:       General: He is not in acute distress.     Appearance: He is well-developed. He is not diaphoretic.   HENT:      Head: Normocephalic and atraumatic.   Eyes:      Pupils: Pupils are equal, round, and reactive to light.   Neck:      Vascular: No JVD.      Trachea: No tracheal deviation.   Cardiovascular:      Rate and Rhythm: Normal rate and regular rhythm.      Heart sounds: Normal heart sounds. No murmur heard.     No friction rub. No gallop.   Pulmonary:      Effort: Pulmonary effort is normal. No respiratory distress.      Breath sounds: Normal breath sounds. No wheezing or rales.   Abdominal:      General: Bowel sounds are normal. There is no distension.      Palpations: Abdomen is soft.      Tenderness:  There is abdominal tenderness in the epigastric area. There is no guarding or rebound.   Skin:     General: Skin is warm and dry.      Coloration: Skin is not pale.   Neurological:      Mental Status: He is alert and oriented to person, place, and time.      Cranial Nerves: No cranial nerve deficit.      Motor: No abnormal muscle tone.   Psychiatric:         Behavior: Behavior normal.         Vital Signs  ED Triage Vitals   Temperature Pulse Respirations Blood Pressure SpO2   08/26/24 2241 08/26/24 2239 08/26/24 2239 08/26/24 2239 08/26/24 2239   97.7 °F (36.5 °C) 73 18 140/71 95 %      Temp Source Heart Rate Source Patient Position - Orthostatic VS BP Location FiO2 (%)   08/26/24 2241 08/26/24 2239 08/26/24 2239 08/26/24 2239 --   Oral Monitor Lying Right arm       Pain Score       08/26/24 2239       3           Vitals:    08/26/24 2239 08/27/24 0028 08/27/24 0030   BP: 140/71 153/74 143/80   Pulse: 73 68 67   Patient Position - Orthostatic VS: Lying           Visual Acuity      ED Medications  Medications   sodium chloride 0.9 % bolus 1,000 mL (0 mL Intravenous Stopped 8/27/24 0300)   iohexol (OMNIPAQUE) 350 MG/ML injection (MULTI-DOSE) 100 mL (100 mL Intravenous Given 8/27/24 0006)       Diagnostic Studies  Results Reviewed       Procedure Component Value Units Date/Time    Comprehensive metabolic panel [[id number]]  (Abnormal) Collected: 08/26/24 2307    Lab Status: Final result Specimen: Blood from Arm, Left Updated: 08/26/24 2341     Sodium 138 mmol/L      Potassium 3.6 mmol/L      Chloride 108 mmol/L      CO2 26 mmol/L      ANION GAP 4 mmol/L      BUN 16 mg/dL      Creatinine 0.70 mg/dL      Glucose 82 mg/dL      Calcium 8.8 mg/dL      AST 22 U/L      ALT 9 U/L      Alkaline Phosphatase 92 U/L      Total Protein 6.0 g/dL      Albumin 3.7 g/dL      Total Bilirubin 0.50 mg/dL      eGFR 92 ml/min/1.73sq m     Narrative:      National Kidney Disease Foundation guidelines for Chronic Kidney Disease (CKD):      Stage 1 with normal or high GFR (GFR > 90 mL/min/1.73 square meters)    Stage 2 Mild CKD (GFR = 60-89 mL/min/1.73 square meters)    Stage 3A Moderate CKD (GFR = 45-59 mL/min/1.73 square meters)    Stage 3B Moderate CKD (GFR = 30-44 mL/min/1.73 square meters)    Stage 4 Severe CKD (GFR = 15-29 mL/min/1.73 square meters)    Stage 5 End Stage CKD (GFR <15 mL/min/1.73 square meters)  Note: GFR calculation is accurate only with a steady state creatinine    Lipase [[id number]]  (Normal) Collected: 08/26/24 2307    Lab Status: Final result Specimen: Blood from Arm, Left Updated: 08/26/24 2341     Lipase 39 u/L     UA w Reflex to Microscopic w Reflex to Culture [[id number]] Collected: 08/26/24 2307    Lab Status: Final result Specimen: Urine, Other Updated: 08/26/24 2317     Color, UA Colorless     Clarity, UA Clear     Specific Gravity, UA 1.006     pH, UA 6.0     Leukocytes, UA Negative     Nitrite, UA Negative     Protein, UA Negative mg/dl      Glucose, UA Negative mg/dl      Ketones, UA Negative mg/dl      Urobilinogen, UA <2.0 mg/dl      Bilirubin, UA Negative     Occult Blood, UA Negative    CBC and differential [[id number]]  (Abnormal) Collected: 08/26/24 2307    Lab Status: Final result Specimen: Blood from Arm, Left Updated: 08/26/24 2316     WBC 4.06 Thousand/uL      RBC 3.87 Million/uL      Hemoglobin 13.2 g/dL      Hematocrit 38.2 %      MCV 99 fL      MCH 34.1 pg      MCHC 34.6 g/dL      RDW 13.3 %      MPV 8.9 fL      Platelets 132 Thousands/uL      nRBC 0 /100 WBCs      Segmented % 50 %      Immature Grans % 0 %      Lymphocytes % 30 %      Monocytes % 12 %      Eosinophils Relative 7 %      Basophils Relative 1 %      Absolute Neutrophils 2.06 Thousands/µL      Absolute Immature Grans 0.01 Thousand/uL      Absolute Lymphocytes 1.20 Thousands/µL      Absolute Monocytes 0.48 Thousand/µL      Eosinophils Absolute 0.28 Thousand/µL      Basophils Absolute 0.03 Thousands/µL                    CT abdomen pelvis with  contrast   Final Result by Jess Martinez MD (08/27 0031)      No acute findings in the abdomen or pelvis.         Workstation performed: [id number]                    Procedures  Procedures         ED Course                                               Medical Decision Making  74-year-old male with abdominal pain will check labs, CT, reassess.    Amount and/or Complexity of Data Reviewed  Labs: ordered.  Radiology: ordered.    Risk  Prescription drug management.                 Disposition  Final diagnoses:   Abdominal pain     Time reflects when diagnosis was documented in both MDM as applicable and the Disposition within this note       Time User Action Codes Description Comment    8/27/2024  2:10 AM Esteban Paige Add [R10.9] Abdominal pain           ED Disposition       ED Disposition   Discharge    Condition   Stable    Date/Time   Tue Aug 27, 2024  2:10 AM    Comment   Sumit Nails discharge to home/self care.                   Follow-up Information       Follow up With Specialties Details Why Contact Info    Garry Hidalgo MD Family Medicine   [address] [place] [id number] [phone]              Patient's Medications   Discharge Prescriptions    No medications on file       No discharge procedures on file.    PDMP Review         Value Time User    PDMP Reviewed  Yes 6/18/2024  9:41 PM Judson Jaimes PA-C            ED Provider  Electronically Signed by             Esteban Paige MD  08/27/24 2314

## 2024-09-05 DIAGNOSIS — E78.00 HYPERCHOLESTEREMIA: ICD-10-CM

## 2024-09-05 DIAGNOSIS — R52 PAIN: ICD-10-CM

## 2024-09-05 RX ORDER — ACETAMINOPHEN 500 MG
500 TABLET ORAL EVERY 6 HOURS PRN
Qty: 30 TABLET | Refills: 11 | Status: SHIPPED | OUTPATIENT
Start: 2024-09-05

## 2024-09-06 RX ORDER — ATORVASTATIN CALCIUM 40 MG/1
40 TABLET, FILM COATED ORAL DAILY
Qty: 90 TABLET | Refills: 1 | Status: SHIPPED | OUTPATIENT
Start: 2024-09-06

## 2024-09-08 ENCOUNTER — HOSPITAL ENCOUNTER (EMERGENCY)
Facility: HOSPITAL | Age: 74
Discharge: HOME | End: 2024-09-09
Attending: EMERGENCY MEDICINE
Payer: MEDICARE

## 2024-09-08 VITALS
SYSTOLIC BLOOD PRESSURE: 135 MMHG | WEIGHT: 197 LBS | BODY MASS INDEX: 28.2 KG/M2 | OXYGEN SATURATION: 95 % | HEART RATE: 71 BPM | DIASTOLIC BLOOD PRESSURE: 68 MMHG | HEIGHT: 70 IN | RESPIRATION RATE: 22 BRPM | TEMPERATURE: 98.4 F

## 2024-09-08 DIAGNOSIS — R11.0 NAUSEA: Primary | ICD-10-CM

## 2024-09-08 DIAGNOSIS — R53.83 FATIGUE: ICD-10-CM

## 2024-09-08 DIAGNOSIS — D72.829 LEUKOCYTOSIS: ICD-10-CM

## 2024-09-08 LAB
ALBUMIN SERPL BCG-MCNC: 3.6 G/DL (ref 3.5–5)
ALP SERPL-CCNC: 97 U/L (ref 34–104)
ALT SERPL W P-5'-P-CCNC: 12 U/L (ref 7–52)
ANION GAP SERPL CALCULATED.3IONS-SCNC: 4 MMOL/L (ref 4–13)
AST SERPL W P-5'-P-CCNC: 23 U/L (ref 13–39)
BACTERIA UR QL AUTO: ABNORMAL /HPF
BASOPHILS # BLD AUTO: 0.03 THOUSANDS/ÂΜL (ref 0–0.1)
BASOPHILS NFR BLD AUTO: 1 % (ref 0–1)
BILIRUB SERPL-MCNC: 0.39 MG/DL (ref 0.2–1)
BILIRUB UR QL STRIP: NEGATIVE
BUN SERPL-MCNC: 13 MG/DL (ref 5–25)
CALCIUM SERPL-MCNC: 8.3 MG/DL (ref 8.4–10.2)
CARDIAC TROPONIN I PNL SERPL HS: 3 NG/L
CHLORIDE SERPL-SCNC: 110 MMOL/L (ref 96–108)
CLARITY UR: CLEAR
CO2 SERPL-SCNC: 27 MMOL/L (ref 21–32)
COLOR UR: YELLOW
CREAT SERPL-MCNC: 0.64 MG/DL (ref 0.6–1.3)
EOSINOPHIL # BLD AUTO: 0.25 THOUSAND/ÂΜL (ref 0–0.61)
EOSINOPHIL NFR BLD AUTO: 6 % (ref 0–6)
ERYTHROCYTE [DISTWIDTH] IN BLOOD BY AUTOMATED COUNT: 13.3 % (ref 11.6–15.1)
FLUAV RNA RESP QL NAA+PROBE: NEGATIVE
FLUBV RNA RESP QL NAA+PROBE: NEGATIVE
GFR SERPL CREATININE-BSD FRML MDRD: 96 ML/MIN/1.73SQ M
GLUCOSE SERPL-MCNC: 103 MG/DL (ref 65–140)
GLUCOSE SERPL-MCNC: 91 MG/DL (ref 65–140)
GLUCOSE UR STRIP-MCNC: NEGATIVE MG/DL
HCT VFR BLD AUTO: 40.8 % (ref 36.5–49.3)
HGB BLD-MCNC: 13.4 G/DL (ref 12–17)
HGB UR QL STRIP.AUTO: NEGATIVE
IMM GRANULOCYTES # BLD AUTO: 0.01 THOUSAND/UL (ref 0–0.2)
IMM GRANULOCYTES NFR BLD AUTO: 0 % (ref 0–2)
KETONES UR STRIP-MCNC: NEGATIVE MG/DL
LEUKOCYTE ESTERASE UR QL STRIP: NEGATIVE
LYMPHOCYTES # BLD AUTO: 1.13 THOUSANDS/ÂΜL (ref 0.6–4.47)
LYMPHOCYTES NFR BLD AUTO: 28 % (ref 14–44)
MCH RBC QN AUTO: 33.3 PG (ref 26.8–34.3)
MCHC RBC AUTO-ENTMCNC: 32.8 G/DL (ref 31.4–37.4)
MCV RBC AUTO: 102 FL (ref 82–98)
MONOCYTES # BLD AUTO: 0.51 THOUSAND/ÂΜL (ref 0.17–1.22)
MONOCYTES NFR BLD AUTO: 13 % (ref 4–12)
MUCOUS THREADS UR QL AUTO: ABNORMAL
NEUTROPHILS # BLD AUTO: 2.09 THOUSANDS/ÂΜL (ref 1.85–7.62)
NEUTS SEG NFR BLD AUTO: 52 % (ref 43–75)
NITRITE UR QL STRIP: NEGATIVE
NON-SQ EPI CELLS URNS QL MICRO: ABNORMAL /HPF
NRBC BLD AUTO-RTO: 0 /100 WBCS
PH UR STRIP.AUTO: 7 [PH]
PLATELET # BLD AUTO: 159 THOUSANDS/UL (ref 149–390)
PMV BLD AUTO: 9.4 FL (ref 8.9–12.7)
POTASSIUM SERPL-SCNC: 4.1 MMOL/L (ref 3.5–5.3)
PROT SERPL-MCNC: 6.1 G/DL (ref 6.4–8.4)
PROT UR STRIP-MCNC: ABNORMAL MG/DL
RBC # BLD AUTO: 4.02 MILLION/UL (ref 3.88–5.62)
RBC #/AREA URNS AUTO: ABNORMAL /HPF
RSV RNA RESP QL NAA+PROBE: NEGATIVE
SARS-COV-2 RNA RESP QL NAA+PROBE: NEGATIVE
SODIUM SERPL-SCNC: 141 MMOL/L (ref 135–147)
SP GR UR STRIP.AUTO: 1.03 (ref 1–1.03)
UROBILINOGEN UR STRIP-ACNC: 3 MG/DL
WBC # BLD AUTO: 4.02 THOUSAND/UL (ref 4.31–10.16)
WBC #/AREA URNS AUTO: ABNORMAL /HPF

## 2024-09-08 PROCEDURE — 80053 COMPREHEN METABOLIC PANEL: CPT | Performed by: EMERGENCY MEDICINE

## 2024-09-08 PROCEDURE — 99284 EMERGENCY DEPT VISIT MOD MDM: CPT | Performed by: EMERGENCY MEDICINE

## 2024-09-08 PROCEDURE — 99285 EMERGENCY DEPT VISIT HI MDM: CPT

## 2024-09-08 PROCEDURE — 93005 ELECTROCARDIOGRAM TRACING: CPT

## 2024-09-08 PROCEDURE — 84484 ASSAY OF TROPONIN QUANT: CPT | Performed by: EMERGENCY MEDICINE

## 2024-09-08 PROCEDURE — 85025 COMPLETE CBC W/AUTO DIFF WBC: CPT | Performed by: EMERGENCY MEDICINE

## 2024-09-08 PROCEDURE — 0241U HB NFCT DS VIR RESP RNA 4 TRGT: CPT | Performed by: EMERGENCY MEDICINE

## 2024-09-08 PROCEDURE — 81001 URINALYSIS AUTO W/SCOPE: CPT

## 2024-09-08 PROCEDURE — 82948 REAGENT STRIP/BLOOD GLUCOSE: CPT

## 2024-09-08 PROCEDURE — 36415 COLL VENOUS BLD VENIPUNCTURE: CPT | Performed by: EMERGENCY MEDICINE

## 2024-09-09 LAB
ATRIAL RATE: 74 BPM
P AXIS: 66 DEGREES
PR INTERVAL: 198 MS
QRS AXIS: 59 DEGREES
QRSD INTERVAL: 94 MS
QT INTERVAL: 408 MS
QTC INTERVAL: 452 MS
T WAVE AXIS: 58 DEGREES
VENTRICULAR RATE: 74 BPM

## 2024-09-09 PROCEDURE — 93010 ELECTROCARDIOGRAM REPORT: CPT | Performed by: INTERNAL MEDICINE

## 2024-09-09 NOTE — ED PROVIDER NOTES
"History  Chief Complaint   Patient presents with    Weakness - Generalized     Patient arrived to ER via EMS c/o generalized weakness and nausea that started today, Patient stays at [facility]. +runny nose      74-year-old male presents from [facility] with a complaint of \"I got a little sick.\"  Patient states \"it's been going on for a while.\"  Patient is unable to state exactly how long it has been ongoing but affirms it has been years.  Patient affirms nausea and fatigue but denies any vomiting.      Patient states \"it went away\" and denies any symptoms at present.  Patient denies any nausea at present.    Patient is requesting a \"blood test to make sure everything is okay.\"    Patient denies any pain.  Note that the triage note states the patient has rhinorrhea though he denies this to me and he denies any cough.  Patient denies any fever and is afebrile upon arrival to the emergency room.    Impression and plan: Reported nausea and fatigue.  Patient does notably have a history of paranoid schizophrenia but appears to be answering direct questions appropriately.  Considering patient's age and reported history, will obtain evaluation of electrolytes and cardiac evaluation to ensure no referred symptoms.  Patient is on clozapine so we will check CBC.  Will monitor patient, reassess, and reevaluate.        Prior to Admission Medications   Prescriptions Last Dose Informant Patient Reported? Taking?   Cholecalciferol (Vitamin D High Potency) 25 MCG (1000 UT) capsule  Care Giver No No   Sig: Take 1 capsule (1,000 Units total) by mouth daily   Deutetrabenazine ER (Austedo XR) 24 MG TB24  Care Giver Yes No   Sig: Take 24 mg by mouth daily   Psyllium (Reguloid) 28.3 % POWD  Care Giver No No   Sig: Take 1 Scoop by mouth 2 (two) times a day   acetaminophen (TYLENOL) 500 mg tablet   No No   Sig: Take 1 tablet (500 mg total) by mouth every 6 (six) hours as needed for mild pain   atorvastatin (LIPITOR) 40 mg tablet  "  No No   Sig: Take 1 tablet (40 mg total) by mouth daily   carbidopa-levodopa (SINEMET)  mg per tablet  Care Giver No No   Sig: Take 1 tablet by mouth 3 (three) times a day   Patient taking differently: Take 1 tablet by mouth 2 (two) times a day   cloZAPine (CLOZARIL) 100 mg tablet  Care Giver Yes No   Sig: Take 100 mg by mouth 2 (two) times a day   clozapine (CLOZARIL) 200 MG tablet  Care Giver Yes No   Sig: Take 200 mg by mouth daily at bedtime    docusate sodium (COLACE) 100 mg capsule  Care Giver No No   Sig: Take 1 capsule (100 mg total) by mouth 2 (two) times a day   ibuprofen (MOTRIN) 600 mg tablet  Care Giver No No   Sig: Take 1 tablet (600 mg total) by mouth every 6 (six) hours as needed for mild pain   meclizine (ANTIVERT) 25 mg tablet  Care Giver No No   Sig: Take 1 tablet (25 mg total) by mouth every 8 (eight) hours as needed for dizziness   metoprolol succinate (TOPROL-XL) 25 mg 24 hr tablet  Care Giver No No   Sig: Take 1 tablet (25 mg total) by mouth daily   olopatadine (PATANOL) 0.1 % ophthalmic solution  Care Giver Yes No   Sig: Administer 1 drop to both eyes daily at bedtime   omeprazole (PriLOSEC) 40 MG capsule  Care Giver No No   Sig: Take 1 capsule (40 mg total) by mouth daily   ondansetron (ZOFRAN-ODT) 4 mg disintegrating tablet  Care Giver Yes No   tamsulosin (FLOMAX) 0.4 mg  Care Giver No No   Sig: Take 1 capsule (0.4 mg total) by mouth daily at bedtime   temazepam (RESTORIL) 15 mg capsule  Care Giver No No   Sig: Take 1 capsule (15 mg total) by mouth daily at bedtime   topiramate (TOPAMAX) 25 mg tablet  Care Giver Yes No   Sig: Take 25 mg by mouth 2 (two) times a day   traZODone (DESYREL) 100 mg tablet  Care Giver Yes No   Sig: Take 100 mg by mouth daily at bedtime      Facility-Administered Medications: None       Past Medical History:   Diagnosis Date    Asthma     Benign prostatic hyperplasia     COPD (chronic obstructive pulmonary disease) (HCC)     GERD (gastroesophageal reflux  disease)     Herpes zoster without complication 12/10/2021    Hypercholesteremia     Hypertension     Neuroleptic induced parkinsonism (HCC)     Paranoid schizophrenia (HCC)     Parkinsons     Psychiatric disorder        Past Surgical History:   Procedure Laterality Date    CATARACT EXTRACTION      CHOLECYSTECTOMY LAPAROSCOPIC N/A 12/3/2023    Procedure: CHOLECYSTECTOMY LAPAROSCOPIC WITH INTRAOPERATIVE CHOLANGIOGRAM;  Surgeon: Maverick Tamayo MD;  Location: [facility];  Service: General    COLONOSCOPY         Family History   Problem Relation Age of Onset    No Known Problems Mother     No Known Problems Father     Parkinsonism Son      I have reviewed and agree with the history as documented.    E-Cigarette/Vaping    E-Cigarette Use Never User      E-Cigarette/Vaping Substances    Nicotine No     THC No     CBD No     Flavoring No     Other No     Unknown No      Social History     Tobacco Use    Smoking status: Former     Current packs/day: 0.00     Average packs/day: 1 pack/day for 20.0 years (20.0 ttl pk-yrs)     Types: Cigarettes     Start date:      Quit date:      Years since quittin.7     Passive exposure: Past    Smokeless tobacco: Never   Vaping Use    Vaping status: Never Used   Substance Use Topics    Alcohol use: Not Currently    Drug use: Never       Review of Systems    Physical Exam  Physical Exam  Constitutional:       Appearance: Normal appearance.   Cardiovascular:      Rate and Rhythm: Normal rate and regular rhythm.   Pulmonary:      Effort: Pulmonary effort is normal.      Breath sounds: Normal breath sounds.   Abdominal:      Palpations: Abdomen is soft.      Tenderness: There is no abdominal tenderness. There is no guarding or rebound.   Skin:     General: Skin is warm and dry.   Neurological:      General: No focal deficit present.      Mental Status: He is alert.   Psychiatric:         Mood and Affect: Affect is flat.         Speech: Speech normal.         Behavior: Behavior is  not withdrawn. Behavior is cooperative.         Thought Content: Thought content does not include homicidal or suicidal ideation. Thought content does not include homicidal or suicidal plan.         Vital Signs  ED Triage Vitals [09/08/24 2055]   Temperature Pulse Respirations Blood Pressure SpO2   98.4 °F (36.9 °C) 74 18 153/76 100 %      Temp Source Heart Rate Source Patient Position - Orthostatic VS BP Location FiO2 (%)   Oral Monitor Sitting Left arm --      Pain Score       --           Vitals:    09/08/24 2055 09/08/24 2130 09/08/24 2200   BP: 153/76 121/72 135/68   Pulse: 74 70 71   Patient Position - Orthostatic VS: Sitting           Visual Acuity      ED Medications  Medications - No data to display    Diagnostic Studies  Results Reviewed       Procedure Component Value Units Date/Time    HS Troponin 0hr (reflex protocol) [[id number]]  (Normal) Collected: 09/08/24 2127    Lab Status: Final result Specimen: Blood from Arm, Left Updated: 09/08/24 2156     hs TnI 0hr 3 ng/L     Comprehensive metabolic panel [[id number]]  (Abnormal) Collected: 09/08/24 2127    Lab Status: Final result Specimen: Blood from Arm, Left Updated: 09/08/24 2152     Sodium 141 mmol/L      Potassium 4.1 mmol/L      Chloride 110 mmol/L      CO2 27 mmol/L      ANION GAP 4 mmol/L      BUN 13 mg/dL      Creatinine 0.64 mg/dL      Glucose 91 mg/dL      Calcium 8.3 mg/dL      AST 23 U/L      ALT 12 U/L      Alkaline Phosphatase 97 U/L      Total Protein 6.1 g/dL      Albumin 3.6 g/dL      Total Bilirubin 0.39 mg/dL      eGFR 96 ml/min/1.73sq m     Narrative:      National Kidney Disease Foundation guidelines for Chronic Kidney Disease (CKD):     Stage 1 with normal or high GFR (GFR > 90 mL/min/1.73 square meters)    Stage 2 Mild CKD (GFR = 60-89 mL/min/1.73 square meters)    Stage 3A Moderate CKD (GFR = 45-59 mL/min/1.73 square meters)    Stage 3B Moderate CKD (GFR = 30-44 mL/min/1.73 square meters)    Stage 4 Severe CKD (GFR = 15-29  mL/min/1.73 square meters)    Stage 5 End Stage CKD (GFR <15 mL/min/1.73 square meters)  Note: GFR calculation is accurate only with a steady state creatinine    FLU/RSV/COVID - if FLU/RSV clinically relevant [[id number]]  (Normal) Collected: 09/08/24 2058    Lab Status: Final result Specimen: Nares from Nose Updated: 09/08/24 2141     SARS-CoV-2 Negative     INFLUENZA A PCR Negative     INFLUENZA B PCR Negative     RSV PCR Negative    Narrative:      This test has been performed using the CoV-2/Flu/RSV plus assay on the [employer] platform. This test has been validated by the  and verified by the performing laboratory.     This test is designed to amplify and detect the following: nucleocapsid (N), envelope (E), and RNA-dependent RNA polymerase (RdRP) genes of the SARS-CoV-2 genome; matrix (M), basic polymerase (PB2), and acidic protein (PA) segments of the influenza A genome; matrix (M) and non-structural protein (NS) segments of the influenza B genome, and the nucleocapsid genes of RSV A and RSV B.     Positive results are indicative of the presence of Flu A, Flu B, RSV, and/or SARS-CoV-2 RNA. Positive results for SARS-CoV-2 or suspected novel influenza should be reported to state, local, or federal health departments according to local reporting requirements.      All results should be assessed in conjunction with clinical presentation and other laboratory markers for clinical management.     FOR PEDIATRIC PATIENTS - copy/paste COVID Guidelines URL to browser: [email or web address]       CBC and differential [[id number]]  (Abnormal) Collected: 09/08/24 2127    Lab Status: Final result Specimen: Blood from Arm, Left Updated: 09/08/24 2133     WBC 4.02 Thousand/uL      RBC 4.02 Million/uL      Hemoglobin 13.4 g/dL      Hematocrit 40.8 %       fL      MCH 33.3 pg      MCHC 32.8 g/dL      RDW 13.3 %      MPV 9.4 fL      Platelets 159  Thousands/uL      nRBC 0 /100 WBCs      Segmented % 52 %      Immature Grans % 0 %      Lymphocytes % 28 %      Monocytes % 13 %      Eosinophils Relative 6 %      Basophils Relative 1 %      Absolute Neutrophils 2.09 Thousands/µL      Absolute Immature Grans 0.01 Thousand/uL      Absolute Lymphocytes 1.13 Thousands/µL      Absolute Monocytes 0.51 Thousand/µL      Eosinophils Absolute 0.25 Thousand/µL      Basophils Absolute 0.03 Thousands/µL     Urine Microscopic [[id number]]  (Abnormal) Collected: 09/08/24 2103    Lab Status: Final result Specimen: Urine, Clean Catch Updated: 09/08/24 2112     RBC, UA 1-2 /hpf      WBC, UA 1-2 /hpf      Epithelial Cells None Seen /hpf      Bacteria, UA None Seen /hpf      MUCUS THREADS Occasional    UA (URINE) with reflex to Scope [[id number]]  (Abnormal) Collected: 09/08/24 2103    Lab Status: Final result Specimen: Urine, Clean Catch Updated: 09/08/24 2111     Color, UA Yellow     Clarity, UA Clear     Specific Gravity, UA 1.027     pH, UA 7.0     Leukocytes, UA Negative     Nitrite, UA Negative     Protein, UA Trace mg/dl      Glucose, UA Negative mg/dl      Ketones, UA Negative mg/dl      Urobilinogen, UA 3.0 mg/dl      Bilirubin, UA Negative     Occult Blood, UA Negative    Fingerstick Glucose (POCT) [[id number]]  (Normal) Collected: 09/08/24 2056    Lab Status: Final result Specimen: Blood Updated: 09/08/24 2057     POC Glucose 103 mg/dl                    No orders to display              Procedures  Procedures         ED Course  ED Course as of 09/10/24 0631   Mon Sep 09, 2024   0025 Patient continues to be asymptomatic, tolerating oral intake without difficulty.  Unable to contact patient's facility to ensure the patient can return so we will continue to monitor patient until we are able to ensure safe discharge plan for the patient.   0226 Nursing was able to contact the patient's facility who is awaiting the patient.  They had no additional information  reportedly.    Patient continues to be asymptomatic throughout stay in the emergency room tolerating oral intake with no vomiting or nausea.  No recurrent symptoms.                                 SBIRT 22yo+      Flowsheet Row Most Recent Value   Initial Alcohol Screen: US AUDIT-C     1. How often do you have a drink containing alcohol? 0 Filed at: 09/08/2024 2055   2. How many drinks containing alcohol do you have on a typical day you are drinking?  0 Filed at: 09/08/2024 2055   3a. Male UNDER 65: How often do you have five or more drinks on one occasion? 0 Filed at: 09/08/2024 2055   Audit-C Score 0 Filed at: 09/08/2024 2055   RAFA: How many times in the past year have you...    Used an illegal drug or used a prescription medication for non-medical reasons? Never Filed at: 09/08/2024 2055                      Medical Decision Making  Amount and/or Complexity of Data Reviewed  Labs: ordered.                 Disposition  Final diagnoses:   Nausea   Fatigue   Leukocytosis     Time reflects when diagnosis was documented in both MDM as applicable and the Disposition within this note       Time User Action Codes Description Comment    9/8/2024 10:04 PM Tito Reyes [R11.0] Nausea     9/8/2024 10:05 PM Tito Reyes [R53.83] Fatigue     9/8/2024 10:05 PM Tito Reyes [D72.829] Leukocytosis           ED Disposition       ED Disposition   Discharge    Condition   Stable    Date/Time   Sun Sep 8, 2024 2205    Comment   Sumit Nails discharge to home/self care.                   Follow-up Information       Follow up With Specialties Details Why Contact Info Additional Information    Garry Hidalgo MD Family Medicine Schedule an appointment as soon as possible for a visit  Continued monitoring of your white blood cell count. [address] [address]  [phone]       [facility] Emergency Department Emergency Medicine Go to  If symptoms worsen [address]  [place]  [facility] [address]  [phone] Formerly [facility], [address], [place], [place], [address]            Discharge Medication List as of 9/8/2024 10:05 PM        CONTINUE these medications which have NOT CHANGED    Details   acetaminophen (TYLENOL) 500 mg tablet Take 1 tablet (500 mg total) by mouth every 6 (six) hours as needed for mild pain, Starting Thu 9/5/2024, Normal      atorvastatin (LIPITOR) 40 mg tablet Take 1 tablet (40 mg total) by mouth daily, Starting Fri 9/6/2024, Normal      carbidopa-levodopa (SINEMET)  mg per tablet Take 1 tablet by mouth 3 (three) times a day, Starting Thu 3/2/2023, Normal      Cholecalciferol (Vitamin D High Potency) 25 MCG (1000 UT) capsule Take 1 capsule (1,000 Units total) by mouth daily, Starting Thu 4/4/2024, Normal      !! cloZAPine (CLOZARIL) 100 mg tablet Take 100 mg by mouth 2 (two) times a day, Historical Med      !! clozapine (CLOZARIL) 200 MG tablet Take 200 mg by mouth daily at bedtime , Starting Mon 4/8/2019, Historical Med      Deutetrabenazine ER (Austedo XR) 24 MG TB24 Take 24 mg by mouth daily, Historical Med      docusate sodium (COLACE) 100 mg capsule Take 1 capsule (100 mg total) by mouth 2 (two) times a day, Starting Wed 3/20/2024, Until Thu 8/22/2024, Normal      ibuprofen (MOTRIN) 600 mg tablet Take 1 tablet (600 mg total) by mouth every 6 (six) hours as needed for mild pain, Starting Tue 6/18/2024, Normal      meclizine (ANTIVERT) 25 mg tablet Take 1 tablet (25 mg total) by mouth every 8 (eight) hours as needed for dizziness, Starting Wed 3/20/2024, Normal      metoprolol succinate (TOPROL-XL) 25 mg 24 hr tablet Take 1 tablet (25 mg total) by mouth daily, Starting Tue 7/30/2024, Normal      olopatadine (PATANOL) 0.1 % ophthalmic solution Administer 1 drop to both eyes daily at bedtime, Historical Med      omeprazole (PriLOSEC) 40 MG capsule Take 1 capsule (40 mg total) by mouth daily,  Starting Wed 3/20/2024, Normal      ondansetron (ZOFRAN-ODT) 4 mg disintegrating tablet Historical Med      Psyllium (Reguloid) 28.3 % POWD Take 1 Scoop by mouth 2 (two) times a day, Starting Mon 6/24/2024, Until Thu 8/22/2024, Normal      tamsulosin (FLOMAX) 0.4 mg Take 1 capsule (0.4 mg total) by mouth daily at bedtime, Starting Wed 3/20/2024, Normal      temazepam (RESTORIL) 15 mg capsule Take 1 capsule (15 mg total) by mouth daily at bedtime, Starting Mon 12/4/2023, Until Thu 8/22/2024, Print      topiramate (TOPAMAX) 25 mg tablet Take 25 mg by mouth 2 (two) times a day, Starting Mon 7/1/2019, Historical Med      traZODone (DESYREL) 100 mg tablet Take 100 mg by mouth daily at bedtime, Starting Mon 4/8/2019, Historical Med       !! - Potential duplicate medications found. Please discuss with provider.          No discharge procedures on file.    PDMP Review         Value Time User    PDMP Reviewed  Yes 6/18/2024  9:41 PM Judson Jaimes PA-C            ED Provider  Electronically Signed by             Tito Reyes MD  09/10/24 0631

## 2024-09-09 NOTE — ED NOTES
Called [facility], no answer. Dr. Reyes aware.      Mackenzie Dean, RN  09/08/24 1148

## 2024-09-09 NOTE — ED NOTES
Called [facility] to inform nurse about patients recovery, ensuring facility did not need anything specifically from ER staff, Per DR smalls. No answer to [phone], called x2     Chanel Fletcher RN  09/08/24 3667

## 2024-09-17 ENCOUNTER — APPOINTMENT (EMERGENCY)
Dept: RADIOLOGY | Facility: HOSPITAL | Age: 74
End: 2024-09-17
Payer: MEDICARE

## 2024-09-17 ENCOUNTER — HOSPITAL ENCOUNTER (EMERGENCY)
Facility: HOSPITAL | Age: 74
End: 2024-09-17
Attending: EMERGENCY MEDICINE
Payer: MEDICARE

## 2024-09-17 VITALS
RESPIRATION RATE: 19 BRPM | OXYGEN SATURATION: 97 % | DIASTOLIC BLOOD PRESSURE: 68 MMHG | SYSTOLIC BLOOD PRESSURE: 131 MMHG | HEART RATE: 67 BPM | TEMPERATURE: 98.2 F

## 2024-09-17 DIAGNOSIS — R07.9 CHEST PAIN: Primary | ICD-10-CM

## 2024-09-17 LAB
2HR DELTA HS TROPONIN: 0 NG/L
ALBUMIN SERPL BCG-MCNC: 4.1 G/DL (ref 3.5–5)
ALP SERPL-CCNC: 105 U/L (ref 34–104)
ALT SERPL W P-5'-P-CCNC: 9 U/L (ref 7–52)
ANION GAP SERPL CALCULATED.3IONS-SCNC: 3 MMOL/L (ref 4–13)
AST SERPL W P-5'-P-CCNC: 17 U/L (ref 13–39)
BASOPHILS # BLD AUTO: 0.03 THOUSANDS/ΜL (ref 0–0.1)
BASOPHILS NFR BLD AUTO: 1 % (ref 0–1)
BILIRUB SERPL-MCNC: 0.68 MG/DL (ref 0.2–1)
BUN SERPL-MCNC: 16 MG/DL (ref 5–25)
CALCIUM SERPL-MCNC: 8.8 MG/DL (ref 8.4–10.2)
CARDIAC TROPONIN I PNL SERPL HS: 3 NG/L
CARDIAC TROPONIN I PNL SERPL HS: 3 NG/L
CHLORIDE SERPL-SCNC: 110 MMOL/L (ref 96–108)
CO2 SERPL-SCNC: 29 MMOL/L (ref 21–32)
CREAT SERPL-MCNC: 0.68 MG/DL (ref 0.6–1.3)
EOSINOPHIL # BLD AUTO: 0.18 THOUSAND/ΜL (ref 0–0.61)
EOSINOPHIL NFR BLD AUTO: 4 % (ref 0–6)
ERYTHROCYTE [DISTWIDTH] IN BLOOD BY AUTOMATED COUNT: 13.1 % (ref 11.6–15.1)
GFR SERPL CREATININE-BSD FRML MDRD: 94 ML/MIN/1.73SQ M
GLUCOSE SERPL-MCNC: 97 MG/DL (ref 65–140)
HCT VFR BLD AUTO: 45.4 % (ref 36.5–49.3)
HGB BLD-MCNC: 15.3 G/DL (ref 12–17)
IMM GRANULOCYTES # BLD AUTO: 0.01 THOUSAND/UL (ref 0–0.2)
IMM GRANULOCYTES NFR BLD AUTO: 0 % (ref 0–2)
LYMPHOCYTES # BLD AUTO: 1.04 THOUSANDS/ΜL (ref 0.6–4.47)
LYMPHOCYTES NFR BLD AUTO: 24 % (ref 14–44)
MCH RBC QN AUTO: 33.7 PG (ref 26.8–34.3)
MCHC RBC AUTO-ENTMCNC: 33.7 G/DL (ref 31.4–37.4)
MCV RBC AUTO: 100 FL (ref 82–98)
MONOCYTES # BLD AUTO: 0.37 THOUSAND/ΜL (ref 0.17–1.22)
MONOCYTES NFR BLD AUTO: 9 % (ref 4–12)
NEUTROPHILS # BLD AUTO: 2.71 THOUSANDS/ΜL (ref 1.85–7.62)
NEUTS SEG NFR BLD AUTO: 62 % (ref 43–75)
NRBC BLD AUTO-RTO: 0 /100 WBCS
PLATELET # BLD AUTO: 149 THOUSANDS/UL (ref 149–390)
PMV BLD AUTO: 9.1 FL (ref 8.9–12.7)
POTASSIUM SERPL-SCNC: 3.8 MMOL/L (ref 3.5–5.3)
PROT SERPL-MCNC: 6.8 G/DL (ref 6.4–8.4)
RBC # BLD AUTO: 4.54 MILLION/UL (ref 3.88–5.62)
SODIUM SERPL-SCNC: 142 MMOL/L (ref 135–147)
WBC # BLD AUTO: 4.34 THOUSAND/UL (ref 4.31–10.16)

## 2024-09-17 PROCEDURE — 80053 COMPREHEN METABOLIC PANEL: CPT | Performed by: EMERGENCY MEDICINE

## 2024-09-17 PROCEDURE — 84484 ASSAY OF TROPONIN QUANT: CPT | Performed by: EMERGENCY MEDICINE

## 2024-09-17 PROCEDURE — 36415 COLL VENOUS BLD VENIPUNCTURE: CPT

## 2024-09-17 PROCEDURE — 99284 EMERGENCY DEPT VISIT MOD MDM: CPT

## 2024-09-17 PROCEDURE — 71045 X-RAY EXAM CHEST 1 VIEW: CPT

## 2024-09-17 PROCEDURE — 85025 COMPLETE CBC W/AUTO DIFF WBC: CPT | Performed by: EMERGENCY MEDICINE

## 2024-09-17 PROCEDURE — 93005 ELECTROCARDIOGRAM TRACING: CPT

## 2024-09-17 PROCEDURE — 99285 EMERGENCY DEPT VISIT HI MDM: CPT | Performed by: EMERGENCY MEDICINE

## 2024-09-17 NOTE — ED PROVIDER NOTES
1. Chest pain      ED Disposition       ED Disposition   Discharge    Condition   Stable    Date/Time   Tue Sep 17, 2024  6:52 PM    Comment   Sumit Nails discharge to home/self care.                   Assessment & Plan       Medical Decision Making  Patient is a 74-year-old male past medical history of schizophrenia, hypertension, hyperlipidemia, COPD, SBO, Parkinson's disease presenting with chest pain.  Patient is well-appearing at bedside with stable vitals and in no acute distress.  He is equal pulses, no lower extremity edema, lung clear to auscultation, no reproducible chest tenderness and no other significant physical exam findings.  Initial labs unremarkable though still pending troponin, initial chest x-ray shows no acute consolidations or any other concerning findings, will continue to monitor, obtain troponin and delta troponin to rule out ACS.    Amount and/or Complexity of Data Reviewed  Labs: ordered.  Radiology: ordered and independent interpretation performed.          HEART Risk Score      Flowsheet Row Most Recent Value   Heart Score Risk Calculator    History 0 Filed at: 09/17/2024 1852   ECG 0 Filed at: 09/17/2024 1852   Age 2 Filed at: 09/17/2024 1852   Risk Factors 2 Filed at: 09/17/2024 1852   Troponin 0 Filed at: 09/17/2024 1852   HEART Score 4 Filed at: 09/17/2024 1852               ED Course as of 09/19/24 2145   Tue Sep 17, 2024   1851 Workup unremarkable, patient has been asymptomatic throughout his stay, have discussed return precautions and outpatient follow-up which she states he understands.       Medications - No data to display    History of Present Illness       Patient is a 74-year-old male past medical history of hypertension, hyperlipidemia, COPD, SBO, Parkinson's disease presenting for chest pain.  Patient is right anterior chest pain which he states started suddenly around 1 to 1:30 PM today roughly 3 hours ago and states lasted minutes before resolving.  States it was  worse while he was at rest and was constant at that time.  Does not have any pain now.  Received 324 mg of aspirin en route by EMS.  Has never had this before.  Notes productive nonbloody cough but denies any shortness of breath with nausea or vomiting, leg pain or swelling, rashes, vision changes, dysuria, fevers.  Denies any recent falls or injuries, cancer diagnosis, prior blood clots or coagulopathies.  Does note some lightheadedness with chest pain.  Denies any falls or injuries.            Review of Systems   All other systems reviewed and are negative.          Objective     ED Triage Vitals   Temperature Pulse Blood Pressure Respirations SpO2 Patient Position - Orthostatic VS   09/17/24 1535 09/17/24 1535 09/17/24 1535 09/17/24 1535 09/17/24 1535 09/17/24 1600   98.2 °F (36.8 °C) 71 144/70 18 100 % Sitting      Temp Source Heart Rate Source BP Location FiO2 (%) Pain Score    09/17/24 1535 09/17/24 1600 09/17/24 1600 -- 09/17/24 1535    Oral Monitor Right arm  8        Physical Exam  Vitals reviewed.   Constitutional:       General: He is not in acute distress.     Appearance: Normal appearance. He is not ill-appearing.   HENT:      Mouth/Throat:      Mouth: Mucous membranes are moist.   Eyes:      Conjunctiva/sclera: Conjunctivae normal.   Cardiovascular:      Rate and Rhythm: Normal rate and regular rhythm.      Pulses: Normal pulses.      Heart sounds: Normal heart sounds.   Pulmonary:      Effort: Pulmonary effort is normal.      Breath sounds: Normal breath sounds.   Chest:      Chest wall: No tenderness.   Abdominal:      General: Abdomen is flat.      Palpations: Abdomen is soft.      Tenderness: There is no abdominal tenderness.   Musculoskeletal:         General: No swelling or tenderness. Normal range of motion.      Cervical back: Neck supple.      Right lower leg: No edema.      Left lower leg: No edema.   Skin:     General: Skin is warm and dry.   Neurological:      General: No focal deficit  present.      Mental Status: He is alert.   Psychiatric:         Mood and Affect: Mood normal.         Labs Reviewed   CBC AND DIFFERENTIAL - Abnormal       Result Value    WBC 4.34      RBC 4.54      Hemoglobin 15.3      Hematocrit 45.4       (*)     MCH 33.7      MCHC 33.7      RDW 13.1      MPV 9.1      Platelets 149      nRBC 0      Segmented % 62      Immature Grans % 0      Lymphocytes % 24      Monocytes % 9      Eosinophils Relative 4      Basophils Relative 1      Absolute Neutrophils 2.71      Absolute Immature Grans 0.01      Absolute Lymphocytes 1.04      Absolute Monocytes 0.37      Eosinophils Absolute 0.18      Basophils Absolute 0.03     COMPREHENSIVE METABOLIC PANEL - Abnormal    Sodium 142      Potassium 3.8      Chloride 110 (*)     CO2 29      ANION GAP 3 (*)     BUN 16      Creatinine 0.68      Glucose 97      Calcium 8.8      AST 17      ALT 9      Alkaline Phosphatase 105 (*)     Total Protein 6.8      Albumin 4.1      Total Bilirubin 0.68      eGFR 94      Narrative:     National Kidney Disease Foundation guidelines for Chronic Kidney Disease (CKD):     Stage 1 with normal or high GFR (GFR > 90 mL/min/1.73 square meters)    Stage 2 Mild CKD (GFR = 60-89 mL/min/1.73 square meters)    Stage 3A Moderate CKD (GFR = 45-59 mL/min/1.73 square meters)    Stage 3B Moderate CKD (GFR = 30-44 mL/min/1.73 square meters)    Stage 4 Severe CKD (GFR = 15-29 mL/min/1.73 square meters)    Stage 5 End Stage CKD (GFR <15 mL/min/1.73 square meters)  Note: GFR calculation is accurate only with a steady state creatinine   HS TROPONIN I 0HR - Normal    hs TnI 0hr 3     HS TROPONIN I 2HR - Normal    hs TnI 2hr 3      Delta 2hr hsTnI 0     LIGHT BLUE TOP     XR chest 1 view portable   ED Interpretation by Bita Rosenbaum DO (09/17 1617)   NAD      Final Interpretation by Yosef Nguyen MD (09/17 1630)      No acute cardiopulmonary disease.            Workstation performed: [id number]              ECG 12 Lead Documentation Only    Date/Time: 9/17/2024 5:06 PM    Performed by: Bita Rosenbaum DO  Authorized by: Bita Rosenbaum DO    Patient location:  ED  Previous ECG:     Previous ECG:  Compared to current    Similarity:  No change  Interpretation:     Interpretation: normal    Rate:     ECG rate assessment: normal    Rhythm:     Rhythm: sinus rhythm    Ectopy:     Ectopy: none    QRS:     QRS axis:  Normal    QRS intervals:  Normal  Conduction:     Conduction: normal    ST segments:     ST segments:  Normal  T waves:     T waves: normal           Bita Rosenbaum DO  09/19/24 2145

## 2024-09-18 LAB
ATRIAL RATE: 72 BPM
P AXIS: 60 DEGREES
PR INTERVAL: 202 MS
QRS AXIS: 58 DEGREES
QRSD INTERVAL: 94 MS
QT INTERVAL: 400 MS
QTC INTERVAL: 438 MS
T WAVE AXIS: 57 DEGREES
VENTRICULAR RATE: 72 BPM

## 2024-09-18 PROCEDURE — 93010 ELECTROCARDIOGRAM REPORT: CPT | Performed by: INTERNAL MEDICINE

## 2024-09-18 NOTE — ED NOTES
This writer found papers that pt came with from EMS, that states the patient is from [facility] in [place].     Millie Farrell RN  09/17/24 2016

## 2024-09-18 NOTE — ED NOTES
This writer called [facility] and spoke to administration, they reported not having this pt as a resident at their facility.     Millie Farrell RN  09/17/24 2015

## 2024-09-18 NOTE — ED NOTES
Pt got picked up by [facility] to arrange to go to [facility] at 2010. This writer tried [facility] again and got through, and confirmed that this pt is a resident there. Charge RN made aware. Getting pt to correct facility at this time.     Millie Farrell RN  09/17/24 2019

## 2024-09-19 ENCOUNTER — OFFICE VISIT (OUTPATIENT)
Age: 74
End: 2024-09-19
Payer: MEDICARE

## 2024-09-19 VITALS
SYSTOLIC BLOOD PRESSURE: 112 MMHG | DIASTOLIC BLOOD PRESSURE: 66 MMHG | TEMPERATURE: 97.2 F | WEIGHT: 158.4 LBS | BODY MASS INDEX: 22.68 KG/M2 | HEIGHT: 70 IN | OXYGEN SATURATION: 95 % | HEART RATE: 75 BPM | RESPIRATION RATE: 16 BRPM

## 2024-09-19 DIAGNOSIS — I10 PRIMARY HYPERTENSION: Primary | ICD-10-CM

## 2024-09-19 DIAGNOSIS — T43.505A NEUROLEPTIC-INDUCED PARKINSONISM (HCC): ICD-10-CM

## 2024-09-19 DIAGNOSIS — F20.0 PARANOID SCHIZOPHRENIA (HCC): ICD-10-CM

## 2024-09-19 DIAGNOSIS — G21.11 NEUROLEPTIC-INDUCED PARKINSONISM (HCC): ICD-10-CM

## 2024-09-19 PROBLEM — R42 DIZZINESS: Status: RESOLVED | Noted: 2023-09-30 | Resolved: 2024-09-19

## 2024-09-19 PROBLEM — K56.609 SBO (SMALL BOWEL OBSTRUCTION) (HCC): Status: RESOLVED | Noted: 2023-05-04 | Resolved: 2024-09-19

## 2024-09-19 PROCEDURE — 99214 OFFICE O/P EST MOD 30 MIN: CPT | Performed by: FAMILY MEDICINE

## 2024-09-19 PROCEDURE — G2211 COMPLEX E/M VISIT ADD ON: HCPCS | Performed by: FAMILY MEDICINE

## 2024-09-19 NOTE — PROGRESS NOTES
[facility]  Ambulatory Office Visit     PATIENT INFORMATION   Name: Sumit Nails   YOB: 1950   MRN: [id number]  Encounter Provider: Garry Hidalgo MD    Encounter Date: 9/19/2024    ASSESSMENT & PLAN     Assessment & Plan  Primary hypertension  Blood Pressure: 112/66    Controlled.  Currently on metoprolol succinate 25 mg daily  Continue current regimen.  Discussed avoiding constantly being evaluated in the emergency room and if concerns about any issues to call the office and set up an appointment and if needed patient can be transferred to the emergency room from the office.  Return in 3 months to monitor blood pressures closely and if well-controlled will discontinue metoprolol.       Neuro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 (HCC)  Follows neurology  Continue care with specialist.  Currently on Sinemet       Paranoid schizophrenia (HCC)  Follows psych.  Currently on trazodone, topiramate, temazepam, clozapine                FOLLOW UP   No follow-ups on file.    CURRENT MEDICATIONS     Current Outpatient Medications:     acetaminophen (TYLENOL) 500 mg tablet, Take 1 tablet (500 mg total) by mouth every 6 (six) hours as needed for mild pain, Disp: 30 tablet, Rfl: 11    atorvastatin (LIPITOR) 40 mg tablet, Take 1 tablet (40 mg total) by mouth daily, Disp: 90 tablet, Rfl: 1    carbidopa-levodopa (SINEMET)  mg per tablet, Take 1 tablet by mouth 3 (three) times a day (Patient taking differently: Take 1 tablet by mouth 2 (two) times a day), Disp: 270 tablet, Rfl: 3    Cholecalciferol (Vitamin D High Potency) 25 MCG (1000 UT) capsule, Take 1 capsule (1,000 Units total) by mouth daily, Disp: 30 capsule, Rfl: 6    cloZAPine (CLOZARIL) 100 mg tablet, Take 100 mg by mouth 2 (two) times a day, Disp: , Rfl:     clozapine (CLOZARIL) 200 MG tablet, Take 200 mg by mouth daily at bedtime , Disp: , Rfl:     Deutetrabenazine ER (Austedo XR) 24 MG TB24, Take 24 mg by mouth daily, Disp: , Rfl:      docusate sodium (COLACE) 100 mg capsule, Take 1 capsule (100 mg total) by mouth 2 (two) times a day, Disp: 60 capsule, Rfl: 1    meclizine (ANTIVERT) 25 mg tablet, Take 1 tablet (25 mg total) by mouth every 8 (eight) hours as needed for dizziness, Disp: 30 tablet, Rfl: 3    metoprolol succinate (TOPROL-XL) 25 mg 24 hr tablet, Take 1 tablet (25 mg total) by mouth daily, Disp: 90 tablet, Rfl: 1    olopatadine (PATANOL) 0.1 % ophthalmic solution, Administer 1 drop to both eyes daily at bedtime, Disp: , Rfl:     omeprazole (PriLOSEC) 40 MG capsule, Take 1 capsule (40 mg total) by mouth daily, Disp: 31 capsule, Rfl: 11    ondansetron (ZOFRAN-ODT) 4 mg disintegrating tablet, , Disp: , Rfl:     Psyllium (Reguloid) 28.3 % POWD, Take 1 Scoop by mouth 2 (two) times a day, Disp: 44 g, Rfl: 1    tamsulosin (FLOMAX) 0.4 mg, Take 1 capsule (0.4 mg total) by mouth daily at bedtime, Disp: 90 capsule, Rfl: 3    topiramate (TOPAMAX) 25 mg tablet, Take 25 mg by mouth 2 (two) times a day, Disp: , Rfl: 1    traZODone (DESYREL) 100 mg tablet, Take 100 mg by mouth daily at bedtime, Disp: , Rfl:     temazepam (RESTORIL) 15 mg capsule, Take 1 capsule (15 mg total) by mouth daily at bedtime, Disp: 30 capsule, Rfl: 0      CHIEF COMPLIANT     Chief Complaint   Patient presents with    Follow-up     After ER visit yesterday     Constipation        HISTORY OF PRESENT ILLNESS    History of Present Illness     History obtained from : patient and reviewed plan with caregiver  HPI    Review of Systems    PAST MEDICAL & SURGICAL HISTORY     Past Medical History:   Diagnosis Date    Asthma     Benign prostatic hyperplasia     COPD (chronic obstructive pulmonary disease) (HCC)     GERD (gastroesophageal reflux disease)     Herpes zoster without complication 12/10/2021    Hypercholesteremia     Hypertension     Neuroleptic induced parkinsonism (HCC)     Paranoid schizophrenia (HCC)     Parkinsons     Psychiatric disorder      Past Surgical History:  "  Procedure Laterality Date    CATARACT EXTRACTION      CHOLECYSTECTOMY LAPAROSCOPIC N/A 12/3/2023    Procedure: CHOLECYSTECTOMY LAPAROSCOPIC WITH INTRAOPERATIVE CHOLANGIOGRAM;  Surgeon: Maverick Tamayo MD;  Location: [facility];  Service: General    COLONOSCOPY         OBJECTIVES      /66 (BP Location: Left arm, Patient Position: Sitting, Cuff Size: Standard)   Pulse 75   Temp (!) 97.2 °F (36.2 °C) (Tympanic)   Resp 16   Ht 5' 10\" (1.778 m)   Wt 71.8 kg (158 lb 6.4 oz)   SpO2 95%   BMI 22.73 kg/m²    Physical Exam  Vitals reviewed.   Constitutional:       General: He is not in acute distress.     Appearance: Normal appearance. He is not ill-appearing, toxic-appearing or diaphoretic.   HENT:      Head: Normocephalic and atraumatic.      Right Ear: External ear normal.      Left Ear: External ear normal.      Nose: Nose normal.      Mouth/Throat:      Mouth: Mucous membranes are moist.   Eyes:      General: No scleral icterus.        Right eye: No discharge.         Left eye: No discharge.      Extraocular Movements: Extraocular movements intact.      Conjunctiva/sclera: Conjunctivae normal.   Cardiovascular:      Rate and Rhythm: Normal rate and regular rhythm.      Pulses: Normal pulses.      Heart sounds: Normal heart sounds.   Pulmonary:      Effort: Pulmonary effort is normal. No respiratory distress.      Breath sounds: Normal breath sounds.   Abdominal:      Palpations: Abdomen is soft.      Tenderness: There is no abdominal tenderness.   Musculoskeletal:         General: No swelling. Normal range of motion.      Cervical back: Normal range of motion.   Skin:     General: Skin is warm and dry.   Neurological:      General: No focal deficit present.      Mental Status: He is alert.           Garry Hidalgo MD  Family Medicine Physician   [facility] [place]      Administrative Statements         "

## 2024-09-19 NOTE — ASSESSMENT & PLAN NOTE
Blood Pressure: 112/66    Controlled.  Currently on metoprolol succinate 25 mg daily  Continue current regimen.  Discussed avoiding constantly being evaluated in the emergency room and if concerns about any issues to call the office and set up an appointment and if needed patient can be transferred to the emergency room from the office.  Return in 3 months to monitor blood pressures closely and if well-controlled will discontinue metoprolol.

## 2024-09-23 ENCOUNTER — TELEPHONE (OUTPATIENT)
Dept: NEUROLOGY | Facility: CLINIC | Age: 74
End: 2024-09-23

## 2024-09-26 ENCOUNTER — OFFICE VISIT (OUTPATIENT)
Dept: NEUROLOGY | Facility: CLINIC | Age: 74
End: 2024-09-26
Payer: MEDICARE

## 2024-09-26 VITALS
HEIGHT: 70 IN | WEIGHT: 159 LBS | HEART RATE: 82 BPM | DIASTOLIC BLOOD PRESSURE: 80 MMHG | BODY MASS INDEX: 22.76 KG/M2 | SYSTOLIC BLOOD PRESSURE: 120 MMHG | OXYGEN SATURATION: 98 %

## 2024-09-26 DIAGNOSIS — T43.505A NEUROLEPTIC-INDUCED PARKINSONISM (HCC): ICD-10-CM

## 2024-09-26 DIAGNOSIS — F20.0 PARANOID SCHIZOPHRENIA (HCC): ICD-10-CM

## 2024-09-26 DIAGNOSIS — G21.11 NEUROLEPTIC-INDUCED PARKINSONISM (HCC): ICD-10-CM

## 2024-09-26 DIAGNOSIS — G20.A1 PARKINSON'S DISEASE, UNSPECIFIED WHETHER DYSKINESIA PRESENT, UNSPECIFIED WHETHER MANIFESTATIONS FLUCTUATE (HCC): ICD-10-CM

## 2024-09-26 DIAGNOSIS — R41.3 MEMORY DIFFICULTIES: ICD-10-CM

## 2024-09-26 PROCEDURE — 99214 OFFICE O/P EST MOD 30 MIN: CPT | Performed by: PSYCHIATRY & NEUROLOGY

## 2024-09-26 RX ORDER — MULTIVITAMIN
1 CAPSULE ORAL DAILY
Qty: 30 CAPSULE | Refills: 5 | Status: SHIPPED | OUTPATIENT
Start: 2024-09-26

## 2024-09-26 NOTE — ASSESSMENT & PLAN NOTE
Patient on Sinemet 25/100 mg 1 tablet 3 times a day, continue with same continue with fall and safety precautions

## 2024-09-26 NOTE — ASSESSMENT & PLAN NOTE
Orders:    Lyme Total AB W Reflex to IGM/IGG; Future    Vitamin B12; Future    Folate; Future    Ambulatory Referral to Occupational Therapy; Future    Multiple Vitamin (multivitamin) capsule; Take 1 capsule by mouth daily      Patient does have cognitive impairment with a MoCA of 11/30, it could be multifactorial he could be having a true cognitive impairment but he is also on quite a bit of medications including Topamax that can interfere with his cognition I advised the caretaker to discuss with a psychiatrist and see if they can change his Topamax to a different medication and also to see if they can decrease his psych medications to see if it will help with his symptoms.    He was also advised to go for cognitive therapy which we discussed and he is agreeable, we discussed medications like Aricept and Exelon he does not want to go on the medications, I also explained to the caretaker that they should discuss with psychiatrist to see if he has medical decision-making capability, he was advised to be under constant supervision to keep his blood pressure cholesterol sugar under control, he was advised to eat healthy nutritious diet, to take a multivitamin every day, to take fall and safety precautions, to go to the hospital if has any worsening symptoms and call me otherwise to see me back in 4 months or sooner if needed and follow-up with the other physicians.

## 2024-09-26 NOTE — PROGRESS NOTES
Neurology Ambulatory Visit  Name: Sumit Nails       : 1950       MRN: [id number]   Encounter Provider: Poonam Solis MD   Encounter Date: 2024  Encounter department: [facility]      Sumit Nails is a 74 y.o. male.     Assessment:  Assessment & Plan  Memory difficulties    Orders:    Lyme Total AB W Reflex to IGM/IGG; Future    Vitamin B12; Future    Folate; Future    Ambulatory Referral to Occupational Therapy; Future    Multiple Vitamin (multivitamin) capsule; Take 1 capsule by mouth daily      Patient does have cognitive impairment with a MoCA of , it could be multifactorial he could be having a true cognitive impairment but he is also on quite a bit of medications including Topamax that can interfere with his cognition I advised the caretaker to discuss with a psychiatrist and see if they can change his Topamax to a different medication and also to see if they can decrease his psych medications to see if it will help with his symptoms.    He was also advised to go for cognitive therapy which we discussed and he is agreeable, we discussed medications like Aricept and Exelon he does not want to go on the medications, I also explained to the caretaker that they should discuss with psychiatrist to see if he has medical decision-making capability, he was advised to be under constant supervision to keep his blood pressure cholesterol sugar under control, he was advised to eat healthy nutritious diet, to take a multivitamin every day, to take fall and safety precautions, to go to the hospital if has any worsening symptoms and call me otherwise to see me back in 4 months or sooner if needed and follow-up with the other physicians.  Neuro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 (HCC)         Parkinson's disease, unspecified whether dyskinesia present, unspecified whether manifestations fluctuate (HCC)       Patient on Sinemet 25/100 mg 1 tablet 3 times a day, continue with same  "continue with fall and safety precautions  Paranoid schizophrenia (HCC)         Management as per psychiatry,          Subjective:  Chief Complaint   Patient presents with    Neuro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    Memory Loss     Patient is here in follow-up for memory difficulties he had last seen me for dizziness and according to the caregiver he still has some occasional dizziness which is mostly positional in nature, according to the caregiver he is having difficulty with attention and concentration and making decisions but he is able to do his ADLs he lives in a group home, he does not like to shower regularly but is able to go to the bathroom on his own appetite is good weight has been stable no difficulty in swallowing he has not had any falls, his tremor seems to be under control, mood is decent sleep is good weight has been stable no freezing episodes, no other complaints.  Vitals:    09/26/24 1253   BP: 120/80   BP Location: Left arm   Patient Position: Sitting   Cuff Size: Standard   Pulse: 82   SpO2: 98%   Weight: 72.1 kg (159 lb)   Height: 5' 10\" (1.778 m)       Current Medications    Current Outpatient Medications:     acetaminophen (TYLENOL) 500 mg tablet, Take 1 tablet (500 mg total) by mouth every 6 (six) hours as needed for mild pain, Disp: 30 tablet, Rfl: 11    atorvastatin (LIPITOR) 40 mg tablet, Take 1 tablet (40 mg total) by mouth daily, Disp: 90 tablet, Rfl: 1    carbidopa-levodopa (SINEMET)  mg per tablet, Take 1 tablet by mouth 3 (three) times a day (Patient taking differently: Take 1 tablet by mouth 2 (two) times a day), Disp: 270 tablet, Rfl: 3    Cholecalciferol (Vitamin D High Potency) 25 MCG (1000 UT) capsule, Take 1 capsule (1,000 Units total) by mouth daily, Disp: 30 capsule, Rfl: 6    cloZAPine (CLOZARIL) 100 mg tablet, Take 100 mg by mouth 2 (two) times a day, Disp: , Rfl:     clozapine (CLOZARIL) 200 MG tablet, Take 200 mg by mouth daily at bedtime , Disp: , Rfl:     " Deutetrabenazine ER (Austedo XR) 24 MG TB24, Take 24 mg by mouth daily, Disp: , Rfl:     docusate sodium (COLACE) 100 mg capsule, Take 1 capsule (100 mg total) by mouth 2 (two) times a day, Disp: 60 capsule, Rfl: 1    meclizine (ANTIVERT) 25 mg tablet, Take 1 tablet (25 mg total) by mouth every 8 (eight) hours as needed for dizziness, Disp: 30 tablet, Rfl: 3    metoprolol succinate (TOPROL-XL) 25 mg 24 hr tablet, Take 1 tablet (25 mg total) by mouth daily, Disp: 90 tablet, Rfl: 1    Multiple Vitamin (multivitamin) capsule, Take 1 capsule by mouth daily, Disp: 30 capsule, Rfl: 5    omeprazole (PriLOSEC) 40 MG capsule, Take 1 capsule (40 mg total) by mouth daily, Disp: 31 capsule, Rfl: 11    ondansetron (ZOFRAN-ODT) 4 mg disintegrating tablet, , Disp: , Rfl:     Psyllium (Reguloid) 28.3 % POWD, Take 1 Scoop by mouth 2 (two) times a day, Disp: 44 g, Rfl: 1    tamsulosin (FLOMAX) 0.4 mg, Take 1 capsule (0.4 mg total) by mouth daily at bedtime, Disp: 90 capsule, Rfl: 3    temazepam (RESTORIL) 15 mg capsule, Take 1 capsule (15 mg total) by mouth daily at bedtime, Disp: 30 capsule, Rfl: 0    topiramate (TOPAMAX) 25 mg tablet, Take 25 mg by mouth 2 (two) times a day, Disp: , Rfl: 1    traZODone (DESYREL) 100 mg tablet, Take 100 mg by mouth daily at bedtime, Disp: , Rfl:     olopatadine (PATANOL) 0.1 % ophthalmic solution, Administer 1 drop to both eyes daily at bedtime (Patient not taking: Reported on 9/26/2024), Disp: , Rfl:       Allergies  Patient has no known allergies.    Past Medical History  Past Medical History:   Diagnosis Date    Asthma     Benign prostatic hyperplasia     COPD (chronic obstructive pulmonary disease) (HCC)     GERD (gastroesophageal reflux disease)     Herpes zoster without complication 12/10/2021    Hypercholesteremia     Hypertension     Neuroleptic induced parkinsonism (HCC)     Paranoid schizophrenia (HCC)     Parkinsons     Psychiatric disorder          Past Surgical History:  Past Surgical  History:   Procedure Laterality Date    CATARACT EXTRACTION      CHOLECYSTECTOMY LAPAROSCOPIC N/A 12/3/2023    Procedure: CHOLECYSTECTOMY LAPAROSCOPIC WITH INTRAOPERATIVE CHOLANGIOGRAM;  Surgeon: Maverick Tamayo MD;  Location: [facility] OR;  Service: General    COLONOSCOPY           Family History:  Family History   Problem Relation Age of Onset    No Known Problems Mother     No Known Problems Father     Parkinsonism Son        Social History:   reports that he quit smoking about 24 years ago. His smoking use included cigarettes. He started smoking about 44 years ago. He has a 20 pack-year smoking history. He has been exposed to tobacco smoke. He has never used smokeless tobacco. He reports that he does not currently use alcohol. He reports that he does not use drugs.    Objective:    Labs  I have reviewed pertinent labs:  CBC:   Lab Results   Component Value Date    WBC 4.34 09/17/2024    RBC 4.54 09/17/2024    HGB 15.3 09/17/2024    HCT 45.4 09/17/2024     (H) 09/17/2024     09/17/2024    MCH 33.7 09/17/2024    MCHC 33.7 09/17/2024    RDW 13.1 09/17/2024    MPV 9.1 09/17/2024    NEUTROABS 2.71 09/17/2024              General Exam  GENERAL APPEARANCE:  No distress, alert, interactive and cooperative.  CARDIOVASCULAR: Warm and well perfused  LUNGS: normal work of breathing on room air  EXTREMITIES: no peripheral edema     Neurologic Exam  MENTAL STATE:  Orientation was normal to time, place and person without aphasia or apraxia.  MoCA is 11/30    CRANIAL NERVES:  CN 2       visual fields full to confrontation.  CN 3, 4, 6  Pupils round, 4 mm in diameter, equally reactive to light. Lids symmetric; no ptosis. EOMs normal alignment, full range. No nystagmus.  CN 5  Facial sensation intact bilaterally.  CN 7  Normal and symmetric facial strength.   CN 8  Hearing intact to finger rub bilaterally.  CN 9  Palate elevates symmetrically.  CN 11  Normal strength of shoulder shrug and neck turning.  CN 12  Tongue  midline, with normal bulk and strength.     MOTOR:  Motor exam was normal. Muscle bulk and tone were normal in both upper and lower extremities. Muscle strength was 5/5 in distal and proximal muscles in both upper and lower extremities. No fasciculations, tremor or other abnormal movements were present.  He has cogwheeling rigidity of bilateral upper extremities and has mild tremor of the mouth     REFLEXES:  RIGHT UE   LEFT UE  BR:2              BR:2    Biceps:2      Biceps:2    Triceps:2     Triceps:2       RIGHT LLE   LEFT LLE    Knee:2           Knee:2    Ankle:2         Ankle:2           COORDINATION:   Coordination exam was normal. In both upper extremities, finger-nose-finger was intact without dysmetria or overshoot.      GAIT:  Ambulates with a stooped posture with decreased arm swing, negative Romberg test       ROS:  Review of Systems   Constitutional:  Negative for appetite change, fatigue and fever.   HENT: Negative.  Negative for hearing loss, tinnitus, trouble swallowing and voice change.    Eyes: Negative.  Negative for photophobia, pain and visual disturbance.   Respiratory: Negative.  Negative for shortness of breath.    Cardiovascular: Negative.  Negative for palpitations.   Gastrointestinal: Negative.  Negative for nausea and vomiting.   Endocrine: Negative.  Negative for cold intolerance.   Genitourinary: Negative.  Negative for dysuria, frequency and urgency.   Musculoskeletal:  Negative for back pain, gait problem, myalgias, neck pain and neck stiffness.   Skin: Negative.  Negative for rash.   Allergic/Immunologic: Negative.    Neurological: Negative.  Negative for dizziness, tremors, seizures, syncope, facial asymmetry, speech difficulty, weakness, light-headedness, numbness and headaches.   Hematological: Negative.  Does not bruise/bleed easily.   Psychiatric/Behavioral:  Positive for confusion, decreased concentration, dysphoric mood and hallucinations. Negative for sleep disturbance. The  patient is nervous/anxious.        Administrative Statements   The total amount of time spent with the patient and on chart review and documentation was 50  minutes.  I have reviewed included the past medical history, surgical history, social and family history, current medications, allergies vitals, review of systems, and updated this information as appropriate today.    Poonam Solis MD

## 2024-09-28 ENCOUNTER — HOSPITAL ENCOUNTER (EMERGENCY)
Facility: HOSPITAL | Age: 74
Discharge: HOME | End: 2024-09-29
Attending: EMERGENCY MEDICINE
Payer: MEDICARE

## 2024-09-28 VITALS
HEART RATE: 74 BPM | DIASTOLIC BLOOD PRESSURE: 67 MMHG | TEMPERATURE: 98 F | OXYGEN SATURATION: 97 % | SYSTOLIC BLOOD PRESSURE: 151 MMHG | RESPIRATION RATE: 16 BRPM

## 2024-09-28 DIAGNOSIS — R11.2 NAUSEA AND VOMITING: ICD-10-CM

## 2024-09-28 DIAGNOSIS — R10.9 ABDOMINAL PAIN: Primary | ICD-10-CM

## 2024-09-28 LAB
ALBUMIN SERPL BCG-MCNC: 3.7 G/DL (ref 3.5–5)
ALP SERPL-CCNC: 115 U/L (ref 34–104)
ALT SERPL W P-5'-P-CCNC: 19 U/L (ref 7–52)
ANION GAP SERPL CALCULATED.3IONS-SCNC: 3 MMOL/L (ref 4–13)
AST SERPL W P-5'-P-CCNC: 19 U/L (ref 13–39)
BASOPHILS # BLD AUTO: 0.04 THOUSANDS/ΜL (ref 0–0.1)
BASOPHILS NFR BLD AUTO: 1 % (ref 0–1)
BILIRUB SERPL-MCNC: 0.43 MG/DL (ref 0.2–1)
BUN SERPL-MCNC: 14 MG/DL (ref 5–25)
CALCIUM SERPL-MCNC: 8.3 MG/DL (ref 8.4–10.2)
CHLORIDE SERPL-SCNC: 111 MMOL/L (ref 96–108)
CO2 SERPL-SCNC: 25 MMOL/L (ref 21–32)
CREAT SERPL-MCNC: 0.67 MG/DL (ref 0.6–1.3)
EOSINOPHIL # BLD AUTO: 0.25 THOUSAND/ΜL (ref 0–0.61)
EOSINOPHIL NFR BLD AUTO: 6 % (ref 0–6)
ERYTHROCYTE [DISTWIDTH] IN BLOOD BY AUTOMATED COUNT: 13 % (ref 11.6–15.1)
GFR SERPL CREATININE-BSD FRML MDRD: 94 ML/MIN/1.73SQ M
GLUCOSE SERPL-MCNC: 89 MG/DL (ref 65–140)
HCT VFR BLD AUTO: 39.6 % (ref 36.5–49.3)
HGB BLD-MCNC: 13.3 G/DL (ref 12–17)
IMM GRANULOCYTES # BLD AUTO: 0.01 THOUSAND/UL (ref 0–0.2)
IMM GRANULOCYTES NFR BLD AUTO: 0 % (ref 0–2)
LIPASE SERPL-CCNC: 48 U/L (ref 11–82)
LYMPHOCYTES # BLD AUTO: 1.19 THOUSANDS/ΜL (ref 0.6–4.47)
LYMPHOCYTES NFR BLD AUTO: 28 % (ref 14–44)
MCH RBC QN AUTO: 33.5 PG (ref 26.8–34.3)
MCHC RBC AUTO-ENTMCNC: 33.6 G/DL (ref 31.4–37.4)
MCV RBC AUTO: 100 FL (ref 82–98)
MONOCYTES # BLD AUTO: 0.5 THOUSAND/ΜL (ref 0.17–1.22)
MONOCYTES NFR BLD AUTO: 12 % (ref 4–12)
NEUTROPHILS # BLD AUTO: 2.33 THOUSANDS/ΜL (ref 1.85–7.62)
NEUTS SEG NFR BLD AUTO: 53 % (ref 43–75)
NRBC BLD AUTO-RTO: 0 /100 WBCS
PLATELET # BLD AUTO: 158 THOUSANDS/UL (ref 149–390)
PMV BLD AUTO: 9.4 FL (ref 8.9–12.7)
POTASSIUM SERPL-SCNC: 3.8 MMOL/L (ref 3.5–5.3)
PROT SERPL-MCNC: 6 G/DL (ref 6.4–8.4)
RBC # BLD AUTO: 3.97 MILLION/UL (ref 3.88–5.62)
SODIUM SERPL-SCNC: 139 MMOL/L (ref 135–147)
WBC # BLD AUTO: 4.32 THOUSAND/UL (ref 4.31–10.16)

## 2024-09-28 PROCEDURE — 36415 COLL VENOUS BLD VENIPUNCTURE: CPT | Performed by: EMERGENCY MEDICINE

## 2024-09-28 PROCEDURE — 96361 HYDRATE IV INFUSION ADD-ON: CPT

## 2024-09-28 PROCEDURE — 80053 COMPREHEN METABOLIC PANEL: CPT | Performed by: EMERGENCY MEDICINE

## 2024-09-28 PROCEDURE — 99284 EMERGENCY DEPT VISIT MOD MDM: CPT

## 2024-09-28 PROCEDURE — 83690 ASSAY OF LIPASE: CPT | Performed by: EMERGENCY MEDICINE

## 2024-09-28 PROCEDURE — 96375 TX/PRO/DX INJ NEW DRUG ADDON: CPT

## 2024-09-28 PROCEDURE — 96374 THER/PROPH/DIAG INJ IV PUSH: CPT

## 2024-09-28 PROCEDURE — 85025 COMPLETE CBC W/AUTO DIFF WBC: CPT | Performed by: EMERGENCY MEDICINE

## 2024-09-28 RX ORDER — ONDANSETRON 4 MG/1
4 TABLET, ORALLY DISINTEGRATING ORAL EVERY 6 HOURS PRN
Qty: 20 TABLET | Refills: 0 | Status: SHIPPED | OUTPATIENT
Start: 2024-09-28

## 2024-09-28 RX ORDER — KETOROLAC TROMETHAMINE 30 MG/ML
15 INJECTION, SOLUTION INTRAMUSCULAR; INTRAVENOUS ONCE
Status: COMPLETED | OUTPATIENT
Start: 2024-09-28 | End: 2024-09-28

## 2024-09-28 RX ORDER — ONDANSETRON 2 MG/ML
4 INJECTION INTRAMUSCULAR; INTRAVENOUS ONCE
Status: COMPLETED | OUTPATIENT
Start: 2024-09-28 | End: 2024-09-28

## 2024-09-28 RX ADMIN — ONDANSETRON 4 MG: 2 INJECTION INTRAMUSCULAR; INTRAVENOUS at 21:08

## 2024-09-28 RX ADMIN — SODIUM CHLORIDE 1000 ML: 0.9 INJECTION, SOLUTION INTRAVENOUS at 21:09

## 2024-09-28 RX ADMIN — KETOROLAC TROMETHAMINE 15 MG: 30 INJECTION, SOLUTION INTRAMUSCULAR at 22:10

## 2024-09-29 PROCEDURE — 99284 EMERGENCY DEPT VISIT MOD MDM: CPT | Performed by: EMERGENCY MEDICINE

## 2024-09-29 NOTE — ED PROVIDER NOTES
"Final diagnoses:   Abdominal pain   Nausea and vomiting     ED Disposition       ED Disposition   Discharge    Condition   Stable    Date/Time   Sat Sep 28, 2024  9:54 PM    Comment   Sumit Nails discharge to home/self care.                   Assessment & Plan       Medical Decision Making  74-year-old male with concern for food poisoning, he is known to have psychiatric history and does have bizarre thinking but is acutely psychotic here.  Will screen for acute pathology with labs, he has benign abdominal exam here.  Will give Zofran and Toradol and reassess.    Amount and/or Complexity of Data Reviewed  Labs: ordered.    Risk  Prescription drug management.             Medications   sodium chloride 0.9 % bolus 1,000 mL (0 mL Intravenous Stopped 9/28/24 2309)   ondansetron (ZOFRAN) injection 4 mg (4 mg Intravenous Given 9/28/24 2108)   ketorolac (TORADOL) injection 15 mg (15 mg Intravenous Given 9/28/24 2210)       ED Risk Strat Scores                           SBIRT 20yo+      Flowsheet Row Most Recent Value   Initial Alcohol Screen: US AUDIT-C     1. How often do you have a drink containing alcohol? 0 Filed at: 09/28/2024 2032   2. How many drinks containing alcohol do you have on a typical day you are drinking?  0 Filed at: 09/28/2024 2032   3b. FEMALE Any Age, or MALE 65+: How often do you have 4 or more drinks on one occassion? 0 Filed at: 09/28/2024 2032   Audit-C Score 0 Filed at: 09/28/2024 2032   RAFA: How many times in the past year have you...    Used an illegal drug or used a prescription medication for non-medical reasons? Never Filed at: 09/28/2024 2032                            History of Present Illness       Chief Complaint   Patient presents with    Abdominal Pain     Pt arrives from [place], [facility] psych hx. States he ate microwaved paste and he's \"allergic to the microwave\" so the food \"didn't agree with him\". Pt now c/o upset stomach and nausea.        Past Medical History:   Diagnosis " "Date    Asthma     Benign prostatic hyperplasia     COPD (chronic obstructive pulmonary disease) (HCC)     GERD (gastroesophageal reflux disease)     Herpes zoster without complication 12/10/2021    Hypercholesteremia     Hypertension     Neuroleptic induced parkinsonism (HCC)     Paranoid schizophrenia (HCC)     Parkinsons     Psychiatric disorder       Past Surgical History:   Procedure Laterality Date    CATARACT EXTRACTION      CHOLECYSTECTOMY LAPAROSCOPIC N/A 12/3/2023    Procedure: CHOLECYSTECTOMY LAPAROSCOPIC WITH INTRAOPERATIVE CHOLANGIOGRAM;  Surgeon: Maverick Tamayo MD;  Location: [facility];  Service: General    COLONOSCOPY        Family History   Problem Relation Age of Onset    No Known Problems Mother     No Known Problems Father     Parkinsonism Son       Social History     Tobacco Use    Smoking status: Former     Current packs/day: 0.00     Average packs/day: 1 pack/day for 20.0 years (20.0 ttl pk-yrs)     Types: Cigarettes     Start date:      Quit date:      Years since quittin.     Passive exposure: Past    Smokeless tobacco: Never   Vaping Use    Vaping status: Never Used   Substance Use Topics    Alcohol use: Not Currently    Drug use: Never      E-Cigarette/Vaping    E-Cigarette Use Never User       E-Cigarette/Vaping Substances    Nicotine No     THC No     CBD No     Flavoring No     Other No     Unknown No       I have reviewed and agree with the history as documented.     74-year-old male presenting to the emergency department for evaluation of abdominal pain.  Patient had epigastric abdominal pain with nausea which has since passed.  Patient states that he it is from eating microwaved food and he states that he is \"allergic to microwaves \"he has no fevers or chills he has nausea without vomiting he has no changes in bowel or bladder habits.        Review of Systems   Constitutional:  Negative for appetite change, chills, fatigue and fever.   HENT:  Negative for sneezing and " sore throat.    Eyes:  Negative for visual disturbance.   Respiratory:  Negative for cough, choking, chest tightness, shortness of breath and wheezing.    Cardiovascular:  Negative for chest pain and palpitations.   Gastrointestinal:  Positive for abdominal pain and nausea. Negative for constipation, diarrhea and vomiting.   Genitourinary:  Negative for difficulty urinating and dysuria.   Neurological:  Negative for dizziness, weakness, light-headedness, numbness and headaches.   All other systems reviewed and are negative.          Objective       ED Triage Vitals   Temperature Pulse Blood Pressure Respirations SpO2 Patient Position - Orthostatic VS   09/28/24 2030 09/28/24 2030 09/28/24 2030 09/28/24 2030 09/28/24 2030 --   98 °F (36.7 °C) 74 151/67 16 97 %       Temp Source Heart Rate Source BP Location FiO2 (%) Pain Score    09/28/24 2030 09/28/24 2030 09/28/24 2030 -- 09/28/24 2210    Oral Monitor Right arm  8      Vitals      Date and Time Temp Pulse SpO2 Resp BP Pain Score FACES Pain Rating User   09/28/24 2210 -- -- -- -- -- 8 -- JR   09/28/24 2030 98 °F (36.7 °C) 74 97 % 16 151/67 -- -- ST            Physical Exam  Vitals and nursing note reviewed.   Constitutional:       General: He is not in acute distress.     Appearance: He is well-developed. He is not diaphoretic.   HENT:      Head: Normocephalic and atraumatic.   Eyes:      Pupils: Pupils are equal, round, and reactive to light.   Neck:      Vascular: No JVD.      Trachea: No tracheal deviation.   Cardiovascular:      Rate and Rhythm: Normal rate and regular rhythm.      Heart sounds: Normal heart sounds. No murmur heard.     No friction rub. No gallop.   Pulmonary:      Effort: Pulmonary effort is normal. No respiratory distress.      Breath sounds: Normal breath sounds. No wheezing or rales.   Abdominal:      General: Bowel sounds are normal. There is no distension.      Palpations: Abdomen is soft.      Tenderness: There is no abdominal tenderness.  There is no guarding or rebound.   Skin:     General: Skin is warm and dry.      Coloration: Skin is not pale.   Neurological:      Mental Status: He is alert and oriented to person, place, and time.      Cranial Nerves: No cranial nerve deficit.      Motor: No abnormal muscle tone.   Psychiatric:         Behavior: Behavior normal.         Results Reviewed       Procedure Component Value Units Date/Time    Comprehensive metabolic panel [[id number]]  (Abnormal) Collected: 09/28/24 2108    Lab Status: Final result Specimen: Blood from Arm, Right Updated: 09/28/24 2135     Sodium 139 mmol/L      Potassium 3.8 mmol/L      Chloride 111 mmol/L      CO2 25 mmol/L      ANION GAP 3 mmol/L      BUN 14 mg/dL      Creatinine 0.67 mg/dL      Glucose 89 mg/dL      Calcium 8.3 mg/dL      AST 19 U/L      ALT 19 U/L      Alkaline Phosphatase 115 U/L      Total Protein 6.0 g/dL      Albumin 3.7 g/dL      Total Bilirubin 0.43 mg/dL      eGFR 94 ml/min/1.73sq m     Narrative:      National Kidney Disease Foundation guidelines for Chronic Kidney Disease (CKD):     Stage 1 with normal or high GFR (GFR > 90 mL/min/1.73 square meters)    Stage 2 Mild CKD (GFR = 60-89 mL/min/1.73 square meters)    Stage 3A Moderate CKD (GFR = 45-59 mL/min/1.73 square meters)    Stage 3B Moderate CKD (GFR = 30-44 mL/min/1.73 square meters)    Stage 4 Severe CKD (GFR = 15-29 mL/min/1.73 square meters)    Stage 5 End Stage CKD (GFR <15 mL/min/1.73 square meters)  Note: GFR calculation is accurate only with a steady state creatinine    Lipase [[id number]]  (Normal) Collected: 09/28/24 2108    Lab Status: Final result Specimen: Blood from Arm, Right Updated: 09/28/24 2135     Lipase 48 u/L     CBC and differential [[id number]]  (Abnormal) Collected: 09/28/24 2108    Lab Status: Final result Specimen: Blood from Arm, Right Updated: 09/28/24 2118     WBC 4.32 Thousand/uL      RBC 3.97 Million/uL      Hemoglobin 13.3 g/dL      Hematocrit 39.6 %       fL       MCH 33.5 pg      MCHC 33.6 g/dL      RDW 13.0 %      MPV 9.4 fL      Platelets 158 Thousands/uL      nRBC 0 /100 WBCs      Segmented % 53 %      Immature Grans % 0 %      Lymphocytes % 28 %      Monocytes % 12 %      Eosinophils Relative 6 %      Basophils Relative 1 %      Absolute Neutrophils 2.33 Thousands/µL      Absolute Immature Grans 0.01 Thousand/uL      Absolute Lymphocytes 1.19 Thousands/µL      Absolute Monocytes 0.50 Thousand/µL      Eosinophils Absolute 0.25 Thousand/µL      Basophils Absolute 0.04 Thousands/µL             No orders to display       Procedures    ED Medication and Procedure Management   Prior to Admission Medications   Prescriptions Last Dose Informant Patient Reported? Taking?   Cholecalciferol (Vitamin D High Potency) 25 MCG (1000 UT) capsule  Care Giver No No   Sig: Take 1 capsule (1,000 Units total) by mouth daily   Deutetrabenazine ER (Austedo XR) 24 MG TB24  Care Giver Yes No   Sig: Take 24 mg by mouth daily   Multiple Vitamin (multivitamin) capsule   No No   Sig: Take 1 capsule by mouth daily   Psyllium (Reguloid) 28.3 % POWD  Care Giver No No   Sig: Take 1 Scoop by mouth 2 (two) times a day   acetaminophen (TYLENOL) 500 mg tablet  Care Giver No No   Sig: Take 1 tablet (500 mg total) by mouth every 6 (six) hours as needed for mild pain   atorvastatin (LIPITOR) 40 mg tablet  Care Giver No No   Sig: Take 1 tablet (40 mg total) by mouth daily   carbidopa-levodopa (SINEMET)  mg per tablet  Care Giver No No   Sig: Take 1 tablet by mouth 3 (three) times a day   Patient taking differently: Take 1 tablet by mouth 2 (two) times a day   cloZAPine (CLOZARIL) 100 mg tablet  Care Giver Yes No   Sig: Take 100 mg by mouth 2 (two) times a day   clozapine (CLOZARIL) 200 MG tablet  Care Giver Yes No   Sig: Take 200 mg by mouth daily at bedtime    docusate sodium (COLACE) 100 mg capsule  Care Giver No No   Sig: Take 1 capsule (100 mg total) by mouth 2 (two) times a day   meclizine (ANTIVERT)  25 mg tablet  Care Giver No No   Sig: Take 1 tablet (25 mg total) by mouth every 8 (eight) hours as needed for dizziness   metoprolol succinate (TOPROL-XL) 25 mg 24 hr tablet  Care Giver No No   Sig: Take 1 tablet (25 mg total) by mouth daily   olopatadine (PATANOL) 0.1 % ophthalmic solution  Care Giver Yes No   Sig: Administer 1 drop to both eyes daily at bedtime   Patient not taking: Reported on 9/26/2024   omeprazole (PriLOSEC) 40 MG capsule  Care Giver No No   Sig: Take 1 capsule (40 mg total) by mouth daily   ondansetron (ZOFRAN-ODT) 4 mg disintegrating tablet  Care Giver Yes No   tamsulosin (FLOMAX) 0.4 mg  Care Giver No No   Sig: Take 1 capsule (0.4 mg total) by mouth daily at bedtime   temazepam (RESTORIL) 15 mg capsule  Care Giver No No   Sig: Take 1 capsule (15 mg total) by mouth daily at bedtime   topiramate (TOPAMAX) 25 mg tablet  Care Giver Yes No   Sig: Take 25 mg by mouth 2 (two) times a day   traZODone (DESYREL) 100 mg tablet  Care Giver Yes No   Sig: Take 100 mg by mouth daily at bedtime      Facility-Administered Medications: None     Discharge Medication List as of 9/28/2024  9:55 PM        START taking these medications    Details   !! ondansetron (ZOFRAN-ODT) 4 mg disintegrating tablet Take 1 tablet (4 mg total) by mouth every 6 (six) hours as needed for nausea or vomiting, Starting Sat 9/28/2024, Print       !! - Potential duplicate medications found. Please discuss with provider.        CONTINUE these medications which have NOT CHANGED    Details   acetaminophen (TYLENOL) 500 mg tablet Take 1 tablet (500 mg total) by mouth every 6 (six) hours as needed for mild pain, Starting Thu 9/5/2024, Normal      atorvastatin (LIPITOR) 40 mg tablet Take 1 tablet (40 mg total) by mouth daily, Starting Fri 9/6/2024, Normal      carbidopa-levodopa (SINEMET)  mg per tablet Take 1 tablet by mouth 3 (three) times a day, Starting Thu 3/2/2023, Normal      Cholecalciferol (Vitamin D High Potency) 25 MCG  (1000 UT) capsule Take 1 capsule (1,000 Units total) by mouth daily, Starting Thu 4/4/2024, Normal      !! cloZAPine (CLOZARIL) 100 mg tablet Take 100 mg by mouth 2 (two) times a day, Historical Med      !! clozapine (CLOZARIL) 200 MG tablet Take 200 mg by mouth daily at bedtime , Starting Mon 4/8/2019, Historical Med      Deutetrabenazine ER (Austedo XR) 24 MG TB24 Take 24 mg by mouth daily, Historical Med      docusate sodium (COLACE) 100 mg capsule Take 1 capsule (100 mg total) by mouth 2 (two) times a day, Starting Wed 3/20/2024, Until Thu 9/26/2024, Normal      meclizine (ANTIVERT) 25 mg tablet Take 1 tablet (25 mg total) by mouth every 8 (eight) hours as needed for dizziness, Starting Wed 3/20/2024, Normal      metoprolol succinate (TOPROL-XL) 25 mg 24 hr tablet Take 1 tablet (25 mg total) by mouth daily, Starting Tue 7/30/2024, Normal      Multiple Vitamin (multivitamin) capsule Take 1 capsule by mouth daily, Starting Thu 9/26/2024, Normal      olopatadine (PATANOL) 0.1 % ophthalmic solution Administer 1 drop to both eyes daily at bedtime, Historical Med      omeprazole (PriLOSEC) 40 MG capsule Take 1 capsule (40 mg total) by mouth daily, Starting Wed 3/20/2024, Normal      !! ondansetron (ZOFRAN-ODT) 4 mg disintegrating tablet Historical Med      Psyllium (Reguloid) 28.3 % POWD Take 1 Scoop by mouth 2 (two) times a day, Starting Mon 6/24/2024, Until Thu 9/26/2024, Normal      tamsulosin (FLOMAX) 0.4 mg Take 1 capsule (0.4 mg total) by mouth daily at bedtime, Starting Wed 3/20/2024, Normal      temazepam (RESTORIL) 15 mg capsule Take 1 capsule (15 mg total) by mouth daily at bedtime, Starting Mon 12/4/2023, Until Thu 9/26/2024, Print      topiramate (TOPAMAX) 25 mg tablet Take 25 mg by mouth 2 (two) times a day, Starting Mon 7/1/2019, Historical Med      traZODone (DESYREL) 100 mg tablet Take 100 mg by mouth daily at bedtime, Starting Mon 4/8/2019, Historical Med       !! - Potential duplicate medications  found. Please discuss with provider.        No discharge procedures on file.  ED SEPSIS DOCUMENTATION   Time reflects when diagnosis was documented in both MDM as applicable and the Disposition within this note       Time User Action Codes Description Comment    9/28/2024  9:55 PM Esteban Paige [R10.9] Abdominal pain     9/28/2024  9:55 PM Esteban Paige [R11.2] Nausea and vomiting                  Esteban Paige MD  09/29/24 0343

## 2024-10-02 ENCOUNTER — OFFICE VISIT (OUTPATIENT)
Dept: GASTROENTEROLOGY | Facility: CLINIC | Age: 74
End: 2024-10-02
Payer: MEDICARE

## 2024-10-02 VITALS
HEART RATE: 68 BPM | OXYGEN SATURATION: 100 % | HEIGHT: 70 IN | TEMPERATURE: 97.5 F | WEIGHT: 159.8 LBS | BODY MASS INDEX: 22.88 KG/M2 | RESPIRATION RATE: 18 BRPM | DIASTOLIC BLOOD PRESSURE: 72 MMHG | SYSTOLIC BLOOD PRESSURE: 111 MMHG

## 2024-10-02 DIAGNOSIS — R10.10 PAIN OF UPPER ABDOMEN: Primary | ICD-10-CM

## 2024-10-02 PROCEDURE — 99214 OFFICE O/P EST MOD 30 MIN: CPT | Performed by: INTERNAL MEDICINE

## 2024-10-02 NOTE — PROGRESS NOTES
[facility] - Outpatient Follow-up Note  Sumit Nails 74 y.o. male MRN: [id number]  Encounter: [id number]          ASSESSMENT AND PLAN:      1. Pain of upper abdomen  -ED visit from 8/27/2024 reviewed  -ED visit from 9/8/2024 reviewed  -ED visit from 9/28/2024 reviewed  -CT scan from 8/27/2024 reviewed  -labs from 8/26/2024 reviewed  -No further diagnostic testing is warranted at this time  -No indication for EGD at this time  -Patient was reassured that he has no significant gastrointestinal problem  -Patient was encouraged to not go to the emergency room every time he has the onset of the GI symptoms, but rather he should wait a day or 2 to see whether or not the symptoms resolved    ______________________________________________________________________    SUBJECTIVE: Sumit is a 74-year-old male who resides in a facility who has a concern about recurring episodes of stomach discomfort and nausea.  Each time he has a symptom he will typically request that he go to the emergency room.  He was seen in the emergency room on 8/27/2024.  The chief complaint at that time was abdominal pain as well as dizziness.  There was vertigo as well but this had resolved.  The pain was epigastric and was occurring after eating.  Serum chemistry panel showed normal liver enzymes and normal electrolytes.  His urinalysis was normal.  He WBC was 4.06 and hemoglobin 13.2.  CT scan of the abdomen pelvis which was performed on 8/27 showed no acute findings in the abdomen or pelvis    Patient was seen again in the ED on 9/8/2024.  His principal complaint was nausea and fatigue without vomiting.  By the time he came to the emergency room his symptoms had gone away.  He was requesting a blood test to make sure that everything was okay.  Serum chemistry panel was performed and showed a chloride of 110 calcium 8.3 but the liver enzymes were normal.  His CBC showed a WBC of 4.02, hemoglobin 13.4.    Patient was  "seen again in the ED on 2024.  He had a concern for food poisoning.  Patient has a known history of psychiatric problems as well as bizarre thinking.  The attendant that came with the patient today stated that the patient was claiming that his brother had \"placed a bomb in his rectum.  \"I's comprehensive metabolic panel showed his alkaline phosphatase level is mildly elevated to the 115 the AST was 19, the ALT 19, and the total bili 0.43.  WBC showed 4.32 and hemoglobin 13.3.  No x-rays were taken.    Today the patient denies any abdominal pain, nausea, vomiting, diarrhea, constipation.  He states his last abdominal pain was about 2 weeks ago.  He had a colonoscopy performed on 10/4/2019 which was normal.              REVIEW OF SYSTEMS IS OTHERWISE NEGATIVE.      Historical Information   Past Medical History:   Diagnosis Date    Asthma     Benign prostatic hyperplasia     COPD (chronic obstructive pulmonary disease) (HCC)     GERD (gastroesophageal reflux disease)     Herpes zoster without complication 12/10/2021    Hypercholesteremia     Hypertension     Neuroleptic induced parkinsonism (HCC)     Paranoid schizophrenia (HCC)     Parkinsons (HCC)     Psychiatric disorder      Past Surgical History:   Procedure Laterality Date    CATARACT EXTRACTION      CHOLECYSTECTOMY LAPAROSCOPIC N/A 12/3/2023    Procedure: CHOLECYSTECTOMY LAPAROSCOPIC WITH INTRAOPERATIVE CHOLANGIOGRAM;  Surgeon: Maverick Tamayo MD;  Location: [facility] OR;  Service: General    COLONOSCOPY       Social History   Social History     Substance and Sexual Activity   Alcohol Use Not Currently     Social History     Substance and Sexual Activity   Drug Use Never     Social History     Tobacco Use   Smoking Status Former    Current packs/day: 0.00    Average packs/day: 1 pack/day for 20.0 years (20.0 ttl pk-yrs)    Types: Cigarettes    Start date:     Quit date: 2000    Years since quittin.7    Passive exposure: Past   Smokeless Tobacco Never " "    Family History   Problem Relation Age of Onset    No Known Problems Mother     No Known Problems Father     Parkinsonism Son        Meds/Allergies       Current Outpatient Medications:     acetaminophen (TYLENOL) 500 mg tablet    atorvastatin (LIPITOR) 40 mg tablet    carbidopa-levodopa (SINEMET)  mg per tablet    Cholecalciferol (Vitamin D High Potency) 25 MCG (1000 UT) capsule    cloZAPine (CLOZARIL) 100 mg tablet    clozapine (CLOZARIL) 200 MG tablet    Deutetrabenazine ER (Austedo XR) 24 MG TB24    docusate sodium (COLACE) 100 mg capsule    meclizine (ANTIVERT) 25 mg tablet    metoprolol succinate (TOPROL-XL) 25 mg 24 hr tablet    Multiple Vitamin (multivitamin) capsule    omeprazole (PriLOSEC) 40 MG capsule    ondansetron (ZOFRAN-ODT) 4 mg disintegrating tablet    ondansetron (ZOFRAN-ODT) 4 mg disintegrating tablet    Psyllium (Reguloid) 28.3 % POWD    tamsulosin (FLOMAX) 0.4 mg    temazepam (RESTORIL) 15 mg capsule    topiramate (TOPAMAX) 25 mg tablet    traZODone (DESYREL) 100 mg tablet    olopatadine (PATANOL) 0.1 % ophthalmic solution    No Known Allergies        Objective     Blood pressure 111/72, pulse 68, temperature 97.5 °F (36.4 °C), temperature source Temporal, resp. rate 18, height 5' 10\" (1.778 m), weight 72.5 kg (159 lb 12.8 oz), SpO2 100%. Body mass index is 22.93 kg/m².      PHYSICAL EXAM:      General Appearance:   Alert, cooperative, no distress   HEENT:   Normocephalic, atraumatic, anicteric.     Neck:  Supple, symmetrical, trachea midline   Lungs:   Clear to auscultation bilaterally; no rales, rhonchi or wheezing; respirations unlabored    Heart::   Regular rate and rhythm; no murmur, rub, or gallop.   Abdomen:   Soft, non-tender, non-distended; normal bowel sounds; no masses, no organomegaly    Genitalia:   Deferred    Rectal:   Deferred    Extremities:  No cyanosis, clubbing or edema    Pulses:  2+ and symmetric    Skin:  No jaundice, rashes, or lesions    Lymph nodes:  No " palpable cervical lymphadenopathy        Lab Results:   No visits with results within 1 Day(s) from this visit.   Latest known visit with results is:   Admission on 09/28/2024, Discharged on 09/29/2024   Component Date Value    WBC 09/28/2024 4.32     RBC 09/28/2024 3.97     Hemoglobin 09/28/2024 13.3     Hematocrit 09/28/2024 39.6     MCV 09/28/2024 100 (H)     MCH 09/28/2024 33.5     MCHC 09/28/2024 33.6     RDW 09/28/2024 13.0     MPV 09/28/2024 9.4     Platelets 09/28/2024 158     nRBC 09/28/2024 0     Segmented % 09/28/2024 53     Immature Grans % 09/28/2024 0     Lymphocytes % 09/28/2024 28     Monocytes % 09/28/2024 12     Eosinophils Relative 09/28/2024 6     Basophils Relative 09/28/2024 1     Absolute Neutrophils 09/28/2024 2.33     Absolute Immature Grans 09/28/2024 0.01     Absolute Lymphocytes 09/28/2024 1.19     Absolute Monocytes 09/28/2024 0.50     Eosinophils Absolute 09/28/2024 0.25     Basophils Absolute 09/28/2024 0.04     Sodium 09/28/2024 139     Potassium 09/28/2024 3.8     Chloride 09/28/2024 111 (H)     CO2 09/28/2024 25     ANION GAP 09/28/2024 3 (L)     BUN 09/28/2024 14     Creatinine 09/28/2024 0.67     Glucose 09/28/2024 89     Calcium 09/28/2024 8.3 (L)     AST 09/28/2024 19     ALT 09/28/2024 19     Alkaline Phosphatase 09/28/2024 115 (H)     Total Protein 09/28/2024 6.0 (L)     Albumin 09/28/2024 3.7     Total Bilirubin 09/28/2024 0.43     eGFR 09/28/2024 94     Lipase 09/28/2024 48          Radiology Results:   XR chest 1 view portable    Result Date: 9/17/2024  Narrative: XR CHEST PORTABLE INDICATION: r rib pain. COMPARISON: August 9, 2024 FINDINGS: Clear lungs. No pneumothorax or pleural effusion. Normal cardiomediastinal silhouette. Bones are unremarkable for age. Normal upper abdomen.     Impression: No acute cardiopulmonary disease. Workstation performed: [id number]

## 2024-10-03 ENCOUNTER — TELEPHONE (OUTPATIENT)
Age: 74
End: 2024-10-03

## 2024-10-03 NOTE — TELEPHONE ENCOUNTER
Mirian states the pharmacy needs a phone call to let them know to dc the eye drops as they have filled them again

## 2024-10-25 DIAGNOSIS — E55.9 VITAMIN D DEFICIENCY: ICD-10-CM

## 2024-10-25 RX ORDER — CHOLECALCIFEROL (VITAMIN D3) 25 MCG
1000 CAPSULE ORAL DAILY
Qty: 30 CAPSULE | Refills: 5 | Status: SHIPPED | OUTPATIENT
Start: 2024-10-25

## 2024-10-25 NOTE — TELEPHONE ENCOUNTER
Reason for call:   [x] Refill   [] Prior Auth  [] Other:     Office:   [x] PCP/Provider -  [facility]  Authorized By: Garry Hidalgo MD  [] Specialty/Provider -     Medication: Cholecalciferol (Vitamin D High Potency) 25 MCG (1000 UT) capsule     Dose/Frequency: Take 1 capsule (1,000 Units total) by mouth daily     Quantity: 30 capsule     Pharmacy: [facility] ([facility]) - [address]     Does the patient have enough for 3 days?   [] Yes   [x] No - Send as HP to POD

## 2024-10-30 ENCOUNTER — APPOINTMENT (OUTPATIENT)
Age: 74
End: 2024-10-30
Payer: MEDICARE

## 2024-10-30 DIAGNOSIS — R41.3 MEMORY DIFFICULTIES: ICD-10-CM

## 2024-10-30 LAB
B BURGDOR IGG+IGM SER QL IA: NEGATIVE
FOLATE SERPL-MCNC: 17.3 NG/ML
VIT B12 SERPL-MCNC: 211 PG/ML (ref 180–914)

## 2024-10-30 PROCEDURE — 36415 COLL VENOUS BLD VENIPUNCTURE: CPT

## 2024-10-30 PROCEDURE — 82746 ASSAY OF FOLIC ACID SERUM: CPT

## 2024-10-30 PROCEDURE — 86618 LYME DISEASE ANTIBODY: CPT

## 2024-10-30 PROCEDURE — 82607 VITAMIN B-12: CPT

## 2024-11-01 ENCOUNTER — OFFICE VISIT (OUTPATIENT)
Age: 74
End: 2024-11-01
Payer: MEDICARE

## 2024-11-01 VITALS
WEIGHT: 158.4 LBS | OXYGEN SATURATION: 100 % | HEART RATE: 80 BPM | TEMPERATURE: 97.9 F | BODY MASS INDEX: 22.68 KG/M2 | HEIGHT: 70 IN | DIASTOLIC BLOOD PRESSURE: 74 MMHG | SYSTOLIC BLOOD PRESSURE: 122 MMHG | RESPIRATION RATE: 16 BRPM

## 2024-11-01 DIAGNOSIS — Z23 ENCOUNTER FOR IMMUNIZATION: ICD-10-CM

## 2024-11-01 DIAGNOSIS — R21 RASH AND NONSPECIFIC SKIN ERUPTION: Primary | ICD-10-CM

## 2024-11-01 PROCEDURE — 90662 IIV NO PRSV INCREASED AG IM: CPT

## 2024-11-01 PROCEDURE — G0008 ADMIN INFLUENZA VIRUS VAC: HCPCS

## 2024-11-01 PROCEDURE — 99213 OFFICE O/P EST LOW 20 MIN: CPT

## 2024-11-01 RX ORDER — POLYETHYLENE GLYCOL 3350 17 G/17G
17 POWDER, FOR SOLUTION ORAL DAILY
COMMUNITY

## 2024-11-01 RX ORDER — TRIAMCINOLONE ACETONIDE 1 MG/G
OINTMENT TOPICAL 2 TIMES DAILY PRN
Qty: 30 G | Refills: 0 | Status: SHIPPED | OUTPATIENT
Start: 2024-11-01 | End: 2024-11-15

## 2024-11-01 NOTE — PROGRESS NOTES
Ambulatory Visit  Name: Sumit Nails      : 1950      MRN: [id number]  Encounter Provider: Thaddeus Babin PA-C  Encounter Date: 2024   Encounter department: [facility] PRIMARY CARE [place]    Assessment & Plan  Rash and nonspecific skin eruption  Discussed suspicion for shingles however not a candidate for antiviral given it has been going on for a week.  Follow-up for any new or worsening symptoms  Orders:    triamcinolone (KENALOG) 0.1 % ointment; Apply topically 2 (two) times a day as needed for rash or irritation for up to 14 days    Encounter for immunization    Orders:    influenza vaccine, high-dose, PF 0.5 mL (Fluzone High Dose)       History of Present Illness     Patient presents today for rash on the right side of his stomach that has been going on for about a week. it is itchy.  Has not tried anything for it  Patient would also like flu vaccine while here        History obtained from : patient  Review of Systems   Constitutional:  Negative for activity change, diaphoresis, fatigue and fever.   HENT: Negative.     Eyes: Negative.    Respiratory: Negative.  Negative for cough, chest tightness and shortness of breath.    Cardiovascular:  Negative for chest pain, palpitations and leg swelling.   Gastrointestinal: Negative.    Endocrine: Negative.  Negative for polydipsia, polyphagia and polyuria.   Genitourinary: Negative.  Negative for dysuria, flank pain, frequency, hematuria and urgency.   Musculoskeletal: Negative.  Negative for back pain, joint swelling, neck pain and neck stiffness.   Skin:  Positive for rash.   Neurological: Negative.  Negative for dizziness, weakness, light-headedness and headaches.   Hematological:  Negative for adenopathy.   Psychiatric/Behavioral: Negative.  Negative for confusion and sleep disturbance. The patient is not nervous/anxious.            Objective     /74 (BP Location: Left arm, Patient Position: Sitting, Cuff Size: Standard)    "Pulse 80   Temp 97.9 °F (36.6 °C) (Tympanic)   Resp 16   Ht 5' 10\" (1.778 m)   Wt 71.8 kg (158 lb 6.4 oz)   SpO2 100%   BMI 22.73 kg/m²     Physical Exam  Vitals and nursing note reviewed.   Constitutional:       General: He is not in acute distress.     Appearance: He is normal weight. He is not ill-appearing, toxic-appearing or diaphoretic.   HENT:      Head: Normocephalic and atraumatic.      Right Ear: External ear normal.      Left Ear: External ear normal.      Nose: Nose normal.   Eyes:      General: No scleral icterus.        Right eye: No discharge.         Left eye: No discharge.      Extraocular Movements: Extraocular movements intact.      Conjunctiva/sclera: Conjunctivae normal.   Cardiovascular:      Rate and Rhythm: Normal rate.   Pulmonary:      Effort: Pulmonary effort is normal. No respiratory distress.   Musculoskeletal:      Cervical back: Normal range of motion and neck supple.      Right lower leg: No edema.      Left lower leg: No edema.   Skin:     Findings: Rash present.             Comments: Linear dermatomal pattern rash on the right torso with crusting, vesicles and a few stages of healing.  Some excoriations from scratching.  Mild underlying skin erythema   Neurological:      Mental Status: He is alert. Mental status is at baseline.   Psychiatric:         Mood and Affect: Mood normal.         Behavior: Behavior normal.         Thought Content: Thought content normal.         Judgment: Judgment normal.       Portions of the record may have been created with voice recognition software. Occasional wrong word or \"sound a like\" substitutions may have occurred due to the inherent limitations of voice recognition software. Read the chart carefully and recognize, using context, where substitutions have occurred.    "

## 2024-11-26 ENCOUNTER — OFFICE VISIT (OUTPATIENT)
Age: 74
End: 2024-11-26
Payer: MEDICARE

## 2024-11-26 VITALS
RESPIRATION RATE: 14 BRPM | OXYGEN SATURATION: 99 % | HEIGHT: 70 IN | TEMPERATURE: 96.4 F | DIASTOLIC BLOOD PRESSURE: 57 MMHG | WEIGHT: 152 LBS | HEART RATE: 82 BPM | SYSTOLIC BLOOD PRESSURE: 118 MMHG | BODY MASS INDEX: 21.76 KG/M2

## 2024-11-26 DIAGNOSIS — G21.11 NEUROLEPTIC-INDUCED PARKINSONISM (HCC): ICD-10-CM

## 2024-11-26 DIAGNOSIS — Z00.00 MEDICARE ANNUAL WELLNESS VISIT, SUBSEQUENT: Primary | ICD-10-CM

## 2024-11-26 DIAGNOSIS — I10 PRIMARY HYPERTENSION: ICD-10-CM

## 2024-11-26 DIAGNOSIS — Z23 ENCOUNTER FOR IMMUNIZATION: ICD-10-CM

## 2024-11-26 DIAGNOSIS — T43.505A NEUROLEPTIC-INDUCED PARKINSONISM (HCC): ICD-10-CM

## 2024-11-26 DIAGNOSIS — F20.0 PARANOID SCHIZOPHRENIA (HCC): ICD-10-CM

## 2024-11-26 DIAGNOSIS — R21 RASH: ICD-10-CM

## 2024-11-26 DIAGNOSIS — R21 RASH AND NONSPECIFIC SKIN ERUPTION: ICD-10-CM

## 2024-11-26 PROBLEM — E44.1 MILD PROTEIN-CALORIE MALNUTRITION (HCC): Status: RESOLVED | Noted: 2023-02-10 | Resolved: 2024-11-26

## 2024-11-26 PROCEDURE — 90662 IIV NO PRSV INCREASED AG IM: CPT | Performed by: FAMILY MEDICINE

## 2024-11-26 PROCEDURE — G0008 ADMIN INFLUENZA VIRUS VAC: HCPCS | Performed by: FAMILY MEDICINE

## 2024-11-26 PROCEDURE — 99214 OFFICE O/P EST MOD 30 MIN: CPT | Performed by: FAMILY MEDICINE

## 2024-11-26 PROCEDURE — G0439 PPPS, SUBSEQ VISIT: HCPCS | Performed by: FAMILY MEDICINE

## 2024-11-26 RX ORDER — TRIAMCINOLONE ACETONIDE 1 MG/G
OINTMENT TOPICAL 2 TIMES DAILY PRN
Qty: 30 G | Refills: 0 | Status: SHIPPED | OUTPATIENT
Start: 2024-11-26 | End: 2024-12-10

## 2024-11-26 RX ORDER — PERMETHRIN 50 MG/G
CREAM TOPICAL ONCE
Qty: 60 G | Refills: 1 | Status: SHIPPED | OUTPATIENT
Start: 2024-11-26 | End: 2024-11-26

## 2024-11-26 NOTE — PATIENT INSTRUCTIONS
Medicare Preventive Visit Patient Instructions  Thank you for completing your Welcome to Medicare Visit or Medicare Annual Wellness Visit today. Your next wellness visit will be due in one year (11/27/2025).  The screening/preventive services that you may require over the next 5-10 years are detailed below. Some tests may not apply to you based off risk factors and/or age. Screening tests ordered at today's visit but not completed yet may show as past due. Also, please note that scanned in results may not display below.  Preventive Screenings:  Service Recommendations Previous Testing/Comments   Colorectal Cancer Screening  Colonoscopy    Fecal Occult Blood Test (FOBT)/Fecal Immunochemical Test (FIT)  Fecal DNA/Cologuard Test  Flexible Sigmoidoscopy Age: 45-75 years old   Colonoscopy: every 10 years (May be performed more frequently if at higher risk)  OR  FOBT/FIT: every 1 year  OR  Cologuard: every 3 years  OR  Sigmoidoscopy: every 5 years  Screening may be recommended earlier than age 45 if at higher risk for colorectal cancer. Also, an individualized decision between you and your healthcare provider will decide whether screening between the ages of 76-85 would be appropriate. Colonoscopy: 10/04/2019  FOBT/FIT: Not on file  Cologuard: Not on file  Sigmoidoscopy: Not on file    Screening Current     Prostate Cancer Screening Individualized decision between patient and health care provider in men between ages of 55-69   Medicare will cover every 12 months beginning on the day after your 50th birthday PSA: 0.65 ng/mL     Screening Current     Hepatitis C Screening Once for adults born between 1945 and 1965  More frequently in patients at high risk for Hepatitis C Hep C Antibody: 08/29/2019    Screening Current   Diabetes Screening 1-2 times per year if you're at risk for diabetes or have pre-diabetes Fasting glucose: 76 mg/dL (3/6/2024)  A1C: 4.8 % (3/11/2024)  Screening Current   Cholesterol Screening Once every 5  years if you don't have a lipid disorder. May order more often based on risk factors. Lipid panel: 03/11/2024  Screening Not Indicated  History Lipid Disorder      Other Preventive Screenings Covered by Medicare:  Abdominal Aortic Aneurysm (AAA) Screening: covered once if your at risk. You're considered to be at risk if you have a family history of AAA or a male between the age of 65-75 who smoking at least 100 cigarettes in your lifetime.  Lung Cancer Screening: covers low dose CT scan once per year if you meet all of the following conditions: (1) Age 55-77; (2) No signs or symptoms of lung cancer; (3) Current smoker or have quit smoking within the last 15 years; (4) You have a tobacco smoking history of at least 20 pack years (packs per day x number of years you smoked); (5) You get a written order from a healthcare provider.  Glaucoma Screening: covered annually if you're considered high risk: (1) You have diabetes OR (2) Family history of glaucoma OR (3)  aged 50 and older OR (4)  American aged 65 and older  Osteoporosis Screening: covered every 2 years if you meet one of the following conditions: (1) Have a vertebral abnormality; (2) On glucocorticoid therapy for more than 3 months; (3) Have primary hyperparathyroidism; (4) On osteoporosis medications and need to assess response to drug therapy.  HIV Screening: covered annually if you're between the age of 15-65. Also covered annually if you are younger than 15 and older than 65 with risk factors for HIV infection. For pregnant patients, it is covered up to 3 times per pregnancy.    Immunizations:  Immunization Recommendations   Influenza Vaccine Annual influenza vaccination during flu season is recommended for all persons aged >= 6 months who do not have contraindications   Pneumococcal Vaccine   * Pneumococcal conjugate vaccine = PCV13 (Prevnar 13), PCV15 (Vaxneuvance), PCV20 (Prevnar 20)  * Pneumococcal polysaccharide vaccine = PPSV23  (Pneumovax) Adults 19-63 yo with certain risk factors or if 65+ yo  If never received any pneumonia vaccine: recommend Prevnar 20 (PCV20)  Give PCV20 if previously received 1 dose of PCV13 or PPSV23   Hepatitis B Vaccine 3 dose series if at intermediate or high risk (ex: diabetes, end stage renal disease, liver disease)   Respiratory syncytial virus (RSV) Vaccine - COVERED BY MEDICARE PART D  * RSVPreF3 (Arexvy) CDC recommends that adults 60 years of age and older may receive a single dose of RSV vaccine using shared clinical decision-making (SCDM)   Tetanus (Td) Vaccine - COST NOT COVERED BY MEDICARE PART B Following completion of primary series, a booster dose should be given every 10 years to maintain immunity against tetanus. Td may also be given as tetanus wound prophylaxis.   Tdap Vaccine - COST NOT COVERED BY MEDICARE PART B Recommended at least once for all adults. For pregnant patients, recommended with each pregnancy.   Shingles Vaccine (Shingrix) - COST NOT COVERED BY MEDICARE PART B  2 shot series recommended in those 19 years and older who have or will have weakened immune systems or those 50 years and older     Health Maintenance Due:      Topic Date Due   • Colorectal Cancer Screening  10/04/2029   • Hepatitis C Screening  Completed     Immunizations Due:      Topic Date Due   • COVID-19 Vaccine (7 - 2024-25 season) 09/01/2024     Advance Directives   What are advance directives?  Advance directives are legal documents that state your wishes and plans for medical care. These plans are made ahead of time in case you lose your ability to make decisions for yourself. Advance directives can apply to any medical decision, such as the treatments you want, and if you want to donate organs.   What are the types of advance directives?  There are many types of advance directives, and each state has rules about how to use them. You may choose a combination of any of the following:  Living will:  This is a  written record of the treatment you want. You can also choose which treatments you do not want, which to limit, and which to stop at a certain time. This includes surgery, medicine, IV fluid, and tube feedings.   Durable power of  for healthcare (DPAHC):  This is a written record that states who you want to make healthcare choices for you when you are unable to make them for yourself. This person, called a proxy, is usually a family member or a friend. You may choose more than 1 proxy.  Do not resuscitate (DNR) order:  A DNR order is used in case your heart stops beating or you stop breathing. It is a request not to have certain forms of treatment, such as CPR. A DNR order may be included in other types of advance directives.  Medical directive:  This covers the care that you want if you are in a coma, near death, or unable to make decisions for yourself. You can list the treatments you want for each condition. Treatment may include pain medicine, surgery, blood transfusions, dialysis, IV or tube feedings, and a ventilator (breathing machine).  Values history:  This document has questions about your views, beliefs, and how you feel and think about life. This information can help others choose the care that you would choose.  Why are advance directives important?  An advance directive helps you control your care. Although spoken wishes may be used, it is better to have your wishes written down. Spoken wishes can be misunderstood, or not followed. Treatments may be given even if you do not want them. An advance directive may make it easier for your family to make difficult choices about your care.       © Copyright [employer] 2018 Information is for End User's use only and may not be sold, redistributed or otherwise used for commercial purposes. All illustrations and images included in CareNotes® are the copyrighted property of [employer] or [employer]

## 2024-11-26 NOTE — PROGRESS NOTES
Name: Sumit Nails      : 1950      MRN: [id number]  Encounter Provider: Garry Hidalgo MD  Encounter Date: 2024   Encounter department: [facility] PRIMARY CARE [facility]  Medicare annual wellness visit, subsequent  Today we discussed patient's overall health including the following:  Previous blood work results, necessary blood work recommended to be done  BMI, nutritional intake, exercise, lifestyle changes  Screenings  Supplements  Medical conditions as listed        Primary hypertension  BP Readings from Last 3 Encounters:   24 118/57   24 122/74   10/02/24 111/72      Currently prescribed succinate 25 mg daily  Reports: Taking as prescribed.  Assessment: Controlled..   Med changes: None. Continue current regimen.   Lifestyle modifications recommended, including reduced sodium intake, regular exercise, maintaining a healthy weight, limiting alcohol consumption, and/or avoiding cig smoking.        Neuro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 (HCC)  Follows neurology on medications  Continue care with specialist       Paranoid schizophrenia (HCC)  Follows psych, on multiple different medications.  Continue care with specialist       Rash and nonspecific skin eruption    Orders:    triamcinolone (KENALOG) 0.1 % ointment; Apply topically 2 (two) times a day as needed for rash or irritation for up to 14 days    Rash  Concerning for scabies.  Patient often going to the emergency room, discussed unnecessary emergency room visits.  Will follow-up closely, return in 6 weeks.  Orders:    permethrin (ELIMITE) 5 % cream; Apply topically once for 1 dose . Apply cream from neck down and leave on for 8-14 hours, then wash off. May repeat in 14 days, 1 refill provided.    Encounter for immunization    Orders:    influenza vaccine, high-dose, PF 0.5 mL (Fluzone High Dose)       Preventive health issues were discussed with patient, and age appropriate screening tests were ordered as  noted in patient's After Visit Summary. Personalized health advice and appropriate referrals for health education or preventive services given if needed, as noted in patient's After Visit Summary.    History of Present Illness     Rash       Patient Care Team:  Garry Hidalgo MD as PCP - General (Family Medicine)  MD Le Hernandez PA-C (Gastroenterology)  Julieta Edwards PA-C as Physician Assistant (Physician Assistant)  Rico Arroyo MD (Psychiatry)    Review of Systems   Skin:  Positive for rash.     Medical History Reviewed by provider this encounter:  Meds       Annual Wellness Visit Questionnaire   Sumit is here for his Subsequent Wellness visit.     Health Risk Assessment:   Patient rates overall health as good. Patient feels that their physical health rating is same. Patient is satisfied with their life. Eyesight was rated as same. Hearing was rated as same. Patient feels that their emotional and mental health rating is same. Patients states they are never, rarely angry. Patient states they are sometimes unusually tired/fatigued. Pain experienced in the last 7 days has been some. Patient's pain rating has been 8/10. Patient states that he has experienced no weight loss or gain in last 6 months.     Depression Screening:   PHQ-2 Score: 0      Fall Risk Screening:   In the past year, patient has experienced: no history of falling in past year      Home Safety:  Patient does not have trouble with stairs inside or outside of their home. Patient has working smoke alarms and has working carbon monoxide detector. Home safety hazards include: none.     Nutrition:   Current diet is Regular.     Medications:   Patient is currently taking over-the-counter supplements. OTC medications include: see medication list. Patient is not able to manage medications.     Activities of Daily Living (ADLs)/Instrumental Activities of Daily Living (IADLs):   Walk and transfer into and out of bed and chair?:  Yes  Dress and groom yourself?: Yes    Bathe or shower yourself?: Yes    Feed yourself? Yes  Do your laundry/housekeeping?: No  Manage your money, pay your bills and track your expenses?: No  Make your own meals?: No    Do your own shopping?: No    Previous Hospitalizations:   Any hospitalizations or ED visits within the last 12 months?: Yes    How many hospitalizations have you had in the last year?: 1-2    Advance Care Planning:   Living will: No      PREVENTIVE SCREENINGS      Cardiovascular Screening:    General: Screening Not Indicated and History Lipid Disorder      Diabetes Screening:     General: Screening Current      Colorectal Cancer Screening:     General: Screening Current      Prostate Cancer Screening:    General: Screening Current      Abdominal Aortic Aneurysm (AAA) Screening:    Risk factors include: age between 65-74 yo and tobacco use        Lung Cancer Screening:     General: Screening Not Indicated      Hepatitis C Screening:    General: Screening Current    Screening, Brief Intervention, and Referral to Treatment (SBIRT)    Screening      Single Item Drug Screening:  How often have you used an illegal drug (including marijuana) or a prescription medication for non-medical reasons in the past year? never    Single Item Drug Screen Score: 0  Interpretation: Negative screen for possible drug use disorder    Other Counseling Topics:   Skin self-exam, sunscreen and calcium and vitamin D intake and regular weightbearing exercise.     Social Drivers of Health     Financial Resource Strain: Low Risk  (3/28/2024)    [facility]    Overall Financial Resource Strain (CARDIA)     Difficulty of Paying Living Expenses: Not hard at all   Food Insecurity: No Food Insecurity (11/26/2024)    Hunger Vital Sign     Worried About Running Out of Food in the Last Year: Never true     Ran Out of Food in the Last Year: Never true   Transportation Needs: No  "Transportation Needs (11/26/2024)    PRAPARE - Transportation     Lack of Transportation (Medical): No     Lack of Transportation (Non-Medical): No   Housing Stability: Low Risk  (11/26/2024)    Housing Stability Vital Sign     Unable to Pay for Housing in the Last Year: No     Number of Times Moved in the Last Year: 1     Homeless in the Last Year: No   Utilities: Not At Risk (11/26/2024)    [facility] Utilities     Threatened with loss of utilities: No     No results found.    Objective   /57 (BP Location: Left arm, Patient Position: Sitting, Cuff Size: Standard)   Pulse 82   Temp (!) 96.4 °F (35.8 °C) (Tympanic Core)   Resp 14   Ht 5' 10\" (1.778 m)   Wt 68.9 kg (152 lb)   SpO2 99%   BMI 21.81 kg/m²     Physical Exam  Vitals reviewed.   Constitutional:       General: He is not in acute distress.     Appearance: Normal appearance. He is not ill-appearing, toxic-appearing or diaphoretic.   HENT:      Head: Normocephalic and atraumatic.      Right Ear: External ear normal.      Left Ear: External ear normal.      Nose: Nose normal.      Mouth/Throat:      Mouth: Mucous membranes are moist.   Eyes:      General: No scleral icterus.        Right eye: No discharge.         Left eye: No discharge.      Extraocular Movements: Extraocular movements intact.      Conjunctiva/sclera: Conjunctivae normal.   Cardiovascular:      Rate and Rhythm: Normal rate and regular rhythm.      Pulses: Normal pulses.      Heart sounds: Normal heart sounds.   Pulmonary:      Effort: Pulmonary effort is normal. No respiratory distress.      Breath sounds: Normal breath sounds.   Abdominal:      Palpations: Abdomen is soft.      Tenderness: There is no abdominal tenderness.   Musculoskeletal:         General: No swelling. Normal range of motion.      Cervical back: Normal range of motion.   Skin:     General: Skin is warm and dry.   Neurological:      General: No focal deficit present.      Mental Status: He is alert and oriented to " person, place, and time.

## 2024-11-26 NOTE — ASSESSMENT & PLAN NOTE
BP Readings from Last 3 Encounters:   11/26/24 118/57   11/01/24 122/74   10/02/24 111/72      Currently prescribed succinate 25 mg daily  Reports: Taking as prescribed.  Assessment: Controlled..   Med changes: None. Continue current regimen.   Lifestyle modifications recommended, including reduced sodium intake, regular exercise, maintaining a healthy weight, limiting alcohol consumption, and/or avoiding cig smoking.

## 2024-12-05 ENCOUNTER — HOSPITAL ENCOUNTER (EMERGENCY)
Facility: HOSPITAL | Age: 74
Discharge: HOME | End: 2024-12-06
Attending: EMERGENCY MEDICINE
Payer: MEDICARE

## 2024-12-05 VITALS
HEART RATE: 68 BPM | DIASTOLIC BLOOD PRESSURE: 79 MMHG | OXYGEN SATURATION: 97 % | TEMPERATURE: 97.8 F | BODY MASS INDEX: 23.69 KG/M2 | SYSTOLIC BLOOD PRESSURE: 145 MMHG | WEIGHT: 165.12 LBS | RESPIRATION RATE: 18 BRPM

## 2024-12-05 DIAGNOSIS — R10.9 ABDOMINAL PAIN: Primary | ICD-10-CM

## 2024-12-05 LAB
ALBUMIN SERPL BCG-MCNC: 3.6 G/DL (ref 3.5–5)
ALP SERPL-CCNC: 98 U/L (ref 34–104)
ALT SERPL W P-5'-P-CCNC: 12 U/L (ref 7–52)
ANION GAP SERPL CALCULATED.3IONS-SCNC: 1 MMOL/L (ref 4–13)
AST SERPL W P-5'-P-CCNC: 18 U/L (ref 13–39)
BASOPHILS # BLD AUTO: 0.03 THOUSANDS/ÂΜL (ref 0–0.1)
BASOPHILS NFR BLD AUTO: 1 % (ref 0–1)
BILIRUB SERPL-MCNC: 0.42 MG/DL (ref 0.2–1)
BILIRUB UR QL STRIP: NEGATIVE
BUN SERPL-MCNC: 16 MG/DL (ref 5–25)
CALCIUM SERPL-MCNC: 8.7 MG/DL (ref 8.4–10.2)
CHLORIDE SERPL-SCNC: 108 MMOL/L (ref 96–108)
CLARITY UR: CLEAR
CO2 SERPL-SCNC: 30 MMOL/L (ref 21–32)
COLOR UR: NORMAL
CREAT SERPL-MCNC: 0.75 MG/DL (ref 0.6–1.3)
EOSINOPHIL # BLD AUTO: 0.18 THOUSAND/ÂΜL (ref 0–0.61)
EOSINOPHIL NFR BLD AUTO: 4 % (ref 0–6)
ERYTHROCYTE [DISTWIDTH] IN BLOOD BY AUTOMATED COUNT: 13.6 % (ref 11.6–15.1)
GFR SERPL CREATININE-BSD FRML MDRD: 90 ML/MIN/1.73SQ M
GLUCOSE SERPL-MCNC: 83 MG/DL (ref 65–140)
GLUCOSE UR STRIP-MCNC: NEGATIVE MG/DL
HCT VFR BLD AUTO: 37.6 % (ref 36.5–49.3)
HGB BLD-MCNC: 12.7 G/DL (ref 12–17)
HGB UR QL STRIP.AUTO: NEGATIVE
IMM GRANULOCYTES # BLD AUTO: 0.01 THOUSAND/UL (ref 0–0.2)
IMM GRANULOCYTES NFR BLD AUTO: 0 % (ref 0–2)
KETONES UR STRIP-MCNC: NEGATIVE MG/DL
LEUKOCYTE ESTERASE UR QL STRIP: NEGATIVE
LYMPHOCYTES # BLD AUTO: 1.14 THOUSANDS/ÂΜL (ref 0.6–4.47)
LYMPHOCYTES NFR BLD AUTO: 25 % (ref 14–44)
MCH RBC QN AUTO: 33.1 PG (ref 26.8–34.3)
MCHC RBC AUTO-ENTMCNC: 33.8 G/DL (ref 31.4–37.4)
MCV RBC AUTO: 98 FL (ref 82–98)
MONOCYTES # BLD AUTO: 0.51 THOUSAND/ÂΜL (ref 0.17–1.22)
MONOCYTES NFR BLD AUTO: 11 % (ref 4–12)
NEUTROPHILS # BLD AUTO: 2.64 THOUSANDS/ÂΜL (ref 1.85–7.62)
NEUTS SEG NFR BLD AUTO: 59 % (ref 43–75)
NITRITE UR QL STRIP: NEGATIVE
NRBC BLD AUTO-RTO: 0 /100 WBCS
PH UR STRIP.AUTO: 6.5 [PH]
PLATELET # BLD AUTO: 164 THOUSANDS/UL (ref 149–390)
PMV BLD AUTO: 9.2 FL (ref 8.9–12.7)
POTASSIUM SERPL-SCNC: 3.6 MMOL/L (ref 3.5–5.3)
PROT SERPL-MCNC: 5.9 G/DL (ref 6.4–8.4)
PROT UR STRIP-MCNC: NEGATIVE MG/DL
RBC # BLD AUTO: 3.84 MILLION/UL (ref 3.88–5.62)
SODIUM SERPL-SCNC: 139 MMOL/L (ref 135–147)
SP GR UR STRIP.AUTO: 1.02 (ref 1–1.03)
UROBILINOGEN UR STRIP-ACNC: <2 MG/DL
WBC # BLD AUTO: 4.51 THOUSAND/UL (ref 4.31–10.16)

## 2024-12-05 PROCEDURE — 99283 EMERGENCY DEPT VISIT LOW MDM: CPT

## 2024-12-05 PROCEDURE — 99284 EMERGENCY DEPT VISIT MOD MDM: CPT | Performed by: EMERGENCY MEDICINE

## 2024-12-05 PROCEDURE — 80053 COMPREHEN METABOLIC PANEL: CPT | Performed by: EMERGENCY MEDICINE

## 2024-12-05 PROCEDURE — 85025 COMPLETE CBC W/AUTO DIFF WBC: CPT | Performed by: EMERGENCY MEDICINE

## 2024-12-05 PROCEDURE — 36415 COLL VENOUS BLD VENIPUNCTURE: CPT | Performed by: EMERGENCY MEDICINE

## 2024-12-05 PROCEDURE — 81003 URINALYSIS AUTO W/O SCOPE: CPT | Performed by: EMERGENCY MEDICINE

## 2024-12-06 NOTE — ED PROVIDER NOTES
Time reflects when diagnosis was documented in both MDM as applicable and the Disposition within this note       Time User Action Codes Description Comment    12/5/2024 10:31 PM Zina Dale [R10.9] Abdominal pain           ED Disposition       ED Disposition   Discharge    Condition   Stable    Date/Time   u Dec 5, 2024 10:31 PM    Comment   Sumit Nails discharge to home/self care.                   Assessment & Plan       Medical Decision Making  74-year-old male presents for evaluation with lower abdominal pain after taking his prescribed laxatives this evening.  On exam, patient with normal vitals, in no acute distress.  Patient's abdomen is soft, nondistended, with only mild reported tenderness with palpation of the lower abdomen.  No peritoneal signs.  Suspect that patient's abdominal discomfort is likely cramping due to laxative administration.  CBC, CMP, and UA unremarkable at this time, no signs of acute electrolyte disturbances, leukocytosis, urinary infection, or other concerning findings.  On reevaluation, patient is resting comfortably in bed.  Patient asked for ice cream at this time.  He was given ice water instead, and tolerated this without difficulty.  Patient given contact information for GI for follow-up.  Patient discharged back to long-term care facility with symptomatic care instructions and strict ED return precautions.    Amount and/or Complexity of Data Reviewed  Labs: ordered.        ED Course as of 12/05/24 2244   Thu Dec 05, 2024   2226 On reevaluation, patient states that his abdominal pain is a 2 out of 10 in severity.  Patient then requested ice cream.  Patient was given ice water instead.        Medications - No data to display    ED Risk Strat Scores                           SBIRT 22yo+      Flowsheet Row Most Recent Value   Initial Alcohol Screen: US AUDIT-C     1. How often do you have a drink containing alcohol? 0 Filed at: 12/05/2024 2101   2. How many drinks containing  alcohol do you have on a typical day you are drinking?  0 Filed at: 2024   3b. FEMALE Any Age, or MALE 65+: How often do you have 4 or more drinks on one occassion? 0 Filed at: 2024   Audit-C Score 0 Filed at: 2024   RAFA: How many times in the past year have you...    Used an illegal drug or used a prescription medication for non-medical reasons? Never Filed at: 2024                            History of Present Illness       Chief Complaint   Patient presents with    Medical Problem     Pt BIBA from [place], complaining of stomach growling after taking their nightly docusate sodium. Pt denies any abdominal pain but admits to tenderness upon palpation.       Past Medical History:   Diagnosis Date    Asthma     Benign prostatic hyperplasia     COPD (chronic obstructive pulmonary disease) (HCC)     GERD (gastroesophageal reflux disease)     Herpes zoster without complication 12/10/2021    Hypercholesteremia     Hypertension     Neuroleptic induced parkinsonism (HCC)     Paranoid schizophrenia (HCC)     Parkinsons (HCC)     Psychiatric disorder       Past Surgical History:   Procedure Laterality Date    CATARACT EXTRACTION      CHOLECYSTECTOMY LAPAROSCOPIC N/A 12/3/2023    Procedure: CHOLECYSTECTOMY LAPAROSCOPIC WITH INTRAOPERATIVE CHOLANGIOGRAM;  Surgeon: Maverick Tamayo MD;  Location: [facility];  Service: General    COLONOSCOPY        Family History   Problem Relation Age of Onset    No Known Problems Mother     No Known Problems Father     Parkinsonism Son       Social History     Tobacco Use    Smoking status: Former     Current packs/day: 0.00     Average packs/day: 1 pack/day for 20.0 years (20.0 ttl pk-yrs)     Types: Cigarettes     Start date:      Quit date: 2000     Years since quittin.9     Passive exposure: Past    Smokeless tobacco: Never   Vaping Use    Vaping status: Never Used   Substance Use Topics    Alcohol use: Not Currently    Drug use: Never       E-Cigarette/Vaping    E-Cigarette Use Never User       E-Cigarette/Vaping Substances    Nicotine No     THC No     CBD No     Flavoring No     Other No     Unknown No       I have reviewed and agree with the history as documented.     74-year-old male brought in for evaluation with abdominal pain.  Patient states that he began experiencing some lower abdominal discomfort after receiving his nightly dose of laxatives.  Per chart review, patient has presented with similar symptoms in the past.  Patient denies any associated fevers, chest pain, shortness of breath, vomiting, diarrhea, or other concerning symptoms.        Review of Systems   Constitutional:  Negative for fever.   Respiratory:  Negative for shortness of breath.    Cardiovascular:  Negative for chest pain.   Gastrointestinal:  Positive for abdominal pain. Negative for diarrhea and vomiting.   All other systems reviewed and are negative.          Objective       ED Triage Vitals [12/05/24 2059]   Temperature Pulse Blood Pressure Respirations SpO2 Patient Position - Orthostatic VS   97.8 °F (36.6 °C) 68 145/79 18 97 % Lying      Temp Source Heart Rate Source BP Location FiO2 (%) Pain Score    Oral Monitor Left arm -- 2      Vitals      Date and Time Temp Pulse SpO2 Resp BP Pain Score FACES Pain Rating User   12/05/24 2059 97.8 °F (36.6 °C) 68 97 % 18 145/79 2 -- NW            Physical Exam  Vitals and nursing note reviewed.   Constitutional:       General: He is awake. He is not in acute distress.     Appearance: He is not toxic-appearing.   HENT:      Head: Normocephalic and atraumatic.   Eyes:      General: Vision grossly intact. Gaze aligned appropriately.   Cardiovascular:      Rate and Rhythm: Normal rate and regular rhythm.   Pulmonary:      Effort: Pulmonary effort is normal. No respiratory distress.   Abdominal:      General: There is no distension.      Palpations: Abdomen is soft.      Comments: Mild reported suprapubic and RLQ tenderness  without guarding.    Musculoskeletal:      Cervical back: Full passive range of motion without pain and neck supple.   Skin:     General: Skin is warm and dry.   Neurological:      General: No focal deficit present.      Mental Status: He is alert and oriented to person, place, and time.         Results Reviewed       Procedure Component Value Units Date/Time    CBC and differential [[id number]]  (Abnormal) Collected: 12/05/24 2150    Lab Status: Final result Specimen: Blood from Arm, Right Updated: 12/05/24 2218     WBC 4.51 Thousand/uL      RBC 3.84 Million/uL      Hemoglobin 12.7 g/dL      Hematocrit 37.6 %      MCV 98 fL      MCH 33.1 pg      MCHC 33.8 g/dL      RDW 13.6 %      MPV 9.2 fL      Platelets 164 Thousands/uL      nRBC 0 /100 WBCs      Segmented % 59 %      Immature Grans % 0 %      Lymphocytes % 25 %      Monocytes % 11 %      Eosinophils Relative 4 %      Basophils Relative 1 %      Absolute Neutrophils 2.64 Thousands/µL      Absolute Immature Grans 0.01 Thousand/uL      Absolute Lymphocytes 1.14 Thousands/µL      Absolute Monocytes 0.51 Thousand/µL      Eosinophils Absolute 0.18 Thousand/µL      Basophils Absolute 0.03 Thousands/µL     Comprehensive metabolic panel [[id number]]  (Abnormal) Collected: 12/05/24 2150    Lab Status: Final result Specimen: Blood from Arm, Right Updated: 12/05/24 2218     Sodium 139 mmol/L      Potassium 3.6 mmol/L      Chloride 108 mmol/L      CO2 30 mmol/L      ANION GAP 1 mmol/L      BUN 16 mg/dL      Creatinine 0.75 mg/dL      Glucose 83 mg/dL      Calcium 8.7 mg/dL      AST 18 U/L      ALT 12 U/L      Alkaline Phosphatase 98 U/L      Total Protein 5.9 g/dL      Albumin 3.6 g/dL      Total Bilirubin 0.42 mg/dL      eGFR 90 ml/min/1.73sq m     Narrative:      National Kidney Disease Foundation guidelines for Chronic Kidney Disease (CKD):     Stage 1 with normal or high GFR (GFR > 90 mL/min/1.73 square meters)    Stage 2 Mild CKD (GFR = 60-89 mL/min/1.73 square meters)     Stage 3A Moderate CKD (GFR = 45-59 mL/min/1.73 square meters)    Stage 3B Moderate CKD (GFR = 30-44 mL/min/1.73 square meters)    Stage 4 Severe CKD (GFR = 15-29 mL/min/1.73 square meters)    Stage 5 End Stage CKD (GFR <15 mL/min/1.73 square meters)  Note: GFR calculation is accurate only with a steady state creatinine    UA w Reflex to Microscopic w Reflex to Culture [[id number]] Collected: 12/05/24 2141    Lab Status: Final result Specimen: Urine, Clean Catch Updated: 12/05/24 2204     Color, UA Light Yellow     Clarity, UA Clear     Specific Gravity, UA 1.020     pH, UA 6.5     Leukocytes, UA Negative     Nitrite, UA Negative     Protein, UA Negative mg/dl      Glucose, UA Negative mg/dl      Ketones, UA Negative mg/dl      Urobilinogen, UA <2.0 mg/dl      Bilirubin, UA Negative     Occult Blood, UA Negative            No orders to display       Procedures    ED Medication and Procedure Management   Prior to Admission Medications   Prescriptions Last Dose Informant Patient Reported? Taking?   Cholecalciferol (Vitamin D High Potency) 25 MCG (1000 UT) capsule   No No   Sig: Take 1 capsule (1,000 Units total) by mouth daily   Deutetrabenazine ER (Austedo XR) 24 MG TB24  Care Giver Yes No   Sig: Take 24 mg by mouth daily   Multiple Vitamin (multivitamin) capsule  Care Giver No No   Sig: Take 1 capsule by mouth daily   Psyllium (Reguloid) 28.3 % POWD  Care Giver No No   Sig: Take 1 Scoop by mouth 2 (two) times a day   acetaminophen (TYLENOL) 500 mg tablet  Care Giver No No   Sig: Take 1 tablet (500 mg total) by mouth every 6 (six) hours as needed for mild pain   atorvastatin (LIPITOR) 40 mg tablet  Care Giver No No   Sig: Take 1 tablet (40 mg total) by mouth daily   carbidopa-levodopa (SINEMET)  mg per tablet  Care Giver No No   Sig: Take 1 tablet by mouth 3 (three) times a day   cloZAPine (CLOZARIL) 100 mg tablet  Care Giver Yes No   Sig: Take 100 mg by mouth 2 (two) times a day   clozapine (CLOZARIL) 200 MG  tablet  Care Giver Yes No   Sig: Take 200 mg by mouth daily at bedtime    docusate sodium (COLACE) 100 mg capsule  Care Giver No No   Sig: Take 1 capsule (100 mg total) by mouth 2 (two) times a day   meclizine (ANTIVERT) 25 mg tablet  Care Giver No No   Sig: Take 1 tablet (25 mg total) by mouth every 8 (eight) hours as needed for dizziness   metoprolol succinate (TOPROL-XL) 25 mg 24 hr tablet  Care Giver No No   Sig: Take 1 tablet (25 mg total) by mouth daily   olopatadine (PATANOL) 0.1 % ophthalmic solution  Care Giver Yes No   Sig: Administer 1 drop to both eyes daily at bedtime   omeprazole (PriLOSEC) 40 MG capsule  Care Giver No No   Sig: Take 1 capsule (40 mg total) by mouth daily   ondansetron (ZOFRAN-ODT) 4 mg disintegrating tablet  Care Giver Yes No   ondansetron (ZOFRAN-ODT) 4 mg disintegrating tablet  Care Giver No No   Sig: Take 1 tablet (4 mg total) by mouth every 6 (six) hours as needed for nausea or vomiting   polyethylene glycol (MIRALAX) 17 g packet   Yes No   Sig: Take 17 g by mouth daily   tamsulosin (FLOMAX) 0.4 mg  Care Giver No No   Sig: Take 1 capsule (0.4 mg total) by mouth daily at bedtime   temazepam (RESTORIL) 15 mg capsule  Care Giver No No   Sig: Take 1 capsule (15 mg total) by mouth daily at bedtime   topiramate (TOPAMAX) 25 mg tablet  Care Giver Yes No   Sig: Take 25 mg by mouth 2 (two) times a day   traZODone (DESYREL) 100 mg tablet  Care Giver Yes No   Sig: Take 100 mg by mouth daily at bedtime   triamcinolone (KENALOG) 0.1 % ointment   No No   Sig: Apply topically 2 (two) times a day as needed for rash or irritation for up to 14 days      Facility-Administered Medications: None     Patient's Medications   Discharge Prescriptions    No medications on file     No discharge procedures on file.  ED SEPSIS DOCUMENTATION   Time reflects when diagnosis was documented in both MDM as applicable and the Disposition within this note       Time User Action Codes Description Comment    12/5/2024  10:31 PM Zina Dale Add [R10.9] Abdominal pain                  Zina Dale, DO  12/05/24 6788

## 2024-12-23 ENCOUNTER — TELEPHONE (OUTPATIENT)
Age: 74
End: 2024-12-23

## 2024-12-23 NOTE — TELEPHONE ENCOUNTER
Mirian, patients care coordinator needs a letter faxed to their facility that the Patanol had been d/c'd by a previous provider so the pharmacy stops sending it as he doesn't use it, fax [phone]

## 2025-01-06 DIAGNOSIS — I77.810 AORTIC ROOT DILATATION (HCC): ICD-10-CM

## 2025-01-06 DIAGNOSIS — N39.43 POST-VOID DRIBBLING: ICD-10-CM

## 2025-01-06 RX ORDER — TAMSULOSIN HYDROCHLORIDE 0.4 MG/1
0.4 CAPSULE ORAL
Qty: 90 CAPSULE | Refills: 3 | Status: SHIPPED | OUTPATIENT
Start: 2025-01-06

## 2025-01-06 RX ORDER — METOPROLOL SUCCINATE 25 MG/1
25 TABLET, EXTENDED RELEASE ORAL DAILY
Qty: 90 TABLET | Refills: 1 | Status: SHIPPED | OUTPATIENT
Start: 2025-01-06

## 2025-01-10 ENCOUNTER — TELEPHONE (OUTPATIENT)
Dept: NEUROLOGY | Facility: CLINIC | Age: 75
End: 2025-01-10

## 2025-01-24 ENCOUNTER — TELEMEDICINE (OUTPATIENT)
Age: 75
End: 2025-01-24

## 2025-01-24 DIAGNOSIS — Z20.822 EXPOSURE TO CONFIRMED CASE OF COVID-19: Primary | ICD-10-CM

## 2025-01-24 LAB
SARS-COV-2 AG UPPER RESP QL IA: NEGATIVE
VALID CONTROL: NORMAL

## 2025-01-24 NOTE — PROGRESS NOTES
Name: Sumit Nails      : 1950      MRN: [id number]  Encounter Provider: Rosibel Brice PA-C  Encounter Date: 2025   Encounter department: [facility] [place]  :  Assessment & Plan  Exposure to confirmed case of COVID-19  Reassured patient.  Continue to monitor for any new signs or symptoms.  Discussed the based on medication interactions, and lack of current symptoms I do not recommend Paxlovid at this time.  Can continue current medications and continue to monitor.  [facility] is already isolating residents due to the positive tests in other residents.  Reviewed ED precautions and s/s of illness.     Patient plans to present to the office from [facility] for point-of-care test which will be ordered now.  Isolation precautions based on result.  Orders:  •  POCT Rapid Covid Ag           History of Present Illness   HPI    Reassured patient.  Continue to monitor for any new signs or symptoms  Discussed the based on medication interactions, kidney function, and lack of current symptoms I do not recommend Paxlovid at this time.  Can continue current medications and continue to monitor.  [facility] is already isolating residents due to the positive tests in other residents.  Reviewed ED precautions and s/s of illness.     Patient plans to present to the office from [facility] for point-of-care test which will be ordered now.  Isolation precautions based on result.    Temp 98.1 F.    Review of Systems   Constitutional: Negative.    HENT: Negative.     Respiratory: Negative.     Cardiovascular: Negative.    Gastrointestinal: Negative.    Musculoskeletal:  Negative for gait problem.   Neurological: Negative.    All other systems reviewed and are negative.      Objective   There were no vitals taken for this visit.     Physical Exam  Vitals and nursing note reviewed.   Constitutional:       General: He is not in acute distress.     Appearance: Normal appearance. He is not toxic-appearing.    HENT:      Head: Normocephalic and atraumatic.      Right Ear: Hearing normal.      Left Ear: Hearing normal.      Nose: Nose normal.   Pulmonary:      Effort: No respiratory distress.   Musculoskeletal:         General: No tenderness, deformity or signs of injury.   Neurological:      Mental Status: He is alert. Mental status is at baseline.   Psychiatric:         Mood and Affect: Mood normal.         Behavior: Behavior is cooperative.

## 2025-01-28 ENCOUNTER — TELEPHONE (OUTPATIENT)
Age: 75
End: 2025-01-28

## 2025-01-28 NOTE — TELEPHONE ENCOUNTER
Please discontinue TRIAMCINOLON OIN 0.1% from pts med list    Any probs - call Mirian   [phone]

## 2025-02-25 ENCOUNTER — HOSPITAL ENCOUNTER (EMERGENCY)
Facility: HOSPITAL | Age: 75
Discharge: HOME | End: 2025-02-26
Payer: MEDICARE

## 2025-02-25 ENCOUNTER — APPOINTMENT (EMERGENCY)
Dept: CT IMAGING | Facility: HOSPITAL | Age: 75
End: 2025-02-25
Payer: MEDICARE

## 2025-02-25 DIAGNOSIS — D69.6 THROMBOCYTOPENIA (HCC): ICD-10-CM

## 2025-02-25 DIAGNOSIS — D72.819 LEUKOPENIA: ICD-10-CM

## 2025-02-25 DIAGNOSIS — K59.00 CONSTIPATION: Primary | ICD-10-CM

## 2025-02-25 DIAGNOSIS — R11.0 NAUSEA: ICD-10-CM

## 2025-02-25 LAB — GLUCOSE SERPL-MCNC: 94 MG/DL (ref 65–140)

## 2025-02-25 PROCEDURE — 83690 ASSAY OF LIPASE: CPT

## 2025-02-25 PROCEDURE — 99284 EMERGENCY DEPT VISIT MOD MDM: CPT

## 2025-02-25 PROCEDURE — 82948 REAGENT STRIP/BLOOD GLUCOSE: CPT

## 2025-02-25 PROCEDURE — 96374 THER/PROPH/DIAG INJ IV PUSH: CPT

## 2025-02-25 PROCEDURE — 36415 COLL VENOUS BLD VENIPUNCTURE: CPT

## 2025-02-25 PROCEDURE — 84484 ASSAY OF TROPONIN QUANT: CPT

## 2025-02-25 PROCEDURE — 85025 COMPLETE CBC W/AUTO DIFF WBC: CPT

## 2025-02-25 PROCEDURE — 80053 COMPREHEN METABOLIC PANEL: CPT

## 2025-02-25 RX ORDER — ONDANSETRON 2 MG/ML
4 INJECTION INTRAMUSCULAR; INTRAVENOUS ONCE
Status: COMPLETED | OUTPATIENT
Start: 2025-02-25 | End: 2025-02-25

## 2025-02-25 RX ORDER — SUCRALFATE 1 G/1
1 TABLET ORAL ONCE
Status: COMPLETED | OUTPATIENT
Start: 2025-02-25 | End: 2025-02-25

## 2025-02-25 RX ORDER — MAGNESIUM HYDROXIDE/ALUMINUM HYDROXICE/SIMETHICONE 120; 1200; 1200 MG/30ML; MG/30ML; MG/30ML
30 SUSPENSION ORAL ONCE
Status: COMPLETED | OUTPATIENT
Start: 2025-02-25 | End: 2025-02-25

## 2025-02-25 RX ADMIN — ONDANSETRON 4 MG: 2 INJECTION INTRAMUSCULAR; INTRAVENOUS at 23:45

## 2025-02-25 RX ADMIN — ALUMINUM HYDROXIDE, MAGNESIUM HYDROXIDE, AND DIMETHICONE 30 ML: 200; 20; 200 SUSPENSION ORAL at 23:45

## 2025-02-25 RX ADMIN — SUCRALFATE 1 G: 1 TABLET ORAL at 23:45

## 2025-02-26 ENCOUNTER — APPOINTMENT (EMERGENCY)
Dept: CT IMAGING | Facility: HOSPITAL | Age: 75
End: 2025-02-26
Payer: MEDICARE

## 2025-02-26 VITALS
TEMPERATURE: 98.3 F | SYSTOLIC BLOOD PRESSURE: 150 MMHG | RESPIRATION RATE: 18 BRPM | HEART RATE: 68 BPM | OXYGEN SATURATION: 99 % | DIASTOLIC BLOOD PRESSURE: 77 MMHG

## 2025-02-26 LAB
2HR DELTA HS TROPONIN: <-1 NG/L
ALBUMIN SERPL BCG-MCNC: 3.6 G/DL (ref 3.5–5)
ALP SERPL-CCNC: 83 U/L (ref 34–104)
ALT SERPL W P-5'-P-CCNC: 12 U/L (ref 7–52)
ANION GAP SERPL CALCULATED.3IONS-SCNC: 4 MMOL/L (ref 4–13)
AST SERPL W P-5'-P-CCNC: 24 U/L (ref 13–39)
ATRIAL RATE: 63 BPM
ATRIAL RATE: 65 BPM
BASOPHILS # BLD AUTO: 0.03 THOUSANDS/ÂΜL (ref 0–0.1)
BASOPHILS NFR BLD AUTO: 1 % (ref 0–1)
BILIRUB SERPL-MCNC: 0.43 MG/DL (ref 0.2–1)
BUN SERPL-MCNC: 20 MG/DL (ref 5–25)
CALCIUM SERPL-MCNC: 8.2 MG/DL (ref 8.4–10.2)
CARDIAC TROPONIN I PNL SERPL HS: 3 NG/L (ref ?–50)
CARDIAC TROPONIN I PNL SERPL HS: <2 NG/L (ref ?–50)
CHLORIDE SERPL-SCNC: 111 MMOL/L (ref 96–108)
CO2 SERPL-SCNC: 24 MMOL/L (ref 21–32)
CREAT SERPL-MCNC: 0.65 MG/DL (ref 0.6–1.3)
EOSINOPHIL # BLD AUTO: 0.16 THOUSAND/ÂΜL (ref 0–0.61)
EOSINOPHIL NFR BLD AUTO: 4 % (ref 0–6)
ERYTHROCYTE [DISTWIDTH] IN BLOOD BY AUTOMATED COUNT: 13.6 % (ref 11.6–15.1)
FLUAV RNA RESP QL NAA+PROBE: NEGATIVE
FLUBV RNA RESP QL NAA+PROBE: NEGATIVE
GFR SERPL CREATININE-BSD FRML MDRD: 95 ML/MIN/1.73SQ M
GLUCOSE SERPL-MCNC: 90 MG/DL (ref 65–140)
HCT VFR BLD AUTO: 37.6 % (ref 36.5–49.3)
HGB BLD-MCNC: 13 G/DL (ref 12–17)
IMM GRANULOCYTES # BLD AUTO: 0.01 THOUSAND/UL (ref 0–0.2)
IMM GRANULOCYTES NFR BLD AUTO: 0 % (ref 0–2)
LIPASE SERPL-CCNC: 36 U/L (ref 11–82)
LYMPHOCYTES # BLD AUTO: 1.14 THOUSANDS/ÂΜL (ref 0.6–4.47)
LYMPHOCYTES NFR BLD AUTO: 29 % (ref 14–44)
MCH RBC QN AUTO: 34 PG (ref 26.8–34.3)
MCHC RBC AUTO-ENTMCNC: 34.6 G/DL (ref 31.4–37.4)
MCV RBC AUTO: 98 FL (ref 82–98)
MONOCYTES # BLD AUTO: 0.44 THOUSAND/ÂΜL (ref 0.17–1.22)
MONOCYTES NFR BLD AUTO: 11 % (ref 4–12)
NEUTROPHILS # BLD AUTO: 2.14 THOUSANDS/ÂΜL (ref 1.85–7.62)
NEUTS SEG NFR BLD AUTO: 55 % (ref 43–75)
NRBC BLD AUTO-RTO: 0 /100 WBCS
P AXIS: 51 DEGREES
P AXIS: 63 DEGREES
PLATELET # BLD AUTO: 125 THOUSANDS/UL (ref 149–390)
PMV BLD AUTO: 9.2 FL (ref 8.9–12.7)
POTASSIUM SERPL-SCNC: 4.1 MMOL/L (ref 3.5–5.3)
PR INTERVAL: 208 MS
PR INTERVAL: 214 MS
PROT SERPL-MCNC: 5.9 G/DL (ref 6.4–8.4)
QRS AXIS: 47 DEGREES
QRS AXIS: 54 DEGREES
QRSD INTERVAL: 96 MS
QRSD INTERVAL: 96 MS
QT INTERVAL: 428 MS
QT INTERVAL: 434 MS
QTC INTERVAL: 437 MS
QTC INTERVAL: 451 MS
RBC # BLD AUTO: 3.82 MILLION/UL (ref 3.88–5.62)
RSV RNA RESP QL NAA+PROBE: NEGATIVE
SARS-COV-2 RNA RESP QL NAA+PROBE: NEGATIVE
SODIUM SERPL-SCNC: 139 MMOL/L (ref 135–147)
T WAVE AXIS: 80 DEGREES
T WAVE AXIS: 91 DEGREES
VENTRICULAR RATE: 63 BPM
VENTRICULAR RATE: 65 BPM
WBC # BLD AUTO: 3.92 THOUSAND/UL (ref 4.31–10.16)

## 2025-02-26 PROCEDURE — 93010 ELECTROCARDIOGRAM REPORT: CPT | Performed by: INTERNAL MEDICINE

## 2025-02-26 PROCEDURE — 74177 CT ABD & PELVIS W/CONTRAST: CPT

## 2025-02-26 PROCEDURE — 99285 EMERGENCY DEPT VISIT HI MDM: CPT

## 2025-02-26 PROCEDURE — 36415 COLL VENOUS BLD VENIPUNCTURE: CPT

## 2025-02-26 PROCEDURE — 93005 ELECTROCARDIOGRAM TRACING: CPT

## 2025-02-26 PROCEDURE — 0241U HB NFCT DS VIR RESP RNA 4 TRGT: CPT

## 2025-02-26 PROCEDURE — 84484 ASSAY OF TROPONIN QUANT: CPT

## 2025-02-26 RX ORDER — POLYETHYLENE GLYCOL 3350 17 G/17G
17 POWDER, FOR SOLUTION ORAL DAILY
Qty: 510 G | Refills: 0 | Status: SHIPPED | OUTPATIENT
Start: 2025-02-26

## 2025-02-26 RX ADMIN — IOHEXOL 100 ML: 350 INJECTION, SOLUTION INTRAVENOUS at 02:04

## 2025-02-26 NOTE — ED CARE HANDOFF
Emergency Department Sign Out Note        Sign out and transfer of care from Dr. Ovalle. See Separate Emergency Department note.     The patient, Sumit Nails, was evaluated by the previous provider for nausea.    Workup Completed:  Broad screening work up completed    ED Course / Workup Pending (followup):  Delta trop  CT a/p                                     Procedures  Medical Decision Making  Amount and/or Complexity of Data Reviewed  Labs: ordered.  Radiology: ordered.    Risk  OTC drugs.  Prescription drug management.            Disposition  Final diagnoses:   Thrombocytopenia (HCC)   Leukopenia   Nausea   Constipation     Time reflects when diagnosis was documented in both MDM as applicable and the Disposition within this note       Time User Action Codes Description Comment    2/26/2025  2:23 AM Ava Ovalle Add [D69.6] Thrombocytopenia (HCC)     2/26/2025  2:23 AM Ava Ovalle Add [D72.819] Leukopenia     2/26/2025  2:23 AM Ava Ovalle Add [R11.0] Nausea     2/26/2025  5:07 AM Gabe-Sole Benz Add [K59.00] Constipation     2/26/2025  5:07 AM Sole Spears Modify [D69.6] Thrombocytopenia (HCC)     2/26/2025  5:07 AM Sole Spears Modify [K59.00] Constipation           ED Disposition       ED Disposition   Discharge    Condition   Stable    Date/Time   Wed Feb 26, 2025  5:06 AM    Comment   Sumit Nails discharge to home/self care.                   Follow-up Information       Follow up With Specialties Details Why Contact Info Additional Information    Garry Hidalgo MD Family Medicine Schedule an appointment as soon as possible for a visit in 3 days  [address] [facility] [phone]       [facility] Emergency Department Emergency Medicine Go to  As needed, for new/worsening symptoms. [address] [facility] [id number] [phone] [facility] Emergency Department, [address] [place] [address]  [place], [place], [address]          Patient's Medications   Discharge Prescriptions    POLYETHYLENE GLYCOL (GLYCOLAX) 17 GM/SCOOP POWDER    Take 17 g by mouth daily       Start Date: 2/26/2025 End Date: --       Order Dose: 17 g       Quantity: 510 g    Refills: 0     No discharge procedures on file.       ED Provider  Electronically Signed by     Sole Spears MD  02/26/25 4383

## 2025-02-27 DIAGNOSIS — R41.3 MEMORY DIFFICULTIES: ICD-10-CM

## 2025-02-27 RX ORDER — MULTIVITAMIN
1 CAPSULE ORAL DAILY
Qty: 30 CAPSULE | Refills: 0 | Status: SHIPPED | OUTPATIENT
Start: 2025-02-27

## 2025-02-27 NOTE — TELEPHONE ENCOUNTER
Reason for call:   [x] Refill   [] Prior Auth  [] Other:     Office: [facility]   [] PCP/Provider -   [x] Specialty/Provider - Neurology/ Poonam Solis     Medication: Multiple Vitamin (multivitamin) capsule     Dose/Frequency: daily     Quantity: 30 day supply     Pharmacy: [facility] [place]     Does the patient have enough for 3 days?   [] Yes   [x] No - Send as HP to POD

## 2025-03-12 NOTE — ED PROVIDER NOTES
Time reflects when diagnosis was documented in both MDM as applicable and the Disposition within this note       Time User Action Codes Description Comment    2/26/2025  2:23 AM Ovalle Phoenixcus Add [D69.6] Thrombocytopenia (HCC)     2/26/2025  2:23 AM Ovalle Phoenixcus Add [D72.819] Leukopenia     2/26/2025  2:23 AM Ovalle Phoenixcus Add [R11.0] Nausea     2/26/2025  5:07 AM Gabe-TuyetSole gordon Add [K59.00] Constipation     2/26/2025  5:07 AM Gabe-TuyetChadie Modify [D69.6] Thrombocytopenia (HCC)     2/26/2025  5:07 AM Gabe-Tuyet, Sole Modify [K59.00] Constipation           ED Disposition       ED Disposition   Discharge    Condition   Stable    Date/Time   Wed Feb 26, 2025  5:06 AM    Comment   Sumit Nails discharge to home/self care.                   Assessment & Plan       Medical Decision Making  74y M p/w nonspecific nausea    Ddx includes but is not limited to anemia, metabolic derangement, ACS, intra-abdominal pathology    Plan: bloodwork, imaging    Evaluation remarkable for mild leukopenia, thrombocytopenia.  Notably at the end of 2024 patient had similar blood work.  Nausea improved after antiemetic.  Imaging demonstrates mild constipation but otherwise no acute intra-abdominal process.  First troponin negative, ECG without acute ischemic changes.  Unsure of etiology of nausea but nothing emergent on evaluation.  Ultimately anticipate discharge home if second troponin negative.  Discussed with additional ER provider, patient signed out pending 2h troponin.    Amount and/or Complexity of Data Reviewed  Labs: ordered.  Radiology: ordered.    Risk  OTC drugs.  Prescription drug management.        ED Course as of 03/15/25 1533   Wed Feb 26, 2025   0211 ECG interpretation by me.  Sinus rhythm at 63 with NM prolongation noted prior on September 2024. Occasional PVC. No PHYLICIA. When compared to prior dated sept 2024 no significant change.       Medications   sucralfate (CARAFATE) tablet 1 g (1 g  Oral Given 2/25/25 2345)   aluminum-magnesium hydroxide-simethicone (MAALOX) oral suspension 30 mL (30 mL Oral Given 2/25/25 2345)   ondansetron (ZOFRAN) injection 4 mg (4 mg Intravenous Given 2/25/25 2345)   iohexol (OMNIPAQUE) 350 MG/ML injection (MULTI-DOSE) 100 mL (100 mL Intravenous Given 2/26/25 0204)       ED Risk Strat Scores                            SBIRT 20yo+      Flowsheet Row Most Recent Value   Initial Alcohol Screen: US AUDIT-C     1. How often do you have a drink containing alcohol? 0 Filed at: 02/25/2025 2320   2. How many drinks containing alcohol do you have on a typical day you are drinking?  0 Filed at: 02/25/2025 2320   3a. Male UNDER 65: How often do you have five or more drinks on one occasion? 0 Filed at: 02/25/2025 2320   Audit-C Score 0 Filed at: 02/25/2025 2320   RAFA: How many times in the past year have you...    Used an illegal drug or used a prescription medication for non-medical reasons? Never Filed at: 02/25/2025 2320                            History of Present Illness       Chief Complaint   Patient presents with    Nausea     Patient resides at [place]. Patient BIBA C/O  nausea after drinking OJ around 2100. He had dinner earlier in the evening (potatoes and spinach) with no difficutly. Patient requesting to be evaluated. Vital Signs are stable.       Past Medical History:   Diagnosis Date    Asthma     Benign prostatic hyperplasia     COPD (chronic obstructive pulmonary disease) (HCC)     GERD (gastroesophageal reflux disease)     Herpes zoster without complication 12/10/2021    Hypercholesteremia     Hypertension     Neuroleptic induced parkinsonism (HCC)     Paranoid schizophrenia (HCC)     Parkinsons (HCC)     Psychiatric disorder       Past Surgical History:   Procedure Laterality Date    CATARACT EXTRACTION      CHOLECYSTECTOMY LAPAROSCOPIC N/A 12/3/2023    Procedure: CHOLECYSTECTOMY LAPAROSCOPIC WITH INTRAOPERATIVE CHOLANGIOGRAM;  Surgeon: Maverick Tamayo MD;   Location: [facility];  Service: General    COLONOSCOPY        Family History   Problem Relation Age of Onset    No Known Problems Mother     No Known Problems Father     Parkinsonism Son       Social History     Tobacco Use    Smoking status: Former     Current packs/day: 0.00     Average packs/day: 1 pack/day for 20.0 years (20.0 ttl pk-yrs)     Types: Cigarettes     Start date:      Quit date: 2000     Years since quittin.2     Passive exposure: Past    Smokeless tobacco: Never   Vaping Use    Vaping status: Never Used   Substance Use Topics    Alcohol use: Not Currently    Drug use: Never      E-Cigarette/Vaping    E-Cigarette Use Never User       E-Cigarette/Vaping Substances    Nicotine No     THC No     CBD No     Flavoring No     Other No     Unknown No       I have reviewed and agree with the history as documented.     Patient is a 74-year-old male with a past medical history significant for COPD, GERD, dyslipidemia, hypertension, parkinsonism, paranoid schizophrenia presenting to emergency department for evaluation of nausea that has since resolved.  He reports earlier today around 915 he was drinking orange juice when he had some transient nausea.  He reports he is now presenting to be evaluated.  He notes some mild nausea but denies headache, change in vision, chest pain, shortness of breath, chest heaviness, abdominal pain, numbness, weakness, tingling of extremities.  He is requesting evaluation for his nausea.        Review of Systems   Constitutional:  Negative for chills and fever.   HENT:  Negative for ear pain and sore throat.    Eyes:  Negative for pain and visual disturbance.   Respiratory:  Negative for cough and shortness of breath.    Cardiovascular:  Negative for chest pain and palpitations.   Gastrointestinal:  Positive for nausea. Negative for abdominal pain and vomiting.   Genitourinary:  Negative for dysuria and hematuria.   Musculoskeletal:  Negative for arthralgias and back  pain.   Skin:  Negative for color change and rash.   Neurological:  Negative for seizures and syncope.   All other systems reviewed and are negative.          Objective       ED Triage Vitals [02/25/25 2336]   Temperature Pulse Blood Pressure Respirations SpO2 Patient Position - Orthostatic VS   98.3 °F (36.8 °C) 69 129/68 19 95 % Lying      Temp Source Heart Rate Source BP Location FiO2 (%) Pain Score    Oral Monitor Left arm -- --      Vitals      Date and Time Temp Pulse SpO2 Resp BP Pain Score FACES Pain Rating User   02/26/25 0500 -- 68 99 % 18 150/77 -- -- MICHAEL   02/26/25 0300 -- 68 98 % 21 122/70 -- -- KL   02/26/25 0230 -- 76 96 % 20 143/69 -- --    02/25/25 2336 98.3 °F (36.8 °C) 69 95 % 19 129/68 -- --             Physical Exam  Vitals and nursing note reviewed.   Constitutional:       General: He is not in acute distress.     Appearance: Normal appearance. He is not ill-appearing, toxic-appearing or diaphoretic.      Comments: Patient sitting in NAD, comfortable.   HENT:      Head: Normocephalic and atraumatic.   Eyes:      General: No scleral icterus.        Right eye: No discharge.         Left eye: No discharge.      Extraocular Movements: Extraocular movements intact.      Conjunctiva/sclera: Conjunctivae normal.   Cardiovascular:      Rate and Rhythm: Normal rate.      Pulses: Normal pulses.      Heart sounds: Normal heart sounds. No murmur heard.     No friction rub. No gallop.   Pulmonary:      Effort: Pulmonary effort is normal. No respiratory distress.      Breath sounds: Normal breath sounds. No stridor. No wheezing, rhonchi or rales.   Abdominal:      General: Abdomen is flat. Bowel sounds are normal. There is no distension.      Palpations: Abdomen is soft.      Tenderness: There is no abdominal tenderness. There is no guarding or rebound.   Musculoskeletal:         General: No swelling. Normal range of motion.      Cervical back: Normal range of motion. No rigidity.      Right lower leg: No  edema.      Left lower leg: No edema.   Skin:     General: Skin is warm and dry.      Capillary Refill: Capillary refill takes less than 2 seconds.      Coloration: Skin is not jaundiced.      Findings: No bruising or lesion.   Neurological:      General: No focal deficit present.      Mental Status: He is alert and oriented to person, place, and time. Mental status is at baseline.   Psychiatric:         Mood and Affect: Mood normal.         Behavior: Behavior normal.         Thought Content: Thought content normal.         Judgment: Judgment normal.         Results Reviewed       Procedure Component Value Units Date/Time    FLU/RSV/COVID - if FLU/RSV clinically relevant (2hr TAT) [[id number]]  (Normal) Collected: 02/26/25 0220    Lab Status: Final result Specimen: Nares from Nose Updated: 02/26/25 0313     SARS-CoV-2 Negative     INFLUENZA A PCR Negative     INFLUENZA B PCR Negative     RSV PCR Negative    Narrative:      This test has been performed using the CoV-2/Flu/RSV plus assay on the [employer] GeneXpert platform. This test has been validated by the  and verified by the performing laboratory.     This test is designed to amplify and detect the following: nucleocapsid (N), envelope (E), and RNA-dependent RNA polymerase (RdRP) genes of the SARS-CoV-2 genome; matrix (M), basic polymerase (PB2), and acidic protein (PA) segments of the influenza A genome; matrix (M) and non-structural protein (NS) segments of the influenza B genome, and the nucleocapsid genes of RSV A and RSV B.     Positive results are indicative of the presence of Flu A, Flu B, RSV, and/or SARS-CoV-2 RNA. Positive results for SARS-CoV-2 or suspected novel influenza should be reported to state, local, or federal health departments according to local reporting requirements.      All results should be assessed in conjunction with clinical presentation and other laboratory markers for clinical management.     FOR PEDIATRIC PATIENTS -  copy/paste COVID Guidelines URL to browser: [email or web address]       HS Troponin I 2hr [[id number]]  (Normal) Collected: 02/26/25 0148    Lab Status: Final result Specimen: Blood from Arm, Left Updated: 02/26/25 0219     hs TnI 2hr <2 ng/L      Delta 2hr hsTnI <-1 ng/L     Comprehensive metabolic panel [[id number]]  (Abnormal) Collected: 02/25/25 2342    Lab Status: Final result Specimen: Blood from Arm, Left Updated: 02/26/25 0134     Sodium 139 mmol/L      Potassium 4.1 mmol/L      Chloride 111 mmol/L      CO2 24 mmol/L      ANION GAP 4 mmol/L      BUN 20 mg/dL      Creatinine 0.65 mg/dL      Glucose 90 mg/dL      Calcium 8.2 mg/dL      AST 24 U/L      ALT 12 U/L      Alkaline Phosphatase 83 U/L      Total Protein 5.9 g/dL      Albumin 3.6 g/dL      Total Bilirubin 0.43 mg/dL      eGFR 95 ml/min/1.73sq m     Narrative:      National Kidney Disease Foundation guidelines for Chronic Kidney Disease (CKD):     Stage 1 with normal or high GFR (GFR > 90 mL/min/1.73 square meters)    Stage 2 Mild CKD (GFR = 60-89 mL/min/1.73 square meters)    Stage 3A Moderate CKD (GFR = 45-59 mL/min/1.73 square meters)    Stage 3B Moderate CKD (GFR = 30-44 mL/min/1.73 square meters)    Stage 4 Severe CKD (GFR = 15-29 mL/min/1.73 square meters)    Stage 5 End Stage CKD (GFR <15 mL/min/1.73 square meters)  Note: GFR calculation is accurate only with a steady state creatinine    Lipase [[id number]]  (Normal) Collected: 02/25/25 2342    Lab Status: Final result Specimen: Blood from Arm, Left Updated: 02/26/25 0134     Lipase 36 u/L     HS Troponin 0hr (reflex protocol) [[id number]]  (Normal) Collected: 02/25/25 2342    Lab Status: Final result Specimen: Blood from Arm, Left Updated: 02/26/25 0128     hs TnI 0hr 3 ng/L     CBC and differential [[id number]]  (Abnormal) Collected: 02/25/25 7876    Lab Status: Final result Specimen: Blood from Arm, Left Updated: 02/26/25 0058     WBC 3.92  Thousand/uL      RBC 3.82 Million/uL      Hemoglobin 13.0 g/dL      Hematocrit 37.6 %      MCV 98 fL      MCH 34.0 pg      MCHC 34.6 g/dL      RDW 13.6 %      MPV 9.2 fL      Platelets 125 Thousands/uL      nRBC 0 /100 WBCs      Segmented % 55 %      Immature Grans % 0 %      Lymphocytes % 29 %      Monocytes % 11 %      Eosinophils Relative 4 %      Basophils Relative 1 %      Absolute Neutrophils 2.14 Thousands/µL      Absolute Immature Grans 0.01 Thousand/uL      Absolute Lymphocytes 1.14 Thousands/µL      Absolute Monocytes 0.44 Thousand/µL      Eosinophils Absolute 0.16 Thousand/µL      Basophils Absolute 0.03 Thousands/µL     Fingerstick Glucose (POCT) [[id number]]  (Normal) Collected: 02/25/25 2345    Lab Status: Final result Specimen: Blood Updated: 02/25/25 2346     POC Glucose 94 mg/dl             CT abdomen pelvis with contrast   Final Interpretation by Bradley Leong MD (02/26 0343)      Colonic constipation. No evidence for bowel obstruction, inflammation, appendicitis, obstructive uropathy, free air, or free fluid. 3 mm nonobstructing calcification in the upper pole of the left kidney.         Workstation performed: [id number]             Procedures    ED Medication and Procedure Management   Prior to Admission Medications   Prescriptions Last Dose Informant Patient Reported? Taking?   Cholecalciferol (Vitamin D High Potency) 25 MCG (1000 UT) capsule   No No   Sig: Take 1 capsule (1,000 Units total) by mouth daily   Deutetrabenazine ER (Austedo XR) 24 MG TB24  Care Giver Yes No   Sig: Take 24 mg by mouth daily   Psyllium (Reguloid) 28.3 % POWD  Care Giver No No   Sig: Take 1 Scoop by mouth 2 (two) times a day   acetaminophen (TYLENOL) 500 mg tablet  Care Giver No No   Sig: Take 1 tablet (500 mg total) by mouth every 6 (six) hours as needed for mild pain   atorvastatin (LIPITOR) 40 mg tablet  Care Giver No No   Sig: Take 1 tablet (40 mg total) by mouth daily   carbidopa-levodopa (SINEMET)  mg per  tablet  Care Giver No No   Sig: Take 1 tablet by mouth 3 (three) times a day   cloZAPine (CLOZARIL) 100 mg tablet  Care Giver Yes No   Sig: Take 100 mg by mouth 2 (two) times a day   clozapine (CLOZARIL) 200 MG tablet  Care Giver Yes No   Sig: Take 200 mg by mouth daily at bedtime    docusate sodium (COLACE) 100 mg capsule  Care Giver No No   Sig: Take 1 capsule (100 mg total) by mouth 2 (two) times a day   meclizine (ANTIVERT) 25 mg tablet  Care Giver No No   Sig: Take 1 tablet (25 mg total) by mouth every 8 (eight) hours as needed for dizziness   metoprolol succinate (TOPROL-XL) 25 mg 24 hr tablet   No No   Sig: Take 1 tablet (25 mg total) by mouth daily   omeprazole (PriLOSEC) 40 MG capsule  Care Giver No No   Sig: Take 1 capsule (40 mg total) by mouth daily   ondansetron (ZOFRAN-ODT) 4 mg disintegrating tablet  Care Giver Yes No   ondansetron (ZOFRAN-ODT) 4 mg disintegrating tablet  Care Giver No No   Sig: Take 1 tablet (4 mg total) by mouth every 6 (six) hours as needed for nausea or vomiting   polyethylene glycol (MIRALAX) 17 g packet   Yes No   Sig: Take 17 g by mouth daily   tamsulosin (FLOMAX) 0.4 mg   No No   Sig: Take 1 capsule (0.4 mg total) by mouth daily at bedtime   temazepam (RESTORIL) 15 mg capsule  Care Giver No No   Sig: Take 1 capsule (15 mg total) by mouth daily at bedtime   topiramate (TOPAMAX) 25 mg tablet  Care Giver Yes No   Sig: Take 25 mg by mouth 2 (two) times a day   traZODone (DESYREL) 100 mg tablet  Care Giver Yes No   Sig: Take 100 mg by mouth daily at bedtime      Facility-Administered Medications: None     Discharge Medication List as of 2/26/2025  5:08 AM        START taking these medications    Details   polyethylene glycol (GLYCOLAX) 17 GM/SCOOP powder Take 17 g by mouth daily, Starting Wed 2/26/2025, Normal           CONTINUE these medications which have NOT CHANGED    Details   acetaminophen (TYLENOL) 500 mg tablet Take 1 tablet (500 mg total) by mouth every 6 (six) hours as  needed for mild pain, Starting Thu 9/5/2024, Normal      atorvastatin (LIPITOR) 40 mg tablet Take 1 tablet (40 mg total) by mouth daily, Starting Fri 9/6/2024, Normal      carbidopa-levodopa (SINEMET)  mg per tablet Take 1 tablet by mouth 3 (three) times a day, Starting Thu 3/2/2023, Normal      Cholecalciferol (Vitamin D High Potency) 25 MCG (1000 UT) capsule Take 1 capsule (1,000 Units total) by mouth daily, Starting Fri 10/25/2024, Normal      !! cloZAPine (CLOZARIL) 100 mg tablet Take 100 mg by mouth 2 (two) times a day, Historical Med      !! clozapine (CLOZARIL) 200 MG tablet Take 200 mg by mouth daily at bedtime , Starting Mon 4/8/2019, Historical Med      Deutetrabenazine ER (Austedo XR) 24 MG TB24 Take 24 mg by mouth daily, Historical Med      docusate sodium (COLACE) 100 mg capsule Take 1 capsule (100 mg total) by mouth 2 (two) times a day, Starting Wed 3/20/2024, Until Fri 11/1/2024, Normal      meclizine (ANTIVERT) 25 mg tablet Take 1 tablet (25 mg total) by mouth every 8 (eight) hours as needed for dizziness, Starting Wed 3/20/2024, Normal      metoprolol succinate (TOPROL-XL) 25 mg 24 hr tablet Take 1 tablet (25 mg total) by mouth daily, Starting Mon 1/6/2025, Normal      omeprazole (PriLOSEC) 40 MG capsule Take 1 capsule (40 mg total) by mouth daily, Starting Wed 3/20/2024, Normal      !! ondansetron (ZOFRAN-ODT) 4 mg disintegrating tablet Historical Med      !! ondansetron (ZOFRAN-ODT) 4 mg disintegrating tablet Take 1 tablet (4 mg total) by mouth every 6 (six) hours as needed for nausea or vomiting, Starting Sat 9/28/2024, Print      polyethylene glycol (MIRALAX) 17 g packet Take 17 g by mouth daily, Historical Med      Psyllium (Reguloid) 28.3 % POWD Take 1 Scoop by mouth 2 (two) times a day, Starting Mon 6/24/2024, Until Fri 11/1/2024, Normal      tamsulosin (FLOMAX) 0.4 mg Take 1 capsule (0.4 mg total) by mouth daily at bedtime, Starting Mon 1/6/2025, Normal      temazepam (RESTORIL) 15 mg  capsule Take 1 capsule (15 mg total) by mouth daily at bedtime, Starting Mon 12/4/2023, Until Fri 11/1/2024, Print      topiramate (TOPAMAX) 25 mg tablet Take 25 mg by mouth 2 (two) times a day, Starting Mon 7/1/2019, Historical Med      traZODone (DESYREL) 100 mg tablet Take 100 mg by mouth daily at bedtime, Starting Mon 4/8/2019, Historical Med      Multiple Vitamin (multivitamin) capsule Take 1 capsule by mouth daily, Starting Thu 9/26/2024, Normal       !! - Potential duplicate medications found. Please discuss with provider.        No discharge procedures on file.  ED SEPSIS DOCUMENTATION   Time reflects when diagnosis was documented in both MDM as applicable and the Disposition within this note       Time User Action Codes Description Comment    2/26/2025  2:23 AM Ava Ovalle Add [D69.6] Thrombocytopenia (HCC)     2/26/2025  2:23 AM Ava Ovalle Add [D72.819] Leukopenia     2/26/2025  2:23 AM Ava Ovalle Add [R11.0] Nausea     2/26/2025  5:07 AM Sole Spears Add [K59.00] Constipation     2/26/2025  5:07 AM Sole Spears Modify [D69.6] Thrombocytopenia (HCC)     2/26/2025  5:07 AM Sole Spears Modify [K59.00] Constipation                  Ava Ovalle DO  03/15/25 1533

## 2025-03-19 ENCOUNTER — TELEPHONE (OUTPATIENT)
Dept: NEUROLOGY | Facility: CLINIC | Age: 75
End: 2025-03-19

## 2025-03-19 DIAGNOSIS — R41.3 MEMORY DIFFICULTIES: ICD-10-CM

## 2025-03-19 RX ORDER — MULTIVITAMIN
1 CAPSULE ORAL DAILY
Qty: 30 CAPSULE | Refills: 5 | Status: SHIPPED | OUTPATIENT
Start: 2025-03-19

## 2025-03-24 ENCOUNTER — HOSPITAL ENCOUNTER (EMERGENCY)
Facility: HOSPITAL | Age: 75
Discharge: HOME | End: 2025-03-25
Attending: EMERGENCY MEDICINE
Payer: MEDICARE

## 2025-03-24 ENCOUNTER — APPOINTMENT (EMERGENCY)
Dept: RADIOLOGY | Facility: HOSPITAL | Age: 75
End: 2025-03-24
Payer: MEDICARE

## 2025-03-24 DIAGNOSIS — R10.9 ABDOMINAL PAIN: Primary | ICD-10-CM

## 2025-03-24 LAB
BASOPHILS # BLD AUTO: 0.02 THOUSANDS/ÂΜL (ref 0–0.1)
BASOPHILS NFR BLD AUTO: 1 % (ref 0–1)
EOSINOPHIL # BLD AUTO: 0.16 THOUSAND/ÂΜL (ref 0–0.61)
EOSINOPHIL NFR BLD AUTO: 4 % (ref 0–6)
ERYTHROCYTE [DISTWIDTH] IN BLOOD BY AUTOMATED COUNT: 13.2 % (ref 11.6–15.1)
GLUCOSE SERPL-MCNC: 115 MG/DL (ref 65–140)
HCT VFR BLD AUTO: 38.4 % (ref 36.5–49.3)
HGB BLD-MCNC: 13.2 G/DL (ref 12–17)
IMM GRANULOCYTES # BLD AUTO: 0.01 THOUSAND/UL (ref 0–0.2)
IMM GRANULOCYTES NFR BLD AUTO: 0 % (ref 0–2)
LYMPHOCYTES # BLD AUTO: 1.05 THOUSANDS/ÂΜL (ref 0.6–4.47)
LYMPHOCYTES NFR BLD AUTO: 25 % (ref 14–44)
MCH RBC QN AUTO: 32.9 PG (ref 26.8–34.3)
MCHC RBC AUTO-ENTMCNC: 34.4 G/DL (ref 31.4–37.4)
MCV RBC AUTO: 96 FL (ref 82–98)
MONOCYTES # BLD AUTO: 0.43 THOUSAND/ÂΜL (ref 0.17–1.22)
MONOCYTES NFR BLD AUTO: 10 % (ref 4–12)
NEUTROPHILS # BLD AUTO: 2.47 THOUSANDS/ÂΜL (ref 1.85–7.62)
NEUTS SEG NFR BLD AUTO: 60 % (ref 43–75)
NRBC BLD AUTO-RTO: 0 /100 WBCS
PLATELET # BLD AUTO: 149 THOUSANDS/UL (ref 149–390)
PMV BLD AUTO: 8.8 FL (ref 8.9–12.7)
RBC # BLD AUTO: 4.01 MILLION/UL (ref 3.88–5.62)
WBC # BLD AUTO: 4.14 THOUSAND/UL (ref 4.31–10.16)

## 2025-03-24 PROCEDURE — 84484 ASSAY OF TROPONIN QUANT: CPT | Performed by: EMERGENCY MEDICINE

## 2025-03-24 PROCEDURE — 85025 COMPLETE CBC W/AUTO DIFF WBC: CPT | Performed by: EMERGENCY MEDICINE

## 2025-03-24 PROCEDURE — 36415 COLL VENOUS BLD VENIPUNCTURE: CPT | Performed by: EMERGENCY MEDICINE

## 2025-03-24 PROCEDURE — 99285 EMERGENCY DEPT VISIT HI MDM: CPT

## 2025-03-24 PROCEDURE — 82948 REAGENT STRIP/BLOOD GLUCOSE: CPT

## 2025-03-24 PROCEDURE — 96360 HYDRATION IV INFUSION INIT: CPT

## 2025-03-24 PROCEDURE — 80053 COMPREHEN METABOLIC PANEL: CPT | Performed by: EMERGENCY MEDICINE

## 2025-03-24 PROCEDURE — 93005 ELECTROCARDIOGRAM TRACING: CPT

## 2025-03-24 PROCEDURE — 83605 ASSAY OF LACTIC ACID: CPT | Performed by: EMERGENCY MEDICINE

## 2025-03-24 PROCEDURE — 83735 ASSAY OF MAGNESIUM: CPT | Performed by: EMERGENCY MEDICINE

## 2025-03-24 PROCEDURE — 81001 URINALYSIS AUTO W/SCOPE: CPT | Performed by: EMERGENCY MEDICINE

## 2025-03-24 PROCEDURE — 71045 X-RAY EXAM CHEST 1 VIEW: CPT

## 2025-03-24 PROCEDURE — 99285 EMERGENCY DEPT VISIT HI MDM: CPT | Performed by: EMERGENCY MEDICINE

## 2025-03-24 PROCEDURE — 0241U HB NFCT DS VIR RESP RNA 4 TRGT: CPT | Performed by: EMERGENCY MEDICINE

## 2025-03-24 PROCEDURE — 83690 ASSAY OF LIPASE: CPT | Performed by: EMERGENCY MEDICINE

## 2025-03-24 RX ORDER — ONDANSETRON 2 MG/ML
4 INJECTION INTRAMUSCULAR; INTRAVENOUS ONCE
Status: DISCONTINUED | OUTPATIENT
Start: 2025-03-24 | End: 2025-03-25 | Stop reason: HOSPADM

## 2025-03-24 RX ADMIN — SODIUM CHLORIDE 1000 ML: 0.9 INJECTION, SOLUTION INTRAVENOUS at 23:53

## 2025-03-25 ENCOUNTER — APPOINTMENT (EMERGENCY)
Dept: CT IMAGING | Facility: HOSPITAL | Age: 75
End: 2025-03-25
Payer: MEDICARE

## 2025-03-25 VITALS
DIASTOLIC BLOOD PRESSURE: 70 MMHG | HEART RATE: 72 BPM | WEIGHT: 157.19 LBS | BODY MASS INDEX: 22.55 KG/M2 | RESPIRATION RATE: 18 BRPM | OXYGEN SATURATION: 99 % | SYSTOLIC BLOOD PRESSURE: 131 MMHG | TEMPERATURE: 97.9 F

## 2025-03-25 LAB
2HR DELTA HS TROPONIN: 1 NG/L
ALBUMIN SERPL BCG-MCNC: 3.5 G/DL (ref 3.5–5)
ALP SERPL-CCNC: 102 U/L (ref 34–104)
ALT SERPL W P-5'-P-CCNC: 3 U/L (ref 7–52)
ANION GAP SERPL CALCULATED.3IONS-SCNC: 4 MMOL/L (ref 4–13)
AST SERPL W P-5'-P-CCNC: 13 U/L (ref 13–39)
ATRIAL RATE: 74 BPM
BACTERIA UR QL AUTO: ABNORMAL /HPF
BILIRUB SERPL-MCNC: 0.53 MG/DL (ref 0.2–1)
BILIRUB UR QL STRIP: NEGATIVE
BUN SERPL-MCNC: 18 MG/DL (ref 5–25)
CALCIUM SERPL-MCNC: 8.2 MG/DL (ref 8.4–10.2)
CARDIAC TROPONIN I PNL SERPL HS: 3 NG/L (ref ?–50)
CARDIAC TROPONIN I PNL SERPL HS: 4 NG/L (ref ?–50)
CHLORIDE SERPL-SCNC: 112 MMOL/L (ref 96–108)
CLARITY UR: CLEAR
CO2 SERPL-SCNC: 26 MMOL/L (ref 21–32)
COLOR UR: YELLOW
CREAT SERPL-MCNC: 0.58 MG/DL (ref 0.6–1.3)
FLUAV RNA RESP QL NAA+PROBE: NEGATIVE
FLUBV RNA RESP QL NAA+PROBE: NEGATIVE
GFR SERPL CREATININE-BSD FRML MDRD: 100 ML/MIN/1.73SQ M
GLUCOSE SERPL-MCNC: 110 MG/DL (ref 65–140)
GLUCOSE UR STRIP-MCNC: NEGATIVE MG/DL
HGB UR QL STRIP.AUTO: NEGATIVE
KETONES UR STRIP-MCNC: NEGATIVE MG/DL
LACTATE SERPL-SCNC: 0.5 MMOL/L (ref 0.5–2)
LEUKOCYTE ESTERASE UR QL STRIP: NEGATIVE
LIPASE SERPL-CCNC: 29 U/L (ref 11–82)
MAGNESIUM SERPL-MCNC: 1.8 MG/DL (ref 1.9–2.7)
MUCOUS THREADS UR QL AUTO: ABNORMAL
NITRITE UR QL STRIP: NEGATIVE
NON-SQ EPI CELLS URNS QL MICRO: ABNORMAL /HPF
P AXIS: 62 DEGREES
PH UR STRIP.AUTO: 6.5 [PH]
POTASSIUM SERPL-SCNC: 3.4 MMOL/L (ref 3.5–5.3)
PR INTERVAL: 186 MS
PROT SERPL-MCNC: 6.2 G/DL (ref 6.4–8.4)
PROT UR STRIP-MCNC: ABNORMAL MG/DL
QRS AXIS: 75 DEGREES
QRSD INTERVAL: 96 MS
QT INTERVAL: 422 MS
QTC INTERVAL: 468 MS
RBC #/AREA URNS AUTO: ABNORMAL /HPF
RSV RNA RESP QL NAA+PROBE: NEGATIVE
SARS-COV-2 RNA RESP QL NAA+PROBE: NEGATIVE
SODIUM SERPL-SCNC: 142 MMOL/L (ref 135–147)
SP GR UR STRIP.AUTO: 1.03 (ref 1–1.03)
T WAVE AXIS: 69 DEGREES
UROBILINOGEN UR STRIP-ACNC: 2 MG/DL
VENTRICULAR RATE: 74 BPM
WBC #/AREA URNS AUTO: ABNORMAL /HPF

## 2025-03-25 PROCEDURE — 93010 ELECTROCARDIOGRAM REPORT: CPT | Performed by: INTERNAL MEDICINE

## 2025-03-25 PROCEDURE — 84484 ASSAY OF TROPONIN QUANT: CPT | Performed by: EMERGENCY MEDICINE

## 2025-03-25 PROCEDURE — 36415 COLL VENOUS BLD VENIPUNCTURE: CPT | Performed by: EMERGENCY MEDICINE

## 2025-03-25 PROCEDURE — 71260 CT THORAX DX C+: CPT

## 2025-03-25 PROCEDURE — 74177 CT ABD & PELVIS W/CONTRAST: CPT

## 2025-03-25 RX ADMIN — IOHEXOL 100 ML: 350 INJECTION, SOLUTION INTRAVENOUS at 00:36

## 2025-03-25 NOTE — ED PROVIDER NOTES
"  ED Disposition       None          Assessment & Plan       Medical Decision Making  Patient is a 74-year-old male past medical history of hypertension, hyperlipidemia, schizophrenia, COPD, kidney cancer, Parkinson disease, GERD presenting with abdominal pain and nausea.  Patient is well-appearing at bedside with stable vitals and in no acute distress.  He does have right upper and lower quadrant tenderness without guarding and no other significant physical exam findings.  He is oriented and following commands however has been seen for paranoid schizophrenia in the past.  Due to patient's history and tenderness on exam will obtain CT chest abdomen pelvis to assess for pneumonia, pulmonary edema, pneumothorax, pancreatitis, cholecystitis, kidney stones, appendicitis or other intra-abdominal pathology, give antiemetic and fluids and continue to monitor.    Amount and/or Complexity of Data Reviewed  Labs: ordered.  Radiology: ordered and independent interpretation performed.    Risk  Prescription drug management.        ED Course as of 03/25/25 0232   Tue Mar 25, 2025   0215 Workup unremarkable, will discharge back to facility       Medications   sodium chloride 0.9 % bolus 1,000 mL (has no administration in time range)   ondansetron (ZOFRAN) injection 4 mg (has no administration in time range)       ED Risk Strat Scores                                                History of Present Illness       Chief Complaint   Patient presents with   • Abdominal Pain     Pt BIBA from [facility] complaining for abdominal pain. Pt states \"that there is radiation in my room that is making me sick, it was so bad today that it scared me.\" Pt seen for the same thing for several times previously. Pt complaining of a cough that's been going on for several weeks. +N/V, -D       Past Medical History:   Diagnosis Date   • Asthma    • Benign prostatic hyperplasia    • COPD (chronic obstructive pulmonary disease) (HCC)    • GERD " (gastroesophageal reflux disease)    • Herpes zoster without complication 12/10/2021   • Hypercholesteremia    • Hypertension    • Neuroleptic induced parkinsonism (HCC)    • Paranoid schizophrenia (HCC)    • Parkinsons (HCC)    • Psychiatric disorder       Past Surgical History:   Procedure Laterality Date   • CATARACT EXTRACTION     • CHOLECYSTECTOMY LAPAROSCOPIC N/A 12/3/2023    Procedure: CHOLECYSTECTOMY LAPAROSCOPIC WITH INTRAOPERATIVE CHOLANGIOGRAM;  Surgeon: Maverick Tamayo MD;  Location: [facility];  Service: General   • COLONOSCOPY        Family History   Problem Relation Age of Onset   • No Known Problems Mother    • No Known Problems Father    • Parkinsonism Son       Social History     Tobacco Use   • Smoking status: Former     Current packs/day: 0.00     Average packs/day: 1 pack/day for 20.0 years (20.0 ttl pk-yrs)     Types: Cigarettes     Start date:      Quit date:      Years since quittin.     Passive exposure: Past   • Smokeless tobacco: Never   Vaping Use   • Vaping status: Never Used   Substance Use Topics   • Alcohol use: Not Currently   • Drug use: Never      E-Cigarette/Vaping   • E-Cigarette Use Never User       E-Cigarette/Vaping Substances   • Nicotine No    • THC No    • CBD No    • Flavoring No    • Other No    • Unknown No       I have reviewed and agree with the history as documented.     Patient is a 74-year-old male past medical history of GERD, hypertension, hyperlipidemia, COPD, kidney cancer, Parkinson's disease, paranoid schizophrenia presenting for nausea.  Patient notes nausea and dizziness and he states that the symptoms are coming because of electricity and radiation that are in his room from outer space.  He notes cough times weeks but denies any chest pain, abdominal pain, shortness of breath, fevers, headache, diarrhea.  Is from [facility].        Review of Systems   All other systems reviewed and are negative.          Objective       ED Triage Vitals  [03/24/25 2320]   Temperature Pulse Blood Pressure Respirations SpO2 Patient Position - Orthostatic VS   97.9 °F (36.6 °C) 77 115/59 18 96 % Sitting      Temp Source Heart Rate Source BP Location FiO2 (%) Pain Score    Oral Monitor Right arm -- --      Vitals      Date and Time Temp Pulse SpO2 Resp BP Pain Score FACES Pain Rating User   03/24/25 2320 97.9 °F (36.6 °C) 77 96 % 18 115/59 -- -- NW            Physical Exam  Vitals reviewed.   Constitutional:       General: He is not in acute distress.     Appearance: Normal appearance. He is not ill-appearing.   HENT:      Mouth/Throat:      Mouth: Mucous membranes are moist.   Eyes:      Conjunctiva/sclera: Conjunctivae normal.      Comments: Normal conjunctiva   Cardiovascular:      Rate and Rhythm: Normal rate and regular rhythm.      Heart sounds: Normal heart sounds.   Pulmonary:      Effort: Pulmonary effort is normal.      Breath sounds: Normal breath sounds.   Abdominal:      General: Abdomen is flat.      Palpations: Abdomen is soft.      Tenderness: There is abdominal tenderness in the right upper quadrant and right lower quadrant. There is no right CVA tenderness, left CVA tenderness or guarding.   Musculoskeletal:         General: No swelling. Normal range of motion.      Cervical back: Neck supple.   Skin:     General: Skin is warm and dry.   Neurological:      General: No focal deficit present.      Mental Status: He is alert and oriented to person, place, and time.      Motor: No weakness.   Psychiatric:         Mood and Affect: Mood normal.         Results Reviewed       None            No orders to display       ECG 12 Lead Documentation Only    Date/Time: 3/25/2025 12:05 AM    Performed by: Bita Rosenbaum DO  Authorized by: Bita Rosenbaum DO    Patient location:  ED  Previous ECG:     Previous ECG:  Compared to current    Comparison ECG info:  PVCs no longer present    Similarity:  Changes noted  Interpretation:     Interpretation: normal     Rate:     ECG rate assessment: normal    Rhythm:     Rhythm: sinus rhythm    Ectopy:     Ectopy: none    QRS:     QRS axis:  Normal    QRS intervals:  Normal  Conduction:     Conduction: normal    ST segments:     ST segments:  Normal  T waves:     T waves: normal        ED Medication and Procedure Management   Prior to Admission Medications   Prescriptions Last Dose Informant Patient Reported? Taking?   Cholecalciferol (Vitamin D High Potency) 25 MCG (1000 UT) capsule   No No   Sig: Take 1 capsule (1,000 Units total) by mouth daily   Deutetrabenazine ER (Austedo XR) 24 MG TB24  Care Giver Yes No   Sig: Take 24 mg by mouth daily   Multiple Vitamin (multivitamin) capsule   No No   Sig: Take 1 capsule by mouth daily   Psyllium (Reguloid) 28.3 % POWD  Care Giver No No   Sig: Take 1 Scoop by mouth 2 (two) times a day   acetaminophen (TYLENOL) 500 mg tablet  Care Giver No No   Sig: Take 1 tablet (500 mg total) by mouth every 6 (six) hours as needed for mild pain   atorvastatin (LIPITOR) 40 mg tablet  Care Giver No No   Sig: Take 1 tablet (40 mg total) by mouth daily   carbidopa-levodopa (SINEMET)  mg per tablet  Care Giver No No   Sig: Take 1 tablet by mouth 3 (three) times a day   cloZAPine (CLOZARIL) 100 mg tablet  Care Giver Yes No   Sig: Take 100 mg by mouth 2 (two) times a day   clozapine (CLOZARIL) 200 MG tablet  Care Giver Yes No   Sig: Take 200 mg by mouth daily at bedtime    docusate sodium (COLACE) 100 mg capsule  Care Giver No No   Sig: Take 1 capsule (100 mg total) by mouth 2 (two) times a day   meclizine (ANTIVERT) 25 mg tablet  Care Giver No No   Sig: Take 1 tablet (25 mg total) by mouth every 8 (eight) hours as needed for dizziness   metoprolol succinate (TOPROL-XL) 25 mg 24 hr tablet   No No   Sig: Take 1 tablet (25 mg total) by mouth daily   omeprazole (PriLOSEC) 40 MG capsule  Care Giver No No   Sig: Take 1 capsule (40 mg total) by mouth daily   ondansetron (ZOFRAN-ODT) 4 mg disintegrating  tablet  Care Giver Yes No   ondansetron (ZOFRAN-ODT) 4 mg disintegrating tablet  Care Giver No No   Sig: Take 1 tablet (4 mg total) by mouth every 6 (six) hours as needed for nausea or vomiting   polyethylene glycol (GLYCOLAX) 17 GM/SCOOP powder   No No   Sig: Take 17 g by mouth daily   polyethylene glycol (MIRALAX) 17 g packet   Yes No   Sig: Take 17 g by mouth daily   tamsulosin (FLOMAX) 0.4 mg   No No   Sig: Take 1 capsule (0.4 mg total) by mouth daily at bedtime   temazepam (RESTORIL) 15 mg capsule  Care Giver No No   Sig: Take 1 capsule (15 mg total) by mouth daily at bedtime   topiramate (TOPAMAX) 25 mg tablet  Care Giver Yes No   Sig: Take 25 mg by mouth 2 (two) times a day   traZODone (DESYREL) 100 mg tablet  Care Giver Yes No   Sig: Take 100 mg by mouth daily at bedtime      Facility-Administered Medications: None     Patient's Medications   Discharge Prescriptions    No medications on file     No discharge procedures on file.  ED SEPSIS DOCUMENTATION            Bita Rosenbaum DO  03/25/25 0235

## 2025-03-25 NOTE — ED NOTES
Patient consumed all food items provided to patient, patient not reporting any nausea nor abdominal pain at this time.     Nelson Oreilly RN  03/25/25 0146

## 2025-03-26 ENCOUNTER — OFFICE VISIT (OUTPATIENT)
Age: 75
End: 2025-03-26
Payer: MEDICARE

## 2025-03-26 VITALS
WEIGHT: 157.2 LBS | RESPIRATION RATE: 18 BRPM | OXYGEN SATURATION: 99 % | DIASTOLIC BLOOD PRESSURE: 72 MMHG | HEART RATE: 69 BPM | BODY MASS INDEX: 22.5 KG/M2 | SYSTOLIC BLOOD PRESSURE: 126 MMHG | TEMPERATURE: 96.6 F | HEIGHT: 70 IN

## 2025-03-26 DIAGNOSIS — H10.33 ACUTE CONJUNCTIVITIS OF BOTH EYES, UNSPECIFIED ACUTE CONJUNCTIVITIS TYPE: Primary | ICD-10-CM

## 2025-03-26 DIAGNOSIS — J44.9 CHRONIC OBSTRUCTIVE PULMONARY DISEASE, UNSPECIFIED COPD TYPE (HCC): ICD-10-CM

## 2025-03-26 DIAGNOSIS — G20.A1 PARKINSON'S DISEASE, UNSPECIFIED WHETHER DYSKINESIA PRESENT, UNSPECIFIED WHETHER MANIFESTATIONS FLUCTUATE (HCC): ICD-10-CM

## 2025-03-26 PROCEDURE — 99214 OFFICE O/P EST MOD 30 MIN: CPT

## 2025-03-26 PROCEDURE — G2211 COMPLEX E/M VISIT ADD ON: HCPCS

## 2025-03-26 RX ORDER — OFLOXACIN 3 MG/ML
2 SOLUTION/ DROPS OPHTHALMIC 4 TIMES DAILY
Qty: 5 ML | Refills: 0 | Status: CANCELLED | OUTPATIENT
Start: 2025-03-26 | End: 2025-03-31

## 2025-03-26 RX ORDER — POLYMYXIN B SULFATE AND TRIMETHOPRIM 1; 10000 MG/ML; [USP'U]/ML
2 SOLUTION OPHTHALMIC EVERY 6 HOURS
Qty: 5 ML | Refills: 0 | Status: SHIPPED | OUTPATIENT
Start: 2025-03-26 | End: 2025-03-31

## 2025-03-26 NOTE — ASSESSMENT & PLAN NOTE
Stable, following with neurology  Will need staff at [place] to assist with eye drop administration due to tremors

## 2025-03-26 NOTE — PROGRESS NOTES
Name: Sumit Nails      : 1950      MRN: [id number]  Encounter Provider: Thaddeus Babin PA-C  Encounter Date: 3/26/2025   Encounter department: [facility]  :  Assessment & Plan  Acute conjunctivitis of both eyes, unspecified acute conjunctivitis type  No foreign body, visual changes, fixed pupil, headache, photophobia   Not a contact lens wearer   Begin polytrim 1-2 drops every 6 hours for 5 days   Frequent hand washing   Follow up for any new or worsening symptoms   Orders:    polymyxin b-trimethoprim (POLYTRIM) ophthalmic solution; Administer 2 drops to both eyes every 6 (six) hours for 5 days    Parkinson's disease, unspecified whether dyskinesia present, unspecified whether manifestations fluctuate (HCC)  Stable, following with neurology  Will need staff at [place] to assist with eye drop administration due to tremors        Chronic obstructive pulmonary disease, unspecified COPD type (HCC)  Stable on room air 99%, continue current inhalers and continue smoking cessation              History of Present Illness   Eye redness, itching/scratching sensation minimal discharge in morning for a couple days.   No foreign body, visual changes, fixed pupil, headache, photophobia       Review of Systems   Constitutional:  Negative for activity change, diaphoresis, fatigue and fever.   HENT: Negative.     Eyes:  Positive for discharge, redness and itching. Negative for photophobia, pain and visual disturbance.   Respiratory: Negative.  Negative for cough, chest tightness and shortness of breath.    Cardiovascular:  Negative for chest pain, palpitations and leg swelling.   Gastrointestinal: Negative.    Endocrine: Negative.  Negative for polydipsia, polyphagia and polyuria.   Genitourinary: Negative.  Negative for dysuria, flank pain, frequency, hematuria and urgency.   Musculoskeletal: Negative.  Negative for back pain, joint swelling, neck pain and neck stiffness.   Skin:  "Negative.    Neurological:  Positive for tremors (chronic at baseline). Negative for dizziness, weakness, light-headedness and headaches.   Hematological:  Negative for adenopathy.   Psychiatric/Behavioral: Negative.  Negative for confusion and sleep disturbance. The patient is not nervous/anxious.        Objective   /72 (BP Location: Left arm, Patient Position: Sitting, Cuff Size: Standard)   Pulse 69   Temp (!) 96.6 °F (35.9 °C) (Tympanic)   Resp 18   Ht 5' 10\" (1.778 m)   Wt 71.3 kg (157 lb 3.2 oz)   SpO2 99%   BMI 22.56 kg/m²      Physical Exam  Vitals and nursing note reviewed.   Constitutional:       General: He is not in acute distress.     Appearance: He is normal weight. He is not ill-appearing, toxic-appearing or diaphoretic.   HENT:      Head: Normocephalic and atraumatic.      Right Ear: External ear normal.      Left Ear: External ear normal.      Nose: Nose normal.   Eyes:      General: Lids are normal. Lids are everted, no foreign bodies appreciated. No scleral icterus.        Right eye: No foreign body, discharge or hordeolum.         Left eye: No foreign body, discharge or hordeolum.      Extraocular Movements: Extraocular movements intact.      Right eye: Normal extraocular motion.      Left eye: Normal extraocular motion.      Conjunctiva/sclera:      Right eye: Right conjunctiva is injected. No chemosis, exudate or hemorrhage.     Left eye: Left conjunctiva is injected. No chemosis, exudate or hemorrhage.  Neck:      Vascular: No carotid bruit.   Cardiovascular:      Rate and Rhythm: Normal rate and regular rhythm.   Pulmonary:      Effort: Pulmonary effort is normal. No respiratory distress.      Breath sounds: Normal breath sounds. No wheezing or rales.   Musculoskeletal:      Cervical back: Normal range of motion and neck supple.      Right lower leg: No edema.      Left lower leg: No edema.   Skin:     Findings: No rash.   Neurological:      Mental Status: He is alert. Mental " status is at baseline.   Psychiatric:         Mood and Affect: Mood normal.         Behavior: Behavior normal.         Thought Content: Thought content normal.         Judgment: Judgment normal.

## 2025-04-03 ENCOUNTER — OFFICE VISIT (OUTPATIENT)
Age: 75
End: 2025-04-03
Payer: MEDICARE

## 2025-04-03 VITALS
TEMPERATURE: 97.8 F | HEIGHT: 70 IN | HEART RATE: 66 BPM | RESPIRATION RATE: 14 BRPM | SYSTOLIC BLOOD PRESSURE: 102 MMHG | BODY MASS INDEX: 22.52 KG/M2 | DIASTOLIC BLOOD PRESSURE: 66 MMHG | WEIGHT: 157.3 LBS

## 2025-04-03 DIAGNOSIS — E78.00 HYPERCHOLESTEREMIA: ICD-10-CM

## 2025-04-03 DIAGNOSIS — F20.0 PARANOID SCHIZOPHRENIA (HCC): ICD-10-CM

## 2025-04-03 DIAGNOSIS — H10.33 ACUTE CONJUNCTIVITIS OF BOTH EYES, UNSPECIFIED ACUTE CONJUNCTIVITIS TYPE: ICD-10-CM

## 2025-04-03 DIAGNOSIS — R10.84 GENERALIZED ABDOMINAL PAIN: ICD-10-CM

## 2025-04-03 DIAGNOSIS — Z00.00 PHYSICAL EXAM: Primary | ICD-10-CM

## 2025-04-03 DIAGNOSIS — Z12.5 ENCOUNTER FOR PROSTATE CANCER SCREENING: ICD-10-CM

## 2025-04-03 DIAGNOSIS — G20.A1 PARKINSON'S DISEASE, UNSPECIFIED WHETHER DYSKINESIA PRESENT, UNSPECIFIED WHETHER MANIFESTATIONS FLUCTUATE (HCC): ICD-10-CM

## 2025-04-03 PROCEDURE — 99214 OFFICE O/P EST MOD 30 MIN: CPT

## 2025-04-03 PROCEDURE — G2211 COMPLEX E/M VISIT ADD ON: HCPCS

## 2025-04-03 NOTE — PROGRESS NOTES
Name: Sumit Nails      : 1950      MRN: [id number]  Encounter Provider: Tahddeus Babin PA-C  Encounter Date: 4/3/2025   Encounter department: [facility] [place]  :  Assessment & Plan  Physical exam  Patient presents today for annual physical for [facility] form to be filled out.   Follow up in 3-4 months for recheck with labs        Paranoid schizophrenia (HCC)  Stable, continue following with psychiatry   Orders:    CBC and differential; Future    Comprehensive metabolic panel; Future    Parkinson's disease, unspecified whether dyskinesia present, unspecified whether manifestations fluctuate (HCC)  Stable, continue following with neurology, taking sinemet tid        Hypercholesteremia  Continue lipitor 40mg and update labs   Orders:    Lipid panel; Future    Encounter for prostate cancer screening  Last completed 3/2024 PSA 0.65  Update psa, denies any lower obstructive urinary symptoms   Orders:    PSA, Total Screen; Future    Acute conjunctivitis of both eyes, unspecified acute conjunctivitis type  Resolved with polytrim 1-2 drops every 6 hours for 5 days from last visit   Follow up as needed        Generalized abdominal pain  Was recently in the ED, resolved. CT reviewed and negative for acute process   There is a small 3 mm nonobstructing left renal calculus incidental finding  No cva tenderness, abdominal pain or urinary symptoms at this time which were reviewed with patient. Follow up if any new or worsening symptoms occur. Advised proper hydration to help pass on its own              History of Present Illness   Patient presents today for a form to be filled out for [place] yearly physical  Is doing well, no new symptoms or concerns       Review of Systems   Constitutional:  Negative for activity change, diaphoresis, fatigue and fever.   HENT: Negative.     Eyes: Negative.    Respiratory: Negative.  Negative for cough, chest tightness and shortness of breath.  "   Cardiovascular:  Negative for chest pain, palpitations and leg swelling.   Gastrointestinal: Negative.    Endocrine: Negative.  Negative for polydipsia, polyphagia and polyuria.   Genitourinary: Negative.  Negative for dysuria, flank pain, frequency, hematuria and urgency.   Musculoskeletal: Negative.  Negative for back pain, joint swelling, neck pain and neck stiffness.   Skin: Negative.    Neurological: Negative.  Negative for dizziness, weakness, light-headedness and headaches.   Hematological:  Negative for adenopathy.   Psychiatric/Behavioral: Negative.  Negative for confusion and sleep disturbance. The patient is not nervous/anxious.        Objective   /66 (BP Location: Left arm, Patient Position: Sitting)   Pulse 66   Temp 97.8 °F (36.6 °C) (Tympanic)   Resp 14   Ht 5' 10\" (1.778 m)   Wt 71.4 kg (157 lb 4.8 oz)   BMI 22.57 kg/m²      Physical Exam  Vitals and nursing note reviewed.   Constitutional:       General: He is not in acute distress.     Appearance: Normal appearance. He is normal weight. He is not ill-appearing, toxic-appearing or diaphoretic.   HENT:      Head: Normocephalic and atraumatic.      Right Ear: Tympanic membrane, ear canal and external ear normal. There is no impacted cerumen.      Left Ear: Tympanic membrane, ear canal and external ear normal. There is no impacted cerumen.      Nose: Nose normal. No congestion or rhinorrhea.      Mouth/Throat:      Mouth: Mucous membranes are moist.      Pharynx: Oropharynx is clear. No oropharyngeal exudate.   Eyes:      General: No scleral icterus.        Right eye: No discharge.         Left eye: No discharge.      Extraocular Movements: Extraocular movements intact.      Conjunctiva/sclera: Conjunctivae normal.   Neck:      Vascular: No carotid bruit.   Cardiovascular:      Rate and Rhythm: Normal rate and regular rhythm.   Pulmonary:      Effort: Pulmonary effort is normal. No respiratory distress.      Breath sounds: Normal breath " "sounds. No wheezing or rales.   Abdominal:      General: Abdomen is flat. Bowel sounds are normal.      Palpations: There is no mass.      Tenderness: There is no abdominal tenderness. There is no rebound.   Musculoskeletal:      Cervical back: Normal range of motion and neck supple. No tenderness.      Right lower leg: No edema.      Left lower leg: No edema.   Lymphadenopathy:      Cervical: No cervical adenopathy.   Skin:     General: Skin is warm and dry.      Findings: No rash.   Neurological:      Mental Status: He is alert. Mental status is at baseline.      Sensory: No sensory deficit.      Motor: No weakness.   Psychiatric:         Mood and Affect: Mood normal.         Behavior: Behavior normal.         Thought Content: Thought content normal.         Judgment: Judgment normal.       Portions of the record may have been created with voice recognition software. Occasional wrong word or \"sound a like\" substitutions may have occurred due to the inherent limitations of voice recognition software. Read the chart carefully and recognize, using context, where substitutions have occurred.    "

## 2025-04-03 NOTE — ASSESSMENT & PLAN NOTE
Stable, continue following with psychiatry   Orders:    CBC and differential; Future    Comprehensive metabolic panel; Future

## 2025-04-08 DIAGNOSIS — E55.9 VITAMIN D DEFICIENCY: ICD-10-CM

## 2025-04-09 DIAGNOSIS — K21.9 GASTROESOPHAGEAL REFLUX DISEASE: ICD-10-CM

## 2025-04-09 RX ORDER — OMEPRAZOLE 40 MG/1
40 CAPSULE, DELAYED RELEASE ORAL DAILY
Qty: 31 CAPSULE | Refills: 5 | Status: SHIPPED | OUTPATIENT
Start: 2025-04-09

## 2025-04-09 RX ORDER — CHOLECALCIFEROL (VITAMIN D3) 25 MCG
CAPSULE ORAL
Qty: 30 CAPSULE | Refills: 5 | Status: SHIPPED | OUTPATIENT
Start: 2025-04-09

## 2025-04-09 NOTE — TELEPHONE ENCOUNTER
Reason for call:   [x] Refill   [] Prior Auth  [] Other:     Office:   [x] PCP/Provider -   [] Specialty/Provider -     Medication: Omeprazole     Dose/Frequency: 40 mg capsule taken by mouth once daily     Quantity: 90    Pharmacy: [facility] ([facility]) - [place], [place] - [address] [place] [facility]. [phone]     Local Pharmacy   Does the patient have enough for 3 days?   [] Yes   [x] No - Send as HP to POD    Mail Away Pharmacy   Does the patient have enough for 10 days?   [] Yes   [] No - Send as HP to POD

## 2025-04-18 ENCOUNTER — APPOINTMENT (EMERGENCY)
Dept: RADIOLOGY | Facility: HOSPITAL | Age: 75
End: 2025-04-18
Payer: MEDICARE

## 2025-04-18 ENCOUNTER — HOSPITAL ENCOUNTER (EMERGENCY)
Facility: HOSPITAL | Age: 75
Discharge: HOME | End: 2025-04-19
Attending: EMERGENCY MEDICINE
Payer: MEDICARE

## 2025-04-18 ENCOUNTER — APPOINTMENT (EMERGENCY)
Dept: CT IMAGING | Facility: HOSPITAL | Age: 75
End: 2025-04-18
Payer: MEDICARE

## 2025-04-18 DIAGNOSIS — R11.2 NAUSEA AND VOMITING: Primary | ICD-10-CM

## 2025-04-18 LAB
ALBUMIN SERPL BCG-MCNC: 3.8 G/DL (ref 3.5–5)
ALP SERPL-CCNC: 97 U/L (ref 34–104)
ALT SERPL W P-5'-P-CCNC: 8 U/L (ref 7–52)
ANION GAP SERPL CALCULATED.3IONS-SCNC: 3 MMOL/L (ref 4–13)
AST SERPL W P-5'-P-CCNC: 18 U/L (ref 13–39)
BASOPHILS # BLD AUTO: 0.04 THOUSANDS/ÂΜL (ref 0–0.1)
BASOPHILS NFR BLD AUTO: 1 % (ref 0–1)
BILIRUB DIRECT SERPL-MCNC: 0.16 MG/DL (ref 0–0.2)
BILIRUB SERPL-MCNC: 0.49 MG/DL (ref 0.2–1)
BILIRUB UR QL STRIP: NEGATIVE
BUN SERPL-MCNC: 15 MG/DL (ref 5–25)
CALCIUM SERPL-MCNC: 8.9 MG/DL (ref 8.4–10.2)
CARDIAC TROPONIN I PNL SERPL HS: 4 NG/L (ref ?–50)
CHLORIDE SERPL-SCNC: 108 MMOL/L (ref 96–108)
CLARITY UR: CLEAR
CO2 SERPL-SCNC: 28 MMOL/L (ref 21–32)
COLOR UR: COLORLESS
CREAT SERPL-MCNC: 0.6 MG/DL (ref 0.6–1.3)
EOSINOPHIL # BLD AUTO: 0.22 THOUSAND/ÂΜL (ref 0–0.61)
EOSINOPHIL NFR BLD AUTO: 6 % (ref 0–6)
ERYTHROCYTE [DISTWIDTH] IN BLOOD BY AUTOMATED COUNT: 13.9 % (ref 11.6–15.1)
FLUAV AG UPPER RESP QL IA.RAPID: NEGATIVE
FLUBV AG UPPER RESP QL IA.RAPID: NEGATIVE
GFR SERPL CREATININE-BSD FRML MDRD: 99 ML/MIN/1.73SQ M
GLUCOSE SERPL-MCNC: 64 MG/DL (ref 65–140)
GLUCOSE UR STRIP-MCNC: NEGATIVE MG/DL
HCT VFR BLD AUTO: 39.3 % (ref 36.5–49.3)
HGB BLD-MCNC: 13.1 G/DL (ref 12–17)
HGB UR QL STRIP.AUTO: NEGATIVE
IMM GRANULOCYTES # BLD AUTO: 0 THOUSAND/UL (ref 0–0.2)
IMM GRANULOCYTES NFR BLD AUTO: 0 % (ref 0–2)
KETONES UR STRIP-MCNC: NEGATIVE MG/DL
LACTATE SERPL-SCNC: 0.7 MMOL/L (ref 0.5–2)
LEUKOCYTE ESTERASE UR QL STRIP: NEGATIVE
LIPASE SERPL-CCNC: 39 U/L (ref 11–82)
LYMPHOCYTES # BLD AUTO: 1.07 THOUSANDS/ÂΜL (ref 0.6–4.47)
LYMPHOCYTES NFR BLD AUTO: 27 % (ref 14–44)
MAGNESIUM SERPL-MCNC: 1.8 MG/DL (ref 1.9–2.7)
MCH RBC QN AUTO: 32.5 PG (ref 26.8–34.3)
MCHC RBC AUTO-ENTMCNC: 33.3 G/DL (ref 31.4–37.4)
MCV RBC AUTO: 98 FL (ref 82–98)
MONOCYTES # BLD AUTO: 0.47 THOUSAND/ÂΜL (ref 0.17–1.22)
MONOCYTES NFR BLD AUTO: 12 % (ref 4–12)
NEUTROPHILS # BLD AUTO: 2.2 THOUSANDS/ÂΜL (ref 1.85–7.62)
NEUTS SEG NFR BLD AUTO: 54 % (ref 43–75)
NITRITE UR QL STRIP: NEGATIVE
NRBC BLD AUTO-RTO: 0 /100 WBCS
PH UR STRIP.AUTO: 6.5 [PH]
PLATELET # BLD AUTO: 131 THOUSANDS/UL (ref 149–390)
PMV BLD AUTO: 9 FL (ref 8.9–12.7)
POTASSIUM SERPL-SCNC: 3.7 MMOL/L (ref 3.5–5.3)
PROT SERPL-MCNC: 6.1 G/DL (ref 6.4–8.4)
PROT UR STRIP-MCNC: NEGATIVE MG/DL
RBC # BLD AUTO: 4.03 MILLION/UL (ref 3.88–5.62)
SARS-COV+SARS-COV-2 AG RESP QL IA.RAPID: NEGATIVE
SODIUM SERPL-SCNC: 139 MMOL/L (ref 135–147)
SP GR UR STRIP.AUTO: 1 (ref 1–1.03)
UROBILINOGEN UR STRIP-ACNC: <2 MG/DL
WBC # BLD AUTO: 4 THOUSAND/UL (ref 4.31–10.16)

## 2025-04-18 PROCEDURE — 83605 ASSAY OF LACTIC ACID: CPT | Performed by: EMERGENCY MEDICINE

## 2025-04-18 PROCEDURE — 96361 HYDRATE IV INFUSION ADD-ON: CPT

## 2025-04-18 PROCEDURE — 36415 COLL VENOUS BLD VENIPUNCTURE: CPT | Performed by: EMERGENCY MEDICINE

## 2025-04-18 PROCEDURE — 96375 TX/PRO/DX INJ NEW DRUG ADDON: CPT

## 2025-04-18 PROCEDURE — 85025 COMPLETE CBC W/AUTO DIFF WBC: CPT | Performed by: EMERGENCY MEDICINE

## 2025-04-18 PROCEDURE — 96365 THER/PROPH/DIAG IV INF INIT: CPT

## 2025-04-18 PROCEDURE — 87811 SARS-COV-2 COVID19 W/OPTIC: CPT | Performed by: EMERGENCY MEDICINE

## 2025-04-18 PROCEDURE — 99285 EMERGENCY DEPT VISIT HI MDM: CPT | Performed by: EMERGENCY MEDICINE

## 2025-04-18 PROCEDURE — 81003 URINALYSIS AUTO W/O SCOPE: CPT | Performed by: EMERGENCY MEDICINE

## 2025-04-18 PROCEDURE — 99284 EMERGENCY DEPT VISIT MOD MDM: CPT

## 2025-04-18 PROCEDURE — 80048 BASIC METABOLIC PNL TOTAL CA: CPT | Performed by: EMERGENCY MEDICINE

## 2025-04-18 PROCEDURE — 93005 ELECTROCARDIOGRAM TRACING: CPT

## 2025-04-18 PROCEDURE — 71045 X-RAY EXAM CHEST 1 VIEW: CPT

## 2025-04-18 PROCEDURE — 83735 ASSAY OF MAGNESIUM: CPT | Performed by: EMERGENCY MEDICINE

## 2025-04-18 PROCEDURE — 84484 ASSAY OF TROPONIN QUANT: CPT | Performed by: EMERGENCY MEDICINE

## 2025-04-18 PROCEDURE — 80076 HEPATIC FUNCTION PANEL: CPT | Performed by: EMERGENCY MEDICINE

## 2025-04-18 PROCEDURE — 74177 CT ABD & PELVIS W/CONTRAST: CPT

## 2025-04-18 PROCEDURE — 83690 ASSAY OF LIPASE: CPT | Performed by: EMERGENCY MEDICINE

## 2025-04-18 PROCEDURE — 87804 INFLUENZA ASSAY W/OPTIC: CPT | Performed by: EMERGENCY MEDICINE

## 2025-04-18 RX ORDER — MAGNESIUM SULFATE HEPTAHYDRATE 40 MG/ML
2 INJECTION, SOLUTION INTRAVENOUS ONCE
Status: COMPLETED | OUTPATIENT
Start: 2025-04-18 | End: 2025-04-19

## 2025-04-18 RX ORDER — ONDANSETRON 2 MG/ML
4 INJECTION INTRAMUSCULAR; INTRAVENOUS ONCE
Status: COMPLETED | OUTPATIENT
Start: 2025-04-18 | End: 2025-04-18

## 2025-04-18 RX ORDER — DEXTROSE MONOHYDRATE 25 G/50ML
25 INJECTION, SOLUTION INTRAVENOUS ONCE
Status: COMPLETED | OUTPATIENT
Start: 2025-04-18 | End: 2025-04-18

## 2025-04-18 RX ADMIN — ONDANSETRON 4 MG: 2 INJECTION INTRAMUSCULAR; INTRAVENOUS at 22:32

## 2025-04-18 RX ADMIN — IOHEXOL 100 ML: 350 INJECTION, SOLUTION INTRAVENOUS at 23:08

## 2025-04-18 RX ADMIN — DEXTROSE MONOHYDRATE 25 ML: 25 INJECTION, SOLUTION INTRAVENOUS at 23:22

## 2025-04-18 RX ADMIN — SODIUM CHLORIDE 1000 ML: 0.9 INJECTION, SOLUTION INTRAVENOUS at 22:30

## 2025-04-18 RX ADMIN — MAGNESIUM SULFATE HEPTAHYDRATE 2 G: 40 INJECTION, SOLUTION INTRAVENOUS at 23:31

## 2025-04-19 VITALS
HEIGHT: 70 IN | WEIGHT: 157.41 LBS | HEART RATE: 68 BPM | RESPIRATION RATE: 22 BRPM | TEMPERATURE: 97.7 F | DIASTOLIC BLOOD PRESSURE: 63 MMHG | SYSTOLIC BLOOD PRESSURE: 124 MMHG | OXYGEN SATURATION: 98 % | BODY MASS INDEX: 22.54 KG/M2

## 2025-04-19 LAB
ATRIAL RATE: 70 BPM
GLUCOSE SERPL-MCNC: 99 MG/DL (ref 65–140)
P AXIS: 61 DEGREES
PR INTERVAL: 204 MS
QRS AXIS: 66 DEGREES
QRSD INTERVAL: 94 MS
QT INTERVAL: 422 MS
QTC INTERVAL: 455 MS
T WAVE AXIS: 69 DEGREES
VENTRICULAR RATE: 70 BPM

## 2025-04-19 PROCEDURE — 82948 REAGENT STRIP/BLOOD GLUCOSE: CPT

## 2025-04-19 PROCEDURE — 93010 ELECTROCARDIOGRAM REPORT: CPT | Performed by: STUDENT IN AN ORGANIZED HEALTH CARE EDUCATION/TRAINING PROGRAM

## 2025-04-19 RX ORDER — ONDANSETRON 4 MG/1
4 TABLET, ORALLY DISINTEGRATING ORAL EVERY 8 HOURS PRN
Qty: 12 TABLET | Refills: 0 | Status: SHIPPED | OUTPATIENT
Start: 2025-04-19

## 2025-04-19 NOTE — ED NOTES
Patient fed, crackers and peanut butter.     Minal Pacheco RN  04/19/25 0155       Minal Pacheco, RN  04/19/25 0155

## 2025-04-19 NOTE — ED NOTES
Patient given fluids, hot tea as requested,taking fluids well.     Minal Pacheco, RN  04/19/25 0155       Minal Pacheco, RN  04/19/25 0155

## 2025-04-19 NOTE — ED PROVIDER NOTES
Time reflects when diagnosis was documented in both MDM as applicable and the Disposition within this note       Time User Action Codes Description Comment    4/19/2025  2:20 AM Chrissy Kumar Add [R11.2] Nausea and vomiting           ED Disposition       ED Disposition   Discharge    Condition   Stable    Date/Time   Sat Apr 19, 2025  2:20 AM    Comment   Sumit Nails discharge to home/self care.                   Assessment & Plan       Medical Decision Making  74-year-old male with history of psychiatric disorder, hypertension, hyperlipidemia, GERD, COPD, prior colon surgery, presents to the ED for several days of constipation, acute nausea, vomiting and abdominal pain tonight.  Differential diagnosis includes acute gastritis, enteritis or colitis, small bowel obstruction, appendicitis, pancreatitis, diverticulitis, atypical presentation of ACS, UTI or pyelonephritis.  Will evaluate with abdominal and cardiac labs, EKG, chest x-ray and CT scan of the abdomen and pelvis.  Will provide IV fluid bolus, Zofran for nausea.    Amount and/or Complexity of Data Reviewed  Independent Historian: EMS  Labs: ordered. Decision-making details documented in ED Course.  Radiology: ordered and independent interpretation performed. Decision-making details documented in ED Course.  ECG/medicine tests: ordered and independent interpretation performed. Decision-making details documented in ED Course.    Risk  Prescription drug management.        ED Course as of 04/19/25 0711   Fri Apr 18, 2025   2241 WBC(!): 4.00  WBC count stable and according to prior labs it is routinely between 3.9 and 4.5.   2244 Leukocytes, UA: Negative   2244 Nitrite, UA: Negative   2252 LACTIC ACID: 0.7   2253 GLUCOSE(!): 64  Will avoid giving patient anything by mouth at this time as we await CT scan.  Will give half an amp of D50 and reassess glucose.   2257 MAGNESIUM(!): 1.8  Will give 2g IV magnesium sulfate.    2315 hs TnI 0hr: 4   Sat Apr 19, 2025    0238 Updated patient about normal CT scan and essentially normal blood work.  Patient tolerated p.o. after the Zofran.  Patient stable for discharge back to nursing facility with prescription for Zofran.  Discussed ED return parameters.   0300 POC Glucose: 99       Medications   sodium chloride 0.9 % bolus 1,000 mL (0 mL Intravenous Stopped 4/18/25 2354)   ondansetron (ZOFRAN) injection 4 mg (4 mg Intravenous Given 4/18/25 2232)   magnesium sulfate 2 g/50 mL IVPB (premix) 2 g (0 g Intravenous Stopped 4/19/25 0031)   dextrose 50 % IV solution 25 mL (25 mL Intravenous Given 4/18/25 2322)   iohexol (OMNIPAQUE) 350 MG/ML injection (MULTI-DOSE) 100 mL (100 mL Intravenous Given 4/18/25 2308)       ED Risk Strat Scores                    No data recorded        SBIRT 20yo+      Flowsheet Row Most Recent Value   Initial Alcohol Screen: US AUDIT-C     1. How often do you have a drink containing alcohol? 0 Filed at: 04/18/2025 2235   2. How many drinks containing alcohol do you have on a typical day you are drinking?  0 Filed at: 04/18/2025 2235   3a. Male UNDER 65: How often do you have five or more drinks on one occasion? 0 Filed at: 04/18/2025 2235   3b. FEMALE Any Age, or MALE 65+: How often do you have 4 or more drinks on one occassion? 0 Filed at: 04/18/2025 2235   Audit-C Score 0 Filed at: 04/18/2025 2235   RAFA: How many times in the past year have you...    Used an illegal drug or used a prescription medication for non-medical reasons? Never Filed at: 04/18/2025 2235                            History of Present Illness       Chief Complaint   Patient presents with    Vomiting     CONSTIPATION, HAD BM TODAY, VOMITED 3X TODAY.       Past Medical History:   Diagnosis Date    Asthma     Benign prostatic hyperplasia     COPD (chronic obstructive pulmonary disease) (HCC)     GERD (gastroesophageal reflux disease)     Herpes zoster without complication 12/10/2021    Hypercholesteremia     Hypertension     Neuroleptic  induced parkinsonism (HCC)     Paranoid schizophrenia (HCC)     Parkinsons (HCC)     Psychiatric disorder       Past Surgical History:   Procedure Laterality Date    CATARACT EXTRACTION      CHOLECYSTECTOMY LAPAROSCOPIC N/A 12/3/2023    Procedure: CHOLECYSTECTOMY LAPAROSCOPIC WITH INTRAOPERATIVE CHOLANGIOGRAM;  Surgeon: Maverick Tamayo MD;  Location: [facility];  Service: General    COLONOSCOPY        Family History   Problem Relation Age of Onset    No Known Problems Mother     No Known Problems Father     Parkinsonism Son       Social History     Tobacco Use    Smoking status: Former     Current packs/day: 0.00     Average packs/day: 1 pack/day for 20.0 years (20.0 ttl pk-yrs)     Types: Cigarettes     Start date:      Quit date:      Years since quittin.3     Passive exposure: Past    Smokeless tobacco: Never   Vaping Use    Vaping status: Never Used   Substance Use Topics    Alcohol use: Not Currently    Drug use: Never      E-Cigarette/Vaping    E-Cigarette Use Never User       E-Cigarette/Vaping Substances    Nicotine No     THC No     CBD No     Flavoring No     Other No     Unknown No       I have reviewed and agree with the history as documented.     Patient is a 74-year-old male with past medical history of schizophrenia, hypertension, hyperlipidemia, neuroleptic induced parkinsonism, asthma/COPD, BPH, GERD, presents to the emergency department by ambulance from [facility] for acute vomiting.  Patient states that for the past 2 to 3 days he has been constipated.  He states he did not have a bowel movement for 2 days but then did have a small bowel movement today but does not feel as though it was enough.  He denies any blood in the stool or any melena.  He states tonight he did have nausea and 3 episodes of nonbilious and nonbloody vomiting.  He also complains of colicky abdominal pain as well as feeling shaky.  Patient also states he has had nasal congestion for the past few days but  denies any significant coughing.  He denies currently feeling any dizziness or lightheadedness, headache, fevers, sore throat, chest pain, shortness of breath, palpitations, abdominal distention, dysuria, change in urinary frequency, gross hematuria, flank pain, skin rash or color change, focal neurologic deficits.  Patient believes in the past he has had some type of colon surgery but is uncertain.  He also states that he was told at 1 point that his appendix was inflamed but denies ever having an appendectomy.      History provided by:  Patient and EMS personnel   used: No    Vomiting  Associated symptoms: abdominal pain    Associated symptoms: no chills, no cough, no diarrhea, no fever, no headaches and no sore throat        Review of Systems   Constitutional:  Negative for chills and fever.   HENT:  Positive for congestion. Negative for ear pain, rhinorrhea and sore throat.    Respiratory:  Negative for cough, chest tightness, shortness of breath and wheezing.    Cardiovascular:  Negative for chest pain and palpitations.   Gastrointestinal:  Positive for abdominal pain, constipation, nausea and vomiting. Negative for abdominal distention, blood in stool and diarrhea.   Genitourinary:  Negative for dysuria, flank pain, frequency and hematuria.   Musculoskeletal:  Negative for back pain, neck pain and neck stiffness.   Skin:  Negative for color change, pallor, rash and wound.   Allergic/Immunologic: Negative for immunocompromised state.   Neurological:  Positive for tremors. Negative for dizziness, syncope, facial asymmetry, speech difficulty, weakness, light-headedness, numbness and headaches.   Hematological:  Negative for adenopathy. Does not bruise/bleed easily.   Psychiatric/Behavioral:  Negative for confusion and decreased concentration.    All other systems reviewed and are negative.          Objective       ED Triage Vitals   Temperature Pulse Blood Pressure Respirations SpO2 Patient  Position - Orthostatic VS   04/18/25 2145 04/18/25 2145 04/18/25 2145 04/18/25 2145 04/18/25 2145 04/18/25 2145   97.7 °F (36.5 °C) 77 130/63 16 99 % Lying      Temp Source Heart Rate Source BP Location FiO2 (%) Pain Score    04/18/25 2145 04/18/25 2145 04/18/25 2145 -- 04/18/25 2150    Oral Monitor Left arm  3      Vitals      Date and Time Temp Pulse SpO2 Resp BP Pain Score FACES Pain Rating User   04/19/25 0030 -- 68 98 % 22 124/63 -- -- LS   04/18/25 2315 -- 69 94 % 22 128/64 -- -- LS   04/18/25 2237 -- -- -- -- -- 3 -- LS   04/18/25 2150 -- -- -- -- -- 3 -- LS   04/18/25 2145 97.7 °F (36.5 °C) 77 99 % 16 130/63 -- -- LS            Physical Exam  Vitals and nursing note reviewed.   Constitutional:       General: He is not in acute distress.     Appearance: Normal appearance. He is well-developed. He is not ill-appearing, toxic-appearing or diaphoretic.   HENT:      Head: Normocephalic and atraumatic.      Right Ear: External ear normal.      Left Ear: External ear normal.      Nose: Nose normal.      Mouth/Throat:      Mouth: Mucous membranes are moist.      Pharynx: Oropharynx is clear.   Eyes:      Extraocular Movements: Extraocular movements intact.      Conjunctiva/sclera: Conjunctivae normal.   Neck:      Vascular: No JVD.   Cardiovascular:      Rate and Rhythm: Normal rate and regular rhythm.      Pulses: Normal pulses.      Heart sounds: Normal heart sounds. No murmur heard.     No friction rub. No gallop.   Pulmonary:      Effort: Pulmonary effort is normal. No respiratory distress.      Breath sounds: Normal breath sounds. No wheezing, rhonchi or rales.   Abdominal:      General: There is no distension.      Palpations: Abdomen is soft.      Tenderness: There is abdominal tenderness. There is no guarding or rebound.      Comments: Bilateral lower quadrant abdominal tenderness present.   Musculoskeletal:         General: No swelling or tenderness. Normal range of motion.      Cervical back: Normal range  of motion. No rigidity.   Skin:     General: Skin is warm and dry.      Coloration: Skin is not pale.      Findings: No erythema or rash.   Neurological:      General: No focal deficit present.      Mental Status: He is alert and oriented to person, place, and time.      Sensory: No sensory deficit.      Motor: No weakness.      Comments: Mild bilateral upper extremity intention tremor.   Psychiatric:         Mood and Affect: Mood normal.         Behavior: Behavior normal.         Results Reviewed       Procedure Component Value Units Date/Time    Fingerstick Glucose (POCT) [[id number]]  (Normal) Collected: 04/19/25 0256    Lab Status: Final result Specimen: Blood Updated: 04/19/25 0257     POC Glucose 99 mg/dl     HS Troponin 0hr (reflex protocol) [[id number]]  (Normal) Collected: 04/18/25 2219    Lab Status: Final result Specimen: Blood from Arm, Right Updated: 04/18/25 2259     hs TnI 0hr 4 ng/L     FLU/COVID Rapid Antigen (30 min. TAT) - Preferred screening test in ED [[id number]]  (Normal) Collected: 04/18/25 2219    Lab Status: Final result Specimen: Nares from Nose Updated: 04/18/25 2254     SARS COV Rapid Antigen Negative     Influenza A Rapid Antigen Negative     Influenza B Rapid Antigen Negative    Narrative:      This test has been performed using the Quidel Christel 2 FLU+SARS Antigen test under the Emergency Use Authorization (EUA). This test has been validated by the  and verified by the performing laboratory. The Christel uses lateral flow immunofluorescent sandwich assay to detect SARS-COV, Influenza A and Influenza B Antigen.     The Quidel Christel 2 SARS Antigen test does not differentiate between SARS-CoV and SARS-CoV-2.     Negative results are presumptive and may be confirmed with a molecular assay, if necessary, for patient management. Negative results do not rule out SARS-CoV-2 or influenza infection and should not be used as the sole basis for treatment or patient management decisions. A  negative test result may occur if the level of antigen in a sample is below the limit of detection of this test.     Positive results are indicative of the presence of viral antigens, but do not rule out bacterial infection or co-infection with other viruses.     All test results should be used as an adjunct to clinical observations and other information available to the provider.    FOR PEDIATRIC PATIENTS - copy/paste COVID Guidelines URL to browser: [email or web address]    Lactic acid, plasma (w/reflex if result > 2.0) [[id number]]  (Normal) Collected: 04/18/25 2219    Lab Status: Final result Specimen: Blood from Arm, Right Updated: 04/18/25 2252     LACTIC ACID 0.7 mmol/L     Narrative:      Result may be elevated if tourniquet was used during collection.    Basic metabolic panel [[id number]]  (Abnormal) Collected: 04/18/25 2219    Lab Status: Final result Specimen: Blood from Arm, Right Updated: 04/18/25 2251     Sodium 139 mmol/L      Potassium 3.7 mmol/L      Chloride 108 mmol/L      CO2 28 mmol/L      ANION GAP 3 mmol/L      BUN 15 mg/dL      Creatinine 0.60 mg/dL      Glucose 64 mg/dL      Calcium 8.9 mg/dL      eGFR 99 ml/min/1.73sq m     Narrative:      National Kidney Disease Foundation guidelines for Chronic Kidney Disease (CKD):     Stage 1 with normal or high GFR (GFR > 90 mL/min/1.73 square meters)    Stage 2 Mild CKD (GFR = 60-89 mL/min/1.73 square meters)    Stage 3A Moderate CKD (GFR = 45-59 mL/min/1.73 square meters)    Stage 3B Moderate CKD (GFR = 30-44 mL/min/1.73 square meters)    Stage 4 Severe CKD (GFR = 15-29 mL/min/1.73 square meters)    Stage 5 End Stage CKD (GFR <15 mL/min/1.73 square meters)  Note: GFR calculation is accurate only with a steady state creatinine    Hepatic function panel [[id number]]  (Abnormal) Collected: 04/18/25 2219    Lab Status: Final result Specimen: Blood from Arm, Right Updated: 04/18/25 2251     Total Bilirubin  "0.49 mg/dL      Bilirubin, Direct 0.16 mg/dL      Alkaline Phosphatase 97 U/L      AST 18 U/L      ALT 8 U/L      Total Protein 6.1 g/dL      Albumin 3.8 g/dL     Magnesium [[id number]]  (Abnormal) Collected: 04/18/25 2219    Lab Status: Final result Specimen: Blood from Arm, Right Updated: 04/18/25 2251     Magnesium 1.8 mg/dL     Lipase [[id number]]  (Normal) Collected: 04/18/25 2219    Lab Status: Final result Specimen: Blood from Arm, Right Updated: 04/18/25 2251     Lipase 39 u/L     UA (URINE) with reflex to Scope [[id number]] Collected: 04/18/25 2227    Lab Status: Final result Specimen: Urine, Clean Catch Updated: 04/18/25 2242     Color, UA Colorless     Clarity, UA Clear     Specific Gravity, UA 1.003     pH, UA 6.5     Leukocytes, UA Negative     Nitrite, UA Negative     Protein, UA Negative mg/dl      Glucose, UA Negative mg/dl      Ketones, UA Negative mg/dl      Urobilinogen, UA <2.0 mg/dl      Bilirubin, UA Negative     Occult Blood, UA Negative    CBC and differential [[id number]]  (Abnormal) Collected: 04/18/25 2219    Lab Status: Final result Specimen: Blood from Arm, Right Updated: 04/18/25 2237     WBC 4.00 Thousand/uL      RBC 4.03 Million/uL      Hemoglobin 13.1 g/dL      Hematocrit 39.3 %      MCV 98 fL      MCH 32.5 pg      MCHC 33.3 g/dL      RDW 13.9 %      MPV 9.0 fL      Platelets 131 Thousands/uL      nRBC 0 /100 WBCs      Segmented % 54 %      Immature Grans % 0 %      Lymphocytes % 27 %      Monocytes % 12 %      Eosinophils Relative 6 %      Basophils Relative 1 %      Absolute Neutrophils 2.20 Thousands/µL      Absolute Immature Grans 0.00 Thousand/uL      Absolute Lymphocytes 1.07 Thousands/µL      Absolute Monocytes 0.47 Thousand/µL      Eosinophils Absolute 0.22 Thousand/µL      Basophils Absolute 0.04 Thousands/µL             CT abdomen pelvis with contrast   ED Interpretation by Chrissy Kumar DO (04/19 0121)   [facility] preliminary result: \"Impression: No acute findings.\"    "   XR chest 1 view portable   ED Interpretation by Chrissy Kumar DO (04/18 2233)   No acute abnormality in the chest.          ECG 12 Lead Documentation Only    Date/Time: 4/18/2025 10:13 PM    Performed by: Chrissy Kumar DO  Authorized by: Chrissy Kumar DO    ECG reviewed by me, the ED Provider: yes    Patient location:  ED  Previous ECG:     Previous ECG:  Compared to current    Comparison ECG info:  3-    Similarity:  No change  Rate:     ECG rate:  70    ECG rate assessment: normal    Rhythm:     Rhythm: sinus rhythm    Ectopy:     Ectopy: none    QRS:     QRS axis:  Normal    QRS intervals:  Normal  Conduction:     Conduction: normal    ST segments:     ST segments:  Normal  T waves:     T waves: normal        ED Medication and Procedure Management   Prior to Admission Medications   Prescriptions Last Dose Informant Patient Reported? Taking?   Cholecalciferol (D3 High Potency) 25 MCG (1000 UT) capsule   No No   Sig: TAKE 1 CAPSULE BY MOUTH ONCE EVERY DAY (VITAMIN D DEFICIENCY)   Deutetrabenazine ER (Austedo XR) 24 MG TB24  Care Giver Yes No   Sig: Take 24 mg by mouth daily   Multiple Vitamin (multivitamin) capsule  Care Giver No No   Sig: Take 1 capsule by mouth daily   acetaminophen (TYLENOL) 500 mg tablet  Care Giver No No   Sig: Take 1 tablet (500 mg total) by mouth every 6 (six) hours as needed for mild pain   atorvastatin (LIPITOR) 40 mg tablet  Care Giver No No   Sig: Take 1 tablet (40 mg total) by mouth daily   carbidopa-levodopa (SINEMET)  mg per tablet  Care Giver No No   Sig: Take 1 tablet by mouth 3 (three) times a day   cloZAPine (CLOZARIL) 100 mg tablet  Care Giver Yes No   Sig: Take 100 mg by mouth 2 (two) times a day   clozapine (CLOZARIL) 200 MG tablet  Care Giver Yes No   Sig: Take 200 mg by mouth daily at bedtime    docusate sodium (COLACE) 100 mg capsule  Care Giver No No   Sig: Take 1 capsule (100 mg total) by mouth 2 (two) times a day    metoprolol succinate (TOPROL-XL) 25 mg 24 hr tablet  Care Giver No No   Sig: Take 1 tablet (25 mg total) by mouth daily   omeprazole (PriLOSEC) 40 MG capsule   No No   Sig: Take 1 capsule (40 mg total) by mouth daily   polyethylene glycol (GLYCOLAX) 17 GM/SCOOP powder  Care Giver No No   Sig: Take 17 g by mouth daily   tamsulosin (FLOMAX) 0.4 mg  Care Giver No No   Sig: Take 1 capsule (0.4 mg total) by mouth daily at bedtime   temazepam (RESTORIL) 15 mg capsule  Care Giver No No   Sig: Take 1 capsule (15 mg total) by mouth daily at bedtime   topiramate (TOPAMAX) 25 mg tablet  Care Giver Yes No   Sig: Take 25 mg by mouth 2 (two) times a day   traZODone (DESYREL) 100 mg tablet  Care Giver Yes No   Sig: Take 100 mg by mouth daily at bedtime      Facility-Administered Medications: None     Patient's Medications   Discharge Prescriptions    ONDANSETRON (ZOFRAN-ODT) 4 MG DISINTEGRATING TABLET    Take 1 tablet (4 mg total) by mouth every 8 (eight) hours as needed for vomiting or nausea       Start Date: 4/19/2025 End Date: --       Order Dose: 4 mg       Quantity: 12 tablet    Refills: 0     No discharge procedures on file.  ED SEPSIS DOCUMENTATION   Time reflects when diagnosis was documented in both MDM as applicable and the Disposition within this note       Time User Action Codes Description Comment    4/19/2025  2:20 AM Chrissy Kumar [R11.2] Nausea and vomiting                  Chrissy Kumar DO  04/19/25 0712

## 2025-05-06 DIAGNOSIS — E78.00 HYPERCHOLESTEREMIA: ICD-10-CM

## 2025-05-06 NOTE — TELEPHONE ENCOUNTER
Mirian from [facility] called    States Patient is constipated requesting laxative   Colace or another medication to help him have a bowel movement. Requesting to have the order sent to pharmacy today please.    Abby CORTEZ ([facility]) - [place], [place] - [address] [phone]    Please advise, Thank you

## 2025-05-06 NOTE — TELEPHONE ENCOUNTER
Medication: atorvastatin (LIPITOR) 40 mg tablet     Dose/Frequency: Take 1 tablet (40 mg total) by mouth daily     Quantity: 90    Pharmacy: [facility]    Office:   [x] PCP/Provider - Dr Hidalgo  [] Speciality/Provider -     Does the patient have enough for 3 days?   [x] Yes   [] No - Send as HP to POD

## 2025-05-07 RX ORDER — ATORVASTATIN CALCIUM 40 MG/1
40 TABLET, FILM COATED ORAL DAILY
Qty: 90 TABLET | Refills: 1 | Status: SHIPPED | OUTPATIENT
Start: 2025-05-07

## 2025-05-09 ENCOUNTER — TELEPHONE (OUTPATIENT)
Dept: NEUROLOGY | Facility: CLINIC | Age: 75
End: 2025-05-09

## 2025-05-09 NOTE — TELEPHONE ENCOUNTER
LVM confirming appointment with Dr. Solis for 5/9/25 at 2 PM.  Gave [phone] to reschedule if needed.

## 2025-05-12 ENCOUNTER — OFFICE VISIT (OUTPATIENT)
Dept: NEUROLOGY | Facility: CLINIC | Age: 75
End: 2025-05-12
Payer: MEDICARE

## 2025-05-12 VITALS
HEIGHT: 70 IN | WEIGHT: 149.6 LBS | OXYGEN SATURATION: 100 % | DIASTOLIC BLOOD PRESSURE: 70 MMHG | SYSTOLIC BLOOD PRESSURE: 100 MMHG | BODY MASS INDEX: 21.42 KG/M2 | HEART RATE: 72 BPM

## 2025-05-12 DIAGNOSIS — R41.3 MEMORY DIFFICULTIES: ICD-10-CM

## 2025-05-12 DIAGNOSIS — G21.11 NEUROLEPTIC-INDUCED PARKINSONISM (HCC): Primary | ICD-10-CM

## 2025-05-12 DIAGNOSIS — T43.505A NEUROLEPTIC-INDUCED PARKINSONISM (HCC): Primary | ICD-10-CM

## 2025-05-12 PROCEDURE — 99214 OFFICE O/P EST MOD 30 MIN: CPT | Performed by: PSYCHIATRY & NEUROLOGY

## 2025-05-12 RX ORDER — DONEPEZIL HYDROCHLORIDE 5 MG/1
5 TABLET, FILM COATED ORAL
Qty: 30 TABLET | Refills: 0 | Status: SHIPPED | OUTPATIENT
Start: 2025-05-12

## 2025-05-12 RX ORDER — DONEPEZIL HYDROCHLORIDE 10 MG/1
10 TABLET, FILM COATED ORAL
Qty: 30 TABLET | Refills: 5 | Status: SHIPPED | OUTPATIENT
Start: 2025-05-12

## 2025-05-12 NOTE — PROGRESS NOTES
Neurology Ambulatory Visit  Name: Sumit Nails       : 1950       MRN: [id number]   Encounter Provider: Poonam Solis MD   Encounter Date: 2025  Encounter department: [facility]      Sumit Nails is a 74 y.o. male.       Assessment & Plan  Neuro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 (HCC)       Patient is on Sinemet 25/100 mg 1 tablet 3 times a day this is being managed by his psychiatrist he was advised to continue with same was advised to continue with physical therapy and to remain active patient is on a lot of medications for his schizoaffective disorder this is being managed by his psychiatrist I have advised the caretaker to discuss with the psychiatrist to see as to the least possible medication that patient can be on for his mood issues so that he does not get any side effects, to take fall and safety precautions and aspiration precautions  Memory difficulties    Orders:    donepezil (ARICEPT) 5 mg tablet; Take 1 tablet (5 mg total) by mouth daily at bedtime    donepezil (Aricept) 10 mg tablet; Take 1 tablet (10 mg total) by mouth daily at bedtime To start after finishing Aricept 5 mg once a day for 1 month    Ambulatory Referral to Occupational Therapy; Future    Patient does have cognitive impairment with a MoCA of  which could be multifactorial discussed with him regarding medications like Aricept he is agreeable and also discussed with the she is agreeable with the medication side effects discussed with them to stop if experiences any side effects I have advised the nursing home to make sure to monitor his vitals every day and to discuss with the family physician and make sure that there is no contraindication for Aricept we will start him at 5 mg once a day for 1 month followed by 10 mg once a day he was advised to continue with mentally stimulating exercises as he does not want to go for cognitive therapy to take a multivitamin every day to eat a healthy  nutritious diet to go to the hospital if has any worsening symptoms and call me otherwise to see me back in 6 months or sooner if needed and follow-up with the other physicians.  He was advised to follow-up with family physician regarding his vitamin B12 deficiency as it is low normal and keep it around 500 and to go on a vitamin B12 supplement every day    Subjective:  Chief Complaint   Patient presents with    Neuro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    memory difficulties       HISTORY OF PRESENT ILLNESS  Patient is here in follow-up for his memory difficulty and dizziness since the last visit he tells me that his dizziness has resolved he does have some parkinsonian symptoms for which he is on Sinemet which is being managed by his psychiatrist, he is accompanied with his caregiver he does have short-term memory issues and also having difficulty with attention and concentration but is able to do his ADLs he is under constant supervision appetite is good weight has been stable no difficulty in swallowing he has not had any falls his tremor seems to be under control mood is decent sleep is good weight has been stable no freezing episodes no other complaints          Prior Work-up:   CAT scan of the head without contrast from 8/9/2024 was reported as no acute intracranial abnormality.       Past Medical History:    Past Medical History:   Diagnosis Date    Asthma     Benign prostatic hyperplasia     COPD (chronic obstructive pulmonary disease) (HCC)     GERD (gastroesophageal reflux disease)     Herpes zoster without complication 12/10/2021    Hypercholesteremia     Hypertension     Neuroleptic induced parkinsonism (HCC)     Paranoid schizophrenia (HCC)     Parkinsons (HCC)     Psychiatric disorder      Past Surgical History:   Procedure Laterality Date    CATARACT EXTRACTION      CHOLECYSTECTOMY LAPAROSCOPIC N/A 12/3/2023    Procedure: CHOLECYSTECTOMY LAPAROSCOPIC WITH INTRAOPERATIVE CHOLANGIOGRAM;  Surgeon: Maverick  MD Roni;  Location: [facility];  Service: General    COLONOSCOPY         Family History:  Family History   Problem Relation Age of Onset    No Known Problems Mother     No Known Problems Father     Parkinsonism Son        Social History:  Social History     Tobacco Use    Smoking status: Former     Current packs/day: 0.00     Average packs/day: 1 pack/day for 20.0 years (20.0 ttl pk-yrs)     Types: Cigarettes     Start date:      Quit date:      Years since quittin.3     Passive exposure: Past    Smokeless tobacco: Never   Vaping Use    Vaping status: Never Used   Substance Use Topics    Alcohol use: Not Currently    Drug use: Never      Living situation:    Work:      Allergies:  No Known Allergies    Medications:    Current Outpatient Medications:     acetaminophen (TYLENOL) 500 mg tablet, Take 1 tablet (500 mg total) by mouth every 6 (six) hours as needed for mild pain, Disp: 30 tablet, Rfl: 11    atorvastatin (LIPITOR) 40 mg tablet, Take 1 tablet (40 mg total) by mouth daily, Disp: 90 tablet, Rfl: 1    carbidopa-levodopa (SINEMET)  mg per tablet, Take 1 tablet by mouth 3 (three) times a day, Disp: 270 tablet, Rfl: 3    Cholecalciferol (D3 High Potency) 25 MCG (1000 UT) capsule, TAKE 1 CAPSULE BY MOUTH ONCE EVERY DAY (VITAMIN D DEFICIENCY), Disp: 30 capsule, Rfl: 5    cloZAPine (CLOZARIL) 100 mg tablet, Take 100 mg by mouth 2 (two) times a day, Disp: , Rfl:     clozapine (CLOZARIL) 200 MG tablet, Take 200 mg by mouth daily at bedtime , Disp: , Rfl:     Deutetrabenazine ER (Austedo XR) 24 MG TB24, Take 24 mg by mouth daily, Disp: , Rfl:     docusate sodium (COLACE) 100 mg capsule, Take 1 capsule (100 mg total) by mouth 2 (two) times a day, Disp: 60 capsule, Rfl: 1    donepezil (Aricept) 10 mg tablet, Take 1 tablet (10 mg total) by mouth daily at bedtime To start after finishing Aricept 5 mg once a day for 1 month, Disp: 30 tablet, Rfl: 5    donepezil (ARICEPT) 5 mg tablet, Take 1 tablet (5 mg  "total) by mouth daily at bedtime, Disp: 30 tablet, Rfl: 0    metoprolol succinate (TOPROL-XL) 25 mg 24 hr tablet, Take 1 tablet (25 mg total) by mouth daily, Disp: 90 tablet, Rfl: 1    Multiple Vitamin (multivitamin) capsule, Take 1 capsule by mouth daily, Disp: 30 capsule, Rfl: 5    omeprazole (PriLOSEC) 40 MG capsule, Take 1 capsule (40 mg total) by mouth daily, Disp: 31 capsule, Rfl: 5    ondansetron (ZOFRAN-ODT) 4 mg disintegrating tablet, Take 1 tablet (4 mg total) by mouth every 8 (eight) hours as needed for vomiting or nausea, Disp: 12 tablet, Rfl: 0    polyethylene glycol (GLYCOLAX) 17 GM/SCOOP powder, Take 17 g by mouth daily, Disp: 510 g, Rfl: 0    tamsulosin (FLOMAX) 0.4 mg, Take 1 capsule (0.4 mg total) by mouth daily at bedtime, Disp: 90 capsule, Rfl: 3    temazepam (RESTORIL) 15 mg capsule, Take 1 capsule (15 mg total) by mouth daily at bedtime, Disp: 30 capsule, Rfl: 0    topiramate (TOPAMAX) 25 mg tablet, Take 25 mg by mouth 2 (two) times a day, Disp: , Rfl: 1    traZODone (DESYREL) 100 mg tablet, Take 100 mg by mouth daily at bedtime, Disp: , Rfl:     Objective:  /70 (BP Location: Left arm, Patient Position: Sitting, Cuff Size: Standard)   Pulse 72   Ht 5' 10\" (1.778 m)   Wt 67.9 kg (149 lb 9.6 oz)   SpO2 100%   BMI 21.47 kg/m²      Labs  I have reviewed pertinent labs:  CBC:   Lab Results   Component Value Date    WBC 4.00 (L) 04/18/2025    RBC 4.03 04/18/2025    HGB 13.1 04/18/2025    HCT 39.3 04/18/2025    MCV 98 04/18/2025     (L) 04/18/2025    MCH 32.5 04/18/2025    MCHC 33.3 04/18/2025    RDW 13.9 04/18/2025    MPV 9.0 04/18/2025    NEUTROABS 2.20 04/18/2025     CMP:   Lab Results   Component Value Date    SODIUM 138 05/06/2025    K 3.9 05/06/2025     05/06/2025    CO2 24 05/06/2025    AGAP 5 05/06/2025    BUN 16 05/06/2025    CREATININE 0.65 05/06/2025    GLUC 89 05/06/2025    GLUF 76 03/06/2024    CALCIUM 8.4 (L) 05/06/2025    AST 18 05/06/2025    ALT 9 05/06/2025    " ALKPHOS 81 05/06/2025    TP 6.3 05/06/2025    ALB 3.7 05/06/2025    TBILI 0.4 05/06/2025    EGFR 98 05/06/2025     Vitamin D Level   Lab Results   Component Value Date    SGMB81AXYCKW 28.6 (L) 05/18/2023     Vitamin B12   Lab Results   Component Value Date    XSRLPEKQ62 211 10/30/2024     Folate   Lab Results   Component Value Date    FOLATE 17.3 10/30/2024              General Exam  GENERAL APPEARANCE:  No distress, alert, interactive and cooperative.  CARDIOVASCULAR: Warm and well perfused  LUNGS: normal work of breathing on room air  EXTREMITIES: no peripheral edema     Neurologic Exam  MENTAL STATE:  Orientation was normal to time, place and person without aphasia or apraxia.  MoCA is 12/30  CRANIAL NERVES:  CN 2       visual fields full to confrontation.  CN 3, 4, 6  Pupils round, 4 mm in diameter, equally reactive to light. Lids symmetric; no ptosis. EOMs normal alignment, full range. No nystagmus.  CN 5  Facial sensation intact bilaterally.  CN 7  Normal and symmetric facial strength.   CN 8  Hearing intact to finger rub bilaterally.  CN 9  Palate elevates symmetrically.  CN 11  Normal strength of shoulder shrug and neck turning.  CN 12  Tongue midline, with normal bulk and strength.     MOTOR:  Strength is 5/5 proximally and distally in both upper and lower extremities mild cogwheeling rigidity in upper extremitiesREFLEXES:  RIGHT UE   LEFT UE  BR:2              BR:2    Biceps:2      Biceps:2    Triceps:2     Triceps:2       RIGHT LLE   LEFT LLE    Knee:2           Knee:2    Ankle:2         Ankle:2       GAIT:  Ambulates with a stooped posture and decreased arm swing      ROS:  Review of Systems   Constitutional:  Negative for appetite change, fatigue and fever.   HENT: Negative.  Negative for hearing loss, tinnitus, trouble swallowing and voice change.    Eyes: Negative.  Negative for photophobia, pain and visual disturbance.   Respiratory: Negative.  Negative for shortness of breath.    Cardiovascular:  Negative.  Negative for palpitations.   Gastrointestinal: Negative.  Negative for nausea and vomiting.   Endocrine: Negative.  Negative for cold intolerance.   Genitourinary: Negative.  Negative for dysuria, frequency and urgency.   Musculoskeletal:  Negative for back pain, gait problem, myalgias, neck pain and neck stiffness.   Skin: Negative.  Negative for rash.   Allergic/Immunologic: Negative.    Neurological:  Negative for dizziness, tremors, seizures, syncope, facial asymmetry, speech difficulty, weakness, light-headedness, numbness and headaches.   Hematological: Negative.  Does not bruise/bleed easily.   Psychiatric/Behavioral:  Positive for confusion. Negative for hallucinations and sleep disturbance.        I have reviewed the past medical history, surgical history, social and family history, current medications, allergies vitals, review of systems, and updated this information as appropriate today.    Administrative Statements   The total amount of time spent with the patient and on chart review and documentation was minutes. Issues addressed during this clinic visit included overall management, medication counseling or monitoring, and counseling and coordination of care.         Poonam Solis MD

## 2025-05-12 NOTE — ASSESSMENT & PLAN NOTE
Orders:    donepezil (ARICEPT) 5 mg tablet; Take 1 tablet (5 mg total) by mouth daily at bedtime    donepezil (Aricept) 10 mg tablet; Take 1 tablet (10 mg total) by mouth daily at bedtime To start after finishing Aricept 5 mg once a day for 1 month    Ambulatory Referral to Occupational Therapy; Future

## 2025-05-12 NOTE — ASSESSMENT & PLAN NOTE
Patient is on Sinemet 25/100 mg 1 tablet 3 times a day this is being managed by his psychiatrist he was advised to continue with same was advised to continue with physical therapy and to remain active patient is on a lot of medications for his schizoaffective disorder this is being managed by his psychiatrist I have advised the caretaker to discuss with the psychiatrist to see as to the least possible medication that patient can be on for his mood issues so that he does not get any side effects, to take fall and safety precautions and aspiration precautions

## 2025-05-15 ENCOUNTER — HOSPITAL ENCOUNTER (EMERGENCY)
Facility: HOSPITAL | Age: 75
Discharge: HOME | End: 2025-05-16
Attending: EMERGENCY MEDICINE
Payer: MEDICARE

## 2025-05-15 DIAGNOSIS — R11.10 ACUTE VOMITING: Primary | ICD-10-CM

## 2025-05-15 LAB
ALBUMIN SERPL BCG-MCNC: 3.8 G/DL (ref 3.5–5)
ALP SERPL-CCNC: 96 U/L (ref 34–104)
ALT SERPL W P-5'-P-CCNC: 3 U/L (ref 7–52)
ANION GAP SERPL CALCULATED.3IONS-SCNC: 5 MMOL/L (ref 4–13)
AST SERPL W P-5'-P-CCNC: 15 U/L (ref 13–39)
BACTERIA UR QL AUTO: ABNORMAL /HPF
BASOPHILS # BLD AUTO: 0.03 THOUSANDS/ÂΜL (ref 0–0.1)
BASOPHILS NFR BLD AUTO: 1 % (ref 0–1)
BILIRUB SERPL-MCNC: 0.48 MG/DL (ref 0.2–1)
BILIRUB UR QL STRIP: NEGATIVE
BUN SERPL-MCNC: 20 MG/DL (ref 5–25)
CALCIUM SERPL-MCNC: 8.8 MG/DL (ref 8.4–10.2)
CARDIAC TROPONIN I PNL SERPL HS: 4 NG/L (ref ?–50)
CHLORIDE SERPL-SCNC: 110 MMOL/L (ref 96–108)
CLARITY UR: CLEAR
CO2 SERPL-SCNC: 26 MMOL/L (ref 21–32)
COLOR UR: YELLOW
CREAT SERPL-MCNC: 0.68 MG/DL (ref 0.6–1.3)
EOSINOPHIL # BLD AUTO: 0.11 THOUSAND/ÂΜL (ref 0–0.61)
EOSINOPHIL NFR BLD AUTO: 3 % (ref 0–6)
ERYTHROCYTE [DISTWIDTH] IN BLOOD BY AUTOMATED COUNT: 13.4 % (ref 11.6–15.1)
FLUAV RNA RESP QL NAA+PROBE: NEGATIVE
FLUBV RNA RESP QL NAA+PROBE: NEGATIVE
GFR SERPL CREATININE-BSD FRML MDRD: 94 ML/MIN/1.73SQ M
GLUCOSE SERPL-MCNC: 93 MG/DL (ref 65–140)
GLUCOSE UR STRIP-MCNC: NEGATIVE MG/DL
HCT VFR BLD AUTO: 40 % (ref 36.5–49.3)
HGB BLD-MCNC: 13.2 G/DL (ref 12–17)
HGB UR QL STRIP.AUTO: NEGATIVE
IMM GRANULOCYTES # BLD AUTO: 0.01 THOUSAND/UL (ref 0–0.2)
IMM GRANULOCYTES NFR BLD AUTO: 0 % (ref 0–2)
KETONES UR STRIP-MCNC: ABNORMAL MG/DL
LACTATE SERPL-SCNC: 1.2 MMOL/L (ref 0.5–2)
LEUKOCYTE ESTERASE UR QL STRIP: NEGATIVE
LIPASE SERPL-CCNC: 32 U/L (ref 11–82)
LYMPHOCYTES # BLD AUTO: 0.51 THOUSANDS/ÂΜL (ref 0.6–4.47)
LYMPHOCYTES NFR BLD AUTO: 13 % (ref 14–44)
MAGNESIUM SERPL-MCNC: 1.8 MG/DL (ref 1.9–2.7)
MCH RBC QN AUTO: 32.8 PG (ref 26.8–34.3)
MCHC RBC AUTO-ENTMCNC: 33 G/DL (ref 31.4–37.4)
MCV RBC AUTO: 99 FL (ref 82–98)
MONOCYTES # BLD AUTO: 0.36 THOUSAND/ÂΜL (ref 0.17–1.22)
MONOCYTES NFR BLD AUTO: 9 % (ref 4–12)
MUCOUS THREADS UR QL AUTO: ABNORMAL
NEUTROPHILS # BLD AUTO: 2.94 THOUSANDS/ÂΜL (ref 1.85–7.62)
NEUTS SEG NFR BLD AUTO: 74 % (ref 43–75)
NITRITE UR QL STRIP: NEGATIVE
NON-SQ EPI CELLS URNS QL MICRO: ABNORMAL /HPF
NRBC BLD AUTO-RTO: 0 /100 WBCS
PH UR STRIP.AUTO: 5.5 [PH]
PLATELET # BLD AUTO: 134 THOUSANDS/UL (ref 149–390)
PMV BLD AUTO: 9.9 FL (ref 8.9–12.7)
POTASSIUM SERPL-SCNC: 3.6 MMOL/L (ref 3.5–5.3)
PROT SERPL-MCNC: 6.1 G/DL (ref 6.4–8.4)
PROT UR STRIP-MCNC: ABNORMAL MG/DL
RBC # BLD AUTO: 4.03 MILLION/UL (ref 3.88–5.62)
RBC #/AREA URNS AUTO: ABNORMAL /HPF
RSV RNA RESP QL NAA+PROBE: NEGATIVE
SARS-COV-2 RNA RESP QL NAA+PROBE: NEGATIVE
SODIUM SERPL-SCNC: 141 MMOL/L (ref 135–147)
SP GR UR STRIP.AUTO: 1.03 (ref 1–1.03)
UROBILINOGEN UR STRIP-ACNC: 2 MG/DL
WBC # BLD AUTO: 3.96 THOUSAND/UL (ref 4.31–10.16)
WBC #/AREA URNS AUTO: ABNORMAL /HPF

## 2025-05-15 PROCEDURE — 85025 COMPLETE CBC W/AUTO DIFF WBC: CPT | Performed by: EMERGENCY MEDICINE

## 2025-05-15 PROCEDURE — 83690 ASSAY OF LIPASE: CPT | Performed by: EMERGENCY MEDICINE

## 2025-05-15 PROCEDURE — 84484 ASSAY OF TROPONIN QUANT: CPT | Performed by: EMERGENCY MEDICINE

## 2025-05-15 PROCEDURE — 99284 EMERGENCY DEPT VISIT MOD MDM: CPT | Performed by: EMERGENCY MEDICINE

## 2025-05-15 PROCEDURE — 99283 EMERGENCY DEPT VISIT LOW MDM: CPT

## 2025-05-15 PROCEDURE — 0241U HB NFCT DS VIR RESP RNA 4 TRGT: CPT | Performed by: EMERGENCY MEDICINE

## 2025-05-15 PROCEDURE — 36415 COLL VENOUS BLD VENIPUNCTURE: CPT | Performed by: EMERGENCY MEDICINE

## 2025-05-15 PROCEDURE — 96374 THER/PROPH/DIAG INJ IV PUSH: CPT

## 2025-05-15 PROCEDURE — 83605 ASSAY OF LACTIC ACID: CPT | Performed by: EMERGENCY MEDICINE

## 2025-05-15 PROCEDURE — 81001 URINALYSIS AUTO W/SCOPE: CPT | Performed by: EMERGENCY MEDICINE

## 2025-05-15 PROCEDURE — 93005 ELECTROCARDIOGRAM TRACING: CPT

## 2025-05-15 PROCEDURE — 96361 HYDRATE IV INFUSION ADD-ON: CPT

## 2025-05-15 PROCEDURE — 80053 COMPREHEN METABOLIC PANEL: CPT | Performed by: EMERGENCY MEDICINE

## 2025-05-15 PROCEDURE — 83735 ASSAY OF MAGNESIUM: CPT | Performed by: EMERGENCY MEDICINE

## 2025-05-15 RX ORDER — ONDANSETRON 2 MG/ML
4 INJECTION INTRAMUSCULAR; INTRAVENOUS ONCE
Status: COMPLETED | OUTPATIENT
Start: 2025-05-15 | End: 2025-05-15

## 2025-05-15 RX ADMIN — ONDANSETRON 4 MG: 2 INJECTION INTRAMUSCULAR; INTRAVENOUS at 22:19

## 2025-05-15 RX ADMIN — SODIUM CHLORIDE 1000 ML: 0.9 INJECTION, SOLUTION INTRAVENOUS at 22:17

## 2025-05-16 VITALS
WEIGHT: 153.22 LBS | DIASTOLIC BLOOD PRESSURE: 73 MMHG | TEMPERATURE: 97.9 F | HEART RATE: 58 BPM | BODY MASS INDEX: 21.98 KG/M2 | RESPIRATION RATE: 15 BRPM | SYSTOLIC BLOOD PRESSURE: 143 MMHG | OXYGEN SATURATION: 98 %

## 2025-05-16 LAB
ATRIAL RATE: 62 BPM
P AXIS: 59 DEGREES
PR INTERVAL: 206 MS
QRS AXIS: 62 DEGREES
QRSD INTERVAL: 104 MS
QT INTERVAL: 446 MS
QTC INTERVAL: 452 MS
T WAVE AXIS: 72 DEGREES
VENTRICULAR RATE: 62 BPM

## 2025-05-16 PROCEDURE — 93010 ELECTROCARDIOGRAM REPORT: CPT | Performed by: INTERNAL MEDICINE

## 2025-05-16 RX ORDER — ONDANSETRON 4 MG/1
4 TABLET, ORALLY DISINTEGRATING ORAL EVERY 8 HOURS PRN
Qty: 12 TABLET | Refills: 0 | Status: SHIPPED | OUTPATIENT
Start: 2025-05-16 | End: 2025-06-15

## 2025-05-16 NOTE — ED PROVIDER NOTES
Time reflects when diagnosis was documented in both MDM as applicable and the Disposition within this note       Time User Action Codes Description Comment    5/15/2025 11:59 PM Maricarmen Rogers Add [R11.10] Acute vomiting           ED Disposition       ED Disposition   Discharge    Condition   Stable    Date/Time   Thu May 15, 2025 11:59 PM    Comment   Sumit Nails discharge to home/self care.                   Assessment & Plan       Medical Decision Making  74 male is coming in with complaint of vomiting x 1.  Patient has history of schizophrenia and Parkinson's and lives in a nursing home.  Reports that he ate dinner and was in perfectly normal state of health and went to bed and then woke up in the middle of the night and vomited x 1.  No associated fever chills or diarrhea.  He felt uncomfortable when vomiting but now he actually feels much better and denies any abdominal pain.  He has no chest pain or shortness of breath no abdominal pain and no urinary symptoms.  Patient has no complaints currently actually only complains about being thirsty and hungry and hoping he could have a good big meal.  Patient has been seen looking at records numerous times over the last few months for episodes of vomiting.  Patient had an episode of vomiting x 1 without any other associated symptoms and already feels better and is denying any pain or abdominal symptoms and has a soft nontender and very benign abdominal exam.  Given his age we will check some blood work for electrolytes and kidney function and will repeat abdominal exams but if still without any abdominal pain and nontender abdominal exam will avoid imaging at this time.  Patient was seen about a month ago for similar and had no acute abdominal findings and patient has a benign abdominal exam at present and actually has resolved symptoms.  Patient was given prescription for Zofran as needed but it is not on his med list.    Amount and/or Complexity of Data  Reviewed  Labs: ordered.  ECG/medicine tests: ordered and independent interpretation performed.    Risk  Prescription drug management.        ED Course as of 05/16/25 0006   Thu May 15, 2025   2320 Pt still without any pain or complaints. Feels well, asking for a good big meal.       Medications   sodium chloride 0.9 % bolus 1,000 mL (1,000 mL Intravenous New Bag 5/15/25 2217)   ondansetron (ZOFRAN) injection 4 mg (4 mg Intravenous Given 5/15/25 2219)       ED Risk Strat Scores                    No data recorded        SBIRT 20yo+      Flowsheet Row Most Recent Value   Initial Alcohol Screen: US AUDIT-C     1. How often do you have a drink containing alcohol? 0 Filed at: 05/15/2025 2137   2. How many drinks containing alcohol do you have on a typical day you are drinking?  0 Filed at: 05/15/2025 2137   3a. Male UNDER 65: How often do you have five or more drinks on one occasion? 0 Filed at: 05/15/2025 2137   3b. FEMALE Any Age, or MALE 65+: How often do you have 4 or more drinks on one occassion? 0 Filed at: 05/15/2025 2137   Audit-C Score 0 Filed at: 05/15/2025 2137   RAFA: How many times in the past year have you...    Used an illegal drug or used a prescription medication for non-medical reasons? Never Filed at: 05/15/2025 2137                            History of Present Illness       Chief Complaint   Patient presents with    Vomiting     Pt from [facility], c/o nausea vomiting and general weakness/illness x 2 days        Past Medical History:   Diagnosis Date    Asthma     Benign prostatic hyperplasia     COPD (chronic obstructive pulmonary disease) (HCC)     GERD (gastroesophageal reflux disease)     Herpes zoster without complication 12/10/2021    Hypercholesteremia     Hypertension     Neuroleptic induced parkinsonism (HCC)     Paranoid schizophrenia (HCC)     Parkinsons (HCC)     Psychiatric disorder       Past Surgical History:   Procedure Laterality Date    CATARACT EXTRACTION       CHOLECYSTECTOMY LAPAROSCOPIC N/A 12/3/2023    Procedure: CHOLECYSTECTOMY LAPAROSCOPIC WITH INTRAOPERATIVE CHOLANGIOGRAM;  Surgeon: Maverick Tamayo MD;  Location: [facility];  Service: General    COLONOSCOPY        Family History   Problem Relation Age of Onset    No Known Problems Mother     No Known Problems Father     Parkinsonism Son       Social History[1]   E-Cigarette/Vaping    E-Cigarette Use Never User       E-Cigarette/Vaping Substances    Nicotine No     THC No     CBD No     Flavoring No     Other No     Unknown No       I have reviewed and agree with the history as documented.       History provided by:  Patient  Vomiting  Severity:  Mild  Duration:  1 day  Timing:  Intermittent  Number of daily episodes:  1  Quality:  Stomach contents  Progression:  Partially resolved  Chronicity:  New  Recent urination:  Normal  Context: not post-tussive and not self-induced    Relieved by:  Nothing  Worsened by:  Nothing  Ineffective treatments:  None tried  Associated symptoms: no abdominal pain, no chills, no cough, no diarrhea and no fever    Risk factors: no suspect food intake        Review of Systems   Constitutional:  Negative for chills and fever.   Respiratory:  Negative for cough.    Gastrointestinal:  Positive for vomiting. Negative for abdominal pain and diarrhea.   All other systems reviewed and are negative.          Objective       ED Triage Vitals   Temperature Pulse Blood Pressure Respirations SpO2 Patient Position - Orthostatic VS   05/15/25 2136 05/15/25 2136 05/15/25 2136 05/15/25 2136 05/15/25 2136 05/15/25 2300   97.9 °F (36.6 °C) 63 143/66 18 98 % Sitting      Temp src Heart Rate Source BP Location FiO2 (%) Pain Score    -- -- 05/15/25 2300 -- --      Right arm        Vitals      Date and Time Temp Pulse SpO2 Resp BP Pain Score FACES Pain Rating User   05/15/25 2300 -- 60 100 % 17 135/64 -- -- NB   05/15/25 2136 97.9 °F (36.6 °C) 63 98 % 18 143/66 -- -- AR            Physical Exam  Vitals and  nursing note reviewed.   Constitutional:       General: He is not in acute distress.     Appearance: He is well-developed. He is not ill-appearing, toxic-appearing or diaphoretic.   HENT:      Head: Normocephalic and atraumatic.      Nose: Nose normal.     Eyes:      Extraocular Movements: Extraocular movements intact.      Conjunctiva/sclera: Conjunctivae normal.       Cardiovascular:      Rate and Rhythm: Normal rate and regular rhythm.      Heart sounds: Normal heart sounds.   Pulmonary:      Effort: Pulmonary effort is normal. No respiratory distress.      Breath sounds: Normal breath sounds.   Abdominal:      General: There is no distension.      Palpations: Abdomen is soft.      Tenderness: There is no abdominal tenderness.     Musculoskeletal:         General: No deformity. Normal range of motion.      Cervical back: Neck supple.      Right lower leg: No edema.      Left lower leg: No edema.     Skin:     General: Skin is warm and dry.     Neurological:      General: No focal deficit present.      Mental Status: He is alert and oriented to person, place, and time.      Cranial Nerves: No cranial nerve deficit.     Psychiatric:         Mood and Affect: Mood normal.         Results Reviewed       Procedure Component Value Units Date/Time    Urine Microscopic [[id number]]  (Abnormal) Collected: 05/15/25 2309    Lab Status: Final result Specimen: Urine, Clean Catch Updated: 05/15/25 2321     RBC, UA 1-2 /hpf      WBC, UA 1-2 /hpf      Epithelial Cells Occasional /hpf      Bacteria, UA None Seen /hpf      MUCUS THREADS Moderate    UA w Reflex to Microscopic w Reflex to Culture [[id number]]  (Abnormal) Collected: 05/15/25 2309    Lab Status: Final result Specimen: Urine, Clean Catch Updated: 05/15/25 2318     Color, UA Yellow     Clarity, UA Clear     Specific Gravity, UA 1.033     pH, UA 5.5     Leukocytes, UA Negative     Nitrite, UA Negative     Protein, UA Trace mg/dl      Glucose, UA Negative mg/dl      Ketones,  UA 40 (2+) mg/dl      Urobilinogen, UA 2.0 mg/dl      Bilirubin, UA Negative     Occult Blood, UA Negative    FLU/RSV/COVID - if FLU/RSV clinically relevant (2hr TAT) [[id number]]  (Normal) Collected: 05/15/25 2207    Lab Status: Final result Specimen: Nares from Nose Updated: 05/15/25 2253     SARS-CoV-2 Negative     INFLUENZA A PCR Negative     INFLUENZA B PCR Negative     RSV PCR Negative    Narrative:      This test has been performed using the CoV-2/Flu/RSV plus assay on the [employer] platform. This test has been validated by the  and verified by the performing laboratory.     This test is designed to amplify and detect the following: nucleocapsid (N), envelope (E), and RNA-dependent RNA polymerase (RdRP) genes of the SARS-CoV-2 genome; matrix (M), basic polymerase (PB2), and acidic protein (PA) segments of the influenza A genome; matrix (M) and non-structural protein (NS) segments of the influenza B genome, and the nucleocapsid genes of RSV A and RSV B.     Positive results are indicative of the presence of Flu A, Flu B, RSV, and/or SARS-CoV-2 RNA. Positive results for SARS-CoV-2 or suspected novel influenza should be reported to state, local, or federal health departments according to local reporting requirements.      All results should be assessed in conjunction with clinical presentation and other laboratory markers for clinical management.     FOR PEDIATRIC PATIENTS - copy/paste COVID Guidelines URL to browser: [email or web address]       CBC and differential [[id number]]  (Abnormal) Collected: 05/15/25 2207    Lab Status: Final result Specimen: Blood from Arm, Left Updated: 05/15/25 2245     WBC 3.96 Thousand/uL      RBC 4.03 Million/uL      Hemoglobin 13.2 g/dL      Hematocrit 40.0 %      MCV 99 fL      MCH 32.8 pg      MCHC 33.0 g/dL      RDW 13.4 %      MPV 9.9 fL      Platelets 134 Thousands/uL      nRBC 0 /100 WBCs      Segmented  % 74 %      Immature Grans % 0 %      Lymphocytes % 13 %      Monocytes % 9 %      Eosinophils Relative 3 %      Basophils Relative 1 %      Absolute Neutrophils 2.94 Thousands/µL      Absolute Immature Grans 0.01 Thousand/uL      Absolute Lymphocytes 0.51 Thousands/µL      Absolute Monocytes 0.36 Thousand/µL      Eosinophils Absolute 0.11 Thousand/µL      Basophils Absolute 0.03 Thousands/µL     HS Troponin 0hr (reflex protocol) [[id number]]  (Normal) Collected: 05/15/25 2207    Lab Status: Final result Specimen: Blood from Arm, Left Updated: 05/15/25 2239     hs TnI 0hr 4 ng/L     Comprehensive metabolic panel [[id number]]  (Abnormal) Collected: 05/15/25 2207    Lab Status: Final result Specimen: Blood from Arm, Left Updated: 05/15/25 2233     Sodium 141 mmol/L      Potassium 3.6 mmol/L      Chloride 110 mmol/L      CO2 26 mmol/L      ANION GAP 5 mmol/L      BUN 20 mg/dL      Creatinine 0.68 mg/dL      Glucose 93 mg/dL      Calcium 8.8 mg/dL      AST 15 U/L      ALT 3 U/L      Alkaline Phosphatase 96 U/L      Total Protein 6.1 g/dL      Albumin 3.8 g/dL      Total Bilirubin 0.48 mg/dL      eGFR 94 ml/min/1.73sq m     Narrative:      National Kidney Disease Foundation guidelines for Chronic Kidney Disease (CKD):     Stage 1 with normal or high GFR (GFR > 90 mL/min/1.73 square meters)    Stage 2 Mild CKD (GFR = 60-89 mL/min/1.73 square meters)    Stage 3A Moderate CKD (GFR = 45-59 mL/min/1.73 square meters)    Stage 3B Moderate CKD (GFR = 30-44 mL/min/1.73 square meters)    Stage 4 Severe CKD (GFR = 15-29 mL/min/1.73 square meters)    Stage 5 End Stage CKD (GFR <15 mL/min/1.73 square meters)  Note: GFR calculation is accurate only with a steady state creatinine    Lipase [[id number]]  (Normal) Collected: 05/15/25 2207    Lab Status: Final result Specimen: Blood from Arm, Left Updated: 05/15/25 2233     Lipase 32 u/L     Magnesium [[id number]]  (Abnormal) Collected: 05/15/25 2207    Lab Status: Final result Specimen:  Blood from Arm, Left Updated: 05/15/25 2233     Magnesium 1.8 mg/dL     Lactic acid, plasma (w/reflex if result > 2.0) [[id number]]  (Normal) Collected: 05/15/25 2207    Lab Status: Final result Specimen: Blood from Arm, Left Updated: 05/15/25 2231     LACTIC ACID 1.2 mmol/L     Narrative:      Result may be elevated if tourniquet was used during collection.            No orders to display       ECG 12 Lead Documentation Only    Date/Time: 5/15/2025 11:18 PM    Performed by: Maricarmen Rogers MD  Authorized by: Maricarmen Rogers MD    Indications / Diagnosis:  Vomiting  ECG reviewed by me, the ED Provider: yes    Patient location:  ED  Rate:     ECG rate:  62    ECG rate assessment: normal    Rhythm:     Rhythm: sinus rhythm    ST segments:     ST segments:  Normal  T waves:     T waves: normal        ED Medication and Procedure Management   Prior to Admission Medications   Prescriptions Last Dose Informant Patient Reported? Taking?   Cholecalciferol (D3 High Potency) 25 MCG (1000 UT) capsule  Care Giver, Outside Facility (Specify) No No   Sig: TAKE 1 CAPSULE BY MOUTH ONCE EVERY DAY (VITAMIN D DEFICIENCY)   Deutetrabenazine ER (Austedo XR) 24 MG TB24  Care Giver, Outside Facility (Specify) Yes No   Sig: Take 24 mg by mouth daily   Multiple Vitamin (multivitamin) capsule  Care Giver, Outside Facility (Specify) No No   Sig: Take 1 capsule by mouth daily   acetaminophen (TYLENOL) 500 mg tablet  Care Giver, Outside Facility (Specify) No No   Sig: Take 1 tablet (500 mg total) by mouth every 6 (six) hours as needed for mild pain   atorvastatin (LIPITOR) 40 mg tablet  Care Giver, Outside Facility (Specify) No No   Sig: Take 1 tablet (40 mg total) by mouth daily   carbidopa-levodopa (SINEMET)  mg per tablet  Care Giver, Outside Facility (Specify) No No   Sig: Take 1 tablet by mouth 3 (three) times a day   cloZAPine (CLOZARIL) 100 mg tablet  Care Giver, Outside Facility (Specify) Yes No   Sig: Take 100 mg by mouth  2 (two) times a day   clozapine (CLOZARIL) 200 MG tablet  Care Giver, Outside Facility (Specify) Yes No   Sig: Take 200 mg by mouth daily at bedtime    docusate sodium (COLACE) 100 mg capsule  Care Giver, Outside Facility (Specify) No No   Sig: Take 1 capsule (100 mg total) by mouth 2 (two) times a day   donepezil (ARICEPT) 5 mg tablet   No No   Sig: Take 1 tablet (5 mg total) by mouth daily at bedtime   donepezil (Aricept) 10 mg tablet   No No   Sig: Take 1 tablet (10 mg total) by mouth daily at bedtime To start after finishing Aricept 5 mg once a day for 1 month   metoprolol succinate (TOPROL-XL) 25 mg 24 hr tablet  Care Giver, Outside Facility (Specify) No No   Sig: Take 1 tablet (25 mg total) by mouth daily   omeprazole (PriLOSEC) 40 MG capsule  Care Giver, Outside Facility (Specify) No No   Sig: Take 1 capsule (40 mg total) by mouth daily   ondansetron (ZOFRAN-ODT) 4 mg disintegrating tablet  Care Giver, Outside Facility (Specify) No No   Sig: Take 1 tablet (4 mg total) by mouth every 8 (eight) hours as needed for vomiting or nausea   polyethylene glycol (GLYCOLAX) 17 GM/SCOOP powder  Care Giver, Outside Facility (Specify) No No   Sig: Take 17 g by mouth daily   tamsulosin (FLOMAX) 0.4 mg  Care Giver, Outside Facility (Specify) No No   Sig: Take 1 capsule (0.4 mg total) by mouth daily at bedtime   temazepam (RESTORIL) 15 mg capsule  Care Giver, Outside Facility (Specify) No No   Sig: Take 1 capsule (15 mg total) by mouth daily at bedtime   topiramate (TOPAMAX) 25 mg tablet  Care Giver, Outside Facility (Specify) Yes No   Sig: Take 25 mg by mouth 2 (two) times a day   traZODone (DESYREL) 100 mg tablet  Care Giver, Outside Facility (Specify) Yes No   Sig: Take 100 mg by mouth daily at bedtime      Facility-Administered Medications: None     Patient's Medications   Discharge Prescriptions    ONDANSETRON (ZOFRAN-ODT) 4 MG DISINTEGRATING TABLET    Take 1 tablet (4 mg total) by mouth every 8 (eight) hours as needed  for nausea or vomiting       Start Date: 2025 End Date: 6/15/2025       Order Dose: 4 mg       Quantity: 12 tablet    Refills: 0     No discharge procedures on file.  ED SEPSIS DOCUMENTATION   Time reflects when diagnosis was documented in both MDM as applicable and the Disposition within this note       Time User Action Codes Description Comment    5/15/2025 11:59 PM Maricarmen Rogers Add [R11.10] Acute vomiting                      [1]   Social History  Tobacco Use    Smoking status: Former     Current packs/day: 0.00     Average packs/day: 1 pack/day for 20.0 years (20.0 ttl pk-yrs)     Types: Cigarettes     Start date:      Quit date:      Years since quittin.3     Passive exposure: Past    Smokeless tobacco: Never   Vaping Use    Vaping status: Never Used   Substance Use Topics    Alcohol use: Not Currently    Drug use: Never        Maricarmen Rogers MD  25 [phone]

## 2025-05-16 NOTE — ED NOTES
Pt states that he is feeling much better and has no belly pain. Requesting a cup of hot tea. Pt given hot tea for PO challenge.        Margo Norton RN  05/16/25 0031

## 2025-06-02 DIAGNOSIS — K59.09 CHRONIC CONSTIPATION: ICD-10-CM

## 2025-06-02 RX ORDER — DOCUSATE SODIUM 100 MG/1
100 CAPSULE, LIQUID FILLED ORAL 2 TIMES DAILY
Qty: 60 CAPSULE | Refills: 2 | Status: SHIPPED | OUTPATIENT
Start: 2025-06-02 | End: 2025-08-01

## 2025-06-02 NOTE — TELEPHONE ENCOUNTER
Reason for call:   [x] Refill   [] Prior Auth  [] Other:     Office:   [x] PCP/Provider -  [facility]  Authorized By: Thaddeus Babin PA-C    [] Specialty/Provider -     Medication: docusate sodium (COLACE) 100 mg capsule    Dose/Frequency: Take 1 capsule (100 mg total) by mouth 2 (two) times a day,    Quantity: 60    Pharmacy: [facility] ([facility])     Local Pharmacy   Does the patient have enough for 3 days?   [] Yes   [x] No - Send as HP to POD    Mail Away Pharmacy   Does the patient have enough for 10 days?   [] Yes   [] No - Send as HP to POD

## 2025-07-07 DIAGNOSIS — K59.09 CHRONIC CONSTIPATION: ICD-10-CM

## 2025-07-07 RX ORDER — DOCUSATE SODIUM 100 MG/1
100 CAPSULE, LIQUID FILLED ORAL 2 TIMES DAILY
Qty: 60 CAPSULE | Refills: 2 | Status: SHIPPED | OUTPATIENT
Start: 2025-07-07 | End: 2025-09-05

## 2025-07-30 ENCOUNTER — APPOINTMENT (EMERGENCY)
Dept: CT IMAGING | Facility: HOSPITAL | Age: 75
End: 2025-07-30
Payer: MEDICARE

## 2025-07-30 ENCOUNTER — HOSPITAL ENCOUNTER (EMERGENCY)
Facility: HOSPITAL | Age: 75
End: 2025-07-31
Attending: EMERGENCY MEDICINE
Payer: MEDICARE

## 2025-07-30 DIAGNOSIS — N40.1 BPH WITH OBSTRUCTION/LOWER URINARY TRACT SYMPTOMS: ICD-10-CM

## 2025-07-30 DIAGNOSIS — R11.2 NAUSEA AND VOMITING: ICD-10-CM

## 2025-07-30 DIAGNOSIS — I10 PRIMARY HYPERTENSION: ICD-10-CM

## 2025-07-30 DIAGNOSIS — F29 PSYCHOSIS (HCC): Primary | ICD-10-CM

## 2025-07-30 DIAGNOSIS — E78.00 HYPERCHOLESTEREMIA: ICD-10-CM

## 2025-07-30 DIAGNOSIS — K59.09 CHRONIC CONSTIPATION: ICD-10-CM

## 2025-07-30 DIAGNOSIS — N13.8 BPH WITH OBSTRUCTION/LOWER URINARY TRACT SYMPTOMS: ICD-10-CM

## 2025-07-30 LAB
ALBUMIN SERPL BCG-MCNC: 4 G/DL (ref 3.5–5)
ALP SERPL-CCNC: 97 U/L (ref 34–104)
ALT SERPL W P-5'-P-CCNC: 14 U/L (ref 7–52)
AMPHETAMINES SERPL QL SCN: NEGATIVE
ANION GAP SERPL CALCULATED.3IONS-SCNC: 5 MMOL/L (ref 4–13)
AST SERPL W P-5'-P-CCNC: 18 U/L (ref 13–39)
ATRIAL RATE: 72 BPM
BARBITURATES UR QL: NEGATIVE
BASOPHILS # BLD AUTO: 0.02 THOUSANDS/ÂΜL (ref 0–0.1)
BASOPHILS NFR BLD AUTO: 1 % (ref 0–1)
BENZODIAZ UR QL: NEGATIVE
BILIRUB SERPL-MCNC: 0.5 MG/DL (ref 0.2–1)
BILIRUB UR QL STRIP: NEGATIVE
BUN SERPL-MCNC: 11 MG/DL (ref 5–25)
CALCIUM SERPL-MCNC: 9.3 MG/DL (ref 8.4–10.2)
CARDIAC TROPONIN I PNL SERPL HS: 3 NG/L (ref ?–50)
CHLORIDE SERPL-SCNC: 108 MMOL/L (ref 96–108)
CLARITY UR: CLEAR
CO2 SERPL-SCNC: 28 MMOL/L (ref 21–32)
COCAINE UR QL: NEGATIVE
COLOR UR: NORMAL
CREAT SERPL-MCNC: 0.65 MG/DL (ref 0.6–1.3)
EOSINOPHIL # BLD AUTO: 0.04 THOUSAND/ÂΜL (ref 0–0.61)
EOSINOPHIL NFR BLD AUTO: 1 % (ref 0–6)
ERYTHROCYTE [DISTWIDTH] IN BLOOD BY AUTOMATED COUNT: 13.6 % (ref 11.6–15.1)
ETHANOL EXG-MCNC: 0 MG/DL
ETHANOL SERPL-MCNC: <10 MG/DL
FENTANYL UR QL SCN: NEGATIVE
GFR SERPL CREATININE-BSD FRML MDRD: 95 ML/MIN/1.73SQ M
GLUCOSE SERPL-MCNC: 109 MG/DL (ref 65–140)
GLUCOSE UR STRIP-MCNC: NEGATIVE MG/DL
HCT VFR BLD AUTO: 42.6 % (ref 36.5–49.3)
HGB BLD-MCNC: 14.7 G/DL (ref 12–17)
HGB UR QL STRIP.AUTO: NEGATIVE
HYDROCODONE UR QL SCN: NEGATIVE
IMM GRANULOCYTES # BLD AUTO: 0.01 THOUSAND/UL (ref 0–0.2)
IMM GRANULOCYTES NFR BLD AUTO: 0 % (ref 0–2)
KETONES UR STRIP-MCNC: NEGATIVE MG/DL
LEUKOCYTE ESTERASE UR QL STRIP: NEGATIVE
LYMPHOCYTES # BLD AUTO: 0.78 THOUSANDS/ÂΜL (ref 0.6–4.47)
LYMPHOCYTES NFR BLD AUTO: 19 % (ref 14–44)
MCH RBC QN AUTO: 33.5 PG (ref 26.8–34.3)
MCHC RBC AUTO-ENTMCNC: 34.5 G/DL (ref 31.4–37.4)
MCV RBC AUTO: 97 FL (ref 82–98)
METHADONE UR QL: NEGATIVE
MONOCYTES # BLD AUTO: 0.38 THOUSAND/ÂΜL (ref 0.17–1.22)
MONOCYTES NFR BLD AUTO: 9 % (ref 4–12)
NEUTROPHILS # BLD AUTO: 2.89 THOUSANDS/ÂΜL (ref 1.85–7.62)
NEUTS SEG NFR BLD AUTO: 70 % (ref 43–75)
NITRITE UR QL STRIP: NEGATIVE
NRBC BLD AUTO-RTO: 0 /100 WBCS
OPIATES UR QL SCN: NEGATIVE
OXYCODONE+OXYMORPHONE UR QL SCN: NEGATIVE
P AXIS: 58 DEGREES
PCP UR QL: NEGATIVE
PH UR STRIP.AUTO: 6.5 [PH]
PLATELET # BLD AUTO: 151 THOUSANDS/UL (ref 149–390)
PMV BLD AUTO: 9.3 FL (ref 8.9–12.7)
POTASSIUM SERPL-SCNC: 4.3 MMOL/L (ref 3.5–5.3)
PR INTERVAL: 182 MS
PROT SERPL-MCNC: 6.3 G/DL (ref 6.4–8.4)
PROT UR STRIP-MCNC: NEGATIVE MG/DL
QRS AXIS: 65 DEGREES
QRSD INTERVAL: 92 MS
QT INTERVAL: 408 MS
QTC INTERVAL: 446 MS
RBC # BLD AUTO: 4.39 MILLION/UL (ref 3.88–5.62)
SODIUM SERPL-SCNC: 141 MMOL/L (ref 135–147)
SP GR UR STRIP.AUTO: 1.01 (ref 1–1.03)
T WAVE AXIS: 74 DEGREES
THC UR QL: NEGATIVE
TSH SERPL DL<=0.05 MIU/L-ACNC: 1.57 UIU/ML (ref 0.45–4.5)
UROBILINOGEN UR STRIP-ACNC: <2 MG/DL
VENTRICULAR RATE: 72 BPM
WBC # BLD AUTO: 4.12 THOUSAND/UL (ref 4.31–10.16)

## 2025-07-30 PROCEDURE — NC001 PR NO CHARGE: Performed by: STUDENT IN AN ORGANIZED HEALTH CARE EDUCATION/TRAINING PROGRAM

## 2025-07-30 PROCEDURE — 93010 ELECTROCARDIOGRAM REPORT: CPT | Performed by: INTERNAL MEDICINE

## 2025-07-30 PROCEDURE — 84484 ASSAY OF TROPONIN QUANT: CPT | Performed by: EMERGENCY MEDICINE

## 2025-07-30 PROCEDURE — 93005 ELECTROCARDIOGRAM TRACING: CPT

## 2025-07-30 PROCEDURE — 85025 COMPLETE CBC W/AUTO DIFF WBC: CPT | Performed by: EMERGENCY MEDICINE

## 2025-07-30 PROCEDURE — 70450 CT HEAD/BRAIN W/O DYE: CPT

## 2025-07-30 PROCEDURE — 80307 DRUG TEST PRSMV CHEM ANLYZR: CPT | Performed by: EMERGENCY MEDICINE

## 2025-07-30 PROCEDURE — 99285 EMERGENCY DEPT VISIT HI MDM: CPT

## 2025-07-30 PROCEDURE — 82077 ASSAY SPEC XCP UR&BREATH IA: CPT | Performed by: EMERGENCY MEDICINE

## 2025-07-30 PROCEDURE — 82075 ASSAY OF BREATH ETHANOL: CPT | Performed by: EMERGENCY MEDICINE

## 2025-07-30 PROCEDURE — 80053 COMPREHEN METABOLIC PANEL: CPT | Performed by: EMERGENCY MEDICINE

## 2025-07-30 PROCEDURE — 36415 COLL VENOUS BLD VENIPUNCTURE: CPT | Performed by: EMERGENCY MEDICINE

## 2025-07-30 PROCEDURE — 81003 URINALYSIS AUTO W/O SCOPE: CPT | Performed by: EMERGENCY MEDICINE

## 2025-07-30 PROCEDURE — 84443 ASSAY THYROID STIM HORMONE: CPT | Performed by: EMERGENCY MEDICINE

## 2025-07-30 RX ORDER — CLOZAPINE 100 MG/1
300 TABLET ORAL
Status: DISCONTINUED | OUTPATIENT
Start: 2025-07-30 | End: 2025-07-31

## 2025-07-30 RX ORDER — TRAZODONE HYDROCHLORIDE 50 MG/1
50 TABLET ORAL
Status: DISCONTINUED | OUTPATIENT
Start: 2025-07-30 | End: 2025-07-31 | Stop reason: HOSPADM

## 2025-07-30 RX ORDER — TOPIRAMATE 25 MG/1
25 TABLET, FILM COATED ORAL 2 TIMES DAILY
Status: DISCONTINUED | OUTPATIENT
Start: 2025-07-30 | End: 2025-07-31 | Stop reason: HOSPADM

## 2025-07-30 RX ORDER — CLOZAPINE 100 MG/1
100 TABLET ORAL DAILY
Status: DISCONTINUED | OUTPATIENT
Start: 2025-07-30 | End: 2025-07-31

## 2025-07-30 RX ORDER — CARBIDOPA AND LEVODOPA 25; 100 MG/1; MG/1
1 TABLET ORAL 2 TIMES DAILY
Status: DISCONTINUED | OUTPATIENT
Start: 2025-07-30 | End: 2025-07-31 | Stop reason: HOSPADM

## 2025-07-30 RX ORDER — DONEPEZIL HYDROCHLORIDE 5 MG/1
10 TABLET, FILM COATED ORAL
Status: DISCONTINUED | OUTPATIENT
Start: 2025-07-30 | End: 2025-07-31 | Stop reason: HOSPADM

## 2025-07-30 RX ORDER — TEMAZEPAM 15 MG/1
15 CAPSULE ORAL
Status: DISCONTINUED | OUTPATIENT
Start: 2025-07-30 | End: 2025-07-31 | Stop reason: HOSPADM

## 2025-07-30 RX ORDER — METOPROLOL SUCCINATE 25 MG/1
25 TABLET, EXTENDED RELEASE ORAL DAILY
Status: DISCONTINUED | OUTPATIENT
Start: 2025-07-31 | End: 2025-07-31 | Stop reason: HOSPADM

## 2025-07-30 RX ADMIN — TRAZODONE HYDROCHLORIDE 50 MG: 50 TABLET ORAL at 21:47

## 2025-07-30 RX ADMIN — CARBIDOPA AND LEVODOPA 1 TABLET: 25; 100 TABLET ORAL at 19:00

## 2025-07-30 RX ADMIN — TOPIRAMATE 25 MG: 25 TABLET, FILM COATED ORAL at 19:00

## 2025-07-30 RX ADMIN — DONEPEZIL HYDROCHLORIDE 10 MG: 5 TABLET ORAL at 21:51

## 2025-07-30 RX ADMIN — TEMAZEPAM 15 MG: 15 CAPSULE ORAL at 21:48

## 2025-07-31 ENCOUNTER — APPOINTMENT (EMERGENCY)
Dept: RADIOLOGY | Facility: HOSPITAL | Age: 75
End: 2025-07-31
Payer: MEDICARE

## 2025-07-31 ENCOUNTER — HOSPITAL ENCOUNTER (INPATIENT)
Facility: HOSPITAL | Age: 75
LOS: 13 days | Discharge: NURSING, REHAB OR LONG-TERM CARE | DRG: 885 | End: 2025-08-13
Attending: PSYCHIATRY & NEUROLOGY | Admitting: PSYCHIATRY & NEUROLOGY
Payer: MEDICARE

## 2025-07-31 ENCOUNTER — APPOINTMENT (EMERGENCY)
Dept: CT IMAGING | Facility: HOSPITAL | Age: 75
End: 2025-07-31
Payer: MEDICARE

## 2025-07-31 VITALS
DIASTOLIC BLOOD PRESSURE: 65 MMHG | SYSTOLIC BLOOD PRESSURE: 143 MMHG | OXYGEN SATURATION: 98 % | RESPIRATION RATE: 16 BRPM | TEMPERATURE: 97.8 F | HEART RATE: 59 BPM

## 2025-07-31 DIAGNOSIS — R41.3 MEMORY DIFFICULTIES: ICD-10-CM

## 2025-07-31 PROBLEM — F25.9 SCHIZOAFFECTIVE DISORDER (HCC): Status: ACTIVE | Noted: 2025-07-31

## 2025-07-31 PROBLEM — F25.9 SCHIZOAFFECTIVE DISORDER (HCC): Status: RESOLVED | Noted: 2025-07-31 | Resolved: 2025-07-31

## 2025-07-31 PROCEDURE — 74177 CT ABD & PELVIS W/CONTRAST: CPT

## 2025-07-31 PROCEDURE — 96361 HYDRATE IV INFUSION ADD-ON: CPT

## 2025-07-31 PROCEDURE — 99205 OFFICE O/P NEW HI 60 MIN: CPT | Performed by: PSYCHIATRY & NEUROLOGY

## 2025-07-31 PROCEDURE — 74018 RADEX ABDOMEN 1 VIEW: CPT

## 2025-07-31 PROCEDURE — 96374 THER/PROPH/DIAG INJ IV PUSH: CPT

## 2025-07-31 RX ORDER — QUETIAPINE FUMARATE 100 MG/1
100 TABLET, FILM COATED ORAL
Status: CANCELLED | OUTPATIENT
Start: 2025-07-31

## 2025-07-31 RX ORDER — LORAZEPAM 1 MG/1
1 TABLET ORAL
Status: CANCELLED | OUTPATIENT
Start: 2025-07-31

## 2025-07-31 RX ORDER — ONDANSETRON 4 MG/1
4 TABLET, ORALLY DISINTEGRATING ORAL ONCE
Status: COMPLETED | OUTPATIENT
Start: 2025-07-31 | End: 2025-07-31

## 2025-07-31 RX ORDER — ONDANSETRON 2 MG/ML
4 INJECTION INTRAMUSCULAR; INTRAVENOUS ONCE
Status: COMPLETED | OUTPATIENT
Start: 2025-07-31 | End: 2025-07-31

## 2025-07-31 RX ORDER — DONEPEZIL HYDROCHLORIDE 5 MG/1
10 TABLET, FILM COATED ORAL
Status: CANCELLED | OUTPATIENT
Start: 2025-07-31

## 2025-07-31 RX ORDER — FAMOTIDINE 20 MG/1
20 TABLET, FILM COATED ORAL ONCE
Status: COMPLETED | OUTPATIENT
Start: 2025-07-31 | End: 2025-07-31

## 2025-07-31 RX ORDER — TRAZODONE HYDROCHLORIDE 50 MG/1
50 TABLET ORAL
Status: CANCELLED | OUTPATIENT
Start: 2025-07-31

## 2025-07-31 RX ORDER — TOPIRAMATE 25 MG/1
25 TABLET, FILM COATED ORAL 2 TIMES DAILY
Status: CANCELLED | OUTPATIENT
Start: 2025-07-31

## 2025-07-31 RX ORDER — HYDROXYZINE HYDROCHLORIDE 25 MG/1
25 TABLET, FILM COATED ORAL
Status: CANCELLED | OUTPATIENT
Start: 2025-07-31

## 2025-07-31 RX ORDER — ACETAMINOPHEN 325 MG/1
650 TABLET ORAL EVERY 4 HOURS PRN
Status: CANCELLED | OUTPATIENT
Start: 2025-07-31

## 2025-07-31 RX ORDER — CLOZAPINE 100 MG/1
100 TABLET ORAL ONCE
Status: DISCONTINUED | OUTPATIENT
Start: 2025-07-31 | End: 2025-07-31

## 2025-07-31 RX ORDER — CARBIDOPA AND LEVODOPA 25; 100 MG/1; MG/1
1 TABLET ORAL 2 TIMES DAILY
Status: CANCELLED | OUTPATIENT
Start: 2025-07-31

## 2025-07-31 RX ORDER — QUETIAPINE FUMARATE 25 MG/1
50 TABLET, FILM COATED ORAL
Status: CANCELLED | OUTPATIENT
Start: 2025-07-31

## 2025-07-31 RX ORDER — BENZTROPINE MESYLATE 1 MG/1
0.5 TABLET ORAL
Status: CANCELLED | OUTPATIENT
Start: 2025-07-31

## 2025-07-31 RX ORDER — MAGNESIUM HYDROXIDE/ALUMINUM HYDROXICE/SIMETHICONE 120; 1200; 1200 MG/30ML; MG/30ML; MG/30ML
30 SUSPENSION ORAL ONCE
Status: COMPLETED | OUTPATIENT
Start: 2025-07-31 | End: 2025-07-31

## 2025-07-31 RX ORDER — LORAZEPAM 0.5 MG/1
0.5 TABLET ORAL
Status: CANCELLED | OUTPATIENT
Start: 2025-07-31

## 2025-07-31 RX ORDER — OLANZAPINE 10 MG/2ML
5 INJECTION, POWDER, FOR SOLUTION INTRAMUSCULAR
Status: CANCELLED | OUTPATIENT
Start: 2025-07-31

## 2025-07-31 RX ORDER — BENZTROPINE MESYLATE 1 MG/ML
1 INJECTION, SOLUTION INTRAMUSCULAR; INTRAVENOUS
Status: CANCELLED | OUTPATIENT
Start: 2025-07-31

## 2025-07-31 RX ORDER — LORAZEPAM 2 MG/ML
1 INJECTION INTRAMUSCULAR
Status: CANCELLED | OUTPATIENT
Start: 2025-07-31

## 2025-07-31 RX ORDER — METOPROLOL SUCCINATE 25 MG/1
25 TABLET, EXTENDED RELEASE ORAL DAILY
Status: CANCELLED | OUTPATIENT
Start: 2025-08-01

## 2025-07-31 RX ORDER — QUETIAPINE FUMARATE 25 MG/1
25 TABLET, FILM COATED ORAL
Status: CANCELLED | OUTPATIENT
Start: 2025-07-31

## 2025-07-31 RX ORDER — ACETAMINOPHEN 325 MG/1
975 TABLET ORAL EVERY 6 HOURS PRN
Status: CANCELLED | OUTPATIENT
Start: 2025-07-31

## 2025-07-31 RX ADMIN — SODIUM CHLORIDE 1000 ML: 0.9 INJECTION, SOLUTION INTRAVENOUS at 15:35

## 2025-07-31 RX ADMIN — ALUMINUM HYDROXIDE, MAGNESIUM HYDROXIDE, AND DIMETHICONE 30 ML: 200; 20; 200 SUSPENSION ORAL at 11:42

## 2025-07-31 RX ADMIN — IOHEXOL 100 ML: 350 INJECTION, SOLUTION INTRAVENOUS at 09:43

## 2025-07-31 RX ADMIN — CARBIDOPA AND LEVODOPA 1 TABLET: 25; 100 TABLET ORAL at 14:11

## 2025-07-31 RX ADMIN — ALUMINUM HYDROXIDE, MAGNESIUM HYDROXIDE, AND DIMETHICONE 30 ML: 200; 20; 200 SUSPENSION ORAL at 05:50

## 2025-07-31 RX ADMIN — ONDANSETRON 4 MG: 4 TABLET, ORALLY DISINTEGRATING ORAL at 03:04

## 2025-07-31 RX ADMIN — TOPIRAMATE 25 MG: 25 TABLET, FILM COATED ORAL at 14:11

## 2025-07-31 RX ADMIN — FAMOTIDINE 20 MG: 20 TABLET, FILM COATED ORAL at 05:50

## 2025-07-31 RX ADMIN — ONDANSETRON 4 MG: 2 INJECTION INTRAMUSCULAR; INTRAVENOUS at 09:09

## 2025-07-31 RX ADMIN — METOPROLOL SUCCINATE 25 MG: 25 TABLET, EXTENDED RELEASE ORAL at 14:10

## 2025-07-31 RX ADMIN — SODIUM CHLORIDE 1000 ML: 0.9 INJECTION, SOLUTION INTRAVENOUS at 09:11

## 2025-08-01 PROBLEM — R11.2 NAUSEA & VOMITING: Status: ACTIVE | Noted: 2025-08-01

## 2025-08-06 ENCOUNTER — TELEPHONE (OUTPATIENT)
Age: 75
End: 2025-08-06

## 2025-08-06 DIAGNOSIS — K21.9 GASTROESOPHAGEAL REFLUX DISEASE: ICD-10-CM

## 2025-08-06 RX ORDER — OMEPRAZOLE 40 MG/1
40 CAPSULE, DELAYED RELEASE ORAL DAILY
Qty: 31 CAPSULE | Refills: 0 | Status: SHIPPED | OUTPATIENT
Start: 2025-08-06 | End: 2025-08-13
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
## 2025-08-12 PROBLEM — F33.3 MDD (MAJOR DEPRESSIVE DISORDER), RECURRENT, SEVERE, WITH PSYCHOSIS (HCC): Status: ACTIVE | Noted: 2025-08-12

## 2025-08-12 PROBLEM — F33.3 MDD (MAJOR DEPRESSIVE DISORDER), RECURRENT, SEVERE, WITH PSYCHOSIS (HCC): Status: RESOLVED | Noted: 2025-08-12 | Resolved: 2025-08-12

## 2025-08-12 PROBLEM — R11.2 NAUSEA & VOMITING: Status: RESOLVED | Noted: 2025-08-01 | Resolved: 2025-08-12

## (undated) DEVICE — LIGHT HANDLE COVER SLEEVE DISP BLUE STELLAR

## (undated) DEVICE — SCD SEQUENTIAL COMPRESSION COMFORT SLEEVE MEDIUM KNEE LENGTH: Brand: KENDALL SCD

## (undated) DEVICE — IV CATH 14 G X 1.75

## (undated) DEVICE — [HIGH FLOW INSUFFLATOR,  DO NOT USE IF PACKAGE IS DAMAGED,  KEEP DRY,  KEEP AWAY FROM SUNLIGHT,  PROTECT FROM HEAT AND RADIOACTIVE SOURCES.]: Brand: PNEUMOSURE

## (undated) DEVICE — ALLENTOWN LAP CHOLE APP PACK: Brand: CARDINAL HEALTH

## (undated) DEVICE — 5 MM CURVED DISSECTORS WITH MONOPOLAR CAUTERY: Brand: ENDOPATH

## (undated) DEVICE — COTTON TIP APPLICTOR 2 PK

## (undated) DEVICE — TUBING SMOKE EVAC W/FILTRATION DEVICE PLUMEPORT ACTIV

## (undated) DEVICE — DRAPE EQUIPMENT RF WAND

## (undated) DEVICE — NEPTUNE E-SEP SMOKE EVACUATION PENCIL, COATED, 70MM BLADE, PUSH BUTTON SWITCH: Brand: NEPTUNE E-SEP

## (undated) DEVICE — ELECTRODE LAP L WIRE E-Z CLEAN 33CM -0100

## (undated) DEVICE — INTENDED FOR TISSUE SEPARATION, AND OTHER PROCEDURES THAT REQUIRE A SHARP SURGICAL BLADE TO PUNCTURE OR CUT.: Brand: BARD-PARKER SAFETY BLADES SIZE 15, STERILE

## (undated) DEVICE — GLOVE INDICATOR PI UNDERGLOVE SZ 7.5 BLUE

## (undated) DEVICE — METZENBAUM ADTEC SINGLE USE DISSECTING SCISSORS, SHAFT ONLY, MONOPOLAR, CURVED TO LEFT, WORKING LENGTH: 12 1/4", (310 MM), DIAM. 5 MM, INSULATED, DOUBLE ACTION, STERILE, DISPOSABLE, PACKAGE OF 10 PIECES: Brand: AESCULAP

## (undated) DEVICE — 3M™ STERI-STRIP™ REINFORCED ADHESIVE SKIN CLOSURES, R1546, 1/4 IN X 4 IN (6 MM X 100 MM), 10 STRIPS/ENVELOPE: Brand: 3M™ STERI-STRIP™

## (undated) DEVICE — SUT VICRYL 0 UR-6 27 IN J603H

## (undated) DEVICE — PAD GROUNDING ADULT

## (undated) DEVICE — 3M™ TEGADERM™ TRANSPARENT FILM DRESSING FRAME STYLE, 1624W, 2-3/8 IN X 2-3/4 IN (6 CM X 7 CM), 100/CT 4CT/CASE: Brand: 3M™ TEGADERM™

## (undated) DEVICE — CHLORAPREP HI-LITE 26ML ORANGE

## (undated) DEVICE — TROCAR: Brand: KII FIOS FIRST ENTRY

## (undated) DEVICE — TISSUE RETRIEVAL SYSTEM: Brand: INZII RETRIEVAL SYSTEM

## (undated) DEVICE — SUT VICRYL 4-0 PS-2 27 IN J426H

## (undated) DEVICE — CHOLE CATH KIT ARROW

## (undated) DEVICE — TOWEL SET X-RAY

## (undated) DEVICE — DRAPE C-ARM X-RAY

## (undated) DEVICE — TROCAR: Brand: KII® SLEEVE

## (undated) DEVICE — GLOVE SRG BIOGEL ECLIPSE 7.5

## (undated) DEVICE — SYRINGE 10ML LL

## (undated) DEVICE — GAUZE SPONGES,8 PLY: Brand: CURITY

## (undated) DEVICE — LIGAMAX 5 MM ENDOSCOPIC MULTIPLE CLIP APPLIER: Brand: LIGAMAX

## (undated) DEVICE — 4-PORT MANIFOLD: Brand: NEPTUNE 2